# Patient Record
Sex: MALE | Race: BLACK OR AFRICAN AMERICAN | Employment: OTHER | URBAN - METROPOLITAN AREA
[De-identification: names, ages, dates, MRNs, and addresses within clinical notes are randomized per-mention and may not be internally consistent; named-entity substitution may affect disease eponyms.]

---

## 2017-01-09 RX ORDER — FOLIC ACID 1 MG/1
TABLET ORAL
Qty: 90 TAB | Refills: 3 | Status: SHIPPED | OUTPATIENT
Start: 2017-01-09 | End: 2017-12-18 | Stop reason: SDUPTHER

## 2017-04-18 ENCOUNTER — OFFICE VISIT (OUTPATIENT)
Dept: FAMILY MEDICINE CLINIC | Age: 77
End: 2017-04-18

## 2017-04-18 VITALS
DIASTOLIC BLOOD PRESSURE: 91 MMHG | BODY MASS INDEX: 26.52 KG/M2 | SYSTOLIC BLOOD PRESSURE: 139 MMHG | HEART RATE: 58 BPM | WEIGHT: 175 LBS | RESPIRATION RATE: 18 BRPM | HEIGHT: 68 IN | TEMPERATURE: 97.9 F | OXYGEN SATURATION: 98 %

## 2017-04-18 DIAGNOSIS — Z00.00 ROUTINE GENERAL MEDICAL EXAMINATION AT A HEALTH CARE FACILITY: ICD-10-CM

## 2017-04-18 DIAGNOSIS — Z71.89 ACP (ADVANCE CARE PLANNING): ICD-10-CM

## 2017-04-18 DIAGNOSIS — M19.90 OSTEOARTHRITIS, UNSPECIFIED OSTEOARTHRITIS TYPE, UNSPECIFIED SITE: Primary | ICD-10-CM

## 2017-04-18 DIAGNOSIS — Z13.39 SCREENING FOR ALCOHOLISM: ICD-10-CM

## 2017-04-18 RX ORDER — OXYCODONE AND ACETAMINOPHEN 5; 325 MG/1; MG/1
TABLET ORAL
Refills: 0 | COMMUNITY
Start: 2017-02-24 | End: 2018-03-08 | Stop reason: SDUPTHER

## 2017-04-18 RX ORDER — OXYCODONE AND ACETAMINOPHEN 5; 325 MG/1; MG/1
1 TABLET ORAL
Qty: 28 TAB | Refills: 0 | Status: SHIPPED | OUTPATIENT
Start: 2017-04-18 | End: 2017-08-18 | Stop reason: SDUPTHER

## 2017-04-18 RX ORDER — OMEPRAZOLE 40 MG/1
CAPSULE, DELAYED RELEASE ORAL
COMMUNITY
Start: 2017-02-19 | End: 2017-11-16 | Stop reason: SDUPTHER

## 2017-04-18 NOTE — PATIENT INSTRUCTIONS
Low Back Arthritis: Exercises  Your Care Instructions  Here are some examples of typical rehabilitation exercises for your condition. Start each exercise slowly. Ease off the exercise if you start to have pain. Your doctor or physical therapist will tell you when you can start these exercises and which ones will work best for you. When you are not being active, find a comfortable position for rest. Some people are comfortable on the floor or a medium-firm bed with a small pillow under their head and another under their knees. Some people prefer to lie on their side with a pillow between their knees. Don't stay in one position for too long. Take short walks (10 to 20 minutes) every 2 to 3 hours. Avoid slopes, hills, and stairs until you feel better. Walk only distances you can manage without pain, especially leg pain. How to do the exercises  Pelvic tilt    1. Lie on your back with your knees bent. 2. \"Brace\" your stomachtighten your muscles by pulling in and imagining your belly button moving toward your spine. 3. Press your lower back into the floor. You should feel your hips and pelvis rock back. 4. Hold for 6 seconds while breathing smoothly. 5. Relax and allow your pelvis and hips to rock forward. 6. Repeat 8 to 12 times. Back stretches    1. Get down on your hands and knees on the floor. 2. Relax your head and allow it to droop. Round your back up toward the ceiling until you feel a nice stretch in your upper, middle, and lower back. Hold this stretch for as long as it feels comfortable, or about 15 to 30 seconds. 3. Return to the starting position with a flat back while you are on your hands and knees. 4. Let your back sway by pressing your stomach toward the floor. Lift your buttocks toward the ceiling. 5. Hold this position for 15 to 30 seconds. 6. Repeat 2 to 4 times. Follow-up care is a key part of your treatment and safety.  Be sure to make and go to all appointments, and call your doctor if you are having problems. It's also a good idea to know your test results and keep a list of the medicines you take. Where can you learn more? Go to http://jennifer-elida.info/. Enter C978 in the search box to learn more about \"Low Back Arthritis: Exercises. \"  Current as of: May 23, 2016  Content Version: 11.2  © 8653-4081 [employer]. Care instructions adapted under license by [employer] (which disclaims liability or warranty for this information). If you have questions about a medical condition or this instruction, always ask your healthcare professional. [place] [address] any warranty or liability for your use of this information.

## 2017-04-18 NOTE — PROGRESS NOTES
Chief Complaint   Patient presents with    Arthritis     lower extremities     Patient states pain has gotten worse in the past month; states pain is worse in the morning; have been treating with oxycodone; with relief noted. 1. Have you been to the ER, urgent care clinic since your last visit? Hospitalized since your last visit? No    2. Have you seen or consulted any other health care providers outside of the [address] [place] [address] since your last visit? Include any pap smears or colon screening.  No

## 2017-04-18 NOTE — PROGRESS NOTES
Donna Mike is a 78 y.o. male   Chief Complaint   Patient presents with    Arthritis     lower extremities    pt states that he has been having increasing pain in his lower extremitites with increasing stiffness. Pain was an 8/10 this morning and the pain medication does help him to function better in his day to day activities,  checked. Pt also due for AWV. he is a 78y.o. year old male who presents for evalution. Reviewed PmHx, RxHx, FmHx, SocHx, AllgHx and updated and dated in the chart. Review of Systems - negative except as listed above in the HPI    Objective:     Vitals:    04/18/17 0723   BP: (!) 139/91   Pulse: (!) 58   Resp: 18   Temp: 97.9 °F (36.6 °C)   TempSrc: Oral   SpO2: 98%   Weight: 175 lb (79.4 kg)   Height: 5' 8\" (1.727 m)       Current Outpatient Prescriptions   Medication Sig    oxyCODONE-acetaminophen (PERCOCET) 5-325 mg per tablet TK 1 T PO  Q 4 TO 6 H PRF PAIN    omeprazole (PRILOSEC) 40 mg capsule     oxyCODONE-acetaminophen (PERCOCET) 5-325 mg per tablet Take 1 Tab by mouth every six (6) hours as needed for Pain. Max Daily Amount: 4 Tabs.  folic acid (FOLVITE) 1 mg tablet TAKE 1 TABLET BY MOUTH DAILY. EXCEPT DAY YOU TAKE METHOTREXATE.  atenolol (TENORMIN) 50 mg tablet Take 1 Tab by mouth daily.  losartan (COZAAR) 50 mg tablet Take 1 Tab by mouth daily.  metFORMIN (GLUCOPHAGE) 1,000 mg tablet TAKE 1 TABLET BY MOUTH TWICE A DAY WITH MEALS    sildenafil citrate (VIAGRA) 100 mg tablet Take 1 Tab by mouth as needed.  SLOW RELEASE IRON 143 mg (45 mg iron) ER tablet TAKE 1 TAB BY MOUTH DAILY (BEFORE BREAKFAST).  atorvastatin (LIPITOR) 40 mg tablet Take 1 Tab by mouth daily.  aspirin delayed-release 81 mg tablet Take 81 mg by mouth daily.  VYTORIN 10/80 10-80 mg per tablet      No current facility-administered medications for this visit.         Physical Examination: General appearance - alert, well appearing, and in no distress  Eyes - pupils equal and reactive, extraocular eye movements intact  Chest - clear to auscultation, no wheezes, rales or rhonchi, symmetric air entry  Heart - normal rate, regular rhythm, normal S1, S2, no murmurs, rubs, clicks or gallops  Musculoskeletal - stiffness of joints, ttp over hand/finger joints    Ext b/l LE pulses without edema  Assessment/ Plan:   Nita Greco was seen today for arthritis. Diagnoses and all orders for this visit:    Osteoarthritis, unspecified osteoarthritis type, unspecified site  -     oxyCODONE-acetaminophen (PERCOCET) 5-325 mg per tablet; Take 1 Tab by mouth every six (6) hours as needed for Pain. Max Daily Amount: 4 Tabs. Routine general medical examination at a health care facility    Screening for alcoholism    ACP (advance care planning)  Comments:  discussed       Follow-up Disposition: Not on File    I have discussed the diagnosis with the patient and the intended plan as seen in the above orders. The patient has received an after-visit summary and questions were answered concerning future plans. Pt conveyed understanding of plan. Medication Side Effects and Warnings were discussed with patient      Justyna Montes, DO       This is a Subsequent Medicare Annual Wellness Visit providing Personalized Prevention Plan Services (PPPS) (Performed 12 months after initial AWV and PPPS )    I have reviewed the patient's medical history in detail and updated the computerized patient record.      History     Past Medical History:   Diagnosis Date    CAD (coronary artery disease)     CAD (coronary artery disease), native coronary artery     Degenerative joint disease 1/28/2011    Diabetes type 1, controlled ([facility] [address]     Essential hypertension     Hyperlipidemia     Hypertension, benign     Obstructive sleep apnea 1/28/2011    ADELA (obstructive sleep apnea)       Past Surgical History:   Procedure Laterality Date    HX BACK SURGERY      HX CARPAL TUNNEL RELEASE      HX CORONARY STENT PLACEMENT      HX HEART CATHETERIZATION      HX HIP REPLACEMENT       Current Outpatient Prescriptions   Medication Sig Dispense Refill    oxyCODONE-acetaminophen (PERCOCET) 5-325 mg per tablet TK 1 T PO  Q 4 TO 6 H PRF PAIN  0    omeprazole (PRILOSEC) 40 mg capsule       oxyCODONE-acetaminophen (PERCOCET) 5-325 mg per tablet Take 1 Tab by mouth every six (6) hours as needed for Pain. Max Daily Amount: 4 Tabs. 28 Tab 0    folic acid (FOLVITE) 1 mg tablet TAKE 1 TABLET BY MOUTH DAILY. EXCEPT DAY YOU TAKE METHOTREXATE. 90 Tab 3    atenolol (TENORMIN) 50 mg tablet Take 1 Tab by mouth daily. 90 Tab 3    losartan (COZAAR) 50 mg tablet Take 1 Tab by mouth daily. 90 Tab 3    metFORMIN (GLUCOPHAGE) 1,000 mg tablet TAKE 1 TABLET BY MOUTH TWICE A DAY WITH MEALS 180 Tab 3    sildenafil citrate (VIAGRA) 100 mg tablet Take 1 Tab by mouth as needed. 18 Tab 3    SLOW RELEASE IRON 143 mg (45 mg iron) ER tablet TAKE 1 TAB BY MOUTH DAILY (BEFORE BREAKFAST). 30 Tab 5    atorvastatin (LIPITOR) 40 mg tablet Take 1 Tab by mouth daily. 90 Tab 3    aspirin delayed-release 81 mg tablet Take 81 mg by mouth daily.       VYTORIN 10/80 10-80 mg per tablet        Allergies   Allergen Reactions    Ace Inhibitors Swelling     Angioedema      Family History   Problem Relation Age of Onset   [place] Arthritis-osteo Mother     No Known Problems Father      Social History   Substance Use Topics    Smoking status: Former Smoker     Quit date: 10/24/2009    Smokeless tobacco: Never Used    Alcohol use No     Patient Active Problem List   Diagnosis Code    Coronary artery disease I25.10    Dyslipidemia E78.5    Hypertension, benign I10    Obstructive sleep apnea G47.33    Degenerative joint disease M19.90    Diabetes mellitus type 2 in nonobese ([facility] [address] E11.9    ACP (advance care planning) Z71.89    Lumbar disc disease M51.9    Cervical disc disease M50.90    Dupuytren's contracture of right hand M72.0       Depression Risk Factor Screening:     PHQ 2 / 9, over the last two weeks 4/18/2017   Little interest or pleasure in doing things Not at all   Feeling down, depressed or hopeless Not at all   Total Score PHQ 2 0     Alcohol Risk Factor Screening: On any occasion during the past 3 months, have you had more than 4 drinks containing alcohol? No    Do you average more than 14 drinks per week? No      Functional Ability and Level of Safety:     Hearing Loss   normal-to-mild    Activities of Daily Living   Self-care. Requires assistance with: no ADLs    Fall Risk     Fall Risk Assessment, last 12 mths 4/18/2017   Able to walk? Yes   Fall in past 12 months? No     Abuse Screen   Patient is not abused    Review of Systems   A comprehensive review of systems was negative except for that written in the HPI. Physical Examination     Evaluation of Cognitive Function:  Mood/affect:  happy  Appearance: age appropriate  Family member/caregiver input: n/a    See above     Patient Care Team:  Pretty Strange DO as PCP - General (Family Practice)  Mónica Foley MD (Orthopedic Surgery)  Jayna Ramirez MD (Orthopedic Surgery)  Chhaya Rios MD (Orthopedic Surgery)    Advice/Referrals/Counseling   Education and counseling provided:  Are appropriate based on today's review and evaluation  End-of-Life planning (with patient's consent)      Assessment/Plan       ICD-10-CM ICD-9-CM    1. Osteoarthritis, unspecified osteoarthritis type, unspecified site M19.90 715.90 oxyCODONE-acetaminophen (PERCOCET) 5-325 mg per tablet   2. Routine general medical examination at a health care facility Z00.00 V70.0    3. Screening for alcoholism Z13.89 V79.1    4. ACP (advance care planning) Z71.89 V65.49     discussed   . I have discussed the diagnosis with the patient and the intended plan as seen in the above orders. The patient has received an after-visit summary and questions were answered concerning future plans. Pt conveyed understanding of plan.       Dr Pretty Strange

## 2017-04-18 NOTE — MR AVS SNAPSHOT
Visit Information Date & Time Provider Department Dept. Phone Encounter #  
 4/18/2017  7:30 AM Clarke Cabezas [phone] [id number] Your Appointments 4/28/2017  7:15 AM  
ESTABLISHED PATIENT with Bob Sukhidayana Amador DO [address] ([facility]) Appt Note: fuv on diabetes [address] [address] [address]  
[phone]  
  
   
 [address] [address] [address]  
  
    
 12/15/2017  2:00 PM  
ESTABLISHED PATIENT with Marvin Paz MD  
[facility] (AVI SCHEDULING) Appt Note: annual  
 [address] [facility] [place] [address]  
[address] [facility] [place] [address] [address] Upcoming Health Maintenance Date Due COLONOSCOPY 2/19/1960 ZOSTER VACCINE AGE 60> 2/19/2002 MEDICARE YEARLY EXAM 10/27/2016 FOOT EXAM Q1 10/27/2016 HEMOGLOBIN A1C Q6M 4/28/2017 MICROALBUMIN Q1 7/28/2017 EYE EXAM RETINAL OR DILATED Q1 8/30/2017 LIPID PANEL Q1 10/28/2017 GLAUCOMA SCREENING Q2Y 8/30/2018 DTaP/Tdap/Td series (2 - Td) 10/27/2025 Allergies as of 4/18/2017  Review Complete On: 4/18/2017 By: Aislinn Mandel LPN Severity Noted Reaction Type Reactions Ace Inhibitors  11/16/2010    Swelling Angioedema Current Immunizations  Reviewed on 9/1/2016 Name Date Influenza Vaccine 9/25/2016 Influenza Vaccine (Quad) PF 9/29/2015 Pneumococcal Conjugate (PCV-13) 10/27/2015 Pneumococcal Polysaccharide (PPSV-23) 10/1/2012 Tdap 10/27/2015 Not reviewed this visit You Were Diagnosed With   
  
 Codes Comments Osteoarthritis, unspecified osteoarthritis type, unspecified site    -  Primary ICD-10-CM: M19.90 ICD-9-CM: 715.90 Routine general medical examination at a health care facility     ICD-10-CM: Z00.00 ICD-9-CM: V70.0 Screening for alcoholism     ICD-10-CM: Z13.89 ICD-9-CM: V79.1 ACP (advance care planning)     ICD-10-CM: Z71.89 ICD-9-CM: V65.49 discussed Vitals BP Pulse Temp Resp Height(growth percentile) Weight(growth percentile) (!) 139/91 (BP 1 Location: Right arm, BP Patient Position: Sitting) (!) 58 97.9 °F (36.6 °C) (Oral) 18 5' 8\" (1.727 m) 175 lb (79.4 kg) SpO2 BMI Smoking Status 98% 26.61 kg/m2 Former Smoker Vitals History BMI and BSA Data Body Mass Index Body Surface Area  
 26.61 kg/m 2 1.95 m 2 Preferred Pharmacy Pharmacy Name Phone [facility] [place] [address], [address] [facility] [phone] Your Updated Medication List  
  
   
This list is accurate as of: 4/18/17  7:48 AM.  Always use your most recent med list.  
  
  
  
  
 aspirin delayed-release 81 mg tablet Take 81 mg by mouth daily. atenolol 50 mg tablet Commonly known as:  TENORMIN Take 1 Tab by mouth daily. atorvastatin 40 mg tablet Commonly known as:  LIPITOR Take 1 Tab by mouth daily. folic acid 1 mg tablet Commonly known as:  FOLVITE  
TAKE 1 TABLET BY MOUTH DAILY. EXCEPT DAY YOU TAKE METHOTREXATE. losartan 50 mg tablet Commonly known as:  COZAAR Take 1 Tab by mouth daily. metFORMIN 1,000 mg tablet Commonly known as:  GLUCOPHAGE  
TAKE 1 TABLET BY MOUTH TWICE A DAY WITH MEALS  
  
 omeprazole 40 mg capsule Commonly known as:  PRILOSEC  
  
 * oxyCODONE-acetaminophen 5-325 mg per tablet Commonly known as:  PERCOCET TK 1 T PO  Q 4 TO 6 H PRF PAIN  
  
 * oxyCODONE-acetaminophen 5-325 mg per tablet Commonly known as:  PERCOCET Take 1 Tab by mouth every six (6) hours as needed for Pain. Max Daily Amount: 4 Tabs.  
  
 sildenafil citrate 100 mg tablet Commonly known as:  VIAGRA Take 1 Tab by mouth as needed. SLOW RELEASE IRON 143 mg (45 mg iron) Tber Generic drug:  ferrous sulfate TAKE 1 TAB BY MOUTH DAILY (BEFORE BREAKFAST). Vytorin 10/80 10-80 mg per tablet Generic drug:  ezetimibe-simvastatin * Notice: This list has 2 medication(s) that are the same as other medications prescribed for you. Read the directions carefully, and ask your doctor or other care provider to review them with you. Prescriptions Printed Refills  
 oxyCODONE-acetaminophen (PERCOCET) 5-325 mg per tablet 0 Sig: Take 1 Tab by mouth every six (6) hours as needed for Pain. Max Daily Amount: 4 Tabs. Class: Print Route: Oral  
  
Patient Instructions Low Back Arthritis: Exercises Your Care Instructions Here are some examples of typical rehabilitation exercises for your condition. Start each exercise slowly. Ease off the exercise if you start to have pain. Your doctor or physical therapist will tell you when you can start these exercises and which ones will work best for you. When you are not being active, find a comfortable position for rest. Some people are comfortable on the floor or a medium-firm bed with a small pillow under their head and another under their knees. Some people prefer to lie on their side with a pillow between their knees. Don't stay in one position for too long. Take short walks (10 to 20 minutes) every 2 to 3 hours. Avoid slopes, hills, and stairs until you feel better. Walk only distances you can manage without pain, especially leg pain. How to do the exercises Pelvic tilt 1. Lie on your back with your knees bent. 2. \"Brace\" your stomachtighten your muscles by pulling in and imagining your belly button moving toward your spine. 3. Press your lower back into the floor. You should feel your hips and pelvis rock back. 4. Hold for 6 seconds while breathing smoothly. 5. Relax and allow your pelvis and hips to rock forward. 6. Repeat 8 to 12 times. Back stretches 1. Get down on your hands and knees on the floor. 2. Relax your head and allow it to droop.  Round your back up toward the ceiling until you feel a nice stretch in your upper, middle, and lower back. Hold this stretch for as long as it feels comfortable, or about 15 to 30 seconds. 3. Return to the starting position with a flat back while you are on your hands and knees. 4. Let your back sway by pressing your stomach toward the floor. Lift your buttocks toward the ceiling. 5. Hold this position for 15 to 30 seconds. 6. Repeat 2 to 4 times. Follow-up care is a key part of your treatment and safety. Be sure to make and go to all appointments, and call your doctor if you are having problems. It's also a good idea to know your test results and keep a list of the medicines you take. Where can you learn more? Go to http://jennifer-elida.info/. Enter O297 in the search box to learn more about \"Low Back Arthritis: Exercises. \" Current as of: May 23, 2016 Content Version: 11.2 © 7174-6838 [employer]. Care instructions adapted under license by [employer] (which disclaims liability or warranty for this information). If you have questions about a medical condition or this instruction, always ask your healthcare professional. [address] any warranty or liability for your use of this information. Introducing \[facility]\"" & HEALTH SERVICES! Kirk Woodard introduces [employer] patient portal. Now you can access parts of your medical record, email your doctor's office, and request medication refills online. 1. In your internet browser, go to [email or web address] 2. Click on the First Time User? Click Here link in the Sign In box. You will see the New Member Sign Up page. 3. Enter your [employer] Access Code exactly as it appears below. You will not need to use this code after youve completed the sign-up process. If you do not sign up before the expiration date, you must request a new code. · [employer] Access Code: [email or web address] Expires: 7/17/2017  7:48 AM 
 
 4. Enter the last four digits of your Social Security Number (xxxx) and Date of Birth (mm/dd/yyyy) as indicated and click Submit. You will be taken to the next sign-up page. 5. Create a [employer] ID. This will be your [employer] login ID and cannot be changed, so think of one that is secure and easy to remember. 6. Create a [employer] password. You can change your password at any time. 7. Enter your Password Reset Question and Answer. This can be used at a later time if you forget your password. 8. Enter your e-mail address. You will receive e-mail notification when new information is available in [address] 9. Click Sign Up. You can now view and download portions of your medical record. 10. Click the Download Summary menu link to download a portable copy of your medical information. If you have questions, please visit the Frequently Asked Questions section of the [employer] website. Remember, [employer] is NOT to be used for urgent needs. For medical emergencies, dial 911. Now available from your iPhone and Android! Please provide this summary of care documentation to your next provider. Your primary care clinician is listed as Vilinda Screen. If you have any questions after today's visit, please call [phone].

## 2017-08-09 DIAGNOSIS — Z71.89 ACP (ADVANCE CARE PLANNING): Primary | ICD-10-CM

## 2017-08-18 ENCOUNTER — HOSPITAL ENCOUNTER (OUTPATIENT)
Dept: LAB | Age: 77
Discharge: HOME | End: 2017-08-18
Payer: MEDICARE

## 2017-08-18 ENCOUNTER — OFFICE VISIT (OUTPATIENT)
Dept: FAMILY MEDICINE CLINIC | Age: 77
End: 2017-08-18

## 2017-08-18 VITALS
SYSTOLIC BLOOD PRESSURE: 122 MMHG | TEMPERATURE: 97.8 F | OXYGEN SATURATION: 97 % | HEIGHT: 68 IN | WEIGHT: 175 LBS | BODY MASS INDEX: 26.52 KG/M2 | RESPIRATION RATE: 18 BRPM | HEART RATE: 62 BPM | DIASTOLIC BLOOD PRESSURE: 82 MMHG

## 2017-08-18 DIAGNOSIS — E11.9 DIABETES MELLITUS TYPE 2 IN NONOBESE (HCC): Primary | ICD-10-CM

## 2017-08-18 DIAGNOSIS — Z51.81 MEDICATION MONITORING ENCOUNTER: ICD-10-CM

## 2017-08-18 DIAGNOSIS — M19.90 OSTEOARTHRITIS, UNSPECIFIED OSTEOARTHRITIS TYPE, UNSPECIFIED SITE: ICD-10-CM

## 2017-08-18 DIAGNOSIS — M15.9 PRIMARY OSTEOARTHRITIS INVOLVING MULTIPLE JOINTS: ICD-10-CM

## 2017-08-18 DIAGNOSIS — I10 HYPERTENSION, BENIGN: ICD-10-CM

## 2017-08-18 LAB
BARBITURATES UR POC: NEGATIVE
BENZODIAZEPINES UR POC: NEGATIVE
COCAINE QL URINE POC: NEGATIVE
LOT EXP DATE POC: NORMAL
LOT NUMBER POC: NORMAL
MARIJUANA (THC) QL URINE POC: NEGATIVE
MDMA/ECSTASY UR POC: NEGATIVE
METHADONE QL URINE POC: NEGATIVE
METHAMPHETAMINE QL URINE POC: NEGATIVE
NTI OTHER MICRO TRANSPORT 977598: NEGATIVE
OPIATES QL URINE POC: NEGATIVE
OXYCODONE UR POC: NEGATIVE
VALID INTERNAL CONTROL?: YES

## 2017-08-18 PROCEDURE — 82043 UR ALBUMIN QUANTITATIVE: CPT

## 2017-08-18 PROCEDURE — 80053 COMPREHEN METABOLIC PANEL: CPT

## 2017-08-18 PROCEDURE — 83036 HEMOGLOBIN GLYCOSYLATED A1C: CPT

## 2017-08-18 RX ORDER — OXYCODONE AND ACETAMINOPHEN 5; 325 MG/1; MG/1
1 TABLET ORAL
Qty: 30 TAB | Refills: 0 | Status: SHIPPED | OUTPATIENT
Start: 2017-08-18 | End: 2017-10-24 | Stop reason: SDUPTHER

## 2017-08-18 RX ORDER — LIDOCAINE HYDROCHLORIDE 10 MG/ML
4 INJECTION INFILTRATION; PERINEURAL ONCE
Qty: 4 ML | Refills: 0
Start: 2017-08-18 | End: 2017-08-18

## 2017-08-18 RX ORDER — METHYLPREDNISOLONE ACETATE 80 MG/ML
80 INJECTION, SUSPENSION INTRA-ARTICULAR; INTRALESIONAL; INTRAMUSCULAR; SOFT TISSUE ONCE
Qty: 2 ML | Refills: 0
Start: 2017-08-18 | End: 2017-08-18

## 2017-08-18 RX ORDER — BUPIVACAINE HYDROCHLORIDE 2.5 MG/ML
4 INJECTION, SOLUTION INFILTRATION; PERINEURAL ONCE
Qty: 4 ML | Refills: 0
Start: 2017-08-18 | End: 2017-08-18

## 2017-08-18 NOTE — PATIENT INSTRUCTIONS
Arthritis: Care Instructions  Your Care Instructions  Arthritis, also called osteoarthritis, is a breakdown of the cartilage that cushions your joints. When the cartilage wears down, your bones rub against each other. This causes pain and stiffness. Many people have some arthritis as they age. Arthritis most often affects the joints of the spine, hands, hips, knees, or feet. You can take simple measures to protect your joints, ease your pain, and help you stay active. Follow-up care is a key part of your treatment and safety. Be sure to make and go to all appointments, and call your doctor if you are having problems. It's also a good idea to know your test results and keep a list of the medicines you take. How can you care for yourself at home? · Stay at a healthy weight. Being overweight puts extra strain on your joints. · Talk to your doctor or physical therapist about exercises that will help ease joint pain. ¨ Stretch. You may enjoy gentle forms of yoga to help keep your joints and muscles flexible. ¨ Walk instead of jog. Other types of exercise that are less stressful on the joints include riding a bicycle, swimming, spencer chi, or water exercise. ¨ Lift weights. Strong muscles help reduce stress on your joints. Stronger thigh muscles, for example, take some of the stress off of the knees and hips. Learn the right way to lift weights so you do not make joint pain worse. · Take your medicines exactly as prescribed. Call your doctor if you think you are having a problem with your medicine. · Take pain medicines exactly as directed. ¨ If the doctor gave you a prescription medicine for pain, take it as prescribed. ¨ If you are not taking a prescription pain medicine, ask your doctor if you can take an over-the-counter medicine. · Use a cane, crutch, walker, or another device if you need help to get around. These can help rest your joints.  You also can use other things to make life easier, such as a higher toilet seat and padded handles on kitchen utensils. · Do not sit in low chairs, which can make it hard to get up. · Put heat or cold on your sore joints as needed. Use whichever helps you most. You also can take turns with hot and cold packs. ¨ Apply heat 2 or 3 times a day for 20 to 30 minutesusing a heating pad, hot shower, or hot packto relieve pain and stiffness. ¨ Put ice or a cold pack on your sore joint for 10 to 20 minutes at a time. Put a thin cloth between the ice and your skin. When should you call for help? Call your doctor now or seek immediate medical care if:  · You have sudden swelling, warmth, or pain in any joint. · You have joint pain and a fever or rash. · You have such bad pain that you cannot use a joint. Watch closely for changes in your health, and be sure to contact your doctor if:  · You have mild joint symptoms that continue even with more than 6 weeks of care at home. · You have stomach pain or other problems with your medicine. Where can you learn more? Go to http://jennifer-elida.info/. Enter T147 in the search box to learn more about \"Arthritis: Care Instructions. \"  Current as of: November 28, 2016  Content Version: 11.3  © 4274-7719 [employer]. Care instructions adapted under license by [employer] (which disclaims liability or warranty for this information). If you have questions about a medical condition or this instruction, always ask your healthcare professional. [place] [address] any warranty or liability for your use of this information.

## 2017-08-18 NOTE — LETTER
[address] [place] [address]:8/[id number] MR #:[id number] Provider Name:Jose Ramon Amador DO  
*[id number]* BSMG-491 (5/16) Page 1 of 5 Initial [employer] CONTROLLED SUBSTANCE AGREEMENT I may be prescribed medications that are controlled substances as part  of my treatment plan for management of my medical condition(s). The goal of my treatment plan is to maintain and/or improve my health and wellbeing. Because controlled substances have an increased risk of abuse or harm, continual re-evaluation is needed determine if the goals of my treatment plan are being met for my safety and the safety of others. Carlen Schirmer  am entering into this Controlled Substance Agreement with my provider, [address] [place] [address] [place],  at [address] . I understand that successful treatment requires mutual trust and honesty between me and my provider. I understand that there are state and federal laws and regulations which apply to the medications that my provider may prescribe that must be followed. I understand there are risks and benefits ts of taking the medicines that my provider may prescribe. I understand and agree that following this Agreement is necessary in continuing my provider-patient relationship and success of my treatment plan. As a part of my treatment plan, I agree to the following: COMMUNICATION: 
 
1. I will communicate fully with my provider about my medical condition(s), including the effect on my daily life and how well my medications are helping. I will tell my provider all of the medications that I take for any reason, including medications I receive from another health care provider, and will notify my provider about all issues, problems or concerns, including any side effects, which may be related to my medications. I understand that this information allows my provider to adjust my treatment plan to help manage my medical condition.  I understand that this information will become part of my permanent medical record. 2. I will notify my provider if I have a history of alcohol/drug misuse/addiction or if I have had treatment for alcohol/drug addiction in the past, or if I have a new problem with or concern about alcohol/drug use/addiction, because this increases the likelihood of high risk behaviors and may lead to serious medical conditions. 3. Females Only: I will notify my provider if I am or become pregnant, or if I intend to become pregnant, or if I intend to breastfeed. I understand that communication of these issues with my provider is important, due to possible effects my medication could have on an unborn fetus or breastfeeding child. [address] [place] [address] FXA:0/66/9999 MR #:[id number] Provider Name:Jose Ramon MERA DO [place]  
*[id number]* BSMG-491 (5/16) Page 2 of 5 Initial [employer] MISUSE OF MEDICATIONS / DRUGS: 
 
1. I agree to take all controlled substances as prescribed, and will not misuse or abuse any controlled substances prescribed by my provider. For my safety, I will not increase the amount of medicine I take without first talking with and getting permission from my provider. 2. If I have a medical emergency, another health care provider may prescribe me medication. If I seek emergency treatment, I will notify my provider within seventy-two (72) hours. 3. I understand that my provider may discuss my use and/or possible misuse/abuse of controlled substances and alcohol, as appropriate, with any health care provider involved in my care, pharmacist or legal authority. ILLEGAL DRUGS: 
 
1. I will not use illegal drugs of any kind, including but not limited to marijuana, heroin, cocaine, or any prescription drug which is not prescribed to me. DRUG DIVERSION / PRESCRIPTION FRAUD: 
 
1. I will not share, sell, trade, give away, or otherwise misuse my prescriptions or medications. 2. I will not alter any prescriptions provided to me by my provider. SINGLE PROVIDER: 
 
1. I agree that all controlled substances that I take will be prescribed only by my provider (or his/her covering provider) under this Agreement. This agreement does not prevent me from seeking emergency medical treatment or receiving pain management related to a surgery. PROTECTING MEDICATIONS: 
 
1. I am responsible for keeping my prescriptions and medications in a safe and secure place including safeguarding them from loss or theft. I understand that lost, stolen or damaged/destroyed prescriptions or medications will not be replaced. [address] [place] [address] QDQ:5/86/2687 MR #:[id number] Provider Name:Jose Ramon Amador DO  
*[id number]* BSMG-491 (5/16) Page 3 of 5 Initial [employer] PRESCRIPTION RENEWALS/REFILLS: 
 
1. I will follow my controlled substance medication schedule as prescribed by my provider. 2. I understand and agree that I will make any requests for renewals or refills of my prescriptions only at the time of an office visit or during my providers regular office hours subject to the prescription refill requirements of the individual practice. 3. I understand that my provider may not call in prescriptions for controlled substances to my pharmacy. 4. I understand that my provider may adjust or discontinue these medications as deemed appropriate for my medical treatment plan. This Agreement does not guarantee the prescription of controlled medications. 5. I agree that if my medications are adjusted or discontinued, I will properly dispose of any remaining medications. I understand that I will be required to dispose of any remaining controlled medications prior to being provided with any prescriptions for other controlled medications.  
 
 
1. I authorize my provider and my pharmacy to cooperate fully with any local, state, or federal law enforcement agency in the investigation of any possible misuse, sale, or other diversion of my controlled substance prescriptions or medications. RISKS: 
 
 
1. I understand that if I do not adhere to this Agreement in any way, my provider may change my prescriptions, stop prescribing controlled substances or end our provider-patient relationship. 2. If my provider decides to stop prescribing medication, or decides to end our provider-patient relationship,my provider may require that I taper my medications slowly. If necessary, my provider may also provide a prescription for other medications to treat my withdrawal symptoms. UNDERSTANDING THIS AGREEMENT: 
 
I understand that my provider may adjust or stop my prescriptions for controlled substances based on my medical condition and my treatment plan. I understand that this Agreement does not guarantee that I will be prescribed medications or controlled substances. I understand that controlled substances may be just one part 
of my treatment plan.  
 
My initial on each page and my signature below shows that I have read each page of this Agreement, I have had an opportunity to ask questions, and all of my questions have been answered to my satisfaction by my provider. By signing below, I agree to comply with this Agreement, and I understand that if I do not follow the Agreements listed above, my provider may stop 
 
 
 
_________________________________________  Date/Time 8/18/2017 10:16 AM   
             (Patient Signature)

## 2017-08-18 NOTE — PROGRESS NOTES
Pt requesting cortisone inj in shoulders  Also small red itchy areas on lower legs, pt has been outside cutting grass recently, noticed after

## 2017-08-18 NOTE — PROGRESS NOTES
Samantha Hall is a 78 y.o. 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 Pt received oxycodone from me in April and still has 1 pill left but he did get tramadol from [place] and statesit does not do anything for hi hossein would like to maintain pain management with me. Pt will sign a pain contract and he has been doing interventional without much relief. Pt tried and failed gabapentin and states he was taking 6 pills a day with no relief at all. Pt is planning to dispose of the tramadol and I discussed proper disposal.  Pt would also like b/l shoulder injections too which he has had before with relief. Pt also reports that his joints in his legs lock up often and cause him chronic pain pain is a 8/10 at worst and with pain medication brings to a 2-3/10. This is a chronic problem that is not changed. Per review of available records and patients , there are not sign of overuse, misuse, diversion, or concerning side effects. Today we reviewed: the risk of overdose, addiction, and dependency proper storage and disposal of medications the goals of treatment (improve functionality, quality of life, and pain)  The following changes were made to the patients current treatment plan: nothing, medications refilled. Pt is also due for his diabetes check and has been well controlled in past at goal.  Pt is not fasting today. BP remains well controlled. he is a 78y.o. year old male who presents for evalution. Reviewed PmHx, RxHx, FmHx, SocHx, AllgHx and updated and dated in the chart.     Review of Systems - negative except as listed above in the HPI    Objective:     Vitals:    08/18/17 0949   BP: 122/82   Pulse: 62   Resp: 18   Temp: 97.8 °F (36.6 °C)   TempSrc: Oral   SpO2: 97%   Weight: 175 lb (79.4 kg)   Height: 5' 8\" (1.727 m)       Current Outpatient Prescriptions   Medication Sig    oxyCODONE-acetaminophen (PERCOCET) 5-325 mg per tablet Take 1 Tab by mouth every eight (8) hours as needed for Pain. Max Daily Amount: 3 Tabs.  methylPREDNISolone acetate (DEPO-MEDROL) 80 mg/mL injection 1 mL by Intra artICUlar route once for 1 dose. To each shoulder    lidocaine (XYLOCAINE) 10 mg/mL (1 %) injection 4 mL by Intra artICUlar route once for 1 dose. 2mL each shoulder    bupivacaine (MARCAINE) 0.25 % (2.5 mg/mL) soln injection 4 mL by Intra artICUlar route once for 1 dose. 2 mL into each shoulder    oxyCODONE-acetaminophen (PERCOCET) 5-325 mg per tablet TK 1 T PO  Q 4 TO 6 H PRF PAIN    omeprazole (PRILOSEC) 40 mg capsule     folic acid (FOLVITE) 1 mg tablet TAKE 1 TABLET BY MOUTH DAILY. EXCEPT DAY YOU TAKE METHOTREXATE.  atenolol (TENORMIN) 50 mg tablet Take 1 Tab by mouth daily.  losartan (COZAAR) 50 mg tablet Take 1 Tab by mouth daily.  metFORMIN (GLUCOPHAGE) 1,000 mg tablet TAKE 1 TABLET BY MOUTH TWICE A DAY WITH MEALS    sildenafil citrate (VIAGRA) 100 mg tablet Take 1 Tab by mouth as needed.  SLOW RELEASE IRON 143 mg (45 mg iron) ER tablet TAKE 1 TAB BY MOUTH DAILY (BEFORE BREAKFAST).  VYTORIN 10/80 10-80 mg per tablet     atorvastatin (LIPITOR) 40 mg tablet Take 1 Tab by mouth daily.  aspirin delayed-release 81 mg tablet Take 81 mg by mouth daily. No current facility-administered medications for this visit. Physical Examination: General appearance - alert, well appearing, and in no distress  Mental status - alert, oriented to person, place, and time  Eyes - pupils equal and reactive, extraocular eye movements intact  Chest - clear to auscultation, no wheezes, rales or rhonchi, symmetric air entry  Heart - normal rate, regular rhythm, normal S1, S2, no murmurs, rubs, clicks or gallops  Musculoskeletal - abnormal exam of both shoulder with ttp over b/l supraspinatus tendon regions pain with resisted abduction and ext rotation b/l.   Pain with motion noted at b/l hips and knees  No hip bursa ttp    Assessment/ Plan:   Diagnoses and all orders for this visit:    1. Diabetes mellitus type 2 in nonobese (HCC)  -     HEMOGLOBIN A1C WITH EAG  -     MICROALBUMIN, UR, RAND W/ MICROALBUMIN/CREA RATIO  -     METABOLIC PANEL, COMPREHENSIVE    2. Hypertension, benign    3. Primary osteoarthritis involving multiple joints  -     20610 - DRAIN/INJECT LARGE JOINT/BURSA  -     methylPREDNISolone acetate (DEPO-MEDROL) 80 mg/mL injection; 1 mL by Intra artICUlar route once for 1 dose. To each shoulder  -     lidocaine (XYLOCAINE) 10 mg/mL (1 %) injection; 4 mL by Intra artICUlar route once for 1 dose. 2mL each shoulder  -     bupivacaine (MARCAINE) 0.25 % (2.5 mg/mL) soln injection; 4 mL by Intra artICUlar route once for 1 dose. 2 mL into each shoulder    4. Osteoarthritis, unspecified osteoarthritis type, unspecified site  -     oxyCODONE-acetaminophen (PERCOCET) 5-325 mg per tablet; Take 1 Tab by mouth every eight (8) hours as needed for Pain. Max Daily Amount: 3 Tabs. -     20610 - DRAIN/INJECT LARGE JOINT/BURSA  -     methylPREDNISolone acetate (DEPO-MEDROL) 80 mg/mL injection; 1 mL by Intra artICUlar route once for 1 dose. To each shoulder  -     lidocaine (XYLOCAINE) 10 mg/mL (1 %) injection; 4 mL by Intra artICUlar route once for 1 dose. 2mL each shoulder  -     bupivacaine (MARCAINE) 0.25 % (2.5 mg/mL) soln injection; 4 mL by Intra artICUlar route once for 1 dose. 2 mL into each shoulder    5. Medication monitoring encounter  -     DEMETRIO CORDERO ICUP DX DRUG SCREEN 10     utox appropriate, pain contract signed  Follow-up Disposition:  Return in about 6 months (around 2/18/2018), or if symptoms worsen or fail to improve. I have discussed the diagnosis with the patient and the intended plan as seen in the above orders. The patient has received an after-visit summary and questions were answered concerning future plans. Pt conveyed understanding of plan.     Medication Side Effects and Warnings were discussed with patient      Dara Calloway DO   Patient has agreed to the procedure and understands the risk of adverse reactions. Procedure:  Joint Injection:  Bilateral shoulder    Injected joint(s) with sterile technique using 5cc of mixed 1% Lidocaine 2cc with 0.25% Marcaine 2cc, DepoMedrol 1cc with relief and no adverse reaction to procedure. Patient given instructions and expectations of after visit care.        [facility] ([facility]  OFFICE PROCEDURE PROGRESS NOTE        Chart reviewed for the following:   ISelene DO, have reviewed the History, Physical and updated the Allergic reactions for uwjerry Wrench     TIME OUT performed immediately prior to start of procedure:   Dara NINO DO, have performed the following reviews on Alyson Christine prior to the start of the procedure:            * Patient was identified by name and date of birth   * Agreement on procedure being performed was verified  * Risks and Benefits explained to the patient  * Procedure site verified and marked as necessary  * Patient was positioned for comfort  * Consent was signed and verified     Time: 1045am      Date of procedure: 5/18/2017    Procedure performed by:  Dara Calloway DO    Provider assisted by: self DO    Patient assisted by: self    How tolerated by patient: tolerated the procedure well with no complications    Post Procedural Pain Scale: 0 - No Hurt    Comments: none

## 2017-08-19 LAB
ALBUMIN SERPL-MCNC: 4.1 G/DL (ref 3.5–4.8)
ALBUMIN/CREAT UR: 3.7 MG/G CREAT (ref 0–30)
ALBUMIN/GLOB SERPL: 1.6 {RATIO} (ref 1.2–2.2)
ALP SERPL-CCNC: 73 IU/L (ref 39–117)
ALT SERPL-CCNC: 9 IU/L (ref 0–44)
AST SERPL-CCNC: 14 IU/L (ref 0–40)
BILIRUB SERPL-MCNC: 0.3 MG/DL (ref 0–1.2)
BUN SERPL-MCNC: 10 MG/DL (ref 8–27)
BUN/CREAT SERPL: 13 (ref 10–24)
CALCIUM SERPL-MCNC: 8.6 MG/DL (ref 8.6–10.2)
CHLORIDE SERPL-SCNC: 103 MMOL/L (ref 96–106)
CO2 SERPL-SCNC: 22 MMOL/L (ref 18–29)
CREAT SERPL-MCNC: 0.8 MG/DL (ref 0.76–1.27)
CREAT UR-MCNC: 94.2 MG/DL
EST. AVERAGE GLUCOSE BLD GHB EST-MCNC: 120 MG/DL
GLOBULIN SER CALC-MCNC: 2.6 G/DL (ref 1.5–4.5)
GLUCOSE SERPL-MCNC: 93 MG/DL (ref 65–99)
HBA1C MFR BLD: 5.8 % (ref 4.8–5.6)
MICROALBUMIN UR-MCNC: 3.5 UG/ML
POTASSIUM SERPL-SCNC: 4 MMOL/L (ref 3.5–5.2)
PROT SERPL-MCNC: 6.7 G/DL (ref 6–8.5)
SODIUM SERPL-SCNC: 142 MMOL/L (ref 134–144)

## 2017-08-28 RX ORDER — ATORVASTATIN CALCIUM 40 MG/1
TABLET, FILM COATED ORAL
Qty: 90 TAB | Refills: 3 | Status: SHIPPED | OUTPATIENT
Start: 2017-08-28 | End: 2017-10-24 | Stop reason: SDUPTHER

## 2017-10-24 ENCOUNTER — OFFICE VISIT (OUTPATIENT)
Dept: FAMILY MEDICINE CLINIC | Age: 77
End: 2017-10-24

## 2017-10-24 VITALS
TEMPERATURE: 98.4 F | OXYGEN SATURATION: 98 % | BODY MASS INDEX: 26.37 KG/M2 | RESPIRATION RATE: 18 BRPM | DIASTOLIC BLOOD PRESSURE: 64 MMHG | HEART RATE: 54 BPM | SYSTOLIC BLOOD PRESSURE: 132 MMHG | HEIGHT: 68 IN | WEIGHT: 174 LBS

## 2017-10-24 DIAGNOSIS — I10 HYPERTENSION, BENIGN: ICD-10-CM

## 2017-10-24 DIAGNOSIS — N52.01 ERECTILE DYSFUNCTION DUE TO ARTERIAL INSUFFICIENCY: ICD-10-CM

## 2017-10-24 DIAGNOSIS — R80.9 MICROALBUMINURIA: ICD-10-CM

## 2017-10-24 DIAGNOSIS — G56.03 BILATERAL CARPAL TUNNEL SYNDROME: Primary | ICD-10-CM

## 2017-10-24 DIAGNOSIS — E11.9 DIABETES MELLITUS TYPE 2 IN NONOBESE (HCC): ICD-10-CM

## 2017-10-24 DIAGNOSIS — M19.90 OSTEOARTHRITIS, UNSPECIFIED OSTEOARTHRITIS TYPE, UNSPECIFIED SITE: ICD-10-CM

## 2017-10-24 DIAGNOSIS — I25.10 CORONARY ARTERY DISEASE INVOLVING NATIVE CORONARY ARTERY OF NATIVE HEART WITHOUT ANGINA PECTORIS: ICD-10-CM

## 2017-10-24 DIAGNOSIS — Z51.81 MEDICATION MONITORING ENCOUNTER: ICD-10-CM

## 2017-10-24 LAB
BARBITURATES UR POC: NEGATIVE
BENZODIAZEPINES UR POC: NEGATIVE
COCAINE QL URINE POC: NEGATIVE
LOT EXP DATE POC: NORMAL
LOT NUMBER POC: NORMAL
MARIJUANA (THC) QL URINE POC: NEGATIVE
MDMA/ECSTASY UR POC: NEGATIVE
METHADONE QL URINE POC: NEGATIVE
METHAMPHETAMINE QL URINE POC: NEGATIVE
NTI OTHER MICRO TRANSPORT 977598: NEGATIVE
OPIATES QL URINE POC: NEGATIVE
OXYCODONE UR POC: NORMAL
VALID INTERNAL CONTROL?: YES

## 2017-10-24 RX ORDER — SILDENAFIL 100 MG/1
100 TABLET, FILM COATED ORAL AS NEEDED
Qty: 18 TAB | Refills: 3 | Status: SHIPPED | OUTPATIENT
Start: 2017-10-24 | End: 2018-12-19 | Stop reason: SDUPTHER

## 2017-10-24 RX ORDER — LOSARTAN POTASSIUM 50 MG/1
50 TABLET ORAL DAILY
Qty: 90 TAB | Refills: 3 | Status: SHIPPED | OUTPATIENT
Start: 2017-10-24 | End: 2017-10-24 | Stop reason: SDUPTHER

## 2017-10-24 RX ORDER — ATENOLOL 50 MG/1
50 TABLET ORAL DAILY
Qty: 90 TAB | Refills: 3 | Status: SHIPPED | OUTPATIENT
Start: 2017-10-24 | End: 2017-10-24 | Stop reason: SDUPTHER

## 2017-10-24 RX ORDER — OXYCODONE AND ACETAMINOPHEN 5; 325 MG/1; MG/1
1 TABLET ORAL
Qty: 30 TAB | Refills: 0 | Status: SHIPPED | OUTPATIENT
Start: 2017-10-24 | End: 2017-12-19 | Stop reason: SDUPTHER

## 2017-10-24 RX ORDER — METFORMIN HYDROCHLORIDE 1000 MG/1
TABLET ORAL
Qty: 180 TAB | Refills: 3 | Status: SHIPPED | OUTPATIENT
Start: 2017-10-24 | End: 2017-10-24 | Stop reason: SDUPTHER

## 2017-10-24 RX ORDER — ATORVASTATIN CALCIUM 40 MG/1
TABLET, FILM COATED ORAL
Qty: 90 TAB | Refills: 3 | Status: SHIPPED | OUTPATIENT
Start: 2017-10-24 | End: 2018-10-23 | Stop reason: SDUPTHER

## 2017-10-24 RX ORDER — PREDNISONE 10 MG/1
TABLET ORAL
Qty: 42 TAB | Refills: 0 | Status: SHIPPED | OUTPATIENT
Start: 2017-10-24 | End: 2017-12-19

## 2017-10-24 RX ORDER — METFORMIN HYDROCHLORIDE 1000 MG/1
TABLET ORAL
Qty: 180 TAB | Refills: 3 | Status: SHIPPED | OUTPATIENT
Start: 2017-10-24 | End: 2018-11-25 | Stop reason: SDUPTHER

## 2017-10-24 RX ORDER — SILDENAFIL 100 MG/1
100 TABLET, FILM COATED ORAL AS NEEDED
Qty: 18 TAB | Refills: 3 | Status: SHIPPED | OUTPATIENT
Start: 2017-10-24 | End: 2017-10-24 | Stop reason: SDUPTHER

## 2017-10-24 RX ORDER — ATENOLOL 50 MG/1
50 TABLET ORAL DAILY
Qty: 90 TAB | Refills: 3 | Status: SHIPPED | OUTPATIENT
Start: 2017-10-24 | End: 2017-10-25

## 2017-10-24 RX ORDER — LOSARTAN POTASSIUM 50 MG/1
50 TABLET ORAL DAILY
Qty: 90 TAB | Refills: 3 | Status: SHIPPED | OUTPATIENT
Start: 2017-10-24 | End: 2017-12-08 | Stop reason: SDUPTHER

## 2017-10-24 NOTE — PATIENT INSTRUCTIONS

## 2017-10-24 NOTE — MR AVS SNAPSHOT
Visit Information Date & Time Provider Department Dept. Phone Encounter #  
 10/24/2017 10:40 AM [facility], [address] [phone] [id number] Follow-up Instructions Return in about 3 months (around 1/24/2018), or if symptoms worsen or fail to improve. Your Appointments 12/15/2017  2:00 PM  
ESTABLISHED PATIENT with eJffry Rios MD  
[facility] (AVI SCHEDULING) Appt Note: annual  
 [address] [facility] [place] [address] [facility]  
[address] [facility] [place] [address] [address] Upcoming Health Maintenance Date Due COLONOSCOPY 2/19/1958 FOOT EXAM Q1 10/27/2016 EYE EXAM RETINAL OR DILATED Q1 8/30/2017 LIPID PANEL Q1 10/28/2017 HEMOGLOBIN A1C Q6M 2/18/2018 MEDICARE YEARLY EXAM 4/19/2018 MICROALBUMIN Q1 8/18/2018 GLAUCOMA SCREENING Q2Y 8/30/2018 DTaP/Tdap/Td series (2 - Td) 10/27/2025 Allergies as of 10/24/2017  Review Complete On: 10/24/2017 By: Wevod Clare, DO Severity Noted Reaction Type Reactions Ace Inhibitors  11/16/2010    Swelling Angioedema Current Immunizations  Reviewed on 9/1/2016 Name Date Influenza Vaccine 10/5/2017, 9/25/2016 Influenza Vaccine (Quad) PF 9/29/2015 Pneumococcal Conjugate (PCV-13) 10/27/2015 Pneumococcal Polysaccharide (PPSV-23) 10/1/2012 Tdap 10/27/2015 Zoster Vaccine, Live 10/5/2017 Not reviewed this visit You Were Diagnosed With   
  
 Codes Comments Bilateral carpal tunnel syndrome    -  Primary ICD-10-CM: G56.03 
ICD-9-CM: 354.0 Osteoarthritis, unspecified osteoarthritis type, unspecified site     ICD-10-CM: M19.90 ICD-9-CM: 715.90 Coronary artery disease involving native coronary artery of native heart without angina pectoris     ICD-10-CM: I25.10 ICD-9-CM: 414.01 Hypertension, benign     ICD-10-CM: I10 
ICD-9-CM: 401.1 Microalbuminuria     ICD-10-CM: R80.9 ICD-9-CM: 791.0 Diabetes mellitus type 2 in nonobese [facility])     ICD-10-CM: E11.9 ICD-9-CM: 250.00 Erectile dysfunction due to arterial insufficiency     ICD-10-CM: N52.01 
ICD-9-CM: 607.84 Vitals BP Pulse Temp Resp Height(growth percentile) Weight(growth percentile) 132/64 (!) 54 98.4 °F (36.9 °C) (Oral) 18 5' 8\" (1.727 m) 174 lb (78.9 kg) SpO2 BMI Smoking Status 98% 26.46 kg/m2 Former Smoker Vitals History BMI and BSA Data Body Mass Index Body Surface Area  
 26.46 kg/m 2 1.95 m 2 Preferred Pharmacy Pharmacy Name Phone [facility] [address], [address] [place] [address] [phone] Your Updated Medication List  
  
   
This list is accurate as of: 10/24/17 10:58 AM.  Always use your most recent med list.  
  
  
  
  
 aspirin delayed-release 81 mg tablet Take 81 mg by mouth daily. atenolol 50 mg tablet Commonly known as:  TENORMIN Take 1 Tab by mouth daily. atorvastatin 40 mg tablet Commonly known as:  LIPITOR  
TAKE 1 TABLET DAILY (START LIPITOR AFTER VYTORIN IS FINISHED) folic acid 1 mg tablet Commonly known as:  FOLVITE  
TAKE 1 TABLET BY MOUTH DAILY. EXCEPT DAY YOU TAKE METHOTREXATE. losartan 50 mg tablet Commonly known as:  COZAAR Take 1 Tab by mouth daily. metFORMIN 1,000 mg tablet Commonly known as:  GLUCOPHAGE  
TAKE 1 TABLET BY MOUTH TWICE A DAY WITH MEALS  
  
 omeprazole 40 mg capsule Commonly known as:  PRILOSEC  
  
 * oxyCODONE-acetaminophen 5-325 mg per tablet Commonly known as:  PERCOCET TK 1 T PO  Q 4 TO 6 H PRF PAIN  
  
 * oxyCODONE-acetaminophen 5-325 mg per tablet Commonly known as:  PERCOCET Take 1 Tab by mouth every eight (8) hours as needed for Pain. Max Daily Amount: 3 Tabs. predniSONE 10 mg dose pack Commonly known as:  STERAPRED DS  
 See administration instruction per 10mg dose pack, 12 day please  
  
 sildenafil citrate 100 mg tablet Commonly known as:  VIAGRA Take 1 Tab by mouth as needed. SLOW RELEASE IRON 143 mg (45 mg iron) Tber Generic drug:  ferrous sulfate TAKE 1 TAB BY MOUTH DAILY (BEFORE BREAKFAST). Vytorin 10/80 10-80 mg per tablet Generic drug:  ezetimibe-simvastatin * Notice: This list has 2 medication(s) that are the same as other medications prescribed for you. Read the directions carefully, and ask your doctor or other care provider to review them with you. Prescriptions Printed Refills  
 oxyCODONE-acetaminophen (PERCOCET) 5-325 mg per tablet 0 Sig: Take 1 Tab by mouth every eight (8) hours as needed for Pain. Max Daily Amount: 3 Tabs. Class: Print Route: Oral  
  
Prescriptions Sent to Pharmacy Refills  
 atenolol (TENORMIN) 50 mg tablet 3 Sig: Take 1 Tab by mouth daily. Class: Normal  
 Pharmacy: [address], [address] Ph #: [phone] Route: Oral  
 losartan (COZAAR) 50 mg tablet 3 Sig: Take 1 Tab by mouth daily. Class: Normal  
 Pharmacy: [address], [address] Ph #: [phone] Route: Oral  
 metFORMIN (GLUCOPHAGE) 1,000 mg tablet 3 Sig: TAKE 1 TABLET BY MOUTH TWICE A DAY WITH MEALS Class: Normal  
 Pharmacy: [address], [address] Ph #: [phone]  
 sildenafil citrate (VIAGRA) 100 mg tablet 3 Sig: Take 1 Tab by mouth as needed. Class: Normal  
 Pharmacy: [address], [address] Ph #: [phone] Route: Oral  
 atorvastatin (LIPITOR) 40 mg tablet 3 Sig: TAKE 1 TABLET DAILY (START LIPITOR AFTER VYTORIN IS FINISHED) Class: Normal  
 Pharmacy: [address], [address] Ph #: [phone] predniSONE (STERAPRED DS) 10 mg dose pack 0 Sig: See administration instruction per 10mg dose pack, 12 day please Class: Normal  
 Pharmacy: [address] [facility] [address], [address] [place] [address] #: [phone] Follow-up Instructions Return in about 3 months (around 1/24/2018), or if symptoms worsen or fail to improve. Patient Instructions A Healthy Lifestyle: Care Instructions Your Care Instructions A healthy lifestyle can help you feel good, stay at a healthy weight, and have plenty of energy for both work and play. A healthy lifestyle is something you can share with your whole family. A healthy lifestyle also can lower your risk for serious health problems, such as high blood pressure, heart disease, and diabetes. You can follow a few steps listed below to improve your health and the health of your family. Follow-up care is a key part of your treatment and safety. Be sure to make and go to all appointments, and call your doctor if you are having problems. Its also a good idea to know your test results and keep a list of the medicines you take. How can you care for yourself at home? · Do not eat too much sugar, fat, or fast foods. You can still have dessert and treats now and then. The goal is moderation. · Start small to improve your eating habits. Pay attention to portion sizes, drink less juice and soda pop, and eat more fruits and vegetables. ¨ Eat a healthy amount of food. A 3-ounce serving of meat, for example, is about the size of a deck of cards. Fill the rest of your plate with vegetables and whole grains. ¨ Limit the amount of soda and sports drinks you have every day. Drink more water when you are thirsty. ¨ Eat at least 5 servings of fruits and vegetables every day. It may seem like a lot, but it is not hard to reach this goal. A serving or helping is 1 piece of fruit, 1 cup of vegetables, or 2 cups of leafy, raw vegetables. Have an apple or some carrot sticks as an afternoon snack instead of a candy bar. Try to have fruits and/or vegetables at every meal. 
· Make exercise part of your daily routine. You may want to start with simple activities, such as walking, bicycling, or slow swimming. Try to be active 30 to 60 minutes every day. You do not need to do all 30 to 60 minutes all at once. For example, you can exercise 3 times a day for 10 or 20 minutes. Moderate exercise is safe for most people, but it is always a good idea to talk to your doctor before starting an exercise program. 
· Keep moving. Abel Duffy the lawn, work in the garden, or [employer]. Take the stairs instead of the elevator at work. · If you smoke, quit. People who smoke have an increased risk for heart attack, stroke, cancer, and other lung illnesses. Quitting is hard, but there are ways to boost your chance of quitting tobacco for good. ¨ Use nicotine gum, patches, or lozenges. ¨ Ask your doctor about stop-smoking programs and medicines. ¨ Keep trying. In addition to reducing your risk of diseases in the future, you will notice some benefits soon after you stop using tobacco. If you have shortness of breath or asthma symptoms, they will likely get better within a few weeks after you quit. · Limit how much alcohol you drink. Moderate amounts of alcohol (up to 2 drinks a day for men, 1 drink a day for women) are okay. But drinking too much can lead to liver problems, high blood pressure, and other health problems. Family health If you have a family, there are many things you can do together to improve your health. · Eat meals together as a family as often as possible. · Eat healthy foods. This includes fruits, vegetables, lean meats and dairy, and whole grains. · Include your family in your fitness plan.  Most people think of activities such as jogging or tennis as the way to fitness, but there are many ways you and your family can be more active. Anything that makes you breathe hard and gets your heart pumping is exercise. Here are some tips: 
¨ Walk to do errands or to take your child to school or the bus. ¨ Go for a family bike ride after dinner instead of watching TV. Where can you learn more? Go to http://jennifer-elida.info/. Enter E920 in the search box to learn more about \"A Healthy Lifestyle: Care Instructions. \" Current as of: July 26, 2016 Content Version: 11.3 © 0045-1879 [employer]. Care instructions adapted under license by [employer] (which disclaims liability or warranty for this information). If you have questions about a medical condition or this instruction, always ask your healthcare professional. [address] any warranty or liability for your use of this information. Introducing [facility]! [employer] introduces [email or web address] patient portal. Now you can access parts of your medical record, email your doctor's office, and request medication refills online. 1. In your internet browser, go to [email or web address] 2. Click on the First Time User? Click Here link in the Sign In box. You will see the New Member Sign Up page. 3. Enter your [email or web address] Access Code exactly as it appears below. You will not need to use this code after youve completed the sign-up process. If you do not sign up before the expiration date, you must request a new code. · [email or web address] Access Code: [email or web address] Expires: 11/16/2017 10:44 AM 
 
4. Enter the last four digits of your Social Security Number (xxxx) and Date of Birth (mm/dd/yyyy) as indicated and click Submit. You will be taken to the next sign-up page. 5. Create a NextStep.io ID. This will be your [email or web address] login ID and cannot be changed, so think of one that is secure and easy to remember. 6. Create a Image Socket password. You can change your password at any time. 7. Enter your Password Reset Question and Answer. This can be used at a later time if you forget your password. 8. Enter your e-mail address. You will receive e-mail notification when new information is available in [address] 9. Click Sign Up. You can now view and download portions of your medical record. 10. Click the Download Summary menu link to download a portable copy of your medical information. If you have questions, please visit the Frequently Asked Questions section of the Image Socket website. Remember, Image Socket is NOT to be used for urgent needs. For medical emergencies, dial 911. Now available from your iPhone and Android! Please provide this summary of care documentation to your next provider. Your primary care clinician is listed as Dixie Barcenas. If you have any questions after today's visit, please call [phone].

## 2017-10-24 NOTE — PROGRESS NOTES
Pt c/o b/l wrist pain and swelling.  Had Cortizone injections aprox 2 months ago with little effect   Also med refills

## 2017-10-24 NOTE — PROGRESS NOTES
Magdiel Pena is a 78 y.o. male   Chief Complaint   Patient presents with    Wrist Pain    pt states he had b/l wrist injections a couple months ago and states only got relief for a week. Pt is not interested in going back to hand. States was also told he had tendinitis. Pt is using b/l wrist braces. Pt did have b/kl carpal tunnel surgery and states it has come back. Pt also needs refills of all meds including pain medication. Internet is not functioning sdo unable to check . Pt fills approx every 3 motnhs and uses sparingly. Last pill taken was Saturday . Opioid/Pain Management:    1. Has the patient signed a pain contract for chronic narcotic use? yes  2. Has the patient had a UDS or Serum screen as per guidelines as per [facility]?:   yes    3. Does patient meet necessary guidelines for Naloxone treatement per the [facility]?:    no  4. Has the Prescription Monitoring Program been reviewed?  no  5. Does patient have a long term condition that requires long term use of a Narcotic?  yes  6. Has patient been tolerant of therapy and responsible to routine follow up and specialist follow-up? Yes        he is a 78y.o. year old male who presents for evalution. Reviewed PmHx, RxHx, FmHx, SocHx, AllgHx and updated and dated in the chart. Review of Systems - negative except as listed above in the HPI    Objective:     Vitals:    10/24/17 1019   BP: 132/64   Pulse: (!) 54   Resp: 18   Temp: 98.4 °F (36.9 °C)   TempSrc: Oral   SpO2: 98%   Weight: 174 lb (78.9 kg)   Height: 5' 8\" (1.727 m)       Current Outpatient Prescriptions   Medication Sig    oxyCODONE-acetaminophen (PERCOCET) 5-325 mg per tablet Take 1 Tab by mouth every eight (8) hours as needed for Pain. Max Daily Amount: 3 Tabs.  atenolol (TENORMIN) 50 mg tablet Take 1 Tab by mouth daily.  losartan (COZAAR) 50 mg tablet Take 1 Tab by mouth daily.     metFORMIN (GLUCOPHAGE) 1,000 mg tablet TAKE 1 TABLET BY MOUTH TWICE A DAY WITH MEALS    sildenafil citrate (VIAGRA) 100 mg tablet Take 1 Tab by mouth as needed.  atorvastatin (LIPITOR) 40 mg tablet TAKE 1 TABLET DAILY (START LIPITOR AFTER VYTORIN IS FINISHED)    predniSONE (STERAPRED DS) 10 mg dose pack See administration instruction per 10mg dose pack, 12 day please    oxyCODONE-acetaminophen (PERCOCET) 5-325 mg per tablet TK 1 T PO  Q 4 TO 6 H PRF PAIN    omeprazole (PRILOSEC) 40 mg capsule     folic acid (FOLVITE) 1 mg tablet TAKE 1 TABLET BY MOUTH DAILY. EXCEPT DAY YOU TAKE METHOTREXATE.  SLOW RELEASE IRON 143 mg (45 mg iron) ER tablet TAKE 1 TAB BY MOUTH DAILY (BEFORE BREAKFAST).  VYTORIN 10/80 10-80 mg per tablet     aspirin delayed-release 81 mg tablet Take 81 mg by mouth daily. No current facility-administered medications for this visit. Physical Examination: General appearance - alert, well appearing, and in no distress  Mental status - alert, oriented to person, place, and time  Eyes - pupils equal and reactive, extraocular eye movements intact  Chest - clear to auscultation, no wheezes, rales or rhonchi, symmetric air entry  Heart - normal rate, regular rhythm, normal S1, S2, no murmurs, rubs, clicks or gallops  Musculoskeletal - b/l wrist swelling with ttp, chronic b/l shoulder pain with active ROM      Assessment/ Plan:   Diagnoses and all orders for this visit:    1. Bilateral carpal tunnel syndrome  -     predniSONE (STERAPRED DS) 10 mg dose pack; See administration instruction per 10mg dose pack, 12 day please    2. Osteoarthritis, unspecified osteoarthritis type, unspecified site  -     oxyCODONE-acetaminophen (PERCOCET) 5-325 mg per tablet; Take 1 Tab by mouth every eight (8) hours as needed for Pain. Max Daily Amount: 3 Tabs. 3. Coronary artery disease involving native coronary artery of native heart without angina pectoris  -     atenolol (TENORMIN) 50 mg tablet; Take 1 Tab by mouth daily.     4. Hypertension, benign  - atenolol (TENORMIN) 50 mg tablet; Take 1 Tab by mouth daily. -     losartan (COZAAR) 50 mg tablet; Take 1 Tab by mouth daily. 5. Microalbuminuria  -     losartan (COZAAR) 50 mg tablet; Take 1 Tab by mouth daily. 6. Diabetes mellitus type 2 in nonobese (HCC)  -     metFORMIN (GLUCOPHAGE) 1,000 mg tablet; TAKE 1 TABLET BY MOUTH TWICE A DAY WITH MEALS    7. Erectile dysfunction due to arterial insufficiency  -     sildenafil citrate (VIAGRA) 100 mg tablet; Take 1 Tab by mouth as needed. 8. Medication monitoring encounter  -     DEMETRIO CORDERO ICUP DX DRUG SCREEN 10    Other orders  -     atorvastatin (LIPITOR) 40 mg tablet; TAKE 1 TABLET DAILY (START LIPITOR AFTER VYTORIN IS FINISHED)     advised may need to switch atenolol to toprol if still on nationwide backorder. Utox appropriate  Follow-up Disposition:  Return in about 3 months (around 1/24/2018), or if symptoms worsen or fail to improve. I have discussed the diagnosis with the patient and the intended plan as seen in the above orders. The patient has received an after-visit summary and questions were answered concerning future plans. Pt conveyed understanding of plan.     Medication Side Effects and Warnings were discussed with patient      Joana Alonso,

## 2017-10-25 RX ORDER — METOPROLOL SUCCINATE 50 MG/1
50 TABLET, EXTENDED RELEASE ORAL DAILY
Qty: 90 TAB | Refills: 3 | Status: SHIPPED | OUTPATIENT
Start: 2017-10-25 | End: 2019-02-13 | Stop reason: ALTCHOICE

## 2017-11-16 RX ORDER — OMEPRAZOLE 40 MG/1
40 CAPSULE, DELAYED RELEASE ORAL DAILY
Qty: 90 CAP | Refills: 3 | Status: SHIPPED | OUTPATIENT
Start: 2017-11-16 | End: 2018-11-11 | Stop reason: SDUPTHER

## 2017-12-08 DIAGNOSIS — R80.9 MICROALBUMINURIA: ICD-10-CM

## 2017-12-08 DIAGNOSIS — I10 HYPERTENSION, BENIGN: ICD-10-CM

## 2017-12-08 RX ORDER — LOSARTAN POTASSIUM 50 MG/1
50 TABLET ORAL DAILY
Qty: 30 TAB | Refills: 1 | Status: SHIPPED | OUTPATIENT
Start: 2017-12-08 | End: 2018-11-25 | Stop reason: SDUPTHER

## 2017-12-18 DIAGNOSIS — E11.9 DIABETES MELLITUS TYPE 2 IN NONOBESE (HCC): ICD-10-CM

## 2017-12-18 DIAGNOSIS — I25.10 CORONARY ARTERY DISEASE INVOLVING NATIVE CORONARY ARTERY OF NATIVE HEART WITHOUT ANGINA PECTORIS: ICD-10-CM

## 2017-12-18 DIAGNOSIS — I10 HYPERTENSION, BENIGN: ICD-10-CM

## 2017-12-18 DIAGNOSIS — R80.9 MICROALBUMINURIA: ICD-10-CM

## 2017-12-18 RX ORDER — METFORMIN HYDROCHLORIDE 1000 MG/1
TABLET ORAL
Qty: 180 TAB | Refills: 3 | Status: SHIPPED | OUTPATIENT
Start: 2017-12-18 | End: 2018-03-08 | Stop reason: SDUPTHER

## 2017-12-18 RX ORDER — ATENOLOL 50 MG/1
TABLET ORAL
Qty: 90 TAB | Refills: 3 | Status: SHIPPED | OUTPATIENT
Start: 2017-12-18 | End: 2018-11-25 | Stop reason: SDUPTHER

## 2017-12-18 RX ORDER — FOLIC ACID 1 MG/1
TABLET ORAL
Qty: 90 TAB | Refills: 3 | Status: SHIPPED | OUTPATIENT
Start: 2017-12-18 | End: 2018-12-24 | Stop reason: SDUPTHER

## 2017-12-18 RX ORDER — LOSARTAN POTASSIUM 50 MG/1
TABLET ORAL
Qty: 90 TAB | Refills: 3 | Status: SHIPPED | OUTPATIENT
Start: 2017-12-18 | End: 2018-03-08 | Stop reason: SDUPTHER

## 2017-12-19 ENCOUNTER — OFFICE VISIT (OUTPATIENT)
Dept: FAMILY MEDICINE CLINIC | Age: 77
End: 2017-12-19

## 2017-12-19 VITALS
OXYGEN SATURATION: 100 % | BODY MASS INDEX: 25.91 KG/M2 | WEIGHT: 171 LBS | SYSTOLIC BLOOD PRESSURE: 170 MMHG | TEMPERATURE: 98.5 F | DIASTOLIC BLOOD PRESSURE: 94 MMHG | HEART RATE: 50 BPM | HEIGHT: 68 IN | RESPIRATION RATE: 12 BRPM

## 2017-12-19 DIAGNOSIS — M19.90 OSTEOARTHRITIS, UNSPECIFIED OSTEOARTHRITIS TYPE, UNSPECIFIED SITE: ICD-10-CM

## 2017-12-19 RX ORDER — PREDNISONE 10 MG/1
TABLET ORAL
Qty: 21 TAB | Refills: 0 | Status: SHIPPED | OUTPATIENT
Start: 2017-12-19 | End: 2018-01-16 | Stop reason: ALTCHOICE

## 2017-12-19 RX ORDER — OXYCODONE AND ACETAMINOPHEN 5; 325 MG/1; MG/1
1 TABLET ORAL
Qty: 30 TAB | Refills: 0 | Status: SHIPPED | OUTPATIENT
Start: 2017-12-19 | End: 2018-01-16 | Stop reason: SDUPTHER

## 2017-12-19 NOTE — PROGRESS NOTES
Marilee Peck is a 78 y.o. male   Chief Complaint   Patient presents with    Medication Refill     would like pred and oxycodone     pt here for refill of his prednisone and also needs a refill of his percocet which works well for his chronic joint pain. Opioid/Pain Management:    1. Has the patient signed a pain contract for chronic narcotic use? yes  2. Has the patient had a UDS or Serum screen as per guidelines as per [facility]?:   yes    3. Does patient meet necessary guidelines for Naloxone treatement per the [facility]?:    no  4. Has the Prescription Monitoring Program been reviewed? yes  5. Does patient have a long term condition that requires long term use of a Narcotic?  yes  6. Has patient been tolerant of therapy and responsible to routine follow up and specialist follow-up? Yes      he is a 78y.o. year old male who presents for evalution. Reviewed PmHx, RxHx, FmHx, SocHx, AllgHx and updated and dated in the chart. Review of Systems - negative except as listed above in the HPI    Objective:     Vitals:    12/19/17 1026   BP: (!) 170/94   Pulse: (!) 50   Resp: 12   Temp: 98.5 °F (36.9 °C)   SpO2: 100%   Weight: 171 lb (77.6 kg)   Height: 5' 8\" (1.727 m)       Current Outpatient Prescriptions   Medication Sig    oxyCODONE-acetaminophen (PERCOCET) 5-325 mg per tablet Take 1 Tab by mouth every eight (8) hours as needed for Pain. Max Daily Amount: 3 Tabs.  predniSONE (STERAPRED DS) 10 mg dose pack See administration instruction per 10mg dose pack    folic acid (FOLVITE) 1 mg tablet TAKE 1 TABLET DAILY EXCEPT DAY THAT METHOTREXATE IS TAKEN.  atenolol (TENORMIN) 50 mg tablet TAKE 1 TABLET DAILY    metFORMIN (GLUCOPHAGE) 1,000 mg tablet TAKE 1 TABLET TWICE A DAY WITH MEALS    losartan (COZAAR) 50 mg tablet TAKE 1 TABLET DAILY    losartan (COZAAR) 50 mg tablet Take 1 Tab by mouth daily.  omeprazole (PRILOSEC) 40 mg capsule Take 1 Cap by mouth daily.     metoprolol succinate (TOPROL-XL) 50 mg XL tablet Take 1 Tab by mouth daily.  metFORMIN (GLUCOPHAGE) 1,000 mg tablet TAKE 1 TABLET BY MOUTH TWICE A DAY WITH MEALS    sildenafil citrate (VIAGRA) 100 mg tablet Take 1 Tab by mouth as needed.  atorvastatin (LIPITOR) 40 mg tablet TAKE 1 TABLET DAILY (START LIPITOR AFTER VYTORIN IS FINISHED)    oxyCODONE-acetaminophen (PERCOCET) 5-325 mg per tablet TK 1 T PO  Q 4 TO 6 H PRF PAIN    SLOW RELEASE IRON 143 mg (45 mg iron) ER tablet TAKE 1 TAB BY MOUTH DAILY (BEFORE BREAKFAST).  VYTORIN 10/80 10-80 mg per tablet     aspirin delayed-release 81 mg tablet Take 81 mg by mouth daily. No current facility-administered medications for this visit. Physical Examination: General appearance - alert, well appearing, and in no distress  Eyes - pupils equal and reactive, extraocular eye movements intact  Chest - clear to auscultation, no wheezes, rales or rhonchi, symmetric air entry  Heart - normal rate, regular rhythm, normal S1, S2, no murmurs, rubs, clicks or gallops  Musculoskeletal - b/l hand joint ttp      Assessment/ Plan:   Diagnoses and all orders for this visit:    1. Osteoarthritis, unspecified osteoarthritis type, unspecified site  -     oxyCODONE-acetaminophen (PERCOCET) 5-325 mg per tablet; Take 1 Tab by mouth every eight (8) hours as needed for Pain. Max Daily Amount: 3 Tabs. -     predniSONE (STERAPRED DS) 10 mg dose pack; See administration instruction per 10mg dose pack       Follow-up Disposition:  Return if symptoms worsen or fail to improve. I have discussed the diagnosis with the patient and the intended plan as seen in the above orders. The patient has received an after-visit summary and questions were answered concerning future plans. Pt conveyed understanding of plan.     Medication Side Effects and Warnings were discussed with patient      [facility] [place], DO

## 2017-12-19 NOTE — PATIENT INSTRUCTIONS

## 2017-12-19 NOTE — MR AVS SNAPSHOT
Visit Information Date & Time Provider Department Dept. Phone Encounter #  
 12/19/2017 10:20 AM Joana Alonso DO [address] [facility] [phone] [id number] Follow-up Instructions Return if symptoms worsen or fail to improve. Your Appointments 1/24/2018  7:15 AM  
ESTABLISHED PATIENT with Shanon Amador  [facility] ([address]) Appt Note: 3 month follow up  
 [address] [place] [address] Road [address] [phone]  
  
   
 N [address] [place] [id number] Upcoming Health Maintenance Date Due COLONOSCOPY 2/19/1958 FOOT EXAM Q1 10/27/2016 LIPID PANEL Q1 10/28/2017 HEMOGLOBIN A1C Q6M 2/18/2018 MEDICARE YEARLY EXAM 4/19/2018 MICROALBUMIN Q1 8/18/2018 EYE EXAM RETINAL OR DILATED Q1 10/30/2018 GLAUCOMA SCREENING Q2Y 10/30/2019 DTaP/Tdap/Td series (2 - Td) 10/27/2025 Allergies as of 12/19/2017  Review Complete On: 12/19/2017 By: Joana Alonso DO Severity Noted Reaction Type Reactions Ace Inhibitors  11/16/2010    Swelling Angioedema Current Immunizations  Reviewed on 9/1/2016 Name Date Influenza Vaccine 10/5/2017, 9/25/2016 Influenza Vaccine (Quad) PF 9/29/2015 Pneumococcal Conjugate (PCV-13) 10/27/2015 Pneumococcal Polysaccharide (PPSV-23) 10/1/2012 Tdap 10/27/2015 Zoster Vaccine, Live 10/5/2017 Not reviewed this visit You Were Diagnosed With   
  
 Codes Comments Osteoarthritis, unspecified osteoarthritis type, unspecified site     ICD-10-CM: M19.90 ICD-9-CM: 715.90 Vitals BP Pulse Temp Resp Height(growth percentile) Weight(growth percentile) (!) 170/94 (!) 50 98.5 °F (36.9 °C) 12 5' 8\" (1.727 m) 171 lb (77.6 kg) SpO2 BMI Smoking Status 100% 26 kg/m2 Former Smoker Vitals History BMI and BSA Data Body Mass Index Body Surface Area  
 26 kg/m 2 1.93 m 2 Preferred Pharmacy Pharmacy Name Phone [facility] [address]  [facility] [phone] Your Updated Medication List  
  
   
This list is accurate as of: 12/19/17 10:57 AM.  Always use your most recent med list.  
  
  
  
  
 aspirin delayed-release 81 mg tablet Take 81 mg by mouth daily. atenolol 50 mg tablet Commonly known as:  TENORMIN  
TAKE 1 TABLET DAILY  
  
 atorvastatin 40 mg tablet Commonly known as:  LIPITOR  
TAKE 1 TABLET DAILY (START LIPITOR AFTER VYTORIN IS FINISHED) folic acid 1 mg tablet Commonly known as:  FOLVITE  
TAKE 1 TABLET DAILY EXCEPT DAY THAT METHOTREXATE IS TAKEN. * losartan 50 mg tablet Commonly known as:  COZAAR Take 1 Tab by mouth daily. * losartan 50 mg tablet Commonly known as:  COZAAR  
TAKE 1 TABLET DAILY * metFORMIN 1,000 mg tablet Commonly known as:  GLUCOPHAGE  
TAKE 1 TABLET BY MOUTH TWICE A DAY WITH MEALS  
  
 * metFORMIN 1,000 mg tablet Commonly known as:  GLUCOPHAGE  
TAKE 1 TABLET TWICE A DAY WITH MEALS  
  
 metoprolol succinate 50 mg XL tablet Commonly known as:  TOPROL-XL Take 1 Tab by mouth daily. omeprazole 40 mg capsule Commonly known as:  PRILOSEC Take 1 Cap by mouth daily. * oxyCODONE-acetaminophen 5-325 mg per tablet Commonly known as:  PERCOCET TK 1 T PO  Q 4 TO 6 H PRF PAIN  
  
 * oxyCODONE-acetaminophen 5-325 mg per tablet Commonly known as:  PERCOCET Take 1 Tab by mouth every eight (8) hours as needed for Pain. Max Daily Amount: 3 Tabs. predniSONE 10 mg dose pack Commonly known as:  STERAPRED DS See administration instruction per 10mg dose pack  
  
 sildenafil citrate 100 mg tablet Commonly known as:  VIAGRA Take 1 Tab by mouth as needed. SLOW RELEASE IRON 143 mg (45 mg iron) Tber Generic drug:  ferrous sulfate TAKE 1 TAB BY MOUTH DAILY (BEFORE BREAKFAST). Vytorin 10/80 10-80 mg per tablet Generic drug:  ezetimibe-simvastatin * Notice: This list has 6 medication(s) that are the same as other medications prescribed for you. Read the directions carefully, and ask your doctor or other care provider to review them with you. Prescriptions Printed Refills  
 oxyCODONE-acetaminophen (PERCOCET) 5-325 mg per tablet 0 Sig: Take 1 Tab by mouth every eight (8) hours as needed for Pain. Max Daily Amount: 3 Tabs. Class: Print Route: Oral  
  
Prescriptions Sent to Pharmacy Refills  
 predniSONE (STERAPRED DS) 10 mg dose pack 0 Sig: See administration instruction per 10mg dose pack Class: Normal  
 Pharmacy: [facility] [address] [place] [address] [address] AT [facility] [address] ([facility] #: [phone] Follow-up Instructions Return if symptoms worsen or fail to improve. Patient Instructions A Healthy Lifestyle: Care Instructions Your Care Instructions A healthy lifestyle can help you feel good, stay at a healthy weight, and have plenty of energy for both work and play. A healthy lifestyle is something you can share with your whole family. A healthy lifestyle also can lower your risk for serious health problems, such as high blood pressure, heart disease, and diabetes. You can follow a few steps listed below to improve your health and the health of your family. Follow-up care is a key part of your treatment and safety. Be sure to make and go to all appointments, and call your doctor if you are having problems. It's also a good idea to know your test results and keep a list of the medicines you take. How can you care for yourself at home? · Do not eat too much sugar, fat, or fast foods. You can still have dessert and treats now and then. The goal is moderation. · Start small to improve your eating habits. Pay attention to portion sizes, drink less juice and soda pop, and eat more fruits and vegetables. ¨ Eat a healthy amount of food. A 3-ounce serving of meat, for example, is about the size of a deck of cards. Fill the rest of your plate with vegetables and whole grains. ¨ Limit the amount of soda and sports drinks you have every day. Drink more water when you are thirsty. ¨ Eat at least 5 servings of fruits and vegetables every day. It may seem like a lot, but it is not hard to reach this goal. A serving or helping is 1 piece of fruit, 1 cup of vegetables, or 2 cups of leafy, raw vegetables. Have an apple or some carrot sticks as an afternoon snack instead of a candy bar. Try to have fruits and/or vegetables at every meal. 
· Make exercise part of your daily routine. You may want to start with simple activities, such as walking, bicycling, or slow swimming. Try to be active 30 to 60 minutes every day. You do not need to do all 30 to 60 minutes all at once. For example, you can exercise 3 times a day for 10 or 20 minutes. Moderate exercise is safe for most people, but it is always a good idea to talk to your doctor before starting an exercise program. 
· Keep moving. Sloanejamie Ramosreys the lawn, work in the garden, or [email or web address]. Take the stairs instead of the elevator at work. · If you smoke, quit. People who smoke have an increased risk for heart attack, stroke, cancer, and other lung illnesses. Quitting is hard, but there are ways to boost your chance of quitting tobacco for good. ¨ Use nicotine gum, patches, or lozenges. ¨ Ask your doctor about stop-smoking programs and medicines. ¨ Keep trying. In addition to reducing your risk of diseases in the future, you will notice some benefits soon after you stop using tobacco. If you have shortness of breath or asthma symptoms, they will likely get better within a few weeks after you quit. · Limit how much alcohol you drink. Moderate amounts of alcohol (up to 2 drinks a day for men, 1 drink a day for women) are okay.  But drinking too much can lead to liver problems, high blood pressure, and other health problems. Family health If you have a family, there are many things you can do together to improve your health. · Eat meals together as a family as often as possible. · Eat healthy foods. This includes fruits, vegetables, lean meats and dairy, and whole grains. · Include your family in your fitness plan. Most people think of activities such as jogging or tennis as the way to fitness, but there are many ways you and your family can be more active. Anything that makes you breathe hard and gets your heart pumping is exercise. Here are some tips: 
¨ Walk to do errands or to take your child to school or the bus. ¨ Go for a family bike ride after dinner instead of watching TV. Where can you learn more? Go to http://jennifer-elida.info/. Enter D055 in the search box to learn more about \"A Healthy Lifestyle: Care Instructions. \" Current as of: May 12, 2017 Content Version: 11.4 © 9675-8844 Armor[employer]. Care instructions adapted under license by [employer] (which disclaims liability or warranty for this information). If you have questions about a medical condition or this instruction, always ask your healthcare professional. [place] [address] any warranty or liability for your use of this information. Introducing [facility]! Fercho Pollack introduces [employer] patient portal. Now you can access parts of your medical record, email your doctor's office, and request medication refills online. 1. In your internet browser, go to [email or web address] 2. Click on the First Time User? Click Here link in the Sign In box. You will see the New Member Sign Up page. 3. Enter your [employer] Access Code exactly as it appears below. You will not need to use this code after youve completed the sign-up process.  If you do not sign up before the expiration date, you must request a new code. · DGTS Access Code: [email or web address] Expires: 3/19/2018 10:38 AM 
 
4. Enter the last four digits of your Social Security Number (xxxx) and Date of Birth (mm/dd/yyyy) as indicated and click Submit. You will be taken to the next sign-up page. 5. Create a DGTS ID. This will be your DGTS login ID and cannot be changed, so think of one that is secure and easy to remember. 6. Create a DGTS password. You can change your password at any time. 7. Enter your Password Reset Question and Answer. This can be used at a later time if you forget your password. 8. Enter your e-mail address. You will receive e-mail notification when new information is available in [address] 9. Click Sign Up. You can now view and download portions of your medical record. 10. Click the Download Summary menu link to download a portable copy of your medical information. If you have questions, please visit the Frequently Asked Questions section of the DGTS website. Remember, DGTS is NOT to be used for urgent needs. For medical emergencies, dial 911. Now available from your iPhone and Android! Please provide this summary of care documentation to your next provider. Your primary care clinician is listed as Veda Horton. If you have any questions after today's visit, please call [phone].

## 2018-01-16 ENCOUNTER — OFFICE VISIT (OUTPATIENT)
Dept: FAMILY MEDICINE CLINIC | Age: 78
End: 2018-01-16

## 2018-01-16 VITALS
OXYGEN SATURATION: 98 % | TEMPERATURE: 98.1 F | HEIGHT: 68 IN | BODY MASS INDEX: 26.37 KG/M2 | WEIGHT: 174 LBS | RESPIRATION RATE: 18 BRPM | DIASTOLIC BLOOD PRESSURE: 80 MMHG | SYSTOLIC BLOOD PRESSURE: 112 MMHG | HEART RATE: 67 BPM

## 2018-01-16 DIAGNOSIS — R09.81 SINUS CONGESTION: ICD-10-CM

## 2018-01-16 DIAGNOSIS — M54.31 BILATERAL SCIATICA: ICD-10-CM

## 2018-01-16 DIAGNOSIS — M19.90 OSTEOARTHRITIS, UNSPECIFIED OSTEOARTHRITIS TYPE, UNSPECIFIED SITE: ICD-10-CM

## 2018-01-16 DIAGNOSIS — M51.9 LUMBAR DISC DISEASE: Primary | ICD-10-CM

## 2018-01-16 DIAGNOSIS — M54.32 BILATERAL SCIATICA: ICD-10-CM

## 2018-01-16 RX ORDER — OXYCODONE AND ACETAMINOPHEN 5; 325 MG/1; MG/1
1 TABLET ORAL
Qty: 30 TAB | Refills: 0 | Status: SHIPPED | OUTPATIENT
Start: 2018-01-16 | End: 2018-02-21 | Stop reason: SDUPTHER

## 2018-01-16 NOTE — PATIENT INSTRUCTIONS
Sciatica: Care Instructions  Your Care Instructions    Sciatica (say \"wye-GC-gr-kuh\") is an irritation of one of the sciatic nerves, which come from the spinal cord in the lower back. The sciatic nerves and their branches extend down through the buttock to the foot. Sciatica can develop when an injured disc in the back presses against a spinal nerve root. Its main symptom is pain, numbness, or weakness that is often worse in the leg or foot than in the back. Sciatica often will improve and go away with time. Early treatment usually includes medicines and exercises to relieve pain. Follow-up care is a key part of your treatment and safety. Be sure to make and go to all appointments, and call your doctor if you are having problems. It's also a good idea to know your test results and keep a list of the medicines you take. How can you care for yourself at home? · Take pain medicines exactly as directed. ¨ If the doctor gave you a prescription medicine for pain, take it as prescribed. ¨ If you are not taking a prescription pain medicine, ask your doctor if you can take an over-the-counter medicine. · Use heat or ice to relieve pain. ¨ To apply heat, put a warm water bottle, heating pad set on low, or warm cloth on your back. Do not go to sleep with a heating pad on your skin. ¨ To use ice, put ice or a cold pack on the area for 10 to 20 minutes at a time. Put a thin cloth between the ice and your skin. · Avoid sitting if possible, unless it feels better than standing. · Alternate lying down with short walks. Increase your walking distance as you are able to without making your symptoms worse. · Do not do anything that makes your symptoms worse. When should you call for help? Call 911 anytime you think you may need emergency care. For example, call if:  · You are unable to move a leg at all.   Call your doctor now or seek immediate medical care if:  · You have new or worse symptoms in your legs or buttocks. Symptoms may include:  ¨ Numbness or tingling. ¨ Weakness. ¨ Pain. · You lose bladder or bowel control. Watch closely for changes in your health, and be sure to contact your doctor if:  · You are not getting better as expected. Where can you learn more? Go to http://jennifer-elida.info/. Enter [phone] in the search box to learn more about \"Sciatica: Care Instructions. \"  Current as of: March 21, 2017  Content Version: 11.4  © 9075-5117 [employer]bit. Care instructions adapted under license by [employer] (which disclaims liability or warranty for this information). If you have questions about a medical condition or this instruction, always ask your healthcare professional. [address] any warranty or liability for your use of this information.

## 2018-01-16 NOTE — PROGRESS NOTES
Cynthia Mcgrath is a 78 y.o. male   Chief Complaint   Patient presents with    Leg Pain    Nasal Congestion    pt states he is getting worsening shooting pains going down his legs. Pt has known spinal disease from an MRI in 22016. Pt has had multiple shots in his back with no improvement. Pt has taken a steroid pack before for his hands when his back was bothering him too and it did not help at all. Pt catrachito lgo backto spine and discuss further with him. Dr Héctor Marie with [facility]. Pt would also like a refill on his pain medication. pthas Rx on him and has 8 pills remaining. Pt states his stalin kis sig decreasing quality of life and if he needs surgery he will do it. Pt has had surgery back in the early 70's while in the [facility] and again in late 70's. Pt failed neurontin as well    Opioid/Pain Management:    1. Has the patient signed a pain contract for chronic narcotic use? yes  2. Has the patient had a UDS or Serum screen as per guidelines as per [facility]?:   yes    3. Does patient meet necessary guidelines for Naloxone treatement per the [facility]?:    no  4. Has the Prescription Monitoring Program been reviewed? yes  5. Does patient have a long term condition that requires long term use of a Narcotic?  yes  6. Has patient been tolerant of therapy and responsible to routine follow up and specialist follow-up? Yes      Pt also with sinus congestion and some bloody noses. Pt owns a humidifier but has not been using, pt will set it up today. No fever no chills. Has not been using anything other than some saline nasal spray. he is a 78y.o. year old male who presents for evalution. Reviewed PmHx, RxHx, FmHx, SocHx, AllgHx and updated and dated in the chart.     Review of Systems - negative except as listed above in the HPI    Objective:     Vitals:    01/16/18 0956   BP: 112/80   Pulse: 67   Resp: 18   Temp: 98.1 °F (36.7 °C)   TempSrc: Oral   SpO2: 98%   Weight: 174 lb (78.9 kg)   Height: 5' 8\" (1.727 m)       Current Outpatient Prescriptions   Medication Sig    oxyCODONE-acetaminophen (PERCOCET) 5-325 mg per tablet Take 1 Tab by mouth every eight (8) hours as needed for Pain. Max Daily Amount: 3 Tabs.  atenolol (TENORMIN) 50 mg tablet TAKE 1 TABLET DAILY    losartan (COZAAR) 50 mg tablet TAKE 1 TABLET DAILY    losartan (COZAAR) 50 mg tablet Take 1 Tab by mouth daily.  omeprazole (PRILOSEC) 40 mg capsule Take 1 Cap by mouth daily.  metoprolol succinate (TOPROL-XL) 50 mg XL tablet Take 1 Tab by mouth daily.  metFORMIN (GLUCOPHAGE) 1,000 mg tablet TAKE 1 TABLET BY MOUTH TWICE A DAY WITH MEALS    sildenafil citrate (VIAGRA) 100 mg tablet Take 1 Tab by mouth as needed.  atorvastatin (LIPITOR) 40 mg tablet TAKE 1 TABLET DAILY (START LIPITOR AFTER VYTORIN IS FINISHED)    oxyCODONE-acetaminophen (PERCOCET) 5-325 mg per tablet TK 1 T PO  Q 4 TO 6 H PRF PAIN    SLOW RELEASE IRON 143 mg (45 mg iron) ER tablet TAKE 1 TAB BY MOUTH DAILY (BEFORE BREAKFAST).  VYTORIN 10/80 10-80 mg per tablet     aspirin delayed-release 81 mg tablet Take 81 mg by mouth daily.  folic acid (FOLVITE) 1 mg tablet TAKE 1 TABLET DAILY EXCEPT DAY THAT METHOTREXATE IS TAKEN.  metFORMIN (GLUCOPHAGE) 1,000 mg tablet TAKE 1 TABLET TWICE A DAY WITH MEALS     No current facility-administered medications for this visit.         Physical Examination: General appearance - alert, well appearing, and in no distress  Eyes - pupils equal and reactive, extraocular eye movements intact  Ears - bilateral TM's and external ear canals normal  Nose - mucosal congestion with dry nasal mucosa  Mouth - mucous membranes moist, pharynx normal without lesions  Neck - supple, no significant adenopathy  Chest - clear to auscultation, no wheezes, rales or rhonchi, symmetric air entry  Heart - normal rate, regular rhythm, normal S1, S2, no murmurs, rubs, clicks or gallops  Back exam - limited range of motion, pain with motion noted during exam, positive straight-leg raise b/l      Assessment/ Plan:   Diagnoses and all orders for this visit:    1. Lumbar disc disease  -     REFERRAL TO SPINE SURGERY  -     oxyCODONE-acetaminophen (PERCOCET) 5-325 mg per tablet; Take 1 Tab by mouth every eight (8) hours as needed for Pain. Max Daily Amount: 3 Tabs. 2. Bilateral sciatica  -     REFERRAL TO SPINE SURGERY  -     oxyCODONE-acetaminophen (PERCOCET) 5-325 mg per tablet; Take 1 Tab by mouth every eight (8) hours as needed for Pain. Max Daily Amount: 3 Tabs. 3. Osteoarthritis, unspecified osteoarthritis type, unspecified site  -     oxyCODONE-acetaminophen (PERCOCET) 5-325 mg per tablet; Take 1 Tab by mouth every eight (8) hours as needed for Pain. Max Daily Amount: 3 Tabs. 4. Sinus congestion  Humidifier in home no indication for abx     Follow-up Disposition:  Return in about 1 month (around 2/16/2018), or if symptoms worsen or fail to improve, for fasting labs. I have discussed the diagnosis with the patient and the intended plan as seen in the above orders. The patient has received an after-visit summary and questions were answered concerning future plans. Pt conveyed understanding of plan.     Medication Side Effects and Warnings were discussed with patient      Sommer Mckeon,

## 2018-01-16 NOTE — MR AVS SNAPSHOT
[address] [facility] 
[phone] Patient: Kari Reyes MRN: [id number] Visit Information Date & Time Provider Department Dept. Phone Encounter #  
 1/16/2018  9:50 AM Clarke Herrera BARAK Asencio [phone] [id number] Follow-up Instructions Return in about 1 month (around 2/16/2018), or if symptoms worsen or fail to improve, for fasting labs. Your Appointments 2/28/2018  7:45 AM  
ESTABLISHED PATIENT with Tab Amador DO [address] [facility] (Demetria Uzbek) Appt Note: 3 month follow up; Needs MWV; following up [address] [address] [address] [phone]  
  
   
 [address] [address] [address] Upcoming Health Maintenance Date Due COLONOSCOPY 2/19/1958 FOOT EXAM Q1 10/27/2016 LIPID PANEL Q1 10/28/2017 HEMOGLOBIN A1C Q6M 2/18/2018 MEDICARE YEARLY EXAM 4/19/2018 MICROALBUMIN Q1 8/18/2018 EYE EXAM RETINAL OR DILATED Q1 10/30/2018 GLAUCOMA SCREENING Q2Y 10/30/2019 DTaP/Tdap/Td series (2 - Td) 10/27/2025 Allergies as of 1/16/2018  Review Complete On: 1/16/2018 By: Christine Gonzalez DO Severity Noted Reaction Type Reactions Ace Inhibitors  11/16/2010    Swelling Angioedema Current Immunizations  Reviewed on 9/1/2016 Name Date Influenza Vaccine 10/5/2017, 9/25/2016 Influenza Vaccine (Quad) PF 9/29/2015 Pneumococcal Conjugate (PCV-13) 10/27/2015 Pneumococcal Polysaccharide (PPSV-23) 10/1/2012 Tdap 10/27/2015 Zoster Vaccine, Live 10/5/2017 Not reviewed this visit You Were Diagnosed With   
  
 Codes Comments Lumbar disc disease    -  Primary ICD-10-CM: M51.9 ICD-9-CM: 722.93 Bilateral sciatica     ICD-10-CM: M54.31, M54.32 
ICD-9-CM: 724.3 Osteoarthritis, unspecified osteoarthritis type, unspecified site     ICD-10-CM: M19.90 ICD-9-CM: 715.90 Vitals BP Pulse Temp Resp Height(growth percentile) Weight(growth percentile) 112/80 67 98.1 °F (36.7 °C) (Oral) 18 5' 8\" (1.727 m) 174 lb (78.9 kg) SpO2 BMI Smoking Status 98% 26.46 kg/m2 Former Smoker Vitals History BMI and BSA Data Body Mass Index Body Surface Area  
 26.46 kg/m 2 1.95 m 2 Preferred Pharmacy Pharmacy Name Phone [facility] [address]  [facility] [phone] Your Updated Medication List  
  
   
This list is accurate as of: 1/16/18 10:17 AM.  Always use your most recent med list.  
  
  
  
  
 aspirin delayed-release 81 mg tablet Take 81 mg by mouth daily. atenolol 50 mg tablet Commonly known as:  TENORMIN  
TAKE 1 TABLET DAILY  
  
 atorvastatin 40 mg tablet Commonly known as:  LIPITOR  
TAKE 1 TABLET DAILY (START LIPITOR AFTER VYTORIN IS FINISHED) folic acid 1 mg tablet Commonly known as:  FOLVITE  
TAKE 1 TABLET DAILY EXCEPT DAY THAT METHOTREXATE IS TAKEN. * losartan 50 mg tablet Commonly known as:  COZAAR Take 1 Tab by mouth daily. * losartan 50 mg tablet Commonly known as:  COZAAR  
TAKE 1 TABLET DAILY * metFORMIN 1,000 mg tablet Commonly known as:  GLUCOPHAGE  
TAKE 1 TABLET BY MOUTH TWICE A DAY WITH MEALS  
  
 * metFORMIN 1,000 mg tablet Commonly known as:  GLUCOPHAGE  
TAKE 1 TABLET TWICE A DAY WITH MEALS  
  
 metoprolol succinate 50 mg XL tablet Commonly known as:  TOPROL-XL Take 1 Tab by mouth daily. omeprazole 40 mg capsule Commonly known as:  PRILOSEC Take 1 Cap by mouth daily. * oxyCODONE-acetaminophen 5-325 mg per tablet Commonly known as:  PERCOCET TK 1 T PO  Q 4 TO 6 H PRF PAIN  
  
 * oxyCODONE-acetaminophen 5-325 mg per tablet Commonly known as:  PERCOCET Take 1 Tab by mouth every eight (8) hours as needed for Pain. Max Daily Amount: 3 Tabs. sildenafil citrate 100 mg tablet Commonly known as:  VIAGRA Take 1 Tab by mouth as needed. SLOW RELEASE IRON 143 mg (45 mg iron) Tber Generic drug:  ferrous sulfate TAKE 1 TAB BY MOUTH DAILY (BEFORE BREAKFAST). Vytorin 10/80 10-80 mg per tablet Generic drug:  ezetimibe-simvastatin * Notice: This list has 6 medication(s) that are the same as other medications prescribed for you. Read the directions carefully, and ask your doctor or other care provider to review them with you. Prescriptions Printed Refills  
 oxyCODONE-acetaminophen (PERCOCET) 5-325 mg per tablet 0 Sig: Take 1 Tab by mouth every eight (8) hours as needed for Pain. Max Daily Amount: 3 Tabs. Class: Print Route: Oral  
  
We Performed the Following REFERRAL TO SPINE SURGERY [ILI957 Custom] Follow-up Instructions Return in about 1 month (around 2/16/2018), or if symptoms worsen or fail to improve, for fasting labs. Referral Information Referral ID Referred By Referred To  
  
 [id number] [facility], [address], [address] Phone: [phone] Fax: [phone] Visits Status Start Date End Date 1 New Request 1/16/18 1/16/19 If your referral has a status of pending review or denied, additional information will be sent to support the outcome of this decision. Patient Instructions Sciatica: Care Instructions Your Care Instructions Sciatica (say \"xak-ZI-ur-kuh\") is an irritation of one of the sciatic nerves, which come from the spinal cord in the lower back. The sciatic nerves and their branches extend down through the buttock to the foot. Sciatica can develop when an injured disc in the back presses against a spinal nerve root. Its main symptom is pain, numbness, or weakness that is often worse in the leg or foot than in the back. Sciatica often will improve and go away with time. Early treatment usually includes medicines and exercises to relieve pain. Follow-up care is a key part of your treatment and safety. Be sure to make and go to all appointments, and call your doctor if you are having problems. It's also a good idea to know your test results and keep a list of the medicines you take. How can you care for yourself at home? · Take pain medicines exactly as directed. ¨ If the doctor gave you a prescription medicine for pain, take it as prescribed. ¨ If you are not taking a prescription pain medicine, ask your doctor if you can take an over-the-counter medicine. · Use heat or ice to relieve pain. ¨ To apply heat, put a warm water bottle, heating pad set on low, or warm cloth on your back. Do not go to sleep with a heating pad on your skin. ¨ To use ice, put ice or a cold pack on the area for 10 to 20 minutes at a time. Put a thin cloth between the ice and your skin. · Avoid sitting if possible, unless it feels better than standing. · Alternate lying down with short walks. Increase your walking distance as you are able to without making your symptoms worse. · Do not do anything that makes your symptoms worse. When should you call for help? Call 911 anytime you think you may need emergency care. For example, call if: 
· You are unable to move a leg at all. Call your doctor now or seek immediate medical care if: 
· You have new or worse symptoms in your legs or buttocks. Symptoms may include: ¨ Numbness or tingling. ¨ Weakness. ¨ Pain. · You lose bladder or bowel control. Watch closely for changes in your health, and be sure to contact your doctor if: 
· You are not getting better as expected. Where can you learn more? Go to http://jennifer-elida.info/. Enter [phone] in the search box to learn more about \"Sciatica: Care Instructions. \" Current as of: March 21, 2017 Content Version: 11.4 © 8158-7537 Healthwise[employer]. Care instructions adapted under license by [employer] (which disclaims liability or warranty for this information). If you have questions about a medical condition or this instruction, always ask your healthcare professional. [address] any warranty or liability for your use of this information. Introducing [facility]! [facility] introduces Birthday [employer]lam patient portal. Now you can access parts of your medical record, email your doctor's office, and request medication refills online. 1. In your internet browser, go to [email or web address] 2. Click on the First Time User? Click Here link in the Sign In box. You will see the New Member Sign Up page. 3. Enter your Birth[employer]lam Access Code exactly as it appears below. You will not need to use this code after youve completed the sign-up process. If you do not sign up before the expiration date, you must request a new code. · Birthday [employer] Access Code: [email or web address] Expires: 3/19/2018 10:38 AM 
 
4. Enter the last four digits of your Social Security Number (xxxx) and Date of Birth (mm/dd/yyyy) as indicated and click Submit. You will be taken to the next sign-up page. 5. Create a Birthday Slam ID. This will be your Birthday Slam login ID and cannot be changed, so think of one that is secure and easy to remember. 6. Create a Birth[employer] Slam password. You can change your password at any time. 7. Enter your Password Reset Question and Answer. This can be used at a later time if you forget your password. 8. Enter your e-mail address. You will receive e-mail notification when new information is available in [address] 9. Click Sign Up. You can now view and download portions of your medical record. 10. Click the Download Summary menu link to download a portable copy of your medical information.  
 
If you have questions, please visit the Frequently Asked Questions section of the [employer]. Remember, [employer] is NOT to be used for urgent needs. For medical emergencies, dial 911. Now available from your iPhone and Android! Please provide this summary of care documentation to your next provider. Your primary care clinician is listed as Jose Can. If you have any questions after today's visit, please call [phone].

## 2018-01-26 ENCOUNTER — HOSPITAL ENCOUNTER (OUTPATIENT)
Dept: MRI IMAGING | Age: 78
Discharge: HOME | End: 2018-01-26
Attending: ORTHOPAEDIC SURGERY
Payer: MEDICARE

## 2018-01-26 ENCOUNTER — HOSPITAL ENCOUNTER (OUTPATIENT)
Dept: CT IMAGING | Age: 78
Discharge: HOME | End: 2018-01-26
Attending: ORTHOPAEDIC SURGERY
Payer: MEDICARE

## 2018-01-26 DIAGNOSIS — M47.26 OSTEOARTHRITIS OF SPINE WITH RADICULOPATHY, LUMBAR REGION: ICD-10-CM

## 2018-01-26 DIAGNOSIS — M51.36 DEGENERATION OF LUMBAR INTERVERTEBRAL DISC: ICD-10-CM

## 2018-01-26 DIAGNOSIS — M54.16 RIGHT LUMBAR RADICULITIS: ICD-10-CM

## 2018-01-26 DIAGNOSIS — Z98.890 HISTORY OF LAMINECTOMY: ICD-10-CM

## 2018-01-26 DIAGNOSIS — M54.5 LOW BACK PAIN, UNSPECIFIED BACK PAIN LATERALITY, UNSPECIFIED CHRONICITY, WITH SCIATICA PRESENCE UNSPECIFIED: ICD-10-CM

## 2018-01-26 DIAGNOSIS — M54.16 LEFT LUMBAR RADICULITIS: ICD-10-CM

## 2018-01-26 DIAGNOSIS — M54.50 LOW BACK PAIN: ICD-10-CM

## 2018-01-26 DIAGNOSIS — M51.36 DISC DEGENERATION, LUMBAR: ICD-10-CM

## 2018-01-26 PROCEDURE — 72148 MRI LUMBAR SPINE W/O DYE: CPT

## 2018-01-26 PROCEDURE — 72131 CT LUMBAR SPINE W/O DYE: CPT

## 2018-02-21 ENCOUNTER — OFFICE VISIT (OUTPATIENT)
Dept: FAMILY MEDICINE CLINIC | Age: 78
End: 2018-02-21

## 2018-02-21 ENCOUNTER — HOSPITAL ENCOUNTER (OUTPATIENT)
Dept: LAB | Age: 78
Discharge: HOME | End: 2018-02-21
Payer: MEDICARE

## 2018-02-21 VITALS
BODY MASS INDEX: 25.76 KG/M2 | HEART RATE: 53 BPM | HEIGHT: 68 IN | DIASTOLIC BLOOD PRESSURE: 71 MMHG | SYSTOLIC BLOOD PRESSURE: 156 MMHG | OXYGEN SATURATION: 98 % | TEMPERATURE: 97.7 F | RESPIRATION RATE: 18 BRPM | WEIGHT: 170 LBS

## 2018-02-21 DIAGNOSIS — Z51.81 ENCOUNTER FOR MONITORING OPIOID MAINTENANCE THERAPY: ICD-10-CM

## 2018-02-21 DIAGNOSIS — Z01.818 PRE-OP EVALUATION: Primary | ICD-10-CM

## 2018-02-21 DIAGNOSIS — M54.31 BILATERAL SCIATICA: ICD-10-CM

## 2018-02-21 DIAGNOSIS — M51.9 LUMBAR DISC DISEASE: ICD-10-CM

## 2018-02-21 DIAGNOSIS — E78.5 DYSLIPIDEMIA: ICD-10-CM

## 2018-02-21 DIAGNOSIS — M54.32 BILATERAL SCIATICA: ICD-10-CM

## 2018-02-21 DIAGNOSIS — Z79.891 ENCOUNTER FOR MONITORING OPIOID MAINTENANCE THERAPY: ICD-10-CM

## 2018-02-21 DIAGNOSIS — M19.90 OSTEOARTHRITIS, UNSPECIFIED OSTEOARTHRITIS TYPE, UNSPECIFIED SITE: ICD-10-CM

## 2018-02-21 DIAGNOSIS — E11.9 DIABETES MELLITUS TYPE 2 IN NONOBESE (HCC): ICD-10-CM

## 2018-02-21 PROCEDURE — 83036 HEMOGLOBIN GLYCOSYLATED A1C: CPT

## 2018-02-21 PROCEDURE — 80061 LIPID PANEL: CPT

## 2018-02-21 PROCEDURE — 80053 COMPREHEN METABOLIC PANEL: CPT

## 2018-02-21 PROCEDURE — 85025 COMPLETE CBC W/AUTO DIFF WBC: CPT

## 2018-02-21 RX ORDER — OXYCODONE AND ACETAMINOPHEN 5; 325 MG/1; MG/1
1 TABLET ORAL
Qty: 30 TAB | Refills: 0 | Status: SHIPPED | OUTPATIENT
Start: 2018-02-21 | End: 2018-05-01 | Stop reason: SDUPTHER

## 2018-02-21 NOTE — PATIENT INSTRUCTIONS

## 2018-02-21 NOTE — PROGRESS NOTES
Terry Mijares is a 70 y.o. male   Chief Complaint   Patient presents with    Pre-op Exam    Medication Refill    Labs    pt is having spinal surgery with Dr Belkys Bales on 3/13 and 3/14 in his lumbar spine. Pt had another MRI and states that it is terrible. Pt reports his pain gets worse and worse everyday. Pt will have a f/u on 28 March with Dr Belkys Bales and we have discussed that there is no issue with him receiving post op pain medication but to ensure Dr Belkys Bales is aware that he is currently on pain medication. Pt also due for diabetes and lipid recheck, diabetes has been very well controlled last check was 5.8 and pt does eat a healthy diet. Opioid/Pain Management:    1. Has the patient signed a pain contract for chronic narcotic use? yes  2. Has the patient had a UDS or Serum screen as per guidelines as per [facility]?:   yes    3. Does patient meet necessary guidelines for Naloxone treatement per the [facility]?:    no  4. Has the Prescription Monitoring Program been reviewed? yes  5. Does patient have a long term condition that requires long term use of a Narcotic?  yes  6. Has patient been tolerant of therapy and responsible to routine follow up and specialist follow-up? Yes      Terry Mijares is a 70 y.o. male is a 70 y.o. yo male who presents for preoperative evaluation. Latex Allergy:NO    History of anesthesia reaction: NO    History of PE/DVT:NO    Allergies   Allergen Reactions    Ace Inhibitors Swelling     Angioedema        Current Outpatient Prescriptions   Medication Sig    oxyCODONE-acetaminophen (PERCOCET) 5-325 mg per tablet Take 1 Tab by mouth every eight (8) hours as needed for Pain. Max Daily Amount: 3 Tabs.  folic acid (FOLVITE) 1 mg tablet TAKE 1 TABLET DAILY EXCEPT DAY THAT METHOTREXATE IS TAKEN.     atenolol (TENORMIN) 50 mg tablet TAKE 1 TABLET DAILY    metFORMIN (GLUCOPHAGE) 1,000 mg tablet TAKE 1 TABLET TWICE A DAY WITH MEALS    losartan (COZAAR) 50 mg tablet TAKE 1 TABLET DAILY    losartan (COZAAR) 50 mg tablet Take 1 Tab by mouth daily.  omeprazole (PRILOSEC) 40 mg capsule Take 1 Cap by mouth daily.  metoprolol succinate (TOPROL-XL) 50 mg XL tablet Take 1 Tab by mouth daily.  metFORMIN (GLUCOPHAGE) 1,000 mg tablet TAKE 1 TABLET BY MOUTH TWICE A DAY WITH MEALS    sildenafil citrate (VIAGRA) 100 mg tablet Take 1 Tab by mouth as needed.  atorvastatin (LIPITOR) 40 mg tablet TAKE 1 TABLET DAILY (START LIPITOR AFTER VYTORIN IS FINISHED)    oxyCODONE-acetaminophen (PERCOCET) 5-325 mg per tablet TK 1 T PO  Q 4 TO 6 H PRF PAIN    SLOW RELEASE IRON 143 mg (45 mg iron) ER tablet TAKE 1 TAB BY MOUTH DAILY (BEFORE BREAKFAST).  aspirin delayed-release 81 mg tablet Take 81 mg by mouth daily.  VYTORIN 10/80 10-80 mg per tablet      No current facility-administered medications for this visit. Patient Active Problem List   Diagnosis Code    Coronary artery disease I25.10    Dyslipidemia E78.5    Hypertension, benign I10    Obstructive sleep apnea G47.33    Degenerative joint disease M19.90    Diabetes mellitus type 2 in nonobese ([facility] [address] E11.9    Lumbar disc disease M51.9    Cervical disc disease M50.90    Dupuytren's contracture of right hand M72.0    ACP (advance care planning) Z71.89    Primary osteoarthritis involving multiple joints M15.0       Past Surgical History:   Procedure Laterality Date    HX BACK SURGERY      HX CARPAL TUNNEL RELEASE      HX CORONARY STENT PLACEMENT      HX HEART CATHETERIZATION      HX HIP REPLACEMENT         Reviewed PmHx, RxHx, FmHx, SocHx, AllgHx and updated and dated in the chart.     Review of Systems - negative except as listed above in the HPI    Objective:     Vitals:    02/21/18 1030   BP: 156/71   Pulse: (!) 53   Resp: 18   Temp: 97.7 °F (36.5 °C)   TempSrc: Oral   SpO2: 98%   Weight: 170 lb (77.1 kg)   Height: 5' 8\" (1.727 m)     Physical Examination: General appearance - alert, well appearing, and in no distress  Mental status - alert, oriented to person, place, and time  Eyes - pupils equal and reactive, extraocular eye movements intact  Ears - bilateral TM's and external ear canals normal  Mouth - mucous membranes moist, pharynx normal without lesions  Neck - supple, no significant adenopathy  Lymphatics - no palpable lymphadenopathy, no hepatosplenomegaly  Chest - clear to auscultation, no wheezes, rales or rhonchi, symmetric air entry  Heart - normal rate, regular rhythm, normal S1, S2, no murmurs, rubs, clicks or gallops  Abdomen - soft, nontender, nondistended, no masses or organomegaly  Neurological - alert, oriented, normal speech, no focal findings or movement disorder noted  Musculoskeletal - no joint tenderness, deformity or swelling  Back- chronic low back pain with sciatica, pain with motion and decreased ROM  Extremities - peripheral pulses normal, no pedal edema, no clubbing or cyanosis    Assessment/ Plan:   Diagnoses and all orders for this visit:    1. Pre-op evaluation  -     CBC WITH AUTOMATED DIFF    2. Lumbar disc disease  -     [facility] POC ALERE ICUP DX DRUG SCREEN 10  -     oxyCODONE-acetaminophen (PERCOCET) 5-325 mg per tablet; Take 1 Tab by mouth every eight (8) hours as needed for Pain. Max Daily Amount: 3 Tabs. 3. Encounter for monitoring opioid maintenance therapy  -     [facility] POC ALERE ICUP DX DRUG SCREEN 10    4. Osteoarthritis, unspecified osteoarthritis type, unspecified site  -     oxyCODONE-acetaminophen (PERCOCET) 5-325 mg per tablet; Take 1 Tab by mouth every eight (8) hours as needed for Pain. Max Daily Amount: 3 Tabs. 5. Bilateral sciatica  -     oxyCODONE-acetaminophen (PERCOCET) 5-325 mg per tablet; Take 1 Tab by mouth every eight (8) hours as needed for Pain. Max Daily Amount: 3 Tabs. 6. Diabetes mellitus type 2 in nonobese (HCC)  -     METABOLIC PANEL, COMPREHENSIVE  -     HEMOGLOBIN A1C WITH EAG  -      DIABETES FOOT EXAM    7. Dyslipidemia  -     METABOLIC PANEL, COMPREHENSIVE  -     LIPID PANEL       Follow-up Disposition:  Return in about 6 months (around 8/21/2018), or if symptoms worsen or fail to improve. I have discussed the diagnosis with the patient and the intended plan as seen in the above orders. The patient has received an after-visit summary and questions were answered concerning future plans. Pt conveyed understanding of plan.     Medication Side Effects and Warnings were discussed with patient      Kelli Deluna,

## 2018-02-21 NOTE — PROGRESS NOTES
Chief Complaint   Patient presents with    Pre-op Exam    Medication Refill    Labs     Patient in office today for preop clearance. Pt will be having back surgery with  on 3/13&3/14. Pt would like a refill on pain medication. 1. Have you been to the ER, urgent care clinic since your last visit? Hospitalized since your last visit? No    2. Have you seen or consulted any other health care providers outside of the [address] [place] [address] since your last visit? Include any pap smears or colon screening.  No

## 2018-02-21 NOTE — MR AVS SNAPSHOT
[address] [id number] [phone] Patient: Kj Morton MRN: [id number] MIB:[id number]/50/9936 Visit Information Date & Time Provider Department Dept. Phone Encounter #  
 2/21/2018 10:40 AM Clarke Obrein [phone] [id number] Follow-up Instructions Return in about 6 months (around 8/21/2018), or if symptoms worsen or fail to improve. Your Appointments 2/28/2018  7:45 AM  
ESTABLISHED PATIENT with Surendra Amador, DO [facility] ([address]) Appt Note: 3 month follow up; Needs MWV; following up [address] [place] [address] Road [address] [phone]  
  
   
 [address] [place] [address] Road [address] Upcoming Health Maintenance Date Due COLONOSCOPY 2[birth year]19/1958 FOOT EXAM Q1 10/27/2016 LIPID PANEL Q1 10/28/2017 HEMOGLOBIN A1C Q6M 2/18/2018 MEDICARE YEARLY EXAM 4/19/2018 MICROALBUMIN Q1 8/18/2018 EYE EXAM RETINAL OR DILATED Q1 10/30/2018 GLAUCOMA SCREENING Q2Y 10/30/2019 DTaP/Tdap/Td series (2 - Td) 10/27/2025 Allergies as of 2/21/2018  Review Complete On: 2/21/2018 By: Filippo Marie LPN Severity Noted Reaction Type Reactions Ace Inhibitors  11/16/2010    Swelling Angioedema Current Immunizations  Reviewed on 9/1/2016 Name Date Influenza Vaccine 10/5/2017, 9/25/2016 Influenza Vaccine (Quad) PF 9/29/2015 Pneumococcal Conjugate (PCV-13) 10/27/2015 Pneumococcal Polysaccharide (PPSV-23) 10/1/2012 Tdap 10/27/2015 Zoster Vaccine, Live 10/5/2017 Not reviewed this visit You Were Diagnosed With   
  
 Codes Comments Lumbar disc disease    -  Primary ICD-10-CM: M51.9 ICD-9-CM: 722.93 Encounter for monitoring opioid maintenance therapy     ICD-10-CM: Z51.81, [id number] ICD-9-CM: V58.83, V58.69  Osteoarthritis, unspecified osteoarthritis type, unspecified site     ICD-10-CM: M19.90 
 ICD-9-CM: 715.90 Bilateral sciatica     ICD-10-CM: M54.31, M54.32 
ICD-9-CM: 724.3 Diabetes mellitus type 2 in nonobese [facility])     ICD-10-CM: E11.9 ICD-9-CM: 250.00 Dyslipidemia     ICD-10-CM: E78.5 ICD-9-CM: 272.4 Pre-op evaluation     ICD-10-CM: V80.749 ICD-9-CM: V72.84 Vitals BP Pulse Temp Resp Height(growth percentile) Weight(growth percentile) 156/71 (BP 1 Location: Right arm, BP Patient Position: Sitting) (!) 53 97.7 °F (36.5 °C) (Oral) 18 5' 8\" (1.727 m) 170 lb (77.1 kg) SpO2 BMI Smoking Status 98% 25.85 kg/m2 Former Smoker Vitals History BMI and BSA Data Body Mass Index Body Surface Area  
 25.85 kg/m 2 1.92 m 2 Preferred Pharmacy Pharmacy Name Phone [facility] [address] [place] [address] [address] AT [address] [facility] [phone] Your Updated Medication List  
  
   
This list is accurate as of 2/21/18 11:00 AM.  Always use your most recent med list.  
  
  
  
  
 aspirin delayed-release 81 mg tablet Take 81 mg by mouth daily. atenolol 50 mg tablet Commonly known as:  TENORMIN  
TAKE 1 TABLET DAILY  
  
 atorvastatin 40 mg tablet Commonly known as:  LIPITOR  
TAKE 1 TABLET DAILY (START LIPITOR AFTER VYTORIN IS FINISHED) folic acid 1 mg tablet Commonly known as:  FOLVITE  
TAKE 1 TABLET DAILY EXCEPT DAY THAT METHOTREXATE IS TAKEN. * losartan 50 mg tablet Commonly known as:  COZAAR Take 1 Tab by mouth daily. * losartan 50 mg tablet Commonly known as:  COZAAR  
TAKE 1 TABLET DAILY * metFORMIN 1,000 mg tablet Commonly known as:  GLUCOPHAGE  
TAKE 1 TABLET BY MOUTH TWICE A DAY WITH MEALS  
  
 * metFORMIN 1,000 mg tablet Commonly known as:  GLUCOPHAGE  
TAKE 1 TABLET TWICE A DAY WITH MEALS  
  
 metoprolol succinate 50 mg XL tablet Commonly known as:  TOPROL-XL Take 1 Tab by mouth daily. omeprazole 40 mg capsule Commonly known as:  PRILOSEC Take 1 Cap by mouth daily. * oxyCODONE-acetaminophen 5-325 mg per tablet Commonly known as:  PERCOCET TK 1 T PO  Q 4 TO 6 H PRF PAIN  
  
 * oxyCODONE-acetaminophen 5-325 mg per tablet Commonly known as:  PERCOCET Take 1 Tab by mouth every eight (8) hours as needed for Pain. Max Daily Amount: 3 Tabs.  
  
 sildenafil citrate 100 mg tablet Commonly known as:  VIAGRA Take 1 Tab by mouth as needed. SLOW RELEASE IRON 143 mg (45 mg iron) Tber Generic drug:  ferrous sulfate TAKE 1 TAB BY MOUTH DAILY (BEFORE BREAKFAST). Vytorin 10/80 10-80 mg per tablet Generic drug:  ezetimibe-simvastatin * Notice: This list has 6 medication(s) that are the same as other medications prescribed for you. Read the directions carefully, and ask your doctor or other care provider to review them with you. Prescriptions Printed Refills  
 oxyCODONE-acetaminophen (PERCOCET) 5-325 mg per tablet 0 Sig: Take 1 Tab by mouth every eight (8) hours as needed for Pain. Max Daily Amount: 3 Tabs. Class: Print Route: Oral  
  
We Performed the Following AMB POC ALERE ICUP DX DRUG SCREEN 10 [id number] CPT(R)] CBC WITH AUTOMATED DIFF [46427 CPT(R)] HEMOGLOBIN A1C WITH EAG [26547 CPT(R)]  DIABETES FOOT EXAM [HM7 Custom] LIPID PANEL [04991 CPT(R)] METABOLIC PANEL, COMPREHENSIVE [94861 CPT(R)] Follow-up Instructions Return in about 6 months (around 8/21/2018), or if symptoms worsen or fail to improve. Patient Instructions Nutrition Tips for Diabetes: After Your Visit Your Care Instructions A healthy diet is important to manage diabetes. It helps you lose weight (if you need to) and keep it off. It gives you the nutrition and energy your body needs and helps prevent heart disease. But a diet for diabetes does not mean that you have to eat special foods.  You can eat what your family eats, including occasional sweets and other favorites. But you do have to pay attention to how often you eat and how much you eat of certain foods. The right plan for you will give you meals that help you keep your blood sugar at healthy levels. Try to eat a variety of foods and to spread carbohydrate throughout the day. Carbohydrate raises blood sugar higher and more quickly than any other nutrient does. Carbohydrate is found in sugar, breads and cereals, fruit, starchy vegetables such as potatoes and corn, and milk and yogurt. You may want to work with a dietitian or diabetes educator to help you plan meals and snacks. A dietitian or diabetes educator also can help you lose weight if that is one of your goals. The following tips can help you enjoy your meals and stay healthy. Follow-up care is a key part of your treatment and safety. Be sure to make and go to all appointments, and call your doctor if you are having problems. Its also a good idea to know your test results and keep a list of the medicines you take. How can you care for yourself at home? · Learn which foods have carbohydrate and how much carbohydrate to eat. A dietitian or diabetes educator can help you learn to keep track of how much carbohydrate you eat. · Spread carbohydrate throughout the day. Eat some carbohydrate at all meals, but do not eat too much at any one time. · Plan meals to include food from all the food groups. These are the food groups and some example portion sizes: ¨ Grains: 1 slice of bread (1 ounce), ½ cup of cooked cereal, and 1/3 cup of cooked pasta or rice. These have about 15 grams of carbohydrate in a serving. Choose whole grains such as whole wheat bread or crackers, oatmeal, and brown rice more often than refined grains.  
¨ Fruit: 1 small fresh fruit, such as an apple or orange; ½ of a banana; ½ cup of chopped, cooked, or canned fruit; ½ cup of fruit juice; 1 cup of melon or raspberries; and 2 tablespoons of dried fruit. These have about 15 grams of carbohydrate in a serving. ¨ Dairy: 1 cup of nonfat or low-fat milk and 2/3 cup of plain yogurt. These have about 15 grams of carbohydrate in a serving. ¨ Protein foods: Beef, chicken, turkey, fish, eggs, tofu, cheese, cottage cheese, and peanut butter. A serving size of meat is 3 ounces, which is about the size of a deck of cards. Examples of meat substitute serving sizes (equal to 1 ounce of meat) are 1/4 cup of cottage cheese, 1 egg, 1 tablespoon of peanut butter, and ½ cup of tofu. These have very little or no carbohydrate per serving. ¨ Vegetables: Starchy vegetables such as ½ cup of cooked dried beans, peas, potatoes, or corn have about 15 grams of carbohydrate. Nonstarchy vegetables have very little carbohydrate, such as 1 cup of raw leafy vegetables (such as spinach), ½ cup of other vegetables (cooked or chopped), and 3/4 cup of vegetable juice. · Use the plate format to plan meals. It is a good, quick way to make sure that you have a balanced meal. It also helps you spread carbohydrate throughout the day. You divide your plate by types of foods. Put vegetables on half the plate, meat or meat substitutes on one-quarter of the plate, and a grain or starchy vegetable (such as brown rice or a potato) in the final quarter of the plate. To this you can add a small piece of fruit and 1 cup of milk or yogurt, depending on how much carbohydrate you are supposed to eat at a meal. 
· Talk to your dietitian or diabetes educator about ways to add limited amounts of sweets into your meal plan. You can eat these foods now and then, as long as you include the amount of carbohydrate they have in your daily carbohydrate allowance. · If you drink alcohol, limit it to no more than 1 drink a day for women and 2 drinks a day for men. If you are pregnant, no amount of alcohol is known to be safe. · Protein, fat, and fiber do not raise blood sugar as much as carbohydrate does. If you eat a lot of these nutrients in a meal, your blood sugar will rise more slowly than it would otherwise. · Limit saturated fats, such as those from meat and dairy products. Try to replace it with monounsaturated fat, such as olive oil. This is a healthier choice because people who have diabetes are at higher-than-average risk of heart disease. But use a modest amount of olive oil. A tablespoon of olive oil has 14 grams of fat and 120 calories. · Exercise lowers blood sugar. If you take insulin by shots or pump, you can use less than you would if you were not exercising. Keep in mind that timing matters. If you exercise within 1 hour after a meal, your body may need less insulin for that meal than it would if you exercised 3 hours after the meal. Test your blood sugar to find out how exercise affects your need for insulin. · Exercise on most days of the week. Aim for at least 30 minutes. Exercise helps you stay at a healthy weight and helps your body use insulin. Walking is an easy way to get exercise. Gradually increase the amount you walk every day. You also may want to swim, bike, or do other activities. When you eat out · Learn to estimate the serving sizes of foods that have carbohydrate. If you measure food at home, it will be easier to estimate the amount in a serving of restaurant food. · If the meal you order has too much carbohydrate (such as potatoes, corn, or baked beans), ask to have a low-carbohydrate food instead. Ask for a salad or green vegetables. · If you use insulin, check your blood sugar before and after eating out to help you plan how much to eat in the future. · If you eat more carbohydrate at a meal than you had planned, take a walk or do other exercise. This will help lower your blood sugar. Where can you learn more? Go to DealExplorer.be Enter I620 in the search box to learn more about \"Nutrition Tips for Diabetes: After Your Visit. \"  
© 7196-2194 Healthwise, Incorporated. Care instructions adapted under license by [employer] (which disclaims liability or warranty for this information). This care instruction is for use with your licensed healthcare professional. If you have questions about a medical condition or this instruction, always ask your healthcare professional. [address] any warranty or liability for your use of this information. Content Version: 74.6.393254; Current as of: June 4, 2014 Introducing [facility]! [employer] introduces [employer] patient portal. Now you can access parts of your medical record, email your doctor's office, and request medication refills online. 1. In your internet browser, go to [email or web address] 2. Click on the First Time User? Click Here link in the Sign In box. You will see the New Member Sign Up page. 3. Enter your [employer] Access Code exactly as it appears below. You will not need to use this code after youve completed the sign-up process. If you do not sign up before the expiration date, you must request a new code. · [employer] Access Code: [email or web address] Expires: 3/19/2018 10:38 AM 
 
4. Enter the last four digits of your Social Security Number (xxxx) and Date of Birth (mm/dd/yyyy) as indicated and click Submit. You will be taken to the next sign-up page. 5. Create a [employer] ID. This will be your [employer] login ID and cannot be changed, so think of one that is secure and easy to remember. 6. Create a [employer] password. You can change your password at any time. 7. Enter your Password Reset Question and Answer. This can be used at a later time if you forget your password. 8. Enter your e-mail address. You will receive e-mail notification when new information is available in [address] 9. Click Sign Up. You can now view and download portions of your medical record. 10. Click the Download Summary menu link to download a portable copy of your medical information. If you have questions, please visit the Frequently Asked Questions section of the [employer] website. Remember, [employer] is NOT to be used for urgent needs. For medical emergencies, dial 911. Now available from your iPhone and Android! Please provide this summary of care documentation to your next provider. Your primary care clinician is listed as Familia Rubio. If you have any questions after today's visit, please call [phone].

## 2018-02-22 LAB
ALBUMIN SERPL-MCNC: 3.9 G/DL (ref 3.5–4.8)
ALBUMIN/GLOB SERPL: 1.4 {RATIO} (ref 1.2–2.2)
ALP SERPL-CCNC: 85 IU/L (ref 39–117)
ALT SERPL-CCNC: 7 IU/L (ref 0–44)
AST SERPL-CCNC: 14 IU/L (ref 0–40)
BASOPHILS # BLD AUTO: 0 X10E3/UL (ref 0–0.2)
BASOPHILS NFR BLD AUTO: 1 %
BILIRUB SERPL-MCNC: 0.3 MG/DL (ref 0–1.2)
BUN SERPL-MCNC: 7 MG/DL (ref 8–27)
BUN/CREAT SERPL: 9 (ref 10–24)
CALCIUM SERPL-MCNC: 9.1 MG/DL (ref 8.6–10.2)
CHLORIDE SERPL-SCNC: 103 MMOL/L (ref 96–106)
CHOLEST SERPL-MCNC: 153 MG/DL (ref 100–199)
CO2 SERPL-SCNC: 24 MMOL/L (ref 18–29)
CREAT SERPL-MCNC: 0.8 MG/DL (ref 0.76–1.27)
EOSINOPHIL # BLD AUTO: 0.1 X10E3/UL (ref 0–0.4)
EOSINOPHIL NFR BLD AUTO: 1 %
ERYTHROCYTE [DISTWIDTH] IN BLOOD BY AUTOMATED COUNT: 15.4 % (ref 12.3–15.4)
EST. AVERAGE GLUCOSE BLD GHB EST-MCNC: 126 MG/DL
GFR SERPLBLD CREATININE-BSD FMLA CKD-EPI: 100 ML/MIN/{1.73_M2}
GFR SERPLBLD CREATININE-BSD FMLA CKD-EPI: 87 ML/MIN/{1.73_M2}
GLOBULIN SER CALC-MCNC: 2.7 G/L (ref 1.5–4.5)
GLUCOSE SERPL-MCNC: 95 MG/DL (ref 65–99)
HBA1C MFR BLD: 6 % (ref 4.8–5.6)
HCT VFR BLD AUTO: 39.5 % (ref 37.5–51)
HDLC SERPL-MCNC: 55 MG/DL
HGB BLD-MCNC: 13.1 G/DL (ref 13–17.7)
IMM GRANULOCYTES # BLD: 0 X10E3/UL (ref 0–0.1)
IMM GRANULOCYTES NFR BLD: 0 %
INTERPRETATION, 910389: NORMAL
LDLC SERPL CALC-MCNC: 86 MG/DL (ref 0–99)
LYMPHOCYTES # BLD AUTO: 2.2 X10E3/UL (ref 0.7–3.1)
LYMPHOCYTES NFR BLD AUTO: 32 %
Lab: NORMAL
MCH RBC QN AUTO: 30.8 PG (ref 26.6–33)
MCHC RBC AUTO-ENTMCNC: 33.2 G/DL (ref 31.5–35.7)
MCV RBC AUTO: 93 FL (ref 79–97)
MONOCYTES # BLD AUTO: 0.6 X10E3/UL (ref 0.1–0.9)
MONOCYTES NFR BLD AUTO: 9 %
NEUTROPHILS # BLD AUTO: 3.9 X10E3/UL (ref 1.4–7)
NEUTROPHILS NFR BLD AUTO: 57 %
PLATELET # BLD AUTO: 333 X10E3/UL (ref 150–379)
POTASSIUM SERPL-SCNC: 4.2 MMOL/L (ref 3.5–5.2)
PROT SERPL-MCNC: 6.6 G/DL (ref 6–8.5)
RBC # BLD AUTO: 4.25 X10E6/UL (ref 4.14–5.8)
SODIUM SERPL-SCNC: 142 MMOL/L (ref 134–144)
TRIGL SERPL-MCNC: 59 MG/DL (ref 0–149)
VLDLC SERPL CALC-MCNC: 12 MG/DL (ref 5–40)
WBC # BLD AUTO: 6.8 X10E3/UL (ref 3.4–10.8)

## 2018-03-08 ENCOUNTER — OFFICE VISIT (OUTPATIENT)
Dept: CARDIOLOGY CLINIC | Age: 78
End: 2018-03-08

## 2018-03-08 VITALS
HEIGHT: 68 IN | BODY MASS INDEX: 26.37 KG/M2 | HEART RATE: 60 BPM | OXYGEN SATURATION: 96 % | DIASTOLIC BLOOD PRESSURE: 70 MMHG | SYSTOLIC BLOOD PRESSURE: 110 MMHG | WEIGHT: 174 LBS

## 2018-03-08 DIAGNOSIS — I25.10 CORONARY ARTERY DISEASE INVOLVING NATIVE CORONARY ARTERY OF NATIVE HEART WITHOUT ANGINA PECTORIS: Primary | ICD-10-CM

## 2018-03-08 DIAGNOSIS — I10 HYPERTENSION, BENIGN: ICD-10-CM

## 2018-03-08 DIAGNOSIS — E78.5 DYSLIPIDEMIA: ICD-10-CM

## 2018-03-08 DIAGNOSIS — Z01.810 PREOP CARDIOVASCULAR EXAM: ICD-10-CM

## 2018-03-08 NOTE — PROGRESS NOTES
HISTORY OF PRESENT ILLNESS  Chelo Canas is a 70 y.o. male     SUMMARY:   Problem List  Date Reviewed: 3/8/2018          Codes Class Noted    Primary osteoarthritis involving multiple joints ICD-10-CM: M15.0  ICD-9-CM: 715.09  8/18/2017        ACP (advance care planning) ICD-10-CM: Z71.89  ICD-9-CM: V65.49  8/9/2017    Overview Signed 8/9/2017 11:49 AM by Gilbert Johnson DO     Discussed this calendar year 17             Cervical disc disease ICD-10-CM: M50.90  ICD-9-CM: 722.91  9/15/2016        Dupuytren's contracture of right hand ICD-10-CM: M72.0  ICD-9-CM: 728.6  9/15/2016        Lumbar disc disease ICD-10-CM: M51.9  ICD-9-CM: 722.93  9/1/2016        Diabetes mellitus type 2 in nonobese [facility]) ICD-10-CM: E11.9  ICD-9-CM: 250.00  7/28/2015        Obstructive sleep apnea ICD-10-CM: G47.33  ICD-9-CM: 327.23  1/28/2011        Degenerative joint disease ICD-10-CM: M19.90  ICD-9-CM: 715.90  1/28/2011    Overview Signed 1/28/2011  3:22 PM by Bernard Amato MD     A. S/P Right THR (8/18/9)             Coronary artery disease ICD-10-CM: I25.10  ICD-9-CM: 414.00  Unknown    Overview Addendum 9/16/2011 10:32 AM by Bernard Amato MD     a. 1996 PCI/stents of Lcx  b. Cath (10/03): severe prox (40-70%) and mid RCA (80%) stenosis - PCI to both areas  c. Cardiolite (2/04):scheduled for comparison from previous stress pre PCI - similar study with no new acute changes. D.  Exercise Cardiolite (8/29/11):  Reversible mid-distal inferolateral defect. No fixed defects. EF 63%. E.  Cath (9/16/11):  LM ost20, d20. LAD m20. LCx m70; OM1 70, OM2 100, OM3 100. RCA p20, d30; PDA 99. F.  PCI PDA (9/16/11):  2.5x18 Xience. Dyslipidemia ICD-10-CM: E78.5  ICD-9-CM: 272.4  Unknown    Overview Addendum 12/12/2016  2:31 PM by Bernard Amato MD     a. FLP 10/06: LDL 62, HDL 50, Trig 107, Chol 133 (vytorin 10/80). b. Ольга Carolus (6/20/8): Tot 143, , HDL 40, LDL 79 (Vytorin 10/80mg qd). c. FLP (12/20/8):  Tot 137, TG 81, HDL 45, LDL 76 (Vytorin 10/80mg qd). D.  FLP (8/19/11): Tot 133, TG 78, HDL 48, LDL 69 (Vytorin 10/80mg qd). E.  FLP (10/22/13): Tot 155, TG 85, HDL 55, LDL 83 (Vytorin 10/80). F.  FLP (9/29/15): Tot 148, , HDL 64, LDL 60 (Vytorin 10/80). G.  FLP (10/28/16): Tot 148, TG 83, HDL 67, LDL 64 (Vytorin 10/80). Hypertension, benign ICD-10-CM: I10  ICD-9-CM: 401.1  Unknown              Current Outpatient Prescriptions on File Prior to Visit   Medication Sig    oxyCODONE-acetaminophen (PERCOCET) 5-325 mg per tablet Take 1 Tab by mouth every eight (8) hours as needed for Pain. Max Daily Amount: 3 Tabs.  folic acid (FOLVITE) 1 mg tablet TAKE 1 TABLET DAILY EXCEPT DAY THAT METHOTREXATE IS TAKEN.  atenolol (TENORMIN) 50 mg tablet TAKE 1 TABLET DAILY    losartan (COZAAR) 50 mg tablet Take 1 Tab by mouth daily.  omeprazole (PRILOSEC) 40 mg capsule Take 1 Cap by mouth daily.  metoprolol succinate (TOPROL-XL) 50 mg XL tablet Take 1 Tab by mouth daily.  metFORMIN (GLUCOPHAGE) 1,000 mg tablet TAKE 1 TABLET BY MOUTH TWICE A DAY WITH MEALS    sildenafil citrate (VIAGRA) 100 mg tablet Take 1 Tab by mouth as needed.  atorvastatin (LIPITOR) 40 mg tablet TAKE 1 TABLET DAILY (START LIPITOR AFTER VYTORIN IS FINISHED)    SLOW RELEASE IRON 143 mg (45 mg iron) ER tablet TAKE 1 TAB BY MOUTH DAILY (BEFORE BREAKFAST).  aspirin delayed-release 81 mg tablet Take 81 mg by mouth daily. No current facility-administered medications on file prior to visit. CARDIOLOGY STUDIES TO DATE:  Cardiolite (2/04):scheduled for comparison from previous stress pre PCI - similar study with no new acute changes. Exercise Cardiolite (8/29/11):  Reversible mid-distal inferolateral defect. No fixed defects. EF 63%.       Chief Complaint   Patient presents with    Coronary Artery Disease     HPI :  Mr. Lucas Holguin is a 70year-old with known coronary disease, formerly followed by Dr. Saul Johnson, who comes in because he needs to have back surgery next Tuesday. He has done really well since his last stenting procedure in 2011 with no symptoms suggestive of angina or heart failure. He is very active in spite of his back issues. He has hypertension, hyperlipidemia and his lipids are followed by his primary care physician. EKG from preop was reviewed today and showed sinus bradycardia with minor nonspecific ST-T wave changes. CARDIAC ROS:   negative for chest pain, dyspnea, palpitations, syncope, orthopnea, paroxysmal nocturnal dyspnea, exertional chest pressure/discomfort, claudication, lower extremity edema    Family History   Problem Relation Age of Onset    Arthritis-osteo Mother     No Known Problems Father        Past Medical History:   Diagnosis Date    CAD (coronary artery disease)     CAD (coronary artery disease), native coronary artery     Degenerative joint disease 1/28/2011    Diabetes type 1, controlled ([facility] [address]     Essential hypertension     Hyperlipidemia     Hypertension, benign     Obstructive sleep apnea 1/28/2011    ADELA (obstructive sleep apnea)        GENERAL ROS:  A comprehensive review of systems was negative except for that written in the HPI.     Visit Vitals    /70    Pulse 60    Ht 5' 8\" (1.727 m)    Wt 174 lb (78.9 kg)    SpO2 96%    BMI 26.46 kg/m2       Wt Readings from Last 3 Encounters:   03/08/18 174 lb (78.9 kg)   02/21/18 170 lb (77.1 kg)   01/16/18 174 lb (78.9 kg)            BP Readings from Last 3 Encounters:   03/08/18 110/70   02/21/18 156/71   01/16/18 112/80       PHYSICAL EXAM  General appearance: alert, cooperative, no distress, appears stated age  Neck: supple, symmetrical, trachea midline, no adenopathy, no carotid bruit and no JVD  Lungs: clear to auscultation bilaterally  Heart: regular rate and rhythm, S1, S2 normal, no murmur, click, rub or gallop  Extremities: extremities normal, atraumatic, no cyanosis or edema    Lab Results   Component Value Date/Time    Cholesterol, total 153 02/21/2018 11:01 AM    Cholesterol, total 148 10/28/2016 07:34 AM    Cholesterol, total 146 07/28/2016 07:39 AM    Cholesterol, total 148 09/29/2015 07:53 AM    Cholesterol, total 155 10/22/2013 10:00 AM    HDL Cholesterol 55 02/21/2018 11:01 AM    HDL Cholesterol 67 10/28/2016 07:34 AM    HDL Cholesterol 59 07/28/2016 07:39 AM    HDL Cholesterol 64 09/29/2015 07:53 AM    HDL Cholesterol 55 10/22/2013 10:00 AM    LDL, calculated 86 02/21/2018 11:01 AM    LDL, calculated 64 10/28/2016 07:34 AM    LDL, calculated 64 07/28/2016 07:39 AM    LDL, calculated 60 09/29/2015 07:53 AM    LDL, calculated 83 10/22/2013 10:00 AM    Triglyceride 59 02/21/2018 11:01 AM    Triglyceride 83 10/28/2016 07:34 AM    Triglyceride 113 07/28/2016 07:39 AM    Triglyceride 122 09/29/2015 07:53 AM    Triglyceride 85 10/22/2013 10:00 AM     ASSESSMENT  Mr. Jeff Walton is stable, asymptomatic and well compensated on a good medical regimen and needs no further cardiac testing at this time. He should be fine for back surgery without special precautions. current treatment plan is effective, no change in therapy  lab results and schedule of future lab studies reviewed with patient  reviewed diet, exercise and weight control    Encounter Diagnoses   Name Primary?  Coronary artery disease involving native coronary artery of native heart without angina pectoris Yes    Dyslipidemia     Hypertension, benign     Preop cardiovascular exam      No orders of the defined types were placed in this encounter. Follow-up Disposition:  Return in about 1 year (around 3/8/2019).     Albina Hankins MD  3/8/2018

## 2018-03-08 NOTE — MR AVS SNAPSHOT
[address] [facility] [address] [facility] 
[phone] Patient: Mini Hayden MRN: [id number] Visit Information Date & Time Provider Department Dept. Phone Encounter #  
 3/8/2018  2:20 PM Tomi Espinoza MD [facility] [phone] [id number] Follow-up Instructions Return in about 1 year (around 3/8/2019). Upcoming Health Maintenance Date Due COLONOSCOPY 2/19/1960 Bone Densitometry (Dexa) Screening 2/19/2007 MICROALBUMIN Q1 8/18/2018 HEMOGLOBIN A1C Q6M 8/21/2018 EYE EXAM RETINAL OR DILATED Q1 10/30/2018 FOOT EXAM Q1 2/21/2019 LIPID PANEL Q1 2/21/2019 GLAUCOMA SCREENING Q2Y 10/30/2019 DTaP/Tdap/Td series (2 - Td) 10/27/2025 Allergies as of 3/8/2018  Review Complete On: 3/8/2018 By: Tomi Espinoza MD  
  
 Severity Noted Reaction Type Reactions Ace Inhibitors  11/16/2010    Swelling Angioedema 3/8/18- patient states he is not allergic to medication Current Immunizations  Reviewed on 9/1/2016 Name Date Influenza Vaccine 10/5/2017, 9/25/2016 Influenza Vaccine (Quad) PF 9/29/2015 Pneumococcal Conjugate (PCV-13) 10/27/2015 Pneumococcal Polysaccharide (PPSV-23) 10/1/2012 Tdap 10/27/2015 Zoster Vaccine, Live 10/5/2017 Not reviewed this visit You Were Diagnosed With   
  
 Codes Comments Coronary artery disease involving native coronary artery of native heart without angina pectoris    -  Primary ICD-10-CM: I25.10 ICD-9-CM: 414.01 Dyslipidemia     ICD-10-CM: E78.5 ICD-9-CM: 272.4 Hypertension, benign     ICD-10-CM: I10 
ICD-9-CM: 401.1 Preop cardiovascular exam     ICD-10-CM: Z01.810 ICD-9-CM: V72.81 Vitals BP Pulse Height(growth percentile) Weight(growth percentile) SpO2 BMI  
 110/70 60 5' 8\" (1.727 m) 174 lb (78.9 kg) 96% 26.46 kg/m2 Smoking Status Former Smoker Vitals History BMI and BSA Data Body Mass Index Body Surface Area  
 26.46 kg/m 2 1.95 m 2 Preferred Pharmacy Pharmacy Name Phone [facility] [address]  [facility] [phone] Your Updated Medication List  
  
   
This list is accurate as of 3/8/18  2:23 PM.  Always use your most recent med list.  
  
  
  
  
 aspirin delayed-release 81 mg tablet Take 81 mg by mouth daily. atenolol 50 mg tablet Commonly known as:  TENORMIN  
TAKE 1 TABLET DAILY  
  
 atorvastatin 40 mg tablet Commonly known as:  LIPITOR  
TAKE 1 TABLET DAILY (START LIPITOR AFTER VYTORIN IS FINISHED) folic acid 1 mg tablet Commonly known as:  FOLVITE  
TAKE 1 TABLET DAILY EXCEPT DAY THAT METHOTREXATE IS TAKEN. losartan 50 mg tablet Commonly known as:  COZAAR Take 1 Tab by mouth daily. metFORMIN 1,000 mg tablet Commonly known as:  GLUCOPHAGE  
TAKE 1 TABLET BY MOUTH TWICE A DAY WITH MEALS  
  
 metoprolol succinate 50 mg XL tablet Commonly known as:  TOPROL-XL Take 1 Tab by mouth daily. omeprazole 40 mg capsule Commonly known as:  PRILOSEC Take 1 Cap by mouth daily. oxyCODONE-acetaminophen 5-325 mg per tablet Commonly known as:  PERCOCET Take 1 Tab by mouth every eight (8) hours as needed for Pain. Max Daily Amount: 3 Tabs.  
  
 sildenafil citrate 100 mg tablet Commonly known as:  VIAGRA Take 1 Tab by mouth as needed. SLOW RELEASE IRON 143 mg (45 mg iron) Tber Generic drug:  ferrous sulfate TAKE 1 TAB BY MOUTH DAILY (BEFORE BREAKFAST). Follow-up Instructions Return in about 1 year (around 3/8/2019). Introducing [facility]! Carmelita Fothergill introduces [employer]Pin patient portal. Now you can access parts of your medical record, email your doctor's office, and request medication refills online.    
 
1. In your internet browser, go to [email or web address] 2. Click on the First Time User? Click Here link in the Sign In box. You will see the New Member Sign Up page. 3. Enter your Sitedesk Access Code exactly as it appears below. You will not need to use this code after youve completed the sign-up process. If you do not sign up before the expiration date, you must request a new code. · Sitedesk Access Code: [email or web address] Expires: 3/19/2018 10:38 AM 
 
4. Enter the last four digits of your Social Security Number (xxxx) and Date of Birth (mm/dd/yyyy) as indicated and click Submit. You will be taken to the next sign-up page. 5. Create a Monogramt ID. This will be your Sitedesk login ID and cannot be changed, so think of one that is secure and easy to remember. 6. Create a Sitedesk password. You can change your password at any time. 7. Enter your Password Reset Question and Answer. This can be used at a later time if you forget your password. 8. Enter your e-mail address. You will receive e-mail notification when new information is available in [address] 9. Click Sign Up. You can now view and download portions of your medical record. 10. Click the Download Summary menu link to download a portable copy of your medical information. If you have questions, please visit the Frequently Asked Questions section of the Sitedesk website. Remember, Sitedesk is NOT to be used for urgent needs. For medical emergencies, dial 911. Now available from your iPhone and Android! Please provide this summary of care documentation to your next provider. Your primary care clinician is listed as Sommer Mckeon. If you have any questions after today's visit, please call [phone].

## 2018-05-01 ENCOUNTER — OFFICE VISIT (OUTPATIENT)
Dept: FAMILY MEDICINE CLINIC | Age: 78
End: 2018-05-01

## 2018-05-01 VITALS
HEART RATE: 64 BPM | SYSTOLIC BLOOD PRESSURE: 151 MMHG | BODY MASS INDEX: 26.34 KG/M2 | OXYGEN SATURATION: 98 % | RESPIRATION RATE: 16 BRPM | WEIGHT: 173.8 LBS | DIASTOLIC BLOOD PRESSURE: 78 MMHG | HEIGHT: 68 IN | TEMPERATURE: 98 F

## 2018-05-01 DIAGNOSIS — M19.012 ARTHRITIS OF LEFT SHOULDER REGION: ICD-10-CM

## 2018-05-01 DIAGNOSIS — M54.32 BILATERAL SCIATICA: ICD-10-CM

## 2018-05-01 DIAGNOSIS — M54.31 BILATERAL SCIATICA: ICD-10-CM

## 2018-05-01 DIAGNOSIS — M19.90 OSTEOARTHRITIS, UNSPECIFIED OSTEOARTHRITIS TYPE, UNSPECIFIED SITE: Primary | ICD-10-CM

## 2018-05-01 DIAGNOSIS — M51.9 LUMBAR DISC DISEASE: ICD-10-CM

## 2018-05-01 RX ORDER — OXYCODONE AND ACETAMINOPHEN 5; 325 MG/1; MG/1
1 TABLET ORAL
Qty: 30 TAB | Refills: 0 | Status: SHIPPED | OUTPATIENT
Start: 2018-05-01 | End: 2018-06-07 | Stop reason: SDUPTHER

## 2018-05-01 RX ORDER — LIDOCAINE HYDROCHLORIDE 10 MG/ML
2 INJECTION INFILTRATION; PERINEURAL ONCE
Qty: 2 ML | Refills: 0
Start: 2018-05-01 | End: 2018-05-01

## 2018-05-01 RX ORDER — BUPIVACAINE HYDROCHLORIDE 2.5 MG/ML
2 INJECTION, SOLUTION INFILTRATION; PERINEURAL ONCE
Qty: 2 ML | Refills: 0
Start: 2018-05-01 | End: 2018-05-01

## 2018-05-01 RX ORDER — METHYLPREDNISOLONE ACETATE 80 MG/ML
80 INJECTION, SUSPENSION INTRA-ARTICULAR; INTRALESIONAL; INTRAMUSCULAR; SOFT TISSUE ONCE
Qty: 1 ML | Refills: 0
Start: 2018-05-01 | End: 2018-05-01

## 2018-05-01 RX ORDER — OXYCODONE HYDROCHLORIDE 5 MG/1
TABLET ORAL
Refills: 0 | COMMUNITY
Start: 2018-04-04 | End: 2018-05-01

## 2018-05-01 RX ORDER — DOCUSATE SODIUM 100 MG/1
CAPSULE, LIQUID FILLED ORAL
Refills: 0 | COMMUNITY
Start: 2018-03-16 | End: 2019-02-13 | Stop reason: ALTCHOICE

## 2018-05-01 RX ORDER — TRAMADOL HYDROCHLORIDE 50 MG/1
50-100 TABLET ORAL
COMMUNITY
Start: 2018-04-25 | End: 2018-05-01

## 2018-05-01 NOTE — PATIENT INSTRUCTIONS

## 2018-05-01 NOTE — PROGRESS NOTES
Sulma De La O is a 70 y.o. male   Chief Complaint   Patient presents with    Shoulder Pain     left, x 2 weeks    pt here for L sided shoulder pain and would like an injection in huis shoulder. Pt was given tramadol for post op pain for his back and states he does not tolerate this at all. His wounds are completely healed from surgery. Will give pt a refill of his percocet for his chronic joint pain and this has proven well tolerated and beneficial when needed. Opioid/Pain Management:    1. Has the patient signed a pain contract for chronic narcotic use? yes  2. Has the patient had a UDS or Serum screen as per guidelines as per [facility]?:   yes    3. Does patient meet necessary guidelines for Naloxone treatement per the [facility]?:    no  4. Has the Prescription Monitoring Program been reviewed? yes  5. Does patient have a long term condition that requires long term use of a Narcotic?  yes  6. Has patient been tolerant of therapy and responsible to routine follow up and specialist follow-up? Yes        he is a 70y.o. year old male who presents for evalution. Reviewed PmHx, RxHx, FmHx, SocHx, AllgHx and updated and dated in the chart. Review of Systems - negative except as listed above in the HPI    Objective:     Vitals:    05/01/18 1015   BP: 151/78   Pulse: 64   Resp: 16   Temp: 98 °F (36.7 °C)   TempSrc: Oral   SpO2: 98%   Weight: 173 lb 12.8 oz (78.8 kg)   Height: 5' 8\" (1.727 m)       Current Outpatient Prescriptions   Medication Sig     mg capsule TK 1 C PO BID FOR CONSTIPATION    oxyCODONE-acetaminophen (PERCOCET) 5-325 mg per tablet Take 1 Tab by mouth every eight (8) hours as needed for Pain. Max Daily Amount: 3 Tabs.  methylPREDNISolone acetate (DEPO-MEDROL) 80 mg/mL injection 1 mL by IntraMUSCular route once for 1 dose.  lidocaine (XYLOCAINE) 10 mg/mL (1 %) injection 2 mL by Intra artICUlar route once for 1 dose.     bupivacaine (MARCAINE) 0.25 % (2.5 mg/mL) soln injection 2 mL by Intra artICUlar route once for 1 dose.  folic acid (FOLVITE) 1 mg tablet TAKE 1 TABLET DAILY EXCEPT DAY THAT METHOTREXATE IS TAKEN.  atenolol (TENORMIN) 50 mg tablet TAKE 1 TABLET DAILY    losartan (COZAAR) 50 mg tablet Take 1 Tab by mouth daily.  omeprazole (PRILOSEC) 40 mg capsule Take 1 Cap by mouth daily.  metoprolol succinate (TOPROL-XL) 50 mg XL tablet Take 1 Tab by mouth daily.  metFORMIN (GLUCOPHAGE) 1,000 mg tablet TAKE 1 TABLET BY MOUTH TWICE A DAY WITH MEALS    sildenafil citrate (VIAGRA) 100 mg tablet Take 1 Tab by mouth as needed.  atorvastatin (LIPITOR) 40 mg tablet TAKE 1 TABLET DAILY (START LIPITOR AFTER VYTORIN IS FINISHED)    SLOW RELEASE IRON 143 mg (45 mg iron) ER tablet TAKE 1 TAB BY MOUTH DAILY (BEFORE BREAKFAST).  aspirin delayed-release 81 mg tablet Take 81 mg by mouth daily. No current facility-administered medications for this visit. Physical Examination: General appearance - alert, well appearing, and in no distress  Chest - clear to auscultation, no wheezes, rales or rhonchi, symmetric air entry  Heart - normal rate, regular rhythm, normal S1, S2, no murmurs, rubs, clicks or gallops  Musculoskeletal - pain of L shoulder with active and passive ROM, back pain is improved from prior      Assessment/ Plan:   Diagnoses and all orders for this visit:    1. Osteoarthritis, unspecified osteoarthritis type, unspecified site  -     oxyCODONE-acetaminophen (PERCOCET) 5-325 mg per tablet; Take 1 Tab by mouth every eight (8) hours as needed for Pain. Max Daily Amount: 3 Tabs. 2. Lumbar disc disease  -     oxyCODONE-acetaminophen (PERCOCET) 5-325 mg per tablet; Take 1 Tab by mouth every eight (8) hours as needed for Pain. Max Daily Amount: 3 Tabs. 3. Bilateral sciatica  -     oxyCODONE-acetaminophen (PERCOCET) 5-325 mg per tablet; Take 1 Tab by mouth every eight (8) hours as needed for Pain.  Max Daily Amount: 3 Tabs.    4. Arthritis of left shoulder region  -     20610 - DRAIN/INJECT LARGE JOINT/BURSA  -     methylPREDNISolone acetate (DEPO-MEDROL) 80 mg/mL injection; 1 mL by IntraMUSCular route once for 1 dose. -     lidocaine (XYLOCAINE) 10 mg/mL (1 %) injection; 2 mL by Intra artICUlar route once for 1 dose. -     bupivacaine (MARCAINE) 0.25 % (2.5 mg/mL) soln injection; 2 mL by Intra artICUlar route once for 1 dose. Follow-up Disposition:  Return if symptoms worsen or fail to improve. I have discussed the diagnosis with the patient and the intended plan as seen in the above orders. The patient has received an after-visit summary and questions were answered concerning future plans. Pt conveyed understanding of plan. Medication Side Effects and Warnings were discussed with patient      Shivani Luong DO   Patient has agreed to the procedure and understands the risk of adverse reactions. Procedure:  Joint Injection:  L shoulder    Injected joint(s) with sterile technique using 5cc of mixed 1% Lidocaine 2cc with 0.25% Marcaine 2cc, DepoMedrol 1cc with relief and no adverse reaction to procedure. Patient given instructions and expectations of after visit care. [facility]  OFFICE PROCEDURE PROGRESS NOTE        Chart reviewed for the following:   Lexi NINO DO, have reviewed the History, Physical and updated the Allergic reactions.     TIME OUT performed immediately prior to start of procedure:   Shivani NINO DO, have performed the following reviews prior to the start of the procedure:            * Patient was identified by name and date of birth   * Agreement on procedure being performed was verified  * Risks and Benefits explained to the patient  * Procedure site verified and marked as necessary  * Patient was positioned for comfort  * Consent was signed and verified     Time: 1105am      Date of procedure: 5/18/2017    Procedure performed by:  Shivani Luong DO    Provider assisted by: self DO    Patient assisted by: self    How tolerated by patient: tolerated the procedure well with no complications    Post Procedural Pain Scale: 0 - No Hurt    Comments: none

## 2018-05-01 NOTE — PROGRESS NOTES
Chief Complaint   Patient presents with    Shoulder Pain     left, x 2 weeks     1. Have you been to the ER, urgent care clinic since your last visit? Hospitalized since your last visit? No    2. Have you seen or consulted any other health care providers outside of the [facility] since your last visit? Include any pap smears or colon screening.  No

## 2018-06-07 ENCOUNTER — OFFICE VISIT (OUTPATIENT)
Dept: FAMILY MEDICINE CLINIC | Age: 78
End: 2018-06-07

## 2018-06-07 VITALS
BODY MASS INDEX: 26.07 KG/M2 | RESPIRATION RATE: 18 BRPM | OXYGEN SATURATION: 97 % | SYSTOLIC BLOOD PRESSURE: 146 MMHG | HEART RATE: 67 BPM | HEIGHT: 68 IN | DIASTOLIC BLOOD PRESSURE: 84 MMHG | WEIGHT: 172 LBS | TEMPERATURE: 98.1 F

## 2018-06-07 DIAGNOSIS — Z00.00 MEDICARE ANNUAL WELLNESS VISIT, SUBSEQUENT: Primary | ICD-10-CM

## 2018-06-07 DIAGNOSIS — M19.90 OSTEOARTHRITIS, UNSPECIFIED OSTEOARTHRITIS TYPE, UNSPECIFIED SITE: ICD-10-CM

## 2018-06-07 DIAGNOSIS — M51.9 LUMBAR DISC DISEASE: ICD-10-CM

## 2018-06-07 DIAGNOSIS — Z51.81 MEDICATION MONITORING ENCOUNTER: ICD-10-CM

## 2018-06-07 RX ORDER — OXYCODONE AND ACETAMINOPHEN 5; 325 MG/1; MG/1
1 TABLET ORAL
Qty: 30 TAB | Refills: 0 | Status: SHIPPED | OUTPATIENT
Start: 2018-06-07 | End: 2018-08-30 | Stop reason: SDUPTHER

## 2018-06-07 NOTE — PROGRESS NOTES
Bjorn Chavira is a 70 y.o. male   Chief Complaint   Patient presents with    Medication Refill    pt here for refill of his pain medication, pt takes 1/2-1 tab daily. Pt has his Rx from last month and has 1 pill left.  utox is appropriate. Pt has a lot of stiffness and pain in his back around the region where he had his surgery but his problems with his sciatica has resolved since surgery. Pt has f/u with his surgeon on the 13th. Pt has not gotten any refills no abhorrent behavior. No hx of drug abuse. Pt has been compliant with all requests. Pt does not tolerate nsaid's and did not get any effect from gabapentin. Opioid/Pain Management:    1. Has the patient signed a pain contract for chronic narcotic use? yes  2. Has the patient had a UDS or Serum screen as per guidelines as per [facility]?:   yes    3. Does patient meet necessary guidelines for Naloxone treatement per the [facility]?:    no  4. Has the Prescription Monitoring Program been reviewed? yes  5. Does patient have a long term condition that requires long term use of a Narcotic?  yes  6. Has patient been tolerant of therapy and responsible to routine follow up and specialist follow-up? Yes    he is a 70y.o. year old male who presents for evalution. Reviewed PmHx, RxHx, FmHx, SocHx, AllgHx and updated and dated in the chart. Review of Systems - negative except as listed above in the HPI    Objective:     Vitals:    06/07/18 0724   BP: 146/84   Pulse: 67   Resp: 18   Temp: 98.1 °F (36.7 °C)   TempSrc: Oral   SpO2: 97%   Weight: 172 lb (78 kg)   Height: 5' 8\" (1.727 m)       Current Outpatient Prescriptions   Medication Sig    oxyCODONE-acetaminophen (PERCOCET) 5-325 mg per tablet Take 1 Tab by mouth every eight (8) hours as needed for Pain. Max Daily Amount: 3 Tabs.      mg capsule TK 1 C PO BID FOR CONSTIPATION    folic acid (FOLVITE) 1 mg tablet TAKE 1 TABLET DAILY EXCEPT DAY THAT METHOTREXATE IS TAKEN.    atenolol (TENORMIN) 50 mg tablet TAKE 1 TABLET DAILY    losartan (COZAAR) 50 mg tablet Take 1 Tab by mouth daily.  omeprazole (PRILOSEC) 40 mg capsule Take 1 Cap by mouth daily.  metoprolol succinate (TOPROL-XL) 50 mg XL tablet Take 1 Tab by mouth daily.  metFORMIN (GLUCOPHAGE) 1,000 mg tablet TAKE 1 TABLET BY MOUTH TWICE A DAY WITH MEALS    sildenafil citrate (VIAGRA) 100 mg tablet Take 1 Tab by mouth as needed.  atorvastatin (LIPITOR) 40 mg tablet TAKE 1 TABLET DAILY (START LIPITOR AFTER VYTORIN IS FINISHED)    SLOW RELEASE IRON 143 mg (45 mg iron) ER tablet TAKE 1 TAB BY MOUTH DAILY (BEFORE BREAKFAST).  aspirin delayed-release 81 mg tablet Take 81 mg by mouth daily. No current facility-administered medications for this visit. Physical Examination: General appearance - alert, well appearing, and in no distress  Chest - clear to auscultation, no wheezes, rales or rhonchi, symmetric air entry  Heart - normal rate, regular rhythm, normal S1, S2, no murmurs, rubs, clicks or gallops  Back exam - limited range of motion, pain with motion noted during exam      Assessment/ Plan:   Diagnoses and all orders for this visit:    1. Medicare annual wellness visit, subsequent    2. Osteoarthritis, unspecified osteoarthritis type, unspecified site  -     oxyCODONE-acetaminophen (PERCOCET) 5-325 mg per tablet; Take 1 Tab by mouth every eight (8) hours as needed for Pain. Max Daily Amount: 3 Tabs. 3. Lumbar disc disease  -     oxyCODONE-acetaminophen (PERCOCET) 5-325 mg per tablet; Take 1 Tab by mouth every eight (8) hours as needed for Pain. Max Daily Amount: 3 Tabs.    glad to see the sciatica has resolved and his leg numbness has resolved as well   Follow-up Disposition:  Return if symptoms worsen or fail to improve. I have discussed the diagnosis with the patient and the intended plan as seen in the above orders.   The patient has received an after-visit summary and questions were answered concerning future plans. Pt conveyed understanding of plan. Medication Side Effects and Warnings were discussed with patient      Kary Wells, DO        This is the Subsequent Medicare Annual Wellness Exam, performed 12 months or more after the Initial AWV or the last Subsequent AWV    I have reviewed the patient's medical history in detail and updated the computerized patient record. History     Past Medical History:   Diagnosis Date    CAD (coronary artery disease)     CAD (coronary artery disease), native coronary artery     Degenerative joint disease 1/28/2011    Diabetes type 1, controlled ([facility] [address]     Essential hypertension     Hyperlipidemia     Hypertension, benign     Obstructive sleep apnea 1/28/2011    ADELA (obstructive sleep apnea)       Past Surgical History:   Procedure Laterality Date    HX BACK SURGERY      HX CARPAL TUNNEL RELEASE      HX CORONARY STENT PLACEMENT      HX HEART CATHETERIZATION      HX HIP REPLACEMENT       Current Outpatient Prescriptions   Medication Sig Dispense Refill    oxyCODONE-acetaminophen (PERCOCET) 5-325 mg per tablet Take 1 Tab by mouth every eight (8) hours as needed for Pain. Max Daily Amount: 3 Tabs. 30 Tab 0     mg capsule TK 1 C PO BID FOR CONSTIPATION  0    folic acid (FOLVITE) 1 mg tablet TAKE 1 TABLET DAILY EXCEPT DAY THAT METHOTREXATE IS TAKEN. 90 Tab 3    atenolol (TENORMIN) 50 mg tablet TAKE 1 TABLET DAILY 90 Tab 3    losartan (COZAAR) 50 mg tablet Take 1 Tab by mouth daily. 30 Tab 1    omeprazole (PRILOSEC) 40 mg capsule Take 1 Cap by mouth daily. 90 Cap 3    metoprolol succinate (TOPROL-XL) 50 mg XL tablet Take 1 Tab by mouth daily. 90 Tab 3    metFORMIN (GLUCOPHAGE) 1,000 mg tablet TAKE 1 TABLET BY MOUTH TWICE A DAY WITH MEALS 180 Tab 3    sildenafil citrate (VIAGRA) 100 mg tablet Take 1 Tab by mouth as needed.  18 Tab 3    atorvastatin (LIPITOR) 40 mg tablet TAKE 1 TABLET DAILY (START LIPITOR AFTER VYTORIN IS FINISHED) 90 Tab 3    SLOW RELEASE IRON 143 mg (45 mg iron) ER tablet TAKE 1 TAB BY MOUTH DAILY (BEFORE BREAKFAST). 30 Tab 5    aspirin delayed-release 81 mg tablet Take 81 mg by mouth daily. Allergies   Allergen Reactions    Ace Inhibitors Swelling     Angioedema   3/8/18- patient states he is not allergic to medication     Family History   Problem Relation Age of Onset   [address] [facility] Arthritis-osteo Mother     No Known Problems Father      Social History   Substance Use Topics    Smoking status: Former Smoker     Quit date: 10/24/2009    Smokeless tobacco: Never Used    Alcohol use No     Patient Active Problem List   Diagnosis Code    Coronary artery disease I25.10    Dyslipidemia E78.5    Hypertension, benign I10    Obstructive sleep apnea G47.33    Degenerative joint disease M19.90    Diabetes mellitus type 2 in nonobese ([address] E11.9    Lumbar disc disease M51.9    Cervical disc disease M50.90    Dupuytren's contracture of right hand M72.0    ACP (advance care planning) Z71.89    Primary osteoarthritis involving multiple joints M15.0       Depression Risk Factor Screening:     PHQ over the last two weeks 6/7/2018   Little interest or pleasure in doing things Not at all   Feeling down, depressed or hopeless Not at all   Total Score PHQ 2 0     Alcohol Risk Factor Screening: You do not drink alcohol or very rarely. Functional Ability and Level of Safety:   Hearing Loss  Hearing is good. Activities of Daily Living  The home contains: no safety equipment. Patient does total self care    Fall Risk  Fall Risk Assessment, last 12 mths 6/7/2018   Able to walk? Yes   Fall in past 12 months?  No       Abuse Screen  Patient is not abused    Cognitive Screening   Evaluation of Cognitive Function:  Has your family/caregiver stated any concerns about your memory: no  Normal    Patient Care Team   Patient Care Team:  Car Martinez DO as PCP - General (Family Practice)  Adalgisa Mcallister MD (Orthopedic Surgery)  Denisha Segundo MD (Orthopedic Surgery)  Leatha Gomez MD (Orthopedic Surgery)  Savita Campa MD (Orthopedic Surgery)    Assessment/Plan   Education and counseling provided:  Are appropriate based on today's review and evaluation  End-of-Life planning (with patient's consent)    Diagnoses and all orders for this visit:    1. Medicare annual wellness visit, subsequent    2. Osteoarthritis, unspecified osteoarthritis type, unspecified site  -     oxyCODONE-acetaminophen (PERCOCET) 5-325 mg per tablet; Take 1 Tab by mouth every eight (8) hours as needed for Pain. Max Daily Amount: 3 Tabs. 3. Lumbar disc disease  -     oxyCODONE-acetaminophen (PERCOCET) 5-325 mg per tablet; Take 1 Tab by mouth every eight (8) hours as needed for Pain. Max Daily Amount: 3 Tabs. Health Maintenance Due   Topic Date Due    COLONOSCOPY  02/19/1958    MEDICARE YEARLY EXAM  04/19/2018     I have discussed the diagnosis with the patient and the intended plan as seen in the above orders. The patient has received an after-visit summary and questions were answered concerning future plans. Pt conveyed understanding of plan.        [address] [facility] [address]

## 2018-06-07 NOTE — PROGRESS NOTES
Pt here requesting a refill on Percocet. Reports having ongoing back pain r/t sciatica.   Pt states he had surgery done in March

## 2018-06-07 NOTE — PATIENT INSTRUCTIONS
A Healthy Lifestyle: Care Instructions  Your Care Instructions    A healthy lifestyle can help you feel good, stay at a healthy weight, and have plenty of energy for both work and play. A healthy lifestyle is something you can share with your whole family. A healthy lifestyle also can lower your risk for serious health problems, such as high blood pressure, heart disease, and diabetes. You can follow a few steps listed below to improve your health and the health of your family. Follow-up care is a key part of your treatment and safety. Be sure to make and go to all appointments, and call your doctor if you are having problems. It's also a good idea to know your test results and keep a list of the medicines you take. How can you care for yourself at home? · Do not eat too much sugar, fat, or fast foods. You can still have dessert and treats now and then. The goal is moderation. · Start small to improve your eating habits. Pay attention to portion sizes, drink less juice and soda pop, and eat more fruits and vegetables. ¨ Eat a healthy amount of food. A 3-ounce serving of meat, for example, is about the size of a deck of cards. Fill the rest of your plate with vegetables and whole grains. ¨ Limit the amount of soda and sports drinks you have every day. Drink more water when you are thirsty. ¨ Eat at least 5 servings of fruits and vegetables every day. It may seem like a lot, but it is not hard to reach this goal. A serving or helping is 1 piece of fruit, 1 cup of vegetables, or 2 cups of leafy, raw vegetables. Have an apple or some carrot sticks as an afternoon snack instead of a candy bar. Try to have fruits and/or vegetables at every meal.  · Make exercise part of your daily routine. You may want to start with simple activities, such as walking, bicycling, or slow swimming. Try to be active 30 to 60 minutes every day. You do not need to do all 30 to 60 minutes all at once.  For example, you can exercise 3 times a day for 10 or 20 minutes. Moderate exercise is safe for most people, but it is always a good idea to talk to your doctor before starting an exercise program.  · Keep moving. Kwadwo Boron the lawn, work in the garden, or [employer]. Take the stairs instead of the elevator at work. · If you smoke, quit. People who smoke have an increased risk for heart attack, stroke, cancer, and other lung illnesses. Quitting is hard, but there are ways to boost your chance of quitting tobacco for good. ¨ Use nicotine gum, patches, or lozenges. ¨ Ask your doctor about stop-smoking programs and medicines. ¨ Keep trying. In addition to reducing your risk of diseases in the future, you will notice some benefits soon after you stop using tobacco. If you have shortness of breath or asthma symptoms, they will likely get better within a few weeks after you quit. · Limit how much alcohol you drink. Moderate amounts of alcohol (up to 2 drinks a day for men, 1 drink a day for women) are okay. But drinking too much can lead to liver problems, high blood pressure, and other health problems. Family health  If you have a family, there are many things you can do together to improve your health. · Eat meals together as a family as often as possible. · Eat healthy foods. This includes fruits, vegetables, lean meats and dairy, and whole grains. · Include your family in your fitness plan. Most people think of activities such as jogging or tennis as the way to fitness, but there are many ways you and your family can be more active. Anything that makes you breathe hard and gets your heart pumping is exercise. Here are some tips:  ¨ Walk to do errands or to take your child to school or the bus. ¨ Go for a family bike ride after dinner instead of watching TV. Where can you learn more? Go to http://jennifer-elida.info/. Enter M011 in the search box to learn more about \"A Healthy Lifestyle: Care Instructions. \"  Current as of: May 12, 2017  Content Version: 11.4  © 5545-3534 Healthwise, [employer]. Care instructions adapted under license by [employer] (which disclaims liability or warranty for this information). If you have questions about a medical condition or this instruction, always ask your healthcare professional. [address] any warranty or liability for your use of this information. Medicare Wellness Visit, Male    The best way to live healthy is to have a lifestyle where you eat a well-balanced diet, exercise regularly, limit alcohol use, and quit all forms of tobacco/nicotine, if applicable. Regular preventive services are another way to keep healthy. Preventive services (vaccines, screening tests, monitoring & exams) can help personalize your care plan, which helps you manage your own care. Screening tests can find health problems at the earliest stages, when they are easiest to treat. [address] [facility] [facility] follows the current, evidence-based guidelines published by the [facility] Ozzie Jimenez (USPSTF) when recommending preventive services for our patients. Because we follow these guidelines, sometimes recommendations change over time as research supports it. (For example, a prostate screening blood test is no longer routinely recommended for men with no symptoms.)    Of course, you and your provider may decide to screen more often for some diseases, based on your risk and co-morbidities (chronic disease you are already diagnosed with). Preventive services for you include:    - Medicare offers their members a free annual wellness visit, which is time for you and your primary care provider to discuss and plan for your preventive service needs. Take advantage of this benefit every year!    -All people over age 72 should receive the recommended pneumonia vaccines.  Current USPSTF guidelines recommend a series of two vaccines for the best pneumonia protection.     -All adults should have a yearly flu vaccine and a tetanus vaccine every 10 years. All adults age 61 years should receive a shingles vaccine once in their lifetime.      -All adults age 38-68 years who are overweight should have a diabetes screening test once every three years.     -Other screening tests & preventive services for persons with diabetes include: an eye exam to screen for diabetic retinopathy, a kidney function test, a foot exam, and stricter control over your cholesterol.     -Cardiovascular screening for adults with routine risk involves an electrocardiogram (ECG) at intervals determined by the provider.     -Colorectal cancer screenings should be done for adults age 54-65 years with normal risk. There are a number of acceptable methods of screening for this type of cancer. Each test has its own benefits and drawbacks. Discuss with your provider what is most appropriate for you during your annual wellness visit. The different tests include: colonoscopy (considered the best screening method), a fecal occult blood test, a fecal DNA test, and sigmoidoscopy.    -All adults born between [facility] should be screened once for Hepatitis C.    -An Abdominal Aortic Aneurysm (AAA) Screening is recommended for men age 73-68 who has ever smoked in their lifetime.      Here is a list of your current Health Maintenance items (your personalized list of preventive services) with a due date:  Health Maintenance Due   Topic Date Due    Colonoscopy  02/19/1958    Annual Well Visit  04/19/2018

## 2018-06-07 NOTE — MR AVS SNAPSHOT
[address] [address] 
[phone] Patient: Rubina Collado MRN: [id number] Visit Information Date & Time Provider Department Dept. Phone Encounter #  
 6/7/2018  7:30 AM Bao IsakClarke [phone] [id number] Follow-up Instructions Return if symptoms worsen or fail to improve. Your Appointments 3/12/2019  1:00 PM  
ESTABLISHED PATIENT with Shawna Garg MD  
[facility] ([address]) Appt Note: 1 yr f/u per Dr. Noe Ordonez [address] [place] [address]  
[phone] Upcoming Health Maintenance Date Due COLONOSCOPY 2/19/1958 MEDICARE YEARLY EXAM 4/19/2018 Influenza Age 5 to Adult 8/1/2018 MICROALBUMIN Q1 8/18/2018 HEMOGLOBIN A1C Q6M 8/21/2018 EYE EXAM RETINAL OR DILATED Q1 10/30/2018 FOOT EXAM Q1 2/21/2019 LIPID PANEL Q1 2/21/2019 GLAUCOMA SCREENING Q2Y 10/30/2019 DTaP/Tdap/Td series (2 - Td) 10/27/2025 Allergies as of 6/7/2018  Review Complete On: 6/7/2018 By: Bao Parisi DO Severity Noted Reaction Type Reactions Ace Inhibitors  11/16/2010    Swelling Angioedema 3/8/18- patient states he is not allergic to medication Current Immunizations  Reviewed on 9/1/2016 Name Date Influenza Vaccine 10/5/2017, 9/25/2016 Influenza Vaccine (Quad) PF 9/29/2015 Pneumococcal Conjugate (PCV-13) 10/27/2015 Pneumococcal Polysaccharide (PPSV-23) 10/1/2012 Tdap 10/27/2015 Zoster Vaccine, Live 10/5/2017 Not reviewed this visit You Were Diagnosed With   
  
 Codes Comments Medicare annual wellness visit, subsequent    -  Primary ICD-10-CM: Z00.00 ICD-9-CM: V70.0 Osteoarthritis, unspecified osteoarthritis type, unspecified site     ICD-10-CM: M19.90 ICD-9-CM: 715.90 Lumbar disc disease     ICD-10-CM: M51.9 ICD-9-CM: 722.93 Vitals BP Pulse Temp Resp Height(growth percentile) Weight(growth percentile) 146/84 (BP 1 Location: Left arm, BP Patient Position: Sitting) 67 98.1 °F (36.7 °C) (Oral) 18 5' 8\" (1.727 m) 172 lb (78 kg) SpO2 BMI Smoking Status 97% 26.15 kg/m2 Former Smoker Vitals History BMI and BSA Data Body Mass Index Body Surface Area  
 26.15 kg/m 2 1.93 m 2 Preferred Pharmacy Pharmacy Name Phone [facility] [address]  [facility] [phone] Your Updated Medication List  
  
   
This list is accurate as of 6/7/18  7:41 AM.  Always use your most recent med list.  
  
  
  
  
 aspirin delayed-release 81 mg tablet Take 81 mg by mouth daily. atenolol 50 mg tablet Commonly known as:  TENORMIN  
TAKE 1 TABLET DAILY  
  
 atorvastatin 40 mg tablet Commonly known as:  LIPITOR  
TAKE 1 TABLET DAILY (START LIPITOR AFTER VYTORIN IS FINISHED)  mg capsule Generic drug:  docusate sodium TK 1 C PO BID FOR CONSTIPATION  
  
 folic acid 1 mg tablet Commonly known as:  FOLVITE  
TAKE 1 TABLET DAILY EXCEPT DAY THAT METHOTREXATE IS TAKEN. losartan 50 mg tablet Commonly known as:  COZAAR Take 1 Tab by mouth daily. metFORMIN 1,000 mg tablet Commonly known as:  GLUCOPHAGE  
TAKE 1 TABLET BY MOUTH TWICE A DAY WITH MEALS  
  
 metoprolol succinate 50 mg XL tablet Commonly known as:  TOPROL-XL Take 1 Tab by mouth daily. omeprazole 40 mg capsule Commonly known as:  PRILOSEC Take 1 Cap by mouth daily. oxyCODONE-acetaminophen 5-325 mg per tablet Commonly known as:  PERCOCET Take 1 Tab by mouth every eight (8) hours as needed for Pain. Max Daily Amount: 3 Tabs.  
  
 sildenafil citrate 100 mg tablet Commonly known as:  VIAGRA Take 1 Tab by mouth as needed. SLOW RELEASE IRON 143 mg (45 mg iron) Tber Generic drug:  ferrous sulfate TAKE 1 TAB BY MOUTH DAILY (BEFORE BREAKFAST). Prescriptions Printed Refills  
 oxyCODONE-acetaminophen (PERCOCET) 5-325 mg per tablet 0 Sig: Take 1 Tab by mouth every eight (8) hours as needed for Pain. Max Daily Amount: 3 Tabs. Class: Print Route: Oral  
  
Follow-up Instructions Return if symptoms worsen or fail to improve. Patient Instructions A Healthy Lifestyle: Care Instructions Your Care Instructions A healthy lifestyle can help you feel good, stay at a healthy weight, and have plenty of energy for both work and play. A healthy lifestyle is something you can share with your whole family. A healthy lifestyle also can lower your risk for serious health problems, such as high blood pressure, heart disease, and diabetes. You can follow a few steps listed below to improve your health and the health of your family. Follow-up care is a key part of your treatment and safety. Be sure to make and go to all appointments, and call your doctor if you are having problems. It's also a good idea to know your test results and keep a list of the medicines you take. How can you care for yourself at home? · Do not eat too much sugar, fat, or fast foods. You can still have dessert and treats now and then. The goal is moderation. · Start small to improve your eating habits. Pay attention to portion sizes, drink less juice and soda pop, and eat more fruits and vegetables. ¨ Eat a healthy amount of food. A 3-ounce serving of meat, for example, is about the size of a deck of cards. Fill the rest of your plate with vegetables and whole grains. ¨ Limit the amount of soda and sports drinks you have every day. Drink more water when you are thirsty. ¨ Eat at least 5 servings of fruits and vegetables every day.  It may seem like a lot, but it is not hard to reach this goal. A serving or helping is 1 piece of fruit, 1 cup of vegetables, or 2 cups of leafy, raw vegetables. Have an apple or some carrot sticks as an afternoon snack instead of a candy bar. Try to have fruits and/or vegetables at every meal. 
· Make exercise part of your daily routine. You may want to start with simple activities, such as walking, bicycling, or slow swimming. Try to be active 30 to 60 minutes every day. You do not need to do all 30 to 60 minutes all at once. For example, you can exercise 3 times a day for 10 or 20 minutes. Moderate exercise is safe for most people, but it is always a good idea to talk to your doctor before starting an exercise program. 
· Keep moving. Ruddy Bhattr the lawn, work in the garden, or [employer]. Take the stairs instead of the elevator at work. · If you smoke, quit. People who smoke have an increased risk for heart attack, stroke, cancer, and other lung illnesses. Quitting is hard, but there are ways to boost your chance of quitting tobacco for good. ¨ Use nicotine gum, patches, or lozenges. ¨ Ask your doctor about stop-smoking programs and medicines. ¨ Keep trying. In addition to reducing your risk of diseases in the future, you will notice some benefits soon after you stop using tobacco. If you have shortness of breath or asthma symptoms, they will likely get better within a few weeks after you quit. · Limit how much alcohol you drink. Moderate amounts of alcohol (up to 2 drinks a day for men, 1 drink a day for women) are okay. But drinking too much can lead to liver problems, high blood pressure, and other health problems. Family health If you have a family, there are many things you can do together to improve your health. · Eat meals together as a family as often as possible. · Eat healthy foods. This includes fruits, vegetables, lean meats and dairy, and whole grains. · Include your family in your fitness plan.  Most people think of activities such as jogging or tennis as the way to fitness, but there are many ways you and your family can be more active. Anything that makes you breathe hard and gets your heart pumping is exercise. Here are some tips: 
¨ Walk to do errands or to take your child to school or the bus. ¨ Go for a family bike ride after dinner instead of watching TV. Where can you learn more? Go to http://jennifer-elida.info/. Enter J365 in the search box to learn more about \"A Healthy Lifestyle: Care Instructions. \" Current as of: May 12, 2017 Content Version: 11.4 © 9715-3081 [employer]. Care instructions adapted under license by [employer] (which disclaims liability or warranty for this information). If you have questions about a medical condition or this instruction, always ask your healthcare professional. [address] [address] any warranty or liability for your use of this information. Medicare Wellness Visit, Male The best way to live healthy is to have a lifestyle where you eat a well-balanced diet, exercise regularly, limit alcohol use, and quit all forms of tobacco/nicotine, if applicable. Regular preventive services are another way to keep healthy. Preventive services (vaccines, screening tests, monitoring & exams) can help personalize your care plan, which helps you manage your own care. Screening tests can find health problems at the earliest stages, when they are easiest to treat. [address] follows the current, evidence-based guidelines published by the [facility] Ozzie Jimenez (USPSTF) when recommending preventive services for our patients. Because we follow these guidelines, sometimes recommendations change over time as research supports it. (For example, a prostate screening blood test is no longer routinely recommended for men with no symptoms.) Of course, you and your provider may decide to screen more often for some diseases, based on your risk and co-morbidities (chronic disease you are already diagnosed with). Preventive services for you include: - Medicare offers their members a free annual wellness visit, which is time for you and your primary care provider to discuss and plan for your preventive service needs. Take advantage of this benefit every year! 
 
-All people over age 72 should receive the recommended pneumonia vaccines. Current USPSTF guidelines recommend a series of two vaccines for the best pneumonia protection.  
 
-All adults should have a yearly flu vaccine and a tetanus vaccine every 10 years. All adults age 61 years should receive a shingles vaccine once in their lifetime.   
 
-All adults age 38-68 years who are overweight should have a diabetes screening test once every three years.  
 
-Other screening tests & preventive services for persons with diabetes include: an eye exam to screen for diabetic retinopathy, a kidney function test, a foot exam, and stricter control over your cholesterol.  
 
-Cardiovascular screening for adults with routine risk involves an electrocardiogram (ECG) at intervals determined by the provider.  
 
-Colorectal cancer screenings should be done for adults age 54-65 years with normal risk. There are a number of acceptable methods of screening for this type of cancer. Each test has its own benefits and drawbacks. Discuss with your provider what is most appropriate for you during your annual wellness visit. The different tests include: colonoscopy (considered the best screening method), a fecal occult blood test, a fecal DNA test, and sigmoidoscopy. 
 
-All adults born between [facility] should be screened once for Hepatitis C. 
 
-An Abdominal Aortic Aneurysm (AAA) Screening is recommended for men age 73-68 who has ever smoked in their lifetime. Here is a list of your current Health Maintenance items (your personalized list of preventive services) with a due date: 
Health Maintenance Due Topic Date Due  
 Colonoscopy  02/19/1958 Jeevan Alcazar Annual Well Visit  04/19/2018 Introducing [facility]! [employer] introduces Savings.com patient portal. Now you can access parts of your medical record, email your doctor's office, and request medication refills online. 1. In your internet browser, go to [email or web address]/"[employer]" 2. Click on the First Time User? Click Here link in the Sign In box. You will see the New Member Sign Up page. 3. Enter your Savings.com Access Code exactly as it appears below. You will not need to use this code after youve completed the sign-up process. If you do not sign up before the expiration date, you must request a new code. · Savings.com Access Code: [email or web address] Expires: 9/5/2018  7:40 AM 
 
4. Enter the last four digits of your Social Security Number (xxxx) and Date of Birth (mm/dd/yyyy) as indicated and click Submit. You will be taken to the next sign-up page. 5. Create a Savings.com ID. This will be your Savings.com login ID and cannot be changed, so think of one that is secure and easy to remember. 6. Create a Savings.com password. You can change your password at any time. 7. Enter your Password Reset Question and Answer. This can be used at a later time if you forget your password. 8. Enter your e-mail address. You will receive e-mail notification when new information is available in [address] 9. Click Sign Up. You can now view and download portions of your medical record. 10. Click the Download Summary menu link to download a portable copy of your medical information. If you have questions, please visit the Frequently Asked Questions section of the Savings.com website. Remember, Savings.com is NOT to be used for urgent needs. For medical emergencies, dial 911. Now available from your iPhone and Android! Please provide this summary of care documentation to your next provider. Your primary care clinician is listed as Paras Pak. If you have any questions after today's visit, please call [phone].

## 2018-08-16 ENCOUNTER — OFFICE VISIT (OUTPATIENT)
Dept: FAMILY MEDICINE CLINIC | Age: 78
End: 2018-08-16

## 2018-08-16 VITALS
HEIGHT: 68 IN | DIASTOLIC BLOOD PRESSURE: 78 MMHG | SYSTOLIC BLOOD PRESSURE: 184 MMHG | TEMPERATURE: 98.3 F | RESPIRATION RATE: 12 BRPM | HEART RATE: 53 BPM | BODY MASS INDEX: 26.22 KG/M2 | WEIGHT: 173 LBS | OXYGEN SATURATION: 99 %

## 2018-08-16 DIAGNOSIS — E11.9 DIABETES MELLITUS TYPE 2 IN NONOBESE (HCC): ICD-10-CM

## 2018-08-16 DIAGNOSIS — J20.9 ACUTE BRONCHITIS, UNSPECIFIED ORGANISM: Primary | ICD-10-CM

## 2018-08-16 RX ORDER — BENZONATATE 100 MG/1
CAPSULE ORAL
Refills: 0 | COMMUNITY
Start: 2018-08-13 | End: 2019-02-13 | Stop reason: ALTCHOICE

## 2018-08-16 RX ORDER — ALBUTEROL SULFATE 90 UG/1
AEROSOL, METERED RESPIRATORY (INHALATION)
Refills: 0 | COMMUNITY
Start: 2018-08-13 | End: 2019-08-27

## 2018-08-16 RX ORDER — PREDNISONE 20 MG/1
TABLET ORAL
Refills: 0 | COMMUNITY
Start: 2018-08-13 | End: 2018-09-21 | Stop reason: ALTCHOICE

## 2018-08-16 NOTE — PATIENT INSTRUCTIONS
Body Mass Index: Care Instructions  Your Care Instructions    Body mass index (BMI) can help you see if your weight is raising your risk for health problems. It uses a formula to compare how much you weigh with how tall you are. · A BMI lower than 18.5 is considered underweight. · A BMI between 18.5 and 24.9 is considered healthy. · A BMI between 25 and 29.9 is considered overweight. A BMI of 30 or higher is considered obese. If your BMI is in the normal range, it means that you have a lower risk for weight-related health problems. If your BMI is in the overweight or obese range, you may be at increased risk for weight-related health problems, such as high blood pressure, heart disease, stroke, arthritis or joint pain, and diabetes. If your BMI is in the underweight range, you may be at increased risk for health problems such as fatigue, lower protection (immunity) against illness, muscle loss, bone loss, hair loss, and hormone problems. BMI is just one measure of your risk for weight-related health problems. You may be at higher risk for health problems if you are not active, you eat an unhealthy diet, or you drink too much alcohol or use tobacco products. Follow-up care is a key part of your treatment and safety. Be sure to make and go to all appointments, and call your doctor if you are having problems. It's also a good idea to know your test results and keep a list of the medicines you take. How can you care for yourself at home? · Practice healthy eating habits. This includes eating plenty of fruits, vegetables, whole grains, lean protein, and low-fat dairy. · If your doctor recommends it, get more exercise. Walking is a good choice. Bit by bit, increase the amount you walk every day. Try for at least 30 minutes on most days of the week. · Do not smoke. Smoking can increase your risk for health problems. If you need help quitting, talk to your doctor about stop-smoking programs and medicines. These can increase your chances of quitting for good. · Limit alcohol to 2 drinks a day for men and 1 drink a day for women. Too much alcohol can cause health problems. If you have a BMI higher than 25  · Your doctor may do other tests to check your risk for weight-related health problems. This may include measuring the distance around your waist. A waist measurement of more than 40 inches in men or 35 inches in women can increase the risk of weight-related health problems. · Talk with your doctor about steps you can take to stay healthy or improve your health. You may need to make lifestyle changes to lose weight and stay healthy, such as changing your diet and getting regular exercise. If you have a BMI lower than 18.5  · Your doctor may do other tests to check your risk for health problems. · Talk with your doctor about steps you can take to stay healthy or improve your health. You may need to make lifestyle changes to gain or maintain weight and stay healthy, such as getting more healthy foods in your diet and doing exercises to build muscle. Where can you learn more? Go to http://jennifer-elida.info/. Enter S176 in the search box to learn more about \"Body Mass Index: Care Instructions. \"  Current as of: October 13, 2016  Content Version: 11.4  © 8049-8062 Healthwise, Incorporated. Care instructions adapted under license by [employer] (which disclaims liability or warranty for this information). If you have questions about a medical condition or this instruction, always ask your healthcare professional. [address] any warranty or liability for your use of this information.

## 2018-08-16 NOTE — PROGRESS NOTES
1. Have you been to the ER, urgent care clinic since your last visit? Hospitalized since your last visit? Yes When: 8/13/2018 Where: [facility] Reason for visit: Cough    2. Have you seen or consulted any other health care providers outside of the [address] [place] [address] since your last visit? Include any pap smears or colon screening. No     Chief Complaint   Patient presents with    Cough     71 y/o male reports to office to follow up from ER. Pt states Coughing x1 wk. Pt states feeling better today. Pt states rx medication helping with symptoms. Pt states x-rays completed.  Arthritis     Pt states would like to discuss injection.

## 2018-08-16 NOTE — MR AVS SNAPSHOT
[address] [address] 
[phone] Patient: Avni Duffy MRN: [id number]:[id number] Visit Information Date & Time Provider Department Dept. Phone Encounter #  
 8/16/2018  9:50 AM Izabela McmanusFlorecnioAnna BARAK Asencio [phone] [id number] Follow-up Instructions Return in about 6 months (around 2/16/2019), or if symptoms worsen or fail to improve. Your Appointments 3/12/2019  1:00 PM  
ESTABLISHED PATIENT with Navin Denton MD  
[facility] ([facility]) Appt Note: 1 yr f/u per Dr. Renetta Weir [address]  [address] [place] [address]  
[address] [address] Upcoming Health Maintenance Date Due COLONOSCOPY 2/19/1958 Influenza Age 5 to Adult 8/1/2018 MICROALBUMIN Q1 8/18/2018 HEMOGLOBIN A1C Q6M 8/21/2018 EYE EXAM RETINAL OR DILATED Q1 10/30/2018 FOOT EXAM Q1 2/21/2019 LIPID PANEL Q1 2/21/2019 MEDICARE YEARLY EXAM 6/8/2019 GLAUCOMA SCREENING Q2Y 10/30/2019 DTaP/Tdap/Td series (2 - Td) 10/27/2025 Allergies as of 8/16/2018  Review Complete On: 8/16/2018 By: Izabela Mcmanus DO Severity Noted Reaction Type Reactions Ace Inhibitors  11/16/2010    Swelling Angioedema 3/8/18- patient states he is not allergic to medication Current Immunizations  Reviewed on 9/1/2016 Name Date Influenza Vaccine 10/5/2017, 9/25/2016 Influenza Vaccine (Quad) PF 9/29/2015 Pneumococcal Conjugate (PCV-13) 10/27/2015 Pneumococcal Polysaccharide (PPSV-23) 10/1/2012 Tdap 10/27/2015 Zoster Vaccine, Live 10/5/2017 Not reviewed this visit You Were Diagnosed With   
  
 Codes Comments Acute bronchitis, unspecified organism    -  Primary ICD-10-CM: J20.9 ICD-9-CM: 466.0 Diabetes mellitus type 2 in nonobese [facility])     ICD-10-CM: E11.9 ICD-9-CM: 250.00 Vitals BP Pulse Temp Resp Height(growth percentile) Weight(growth percentile) 184/78 (BP 1 Location: Right arm, BP Patient Position: Sitting) (!) 53 98.3 °F (36.8 °C) (Oral) 12 5' 8\" (1.727 m) 173 lb (78.5 kg) SpO2 BMI Smoking Status 99% 26.3 kg/m2 Former Smoker BMI and BSA Data Body Mass Index Body Surface Area  
 26.3 kg/m 2 1.94 m 2 Preferred Pharmacy Pharmacy Name Phone [facility] [address]  [facility] [phone] Your Updated Medication List  
  
   
This list is accurate as of 8/16/18 10:21 AM.  Always use your most recent med list.  
  
  
  
  
 aspirin delayed-release 81 mg tablet Take 81 mg by mouth daily. atenolol 50 mg tablet Commonly known as:  TENORMIN  
TAKE 1 TABLET DAILY  
  
 atorvastatin 40 mg tablet Commonly known as:  LIPITOR  
TAKE 1 TABLET DAILY (START LIPITOR AFTER VYTORIN IS FINISHED) benzonatate 100 mg capsule Commonly known as:  TESSALON  
TK ONE C PO  Q 8 H PRF COUGH  
  
  mg capsule Generic drug:  docusate sodium TK 1 C PO BID FOR CONSTIPATION  
  
 folic acid 1 mg tablet Commonly known as:  FOLVITE  
TAKE 1 TABLET DAILY EXCEPT DAY THAT METHOTREXATE IS TAKEN. losartan 50 mg tablet Commonly known as:  COZAAR Take 1 Tab by mouth daily. metFORMIN 1,000 mg tablet Commonly known as:  GLUCOPHAGE  
TAKE 1 TABLET BY MOUTH TWICE A DAY WITH MEALS  
  
 metoprolol succinate 50 mg XL tablet Commonly known as:  TOPROL-XL Take 1 Tab by mouth daily. omeprazole 40 mg capsule Commonly known as:  PRILOSEC Take 1 Cap by mouth daily. oxyCODONE-acetaminophen 5-325 mg per tablet Commonly known as:  PERCOCET Take 1 Tab by mouth every eight (8) hours as needed for Pain. Max Daily Amount: 3 Tabs. predniSONE 20 mg tablet Commonly known as:  Berle Pouch  
 TK 3 TS PO ONCE DAILY STARTING TOMORROW UNTIL FINISHED  
  
 sildenafil citrate 100 mg tablet Commonly known as:  VIAGRA Take 1 Tab by mouth as needed. SLOW RELEASE IRON 143 mg (45 mg iron) Tber Generic drug:  ferrous sulfate TAKE 1 TAB BY MOUTH DAILY (BEFORE BREAKFAST). VENTOLIN HFA 90 mcg/actuation inhaler Generic drug:  albuterol INHALE 1 TO 2 PUFFS PO Q 4 TO 6 H PRF DYSPNEA/WHEEZING We Performed the Following HEMOGLOBIN A1C WITH EAG [41721 CPT(R)] MICROALBUMIN, UR, RAND W/ MICROALB/CREAT RATIO [id number] CPT(R)] Follow-up Instructions Return in about 6 months (around 2/16/2019), or if symptoms worsen or fail to improve. Patient Instructions Body Mass Index: Care Instructions Your Care Instructions Body mass index (BMI) can help you see if your weight is raising your risk for health problems. It uses a formula to compare how much you weigh with how tall you are. · A BMI lower than 18.5 is considered underweight. · A BMI between 18.5 and 24.9 is considered healthy. · A BMI between 25 and 29.9 is considered overweight. A BMI of 30 or higher is considered obese. If your BMI is in the normal range, it means that you have a lower risk for weight-related health problems. If your BMI is in the overweight or obese range, you may be at increased risk for weight-related health problems, such as high blood pressure, heart disease, stroke, arthritis or joint pain, and diabetes. If your BMI is in the underweight range, you may be at increased risk for health problems such as fatigue, lower protection (immunity) against illness, muscle loss, bone loss, hair loss, and hormone problems. BMI is just one measure of your risk for weight-related health problems. You may be at higher risk for health problems if you are not active, you eat an unhealthy diet, or you drink too much alcohol or use tobacco products. Follow-up care is a key part of your treatment and safety. Be sure to make and go to all appointments, and call your doctor if you are having problems. It's also a good idea to know your test results and keep a list of the medicines you take. How can you care for yourself at home? · Practice healthy eating habits. This includes eating plenty of fruits, vegetables, whole grains, lean protein, and low-fat dairy. · If your doctor recommends it, get more exercise. Walking is a good choice. Bit by bit, increase the amount you walk every day. Try for at least 30 minutes on most days of the week. · Do not smoke. Smoking can increase your risk for health problems. If you need help quitting, talk to your doctor about stop-smoking programs and medicines. These can increase your chances of quitting for good. · Limit alcohol to 2 drinks a day for men and 1 drink a day for women. Too much alcohol can cause health problems. If you have a BMI higher than 25 · Your doctor may do other tests to check your risk for weight-related health problems. This may include measuring the distance around your waist. A waist measurement of more than 40 inches in men or 35 inches in women can increase the risk of weight-related health problems. · Talk with your doctor about steps you can take to stay healthy or improve your health. You may need to make lifestyle changes to lose weight and stay healthy, such as changing your diet and getting regular exercise. If you have a BMI lower than 18.5 · Your doctor may do other tests to check your risk for health problems. · Talk with your doctor about steps you can take to stay healthy or improve your health. You may need to make lifestyle changes to gain or maintain weight and stay healthy, such as getting more healthy foods in your diet and doing exercises to build muscle. Where can you learn more? Go to http://jennifer-elida.info/. Enter S176 in the search box to learn more about \"Body Mass Index: Care Instructions. \" Current as of: October 13, 2016 Content Version: 11.4 © 2651-4977 Healthwise, [employer]. Care instructions adapted under license by [employer] (which disclaims liability or warranty for this information). If you have questions about a medical condition or this instruction, always ask your healthcare professional. [address] any warranty or liability for your use of this information. Introducing [facility]! [facility] introduces [employer] patient portal. Now you can access parts of your medical record, email your doctor's office, and request medication refills online. 1. In your internet browser, go to [email or web address] 2. Click on the First Time User? Click Here link in the Sign In box. You will see the New Member Sign Up page. 3. Enter your [employer] Access Code exactly as it appears below. You will not need to use this code after youve completed the sign-up process. If you do not sign up before the expiration date, you must request a new code. · [employer] Access Code: [email or web address] Expires: 9/5/2018  7:40 AM 
 
4. Enter the last four digits of your Social Security Number (xxxx) and Date of Birth (mm/dd/yyyy) as indicated and click Submit. You will be taken to the next sign-up page. 5. Create a [employer] ID. This will be your [employer] login ID and cannot be changed, so think of one that is secure and easy to remember. 6. Create a [employer] password. You can change your password at any time. 7. Enter your Password Reset Question and Answer. This can be used at a later time if you forget your password. 8. Enter your e-mail address. You will receive e-mail notification when new information is available in [address] 9. Click Sign Up. You can now view and download portions of your medical record. 10. Click the Download Summary menu link to download a portable copy of your medical information. If you have questions, please visit the Frequently Asked Questions section of the [employer] website. Remember, [employer] is NOT to be used for urgent needs. For medical emergencies, dial 911. Now available from your iPhone and Android! Please provide this summary of care documentation to your next provider. Your primary care clinician is listed as Ebony Vazquez. If you have any questions after today's visit, please call [phone].

## 2018-08-16 NOTE — PROGRESS NOTES
Molina Vernon is a 70 y.o. male   Chief Complaint   Patient presents with    Cough     71 y/o male reports to office to follow up from ER. Pt states Coughing x1 wk. Pt states feeling better today. Pt states rx medication helping with symptoms. Pt states x-rays completed.  Arthritis     Pt states would like to discuss injection. pt here for f/u from ED and was dx with a cough and given albuterol tessalon and prednisone. Pt states he had a cxr and this was clear. Pt states his cough is improved and the cough was prod with clear mucous. Pt denies any CP or SOB. Pt is very stiff but this is usual for him and the prednisone he is taking is likely contributing to this. Pt has a back appt on 12 September. Pt is a few months post op from march, 2 back surgeries. We can consider this surgery a success his pain is much better and no longer pain in the legs. Pt has his percocet with him that I gave him in June and still has 9 pills left, pt brought bottle with him. Pt also due for diabetes follow up and last A1C at 6. Pt is controlling this quite well with diet and exercise and medication (metformin). he is a 70y.o. year old male who presents for evalution. Reviewed PmHx, RxHx, FmHx, SocHx, AllgHx and updated and dated in the chart. Review of Systems - negative except as listed above in the HPI    Objective:     Vitals:    08/16/18 0950   BP: 184/78   Pulse: (!) 53   Resp: 12   Temp: 98.3 °F (36.8 °C)   TempSrc: Oral   SpO2: 99%   Weight: 173 lb (78.5 kg)   Height: 5' 8\" (1.727 m)       Current Outpatient Prescriptions   Medication Sig    predniSONE (DELTASONE) 20 mg tablet TK 3 TS PO ONCE DAILY STARTING TOMORROW UNTIL FINISHED    benzonatate (TESSALON) 100 mg capsule TK ONE C PO  Q 8 H PRF COUGH    VENTOLIN HFA 90 mcg/actuation inhaler INHALE 1 TO 2 PUFFS PO Q 4 TO 6 H PRF DYSPNEA/WHEEZING    folic acid (FOLVITE) 1 mg tablet TAKE 1 TABLET DAILY EXCEPT DAY THAT METHOTREXATE IS TAKEN.  atenolol (TENORMIN) 50 mg tablet TAKE 1 TABLET DAILY    losartan (COZAAR) 50 mg tablet Take 1 Tab by mouth daily.  omeprazole (PRILOSEC) 40 mg capsule Take 1 Cap by mouth daily.  metoprolol succinate (TOPROL-XL) 50 mg XL tablet Take 1 Tab by mouth daily.  metFORMIN (GLUCOPHAGE) 1,000 mg tablet TAKE 1 TABLET BY MOUTH TWICE A DAY WITH MEALS    sildenafil citrate (VIAGRA) 100 mg tablet Take 1 Tab by mouth as needed.  atorvastatin (LIPITOR) 40 mg tablet TAKE 1 TABLET DAILY (START LIPITOR AFTER VYTORIN IS FINISHED)    SLOW RELEASE IRON 143 mg (45 mg iron) ER tablet TAKE 1 TAB BY MOUTH DAILY (BEFORE BREAKFAST).  aspirin delayed-release 81 mg tablet Take 81 mg by mouth daily.  oxyCODONE-acetaminophen (PERCOCET) 5-325 mg per tablet Take 1 Tab by mouth every eight (8) hours as needed for Pain. Max Daily Amount: 3 Tabs.   mg capsule TK 1 C PO BID FOR CONSTIPATION     No current facility-administered medications for this visit. Physical Examination: General appearance - alert, well appearing, and in no distress  Mental status - alert, oriented to person, place, and time  Chest - clear to auscultation, no wheezes, rales or rhonchi, symmetric air entry  Heart - normal rate, regular rhythm, normal S1, S2, no murmurs, rubs, clicks or gallops      Assessment/ Plan:   Diagnoses and all orders for this visit:    1. Acute bronchitis, unspecified organism    2. Diabetes mellitus type 2 in nonobese (HCC)  -     HEMOGLOBIN A1C WITH EAG  -     MICROALBUMIN, UR, RAND W/ MICROALB/CREAT RATIO     resolving bronchitis  Follow-up Disposition:  Return if symptoms worsen or fail to improve. I have discussed the diagnosis with the patient and the intended plan as seen in the above orders. The patient has received an after-visit summary and questions were answered concerning future plans. Pt conveyed understanding of plan.     Medication Side Effects and Warnings were discussed with patient      Monika Amanda DO [place]       Discussed the patient's BMI with him. The BMI follow up plan is as follows:     dietary management education, guidance, and counseling  encourage exercise  monitor weight  prescribed dietary intake    An After Visit Summary was printed and given to the patient.

## 2018-08-17 LAB
ALBUMIN/CREAT UR: 7.1 MG/G CREAT (ref 0–30)
CREAT UR-MCNC: 221.8 MG/DL
EST. AVERAGE GLUCOSE BLD GHB EST-MCNC: 134 MG/DL
HBA1C MFR BLD: 6.3 % (ref 4.8–5.6)
MICROALBUMIN UR-MCNC: 15.8 UG/ML

## 2018-08-17 NOTE — PROGRESS NOTES
Prediabetes has worsened slightly and urine test was normal.  We should recheck the diabetes marker in 6 months

## 2018-08-30 ENCOUNTER — TELEPHONE (OUTPATIENT)
Dept: FAMILY MEDICINE CLINIC | Age: 78
End: 2018-08-30

## 2018-08-30 DIAGNOSIS — M19.90 OSTEOARTHRITIS, UNSPECIFIED OSTEOARTHRITIS TYPE, UNSPECIFIED SITE: ICD-10-CM

## 2018-08-30 DIAGNOSIS — M51.9 LUMBAR DISC DISEASE: ICD-10-CM

## 2018-08-30 RX ORDER — OXYCODONE AND ACETAMINOPHEN 5; 325 MG/1; MG/1
1 TABLET ORAL
Qty: 30 TAB | Refills: 0 | Status: SHIPPED | OUTPATIENT
Start: 2018-08-30 | End: 2018-12-04 | Stop reason: SDUPTHER

## 2018-08-30 NOTE — TELEPHONE ENCOUNTER
----- Message from Hylete sent at 8/30/2018  9:12 AM EDT -----  Regarding: Dr. Pooja Lunsford requested a Rx refill for his \"Oxycodone 5-325mg\". Pt's contacts: [phone] (cell) or [phone] (home).

## 2018-09-21 ENCOUNTER — OFFICE VISIT (OUTPATIENT)
Dept: FAMILY MEDICINE CLINIC | Age: 78
End: 2018-09-21

## 2018-09-21 VITALS
HEART RATE: 58 BPM | OXYGEN SATURATION: 96 % | DIASTOLIC BLOOD PRESSURE: 70 MMHG | BODY MASS INDEX: 26.07 KG/M2 | TEMPERATURE: 98.2 F | HEIGHT: 68 IN | SYSTOLIC BLOOD PRESSURE: 136 MMHG | WEIGHT: 172 LBS | RESPIRATION RATE: 16 BRPM

## 2018-09-21 DIAGNOSIS — M65.4 DE QUERVAIN'S TENOSYNOVITIS, BILATERAL: ICD-10-CM

## 2018-09-21 DIAGNOSIS — M79.642 PAIN IN BOTH HANDS: Primary | ICD-10-CM

## 2018-09-21 DIAGNOSIS — M79.641 PAIN IN BOTH HANDS: Primary | ICD-10-CM

## 2018-09-21 DIAGNOSIS — Z23 ENCOUNTER FOR IMMUNIZATION: ICD-10-CM

## 2018-09-21 RX ORDER — PREDNISONE 10 MG/1
TABLET ORAL
Qty: 21 TAB | Refills: 0 | Status: SHIPPED | OUTPATIENT
Start: 2018-09-21 | End: 2019-02-13 | Stop reason: ALTCHOICE

## 2018-09-21 NOTE — PROGRESS NOTES
Chief Complaint Patient presents with  Injection  
  bilateral wrist injections  Immunization/Injection  
  flu shot 1. Have you been to the ER, urgent care clinic since your last visit? Hospitalized since your last visit? No 
 
2. Have you seen or consulted any other health care providers outside of the [address] [place] [address] since your last visit? Include any pap smears or colon screening. No 
 
Visit Vitals  /70 (BP 1 Location: Left arm, BP Patient Position: Sitting)  Pulse (!) 58  Temp 98.2 °F (36.8 °C) (Oral)  Resp 16  
 Ht 5' 8\" (1.727 m)  Wt 172 lb (78 kg)  SpO2 96%  BMI 26.15 kg/m2

## 2018-09-21 NOTE — PROGRESS NOTES
S: 70year old patient here with complaint of bilateral wrist tenderness for about 3-4 months. Had the same problem in the past and had x-rays about 3 years ago he says showed tendonitis. Also had carpal tunnel surgery in both hands over 3 years ago that he says did not help. Saw Dr. Lilia Salinas who is a hand doctor who gave him his last injectiosn in his wrists about 3 years ago. Describes the pain as a constant ache with severity of 7/10 right now. Pain sometimes radiates to fingers. He uses a brace when he is outside working to prevent sharp pains, but as soon as he takes it off the pain worsens. Also tries tylenol with improvement to 5/10. Pain worsens with twisting at wrist and gripping objects and when the weather is wet outside severity can increase to 9/10. Also notices his wrists are a little swollen. Denies numbness. He would like wrist injections today. Also is requesting a flu shot today. Also requests lipid panel and A1C to be faxed to [facility]. O:  
Heart: Regular rate and rhythm, normal S1 and S2 Lungs: Lungs clear to auscultation. Decreased air movement. Musculoskeletal: Pain with flexion and extension of wrists and with twist left or right. Tenderness to palpation of joint left greater than right. Decreased  strength left worse than right. Normal sensation. A: 
Carpal Tunnel P: Continue using splint. Agree steroid injections are a good option as he does not want any more surgery. Physical therapy may be another option. Continue Tylenol as needed. Will give flu shot today.

## 2018-09-21 NOTE — PROGRESS NOTES
Samantha Hou is a 70 y.o. male Chief Complaint Patient presents with  Injection  
  bilateral wrist injections  Immunization/Injection  
  flu shot Pt here for b/l wrist pain and had previously seen hand surgery and had injections. Pt states he is having a lot of pain in both wrists and decreased hand  strength. he is a 70y.o. year old male who presents for evalution. Reviewed PmHx, RxHx, FmHx, SocHx, AllgHx and updated and dated in the chart. Review of Systems - negative except as listed above in the HPI Objective:  
 
Vitals:  
 09/21/18 1021 BP: 136/70 Pulse: (!) 58 Resp: 16 Temp: 98.2 °F (36.8 °C) TempSrc: Oral  
SpO2: 96% Weight: 172 lb (78 kg) Height: 5' 8\" (1.727 m) Current Outpatient Prescriptions Medication Sig  predniSONE (STERAPRED DS) 10 mg dose pack See administration instruction per 10mg dose pack  oxyCODONE-acetaminophen (PERCOCET) 5-325 mg per tablet Take 1 Tab by mouth every eight (8) hours as needed for Pain. Max Daily Amount: 3 Tabs.  VENTOLIN HFA 90 mcg/actuation inhaler INHALE 1 TO 2 PUFFS PO Q 4 TO 6 H PRF DYSPNEA/WHEEZING  
  mg capsule TK 1 C PO BID FOR CONSTIPATION  folic acid (FOLVITE) 1 mg tablet TAKE 1 TABLET DAILY EXCEPT DAY THAT METHOTREXATE IS TAKEN.  atenolol (TENORMIN) 50 mg tablet TAKE 1 TABLET DAILY  losartan (COZAAR) 50 mg tablet Take 1 Tab by mouth daily.  omeprazole (PRILOSEC) 40 mg capsule Take 1 Cap by mouth daily.  metoprolol succinate (TOPROL-XL) 50 mg XL tablet Take 1 Tab by mouth daily.  metFORMIN (GLUCOPHAGE) 1,000 mg tablet TAKE 1 TABLET BY MOUTH TWICE A DAY WITH MEALS  sildenafil citrate (VIAGRA) 100 mg tablet Take 1 Tab by mouth as needed.  atorvastatin (LIPITOR) 40 mg tablet TAKE 1 TABLET DAILY (START LIPITOR AFTER VYTORIN IS FINISHED)  SLOW RELEASE IRON 143 mg (45 mg iron) ER tablet TAKE 1 TAB BY MOUTH DAILY (BEFORE BREAKFAST).  aspirin delayed-release 81 mg tablet Take 81 mg by mouth daily.  benzonatate (TESSALON) 100 mg capsule TK ONE C PO  Q 8 H PRF COUGH No current facility-administered medications for this visit. Physical Examination: General appearance - alert, well appearing, and in no distress Chest - clear to auscultation, no wheezes, rales or rhonchi, symmetric air entry Heart - normal rate, regular rhythm, normal S1, S2, no murmurs, rubs, clicks or gallops Musculoskeletal - + finkelstein b/l, + ttp over posterior wrist joint b/l, decreased  strength of l side. Assessment/ Plan:  
Diagnoses and all orders for this visit: 1. Pain in both hands -     predniSONE (STERAPRED DS) 10 mg dose pack; See administration instruction per 10mg dose pack 2. Encounter for immunization -     Influenza Vaccine Inactivated (IIV)(FLUAD), Subunit, Adjuvanted, IM, ([id number]) -     Administration fee () for Medicare insured patients 3. De Quervain's tenosynovitis, bilateral 
-     predniSONE (STERAPRED DS) 10 mg dose pack; See administration instruction per 10mg dose pack f/u with hand surgery Follow-up Disposition: 
Return if symptoms worsen or fail to improve. I have discussed the diagnosis with the patient and the intended plan as seen in the above orders. The patient has received an after-visit summary and questions were answered concerning future plans. Pt conveyed understanding of plan. Medication Side Effects and Warnings were discussed with patient Melissa Davey DO

## 2018-09-21 NOTE — MR AVS SNAPSHOT
[address] [address] 
[phone] Patient: Kendell Mike MRN: [id number] Visit Information Date & Time Provider Department Dept. Phone Encounter #  
 9/21/2018 10:30 AM Clarke [place] [phone] [id number] Follow-up Instructions Return if symptoms worsen or fail to improve. Your Appointments 3/12/2019  1:00 PM  
ESTABLISHED PATIENT with Luis F Whiting MD  
[facility] ([address] [facility]) Appt Note: 1 yr f/u per Dr. Bueno Arsalan [address] [place] [address]  
[address] [address] Upcoming Health Maintenance Date Due COLONOSCOPY 2/19/1958 Influenza Age 5 to Adult 8/1/2018 EYE EXAM RETINAL OR DILATED Q1 10/30/2018 HEMOGLOBIN A1C Q6M 2/16/2019 FOOT EXAM Q1 2/21/2019 LIPID PANEL Q1 2/21/2019 MEDICARE YEARLY EXAM 6/8/2019 MICROALBUMIN Q1 8/16/2019 GLAUCOMA SCREENING Q2Y 10/30/2019 DTaP/Tdap/Td series (2 - Td) 10/27/2025 Allergies as of 9/21/2018  Review Complete On: 9/21/2018 By: Sowmya Maynard Severity Noted Reaction Type Reactions Ace Inhibitors  11/16/2010    Swelling Angioedema 3/8/18- patient states he is not allergic to medication Current Immunizations  Reviewed on 9/1/2016 Name Date Influenza Vaccine 10/5/2017, 9/25/2016 Influenza Vaccine (Quad) PF 9/29/2015 Influenza Vaccine (Tri) Adjuvanted  Incomplete Pneumococcal Conjugate (PCV-13) 10/27/2015 Pneumococcal Polysaccharide (PPSV-23) 10/1/2012 Tdap 10/27/2015 Zoster Vaccine, Live 10/5/2017 Not reviewed this visit You Were Diagnosed With   
  
 Codes Comments Pain in both hands    -  Primary ICD-10-CM: M79.641, U71.576 ICD-9-CM: 729.5 Encounter for immunization     ICD-10-CM: L21 ICD-9-CM: V03.89   
 De Quervain's tenosynovitis, bilateral     ICD-10-CM: M65.4 ICD-9-CM: 727.04 Vitals BP Pulse Temp Resp Height(growth percentile) Weight(growth percentile) 136/70 (BP 1 Location: Left arm, BP Patient Position: Sitting) (!) 58 98.2 °F (36.8 °C) (Oral) 16 5' 8\" (1.727 m) 172 lb (78 kg) SpO2 BMI Smoking Status 96% 26.15 kg/m2 Former Smoker Vitals History BMI and BSA Data Body Mass Index Body Surface Area  
 26.15 kg/m 2 1.93 m 2 Preferred Pharmacy Pharmacy Name Phone [facility] [address]  [facility] [phone] Your Updated Medication List  
  
   
This list is accurate as of 9/21/18 11:39 AM.  Always use your most recent med list.  
  
  
  
  
 aspirin delayed-release 81 mg tablet Take 81 mg by mouth daily. atenolol 50 mg tablet Commonly known as:  TENORMIN  
TAKE 1 TABLET DAILY  
  
 atorvastatin 40 mg tablet Commonly known as:  LIPITOR  
TAKE 1 TABLET DAILY (START LIPITOR AFTER VYTORIN IS FINISHED) benzonatate 100 mg capsule Commonly known as:  TESSALON  
TK ONE C PO  Q 8 H PRF COUGH  
  
  mg capsule Generic drug:  docusate sodium TK 1 C PO BID FOR CONSTIPATION  
  
 folic acid 1 mg tablet Commonly known as:  FOLVITE  
TAKE 1 TABLET DAILY EXCEPT DAY THAT METHOTREXATE IS TAKEN. losartan 50 mg tablet Commonly known as:  COZAAR Take 1 Tab by mouth daily. metFORMIN 1,000 mg tablet Commonly known as:  GLUCOPHAGE  
TAKE 1 TABLET BY MOUTH TWICE A DAY WITH MEALS  
  
 metoprolol succinate 50 mg XL tablet Commonly known as:  TOPROL-XL Take 1 Tab by mouth daily. omeprazole 40 mg capsule Commonly known as:  PRILOSEC Take 1 Cap by mouth daily. oxyCODONE-acetaminophen 5-325 mg per tablet Commonly known as:  PERCOCET Take 1 Tab by mouth every eight (8) hours as needed for Pain. Max Daily Amount: 3 Tabs. predniSONE 10 mg dose pack Commonly known as:  STERAPRED DS See administration instruction per 10mg dose pack  
  
 sildenafil citrate 100 mg tablet Commonly known as:  VIAGRA Take 1 Tab by mouth as needed. SLOW RELEASE IRON 143 mg (45 mg iron) Tber Generic drug:  ferrous sulfate TAKE 1 TAB BY MOUTH DAILY (BEFORE BREAKFAST). VENTOLIN HFA 90 mcg/actuation inhaler Generic drug:  albuterol INHALE 1 TO 2 PUFFS PO Q 4 TO 6 H PRF DYSPNEA/WHEEZING Prescriptions Sent to Pharmacy Refills  
 predniSONE (STERAPRED DS) 10 mg dose pack 0 Sig: See administration instruction per 10mg dose pack Class: Normal  
 Pharmacy: [facility] [address] [place] [address] [address]  [facility] #: [phone] We Performed the Following ADMIN INFLUENZA VIRUS VAC [ [facility]] INFLUENZA VACCINE INACTIVATED (IIV), SUBUNIT, ADJUVANTED, IM [id number] CPT(R)] Follow-up Instructions Return if symptoms worsen or fail to improve. Patient Instructions Ayanna [place]burg Disease: Exercises Your Care Instructions Here are some examples of typical rehabilitation exercises for your condition. Start each exercise slowly. Ease off the exercise if you start to have pain. Your doctor or your physical or occupational therapist will tell you when you can start these exercises and which ones will work best for you. How to do the exercises Thumb lifts 1. Place your hand on a flat surface, with your palm up. 2. Lift your thumb away from your palm to make a \"C\" shape. 3. Hold for about 6 seconds. 4. Repeat 8 to 12 times. Passive thumb MP flexion 1. Hold your hand in front of you, and turn your hand so your little finger faces down and your thumb faces up. (Your hand should be in the position used for shaking someone's hand.) You may also rest your hand on a flat surface. 2. Use the fingers on your other hand to bend your thumb down at the point where your thumb connects to your palm. 3. Hold for at least 15 to 30 seconds. 4. Repeat 2 to 4 times. Finkelstein stretch 1. Hold your arms out in front of you. (Your hand should be in the position used for shaking someone's hand.) 2. Bend your thumb toward your palm. 3. Use your other hand to gently stretch your thumb and wrist downward until you feel the stretch on the thumb side of your wrist. 
4. Hold for at least 15 to 30 seconds. 5. Repeat 2 to 4 times. Resisted ulnar deviation For this exercise, you will need elastic exercise material, such as surgical tubing or Thera-Band. 1. Sit leaning forward with your legs slightly spread and your elbow on your thigh. 2. Grasp one end of the band with your palm down, and step on the other end with the foot opposite the hand holding the band. 3. Slowly bend your wrist sideways and away from your knee. 4. Repeat 8 to 12 times. Follow-up care is a key part of your treatment and safety. Be sure to make and go to all appointments, and call your doctor if you are having problems. It's also a good idea to know your test results and keep a list of the medicines you take. Where can you learn more? Go to http://jennifer-elida.info/. Enter Y461 in the search box to learn more about \"De Quervain's Disease: Exercises. \" Current as of: November 29, 2017 Content Version: 11.7 © 7440-6346 [employer]. Care instructions adapted under license by [employer] (which disclaims liability or warranty for this information). If you have questions about a medical condition or this instruction, always ask your healthcare professional. [address] [address] any warranty or liability for your use of this information. Introducing [facility]!    
 Aram Fontana introduces [employer] patient portal. Now you can access parts of your medical record, email your doctor's office, and request medication refills online. 1. In your internet browser, go to [email or web address] 2. Click on the First Time User? Click Here link in the Sign In box. You will see the New Member Sign Up page. 3. Enter your [facility] Access Code exactly as it appears below. You will not need to use this code after youve completed the sign-up process. If you do not sign up before the expiration date, you must request a new code. · [facility] Access Code: [email or web address] Expires: 12/20/2018 10:07 AM 
 
4. Enter the last four digits of your Social Security Number (xxxx) and Date of Birth (mm/dd/yyyy) as indicated and click Submit. You will be taken to the next sign-up page. 5. Create a [facility] ID. This will be your [facility] login ID and cannot be changed, so think of one that is secure and easy to remember. 6. Create a [facility] password. You can change your password at any time. 7. Enter your Password Reset Question and Answer. This can be used at a later time if you forget your password. 8. Enter your e-mail address. You will receive e-mail notification when new information is available in [address] 9. Click Sign Up. You can now view and download portions of your medical record. 10. Click the Download Summary menu link to download a portable copy of your medical information. If you have questions, please visit the Frequently Asked Questions section of the [facility] website. Remember, [facility] is NOT to be used for urgent needs. For medical emergencies, dial 911. Now available from your iPhone and Android! Please provide this summary of care documentation to your next provider. Your primary care clinician is listed as Sherman Anderson. If you have any questions after today's visit, please call [phone].

## 2018-09-21 NOTE — PATIENT INSTRUCTIONS
Sarika Collado Disease: Exercises Your Care Instructions Here are some examples of typical rehabilitation exercises for your condition. Start each exercise slowly. Ease off the exercise if you start to have pain. Your doctor or your physical or occupational therapist will tell you when you can start these exercises and which ones will work best for you. How to do the exercises Thumb lifts 1. Place your hand on a flat surface, with your palm up. 2. Lift your thumb away from your palm to make a \"C\" shape. 3. Hold for about 6 seconds. 4. Repeat 8 to 12 times. Passive thumb MP flexion 1. Hold your hand in front of you, and turn your hand so your little finger faces down and your thumb faces up. (Your hand should be in the position used for shaking someone's hand.) You may also rest your hand on a flat surface. 2. Use the fingers on your other hand to bend your thumb down at the point where your thumb connects to your palm. 3. Hold for at least 15 to 30 seconds. 4. Repeat 2 to 4 times. Finkelstein stretch 1. Hold your arms out in front of you. (Your hand should be in the position used for shaking someone's hand.) 2. Bend your thumb toward your palm. 3. Use your other hand to gently stretch your thumb and wrist downward until you feel the stretch on the thumb side of your wrist. 
4. Hold for at least 15 to 30 seconds. 5. Repeat 2 to 4 times. Resisted ulnar deviation For this exercise, you will need elastic exercise material, such as surgical tubing or Thera-Band. 1. Sit leaning forward with your legs slightly spread and your elbow on your thigh. 2. Grasp one end of the band with your palm down, and step on the other end with the foot opposite the hand holding the band. 3. Slowly bend your wrist sideways and away from your knee. 4. Repeat 8 to 12 times. Follow-up care is a key part of your treatment and safety.  Be sure to make and go to all appointments, and call your doctor if you are having problems. It's also a good idea to know your test results and keep a list of the medicines you take. Where can you learn more? Go to http://jennifer-elida.info/. Enter O340 in the search box to learn more about \"De Quervain's Disease: Exercises. \" Current as of: November 29, 2017 Content Version: 11.7 © 6485-7071 [employer]. Care instructions adapted under license by [employer] (which disclaims liability or warranty for this information). If you have questions about a medical condition or this instruction, always ask your healthcare professional. [address] any warranty or liability for your use of this information.

## 2018-10-23 RX ORDER — ATORVASTATIN CALCIUM 40 MG/1
TABLET, FILM COATED ORAL
Qty: 90 TAB | Refills: 3 | Status: SHIPPED | OUTPATIENT
Start: 2018-10-23 | End: 2019-11-25 | Stop reason: SDUPTHER

## 2018-11-12 RX ORDER — OMEPRAZOLE 40 MG/1
CAPSULE, DELAYED RELEASE ORAL
Qty: 90 CAP | Refills: 3 | Status: SHIPPED | OUTPATIENT
Start: 2018-11-12 | End: 2019-11-25 | Stop reason: SDUPTHER

## 2018-11-25 DIAGNOSIS — I25.10 CORONARY ARTERY DISEASE INVOLVING NATIVE CORONARY ARTERY OF NATIVE HEART WITHOUT ANGINA PECTORIS: ICD-10-CM

## 2018-11-25 DIAGNOSIS — E11.9 DIABETES MELLITUS TYPE 2 IN NONOBESE (HCC): ICD-10-CM

## 2018-11-25 DIAGNOSIS — I10 HYPERTENSION, BENIGN: ICD-10-CM

## 2018-11-25 DIAGNOSIS — R80.9 MICROALBUMINURIA: ICD-10-CM

## 2018-11-26 RX ORDER — LOSARTAN POTASSIUM 50 MG/1
TABLET ORAL
Qty: 90 TAB | Refills: 3 | Status: SHIPPED | OUTPATIENT
Start: 2018-11-26 | End: 2019-03-13

## 2018-11-26 RX ORDER — ATENOLOL 50 MG/1
TABLET ORAL
Qty: 90 TAB | Refills: 3 | Status: SHIPPED | OUTPATIENT
Start: 2018-11-26 | End: 2019-11-25 | Stop reason: SDUPTHER

## 2018-11-26 RX ORDER — METFORMIN HYDROCHLORIDE 1000 MG/1
TABLET ORAL
Qty: 180 TAB | Refills: 3 | Status: SHIPPED | OUTPATIENT
Start: 2018-11-26 | End: 2019-11-25 | Stop reason: SDUPTHER

## 2018-12-04 ENCOUNTER — OFFICE VISIT (OUTPATIENT)
Dept: FAMILY MEDICINE CLINIC | Age: 78
End: 2018-12-04

## 2018-12-04 VITALS
RESPIRATION RATE: 16 BRPM | HEART RATE: 42 BPM | DIASTOLIC BLOOD PRESSURE: 83 MMHG | WEIGHT: 173 LBS | HEIGHT: 68 IN | TEMPERATURE: 98 F | BODY MASS INDEX: 26.22 KG/M2 | OXYGEN SATURATION: 98 % | SYSTOLIC BLOOD PRESSURE: 164 MMHG

## 2018-12-04 DIAGNOSIS — M51.9 LUMBAR DISC DISEASE: ICD-10-CM

## 2018-12-04 DIAGNOSIS — M19.90 OSTEOARTHRITIS, UNSPECIFIED OSTEOARTHRITIS TYPE, UNSPECIFIED SITE: Primary | ICD-10-CM

## 2018-12-04 DIAGNOSIS — Z51.81 MEDICATION MONITORING ENCOUNTER: ICD-10-CM

## 2018-12-04 RX ORDER — OXYCODONE AND ACETAMINOPHEN 5; 325 MG/1; MG/1
1 TABLET ORAL
Qty: 30 TAB | Refills: 0 | Status: SHIPPED | OUTPATIENT
Start: 2018-12-04 | End: 2019-02-27 | Stop reason: SDUPTHER

## 2018-12-04 NOTE — PROGRESS NOTES
Angel Abbasi is a 70 y.o. male Chief Complaint Patient presents with  Medication Refill  
 pt here for refill of his narcotic pain medication./  Pt does use this for his chronic arthritis pain in multiple joints. The emdication when needded does bring his pain from a 8 to a 3-4/10. This medication allows for him to be more active in his day to day life and around the home when he needs it. Pt gets an Rx every few months. Last fill was 8/30/18 for 30 pills and pt has his Rx with him with 2 pills remaining. No hx of drug abuse. Pt has not taken percocet in over a week and his utox is appropriate for this. States his hands are the worst, pt also with a lot of stiffness in his back tho he is overall much better since his surgery. Pt is not currently in pain. States contemplated using a pain pill yesterday after doing yard work but decided to use tylenol instead and thisdid help some. Pt is aware to only use med prn for mod to severe pain and otherwise use tylenol. Opioid/Pain Management: 
1. Has the patient signed a pain contract for chronic narcotic use? yes 2. Has the patient had a UDS or Serum screen as per guidelines as per [facility]?:   yes 3. Does patient meet necessary guidelines for Naloxone treatement per the [facility]?:    no 
4. Has the Prescription Monitoring Program been reviewed? yes 5. Does patient have a long term condition that requires long term use of a Narcotic?  yes 6. Has patient been tolerant of therapy and responsible to routine follow up and specialist follow-up? Yes 
 
 
he is a 70y.o. year old male who presents for evalution. Reviewed PmHx, RxHx, FmHx, SocHx, AllgHx and updated and dated in the chart. Review of Systems - negative except as listed above in the HPI Objective:  
 
Vitals:  
 12/04/18 4261 BP: 164/83 Pulse: (!) 42 Resp: 16 Temp: 98 °F (36.7 °C) TempSrc: Oral  
SpO2: 98% Weight: 173 lb (78.5 kg) Height: 5' 8\" (1.727 m) Current Outpatient Medications Medication Sig  
 oxyCODONE-acetaminophen (PERCOCET) 5-325 mg per tablet Take 1 Tab by mouth every eight (8) hours as needed for Pain. Max Daily Amount: 3 Tabs.  metFORMIN (GLUCOPHAGE) 1,000 mg tablet TAKE 1 TABLET TWICE A DAY WITH MEALS  
 losartan (COZAAR) 50 mg tablet TAKE 1 TABLET DAILY  atenolol (TENORMIN) 50 mg tablet TAKE 1 TABLET DAILY  omeprazole (PRILOSEC) 40 mg capsule TAKE 1 CAPSULE DAILY  atorvastatin (LIPITOR) 40 mg tablet TAKE 1 TABLET DAILY (START LIPITOR AFTER VYTORIN IS FINISHED)  VENTOLIN HFA 90 mcg/actuation inhaler INHALE 1 TO 2 PUFFS PO Q 4 TO 6 H PRF DYSPNEA/WHEEZING  
  mg capsule TK 1 C PO BID FOR CONSTIPATION  folic acid (FOLVITE) 1 mg tablet TAKE 1 TABLET DAILY EXCEPT DAY THAT METHOTREXATE IS TAKEN.  metoprolol succinate (TOPROL-XL) 50 mg XL tablet Take 1 Tab by mouth daily.  sildenafil citrate (VIAGRA) 100 mg tablet Take 1 Tab by mouth as needed.  SLOW RELEASE IRON 143 mg (45 mg iron) ER tablet TAKE 1 TAB BY MOUTH DAILY (BEFORE BREAKFAST).  aspirin delayed-release 81 mg tablet Take 81 mg by mouth daily.  predniSONE (STERAPRED DS) 10 mg dose pack See administration instruction per 10mg dose pack  benzonatate (TESSALON) 100 mg capsule TK ONE C PO  Q 8 H PRF COUGH No current facility-administered medications for this visit. Physical Examination: General appearance - alert, well appearing, and in no distress Chest - clear to auscultation, no wheezes, rales or rhonchi, symmetric air entry Heart - normal rate, regular rhythm, normal S1, S2, no murmurs, rubs, clicks or gallops Back exam - limited range of motion Musculoskeletal - no joint tenderness, deformity or swelling Assessment/ Plan:  
Diagnoses and all orders for this visit: 
 
1. Osteoarthritis, unspecified osteoarthritis type, unspecified site -     oxyCODONE-acetaminophen (PERCOCET) 5-325 mg per tablet; Take 1 Tab by mouth every eight (8) hours as needed for Pain. Max Daily Amount: 3 Tabs. 2. Medication monitoring encounter -     DE[facility]O DONATO CONSUELO ICUP DX DRUG SCREEN 10 
 
3. Lumbar disc disease 
-     oxyCODONE-acetaminophen (PERCOCET) 5-325 mg per tablet; Take 1 Tab by mouth every eight (8) hours as needed for Pain. Max Daily Amount: 3 Tabs. no changes in medication indicated at this time Follow-up Disposition: 
Return if symptoms worsen or fail to improve. I have discussed the diagnosis with the patient and the intended plan as seen in the above orders. The patient has received an after-visit summary and questions were answered concerning future plans. Pt conveyed understanding of plan. Medication Side Effects and Warnings were discussed with patient Celia Krishnan DO

## 2018-12-04 NOTE — PROGRESS NOTES
Chief Complaint Patient presents with  Medication Refill Patient presents in office today for med refill of percocet. No concerns. 1. Have you been to the ER, urgent care clinic since your last visit? Hospitalized since your last visit? No 
 
2. Have you seen or consulted any other health care providers outside of the [address] [place] [address] since your last visit? Include any pap smears or colon screening. No 
 
Learning Assessment 12/1/2015 PRIMARY LEARNER Patient HIGHEST LEVEL OF EDUCATION - PRIMARY LEARNER  GRADUATED HIGH SCHOOL OR GED  
BARRIERS PRIMARY LEARNER NONE  
CO-LEARNER CAREGIVER No  
PRIMARY LANGUAGE ENGLISH  
LEARNER PREFERENCE PRIMARY DEMONSTRATION  
ANSWERED BY pt  
RELATIONSHIP SELF

## 2018-12-04 NOTE — PATIENT INSTRUCTIONS
A Healthy Lifestyle: Care Instructions Your Care Instructions A healthy lifestyle can help you feel good, stay at a healthy weight, and have plenty of energy for both work and play. A healthy lifestyle is something you can share with your whole family. A healthy lifestyle also can lower your risk for serious health problems, such as high blood pressure, heart disease, and diabetes. You can follow a few steps listed below to improve your health and the health of your family. Follow-up care is a key part of your treatment and safety. Be sure to make and go to all appointments, and call your doctor if you are having problems. It's also a good idea to know your test results and keep a list of the medicines you take. How can you care for yourself at home? · Do not eat too much sugar, fat, or fast foods. You can still have dessert and treats now and then. The goal is moderation. · Start small to improve your eating habits. Pay attention to portion sizes, drink less juice and soda pop, and eat more fruits and vegetables. ? Eat a healthy amount of food. A 3-ounce serving of meat, for example, is about the size of a deck of cards. Fill the rest of your plate with vegetables and whole grains. ? Limit the amount of soda and sports drinks you have every day. Drink more water when you are thirsty. ? Eat at least 5 servings of fruits and vegetables every day. It may seem like a lot, but it is not hard to reach this goal. A serving or helping is 1 piece of fruit, 1 cup of vegetables, or 2 cups of leafy, raw vegetables. Have an apple or some carrot sticks as an afternoon snack instead of a candy bar. Try to have fruits and/or vegetables at every meal. 
· Make exercise part of your daily routine. You may want to start with simple activities, such as walking, bicycling, or slow swimming. Try to be active 30 to 60 minutes every day.  You do not need to do all 30 to 60 minutes all at once. For example, you can exercise 3 times a day for 10 or 20 minutes. Moderate exercise is safe for most people, but it is always a good idea to talk to your doctor before starting an exercise program. 
· Keep moving. [employer]er the lawn, work in the garden, or [employer]. Take the stairs instead of the elevator at work. · If you smoke, quit. People who smoke have an increased risk for heart attack, stroke, cancer, and other lung illnesses. Quitting is hard, but there are ways to boost your chance of quitting tobacco for good. ? Use nicotine gum, patches, or lozenges. ? Ask your doctor about stop-smoking programs and medicines. ? Keep trying. In addition to reducing your risk of diseases in the future, you will notice some benefits soon after you stop using tobacco. If you have shortness of breath or asthma symptoms, they will likely get better within a few weeks after you quit. · Limit how much alcohol you drink. Moderate amounts of alcohol (up to 2 drinks a day for men, 1 drink a day for women) are okay. But drinking too much can lead to liver problems, high blood pressure, and other health problems. Family health If you have a family, there are many things you can do together to improve your health. · Eat meals together as a family as often as possible. · Eat healthy foods. This includes fruits, vegetables, lean meats and dairy, and whole grains. · Include your family in your fitness plan. Most people think of activities such as jogging or tennis as the way to fitness, but there are many ways you and your family can be more active. Anything that makes you breathe hard and gets your heart pumping is exercise. Here are some tips: 
? Walk to do errands or to take your child to school or the bus. 
? Go for a family bike ride after dinner instead of watching TV. Where can you learn more? Go to http://jennifer-elida.info/. Enter N778 in the search box to learn more about \"A Healthy Lifestyle: Care Instructions. \" Current as of: December 7, 2017 Content Version: 11.8 © 5296-4582 [employer]wise, [employer]. Care instructions adapted under license by [employer] (which disclaims liability or warranty for this information). If you have questions about a medical condition or this instruction, always ask your healthcare professional. [place] [address] any warranty or liability for your use of this information.

## 2018-12-19 DIAGNOSIS — N52.01 ERECTILE DYSFUNCTION DUE TO ARTERIAL INSUFFICIENCY: ICD-10-CM

## 2018-12-19 RX ORDER — SILDENAFIL CITRATE 100 MG/1
TABLET, FILM COATED ORAL
Qty: 18 TAB | Refills: 3 | Status: SHIPPED | OUTPATIENT
Start: 2018-12-19 | End: 2019-08-27

## 2018-12-27 RX ORDER — FOLIC ACID 1 MG/1
TABLET ORAL
Qty: 90 TAB | Refills: 3 | Status: SHIPPED | OUTPATIENT
Start: 2018-12-27 | End: 2019-08-27

## 2019-02-13 ENCOUNTER — OFFICE VISIT (OUTPATIENT)
Dept: CARDIOLOGY CLINIC | Age: 79
End: 2019-02-13

## 2019-02-13 VITALS
SYSTOLIC BLOOD PRESSURE: 140 MMHG | BODY MASS INDEX: 26.4 KG/M2 | DIASTOLIC BLOOD PRESSURE: 76 MMHG | OXYGEN SATURATION: 96 % | WEIGHT: 174.2 LBS | HEIGHT: 68 IN | HEART RATE: 55 BPM | RESPIRATION RATE: 17 BRPM

## 2019-02-13 DIAGNOSIS — E78.5 DYSLIPIDEMIA: ICD-10-CM

## 2019-02-13 DIAGNOSIS — I25.10 CORONARY ARTERY DISEASE INVOLVING NATIVE CORONARY ARTERY OF NATIVE HEART WITHOUT ANGINA PECTORIS: Primary | ICD-10-CM

## 2019-02-13 DIAGNOSIS — I10 HYPERTENSION, BENIGN: ICD-10-CM

## 2019-02-13 NOTE — PROGRESS NOTES
HISTORY OF PRESENT ILLNESS Kina Peña is a 70 y.o. male SUMMARY:  
Problem List  Date Reviewed: 2/12/2019 Codes Class Noted Primary osteoarthritis involving multiple joints ICD-10-CM: M15.0 ICD-9-CM: 715.09  8/18/2017 ACP (advance care planning) ICD-10-CM: Z71.89 ICD-9-CM: V65.49  8/9/2017 Overview Signed 8/9/2017 11:49 AM by Crystal Interiano DO Discussed this calendar year 16 Cervical disc disease ICD-10-CM: M50.90 ICD-9-CM: 722.91  9/15/2016 Dupuytren's contracture of right hand ICD-10-CM: M72.0 ICD-9-CM: 728.6  9/15/2016 Lumbar disc disease ICD-10-CM: M51.9 ICD-9-CM: 722.93  9/1/2016 Diabetes mellitus type 2 in nonobese [facility]) ICD-10-CM: E11.9 ICD-9-CM: 250.00  7/28/2015 Obstructive sleep apnea ICD-10-CM: G47.33 
ICD-9-CM: 327.23  1/28/2011 Degenerative joint disease ICD-10-CM: M19.90 ICD-9-CM: 715.90  1/28/2011 Overview Signed 1/28/2011  3:22 PM by Penelope Haider MD  
  A. S/P Right THR (8/18/9) Coronary artery disease ICD-10-CM: I25.10 ICD-9-CM: 414.00  Unknown Overview Addendum 9/16/2011 10:32 AM by Penelope Haider MD  
  a. 1996 PCI/stents of Lcx 
b. Cath (10/03): severe prox (40-70%) and mid RCA (80%) stenosis - PCI to both areas 
c. Cardiolite (2/04):scheduled for comparison from previous stress pre PCI - similar study with no new acute changes. D.  Exercise Cardiolite (8/29/11):  Reversible mid-distal inferolateral defect. No fixed defects. EF 63%. E.  Cath (9/16/11):  LM ost20, d20. LAD m20. LCx m70; OM1 70, OM2 100, OM3 100. RCA p20, d30; PDA 99. F.  PCI PDA (9/16/11):  2.5x18 Xience. Dyslipidemia ICD-10-CM: E78.5 ICD-9-CM: 272.4  Unknown Overview Addendum 12/12/2016  2:31 PM by Penelope Haider MD  
  a. FLP 10/06: LDL 62, HDL 50, Trig 107, Chol 133 (vytorin 10/80). b. Amy Puls (6/20/8): Tot 143, , HDL 40, LDL 79 (Vytorin 10/80mg qd). c. FLP (12/20/8): Tot 137, TG 81, HDL 45, LDL 76 (Vytorin 10/80mg qd). D.  FLP (8/19/11): Tot 133, TG 78, HDL 48, LDL 69 (Vytorin 10/80mg qd). E.  FLP (10/22/13): Tot 155, TG 85, HDL 55, LDL 83 (Vytorin 10/80). F.  FLP (9/29/15): Tot 148, , HDL 64, LDL 60 (Vytorin 10/80). G.  FLP (10/28/16): Tot 148, TG 83, HDL 67, LDL 64 (Vytorin 10/80). Hypertension, benign ICD-10-CM: I10 
ICD-9-CM: 401.1  Unknown Current Outpatient Medications on File Prior to Visit Medication Sig  
 folic acid (FOLVITE) 1 mg tablet TAKE 1 TABLET DAILY EXCEPT DAY THAT METHOTREXATE IS TAKEN.  
 VIAGRA 100 mg tablet TAKE 1 TABLET AS NEEDED  
 oxyCODONE-acetaminophen (PERCOCET) 5-325 mg per tablet Take 1 Tab by mouth every eight (8) hours as needed for Pain. Max Daily Amount: 3 Tabs.  metFORMIN (GLUCOPHAGE) 1,000 mg tablet TAKE 1 TABLET TWICE A DAY WITH MEALS  
 losartan (COZAAR) 50 mg tablet TAKE 1 TABLET DAILY  atenolol (TENORMIN) 50 mg tablet TAKE 1 TABLET DAILY  omeprazole (PRILOSEC) 40 mg capsule TAKE 1 CAPSULE DAILY  atorvastatin (LIPITOR) 40 mg tablet TAKE 1 TABLET DAILY (START LIPITOR AFTER VYTORIN IS FINISHED)  VENTOLIN HFA 90 mcg/actuation inhaler INHALE 1 TO 2 PUFFS PO Q 4 TO 6 H PRF DYSPNEA/WHEEZING  
 aspirin delayed-release 81 mg tablet Take 81 mg by mouth daily. No current facility-administered medications on file prior to visit. CARDIOLOGY STUDIES TO DATE: 
Cardiolite (2/04):scheduled for comparison from previous stress pre PCI - similar study with no new acute changes. Exercise Cardiolite (8/29/11):  Reversible mid-distal inferolateral defect.  No fixed defects.  EF 63%. Chief Complaint Patient presents with  Coronary Artery Disease HPI : 
Mr. Venkatesh Man is doing great. He had back surgery without difficulty though he still has some muscle stiffness there.  In spite of that, he is very active working around his home and yard with no symptoms suggestive of angina or heart failure. His primary care physician is following his lipids in which he has not had checked since last February. CARDIAC ROS:  
negative for chest pain, dyspnea, palpitations, syncope, orthopnea, paroxysmal nocturnal dyspnea, exertional chest pressure/discomfort, claudication, lower extremity edema Family History Problem Relation Age of Onset 26 [facility] Arthritis-osteo Mother  No Known Problems Father Past Medical History:  
Diagnosis Date  CAD (coronary artery disease)  CAD (coronary artery disease), native coronary artery  Degenerative joint disease 1/28/2011  Diabetes type 1, controlled ([address]  Essential hypertension  Hyperlipidemia  Hypertension, benign  Obstructive sleep apnea 1/28/2011  ADELA (obstructive sleep apnea) GENERAL ROS: 
A comprehensive review of systems was negative except for that written in the HPI. Visit Vitals /76 (BP 1 Location: Left arm, BP Patient Position: Sitting) Pulse (!) 55 Resp 17 Ht 5' 8\" (1.727 m) Wt 174 lb 3.2 oz (79 kg) SpO2 96% BMI 26.49 kg/m² Wt Readings from Last 3 Encounters:  
02/13/19 174 lb 3.2 oz (79 kg) 12/04/18 173 lb (78.5 kg) 09/21/18 172 lb (78 kg) BP Readings from Last 3 Encounters:  
02/13/19 140/76  
12/04/18 164/83  
09/21/18 136/70 PHYSICAL EXAM 
General appearance: alert, cooperative, no distress, appears stated age Neurologic: Alert and oriented X 3 Neck: supple, symmetrical, trachea midline, no adenopathy, no carotid bruit and no JVD Lungs: clear to auscultation bilaterally Heart: regular rate and rhythm, S1, S2 normal, no murmur, click, rub or gallop Extremities: extremities normal, atraumatic, no cyanosis or edema Lab Results Component Value Date/Time  Cholesterol, total 153 02/21/2018 11:01 AM  
 Cholesterol, total 148 10/28/2016 07:34 AM  
 Cholesterol, total 146 07/28/2016 07:39 AM  
 Cholesterol, total 148 09/29/2015 07:53 AM  
 Cholesterol, total 155 10/22/2013 10:00 AM  
 HDL Cholesterol 55 02/21/2018 11:01 AM  
 HDL Cholesterol 67 10/28/2016 07:34 AM  
 HDL Cholesterol 59 07/28/2016 07:39 AM  
 HDL Cholesterol 64 09/29/2015 07:53 AM  
 HDL Cholesterol 55 10/22/2013 10:00 AM  
 LDL, calculated 86 02/21/2018 11:01 AM  
 LDL, calculated 64 10/28/2016 07:34 AM  
 LDL, calculated 64 07/28/2016 07:39 AM  
 LDL, calculated 60 09/29/2015 07:53 AM  
 LDL, calculated 83 10/22/2013 10:00 AM  
 Triglyceride 59 02/21/2018 11:01 AM  
 Triglyceride 83 10/28/2016 07:34 AM  
 Triglyceride 113 07/28/2016 07:39 AM  
 Triglyceride 122 09/29/2015 07:53 AM  
 Triglyceride 85 10/22/2013 10:00 AM  
 
ASSESSMENT Mr. Enma Esposito is stable, asymptomatic and well compensated on a good medical regimen and needs no cardiac testing at this time. He will contact his primary care physician to get some blood work. current treatment plan is effective, no change in therapy 
lab results and schedule of future lab studies reviewed with patient 
reviewed diet, exercise and weight control Encounter Diagnoses Name Primary?  Coronary artery disease involving native coronary artery of native heart without angina pectoris Yes  Hypertension, benign  Dyslipidemia No orders of the defined types were placed in this encounter. Follow-up Disposition: 
Return in about 1 year (around 2/13/2020). Hayley Hdz MD 
2/13/2019

## 2019-02-27 ENCOUNTER — HOSPITAL ENCOUNTER (OUTPATIENT)
Dept: LAB | Age: 79
Discharge: HOME | End: 2019-02-27
Payer: MEDICARE

## 2019-02-27 ENCOUNTER — OFFICE VISIT (OUTPATIENT)
Dept: FAMILY MEDICINE CLINIC | Age: 79
End: 2019-02-27

## 2019-02-27 VITALS
HEIGHT: 68 IN | SYSTOLIC BLOOD PRESSURE: 175 MMHG | DIASTOLIC BLOOD PRESSURE: 75 MMHG | OXYGEN SATURATION: 96 % | TEMPERATURE: 98.2 F | BODY MASS INDEX: 26.67 KG/M2 | RESPIRATION RATE: 16 BRPM | WEIGHT: 176 LBS | HEART RATE: 58 BPM

## 2019-02-27 DIAGNOSIS — E11.9 DIABETES MELLITUS TYPE 2 IN NONOBESE (HCC): Primary | ICD-10-CM

## 2019-02-27 DIAGNOSIS — M19.90 OSTEOARTHRITIS, UNSPECIFIED OSTEOARTHRITIS TYPE, UNSPECIFIED SITE: ICD-10-CM

## 2019-02-27 DIAGNOSIS — E78.5 DYSLIPIDEMIA: ICD-10-CM

## 2019-02-27 DIAGNOSIS — M51.9 LUMBAR DISC DISEASE: ICD-10-CM

## 2019-02-27 PROCEDURE — 80061 LIPID PANEL: CPT

## 2019-02-27 PROCEDURE — 80053 COMPREHEN METABOLIC PANEL: CPT

## 2019-02-27 PROCEDURE — 83036 HEMOGLOBIN GLYCOSYLATED A1C: CPT

## 2019-02-27 RX ORDER — OXYCODONE AND ACETAMINOPHEN 5; 325 MG/1; MG/1
1 TABLET ORAL
Qty: 30 TAB | Refills: 0 | Status: SHIPPED | OUTPATIENT
Start: 2019-02-27 | End: 2019-05-29 | Stop reason: SDUPTHER

## 2019-02-27 NOTE — PROGRESS NOTES
Temo Jernigan is a 70 y.o. male Chief Complaint Patient presents with  Medication Refill  
  percocet  Labs  
 pt here for refill of his narcotic pain medication. Pt does use this for his chronic arthritis pain in multiple joints including his chronic back pain which he has undergone multiple surgeries for. The medication when needed does bring his pain from a 8 to a 3-4/10. This medication allows for him to be more active in his day to day life and around the home when he needs it. Pt gets an Rx every few months. Last fill was 12/4/18 per  for 30 pills. No hx of drug abuse. States his hands are the worst, pt also with a lot of stiffness in his back tho he is overall much better since his surgery. Pt is not currently in pain. Pt is aware to only use med prn for mod to severe pain and otherwise use tylenol. pt has 5 pills remaining from prior Rx. Pt has f/u with Dr Rizwan Otto on march 21. Pt states his biggest complaint with his back is just stiffness. States the cold weather has been negatively impacting his hand arthritis as of late and this remians where his worst pain is. Opioid/Pain Management: 
1. Has the patient signed a pain contract for chronic narcotic use? yes 2. Has the patient had a UDS or Serum screen as per guidelines as per [place] [employer] [facility]?:   yes 3. Does patient meet necessary guidelines for Naloxone treatement per the [place] [employer] [facility]?:    no 
4. Has the Prescription Monitoring Program been reviewed? yes 5. Does patient have a long term condition that requires long term use of a Narcotic?  yes 6. Has patient been tolerant of therapy and responsible to routine follow up and specialist follow-up? Yes Pt also due for recheck of his diabetes and HLD and his diabetes has been well controlled, last A1C at 6.3, on metformin which he tolerates without s/e.   Pt on lipitor for his HLD and his last LDL was 86 but this was a year prior. Pt is fasting today and we will recheck . he is a 70y.o. year old male who presents for evalution. Reviewed PmHx, RxHx, FmHx, SocHx, AllgHx and updated and dated in the chart. Review of Systems - negative except as listed above in the HPI Objective:  
 
Vitals:  
 02/27/19 2468 BP: 175/75 Pulse: (!) 58 Resp: 16 Temp: 98.2 °F (36.8 °C) TempSrc: Oral  
SpO2: 96% Weight: 176 lb (79.8 kg) Height: 5' 8\" (1.727 m) Current Outpatient Medications Medication Sig  
 oxyCODONE-acetaminophen (PERCOCET) 5-325 mg per tablet Take 1 Tab by mouth daily as needed for Pain for up to 30 days.  folic acid (FOLVITE) 1 mg tablet TAKE 1 TABLET DAILY EXCEPT DAY THAT METHOTREXATE IS TAKEN.  
 VIAGRA 100 mg tablet TAKE 1 TABLET AS NEEDED  
 metFORMIN (GLUCOPHAGE) 1,000 mg tablet TAKE 1 TABLET TWICE A DAY WITH MEALS  
 losartan (COZAAR) 50 mg tablet TAKE 1 TABLET DAILY  atenolol (TENORMIN) 50 mg tablet TAKE 1 TABLET DAILY  omeprazole (PRILOSEC) 40 mg capsule TAKE 1 CAPSULE DAILY  atorvastatin (LIPITOR) 40 mg tablet TAKE 1 TABLET DAILY (START LIPITOR AFTER VYTORIN IS FINISHED)  VENTOLIN HFA 90 mcg/actuation inhaler INHALE 1 TO 2 PUFFS PO Q 4 TO 6 H PRF DYSPNEA/WHEEZING  
 aspirin delayed-release 81 mg tablet Take 81 mg by mouth daily. No current facility-administered medications for this visit. Physical Examination: General appearance - alert, well appearing, and in no distress Mental status - alert, oriented to person, place, and time Chest - clear to auscultation, no wheezes, rales or rhonchi, symmetric air entry Heart - normal rate, regular rhythm, normal S1, S2, no murmurs, rubs, clicks or gallops Back exam - limited range of motion Musculoskeletal - mild finger deformities particularly noted in R hand 2nd digit, ttp over PIP joints of R hand with noted swelling without erythema Assessment/ Plan:  
Diagnoses and all orders for this visit: 
 
 1. Diabetes mellitus type 2 in nonobese (HCC) -     METABOLIC PANEL, COMPREHENSIVE 
-     LIPID PANEL 
-     HEMOGLOBIN A1C WITH EAG 2. Dyslipidemia -     METABOLIC PANEL, COMPREHENSIVE 
-     LIPID PANEL 3. Osteoarthritis, unspecified osteoarthritis type, unspecified site 
-     oxyCODONE-acetaminophen (PERCOCET) 5-325 mg per tablet; Take 1 Tab by mouth daily as needed for Pain for up to 30 days. 4. Lumbar disc disease 
-     oxyCODONE-acetaminophen (PERCOCET) 5-325 mg per tablet; Take 1 Tab by mouth daily as needed for Pain for up to 30 days. no change in pain medication. Follow-up Disposition: 
Return if symptoms worsen or fail to improve. I have discussed the diagnosis with the patient and the intended plan as seen in the above orders. The patient has received an after-visit summary and questions were answered concerning future plans. Pt conveyed understanding of plan. Medication Side Effects and Warnings were discussed with patient [id number] [facility], DO

## 2019-02-27 NOTE — PROGRESS NOTES
Chief Complaint Patient presents with  Medication Refill  
  percocet  Labs Patient presents in office today for med refill of percocet and fasting labs. Patient states he saw his cardiologist and was told he is overdue to have his cholesterol checked. No concerns. 1. Have you been to the ER, urgent care clinic since your last visit? Hospitalized since your last visit? No 
 
2. Have you seen or consulted any other health care providers outside of the [address] [place] [address] since your last visit? Include any pap smears or colon screening. No 
 
Learning Assessment 12/1/2015 PRIMARY LEARNER Patient HIGHEST LEVEL OF EDUCATION - PRIMARY LEARNER  GRADUATED HIGH SCHOOL OR GED  
BARRIERS PRIMARY LEARNER NONE  
CO-LEARNER CAREGIVER No  
PRIMARY LANGUAGE ENGLISH  
LEARNER PREFERENCE PRIMARY DEMONSTRATION  
ANSWERED BY pt  
RELATIONSHIP SELF

## 2019-02-27 NOTE — PATIENT INSTRUCTIONS
A Healthy Lifestyle: Care Instructions Your Care Instructions A healthy lifestyle can help you feel good, stay at a healthy weight, and have plenty of energy for both work and play. A healthy lifestyle is something you can share with your whole family. A healthy lifestyle also can lower your risk for serious health problems, such as high blood pressure, heart disease, and diabetes. You can follow a few steps listed below to improve your health and the health of your family. Follow-up care is a key part of your treatment and safety. Be sure to make and go to all appointments, and call your doctor if you are having problems. It's also a good idea to know your test results and keep a list of the medicines you take. How can you care for yourself at home? · Do not eat too much sugar, fat, or fast foods. You can still have dessert and treats now and then. The goal is moderation. · Start small to improve your eating habits. Pay attention to portion sizes, drink less juice and soda pop, and eat more fruits and vegetables. ? Eat a healthy amount of food. A 3-ounce serving of meat, for example, is about the size of a deck of cards. Fill the rest of your plate with vegetables and whole grains. ? Limit the amount of soda and sports drinks you have every day. Drink more water when you are thirsty. ? Eat at least 5 servings of fruits and vegetables every day. It may seem like a lot, but it is not hard to reach this goal. A serving or helping is 1 piece of fruit, 1 cup of vegetables, or 2 cups of leafy, raw vegetables. Have an apple or some carrot sticks as an afternoon snack instead of a candy bar. Try to have fruits and/or vegetables at every meal. 
· Make exercise part of your daily routine. You may want to start with simple activities, such as walking, bicycling, or slow swimming. Try to be active 30 to 60 minutes every day.  You do not need to do all 30 to 60 minutes all at once. For example, you can exercise 3 times a day for 10 or 20 minutes. Moderate exercise is safe for most people, but it is always a good idea to talk to your doctor before starting an exercise program. 
· Keep moving. Robbie Godwin the lawn, work in the garden, or [employer]. Take the stairs instead of the elevator at work. · If you smoke, quit. People who smoke have an increased risk for heart attack, stroke, cancer, and other lung illnesses. Quitting is hard, but there are ways to boost your chance of quitting tobacco for good. ? Use nicotine gum, patches, or lozenges. ? Ask your doctor about stop-smoking programs and medicines. ? Keep trying. In addition to reducing your risk of diseases in the future, you will notice some benefits soon after you stop using tobacco. If you have shortness of breath or asthma symptoms, they will likely get better within a few weeks after you quit. · Limit how much alcohol you drink. Moderate amounts of alcohol (up to 2 drinks a day for men, 1 drink a day for women) are okay. But drinking too much can lead to liver problems, high blood pressure, and other health problems. Family health If you have a family, there are many things you can do together to improve your health. · Eat meals together as a family as often as possible. · Eat healthy foods. This includes fruits, vegetables, lean meats and dairy, and whole grains. · Include your family in your fitness plan. Most people think of activities such as jogging or tennis as the way to fitness, but there are many ways you and your family can be more active. Anything that makes you breathe hard and gets your heart pumping is exercise. Here are some tips: 
? Walk to do errands or to take your child to school or the bus. 
? Go for a family bike ride after dinner instead of watching TV. Where can you learn more? Go to http://jennifer-elida.info/. Enter K019 in the search box to learn more about \"A Healthy Lifestyle: Care Instructions. \" Current as of: September 11, 2018 Content Version: 11.9 © 9229-0947 [employer]. Care instructions adapted under license by [employer] (which disclaims liability or warranty for this information). If you have questions about a medical condition or this instruction, always ask your healthcare professional. [place] [address] any warranty or liability for your use of this information.

## 2019-02-28 LAB
ALBUMIN SERPL-MCNC: 4.1 G/DL (ref 3.5–4.8)
ALBUMIN/GLOB SERPL: 1.5 {RATIO} (ref 1.2–2.2)
ALP SERPL-CCNC: 87 IU/L (ref 39–117)
ALT SERPL-CCNC: 8 IU/L (ref 0–44)
AST SERPL-CCNC: 17 IU/L (ref 0–40)
BILIRUB SERPL-MCNC: 0.2 MG/DL (ref 0–1.2)
BUN SERPL-MCNC: 9 MG/DL (ref 8–27)
BUN/CREAT SERPL: 10 (ref 10–24)
CALCIUM SERPL-MCNC: 9.3 MG/DL (ref 8.6–10.2)
CHLORIDE SERPL-SCNC: 106 MMOL/L (ref 96–106)
CHOLEST SERPL-MCNC: 138 MG/DL (ref 100–199)
CO2 SERPL-SCNC: 23 MMOL/L (ref 20–29)
CREAT SERPL-MCNC: 0.89 MG/DL (ref 0.76–1.27)
EST. AVERAGE GLUCOSE BLD GHB EST-MCNC: 128 MG/DL
GLOBULIN SER CALC-MCNC: 2.7 G/DL (ref 1.5–4.5)
GLUCOSE SERPL-MCNC: 82 MG/DL (ref 65–99)
HBA1C MFR BLD: 6.1 % (ref 4.8–5.6)
HDLC SERPL-MCNC: 51 MG/DL
INTERPRETATION, 910389: NORMAL
LDLC SERPL CALC-MCNC: 71 MG/DL (ref 0–99)
Lab: NORMAL
POTASSIUM SERPL-SCNC: 4.5 MMOL/L (ref 3.5–5.2)
PROT SERPL-MCNC: 6.8 G/DL (ref 6–8.5)
SODIUM SERPL-SCNC: 144 MMOL/L (ref 134–144)
TRIGL SERPL-MCNC: 80 MG/DL (ref 0–149)
VLDLC SERPL CALC-MCNC: 16 MG/DL (ref 5–40)

## 2019-03-13 ENCOUNTER — TELEPHONE (OUTPATIENT)
Dept: FAMILY MEDICINE CLINIC | Age: 79
End: 2019-03-13

## 2019-03-13 RX ORDER — IRBESARTAN 150 MG/1
150 TABLET ORAL DAILY
Qty: 30 TAB | Refills: 0 | Status: SHIPPED | OUTPATIENT
Start: 2019-03-13 | End: 2019-08-27

## 2019-03-13 RX ORDER — IRBESARTAN 150 MG/1
150 TABLET ORAL
Qty: 90 TAB | Refills: 3 | Status: SHIPPED | OUTPATIENT
Start: 2019-03-13 | End: 2019-03-13 | Stop reason: SDUPTHER

## 2019-03-13 RX ORDER — IRBESARTAN 150 MG/1
150 TABLET ORAL
Qty: 90 TAB | Refills: 3 | Status: SHIPPED | OUTPATIENT
Start: 2019-03-13 | End: 2020-02-26

## 2019-03-13 NOTE — TELEPHONE ENCOUNTER
----- Message from Benny Loomis sent at 3/13/2019 12:04 PM EDT -----  Regarding: Demetri Barr - DO/ telephone  Pt's wife Candie lGynn has received a notice about the Pts prescription losartan being recalled and would like a call back in reference to what to do about the prescription. [facility]. Pts wife's contact [phone].

## 2019-03-13 NOTE — TELEPHONE ENCOUNTER
Wife called back upset as she had requested [facility]. Since he is completely out wife would like a 30-day supply to be sent to the local pharmacy to hold him over and a 90-day supply with 3 refills sent to [facility].

## 2019-03-13 NOTE — TELEPHONE ENCOUNTER
I changed to irbesartan and sent to local pharmacy.   Its 150mg but this is equivalent to the 50 of losartan he was on

## 2019-03-26 ENCOUNTER — HOSPITAL ENCOUNTER (OUTPATIENT)
Dept: MRI IMAGING | Age: 79
Discharge: HOME | End: 2019-03-26
Attending: ORTHOPAEDIC SURGERY
Payer: MEDICARE

## 2019-03-26 DIAGNOSIS — M54.16 LEFT LUMBAR RADICULITIS: ICD-10-CM

## 2019-03-26 DIAGNOSIS — Z98.1 STATUS POST LUMBAR SPINAL FUSION: ICD-10-CM

## 2019-03-26 DIAGNOSIS — M43.16 SPONDYLOLISTHESIS OF LUMBAR REGION: ICD-10-CM

## 2019-03-26 DIAGNOSIS — M48.062 LUMBAR STENOSIS WITH NEUROGENIC CLAUDICATION: ICD-10-CM

## 2019-03-26 DIAGNOSIS — M54.50 LUMBAR PAIN: ICD-10-CM

## 2019-03-26 DIAGNOSIS — M99.73 CONNECTIVE TISSUE AND DISC STENOSIS OF INTERVERTEBRAL FORAMINA OF LUMBAR REGION: ICD-10-CM

## 2019-03-26 PROCEDURE — 72148 MRI LUMBAR SPINE W/O DYE: CPT

## 2019-05-29 ENCOUNTER — OFFICE VISIT (OUTPATIENT)
Dept: FAMILY MEDICINE CLINIC | Age: 79
End: 2019-05-29

## 2019-05-29 VITALS
SYSTOLIC BLOOD PRESSURE: 126 MMHG | BODY MASS INDEX: 25.01 KG/M2 | HEIGHT: 68 IN | TEMPERATURE: 98.2 F | DIASTOLIC BLOOD PRESSURE: 73 MMHG | RESPIRATION RATE: 16 BRPM | WEIGHT: 165 LBS | HEART RATE: 55 BPM | OXYGEN SATURATION: 95 %

## 2019-05-29 DIAGNOSIS — M51.9 LUMBAR DISC DISEASE: ICD-10-CM

## 2019-05-29 DIAGNOSIS — M19.90 OSTEOARTHRITIS, UNSPECIFIED OSTEOARTHRITIS TYPE, UNSPECIFIED SITE: ICD-10-CM

## 2019-05-29 RX ORDER — MELOXICAM 7.5 MG/1
TABLET ORAL
Qty: 60 TAB | Refills: 5 | Status: SHIPPED | OUTPATIENT
Start: 2019-05-29 | End: 2019-08-27

## 2019-05-29 RX ORDER — OXYCODONE AND ACETAMINOPHEN 5; 325 MG/1; MG/1
1 TABLET ORAL
Qty: 30 TAB | Refills: 0 | Status: SHIPPED | OUTPATIENT
Start: 2019-05-29 | End: 2019-08-27 | Stop reason: SDUPTHER

## 2019-05-29 NOTE — PROGRESS NOTES
Chief Complaint   Patient presents with    Medication Refill     Percocet     Patient presents in office today for med refill of Percocet. No concerns. 1. Have you been to the ER, urgent care clinic since your last visit? Hospitalized since your last visit? No    2. Have you seen or consulted any other health care providers outside of the [address] [place] [address] since your last visit? Include any pap smears or colon screening.  No    Learning Assessment 12/1/2015   PRIMARY LEARNER Patient   HIGHEST LEVEL OF EDUCATION - PRIMARY LEARNER  GRADUATED HIGH SCHOOL OR GED   BARRIERS PRIMARY LEARNER NONE   CO-LEARNER CAREGIVER No   PRIMARY LANGUAGE ENGLISH   LEARNER PREFERENCE PRIMARY DEMONSTRATION   ANSWERED BY pt   RELATIONSHIP SELF

## 2019-05-29 NOTE — PROGRESS NOTES
Anastasiya Liz is a 70 y.o. male   Chief Complaint   Patient presents with    Medication Refill     Percocet    pt ehre for refill of his percocet, states his back has been bothering him quite a bit more and had an injection in April and this did not help much. Rpeorts another MRI and states was told discs are getting smaller. Pt is using the percocet on rare occasion, has 4 left since lasty Rx. Pt uses them only when he can not take it anymore. Will bring pain from 8-9/10 to a 2-3/10 but does will give him releif for a day or so. Will take one pill every 3-4 days. Last fill was Feb.  Med allows pt to remain somewhat active. He can't flex forward much tho due to pain    MRI Report:  IMPRESSION:   1. Mild to moderate degenerative disease. 2. Mild to moderate canal stenosis L2-L3 and L4-L5. 3. Right neural foraminal stenosis L4-L5. 4. More mild stenoses as described above. Opioid/Pain Management:    1. Has the patient signed a pain contract for chronic narcotic use? yes  2. Has the patient had a UDS or Serum screen as per guidelines as per [facility]?:   yes    3. Does patient meet necessary guidelines for Naloxone treatement per the [facility]?:    no  4. Has the Prescription Monitoring Program been reviewed? yes  5. Does patient have a long term condition that requires long term use of a Narcotic?  yes  6. Has patient been tolerant of therapy and responsible to routine follow up and specialist follow-up? Yes    he is a 70y.o. year old male who presents for evalution. Reviewed PmHx, RxHx, FmHx, SocHx, AllgHx and updated and dated in the chart.     Review of Systems - negative except as listed above in the HPI    Objective:     Vitals:    05/29/19 1552   BP: 126/73   Pulse: (!) 55   Resp: 16   Temp: 98.2 °F (36.8 °C)   TempSrc: Oral   SpO2: 95%   Weight: 165 lb (74.8 kg)   Height: 5' 8\" (1.727 m)       Current Outpatient Medications   Medication Sig    meloxicam (MOBIC) 7.5 mg tablet 1-2 tabs PO qday prn back pain    oxyCODONE-acetaminophen (PERCOCET) 5-325 mg per tablet Take 1 Tab by mouth daily as needed for Pain for up to 30 days.  irbesartan (AVAPRO) 150 mg tablet Take 1 Tab by mouth nightly.  irbesartan (AVAPRO) 150 mg tablet Take 1 Tab by mouth daily.  folic acid (FOLVITE) 1 mg tablet TAKE 1 TABLET DAILY EXCEPT DAY THAT METHOTREXATE IS TAKEN.    VIAGRA 100 mg tablet TAKE 1 TABLET AS NEEDED    metFORMIN (GLUCOPHAGE) 1,000 mg tablet TAKE 1 TABLET TWICE A DAY WITH MEALS    atenolol (TENORMIN) 50 mg tablet TAKE 1 TABLET DAILY    omeprazole (PRILOSEC) 40 mg capsule TAKE 1 CAPSULE DAILY    atorvastatin (LIPITOR) 40 mg tablet TAKE 1 TABLET DAILY (START LIPITOR AFTER VYTORIN IS FINISHED)    VENTOLIN HFA 90 mcg/actuation inhaler INHALE 1 TO 2 PUFFS PO Q 4 TO 6 H PRF DYSPNEA/WHEEZING    aspirin delayed-release 81 mg tablet Take 81 mg by mouth daily. No current facility-administered medications for this visit. Physical Examination: General appearance - alert, well appearing, and in no distress  Chest - clear to auscultation, no wheezes, rales or rhonchi, symmetric air entry  Heart - normal rate, regular rhythm, normal S1, S2, no murmurs, rubs, clicks or gallops  Musculoskeletal - decreased ROM of L spine with multiple prior surgical scars, tp b/l lumbar paraspinals. Assessment/ Plan:   Diagnoses and all orders for this visit:    1. Osteoarthritis, unspecified osteoarthritis type, unspecified site  -     meloxicam (MOBIC) 7.5 mg tablet; 1-2 tabs PO qday prn back pain  -     oxyCODONE-acetaminophen (PERCOCET) 5-325 mg per tablet; Take 1 Tab by mouth daily as needed for Pain for up to 30 days. 2. Lumbar disc disease  -     meloxicam (MOBIC) 7.5 mg tablet; 1-2 tabs PO qday prn back pain  -     oxyCODONE-acetaminophen (PERCOCET) 5-325 mg per tablet; Take 1 Tab by mouth daily as needed for Pain for up to 30 days.      no change in percocet regimen indicated  Follow-up and Dispositions    · Return if symptoms worsen or fail to improve. I have discussed the diagnosis with the patient and the intended plan as seen in the above orders. The patient has received an after-visit summary and questions were answered concerning future plans. Pt conveyed understanding of plan.     Medication Side Effects and Warnings were discussed with patient      Sukumar Jennings, DO

## 2019-05-29 NOTE — PATIENT INSTRUCTIONS
A Healthy Lifestyle: Care Instructions  Your Care Instructions    A healthy lifestyle can help you feel good, stay at a healthy weight, and have plenty of energy for both work and play. A healthy lifestyle is something you can share with your whole family. A healthy lifestyle also can lower your risk for serious health problems, such as high blood pressure, heart disease, and diabetes. You can follow a few steps listed below to improve your health and the health of your family. Follow-up care is a key part of your treatment and safety. Be sure to make and go to all appointments, and call your doctor if you are having problems. It's also a good idea to know your test results and keep a list of the medicines you take. How can you care for yourself at home? · Do not eat too much sugar, fat, or fast foods. You can still have dessert and treats now and then. The goal is moderation. · Start small to improve your eating habits. Pay attention to portion sizes, drink less juice and soda pop, and eat more fruits and vegetables. ? Eat a healthy amount of food. A 3-ounce serving of meat, for example, is about the size of a deck of cards. Fill the rest of your plate with vegetables and whole grains. ? Limit the amount of soda and sports drinks you have every day. Drink more water when you are thirsty. ? Eat at least 5 servings of fruits and vegetables every day. It may seem like a lot, but it is not hard to reach this goal. A serving or helping is 1 piece of fruit, 1 cup of vegetables, or 2 cups of leafy, raw vegetables. Have an apple or some carrot sticks as an afternoon snack instead of a candy bar. Try to have fruits and/or vegetables at every meal.  · Make exercise part of your daily routine. You may want to start with simple activities, such as walking, bicycling, or slow swimming. Try to be active 30 to 60 minutes every day. You do not need to do all 30 to 60 minutes all at once.  For example, you can exercise 3 times a day for 10 or 20 minutes. Moderate exercise is safe for most people, but it is always a good idea to talk to your doctor before starting an exercise program.  · Keep moving. [place] [employer] the lawn, work in the garden, or [employer]. Take the stairs instead of the elevator at work. · If you smoke, quit. People who smoke have an increased risk for heart attack, stroke, cancer, and other lung illnesses. Quitting is hard, but there are ways to boost your chance of quitting tobacco for good. ? Use nicotine gum, patches, or lozenges. ? Ask your doctor about stop-smoking programs and medicines. ? Keep trying. In addition to reducing your risk of diseases in the future, you will notice some benefits soon after you stop using tobacco. If you have shortness of breath or asthma symptoms, they will likely get better within a few weeks after you quit. · Limit how much alcohol you drink. Moderate amounts of alcohol (up to 2 drinks a day for men, 1 drink a day for women) are okay. But drinking too much can lead to liver problems, high blood pressure, and other health problems. Family health  If you have a family, there are many things you can do together to improve your health. · Eat meals together as a family as often as possible. · Eat healthy foods. This includes fruits, vegetables, lean meats and dairy, and whole grains. · Include your family in your fitness plan. Most people think of activities such as jogging or tennis as the way to fitness, but there are many ways you and your family can be more active. Anything that makes you breathe hard and gets your heart pumping is exercise. Here are some tips:  ? Walk to do errands or to take your child to school or the bus.  ? Go for a family bike ride after dinner instead of watching TV. Where can you learn more? Go to http://jennifer-elida.info/. Enter Z378 in the search box to learn more about \"A Healthy Lifestyle: Care Instructions. \"  Current as of: September 11, 2018  Content Version: 11.9  © 2245-9982 [employer]. Care instructions adapted under license by [employer] (which disclaims liability or warranty for this information). If you have questions about a medical condition or this instruction, always ask your healthcare professional. [address] any warranty or liability for your use of this information.

## 2019-08-27 ENCOUNTER — HOSPITAL ENCOUNTER (OUTPATIENT)
Dept: LAB | Age: 79
Discharge: HOME | End: 2019-08-27
Payer: MEDICARE

## 2019-08-27 ENCOUNTER — HOSPITAL ENCOUNTER (OUTPATIENT)
Dept: NON INVASIVE DIAGNOSTICS | Age: 79
Discharge: HOME | End: 2019-08-27
Attending: ORTHOPAEDIC SURGERY
Payer: MEDICARE

## 2019-08-27 ENCOUNTER — OFFICE VISIT (OUTPATIENT)
Dept: FAMILY MEDICINE CLINIC | Age: 79
End: 2019-08-27

## 2019-08-27 ENCOUNTER — HOSPITAL ENCOUNTER (OUTPATIENT)
Dept: PREADMISSION TESTING | Age: 79
Discharge: HOME | End: 2019-08-27
Payer: MEDICARE

## 2019-08-27 VITALS
RESPIRATION RATE: 16 BRPM | WEIGHT: 167 LBS | SYSTOLIC BLOOD PRESSURE: 135 MMHG | HEART RATE: 65 BPM | BODY MASS INDEX: 25.31 KG/M2 | HEIGHT: 68 IN | TEMPERATURE: 98.4 F | OXYGEN SATURATION: 98 % | DIASTOLIC BLOOD PRESSURE: 68 MMHG

## 2019-08-27 VITALS
HEART RATE: 49 BPM | WEIGHT: 168.87 LBS | HEIGHT: 68 IN | RESPIRATION RATE: 16 BRPM | TEMPERATURE: 98.1 F | BODY MASS INDEX: 25.59 KG/M2 | OXYGEN SATURATION: 97 % | DIASTOLIC BLOOD PRESSURE: 63 MMHG | SYSTOLIC BLOOD PRESSURE: 140 MMHG

## 2019-08-27 DIAGNOSIS — M51.9 LUMBAR DISC DISEASE: Primary | ICD-10-CM

## 2019-08-27 DIAGNOSIS — E11.9 DIABETES MELLITUS TYPE 2 IN NONOBESE (HCC): ICD-10-CM

## 2019-08-27 DIAGNOSIS — Z00.00 MEDICARE ANNUAL WELLNESS VISIT, SUBSEQUENT: ICD-10-CM

## 2019-08-27 DIAGNOSIS — Z13.39 SCREENING FOR ALCOHOLISM: ICD-10-CM

## 2019-08-27 DIAGNOSIS — M19.90 OSTEOARTHRITIS, UNSPECIFIED OSTEOARTHRITIS TYPE, UNSPECIFIED SITE: ICD-10-CM

## 2019-08-27 DIAGNOSIS — Z51.81 MEDICATION MONITORING ENCOUNTER: ICD-10-CM

## 2019-08-27 LAB
25(OH)D3 SERPL-MCNC: 41.9 NG/ML (ref 30–100)
ABO + RH BLD: NORMAL
ALBUMIN SERPL-MCNC: 3.8 G/DL (ref 3.5–5)
ALBUMIN/GLOB SERPL: 1.1 {RATIO} (ref 1.1–2.2)
ALP SERPL-CCNC: 93 U/L (ref 45–117)
ALT SERPL-CCNC: 13 U/L (ref 12–78)
ANION GAP SERPL CALC-SCNC: 5 MMOL/L (ref 5–15)
APPEARANCE UR: CLEAR
APTT PPP: 26.3 SEC (ref 22.1–32)
AST SERPL-CCNC: 11 U/L (ref 15–37)
ATRIAL RATE: 53 BPM
BACTERIA URNS QL MICRO: NEGATIVE /HPF
BARBITURATES UR POC: NEGATIVE
BASOPHILS # BLD: 0 K/UL (ref 0–0.1)
BASOPHILS NFR BLD: 1 % (ref 0–1)
BENZODIAZEPINES UR POC: NEGATIVE
BILIRUB SERPL-MCNC: 0.4 MG/DL (ref 0.2–1)
BILIRUB UR QL: NEGATIVE
BLOOD GROUP ANTIBODIES SERPL: NORMAL
BUN SERPL-MCNC: 9 MG/DL (ref 6–20)
BUN/CREAT SERPL: 10 (ref 12–20)
CALCIUM SERPL-MCNC: 9.1 MG/DL (ref 8.5–10.1)
CALCULATED P AXIS, ECG09: 61 DEGREES
CALCULATED R AXIS, ECG10: 63 DEGREES
CALCULATED T AXIS, ECG11: 53 DEGREES
CHLORIDE SERPL-SCNC: 110 MMOL/L (ref 97–108)
CO2 SERPL-SCNC: 28 MMOL/L (ref 21–32)
COCAINE QL URINE POC: NEGATIVE
COLOR UR: NORMAL
CREAT SERPL-MCNC: 0.92 MG/DL (ref 0.7–1.3)
DIAGNOSIS, 93000: NORMAL
DIFFERENTIAL METHOD BLD: ABNORMAL
EOSINOPHIL # BLD: 0.1 K/UL (ref 0–0.4)
EOSINOPHIL NFR BLD: 1 % (ref 0–7)
EPITH CASTS URNS QL MICRO: NORMAL /LPF
ERYTHROCYTE [DISTWIDTH] IN BLOOD BY AUTOMATED COUNT: 15.9 % (ref 11.5–14.5)
EST. AVERAGE GLUCOSE BLD GHB EST-MCNC: 143 MG/DL
GLOBULIN SER CALC-MCNC: 3.5 G/DL (ref 2–4)
GLUCOSE SERPL-MCNC: 86 MG/DL (ref 65–100)
GLUCOSE UR STRIP.AUTO-MCNC: NEGATIVE MG/DL
HBA1C MFR BLD: 6.6 % (ref 4.2–6.3)
HCT VFR BLD AUTO: 41.5 % (ref 36.6–50.3)
HGB BLD-MCNC: 13 G/DL (ref 12.1–17)
HGB UR QL STRIP: NEGATIVE
HYALINE CASTS URNS QL MICRO: NORMAL /LPF (ref 0–5)
IMM GRANULOCYTES # BLD AUTO: 0 K/UL (ref 0–0.04)
IMM GRANULOCYTES NFR BLD AUTO: 0 % (ref 0–0.5)
INR PPP: 1.1 (ref 0.9–1.1)
KETONES UR QL STRIP.AUTO: NEGATIVE MG/DL
LEUKOCYTE ESTERASE UR QL STRIP.AUTO: NEGATIVE
LOT EXP DATE POC: NORMAL
LOT NUMBER POC: NORMAL
LYMPHOCYTES # BLD: 1.6 K/UL (ref 0.8–3.5)
LYMPHOCYTES NFR BLD: 25 % (ref 12–49)
MARIJUANA (THC) QL URINE POC: NEGATIVE
MCH RBC QN AUTO: 29.1 PG (ref 26–34)
MCHC RBC AUTO-ENTMCNC: 31.3 G/DL (ref 30–36.5)
MCV RBC AUTO: 93 FL (ref 80–99)
MDMA/ECSTASY UR POC: NEGATIVE
METHADONE QL URINE POC: NEGATIVE
METHAMPHETAMINE QL URINE POC: NEGATIVE
MONOCYTES # BLD: 0.5 K/UL (ref 0–1)
MONOCYTES NFR BLD: 8 % (ref 5–13)
NEUTS SEG # BLD: 4.1 K/UL (ref 1.8–8)
NEUTS SEG NFR BLD: 65 % (ref 32–75)
NITRITE UR QL STRIP.AUTO: NEGATIVE
NRBC # BLD: 0 K/UL (ref 0–0.01)
NRBC BLD-RTO: 0 PER 100 WBC
NTI OTHER MICRO TRANSPORT 977598: NEGATIVE
OPIATES QL URINE POC: NEGATIVE
OXYCODONE UR POC: NEGATIVE
P-R INTERVAL, ECG05: 150 MS
PH UR STRIP: 5.5 [PH] (ref 5–8)
PLATELET # BLD AUTO: 337 K/UL (ref 150–400)
PMV BLD AUTO: 9.9 FL (ref 8.9–12.9)
POTASSIUM SERPL-SCNC: 5 MMOL/L (ref 3.5–5.1)
PROT SERPL-MCNC: 7.3 G/DL (ref 6.4–8.2)
PROT UR STRIP-MCNC: NEGATIVE MG/DL
PROTHROMBIN TIME: 10.7 SEC (ref 9–11.1)
Q-T INTERVAL, ECG07: 436 MS
QRS DURATION, ECG06: 90 MS
QTC CALCULATION (BEZET), ECG08: 409 MS
RBC # BLD AUTO: 4.46 M/UL (ref 4.1–5.7)
RBC #/AREA URNS HPF: NORMAL /HPF (ref 0–5)
SODIUM SERPL-SCNC: 143 MMOL/L (ref 136–145)
SP GR UR REFRACTOMETRY: 1.01 (ref 1–1.03)
SPECIMEN EXP DATE BLD: NORMAL
THERAPEUTIC RANGE,PTTT: NORMAL SECS (ref 58–77)
UA: UC IF INDICATED,UAUC: NORMAL
UROBILINOGEN UR QL STRIP.AUTO: 0.2 EU/DL (ref 0.2–1)
VALID INTERNAL CONTROL?: YES
VENTRICULAR RATE, ECG03: 53 BPM
WBC # BLD AUTO: 6.2 K/UL (ref 4.1–11.1)
WBC URNS QL MICRO: NORMAL /HPF (ref 0–4)

## 2019-08-27 PROCEDURE — 36415 COLL VENOUS BLD VENIPUNCTURE: CPT

## 2019-08-27 PROCEDURE — 85025 COMPLETE CBC W/AUTO DIFF WBC: CPT

## 2019-08-27 PROCEDURE — 82306 VITAMIN D 25 HYDROXY: CPT

## 2019-08-27 PROCEDURE — 81001 URINALYSIS AUTO W/SCOPE: CPT

## 2019-08-27 PROCEDURE — 83036 HEMOGLOBIN GLYCOSYLATED A1C: CPT

## 2019-08-27 PROCEDURE — 85730 THROMBOPLASTIN TIME PARTIAL: CPT

## 2019-08-27 PROCEDURE — 80053 COMPREHEN METABOLIC PANEL: CPT

## 2019-08-27 PROCEDURE — 93005 ELECTROCARDIOGRAM TRACING: CPT

## 2019-08-27 PROCEDURE — 82043 UR ALBUMIN QUANTITATIVE: CPT

## 2019-08-27 PROCEDURE — 86900 BLOOD TYPING SEROLOGIC ABO: CPT

## 2019-08-27 PROCEDURE — 85610 PROTHROMBIN TIME: CPT

## 2019-08-27 RX ORDER — OXYCODONE AND ACETAMINOPHEN 5; 325 MG/1; MG/1
1 TABLET ORAL
Status: ON HOLD | COMMUNITY
End: 2019-09-10 | Stop reason: SDUPTHER

## 2019-08-27 RX ORDER — CHOLECALCIFEROL TAB 125 MCG (5000 UNIT) 125 MCG
5000 TAB ORAL DAILY
Qty: 30 TAB | Refills: 0 | Status: SHIPPED | OUTPATIENT
Start: 2019-08-27 | End: 2019-09-26

## 2019-08-27 RX ORDER — FOLIC ACID 1 MG/1
1 TABLET ORAL DAILY
COMMUNITY
End: 2020-02-25 | Stop reason: SDUPTHER

## 2019-08-27 RX ORDER — OXYCODONE AND ACETAMINOPHEN 5; 325 MG/1; MG/1
1 TABLET ORAL
Qty: 30 TAB | Refills: 0 | Status: ON HOLD | OUTPATIENT
Start: 2019-08-27 | End: 2019-09-18 | Stop reason: SDUPTHER

## 2019-08-27 NOTE — FACE TO FACE
The patient attended the pre-operative spine class. The content of the class was presented using a power point presentation and visual demonstrations specific for patients undergoing surgical spine procedures of the neck and back. A pre-operative Patient education booklet specific to spine surgery was given to patient. Incentive spirometer and CHG bath kits were demonstrated in class. Day of surgery routine and expectations, hospital routine and expectations, nutrition, alcohol, nicotine, medications, infection control, pain management, DVT precautions and equipment, ice therapy, durable medical equipment, exercises, mobility expectations and precautions, home preparation and safety were reviewed in class. During class, patient had opportunities to ask questions of the material presented as well as any concerns about their upcoming surgery.

## 2019-08-27 NOTE — PROGRESS NOTES
Deann Davalos is a 70 y.o. male    Chief Complaint   Patient presents with    Medication Refill     Oxycodone     1. Have you been to the ER, urgent care clinic since your last visit? Hospitalized since your last visit? No    2. Have you seen or consulted any other health care providers outside of the [address] [place] [address] since your last visit? Include any pap smears or colon screening.  No    Visit Vitals  /68 (BP 1 Location: Left arm, BP Patient Position: Sitting)   Pulse 65   Temp 98.4 °F (36.9 °C) (Oral)   Resp 16   Ht 5' 8\" (1.727 m)   Wt 167 lb (75.8 kg)   SpO2 98%   BMI 25.39 kg/m²

## 2019-08-27 NOTE — PERIOP NOTES
[address] [address] [facility]   MAIN OR                                  [phone]   MAIN PRE OP                          [phone]                                                                                AMBULATORY PRE OP          [phone]  PRE-ADMISSION TESTING    [phone]   Surgery Date:   Tuesday 9/10/19         Is surgery arrival time given by surgeon? NO  If NO, [facility] staff will call you between 3 and 7pm the day before your surgery with your arrival time. (If your surgery is on a Monday, we will call you the Friday before.)    Call [phone] after 7pm Monday-Friday if you did not receive your arrival time.     INSTRUCTIONS BEFORE YOUR SURGERY   When You  Arrive Arrive at the [address] [facility] on the day of your surgery  Have your insurance card, photo ID, and any copayment (if needed)   Food   and   Drink NO food or drink after midnight the night before surgery    This means NO water, gum, mints, coffee, juice, etc.  No alcohol (beer, wine, liquor) 24 hours before and after surgery   Medications to   TAKE   Morning of Surgery MEDICATIONS TO TAKE THE MORNING OF SURGERY WITH A SIP OF WATER:    Atorvastatin, prilosec, atenolol   Medications  To  STOP      7 days before surgery  Non-Steroidal anti-inflammatory Drugs (NSAID's): for example, Ibuprofen (Advil, Motrin), Naproxen (Aleve)      Herbal supplements, vitamins, and fish oil   Other:  (Pain medications not listed above, including Tylenol may be taken)   Blood  Thinners  If you take  Aspirin, Plavix, Coumadin, or any blood-thinning or anti-blood clot medicine, talk to the doctor who prescribed the medications for pre-operative instructions - Aspirin as directed by Dr. Jamel López  If you shower the morning of surgery, please do not apply anything to your skin (lotions, powders, deodorant)   Follow all special bath instructions (for total joint replacement, spine and bowel surgeries)   Do not shave or trim anywhere 24 hours before surgery      Wear loose, comfortable, clean clothes   Wear glasses instead of contacts  [employer] money, valuables, and jewelry, including body piercings, at home   Going Home - or Spending the Night  SAME-DAY SURGERY: You must have a responsible adult drive you home and stay with you 24 hours after surgery   ADMITS: If your doctor is keeping you in the hospital after surgery, leave personal belongings/luggage in your car until you have a hospital room number. Hospital discharge time is 12 noon  Drivers must be here before 12 noon unless you are told differently   Special Instructions 16. Special Instructions:  · Use Chlorhexidine Care Fusion wash and sponges 3 days prior to surgery as instructed. · Incentive spirometer given with instructions to practice at home and bring back to the hospital on the day of surgery. · [facility] will contact you if your Hemoglobin A1C is greater than 7.5. · Ensure/Glucerna  sample, nutritional information, and Ensure/Glucerna coupon given. · Pain pamphlet and Call Don't Fall reminder reviewed with patient. ·  parking is complimentary Monday - Friday 7 am - 5 pm  · Bring PTA Medication list day of surgery with the last doses taken documented   · Do not bring medication bottles the day of surgery     Follow all instructions so your surgery wont be cancelled. Please, be on time. If a situation occurs and you are delayed the day of surgery, call [phone] or [phone]. If your physical condition changes (like a fever, cold, flu, etc.) call your surgeon. The patient was contacted  in person. Home medication reviewed (bottles) and verified during PAT appointment. The patient verbalizes understanding of all instructions and does not  need reinforcement.

## 2019-08-27 NOTE — H&P
Preoperative Evaluation                     History and Physical with Surgical Risk Stratification     8/27/2019    CC: Low back pain  Surgery: L 2-5 Laminectomy, L 2-3 Fusion     HPI:   Meera Velazquez is a 70 y.o. male referred for pre-operative evaluation by Dr. Kelly Noonan for surgery on 9/10/19. Mr. Roxy Farooq states he has had three previous back surgeries and his pain never went away with the last surgery. He is a very active man and likes to keep busy so he states the pain has not kept him down but it has slowed him down some. He notes some stiffness and pain to both legs. He has some numbness and tingling in his toes. Denies buckling. Sitting makes the pain better and walking makes the pain worse. Pain ranges from 2-6/10. He has failed all conservative treatments. The patient was evaluated in the surgeon's office and it was determined that the most appropriate plan of care is to proceed with surgical intervention. Patient's PCP Lobb, Tenna Blow, DO    Review of Systems     Constitutional: Negative for chills and fever  HENT: Negative for congestion and sore throat  Eyes: negative for blurred vision and double vision  Respiratory: Negative for cough, shortness of breath and wheezing  Mouth: Negative for loose, broken or chipped teeth. Cardiovascular: Negative for chest pain and palpitations  Gastrointestinal: Negative for abdominal pain, constipation, diarrhea and nausea  Genitourinary: Negative for dysuria and hematuria  Musculoskeletal: Positive for low back pain  Skin: Negative for rash, open wounds. Negative for bruises easily  Neurological: Negative for dizziness, tremors and headaches  Psychiatric: Negative for depression. The patient is not nervous/anxious.     Inherent Risk of Surgery     Surgical risk:  Intermediate  Low:  EIntermediate:  Orthopedic, High:    Patient Cardiac Risk Assessment     Revised Cardiac Risk Index (RCRI)  Hx of CVD  Rate if cardiac death, nonfatal MI, nonfatal cardiac arrest by number of risk factor- 1.0%    TJ/AHA 2007 Guidelines:   1) Surgery Emergency, Non-cardiac -> to surgery  2) If not, look at clinical predictors    Major Intermediate Minor   Diabetes    Blood Thinner: ASA    METS     >4 METS Climb a flight of stairs or a hill Walk on level ground at 4 mph Run a short distance Heavy work around house (scrub floors, move furniture)    He mows the yard pushing the mower     Other Risk Factors:   Screening for ETOH use:  Done and low risk  Smoking status:   None    Personal or FH of bleeding problems:  No  Personal or FH of blood clots:  No  Personal or FH of anesthesia problems:   No    Pulmonary Risk:  Asthma or COPD:  No  Body mass index is 25.68 kg/m². Known ADELA:  Yes  Albumin normal, BUN normal    Past Medical, Surgical, Social History     Allergies: Allergies   Allergen Reactions    Ace Inhibitors Swelling         Current Outpatient Medications   Medication Sig    oxyCODONE-acetaminophen (PERCOCET) 5-325 mg per tablet Take 1 Tab by mouth daily as needed for Pain.  folic acid (FOLVITE) 1 mg tablet Take 1 mg by mouth daily.  irbesartan (AVAPRO) 150 mg tablet Take 1 Tab by mouth nightly.  metFORMIN (GLUCOPHAGE) 1,000 mg tablet TAKE 1 TABLET TWICE A DAY WITH MEALS    atenolol (TENORMIN) 50 mg tablet TAKE 1 TABLET DAILY    omeprazole (PRILOSEC) 40 mg capsule TAKE 1 CAPSULE DAILY    atorvastatin (LIPITOR) 40 mg tablet TAKE 1 TABLET DAILY (START LIPITOR AFTER VYTORIN IS FINISHED)    aspirin delayed-release 81 mg tablet Take 81 mg by mouth daily.  cholecalciferol, VITAMIN D3, (VITAMIN D3) 5,000 unit tab tablet Take 1 Tab by mouth daily for 30 days. Please start today and take until surgery. Do not take AM of surgery    oxyCODONE-acetaminophen (PERCOCET) 5-325 mg per tablet Take 1 Tab by mouth daily as needed for Pain for up to 30 days. No current facility-administered medications for this encounter.       Past Medical History:   Diagnosis Date  CAD (coronary artery disease)     Degenerative joint disease 2011    Diabetes mellitus ([facility] [address]     Essential hypertension     Hyperlipidemia     Kidney stones     Obstructive sleep apnea 2011    Stopped using CPAP years ago     Past Surgical History:   Procedure Laterality Date    HX CARPAL TUNNEL RELEASE      HX CORONARY STENT PLACEMENT Left 1991    x 3    HX HIP REPLACEMENT Bilateral 2009 & 2015    HX HIP REPLACEMENT Right 2012    Revision    HX LITHOTRIPSY N/A 1969    HX LUMBAR DISKECTOMY N/A     L 4-5    HX LUMBAR FUSION N/A 2018    HX LUMBAR LAMINECTOMY  1971    L 4-5     Social History     Tobacco Use    Smoking status: Former Smoker     Packs/day: 1.00     Years: 16.00     Pack years: 16.00     Types: Cigarettes     Last attempt to quit:      Years since quittin.6    Smokeless tobacco: Never Used   Substance Use Topics    Alcohol use: Yes     Alcohol/week: 0.0 standard drinks     Comment: rarely    Drug use: No       Objective     Vitals:    19 0935   BP: 140/63   Pulse: (!) 49   Resp: 16   Temp: 98.1 °F (36.7 °C)   SpO2: 97%   Weight: 76.6 kg (168 lb 14 oz)   Height: 5' 8\" (1.727 m)       Constitutional:  Appears well,  No Acute Distress, Vitals noted  Psychiatric:   Affect normal, Alert and Oriented to person/place/time    Eyes:   Pupils equally round and reactive, EOMI, conjunctiva clear, eyelids normal  ENT:   External ears and nose normal/lips, teeth normal, gums normal, TMs and Orophyarynx normal  Neck:   general inspection and Thyroid normal.  No abnormal cervical or supraclavicular nodes    Lungs:   clear to auscultation, good respiratory effort  Heart: Ausculation normal.  Bradycardia. No cardiac murmurs. No carotid bruits or palpable thrills  Chest wall normal  Musculoskeletal: Gait antalgic.    Extremities:   without edema, good peripheral pulses  Skin:   Warm to palpation, without rashes, bruising, or suspicious lesions       DVT /PE Risk Assessment    Bedridden for more than 3 days or major surgery within the last 4 weeks NO    Active cancer NO    Calf swelling >3 cm compared to other leg NO    Collateral superficial veins present NO    Entire leg swollen NO    Tenderness along the deep venous system & or pitting edema confined to symptomatic leg NO    Cast applied to lower limb, paralysis NO    Previous DVT NO    Recent Results (from the past 72 hour(s))   TYPE & SCREEN    Collection Time: 08/27/19 10:15 AM   Result Value Ref Range    Crossmatch Expiration 09/10/2019     ABO/Rh(D) Cal Blew POSITIVE     Antibody screen NEG    CBC WITH AUTOMATED DIFF    Collection Time: 08/27/19 10:15 AM   Result Value Ref Range    WBC 6.2 4.1 - 11.1 K/uL    RBC 4.46 4.10 - 5.70 M/uL    HGB 13.0 12.1 - 17.0 g/dL    HCT 41.5 36.6 - 50.3 %    MCV 93.0 80.0 - 99.0 FL    MCH 29.1 26.0 - 34.0 PG    MCHC 31.3 30.0 - 36.5 g/dL    RDW 15.9 (H) 11.5 - 14.5 %    PLATELET 360 579 - 657 K/uL    MPV 9.9 8.9 - 12.9 FL    NRBC 0.0 0  WBC    ABSOLUTE NRBC 0.00 0.00 - 0.01 K/uL    NEUTROPHILS 65 32 - 75 %    LYMPHOCYTES 25 12 - 49 %    MONOCYTES 8 5 - 13 %    EOSINOPHILS 1 0 - 7 %    BASOPHILS 1 0 - 1 %    IMMATURE GRANULOCYTES 0 0.0 - 0.5 %    ABS. NEUTROPHILS 4.1 1.8 - 8.0 K/UL    ABS. LYMPHOCYTES 1.6 0.8 - 3.5 K/UL    ABS. MONOCYTES 0.5 0.0 - 1.0 K/UL    ABS. EOSINOPHILS 0.1 0.0 - 0.4 K/UL    ABS. BASOPHILS 0.0 0.0 - 0.1 K/UL    ABS. IMM.  GRANS. 0.0 0.00 - 0.04 K/UL    DF AUTOMATED     METABOLIC PANEL, COMPREHENSIVE    Collection Time: 08/27/19 10:15 AM   Result Value Ref Range    Sodium 143 136 - 145 mmol/L    Potassium 5.0 3.5 - 5.1 mmol/L    Chloride 110 (H) 97 - 108 mmol/L    CO2 28 21 - 32 mmol/L    Anion gap 5 5 - 15 mmol/L    Glucose 86 65 - 100 mg/dL    BUN 9 6 - 20 MG/DL    Creatinine 0.92 0.70 - 1.30 MG/DL    BUN/Creatinine ratio 10 (L) 12 - 20      GFR est AA >60 >60 ml/min/1.73m2    GFR est non-AA >60 >60 ml/min/1.73m2    Calcium 9.1 8.5 - 10.1 MG/DL    Bilirubin, total 0.4 0.2 - 1.0 MG/DL    ALT (SGPT) 13 12 - 78 U/L    AST (SGOT) 11 (L) 15 - 37 U/L    Alk.  phosphatase 93 45 - 117 U/L    Protein, total 7.3 6.4 - 8.2 g/dL    Albumin 3.8 3.5 - 5.0 g/dL    Globulin 3.5 2.0 - 4.0 g/dL    A-G Ratio 1.1 1.1 - 2.2     HEMOGLOBIN A1C WITH EAG    Collection Time: 08/27/19 10:15 AM   Result Value Ref Range    Hemoglobin A1c 6.6 (H) 4.2 - 6.3 %    Est. average glucose 143 mg/dL   CULTURE, MRSA    Collection Time: 08/27/19 10:15 AM   Result Value Ref Range    Special Requests: NO SPECIAL REQUESTS      Culture result: MRSA NOT PRESENT AT 17 HOURS     PROTHROMBIN TIME + INR    Collection Time: 08/27/19 10:15 AM   Result Value Ref Range    INR 1.1 0.9 - 1.1      Prothrombin time 10.7 9.0 - 11.1 sec   PTT    Collection Time: 08/27/19 10:15 AM   Result Value Ref Range    aPTT 26.3 22.1 - 32.0 sec    aPTT, therapeutic range     58.0 - 77.0 SECS   URINALYSIS W/ REFLEX CULTURE    Collection Time: 08/27/19 10:15 AM   Result Value Ref Range    Color YELLOW/STRAW      Appearance CLEAR CLEAR      Specific gravity 1.012 1.003 - 1.030      pH (UA) 5.5 5.0 - 8.0      Protein NEGATIVE  NEG mg/dL    Glucose NEGATIVE  NEG mg/dL    Ketone NEGATIVE  NEG mg/dL    Bilirubin NEGATIVE  NEG      Blood NEGATIVE  NEG      Urobilinogen 0.2 0.2 - 1.0 EU/dL    Nitrites NEGATIVE  NEG      Leukocyte Esterase NEGATIVE  NEG      WBC 0-4 0 - 4 /hpf    RBC 0-5 0 - 5 /hpf    Epithelial cells FEW FEW /lpf    Bacteria NEGATIVE  NEG /hpf    UA:UC IF INDICATED CULTURE NOT INDICATED BY UA RESULT CNI      Hyaline cast 0-2 0 - 5 /lpf   VITAMIN D, 25 HYDROXY    Collection Time: 08/27/19 10:15 AM   Result Value Ref Range    Vitamin D 25-Hydroxy 41.9 30 - 100 ng/mL   EKG, 12 LEAD, INITIAL    Collection Time: 08/27/19 10:36 AM   Result Value Ref Range    Ventricular Rate 53 BPM    Atrial Rate 53 BPM    P-R Interval 150 ms    QRS Duration 90 ms    Q-T Interval 436 ms    QTC Calculation (Bezet) 409 ms    Calculated P Axis 61 degrees Calculated R Axis 63 degrees    Calculated T Axis 53 degrees    Diagnosis       Sinus bradycardia with sinus arrhythmia  Otherwise normal ECG  When compared with ECG of 9/16/11  No significant change  Confirmed by Darlene Awad MD., Janice ([id number]) on 8/27/2019 3:24:44 PM     AMB POC ALERE ICUP DX DRUG SCREEN 10    Collection Time: 08/27/19  3:05 PM   Result Value Ref Range    LOT NUMBER [id number]     LOT EXP DATE POC 08/19/2020     VALID INTERNAL CONTROL POC Yes     BENZODIAZEPINES UR POC Negative     BARBITURATES UR POC Negative     Methamphetamine urine , Ql. (POC) Negative     Opiates urine , Ql. (POC) Negative     OXYCODONE UR POC Negative     MDMA/ECSTASY UR POC Negative     NTI OTHER MICRO TRANSPORT Negative     Cocaine urine , Ql. (POC) Negative     Methadone urine , Ql. (POC) Negative     Marijuana(THC) urine , Ql. (POC) Negative        Assessment and Plan     Assessment/Plan:   1) Lumbar stenosis  2) Pre-Operative Evaluation    Labs and EKG reviewed. MRSA pending    Vitamin D- script sent to pharmacy per surgeons protocol    Cardiac clearance in  dated 2/13/19    Preoperative Clearance  Per RCRI, the patient has a 1.0% risk of cardiac death, nonfatal MI, nonfatal cardiac arrest based on no risk factors. Per ACC/AHA guidelines, patient is intermediate risk for a(n) intermediate risk surgery and may proceed to planned surgery with the above noted risk.     Anthony Tenorio NP

## 2019-08-27 NOTE — PATIENT INSTRUCTIONS
A Healthy Lifestyle: Care Instructions  Your Care Instructions    A healthy lifestyle can help you feel good, stay at a healthy weight, and have plenty of energy for both work and play. A healthy lifestyle is something you can share with your whole family. A healthy lifestyle also can lower your risk for serious health problems, such as high blood pressure, heart disease, and diabetes. You can follow a few steps listed below to improve your health and the health of your family. Follow-up care is a key part of your treatment and safety. Be sure to make and go to all appointments, and call your doctor if you are having problems. It's also a good idea to know your test results and keep a list of the medicines you take. How can you care for yourself at home? · Do not eat too much sugar, fat, or fast foods. You can still have dessert and treats now and then. The goal is moderation. · Start small to improve your eating habits. Pay attention to portion sizes, drink less juice and soda pop, and eat more fruits and vegetables. ? Eat a healthy amount of food. A 3-ounce serving of meat, for example, is about the size of a deck of cards. Fill the rest of your plate with vegetables and whole grains. ? Limit the amount of soda and sports drinks you have every day. Drink more water when you are thirsty. ? Eat at least 5 servings of fruits and vegetables every day. It may seem like a lot, but it is not hard to reach this goal. A serving or helping is 1 piece of fruit, 1 cup of vegetables, or 2 cups of leafy, raw vegetables. Have an apple or some carrot sticks as an afternoon snack instead of a candy bar. Try to have fruits and/or vegetables at every meal.  · Make exercise part of your daily routine. You may want to start with simple activities, such as walking, bicycling, or slow swimming. Try to be active 30 to 60 minutes every day. You do not need to do all 30 to 60 minutes all at once.  For example, you can exercise 3 times a day for 10 or 20 minutes. Moderate exercise is safe for most people, but it is always a good idea to talk to your doctor before starting an exercise program.  · Keep moving. Nataly Mems the lawn, work in the garden, or [employer]. Take the stairs instead of the elevator at work. · If you smoke, quit. People who smoke have an increased risk for heart attack, stroke, cancer, and other lung illnesses. Quitting is hard, but there are ways to boost your chance of quitting tobacco for good. ? Use nicotine gum, patches, or lozenges. ? Ask your doctor about stop-smoking programs and medicines. ? Keep trying. In addition to reducing your risk of diseases in the future, you will notice some benefits soon after you stop using tobacco. If you have shortness of breath or asthma symptoms, they will likely get better within a few weeks after you quit. · Limit how much alcohol you drink. Moderate amounts of alcohol (up to 2 drinks a day for men, 1 drink a day for women) are okay. But drinking too much can lead to liver problems, high blood pressure, and other health problems. Family health  If you have a family, there are many things you can do together to improve your health. · Eat meals together as a family as often as possible. · Eat healthy foods. This includes fruits, vegetables, lean meats and dairy, and whole grains. · Include your family in your fitness plan. Most people think of activities such as jogging or tennis as the way to fitness, but there are many ways you and your family can be more active. Anything that makes you breathe hard and gets your heart pumping is exercise. Here are some tips:  ? Walk to do errands or to take your child to school or the bus.  ? Go for a family bike ride after dinner instead of watching TV. Where can you learn more? Go to http://jennifer-elida.info/. Enter T096 in the search box to learn more about \"A Healthy Lifestyle: Care Instructions. \"  Current as of: September 11, 2018  Content Version: 12.1  © 8729-4239 Healthwise, [employer]. Care instructions adapted under license by [employer] (which disclaims liability or warranty for this information). If you have questions about a medical condition or this instruction, always ask your healthcare professional. [address] any warranty or liability for your use of this information. Medicare Wellness Visit, Male    The best way to live healthy is to have a lifestyle where you eat a well-balanced diet, exercise regularly, limit alcohol use, and quit all forms of tobacco/nicotine, if applicable. Regular preventive services are another way to keep healthy. Preventive services (vaccines, screening tests, monitoring & exams) can help personalize your care plan, which helps you manage your own care. Screening tests can find health problems at the earliest stages, when they are easiest to treat. [facility] follows the current, evidence-based guidelines published by the [facility] Ozzie Jimenez (USPSTF) when recommending preventive services for our patients. Because we follow these guidelines, sometimes recommendations change over time as research supports it. (For example, a prostate screening blood test is no longer routinely recommended for men with no symptoms.)  Of course, you and your doctor may decide to screen more often for some diseases, based on your risk and co-morbidities (chronic disease you are already diagnosed with). Preventive services for you include:  - Medicare offers their members a free annual wellness visit, which is time for you and your primary care provider to discuss and plan for your preventive service needs. Take advantage of this benefit every year!  -All adults over age 72 should receive the recommended pneumonia vaccines.  Current USPSTF guidelines recommend a series of two vaccines for the best pneumonia protection.   -All adults should have a flu vaccine yearly and an ECG. All adults age 61 and older should receive a shingles vaccine once in their lifetime.    -All adults age 38-68 who are overweight should have a diabetes screening test once every three years.   -Other screening tests & preventive services for persons with diabetes include: an eye exam to screen for diabetic retinopathy, a kidney function test, a foot exam, and stricter control over your cholesterol.   -Cardiovascular screening for adults with routine risk involves an electrocardiogram (ECG) at intervals determined by the provider.   -Colorectal cancer screening should be done for adults age 54-65 with no increased risk factors for colorectal cancer. There are a number of acceptable methods of screening for this type of cancer. Each test has its own benefits and drawbacks. Discuss with your provider what is most appropriate for you during your annual wellness visit. The different tests include: colonoscopy (considered the best screening method), a fecal occult blood test, a fecal DNA test, and sigmoidoscopy.  -All adults born between [facility] should be screened once for Hepatitis C.  -An Abdominal Aortic Aneurysm (AAA) Screening is recommended for men age 73-68 who has ever smoked in their lifetime.      Here is a list of your current Health Maintenance items (your personalized list of preventive services) with a due date:  Health Maintenance Due   Topic Date Due    Colonoscopy  02/19/1960    Shingles Vaccine (1 of 2) 02/19/1992    Diabetic Foot Care  02/21/2019    Annual Well Visit  06/08/2019    Flu Vaccine  08/01/2019    Albumin Urine Test  08/16/2019

## 2019-08-27 NOTE — PROGRESS NOTES
Radha Griffiths is a 70 y.o. male   Chief Complaint   Patient presents with    Medication Refill     Oxycodone    pt here for refill of his percocet for his chronic back pain. Pt is having another surgery with Dr Karri Diamond for his spine and states is having a penny placed this time. Pain has been a 6/10 constant. Pt tries to keep the percocet to a minimum last fill was given in May and is just about out. Sx is scheduled for 10 Sep at [facility] and just had pre op at the hospital.  Med brings his pain to a 3/10 but states pain med takes his appetite away and he does not want to lose more weight. Last percocet was 5 days ago. No hx of abuse. Medication when needed helps him to stay active around his house.    /Opioid/Pain Management:    1. Has the patient signed a pain contract for chronic narcotic use? yes  2. Has the patient had a UDS or Serum screen as per guidelines as per [facility]?:   yes    3. Does patient meet necessary guidelines for Naloxone treatement per the [facility]?:    no  4. Has the Prescription Monitoring Program been reviewed? yes  5. Does patient have a long term condition that requires long term use of a Narcotic?  yes  6. Has patient been tolerant of therapy and responsible to routine follow up and specialist follow-up? Yes    he is a 70y.o. year old male who presents for evalution. Reviewed PmHx, RxHx, FmHx, SocHx, AllgHx and updated and dated in the chart. Review of Systems - negative except as listed above in the HPI    Objective:     Vitals:    08/27/19 1426   BP: 135/68   Pulse: 65   Resp: 16   Temp: 98.4 °F (36.9 °C)   TempSrc: Oral   SpO2: 98%   Weight: 167 lb (75.8 kg)   Height: 5' 8\" (1.727 m)       Current Outpatient Medications   Medication Sig    oxyCODONE-acetaminophen (PERCOCET) 5-325 mg per tablet Take 1 Tab by mouth daily as needed for Pain.  folic acid (FOLVITE) 1 mg tablet Take 1 mg by mouth daily.     cholecalciferol, VITAMIN D3, (VITAMIN D3) 5,000 unit tab tablet Take 1 Tab by mouth daily for 30 days. Please start today and take until surgery. Do not take AM of surgery    oxyCODONE-acetaminophen (PERCOCET) 5-325 mg per tablet Take 1 Tab by mouth daily as needed for Pain for up to 30 days.  irbesartan (AVAPRO) 150 mg tablet Take 1 Tab by mouth nightly.  metFORMIN (GLUCOPHAGE) 1,000 mg tablet TAKE 1 TABLET TWICE A DAY WITH MEALS    atenolol (TENORMIN) 50 mg tablet TAKE 1 TABLET DAILY    omeprazole (PRILOSEC) 40 mg capsule TAKE 1 CAPSULE DAILY    atorvastatin (LIPITOR) 40 mg tablet TAKE 1 TABLET DAILY (START LIPITOR AFTER VYTORIN IS FINISHED)    aspirin delayed-release 81 mg tablet Take 81 mg by mouth daily. No current facility-administered medications for this visit. Physical Examination: General appearance - alert, well appearing, and in no distress  Chest - clear to auscultation, no wheezes, rales or rhonchi, symmetric air entry  Heart - normal rate, regular rhythm, normal S1, S2, no murmurs, rubs, clicks or gallops  Back exam - limited range of motion, pain with motion noted during exam      Assessment/ Plan:   Diagnoses and all orders for this visit:    1. Lumbar disc disease  -     oxyCODONE-acetaminophen (PERCOCET) 5-325 mg per tablet; Take 1 Tab by mouth daily as needed for Pain for up to 30 days.  -     AMB POC ALERE ICUP DX DRUG SCREEN 10    2. Osteoarthritis, unspecified osteoarthritis type, unspecified site  -     oxyCODONE-acetaminophen (PERCOCET) 5-325 mg per tablet; Take 1 Tab by mouth daily as needed for Pain for up to 30 days.  -     AMB POC ALERE ICUP DX DRUG SCREEN 10    3. Medication monitoring encounter  -     AMB POC ALERE ICUP DX DRUG SCREEN 10    4. Diabetes mellitus type 2 in nonobese (HCC)  -     MICROALBUMIN, UR, RAND W/ MICROALB/CREAT RATIO    5. Medicare annual wellness visit, subsequent    6. Screening for alcoholism  -     [place] ANNUAL ALCOHOL SCREEN 15 MIN    no change in pain management indicated.   Pt advised ortho should be managing his post op pain and there is no issue with this with his pain contract. Follow-up and Dispositions    · Return if symptoms worsen or fail to improve. I have discussed the diagnosis with the patient and the intended plan as seen in the above orders. The patient has received an after-visit summary and questions were answered concerning future plans. Pt conveyed understanding of plan. Medication Side Effects and Warnings were discussed with patient      Ibrahima Llamas, DO        This is the Subsequent Medicare Annual Wellness Exam, performed 12 months or more after the Initial AWV or the last Subsequent AWV    I have reviewed the patient's medical history in detail and updated the computerized patient record. History     Past Medical History:   Diagnosis Date    CAD (coronary artery disease)     Degenerative joint disease 1/28/2011    Diabetes mellitus ([facility] [address]     Essential hypertension     Hyperlipidemia     Kidney stones 1969    Obstructive sleep apnea 01/28/2011    Stopped using CPAP years ago      Past Surgical History:   Procedure Laterality Date    HX CARPAL TUNNEL RELEASE      HX CORONARY STENT PLACEMENT Left 1991    x 3    HX HIP REPLACEMENT Bilateral 2009 & 2015    HX HIP REPLACEMENT Right 2012    Revision    HX LITHOTRIPSY N/A 1969    HX LUMBAR DISKECTOMY N/A 1978    L 4-5    HX LUMBAR FUSION N/A 2018    HX LUMBAR LAMINECTOMY  1971    L 4-5     Current Outpatient Medications   Medication Sig Dispense Refill    oxyCODONE-acetaminophen (PERCOCET) 5-325 mg per tablet Take 1 Tab by mouth daily as needed for Pain.  folic acid (FOLVITE) 1 mg tablet Take 1 mg by mouth daily.  cholecalciferol, VITAMIN D3, (VITAMIN D3) 5,000 unit tab tablet Take 1 Tab by mouth daily for 30 days. Please start today and take until surgery.  Do not take AM of surgery 30 Tab 0    oxyCODONE-acetaminophen (PERCOCET) 5-325 mg per tablet Take 1 Tab by mouth daily as needed for Pain for up to 30 days. 30 Tab 0    irbesartan (AVAPRO) 150 mg tablet Take 1 Tab by mouth nightly. 90 Tab 3    metFORMIN (GLUCOPHAGE) 1,000 mg tablet TAKE 1 TABLET TWICE A DAY WITH MEALS 180 Tab 3    atenolol (TENORMIN) 50 mg tablet TAKE 1 TABLET DAILY 90 Tab 3    omeprazole (PRILOSEC) 40 mg capsule TAKE 1 CAPSULE DAILY 90 Cap 3    atorvastatin (LIPITOR) 40 mg tablet TAKE 1 TABLET DAILY (START LIPITOR AFTER VYTORIN IS FINISHED) 90 Tab 3    aspirin delayed-release 81 mg tablet Take 81 mg by mouth daily. Allergies   Allergen Reactions    Ace Inhibitors Swelling     Family History   Problem Relation Age of Onset   [employer] Arthritis-osteo Mother     No Known Problems Father      Social History     Tobacco Use    Smoking status: Former Smoker     Packs/day: 1.00     Years: 16.00     Pack years: 16.00     Types: Cigarettes     Last attempt to quit:      Years since quittin.6    Smokeless tobacco: Never Used   Substance Use Topics    Alcohol use: Yes     Alcohol/week: 0.0 standard drinks     Comment: rarely     Patient Active Problem List   Diagnosis Code    Coronary artery disease I25.10    Dyslipidemia E78.5    Hypertension, benign I10    Obstructive sleep apnea G47.33    Degenerative joint disease M19.90    Diabetes mellitus type 2 in nonobese ([facility] [address] E11.9    Lumbar disc disease M51.9    Cervical disc disease M50.90    Dupuytren's contracture of right hand M72.0    ACP (advance care planning) Z71.89    Primary osteoarthritis involving multiple joints M15.0       Depression Risk Factor Screening:     3 most recent PHQ Screens 2019   Little interest or pleasure in doing things Not at all   Feeling down, depressed, irritable, or hopeless Not at all   Total Score PHQ 2 0     Alcohol Risk Factor Screening: You do not drink alcohol or very rarely. Functional Ability and Level of Safety:   Hearing Loss  Hearing is good.     Activities of Daily Living  The home contains: no safety equipment. Patient does total self care    Fall Risk  Fall Risk Assessment, last 12 mths 8/27/2019   Able to walk? Yes   Fall in past 12 months? -       Abuse Screen  Patient is not abused    Cognitive Screening   Evaluation of Cognitive Function:  Has your family/caregiver stated any concerns about your memory: no  Normal    Patient Care Team   Patient Care Team:  Namrata Rodríguez DO as PCP - General (Family Practice)  Brittny Garcia MD (Orthopedic Surgery)  Lissett Naik MD (Orthopedic Surgery)  Suzie Yo MD (Orthopedic Surgery)  Tyson Chandra MD (Orthopedic Surgery)    Assessment/Plan   Education and counseling provided:  Are appropriate based on today's review and evaluation    Diagnoses and all orders for this visit:    1. Lumbar disc disease  -     oxyCODONE-acetaminophen (PERCOCET) 5-325 mg per tablet; Take 1 Tab by mouth daily as needed for Pain for up to 30 days.  -     AMB POC ALERE ICUP DX DRUG SCREEN 10    2. Osteoarthritis, unspecified osteoarthritis type, unspecified site  -     oxyCODONE-acetaminophen (PERCOCET) 5-325 mg per tablet; Take 1 Tab by mouth daily as needed for Pain for up to 30 days.  -     AMB POC ALERE ICUP DX DRUG SCREEN 10    3. Medication monitoring encounter  -     AMB POC ALERE ICUP DX DRUG SCREEN 10    4. Diabetes mellitus type 2 in nonobese (HCC)  -     MICROALBUMIN, UR, RAND W/ MICROALB/CREAT RATIO    5. Medicare annual wellness visit, subsequent    6. Screening for alcoholism  -     [place] ANNUAL ALCOHOL SCREEN 15 MIN        Health Maintenance Due   Topic Date Due    COLONOSCOPY  02/19/1960    Shingrix Vaccine Age 50> (1 of 2) 02/19/1992    FOOT EXAM Q1  02/21/2019    MEDICARE YEARLY EXAM  06/08/2019    Influenza Age 5 to Adult  08/01/2019    MICROALBUMIN Q1  08/16/2019     I have discussed the diagnosis with the patient and the intended plan as seen in the above orders.   The patient has received an after-visit summary and questions were answered concerning future plans. Pt conveyed understanding of plan.       Dr Reyes Rey

## 2019-08-28 ENCOUNTER — TELEPHONE (OUTPATIENT)
Dept: CARDIOLOGY CLINIC | Age: 79
End: 2019-08-28

## 2019-08-28 LAB
ALBUMIN/CREAT UR: <3.7 MG/G CREAT (ref 0–30)
BACTERIA SPEC CULT: NORMAL
BACTERIA SPEC CULT: NORMAL
CREAT UR-MCNC: 82.1 MG/DL
MICROALBUMIN UR-MCNC: <3 UG/ML
SERVICE CMNT-IMP: NORMAL

## 2019-08-28 NOTE — TELEPHONE ENCOUNTER
[facility] Pre Admission Testing is requesting cardiac clearance for patient to undergo laminectomy/fusion under general anesthesia. Clearance to include holding aspirin. If okay, how many days can he hold?      Fax #: [phone]  Phone #: [phone]

## 2019-08-29 NOTE — TELEPHONE ENCOUNTER
Patient would like to know when he needs to stop taking his aspirin prior to surgery. I see the message from [facility], but he would like a call personally from Rafa Konx to discuss this. Thank you.      Phone: [phone]

## 2019-08-29 NOTE — TELEPHONE ENCOUNTER
Called patient. Left message on voicemail stating I was returning call. Faxed clearance to [facility] PAT.

## 2019-08-29 NOTE — TELEPHONE ENCOUNTER
Called patient. Verified patient's identity with two identifiers. Patient stated he was calling to see how long he was okay to hold asa, but he just got call from hospital and they told him 7 days. I confirmed this was what Dr. Alphonse Levy advised. Patient verbalized understanding and denied further questions or concerns.

## 2019-08-29 NOTE — PERIOP NOTES
Called patient to notify him of when to stop his aspirin prior to surgery. Per Dr. Armen De Santiago patient is to stop 7 days prior to surgery. Patient very appreciative of the phone call and verbalizes understanding.

## 2019-09-09 ENCOUNTER — ANESTHESIA EVENT (OUTPATIENT)
Dept: ANESTHESIOLOGY | Age: 79
End: 2019-09-09
Payer: MEDICARE

## 2019-09-09 RX ORDER — DIPHENHYDRAMINE HYDROCHLORIDE 50 MG/ML
12.5 INJECTION, SOLUTION INTRAMUSCULAR; INTRAVENOUS AS NEEDED
Status: CANCELLED | OUTPATIENT
Start: 2019-09-09 | End: 2019-09-09

## 2019-09-09 RX ORDER — MIDAZOLAM HYDROCHLORIDE 1 MG/ML
2 INJECTION, SOLUTION INTRAMUSCULAR; INTRAVENOUS
Status: CANCELLED | OUTPATIENT
Start: 2019-09-09

## 2019-09-09 RX ORDER — HYDROMORPHONE HYDROCHLORIDE 1 MG/ML
.25-1 INJECTION, SOLUTION INTRAMUSCULAR; INTRAVENOUS; SUBCUTANEOUS
Status: CANCELLED | OUTPATIENT
Start: 2019-09-09

## 2019-09-10 ENCOUNTER — HOSPITAL ENCOUNTER (OUTPATIENT)
Age: 79
Setting detail: OUTPATIENT SURGERY
Discharge: HOME | End: 2019-09-10
Attending: ORTHOPAEDIC SURGERY | Admitting: ORTHOPAEDIC SURGERY
Payer: MEDICARE

## 2019-09-10 ENCOUNTER — APPOINTMENT (OUTPATIENT)
Dept: GENERAL RADIOLOGY | Age: 79
End: 2019-09-10
Attending: ORTHOPAEDIC SURGERY
Payer: MEDICARE

## 2019-09-10 ENCOUNTER — ANESTHESIA (OUTPATIENT)
Dept: ANESTHESIOLOGY | Age: 79
End: 2019-09-10
Payer: MEDICARE

## 2019-09-10 VITALS
RESPIRATION RATE: 16 BRPM | TEMPERATURE: 99.2 F | SYSTOLIC BLOOD PRESSURE: 145 MMHG | DIASTOLIC BLOOD PRESSURE: 73 MMHG | OXYGEN SATURATION: 100 % | HEART RATE: 77 BPM

## 2019-09-10 LAB
ANION GAP SERPL CALC-SCNC: 9 MMOL/L (ref 5–15)
APPEARANCE UR: CLEAR
BACTERIA URNS QL MICRO: NEGATIVE /HPF
BASOPHILS # BLD: 0.1 K/UL (ref 0–0.1)
BASOPHILS NFR BLD: 0 % (ref 0–1)
BILIRUB UR QL: NEGATIVE
BUN SERPL-MCNC: 9 MG/DL (ref 6–20)
BUN/CREAT SERPL: 11 (ref 12–20)
CALCIUM SERPL-MCNC: 9 MG/DL (ref 8.5–10.1)
CHLORIDE SERPL-SCNC: 108 MMOL/L (ref 97–108)
CO2 SERPL-SCNC: 25 MMOL/L (ref 21–32)
COLOR UR: NORMAL
CREAT SERPL-MCNC: 0.81 MG/DL (ref 0.7–1.3)
DIFFERENTIAL METHOD BLD: ABNORMAL
EOSINOPHIL # BLD: 0 K/UL (ref 0–0.4)
EOSINOPHIL NFR BLD: 0 % (ref 0–7)
EPITH CASTS URNS QL MICRO: NORMAL /LPF
ERYTHROCYTE [DISTWIDTH] IN BLOOD BY AUTOMATED COUNT: 15 % (ref 11.5–14.5)
GLUCOSE BLD STRIP.AUTO-MCNC: 88 MG/DL (ref 65–100)
GLUCOSE SERPL-MCNC: 98 MG/DL (ref 65–100)
GLUCOSE UR STRIP.AUTO-MCNC: NEGATIVE MG/DL
HCT VFR BLD AUTO: 37.5 % (ref 36.6–50.3)
HGB BLD-MCNC: 12.3 G/DL (ref 12.1–17)
HGB UR QL STRIP: NEGATIVE
IMM GRANULOCYTES # BLD AUTO: 0.2 K/UL (ref 0–0.04)
IMM GRANULOCYTES NFR BLD AUTO: 1 % (ref 0–0.5)
KETONES UR QL STRIP.AUTO: NEGATIVE MG/DL
LEUKOCYTE ESTERASE UR QL STRIP.AUTO: NEGATIVE
LYMPHOCYTES # BLD: 1.2 K/UL (ref 0.8–3.5)
LYMPHOCYTES NFR BLD: 6 % (ref 12–49)
MCH RBC QN AUTO: 29.4 PG (ref 26–34)
MCHC RBC AUTO-ENTMCNC: 32.8 G/DL (ref 30–36.5)
MCV RBC AUTO: 89.5 FL (ref 80–99)
MONOCYTES # BLD: 1.1 K/UL (ref 0–1)
MONOCYTES NFR BLD: 5 % (ref 5–13)
NEUTS SEG # BLD: 17.9 K/UL (ref 1.8–8)
NEUTS SEG NFR BLD: 88 % (ref 32–75)
NITRITE UR QL STRIP.AUTO: NEGATIVE
NRBC # BLD: 0 K/UL (ref 0–0.01)
NRBC BLD-RTO: 0 PER 100 WBC
PH UR STRIP: 8 [PH] (ref 5–8)
PLATELET # BLD AUTO: 325 K/UL (ref 150–400)
PMV BLD AUTO: 9.6 FL (ref 8.9–12.9)
POTASSIUM SERPL-SCNC: 3.6 MMOL/L (ref 3.5–5.1)
PROT UR STRIP-MCNC: NEGATIVE MG/DL
RBC # BLD AUTO: 4.19 M/UL (ref 4.1–5.7)
RBC #/AREA URNS HPF: NORMAL /HPF (ref 0–5)
SERVICE CMNT-IMP: NORMAL
SODIUM SERPL-SCNC: 142 MMOL/L (ref 136–145)
SP GR UR REFRACTOMETRY: 1.01 (ref 1–1.03)
UA: UC IF INDICATED,UAUC: NORMAL
UROBILINOGEN UR QL STRIP.AUTO: 0.2 EU/DL (ref 0.2–1)
WBC # BLD AUTO: 20.4 K/UL (ref 4.1–11.1)
WBC URNS QL MICRO: NORMAL /HPF (ref 0–4)

## 2019-09-10 PROCEDURE — 77030040361 HC SLV COMPR DVT MDII -B

## 2019-09-10 PROCEDURE — 80048 BASIC METABOLIC PNL TOTAL CA: CPT

## 2019-09-10 PROCEDURE — 74011250636 HC RX REV CODE- 250/636: Performed by: ANESTHESIOLOGY

## 2019-09-10 PROCEDURE — 71045 X-RAY EXAM CHEST 1 VIEW: CPT

## 2019-09-10 PROCEDURE — 85025 COMPLETE CBC W/AUTO DIFF WBC: CPT

## 2019-09-10 PROCEDURE — 65270000029 HC RM PRIVATE

## 2019-09-10 PROCEDURE — 82962 GLUCOSE BLOOD TEST: CPT

## 2019-09-10 PROCEDURE — 81001 URINALYSIS AUTO W/SCOPE: CPT

## 2019-09-10 PROCEDURE — 36415 COLL VENOUS BLD VENIPUNCTURE: CPT

## 2019-09-10 PROCEDURE — 77030020782 HC GWN BAIR PAWS FLX 3M -B

## 2019-09-10 PROCEDURE — 87040 BLOOD CULTURE FOR BACTERIA: CPT

## 2019-09-10 RX ORDER — SODIUM CHLORIDE, SODIUM LACTATE, POTASSIUM CHLORIDE, CALCIUM CHLORIDE 600; 310; 30; 20 MG/100ML; MG/100ML; MG/100ML; MG/100ML
125 INJECTION, SOLUTION INTRAVENOUS CONTINUOUS
Status: DISCONTINUED | OUTPATIENT
Start: 2019-09-10 | End: 2019-09-10 | Stop reason: HOSPADM

## 2019-09-10 RX ORDER — NALOXONE HYDROCHLORIDE 0.4 MG/ML
0.2 INJECTION, SOLUTION INTRAMUSCULAR; INTRAVENOUS; SUBCUTANEOUS
Status: DISCONTINUED | OUTPATIENT
Start: 2019-09-10 | End: 2019-09-10 | Stop reason: HOSPADM

## 2019-09-10 RX ORDER — FLUMAZENIL 0.1 MG/ML
0.2 INJECTION INTRAVENOUS
Status: DISCONTINUED | OUTPATIENT
Start: 2019-09-10 | End: 2019-09-10 | Stop reason: HOSPADM

## 2019-09-10 RX ORDER — LIDOCAINE HYDROCHLORIDE 10 MG/ML
0.1 INJECTION, SOLUTION EPIDURAL; INFILTRATION; INTRACAUDAL; PERINEURAL AS NEEDED
Status: DISCONTINUED | OUTPATIENT
Start: 2019-09-10 | End: 2019-09-10 | Stop reason: HOSPADM

## 2019-09-10 RX ADMIN — SODIUM CHLORIDE, SODIUM LACTATE, POTASSIUM CHLORIDE, AND CALCIUM CHLORIDE 125 ML/HR: 600; 310; 30; 20 INJECTION, SOLUTION INTRAVENOUS at 06:36

## 2019-09-10 NOTE — PERIOP NOTES
All lab work obtained, chest x-ray and urinalysis sent along with blood cultures before discharge. Iv dc'd, pt. Instructed to take Tylenol when he get's home. Dr. Estee Barahona to call pt. With results of labs for further instruction. Pt. Discharged into care of wife.

## 2019-09-10 NOTE — PERIOP NOTES
Dr. Lawton Beverage present. Informed him of pt's temp. And pt. Having chills last night and today. Orders given for labs, chest x-ray, blood cultures. Case canceled at this time.

## 2019-09-10 NOTE — H&P
H&P Update:  Namrata Poole was seen and examined. History and physical has been reviewed. Significant clinical changes have occurred as noted:  Patient reports had chills yesterday and also had painful urination. Denies sore throat or upper respiratory symptoms. Denies abdominal pain or diarrhea. Can't remember if urine looked discolored. On presentation this am at 6 am he had temp 99.4, on recheck at 7:45 am his temp is 100.4  BP stable  General: WD, WN, NAD, feels warm to touch  HEENT-sclera non-icteric, no sinus tenderness  -neck-supple, no lymphadenopathy, no pain with ROM  -abd- soft, NT  -skin-no rashes seen , no ecchymosis  -circ-pulses palpable in feet and arm bilaterally, no edema, calves soft and nontender      A/p : discussed with patient, given temperature going up, urinary symptoms and chills yesterday, I recommend postponing elective surgery at this time. He may have UTI or viral illness, discussed postpone surgery until resolved, hopefully just a week. -will order CXR, CBC c diff, BMP, UA and culture, blood cultures x 2.   -discussed using tylenol for temp, will followup on above studies, discussed if blood cultures positive over next few days, will refer to ER.   -will hold on antibiotics until results of testing.  -patient and wife disappointed but agree with plan.

## 2019-09-10 NOTE — ANESTHESIA PREPROCEDURE EVALUATION
Anesthetic History   No history of anesthetic complications            Review of Systems / Medical History  Patient summary reviewed and pertinent labs reviewed    Pulmonary        Sleep apnea           Neuro/Psych   Within defined limits          Comments: Chronic back pain, b/l radiculopathy, chronic pain meds Cardiovascular    Hypertension          CAD and cardiac stents      Comments: Cardiology clearance, denies CP or SOB   GI/Hepatic/Renal  Within defined limits              Endo/Other    Diabetes    Arthritis     Other Findings              Physical Exam    Airway  Mallampati: II    Neck ROM: normal range of motion   Mouth opening: Normal     Cardiovascular    Rhythm: regular  Rate: normal         Dental    Dentition: Full upper dentures     Pulmonary  Breath sounds clear to auscultation               Abdominal  GI exam deferred       Other Findings            Anesthetic Plan    ASA: 3  Anesthesia type: general          Induction: Intravenous  Anesthetic plan and risks discussed with: Patient

## 2019-09-11 ENCOUNTER — OFFICE VISIT (OUTPATIENT)
Dept: FAMILY MEDICINE CLINIC | Age: 79
End: 2019-09-11

## 2019-09-11 ENCOUNTER — HOSPITAL ENCOUNTER (OUTPATIENT)
Dept: LAB | Age: 79
Discharge: HOME | End: 2019-09-11
Payer: MEDICARE

## 2019-09-11 VITALS
SYSTOLIC BLOOD PRESSURE: 151 MMHG | HEIGHT: 68 IN | DIASTOLIC BLOOD PRESSURE: 74 MMHG | TEMPERATURE: 98 F | HEART RATE: 52 BPM | BODY MASS INDEX: 25.16 KG/M2 | RESPIRATION RATE: 16 BRPM | OXYGEN SATURATION: 98 % | WEIGHT: 166 LBS

## 2019-09-11 DIAGNOSIS — D72.829 LEUKOCYTOSIS, UNSPECIFIED TYPE: Primary | ICD-10-CM

## 2019-09-11 PROCEDURE — 85651 RBC SED RATE NONAUTOMATED: CPT

## 2019-09-11 PROCEDURE — 86141 C-REACTIVE PROTEIN HS: CPT

## 2019-09-11 PROCEDURE — 85025 COMPLETE CBC W/AUTO DIFF WBC: CPT

## 2019-09-11 NOTE — PROGRESS NOTES
Chief Complaint   Patient presents with    Follow-up     Patient presents in office today for f/u from labs done by Dr. Cornelius Cherry. .  States that yesterday he had a fever and he had an elevated WBC. Was supposed to have back surgery yesterday and they told him they could not perform the surgery and to f/u with PCP. No other concerns. 1. Have you been to the ER, urgent care clinic since your last visit? Hospitalized since your last visit? No    2. Have you seen or consulted any other health care providers outside of the [address] [place] [address] since your last visit? Include any pap smears or colon screening.  No    Learning Assessment 12/1/2015   PRIMARY LEARNER Patient   HIGHEST LEVEL OF EDUCATION - PRIMARY LEARNER  GRADUATED HIGH SCHOOL OR GED   BARRIERS PRIMARY LEARNER NONE   CO-LEARNER CAREGIVER No   PRIMARY LANGUAGE ENGLISH   LEARNER PREFERENCE PRIMARY DEMONSTRATION   ANSWERED BY pt   RELATIONSHIP SELF

## 2019-09-11 NOTE — PROGRESS NOTES
Roxana Del Rio is a 70 y.o. male   Chief Complaint   Patient presents with    Follow-up    pt here for follow up from the hospital yesterday, pt was supposed to have surgery for his back and had chills the night before and was told he had a fever. His WBC were 20.4 with neutrophils elevated at 88. Pt states he feels fine today and has been checking temp and no fever. Pt did take tylenol at 1pm today tho. Nose has been runny clear mucous. No ear pain no sore throat No cough no sob, no sinus pain. Has one sensitive tooth but this has been for years. No CP, no abd pain No NVD. No skin rashes or lesions. No recent steroid use. No recent stressors per pt states he has been happy and doing well just trying to get his back to feel better. he is a 70y.o. year old male who presents for evalution. Reviewed PmHx, RxHx, FmHx, SocHx, AllgHx and updated and dated in the chart. Review of Systems - negative except as listed above in the HPI    Objective:     Vitals:    09/11/19 1413   BP: 151/74   Pulse: (!) 52   Resp: 16   Temp: 98 °F (36.7 °C)   TempSrc: Oral   SpO2: 98%   Weight: 166 lb (75.3 kg)   Height: 5' 8\" (1.727 m)       Current Outpatient Medications   Medication Sig    folic acid (FOLVITE) 1 mg tablet Take 1 mg by mouth daily.  cholecalciferol, VITAMIN D3, (VITAMIN D3) 5,000 unit tab tablet Take 1 Tab by mouth daily for 30 days. Please start today and take until surgery. Do not take AM of surgery    irbesartan (AVAPRO) 150 mg tablet Take 1 Tab by mouth nightly.  metFORMIN (GLUCOPHAGE) 1,000 mg tablet TAKE 1 TABLET TWICE A DAY WITH MEALS    atenolol (TENORMIN) 50 mg tablet TAKE 1 TABLET DAILY    omeprazole (PRILOSEC) 40 mg capsule TAKE 1 CAPSULE DAILY    atorvastatin (LIPITOR) 40 mg tablet TAKE 1 TABLET DAILY (START LIPITOR AFTER VYTORIN IS FINISHED)    aspirin delayed-release 81 mg tablet Take 81 mg by mouth daily.     oxyCODONE-acetaminophen (PERCOCET) 5-325 mg per tablet Take 1 Tab by mouth daily as needed for Pain for up to 30 days. No current facility-administered medications for this visit. Physical Examination: General appearance - alert, well appearing, and in no distress  Mental status - alert, oriented to person, place, and time  Eyes - pupils equal and reactive, extraocular eye movements intact  Ears - bilateral TM's and external ear canals normal  Nose - normal and patent, no erythema, discharge or polyps  Mouth - mucous membranes moist, pharynx normal without lesions  Neck - supple, no significant adenopathy  Lymphatics - no palpable lymphadenopathy, no hepatosplenomegaly  Chest - clear to auscultation, no wheezes, rales or rhonchi, symmetric air entry  Heart - normal rate, regular rhythm, normal S1, S2, no murmurs, rubs, clicks or gallops  Abdomen - soft, nontender, nondistended, no masses or organomegaly  Musculoskeletal - no joint tenderness, deformity or swelling  Extremities - peripheral pulses normal, no pedal edema, no clubbing or cyanosis  Skin - normal coloration and turgor, no rashes, no suspicious skin lesions noted      Assessment/ Plan:   Diagnoses and all orders for this visit:    1. Leukocytosis, unspecified type  -     CBC WITH AUTOMATED DIFF  -     SED RATE (ESR)  -     CRP, HIGH SENSITIVITY     CXR Negative  Blood cx's neg thus far. CXR with just some atelectasis no consolidation. No source of infection will recheck WBC today along with inflammatory markers. Pt to stop tylenol and see if he gets a fever if so call office. Follow-up and Dispositions    · Return if symptoms worsen or fail to improve. I have discussed the diagnosis with the patient and the intended plan as seen in the above orders. The patient has received an after-visit summary and questions were answered concerning future plans. Pt conveyed understanding of plan.     Medication Side Effects and Warnings were discussed with patient      [address] [facility] [address], DO

## 2019-09-11 NOTE — PATIENT INSTRUCTIONS
A Healthy Lifestyle: Care Instructions  Your Care Instructions    A healthy lifestyle can help you feel good, stay at a healthy weight, and have plenty of energy for both work and play. A healthy lifestyle is something you can share with your whole family. A healthy lifestyle also can lower your risk for serious health problems, such as high blood pressure, heart disease, and diabetes. You can follow a few steps listed below to improve your health and the health of your family. Follow-up care is a key part of your treatment and safety. Be sure to make and go to all appointments, and call your doctor if you are having problems. It's also a good idea to know your test results and keep a list of the medicines you take. How can you care for yourself at home? · Do not eat too much sugar, fat, or fast foods. You can still have dessert and treats now and then. The goal is moderation. · Start small to improve your eating habits. Pay attention to portion sizes, drink less juice and soda pop, and eat more fruits and vegetables. ? Eat a healthy amount of food. A 3-ounce serving of meat, for example, is about the size of a deck of cards. Fill the rest of your plate with vegetables and whole grains. ? Limit the amount of soda and sports drinks you have every day. Drink more water when you are thirsty. ? Eat at least 5 servings of fruits and vegetables every day. It may seem like a lot, but it is not hard to reach this goal. A serving or helping is 1 piece of fruit, 1 cup of vegetables, or 2 cups of leafy, raw vegetables. Have an apple or some carrot sticks as an afternoon snack instead of a candy bar. Try to have fruits and/or vegetables at every meal.  · Make exercise part of your daily routine. You may want to start with simple activities, such as walking, bicycling, or slow swimming. Try to be active 30 to 60 minutes every day. You do not need to do all 30 to 60 minutes all at once.  For example, you can exercise 3 times a day for 10 or 20 minutes. Moderate exercise is safe for most people, but it is always a good idea to talk to your doctor before starting an exercise program.  · Keep moving. Sadi Memos the lawn, work in the garden, or [employer]. Take the stairs instead of the elevator at work. · If you smoke, quit. People who smoke have an increased risk for heart attack, stroke, cancer, and other lung illnesses. Quitting is hard, but there are ways to boost your chance of quitting tobacco for good. ? Use nicotine gum, patches, or lozenges. ? Ask your doctor about stop-smoking programs and medicines. ? Keep trying. In addition to reducing your risk of diseases in the future, you will notice some benefits soon after you stop using tobacco. If you have shortness of breath or asthma symptoms, they will likely get better within a few weeks after you quit. · Limit how much alcohol you drink. Moderate amounts of alcohol (up to 2 drinks a day for men, 1 drink a day for women) are okay. But drinking too much can lead to liver problems, high blood pressure, and other health problems. Family health  If you have a family, there are many things you can do together to improve your health. · Eat meals together as a family as often as possible. · Eat healthy foods. This includes fruits, vegetables, lean meats and dairy, and whole grains. · Include your family in your fitness plan. Most people think of activities such as jogging or tennis as the way to fitness, but there are many ways you and your family can be more active. Anything that makes you breathe hard and gets your heart pumping is exercise. Here are some tips:  ? Walk to do errands or to take your child to school or the bus.  ? Go for a family bike ride after dinner instead of watching TV. Where can you learn more? Go to http://jennifer-elida.info/. Enter J063 in the search box to learn more about \"A Healthy Lifestyle: Care Instructions. \"  Current as of: September 11, 2018  Content Version: 12.1  © 2985-9645 [employer]. Care instructions adapted under license by [employer] (which disclaims liability or warranty for this information). If you have questions about a medical condition or this instruction, always ask your healthcare professional. [address] any warranty or liability for your use of this information.

## 2019-09-12 ENCOUNTER — PATIENT OUTREACH (OUTPATIENT)
Dept: FAMILY MEDICINE CLINIC | Age: 79
End: 2019-09-12

## 2019-09-12 LAB
BASOPHILS # BLD AUTO: 0 X10E3/UL (ref 0–0.2)
BASOPHILS NFR BLD AUTO: 0 %
CRP SERPL HS-MCNC: 169.73 MG/L (ref 0–3)
EOSINOPHIL # BLD AUTO: 0.1 X10E3/UL (ref 0–0.4)
EOSINOPHIL NFR BLD AUTO: 1 %
ERYTHROCYTE [DISTWIDTH] IN BLOOD BY AUTOMATED COUNT: 16.3 % (ref 12.3–15.4)
ERYTHROCYTE [SEDIMENTATION RATE] IN BLOOD BY WESTERGREN METHOD: 12 MM/HR (ref 0–30)
HCT VFR BLD AUTO: 37 % (ref 37.5–51)
HGB BLD-MCNC: 12 G/DL (ref 13–17.7)
IMM GRANULOCYTES # BLD AUTO: 0 X10E3/UL (ref 0–0.1)
IMM GRANULOCYTES NFR BLD AUTO: 0 %
LYMPHOCYTES # BLD AUTO: 3.1 X10E3/UL (ref 0.7–3.1)
LYMPHOCYTES NFR BLD AUTO: 20 %
MCH RBC QN AUTO: 29.2 PG (ref 26.6–33)
MCHC RBC AUTO-ENTMCNC: 32.4 G/DL (ref 31.5–35.7)
MCV RBC AUTO: 90 FL (ref 79–97)
MONOCYTES # BLD AUTO: 1 X10E3/UL (ref 0.1–0.9)
MONOCYTES NFR BLD AUTO: 6 %
NEUTROPHILS # BLD AUTO: 11.4 X10E3/UL (ref 1.4–7)
NEUTROPHILS NFR BLD AUTO: 73 %
PLATELET # BLD AUTO: 374 X10E3/UL (ref 150–450)
RBC # BLD AUTO: 4.11 X10E6/UL (ref 4.14–5.8)
WBC # BLD AUTO: 15.6 X10E3/UL (ref 3.4–10.8)

## 2019-09-12 NOTE — PERIOP NOTES
Message left for Dr. Madrid Leisure office to verify that patient will not need PAT or repeat labwork prior to surgery 9/16/19.

## 2019-09-12 NOTE — PROGRESS NOTES
09/12/19 Opened encounter for ELIZA VELASQUEZ for patient's scheduled surgery, surgery did not actually happen on 9/10. Rescheduled for 9/16. NN will follow chart as needed.  JOSELITO

## 2019-09-12 NOTE — PROGRESS NOTES
His white count has come down but one of his inflammatory markers is extremely elevated.   I would like him to follow up in a week for repeat labs

## 2019-09-12 NOTE — PROGRESS NOTES
Called and spoke with patient in reference to labs. Patient states that he will have to call back to schedule but that it wont be in a week. He states that Dr. Kevin Cheng went over the results with him today and cleared him for surgery on Monday.

## 2019-09-13 ENCOUNTER — ANESTHESIA EVENT (OUTPATIENT)
Dept: SURGERY | Age: 79
DRG: 454 | End: 2019-09-13
Payer: MEDICARE

## 2019-09-13 ENCOUNTER — APPOINTMENT (OUTPATIENT)
Dept: CT IMAGING | Age: 79
End: 2019-09-13
Attending: EMERGENCY MEDICINE
Payer: MEDICARE

## 2019-09-13 ENCOUNTER — APPOINTMENT (OUTPATIENT)
Dept: GENERAL RADIOLOGY | Age: 79
End: 2019-09-13
Attending: EMERGENCY MEDICINE
Payer: MEDICARE

## 2019-09-13 ENCOUNTER — HOSPITAL ENCOUNTER (EMERGENCY)
Age: 79
Discharge: HOME | End: 2019-09-13
Attending: EMERGENCY MEDICINE
Payer: MEDICARE

## 2019-09-13 VITALS
SYSTOLIC BLOOD PRESSURE: 137 MMHG | WEIGHT: 165 LBS | OXYGEN SATURATION: 92 % | HEART RATE: 85 BPM | RESPIRATION RATE: 20 BRPM | TEMPERATURE: 98.5 F | DIASTOLIC BLOOD PRESSURE: 63 MMHG | BODY MASS INDEX: 25.01 KG/M2 | HEIGHT: 68 IN

## 2019-09-13 DIAGNOSIS — E87.20 LACTIC ACIDOSIS: ICD-10-CM

## 2019-09-13 DIAGNOSIS — N39.0 URINARY TRACT INFECTION WITHOUT HEMATURIA, SITE UNSPECIFIED: Primary | ICD-10-CM

## 2019-09-13 LAB
ALBUMIN SERPL-MCNC: 2.9 G/DL (ref 3.5–5)
ALBUMIN/GLOB SERPL: 0.6 {RATIO} (ref 1.1–2.2)
ALP SERPL-CCNC: 94 U/L (ref 45–117)
ALT SERPL-CCNC: 14 U/L (ref 12–78)
ANION GAP SERPL CALC-SCNC: 8 MMOL/L (ref 5–15)
APPEARANCE UR: ABNORMAL
AST SERPL-CCNC: 29 U/L (ref 15–37)
ATRIAL RATE: 86 BPM
BACTERIA URNS QL MICRO: ABNORMAL /HPF
BASOPHILS # BLD: 0 K/UL (ref 0–0.1)
BASOPHILS NFR BLD: 0 % (ref 0–1)
BILIRUB SERPL-MCNC: 0.5 MG/DL (ref 0.2–1)
BILIRUB UR QL: NEGATIVE
BUN SERPL-MCNC: 8 MG/DL (ref 6–20)
BUN/CREAT SERPL: 8 (ref 12–20)
CALCIUM SERPL-MCNC: 8.6 MG/DL (ref 8.5–10.1)
CALCULATED P AXIS, ECG09: 35 DEGREES
CALCULATED R AXIS, ECG10: 41 DEGREES
CALCULATED T AXIS, ECG11: 31 DEGREES
CHLORIDE SERPL-SCNC: 110 MMOL/L (ref 97–108)
CO2 SERPL-SCNC: 26 MMOL/L (ref 21–32)
COLOR UR: ABNORMAL
COMMENT, HOLDF: NORMAL
COMMENT, HOLDF: NORMAL
CREAT SERPL-MCNC: 0.96 MG/DL (ref 0.7–1.3)
DIAGNOSIS, 93000: NORMAL
DIFFERENTIAL METHOD BLD: ABNORMAL
EOSINOPHIL # BLD: 0 K/UL (ref 0–0.4)
EOSINOPHIL NFR BLD: 0 % (ref 0–7)
EPITH CASTS URNS QL MICRO: ABNORMAL /LPF
ERYTHROCYTE [DISTWIDTH] IN BLOOD BY AUTOMATED COUNT: 14.8 % (ref 11.5–14.5)
GLOBULIN SER CALC-MCNC: 4.5 G/DL (ref 2–4)
GLUCOSE SERPL-MCNC: 96 MG/DL (ref 65–100)
GLUCOSE UR STRIP.AUTO-MCNC: NEGATIVE MG/DL
HCT VFR BLD AUTO: 37.2 % (ref 36.6–50.3)
HGB BLD-MCNC: 11.8 G/DL (ref 12.1–17)
HGB UR QL STRIP: ABNORMAL
HYALINE CASTS URNS QL MICRO: ABNORMAL /LPF (ref 0–5)
IMM GRANULOCYTES # BLD AUTO: 0 K/UL
IMM GRANULOCYTES NFR BLD AUTO: 0 %
KETONES UR QL STRIP.AUTO: NEGATIVE MG/DL
LACTATE BLD-SCNC: 0.89 MMOL/L (ref 0.4–2)
LACTATE SERPL-SCNC: 2.5 MMOL/L (ref 0.4–2)
LEUKOCYTE ESTERASE UR QL STRIP.AUTO: ABNORMAL
LYMPHOCYTES # BLD: 0.7 K/UL (ref 0.8–3.5)
LYMPHOCYTES NFR BLD: 16 % (ref 12–49)
MCH RBC QN AUTO: 29.2 PG (ref 26–34)
MCHC RBC AUTO-ENTMCNC: 31.7 G/DL (ref 30–36.5)
MCV RBC AUTO: 92.1 FL (ref 80–99)
MONOCYTES # BLD: 0 K/UL (ref 0–1)
MONOCYTES NFR BLD: 0 % (ref 5–13)
NEUTS BAND NFR BLD MANUAL: 6 % (ref 0–6)
NEUTS SEG # BLD: 3.4 K/UL (ref 1.8–8)
NEUTS SEG NFR BLD: 78 % (ref 32–75)
NITRITE UR QL STRIP.AUTO: POSITIVE
NRBC # BLD: 0 K/UL (ref 0–0.01)
NRBC BLD-RTO: 0 PER 100 WBC
P-R INTERVAL, ECG05: 124 MS
PH UR STRIP: 5.5 [PH] (ref 5–8)
PLATELET # BLD AUTO: 329 K/UL (ref 150–400)
PMV BLD AUTO: 9.7 FL (ref 8.9–12.9)
POTASSIUM SERPL-SCNC: 4 MMOL/L (ref 3.5–5.1)
PROT SERPL-MCNC: 7.4 G/DL (ref 6.4–8.2)
PROT UR STRIP-MCNC: 30 MG/DL
Q-T INTERVAL, ECG07: 360 MS
QRS DURATION, ECG06: 78 MS
QTC CALCULATION (BEZET), ECG08: 430 MS
RBC # BLD AUTO: 4.04 M/UL (ref 4.1–5.7)
RBC #/AREA URNS HPF: ABNORMAL /HPF (ref 0–5)
RBC MORPH BLD: ABNORMAL
SAMPLES BEING HELD,HOLD: NORMAL
SAMPLES BEING HELD,HOLD: NORMAL
SODIUM SERPL-SCNC: 144 MMOL/L (ref 136–145)
SP GR UR REFRACTOMETRY: 1.01 (ref 1–1.03)
UA: UC IF INDICATED,UAUC: ABNORMAL
UR CULT HOLD, URHOLD: NORMAL
UROBILINOGEN UR QL STRIP.AUTO: 0.2 EU/DL (ref 0.2–1)
VENTRICULAR RATE, ECG03: 86 BPM
WBC # BLD AUTO: 4.1 K/UL (ref 4.1–11.1)
WBC URNS QL MICRO: >100 /HPF (ref 0–4)

## 2019-09-13 PROCEDURE — 87086 URINE CULTURE/COLONY COUNT: CPT

## 2019-09-13 PROCEDURE — 96361 HYDRATE IV INFUSION ADD-ON: CPT

## 2019-09-13 PROCEDURE — 83605 ASSAY OF LACTIC ACID: CPT

## 2019-09-13 PROCEDURE — 71046 X-RAY EXAM CHEST 2 VIEWS: CPT

## 2019-09-13 PROCEDURE — 74011000250 HC RX REV CODE- 250: Performed by: EMERGENCY MEDICINE

## 2019-09-13 PROCEDURE — 87186 SC STD MICRODIL/AGAR DIL: CPT

## 2019-09-13 PROCEDURE — 74011636320 HC RX REV CODE- 636/320: Performed by: RADIOLOGY

## 2019-09-13 PROCEDURE — 93005 ELECTROCARDIOGRAM TRACING: CPT

## 2019-09-13 PROCEDURE — 99285 EMERGENCY DEPT VISIT HI MDM: CPT

## 2019-09-13 PROCEDURE — 36415 COLL VENOUS BLD VENIPUNCTURE: CPT

## 2019-09-13 PROCEDURE — 81001 URINALYSIS AUTO W/SCOPE: CPT

## 2019-09-13 PROCEDURE — 80053 COMPREHEN METABOLIC PANEL: CPT

## 2019-09-13 PROCEDURE — 71260 CT THORAX DX C+: CPT

## 2019-09-13 PROCEDURE — 85025 COMPLETE CBC W/AUTO DIFF WBC: CPT

## 2019-09-13 PROCEDURE — 87077 CULTURE AEROBIC IDENTIFY: CPT

## 2019-09-13 PROCEDURE — 74011250636 HC RX REV CODE- 250/636: Performed by: EMERGENCY MEDICINE

## 2019-09-13 PROCEDURE — 96374 THER/PROPH/DIAG INJ IV PUSH: CPT

## 2019-09-13 PROCEDURE — 87040 BLOOD CULTURE FOR BACTERIA: CPT

## 2019-09-13 RX ORDER — SODIUM CHLORIDE 0.9 % (FLUSH) 0.9 %
5-10 SYRINGE (ML) INJECTION AS NEEDED
Status: DISCONTINUED | OUTPATIENT
Start: 2019-09-13 | End: 2019-09-13 | Stop reason: HOSPADM

## 2019-09-13 RX ORDER — CEFADROXIL 500 MG/1
500 CAPSULE ORAL 2 TIMES DAILY
Qty: 20 CAP | Refills: 0 | Status: ON HOLD | OUTPATIENT
Start: 2019-09-13 | End: 2019-09-16 | Stop reason: ALTCHOICE

## 2019-09-13 RX ADMIN — IOPAMIDOL 100 ML: 612 INJECTION, SOLUTION INTRAVENOUS at 08:28

## 2019-09-13 RX ADMIN — SODIUM CHLORIDE 1000 ML: 900 INJECTION, SOLUTION INTRAVENOUS at 08:49

## 2019-09-13 RX ADMIN — CEFTRIAXONE SODIUM 1 G: 1 INJECTION, POWDER, FOR SOLUTION INTRAMUSCULAR; INTRAVENOUS at 08:49

## 2019-09-13 NOTE — ED PROVIDER NOTES
70 y.o. male with past medical history significant for hyperlipidemia, CAD, essential HTN, obstructive sleep apnea, DJD, DM, and kidney stones who presents from home via EMS with chief complaint of chills. He notes that 5 days ago he had chills when he woke up. He went in for a pre-evaluation for back surgery was noted to be febrile. Blood work was obtained and he was found to have an elevated white blood cell count so the surgery was canceled and he followed up with his primary care doctor. Later in the week he had repeat blood work which was noted to be normal he was told to come back Monday for his back surgery. This morning around 5 AM, the patient woke up with extreme chills. He has fevers. Patient states his fevers have been intermittent. He has not been taking anything to denies any headache otherwise the patient states that he is been well. His only complaint is that of intermittent chills. No recent travel. No known tuberculosis. No known exposure to anybody with any communicable diseases. He does note one episode of dysuria several days ago that has since resolved. Otherwise the patient denies any other acute complaints or modifying factors.     Social hx: Former smoker  PCP: Shira Amezcua DO    Note written by Hubert Thomas, as dictated by Bertha Osei DO 7:02 AM              Past Medical History:   Diagnosis Date    CAD (coronary artery disease)     Degenerative joint disease 1/28/2011    Diabetes mellitus ([facility] [address]     Essential hypertension     Hyperlipidemia     Kidney stones 1969    Obstructive sleep apnea 01/28/2011    Stopped using CPAP years ago       Past Surgical History:   Procedure Laterality Date    HX CARPAL TUNNEL RELEASE      HX CORONARY STENT PLACEMENT Left 1991    x 3    HX HIP REPLACEMENT Bilateral 2009 & 2015    HX HIP REPLACEMENT Right 2012    Revision    HX LITHOTRIPSY N/A 1969    HX LUMBAR DISKECTOMY N/A 1978    L 4-5    HX LUMBAR FUSION N/A 2018    HX LUMBAR LAMINECTOMY  1971    L 4-5         Family History:   Problem Relation Age of Onset   Rodriguez Arthritis-osteo Mother     No Known Problems Father        Social History     Socioeconomic History    Marital status:      Spouse name: Not on file    Number of children: Not on file    Years of education: Not on file    Highest education level: Not on file   Occupational History    Not on file   Social Needs    Financial resource strain: Not on file    Food insecurity:     Worry: Not on file     Inability: Not on file    Transportation needs:     Medical: Not on file     Non-medical: Not on file   Tobacco Use    Smoking status: Former Smoker     Packs/day: 1.00     Years: 16.00     Pack years: 16.00     Types: Cigarettes     Last attempt to quit:      Years since quittin.7    Smokeless tobacco: Never Used   Substance and Sexual Activity    Alcohol use: Yes     Alcohol/week: 0.0 standard drinks     Comment: rarely    Drug use: No    Sexual activity: Yes     Partners: Female   Lifestyle    Physical activity:     Days per week: Not on file     Minutes per session: Not on file    Stress: Not on file   Relationships    Social connections:     Talks on phone: Not on file     Gets together: Not on file     Attends Orthodox service: Not on file     Active member of club or organization: Not on file     Attends meetings of clubs or organizations: Not on file     Relationship status: Not on file    Intimate partner violence:     Fear of current or ex partner: Not on file     Emotionally abused: Not on file     Physically abused: Not on file     Forced sexual activity: Not on file   Other Topics Concern    Not on file   Social History Narrative    Not on file         ALLERGIES: Ace inhibitors    Review of Systems   Constitutional: Positive for chills. Negative for fever. HENT: Negative for congestion, ear pain, hearing loss and sore throat. Eyes: Negative for visual disturbance. Respiratory: Negative for cough, chest tightness and shortness of breath. Cardiovascular: Negative for chest pain. Gastrointestinal: Negative for abdominal pain, diarrhea and nausea. Genitourinary: Positive for dysuria. Negative for difficulty urinating and flank pain. Musculoskeletal: Negative for back pain, joint swelling, myalgias and neck pain. Skin: Negative for rash. Neurological: Negative for dizziness, weakness, light-headedness, numbness and headaches. Psychiatric/Behavioral: Negative for suicidal ideas. Vitals:    09/13/19 0657   BP: 140/69   Pulse: 85   Resp: 20   Temp: 99.4 °F (37.4 °C)   SpO2: 96%   Weight: 74.8 kg (165 lb)   Height: 5' 8\" (1.727 m)            Physical Exam   Constitutional: He is oriented to person, place, and time. He appears well-developed and well-nourished. No distress. HENT:   Head: Normocephalic and atraumatic. Mouth/Throat: No oropharyngeal exudate. Eyes: Pupils are equal, round, and reactive to light. Conjunctivae and EOM are normal.   Neck: Neck supple. No JVD present. No tracheal deviation present. Cardiovascular: Normal rate and regular rhythm. Exam reveals no friction rub. No murmur heard. Pulmonary/Chest: Effort normal and breath sounds normal. No stridor. No respiratory distress. He has no wheezes. He has no rales. He exhibits no tenderness. Abdominal: Soft. He exhibits no distension. There is no tenderness. There is no guarding. Musculoskeletal: He exhibits no edema, tenderness or deformity. Lymphadenopathy:     He has no cervical adenopathy. Neurological: He is alert and oriented to person, place, and time. No cranial nerve deficit. Skin: Skin is warm, dry and intact. Capillary refill takes less than 2 seconds. No bruising, no petechiae and no rash noted. He is not diaphoretic. No pallor. Psychiatric: He has a normal mood and affect.  His behavior is normal. Judgment and thought content normal.    Note written by Flash Coronel. Black, Scribe, as dictated by Dennis Palomino DO 7:02 AM       MDM  Number of Diagnoses or Management Options  Lactic acidosis:   Urinary tract infection without hematuria, site unspecified:     ED Course as of Sep 13 1055   Fri Sep 13, 2019   0830 Nitrites(!): POSITIVE [GG]      ED Course User Index  [GG] Dennis Palomino DO     Mr. Dhruv Huber is a 43-year-old male who comes in as above. Here, the patient is afebrile but did have a minimally elevated lactate. Patient has complaints I did order sepsis panel however the only positive finding was that of a urinary tract infection which would account for his fever and dysuria. Abnormal x-ray was followed by CT which was unremarkable. At this time, I will discharge the patient home as his lactate is improved with a liter of fluid. He will be placed on Duricef for treatment of his urinary tract infection and asked to call his surgeon today to find out about follow-up on Monday to see if he can go through with his procedure. At this time patient is agreeable to this plan. All questions answered. Patient discharged home in stable condition. Procedures  ED EKG interpretation: 7:36  Rhythm: normal sinus rhythm; and regular . Rate (approx.): 86; Axis: normal; ST/T wave: No acute changes; Note written by Hubert Weaver, as dictated by Dennis Palomino DO 7:58 AM     PROGRESS NOTE:  8:40 AM  Patient reevaluated. Patient is doing well. Patient updated of further testing and lab plans.

## 2019-09-13 NOTE — ED NOTES
Urinal given to patient at this time, along with glass of water. Relayed instructions given regarding obtaining specimen, patient and wife understand directions.

## 2019-09-13 NOTE — ED NOTES
Patient able to stand without any difficulty and got dressed at this time. Discharge Instructions Reviewed with patient and wife. Discharge instructions given to patient per this nurse. Patient able to return verbalize discharge instructions. Paper copy of discharge instructions given. No RX given to patient but instructions given regarding 1 Rx sent electronically to pharmacy on record per this nurse. Patient condition stable, Respiratory status WNL, Neurostatus intact.  Ambulatory out of er, to home with wife

## 2019-09-13 NOTE — PERIOP NOTES
Received a return call from [address] at Dr. Sabrina Sargent office. Per [address], Dr. Slade Galarza has reviewed the ED visit notes and spoken to the patient. He has informed the patient to contact their on call line if he has any further UTI symptoms, but otherwise will still plan for the patient to have surgery.   DOS: 9/16/2019

## 2019-09-13 NOTE — PERIOP NOTES
The patient has been put back on the surgery schedule for Monday, 9/16/2019. The patient appears to be in the ED this morning for fever, chills, and UTI symptoms. His UA shows 3+ bacteria, + nitrates, and large leukocyte esterase. Notified Olesya at Dr. Duane Booker office regarding this. Olesya to inform Dr. Meghann Jacobo.   DOS: 9/16/2019

## 2019-09-13 NOTE — PERIOP NOTES
The patient is still on the surgery schedule for Monday, 9/16/2019. Left a VM nixon Dacosta at Dr. Elías Tuttle office inquiring if they will be cancelling the patient's surgery based on the ED visit for UTI symptoms.   DOS: 9/16/2019

## 2019-09-15 LAB
BACTERIA SPEC CULT: ABNORMAL
BACTERIA SPEC CULT: ABNORMAL
CC UR VC: ABNORMAL
SERVICE CMNT-IMP: ABNORMAL

## 2019-09-15 RX ORDER — NITROFURANTOIN 25; 75 MG/1; MG/1
100 CAPSULE ORAL 2 TIMES DAILY
Qty: 14 CAP | Refills: 0 | Status: ON HOLD | OUTPATIENT
Start: 2019-09-15 | End: 2019-09-16 | Stop reason: ALTCHOICE

## 2019-09-15 NOTE — ED NOTES
Lab called regarding patient's urine culture results. Pt dc home on duricef. Will call in rx for macrobid to patients pharmacy and inform him of results and to go  rx.       Francisco Blood PA-C

## 2019-09-16 ENCOUNTER — ANESTHESIA (OUTPATIENT)
Dept: SURGERY | Age: 79
DRG: 454 | End: 2019-09-16
Payer: MEDICARE

## 2019-09-16 ENCOUNTER — HOSPITAL ENCOUNTER (INPATIENT)
Age: 79
LOS: 4 days | Discharge: HOME HEALTH | DRG: 454 | End: 2019-09-20
Attending: ORTHOPAEDIC SURGERY | Admitting: ORTHOPAEDIC SURGERY
Payer: MEDICARE

## 2019-09-16 ENCOUNTER — APPOINTMENT (OUTPATIENT)
Dept: GENERAL RADIOLOGY | Age: 79
DRG: 454 | End: 2019-09-16
Attending: ORTHOPAEDIC SURGERY
Payer: MEDICARE

## 2019-09-16 DIAGNOSIS — M51.9 LUMBAR DISC DISEASE: ICD-10-CM

## 2019-09-16 DIAGNOSIS — M19.90 OSTEOARTHRITIS, UNSPECIFIED OSTEOARTHRITIS TYPE, UNSPECIFIED SITE: ICD-10-CM

## 2019-09-16 LAB
APPEARANCE UR: CLEAR
BACTERIA SPEC CULT: NORMAL
BACTERIA URNS QL MICRO: NEGATIVE /HPF
BASOPHILS # BLD: 0 K/UL (ref 0–0.1)
BASOPHILS NFR BLD: 1 % (ref 0–1)
BILIRUB UR QL: NEGATIVE
COLOR UR: ABNORMAL
DIFFERENTIAL METHOD BLD: ABNORMAL
EOSINOPHIL # BLD: 0 K/UL (ref 0–0.4)
EOSINOPHIL NFR BLD: 1 % (ref 0–7)
EPITH CASTS URNS QL MICRO: ABNORMAL /LPF
ERYTHROCYTE [DISTWIDTH] IN BLOOD BY AUTOMATED COUNT: 14.7 % (ref 11.5–14.5)
GLUCOSE BLD STRIP.AUTO-MCNC: 105 MG/DL (ref 65–100)
GLUCOSE BLD STRIP.AUTO-MCNC: 125 MG/DL (ref 65–100)
GLUCOSE BLD STRIP.AUTO-MCNC: 142 MG/DL (ref 65–100)
GLUCOSE BLD STRIP.AUTO-MCNC: 80 MG/DL (ref 65–100)
GLUCOSE UR STRIP.AUTO-MCNC: NEGATIVE MG/DL
HCT VFR BLD AUTO: 34.7 % (ref 36.6–50.3)
HGB BLD-MCNC: 11 G/DL (ref 12.1–17)
HGB UR QL STRIP: NEGATIVE
HYALINE CASTS URNS QL MICRO: ABNORMAL /LPF (ref 0–5)
IMM GRANULOCYTES # BLD AUTO: 0 K/UL (ref 0–0.04)
IMM GRANULOCYTES NFR BLD AUTO: 1 % (ref 0–0.5)
KETONES UR QL STRIP.AUTO: NEGATIVE MG/DL
LEUKOCYTE ESTERASE UR QL STRIP.AUTO: ABNORMAL
LYMPHOCYTES # BLD: 1.7 K/UL (ref 0.8–3.5)
LYMPHOCYTES NFR BLD: 26 % (ref 12–49)
MCH RBC QN AUTO: 28.9 PG (ref 26–34)
MCHC RBC AUTO-ENTMCNC: 31.7 G/DL (ref 30–36.5)
MCV RBC AUTO: 91.3 FL (ref 80–99)
MONOCYTES # BLD: 0.7 K/UL (ref 0–1)
MONOCYTES NFR BLD: 11 % (ref 5–13)
NEUTS SEG # BLD: 4 K/UL (ref 1.8–8)
NEUTS SEG NFR BLD: 60 % (ref 32–75)
NITRITE UR QL STRIP.AUTO: NEGATIVE
NRBC # BLD: 0 K/UL (ref 0–0.01)
NRBC BLD-RTO: 0 PER 100 WBC
PH UR STRIP: 7 [PH] (ref 5–8)
PLATELET # BLD AUTO: 407 K/UL (ref 150–400)
PMV BLD AUTO: 9.6 FL (ref 8.9–12.9)
PROT UR STRIP-MCNC: NEGATIVE MG/DL
RBC # BLD AUTO: 3.8 M/UL (ref 4.1–5.7)
RBC #/AREA URNS HPF: ABNORMAL /HPF (ref 0–5)
SERVICE CMNT-IMP: ABNORMAL
SERVICE CMNT-IMP: NORMAL
SERVICE CMNT-IMP: NORMAL
SP GR UR REFRACTOMETRY: 1.01 (ref 1–1.03)
UR CULT HOLD, URHOLD: NORMAL
UROBILINOGEN UR QL STRIP.AUTO: 1 EU/DL (ref 0.2–1)
WBC # BLD AUTO: 6.5 K/UL (ref 4.1–11.1)
WBC URNS QL MICRO: ABNORMAL /HPF (ref 0–4)

## 2019-09-16 PROCEDURE — 74011250636 HC RX REV CODE- 250/636: Performed by: ORTHOPAEDIC SURGERY

## 2019-09-16 PROCEDURE — 94760 N-INVAS EAR/PLS OXIMETRY 1: CPT

## 2019-09-16 PROCEDURE — 77030018723 HC ELCTRD BLD COVD -A: Performed by: ORTHOPAEDIC SURGERY

## 2019-09-16 PROCEDURE — 74011250637 HC RX REV CODE- 250/637: Performed by: NURSE PRACTITIONER

## 2019-09-16 PROCEDURE — 77030040951 HC GRFT BN OSTEOAMP SELCT BTUS -I: Performed by: ORTHOPAEDIC SURGERY

## 2019-09-16 PROCEDURE — 97161 PT EVAL LOW COMPLEX 20 MIN: CPT

## 2019-09-16 PROCEDURE — 07DR0ZZ EXTRACTION OF ILIAC BONE MARROW, OPEN APPROACH: ICD-10-PCS | Performed by: ORTHOPAEDIC SURGERY

## 2019-09-16 PROCEDURE — 86923 COMPATIBILITY TEST ELECTRIC: CPT

## 2019-09-16 PROCEDURE — 77030026438 HC STYL ET INTUB CARD -A: Performed by: NURSE ANESTHETIST, CERTIFIED REGISTERED

## 2019-09-16 PROCEDURE — 77030004391 HC BUR FLUT MEDT -C: Performed by: ORTHOPAEDIC SURGERY

## 2019-09-16 PROCEDURE — 77030034169 HC GRFT BN BIOACTV INTRFC 1G BSTEB -F: Performed by: ORTHOPAEDIC SURGERY

## 2019-09-16 PROCEDURE — 74011000250 HC RX REV CODE- 250: Performed by: NURSE ANESTHETIST, CERTIFIED REGISTERED

## 2019-09-16 PROCEDURE — 74011250636 HC RX REV CODE- 250/636: Performed by: NURSE ANESTHETIST, CERTIFIED REGISTERED

## 2019-09-16 PROCEDURE — 82962 GLUCOSE BLOOD TEST: CPT

## 2019-09-16 PROCEDURE — 77030003161 HC GRFT DURA MTRX INLC -E: Performed by: ORTHOPAEDIC SURGERY

## 2019-09-16 PROCEDURE — 76210000016 HC OR PH I REC 1 TO 1.5 HR: Performed by: ORTHOPAEDIC SURGERY

## 2019-09-16 PROCEDURE — 74011000250 HC RX REV CODE- 250: Performed by: NURSE PRACTITIONER

## 2019-09-16 PROCEDURE — 77030031139 HC SUT VCRL2 J&J -A: Performed by: ORTHOPAEDIC SURGERY

## 2019-09-16 PROCEDURE — 81001 URINALYSIS AUTO W/SCOPE: CPT

## 2019-09-16 PROCEDURE — 77030029099 HC BN WAX SSPC -A: Performed by: ORTHOPAEDIC SURGERY

## 2019-09-16 PROCEDURE — 77030013079 HC BLNKT BAIR HGGR 3M -A: Performed by: NURSE ANESTHETIST, CERTIFIED REGISTERED

## 2019-09-16 PROCEDURE — 77030037134 HC WRAP COMPR BACK THER SOLM -B

## 2019-09-16 PROCEDURE — 0ST20ZZ RESECTION OF LUMBAR VERTEBRAL DISC, OPEN APPROACH: ICD-10-PCS | Performed by: ORTHOPAEDIC SURGERY

## 2019-09-16 PROCEDURE — 77030035129: Performed by: ORTHOPAEDIC SURGERY

## 2019-09-16 PROCEDURE — 77030014647 HC SEAL FBRN TISSL BAXT -D: Performed by: ORTHOPAEDIC SURGERY

## 2019-09-16 PROCEDURE — 77030018836 HC SOL IRR NACL ICUM -A: Performed by: ORTHOPAEDIC SURGERY

## 2019-09-16 PROCEDURE — 65270000029 HC RM PRIVATE

## 2019-09-16 PROCEDURE — 86900 BLOOD TYPING SEROLOGIC ABO: CPT

## 2019-09-16 PROCEDURE — C1713 ANCHOR/SCREW BN/BN,TIS/BN: HCPCS | Performed by: ORTHOPAEDIC SURGERY

## 2019-09-16 PROCEDURE — 77030034475 HC MISC IMPL SPN: Performed by: ORTHOPAEDIC SURGERY

## 2019-09-16 PROCEDURE — 77030029301 HC PMP F NGP WND DRSG PICO S&N -F: Performed by: ORTHOPAEDIC SURGERY

## 2019-09-16 PROCEDURE — 77030039266 HC ADH SKN EXOFIN S2SG -A: Performed by: ORTHOPAEDIC SURGERY

## 2019-09-16 PROCEDURE — 0SG00AJ FUSION OF LUMBAR VERTEBRAL JOINT WITH INTERBODY FUSION DEVICE, POSTERIOR APPROACH, ANTERIOR COLUMN, OPEN APPROACH: ICD-10-PCS | Performed by: ORTHOPAEDIC SURGERY

## 2019-09-16 PROCEDURE — 77030020782 HC GWN BAIR PAWS FLX 3M -B

## 2019-09-16 PROCEDURE — 85025 COMPLETE CBC W/AUTO DIFF WBC: CPT

## 2019-09-16 PROCEDURE — 97116 GAIT TRAINING THERAPY: CPT

## 2019-09-16 PROCEDURE — 0SG1071 FUSION OF 2 OR MORE LUMBAR VERTEBRAL JOINTS WITH AUTOLOGOUS TISSUE SUBSTITUTE, POSTERIOR APPROACH, POSTERIOR COLUMN, OPEN APPROACH: ICD-10-PCS | Performed by: ORTHOPAEDIC SURGERY

## 2019-09-16 PROCEDURE — C9290 INJ, BUPIVACAINE LIPOSOME: HCPCS | Performed by: ORTHOPAEDIC SURGERY

## 2019-09-16 PROCEDURE — 77030040830 HC CATH URETH FOL MDII -A: Performed by: ORTHOPAEDIC SURGERY

## 2019-09-16 PROCEDURE — 77030008684 HC TU ET CUF COVD -B: Performed by: NURSE ANESTHETIST, CERTIFIED REGISTERED

## 2019-09-16 PROCEDURE — 77030040361 HC SLV COMPR DVT MDII -B

## 2019-09-16 PROCEDURE — 76060000043 HC ANESTHESIA 6 TO 6.5 HR: Performed by: ORTHOPAEDIC SURGERY

## 2019-09-16 PROCEDURE — 74011250636 HC RX REV CODE- 250/636

## 2019-09-16 PROCEDURE — 74011250636 HC RX REV CODE- 250/636: Performed by: ANESTHESIOLOGY

## 2019-09-16 PROCEDURE — 01NB0ZZ RELEASE LUMBAR NERVE, OPEN APPROACH: ICD-10-PCS | Performed by: ORTHOPAEDIC SURGERY

## 2019-09-16 PROCEDURE — 77030033138 HC SUT PGA STRATFX J&J -B: Performed by: ORTHOPAEDIC SURGERY

## 2019-09-16 PROCEDURE — 74011000272 HC RX REV CODE- 272: Performed by: ORTHOPAEDIC SURGERY

## 2019-09-16 PROCEDURE — 77030013474 HC CRD BPLR DISP ADLR -A: Performed by: ORTHOPAEDIC SURGERY

## 2019-09-16 PROCEDURE — 77030029914 HC PMP F NGP WND DRSG PICO S&N -C: Performed by: ORTHOPAEDIC SURGERY

## 2019-09-16 PROCEDURE — 74011000250 HC RX REV CODE- 250: Performed by: ORTHOPAEDIC SURGERY

## 2019-09-16 PROCEDURE — 74011250636 HC RX REV CODE- 250/636: Performed by: NURSE PRACTITIONER

## 2019-09-16 PROCEDURE — 76010000179 HC OR TIME 6 TO 6.5 HR INTENSV-TIER 1: Performed by: ORTHOPAEDIC SURGERY

## 2019-09-16 PROCEDURE — 36415 COLL VENOUS BLD VENIPUNCTURE: CPT

## 2019-09-16 PROCEDURE — 77030018846 HC SOL IRR STRL H20 ICUM -A: Performed by: ORTHOPAEDIC SURGERY

## 2019-09-16 PROCEDURE — 77030012935 HC DRSG AQUACEL BMS -B: Performed by: ORTHOPAEDIC SURGERY

## 2019-09-16 DEVICE — FORTILINK-TS INTERBODY FUSION DEVICE (IBF) SYSTEM WITH TETRAFUSE 3D TECHNOLOGY, 12MM (H), 32MM (L)
Type: IMPLANTABLE DEVICE | Site: SPINE LUMBAR | Status: FUNCTIONAL
Brand: FORTILINK-TS IBF SYSTEM WITH TETRAFUSE 3D TECHNOLOGY

## 2019-09-16 DEVICE — DURAGEN® SUTURABLE DURAL REGENERATION MATRIX, 2 IN X 2 IN (5 CM X 5 CM)
Type: IMPLANTABLE DEVICE | Site: SPINE LUMBAR | Status: FUNCTIONAL
Brand: DURAGEN® SUTURABLE

## 2019-09-16 DEVICE — INTERFACE IS A SYNTHETIC BIOACTIVE BONE GRAFT FOR USE IN THE REPAIR OF OSSEOUS DEFECTS. IT IS SUPPLIED AS IRREGULAR SYNTHETIC GRANULES OF BIOACTIVE GLASS (45S5 BIOGLASS), SIZED FROM 200 MICRONS TO 420 MICRONS. WHEN IMPLANTED IN LIVING TISSUE, THE MATERIAL UNDERGOES A TIME DEPENDENT SURFACE MODIFICATION. THE SURFACE REACTION RESULTS IN THE FORMATION OF A CALCIUM PHOSPHATE LAYER, WHICH IS EQUIVALENT IN COMPOSITION AND STRUCTURE TO THE HYDROXYAPATITE FOUND IN BONE MINERAL. THE BIOLOGICAL APATITE LAYER OF THE GRANULES PROVIDES AN OSTEOCONDUCTIVE SCAFFOLD FOR THE GENERATION OF NEW OSSEOUS TISSUE. NEW BONE INFILTRATES AROUND THE GRANULES ALLOWING THE REPAIR OF THE DEFECT AS THE GRANULES ARE ABSORBED.
Type: IMPLANTABLE DEVICE | Site: SPINE LUMBAR | Status: FUNCTIONAL
Brand: INTERFACE

## 2019-09-16 DEVICE — GRAFT BONE 10 CC: Type: IMPLANTABLE DEVICE | Site: SPINE LUMBAR | Status: FUNCTIONAL

## 2019-09-16 DEVICE — SET SCREW 5540030 5.5 TI NS BRK OFF
Type: IMPLANTABLE DEVICE | Site: SPINE LUMBAR | Status: FUNCTIONAL
Brand: CD HORIZON® SPINAL SYSTEM

## 2019-09-16 DEVICE — BIOACTIVE BONE GRAFT PUTTY IS A SYNTHETIC BONE VOID FILLER COMPRISED OF A MIXTURE OF CALCIUM PHOSPHATE GRANULES (CAP) AND (45S5) BIOACTIVE GLASS GRANULES SUSPENDED IN A RESORBABLE ALKYLENEOXIDE POLYMER (AOP) CARRIER THAT FACILITATES HANDLING AND DELIVERY OF THE GRANULE COMPONENTS. THE DEVICE IS SUPPLIED AS PUTTY, PRE-LOADED IN A SYRINGE APPLICATOR AND PACKAGED IN A STERILE BARRIER FOIL POUCH. THE DEVICE IS PROVIDED STERILE, FOR SINGLE USE, IN A VARIETY OF SIZES.
Type: IMPLANTABLE DEVICE | Site: SPINE LUMBAR | Status: FUNCTIONAL
Brand: SIGNAFUSE

## 2019-09-16 RX ORDER — SODIUM CHLORIDE 0.9 % (FLUSH) 0.9 %
5-40 SYRINGE (ML) INJECTION AS NEEDED
Status: DISCONTINUED | OUTPATIENT
Start: 2019-09-16 | End: 2019-09-20 | Stop reason: HOSPADM

## 2019-09-16 RX ORDER — HYDROMORPHONE HYDROCHLORIDE 1 MG/ML
.25-1 INJECTION, SOLUTION INTRAMUSCULAR; INTRAVENOUS; SUBCUTANEOUS
Status: DISCONTINUED | OUTPATIENT
Start: 2019-09-16 | End: 2019-09-16 | Stop reason: HOSPADM

## 2019-09-16 RX ORDER — FACIAL-BODY WIPES
10 EACH TOPICAL DAILY PRN
Status: DISCONTINUED | OUTPATIENT
Start: 2019-09-18 | End: 2019-09-20 | Stop reason: HOSPADM

## 2019-09-16 RX ORDER — METOCLOPRAMIDE 10 MG/1
10 TABLET ORAL
Status: DISCONTINUED | OUTPATIENT
Start: 2019-09-16 | End: 2019-09-20 | Stop reason: HOSPADM

## 2019-09-16 RX ORDER — DIPHENHYDRAMINE HYDROCHLORIDE 50 MG/ML
12.5 INJECTION, SOLUTION INTRAMUSCULAR; INTRAVENOUS AS NEEDED
Status: DISCONTINUED | OUTPATIENT
Start: 2019-09-16 | End: 2019-09-16 | Stop reason: HOSPADM

## 2019-09-16 RX ORDER — NALOXONE HYDROCHLORIDE 0.4 MG/ML
0.2 INJECTION, SOLUTION INTRAMUSCULAR; INTRAVENOUS; SUBCUTANEOUS
Status: DISCONTINUED | OUTPATIENT
Start: 2019-09-16 | End: 2019-09-16 | Stop reason: HOSPADM

## 2019-09-16 RX ORDER — INSULIN LISPRO 100 [IU]/ML
INJECTION, SOLUTION INTRAVENOUS; SUBCUTANEOUS
Status: DISCONTINUED | OUTPATIENT
Start: 2019-09-16 | End: 2019-09-20 | Stop reason: HOSPADM

## 2019-09-16 RX ORDER — GLYCOPYRROLATE 0.2 MG/ML
INJECTION INTRAMUSCULAR; INTRAVENOUS AS NEEDED
Status: DISCONTINUED | OUTPATIENT
Start: 2019-09-16 | End: 2019-09-16 | Stop reason: HOSPADM

## 2019-09-16 RX ORDER — MIDAZOLAM HYDROCHLORIDE 1 MG/ML
INJECTION, SOLUTION INTRAMUSCULAR; INTRAVENOUS AS NEEDED
Status: DISCONTINUED | OUTPATIENT
Start: 2019-09-16 | End: 2019-09-16 | Stop reason: HOSPADM

## 2019-09-16 RX ORDER — FLUMAZENIL 0.1 MG/ML
0.2 INJECTION INTRAVENOUS
Status: DISCONTINUED | OUTPATIENT
Start: 2019-09-16 | End: 2019-09-16 | Stop reason: HOSPADM

## 2019-09-16 RX ORDER — VALSARTAN 80 MG/1
80 TABLET ORAL
Status: DISCONTINUED | OUTPATIENT
Start: 2019-09-16 | End: 2019-09-20 | Stop reason: HOSPADM

## 2019-09-16 RX ORDER — METFORMIN HYDROCHLORIDE 500 MG/1
1000 TABLET ORAL DAILY
Status: DISCONTINUED | OUTPATIENT
Start: 2019-09-17 | End: 2019-09-20 | Stop reason: HOSPADM

## 2019-09-16 RX ORDER — AMOXICILLIN 250 MG
1 CAPSULE ORAL 2 TIMES DAILY
Status: DISCONTINUED | OUTPATIENT
Start: 2019-09-16 | End: 2019-09-20 | Stop reason: HOSPADM

## 2019-09-16 RX ORDER — KETOROLAC TROMETHAMINE 30 MG/ML
15 INJECTION, SOLUTION INTRAMUSCULAR; INTRAVENOUS EVERY 6 HOURS
Status: COMPLETED | OUTPATIENT
Start: 2019-09-16 | End: 2019-09-17

## 2019-09-16 RX ORDER — ACETAMINOPHEN 500 MG
1000 TABLET ORAL EVERY 6 HOURS
Status: DISCONTINUED | OUTPATIENT
Start: 2019-09-16 | End: 2019-09-20 | Stop reason: HOSPADM

## 2019-09-16 RX ORDER — OXYCODONE HYDROCHLORIDE 5 MG/1
5 TABLET ORAL
Status: DISCONTINUED | OUTPATIENT
Start: 2019-09-16 | End: 2019-09-20 | Stop reason: HOSPADM

## 2019-09-16 RX ORDER — MAGNESIUM SULFATE 100 %
4 CRYSTALS MISCELLANEOUS AS NEEDED
Status: DISCONTINUED | OUTPATIENT
Start: 2019-09-16 | End: 2019-09-20 | Stop reason: HOSPADM

## 2019-09-16 RX ORDER — ONDANSETRON 2 MG/ML
INJECTION INTRAMUSCULAR; INTRAVENOUS AS NEEDED
Status: DISCONTINUED | OUTPATIENT
Start: 2019-09-16 | End: 2019-09-16 | Stop reason: HOSPADM

## 2019-09-16 RX ORDER — DEXTROSE MONOHYDRATE 100 MG/ML
0-250 INJECTION, SOLUTION INTRAVENOUS AS NEEDED
Status: DISCONTINUED | OUTPATIENT
Start: 2019-09-16 | End: 2019-09-20 | Stop reason: HOSPADM

## 2019-09-16 RX ORDER — SODIUM CHLORIDE 0.9 % (FLUSH) 0.9 %
5-40 SYRINGE (ML) INJECTION EVERY 8 HOURS
Status: DISCONTINUED | OUTPATIENT
Start: 2019-09-16 | End: 2019-09-20 | Stop reason: HOSPADM

## 2019-09-16 RX ORDER — SODIUM CHLORIDE, SODIUM LACTATE, POTASSIUM CHLORIDE, CALCIUM CHLORIDE 600; 310; 30; 20 MG/100ML; MG/100ML; MG/100ML; MG/100ML
INJECTION, SOLUTION INTRAVENOUS
Status: DISCONTINUED | OUTPATIENT
Start: 2019-09-16 | End: 2019-09-16 | Stop reason: HOSPADM

## 2019-09-16 RX ORDER — POLYETHYLENE GLYCOL 3350 17 G/17G
17 POWDER, FOR SOLUTION ORAL DAILY
Status: DISCONTINUED | OUTPATIENT
Start: 2019-09-17 | End: 2019-09-20 | Stop reason: HOSPADM

## 2019-09-16 RX ORDER — PROPOFOL 10 MG/ML
INJECTION, EMULSION INTRAVENOUS
Status: DISCONTINUED | OUTPATIENT
Start: 2019-09-16 | End: 2019-09-16 | Stop reason: HOSPADM

## 2019-09-16 RX ORDER — PROPOFOL 10 MG/ML
INJECTION, EMULSION INTRAVENOUS AS NEEDED
Status: DISCONTINUED | OUTPATIENT
Start: 2019-09-16 | End: 2019-09-16 | Stop reason: HOSPADM

## 2019-09-16 RX ORDER — TRAMADOL HYDROCHLORIDE 50 MG/1
50 TABLET ORAL
Status: DISCONTINUED | OUTPATIENT
Start: 2019-09-16 | End: 2019-09-20 | Stop reason: HOSPADM

## 2019-09-16 RX ORDER — ROCURONIUM BROMIDE 10 MG/ML
INJECTION, SOLUTION INTRAVENOUS AS NEEDED
Status: DISCONTINUED | OUTPATIENT
Start: 2019-09-16 | End: 2019-09-16 | Stop reason: HOSPADM

## 2019-09-16 RX ORDER — OXYCODONE HYDROCHLORIDE 5 MG/1
10 TABLET ORAL
Status: DISCONTINUED | OUTPATIENT
Start: 2019-09-16 | End: 2019-09-20 | Stop reason: HOSPADM

## 2019-09-16 RX ORDER — SODIUM CHLORIDE, SODIUM LACTATE, POTASSIUM CHLORIDE, CALCIUM CHLORIDE 600; 310; 30; 20 MG/100ML; MG/100ML; MG/100ML; MG/100ML
125 INJECTION, SOLUTION INTRAVENOUS CONTINUOUS
Status: DISCONTINUED | OUTPATIENT
Start: 2019-09-16 | End: 2019-09-16 | Stop reason: HOSPADM

## 2019-09-16 RX ORDER — TAMSULOSIN HYDROCHLORIDE 0.4 MG/1
0.4 CAPSULE ORAL
Status: DISCONTINUED | OUTPATIENT
Start: 2019-09-16 | End: 2019-09-20 | Stop reason: HOSPADM

## 2019-09-16 RX ORDER — CYCLOBENZAPRINE HCL 10 MG
10 TABLET ORAL
Status: DISCONTINUED | OUTPATIENT
Start: 2019-09-16 | End: 2019-09-20 | Stop reason: HOSPADM

## 2019-09-16 RX ORDER — LEVOFLOXACIN 500 MG/1
500 TABLET, FILM COATED ORAL EVERY 24 HOURS
Status: DISCONTINUED | OUTPATIENT
Start: 2019-09-17 | End: 2019-09-16 | Stop reason: ALTCHOICE

## 2019-09-16 RX ORDER — FENTANYL CITRATE 50 UG/ML
INJECTION, SOLUTION INTRAMUSCULAR; INTRAVENOUS AS NEEDED
Status: DISCONTINUED | OUTPATIENT
Start: 2019-09-16 | End: 2019-09-16 | Stop reason: HOSPADM

## 2019-09-16 RX ORDER — NEOSTIGMINE METHYLSULFATE 1 MG/ML
INJECTION INTRAVENOUS AS NEEDED
Status: DISCONTINUED | OUTPATIENT
Start: 2019-09-16 | End: 2019-09-16 | Stop reason: HOSPADM

## 2019-09-16 RX ORDER — LIDOCAINE HYDROCHLORIDE 20 MG/ML
INJECTION, SOLUTION EPIDURAL; INFILTRATION; INTRACAUDAL; PERINEURAL AS NEEDED
Status: DISCONTINUED | OUTPATIENT
Start: 2019-09-16 | End: 2019-09-16 | Stop reason: HOSPADM

## 2019-09-16 RX ORDER — SODIUM CHLORIDE 9 MG/ML
125 INJECTION, SOLUTION INTRAVENOUS CONTINUOUS
Status: DISPENSED | OUTPATIENT
Start: 2019-09-16 | End: 2019-09-17

## 2019-09-16 RX ORDER — HYDROMORPHONE HYDROCHLORIDE 2 MG/ML
INJECTION, SOLUTION INTRAMUSCULAR; INTRAVENOUS; SUBCUTANEOUS AS NEEDED
Status: DISCONTINUED | OUTPATIENT
Start: 2019-09-16 | End: 2019-09-16 | Stop reason: HOSPADM

## 2019-09-16 RX ORDER — ONDANSETRON 2 MG/ML
4 INJECTION INTRAMUSCULAR; INTRAVENOUS
Status: ACTIVE | OUTPATIENT
Start: 2019-09-16 | End: 2019-09-17

## 2019-09-16 RX ORDER — CHOLECALCIFEROL TAB 125 MCG (5000 UNIT) 125 MCG
5000 TAB ORAL DAILY
Status: DISCONTINUED | OUTPATIENT
Start: 2019-09-17 | End: 2019-09-20 | Stop reason: HOSPADM

## 2019-09-16 RX ORDER — EPHEDRINE SULFATE/0.9% NACL/PF 50 MG/5 ML
SYRINGE (ML) INTRAVENOUS AS NEEDED
Status: DISCONTINUED | OUTPATIENT
Start: 2019-09-16 | End: 2019-09-16 | Stop reason: HOSPADM

## 2019-09-16 RX ORDER — SUCCINYLCHOLINE CHLORIDE 20 MG/ML
INJECTION INTRAMUSCULAR; INTRAVENOUS AS NEEDED
Status: DISCONTINUED | OUTPATIENT
Start: 2019-09-16 | End: 2019-09-16 | Stop reason: HOSPADM

## 2019-09-16 RX ORDER — VANCOMYCIN HYDROCHLORIDE 1 G/20ML
INJECTION, POWDER, LYOPHILIZED, FOR SOLUTION INTRAVENOUS AS NEEDED
Status: DISCONTINUED | OUTPATIENT
Start: 2019-09-16 | End: 2019-09-16 | Stop reason: HOSPADM

## 2019-09-16 RX ORDER — CEFAZOLIN SODIUM 1 G/3ML
INJECTION, POWDER, FOR SOLUTION INTRAMUSCULAR; INTRAVENOUS AS NEEDED
Status: DISCONTINUED | OUTPATIENT
Start: 2019-09-16 | End: 2019-09-16 | Stop reason: HOSPADM

## 2019-09-16 RX ORDER — LIDOCAINE HYDROCHLORIDE 10 MG/ML
0.1 INJECTION, SOLUTION EPIDURAL; INFILTRATION; INTRACAUDAL; PERINEURAL AS NEEDED
Status: DISCONTINUED | OUTPATIENT
Start: 2019-09-16 | End: 2019-09-16 | Stop reason: HOSPADM

## 2019-09-16 RX ORDER — DIPHENHYDRAMINE HYDROCHLORIDE 50 MG/ML
12.5 INJECTION, SOLUTION INTRAMUSCULAR; INTRAVENOUS
Status: ACTIVE | OUTPATIENT
Start: 2019-09-16 | End: 2019-09-17

## 2019-09-16 RX ORDER — FAMOTIDINE 20 MG/1
20 TABLET, FILM COATED ORAL 2 TIMES DAILY
Status: DISCONTINUED | OUTPATIENT
Start: 2019-09-16 | End: 2019-09-20 | Stop reason: HOSPADM

## 2019-09-16 RX ORDER — LEVOFLOXACIN 5 MG/ML
500 INJECTION, SOLUTION INTRAVENOUS EVERY 24 HOURS
Status: DISCONTINUED | OUTPATIENT
Start: 2019-09-16 | End: 2019-09-20 | Stop reason: HOSPADM

## 2019-09-16 RX ORDER — MORPHINE SULFATE 4 MG/ML
2 INJECTION INTRAVENOUS
Status: DISPENSED | OUTPATIENT
Start: 2019-09-16 | End: 2019-09-17

## 2019-09-16 RX ORDER — NALOXONE HYDROCHLORIDE 0.4 MG/ML
0.4 INJECTION, SOLUTION INTRAMUSCULAR; INTRAVENOUS; SUBCUTANEOUS AS NEEDED
Status: DISCONTINUED | OUTPATIENT
Start: 2019-09-16 | End: 2019-09-20 | Stop reason: HOSPADM

## 2019-09-16 RX ORDER — ATENOLOL 50 MG/1
50 TABLET ORAL DAILY
Status: DISCONTINUED | OUTPATIENT
Start: 2019-09-17 | End: 2019-09-20 | Stop reason: HOSPADM

## 2019-09-16 RX ORDER — ATORVASTATIN CALCIUM 20 MG/1
40 TABLET, FILM COATED ORAL DAILY
Status: DISCONTINUED | OUTPATIENT
Start: 2019-09-17 | End: 2019-09-20 | Stop reason: HOSPADM

## 2019-09-16 RX ADMIN — ROCURONIUM BROMIDE 10 MG: 50 INJECTION, SOLUTION INTRAVENOUS at 08:01

## 2019-09-16 RX ADMIN — FENTANYL CITRATE 100 MCG: 50 INJECTION, SOLUTION INTRAMUSCULAR; INTRAVENOUS at 08:20

## 2019-09-16 RX ADMIN — HYDROMORPHONE HYDROCHLORIDE 0.5 MG: 2 INJECTION INTRAMUSCULAR; INTRAVENOUS; SUBCUTANEOUS at 13:44

## 2019-09-16 RX ADMIN — PROPOFOL 75 MCG/KG/MIN: 10 INJECTION, EMULSION INTRAVENOUS at 09:00

## 2019-09-16 RX ADMIN — PROPOFOL 150 MG: 10 INJECTION, EMULSION INTRAVENOUS at 08:01

## 2019-09-16 RX ADMIN — ROCURONIUM BROMIDE 40 MG: 50 INJECTION, SOLUTION INTRAVENOUS at 08:48

## 2019-09-16 RX ADMIN — SODIUM CHLORIDE, SODIUM LACTATE, POTASSIUM CHLORIDE, AND CALCIUM CHLORIDE: 600; 310; 30; 20 INJECTION, SOLUTION INTRAVENOUS at 13:29

## 2019-09-16 RX ADMIN — FENTANYL CITRATE 100 MCG: 50 INJECTION, SOLUTION INTRAMUSCULAR; INTRAVENOUS at 08:01

## 2019-09-16 RX ADMIN — WATER 2 G: 1 INJECTION INTRAMUSCULAR; INTRAVENOUS; SUBCUTANEOUS at 18:39

## 2019-09-16 RX ADMIN — Medication 5 MG: at 11:17

## 2019-09-16 RX ADMIN — SODIUM CHLORIDE, SODIUM LACTATE, POTASSIUM CHLORIDE, AND CALCIUM CHLORIDE: 600; 310; 30; 20 INJECTION, SOLUTION INTRAVENOUS at 07:55

## 2019-09-16 RX ADMIN — Medication 10 MG: at 13:03

## 2019-09-16 RX ADMIN — VALSARTAN 80 MG: 80 TABLET, FILM COATED ORAL at 22:20

## 2019-09-16 RX ADMIN — Medication 5 MG: at 10:59

## 2019-09-16 RX ADMIN — NEOSTIGMINE METHYLSULFATE 2 MG: 1 INJECTION, SOLUTION INTRAVENOUS at 13:33

## 2019-09-16 RX ADMIN — Medication 10 MG: at 08:11

## 2019-09-16 RX ADMIN — MIDAZOLAM HYDROCHLORIDE 2 MG: 1 INJECTION, SOLUTION INTRAMUSCULAR; INTRAVENOUS at 07:59

## 2019-09-16 RX ADMIN — ROCURONIUM BROMIDE 20 MG: 50 INJECTION, SOLUTION INTRAVENOUS at 10:58

## 2019-09-16 RX ADMIN — CEFAZOLIN 2 G: 1 INJECTION, POWDER, FOR SOLUTION INTRAMUSCULAR; INTRAVENOUS at 08:44

## 2019-09-16 RX ADMIN — MORPHINE SULFATE 2 MG: 4 INJECTION, SOLUTION INTRAMUSCULAR; INTRAVENOUS at 18:21

## 2019-09-16 RX ADMIN — SODIUM CHLORIDE, SODIUM LACTATE, POTASSIUM CHLORIDE, AND CALCIUM CHLORIDE: 600; 310; 30; 20 INJECTION, SOLUTION INTRAVENOUS at 11:00

## 2019-09-16 RX ADMIN — TAMSULOSIN HYDROCHLORIDE 0.4 MG: 0.4 CAPSULE ORAL at 22:17

## 2019-09-16 RX ADMIN — GLYCOPYRROLATE 0.4 MG: 0.2 INJECTION, SOLUTION INTRAMUSCULAR; INTRAVENOUS at 13:33

## 2019-09-16 RX ADMIN — OXYCODONE HYDROCHLORIDE 10 MG: 5 TABLET ORAL at 22:17

## 2019-09-16 RX ADMIN — WATER 2 G: 1 INJECTION INTRAMUSCULAR; INTRAVENOUS; SUBCUTANEOUS at 12:50

## 2019-09-16 RX ADMIN — PROPOFOL 30 MG: 10 INJECTION, EMULSION INTRAVENOUS at 08:20

## 2019-09-16 RX ADMIN — KETOROLAC TROMETHAMINE 15 MG: 30 INJECTION, SOLUTION INTRAMUSCULAR at 18:40

## 2019-09-16 RX ADMIN — LEVOFLOXACIN 500 MG: 5 INJECTION, SOLUTION INTRAVENOUS at 08:37

## 2019-09-16 RX ADMIN — OXYCODONE HYDROCHLORIDE 10 MG: 5 TABLET ORAL at 16:45

## 2019-09-16 RX ADMIN — SODIUM CHLORIDE, POTASSIUM CHLORIDE, SODIUM LACTATE AND CALCIUM CHLORIDE: 600; 310; 30; 20 INJECTION, SOLUTION INTRAVENOUS at 08:13

## 2019-09-16 RX ADMIN — SENNOSIDES,DOCUSATE SODIUM 1 TABLET: 8.6; 5 TABLET, FILM COATED ORAL at 18:39

## 2019-09-16 RX ADMIN — METOCLOPRAMIDE HYDROCHLORIDE 10 MG: 10 TABLET ORAL at 18:39

## 2019-09-16 RX ADMIN — SODIUM CHLORIDE 125 ML/HR: 900 INJECTION, SOLUTION INTRAVENOUS at 22:25

## 2019-09-16 RX ADMIN — SODIUM CHLORIDE, SODIUM LACTATE, POTASSIUM CHLORIDE, AND CALCIUM CHLORIDE 125 ML/HR: 600; 310; 30; 20 INJECTION, SOLUTION INTRAVENOUS at 06:26

## 2019-09-16 RX ADMIN — FENTANYL CITRATE 50 MCG: 50 INJECTION, SOLUTION INTRAMUSCULAR; INTRAVENOUS at 08:33

## 2019-09-16 RX ADMIN — FAMOTIDINE 20 MG: 20 TABLET ORAL at 18:39

## 2019-09-16 RX ADMIN — LIDOCAINE HYDROCHLORIDE 80 MG: 20 INJECTION, SOLUTION INTRAVENOUS at 08:01

## 2019-09-16 RX ADMIN — SODIUM CHLORIDE 125 ML/HR: 900 INJECTION, SOLUTION INTRAVENOUS at 14:42

## 2019-09-16 RX ADMIN — ONDANSETRON 4 MG: 2 INJECTION INTRAMUSCULAR; INTRAVENOUS at 13:19

## 2019-09-16 RX ADMIN — Medication 5 MG: at 13:39

## 2019-09-16 RX ADMIN — SUCCINYLCHOLINE CHLORIDE 80 MG: 20 INJECTION, SOLUTION INTRAMUSCULAR; INTRAVENOUS; PARENTERAL at 08:02

## 2019-09-16 RX ADMIN — Medication 10 ML: at 22:21

## 2019-09-16 RX ADMIN — ACETAMINOPHEN 1000 MG: 500 TABLET ORAL at 22:17

## 2019-09-16 RX ADMIN — Medication 10 MG: at 10:13

## 2019-09-16 RX ADMIN — HYDROMORPHONE HYDROCHLORIDE 1 MG: 2 INJECTION INTRAMUSCULAR; INTRAVENOUS; SUBCUTANEOUS at 12:55

## 2019-09-16 RX ADMIN — KETOROLAC TROMETHAMINE 15 MG: 30 INJECTION, SOLUTION INTRAMUSCULAR at 22:20

## 2019-09-16 RX ADMIN — ACETAMINOPHEN 1000 MG: 500 TABLET ORAL at 18:39

## 2019-09-16 RX ADMIN — Medication 10 MG: at 09:14

## 2019-09-16 NOTE — PERIOP NOTES
Dr Marya Parmar phoned to notify his case is posted for 0730 today. He was not aware, his cases usually start at 46.   He is in transit to hospital.

## 2019-09-16 NOTE — PROGRESS NOTES
Problem: Mobility Impaired (Adult and Pediatric)  Goal: *Acute Goals and Plan of Care (Insert Text)  Description  FUNCTIONAL STATUS PRIOR TO ADMISSION: Patient was modified independent using a back brace for functional mobility. HOME SUPPORT PRIOR TO ADMISSION: The patient lived with wife but did not require assist.    Physical Therapy Goals  Initiated 9/16/2019    1. Patient will move from supine to sit and sit to supine  in bed with independence within 4 days. 2. Patient will perform sit to stand with modified independence within 4 days. 3. Patient will ambulate with modified independence for 100 feet with the least restrictive device within 4 days. 4. Patient will ascend/descend 1 stairs with 1 handrail(s) with minimal assistance/contact guard assist within 4 days. 5. Patient will verbalize and demonstrate understanding of spinal precautions (No bending, lifting greater than 5 lbs, or twisting; log-roll technique; frequent repositioning as instructed) within 4 days. Outcome: Progressing Towards Goal   PHYSICAL THERAPY EVALUATION  Patient: Lara Doherty (22 y.o. male)  Date: 9/16/2019  Primary Diagnosis: Adjacent segment disease with spinal stenosis [M48.00]  Procedure(s) (LRB):  L2-5 LAMINECTOMY, L2-3 POSTERIOR LUMBER INTERBODY FUSION WITH L2-5 INSTRUMENTATION, ILIAC CREST BONE MARROW ASPIRATE, OSTEOAMP FIBERS, IMAGE GUIDANCE (N/A) Day of Surgery   Precautions: ESBL,  Back, Contact, Fall      ASSESSMENT  Based on the objective data described below, the patient presents with post up pain at 9/10, decreased BLE strength and ROM, decreased bed mobility, transfers and gait following admission for L2-L5 laminectomy fusion with instrumentation. Patient was received in bed willing to work with PT. Wife present. Nursing medicated patient at start of PT for pain. Patient has drain and JULIUS dressing in place. Slightly dizzy when up and drop in blood pressure but resolved once back in bed.      Current Level of Function Impacting Discharge (mobility/balance): PT educated patient and his wife regarding spine precautions of no BLT's and log roll technique for getting in and out of bed. Discussed brace. Patient was wearing brace prior to this surgery but did not bring it today and there are no MD orders for brace. MD please clarify brace orders. Patient was able to roll and sit on edge of bed with min assist. He had drop in blood pressure (see doc flow sheets) when seated but was able to take a few steps with rolling walker +2 min assist. Slight dizziness reported but improved once back in bed. Positioned patient in side-lying for comfort with pillow support between legs and to low back. Wife present and will bring brace in tomorrow. Functional Outcome Measure: The patient scored 30/100 on the Barthel outcome measure. Other factors to consider for discharge: none     Patient will benefit from skilled therapy intervention to address the above noted impairments. PLAN :  Recommendations and Planned Interventions: bed mobility training, transfer training and gait training      Frequency/Duration: Patient will be followed by physical therapy:  twice daily to address goals. Recommendation for discharge: (in order for the patient to meet his/her long term goals)  Physical therapy at least 2 days/week in the home     This discharge recommendation:  Has not yet been discussed the attending provider and/or case management    Equipment recommendations for successful discharge (if) home: patient owns DME required for discharge         SUBJECTIVE:   Patient stated This is my 4th back surgery but the first two were several years ago. The most recent one was last year.     OBJECTIVE DATA SUMMARY:   HISTORY:    Past Medical History:   Diagnosis Date    CAD (coronary artery disease)     Degenerative joint disease 1/28/2011    Diabetes mellitus ([facility] [address]     Essential hypertension     Hyperlipidemia     Kidney stones 1969 Obstructive sleep apnea 01/28/2011    Stopped using CPAP years ago     Past Surgical History:   Procedure Laterality Date    HX CARPAL TUNNEL RELEASE      HX CORONARY STENT PLACEMENT Left 1991    x 3    HX HIP REPLACEMENT Bilateral 2009 & 2015    HX HIP REPLACEMENT Right 2012    Revision    HX LITHOTRIPSY N/A 1969    HX LUMBAR DISKECTOMY N/A 1978    L 4-5    HX LUMBAR FUSION N/A 2018    HX LUMBAR LAMINECTOMY  1971    L 4-5       Personal factors and/or comorbidities impacting plan of care: pain    Home Situation  Home Environment: Private residence  # Steps to Enter: 1  Rails to Enter: Yes  One/Two Story Residence: Split level(3 story but can stay on 1st floor)  Living Alone: No  Support Systems: Spouse/Significant Other/Partner  Patient Expects to be Discharged to[de-identified] Private residence  Current DME Used/Available at Home: Brace/Splint, Adaptive dressing aides, Cane, straight, Shower chair, Walker, rolling, Commode, bedside    EXAMINATION/PRESENTATION/DECISION MAKING:   Critical Behavior:  Neurologic State: Alert  Orientation Level: Oriented X4     Safety/Judgement: Good awareness of safety precautions  Hearing: Auditory  Auditory Impairment: None  Skin:  not fully observed    Range Of Motion:  AROM: Generally decreased, functional(BLE)                       Strength:    Strength: Generally decreased, functional(BLE)                    Tone & Sensation:   Tone: Normal              Sensation: Intact                         Functional Mobility:  Bed Mobility:  Rolling: Additional time;Stand-by assistance  Supine to Sit: Additional time;Minimum assistance  Sit to Supine: Minimum assistance; Additional time;Assist x2  Scooting: Stand-by assistance  Transfers:  Sit to Stand: Assist x2;Minimum assistance  Stand to Sit: Minimum assistance;Assist x2                       Balance:   Sitting: Intact  Standing: Intact; With support  Ambulation/Gait Training:  Distance (ft): 3 Feet (ft)  Assistive Device: Walker, rolling;Gait belt  Ambulation - Level of Assistance: Minimal assistance;Assist x2        Gait Abnormalities: Decreased step clearance              Speed/Pat: Pace decreased (<100 feet/min)                                       Functional Measure:  Barthel Index:    Bathin  Bladder: 0  Bowels: 10  Groomin  Dressin  Feedin  Mobility: 0  Stairs: 0  Toilet Use: 0  Transfer (Bed to Chair and Back): 5  Total: 30/100       The Barthel ADL Index: Guidelines  1. The index should be used as a record of what a patient does, not as a record of what a patient could do. 2. The main aim is to establish degree of independence from any help, physical or verbal, however minor and for whatever reason. 3. The need for supervision renders the patient not independent. 4. A patient's performance should be established using the best available evidence. Asking the patient, friends/relatives and nurses are the usual sources, but direct observation and common sense are also important. However direct testing is not needed. 5. Usually the patient's performance over the preceding 24-48 hours is important, but occasionally longer periods will be relevant. 6. Middle categories imply that the patient supplies over 50 per cent of the effort. 7. Use of aids to be independent is allowed. María Gorman., Barthel, D.W. (9634). Functional evaluation: the Barthel Index. [facility] (14)2. DELORIS Sharpe, Abhay Rubina., [facility], [address] [place] [address] (). Measuring the change indisability after inpatient rehabilitation; comparison of the responsiveness of the Barthel Index and Functional [place] Measure. Journal of Neurology, Neurosurgery, and Psychiatry, 66(4), 902-233. Ermelinda Watt, N.J.A, DANNY Peter, & Lissette Whitney MEleazarA. (2004.) Assessment of post-stroke quality of life in cost-effectiveness studies: The usefulness of the Barthel Index and the EuroQoL-5D.  [facility], 13, 205-69 Physical Therapy Evaluation Charge Determination   History Examination Presentation Decision-Making   MEDIUM  Complexity : 1-2 comorbidities / personal factors will impact the outcome/ POC  LOW Complexity : 1-2 Standardized tests and measures addressing body structure, function, activity limitation and / or participation in recreation  MEDIUM Complexity : Evolving with changing characteristics  Other outcome measures Barthel  LOW       Based on the above components, the patient evaluation is determined to be of the following complexity level: LOW     Pain Ratin/10 to low back    Activity Tolerance:   Fair; decreased blood pressure in sitting and slight dizziness when up. Please refer to the flowsheet for vital signs taken during this treatment. After treatment patient left in no apparent distress:   Call bell within reach, Bed / chair alarm activated, Caregiver / family present, Side rails x 3 and side lying with pillow support. COMMUNICATION/EDUCATION:   The patients plan of care was discussed with: Registered Nurse. Fall prevention education was provided and the patient/caregiver indicated understanding. and Patient/family agree to work toward stated goals and plan of care.     Thank you for this referral.  Janece Bernheim, PT   Time Calculation: 27 mins

## 2019-09-16 NOTE — PROGRESS NOTES
Patient is seen in PACU   Extubated  Uneventfully. - Denies low back pain,numbness/ tingling.  - post op pain is well contrlolled. - Denies N/V/ HA. Alert and oriented  - Vitals stable  - Dressing clean/ Dry, [address]  working.   - Hemovac- holding suction with minimal output   Motor strength strong BLE   sensory intact. Collins +    - stable post lumbar fusion     -Clear liquids  - Pain meds  As needed.   -  Diabetic management  -SCD  - Raya op antibiotics

## 2019-09-16 NOTE — ROUTINE PROCESS
TRANSFER - OUT REPORT:    Verbal report given to [address] [place] [address] Way on Ramon Pugh  being transferred to  for routine post - op       Report consisted of patients Situation, Background, Assessment and   Recommendations(SBAR). Information from the following report(s) SBAR and OR Summary was reviewed with the receiving nurse. Lines:   Peripheral IV 09/16/19 Right Forearm (Active)   Site Assessment Clean, dry, & intact 9/16/2019  2:30 PM   Phlebitis Assessment 0 9/16/2019  2:30 PM   Infiltration Assessment 0 9/16/2019  2:30 PM   Dressing Status Clean, dry, & intact 9/16/2019  2:30 PM   Dressing Type Tape;Transparent 9/16/2019  2:30 PM   Hub Color/Line Status Pink; Infusing 9/16/2019  2:30 PM   Action Taken Open ports on tubing capped 9/16/2019  6:25 AM   Alcohol Cap Used Yes 9/16/2019  2:30 PM       Peripheral IV 09/16/19 Left Arm (Active)   Site Assessment Clean, dry, & intact 9/16/2019  2:30 PM   Phlebitis Assessment 0 9/16/2019  2:30 PM   Infiltration Assessment 0 9/16/2019  2:30 PM   Dressing Status Clean, dry, & intact 9/16/2019  2:30 PM   Dressing Type Tape;Transparent 9/16/2019  2:30 PM   Hub Color/Line Status Green;Capped 9/16/2019  2:30 PM   Alcohol Cap Used Yes 9/16/2019  2:30 PM        Opportunity for questions and clarification was provided.       Patient transported with:   O2 @ 2 liters  Registered Nurse

## 2019-09-16 NOTE — ANESTHESIA PREPROCEDURE EVALUATION
Anesthetic History   No history of anesthetic complications            Review of Systems / Medical History  Patient summary reviewed and pertinent labs reviewed    Pulmonary        Sleep apnea           Neuro/Psych   Within defined limits          Comments: Chronic back pain, b/l radiculopathy, chronic pain meds Cardiovascular    Hypertension          CAD and cardiac stents      Comments: Cardiology clearance, denies CP or SOB   GI/Hepatic/Renal  Within defined limits              Endo/Other    Diabetes    Arthritis     Other Findings              Physical Exam    Airway  Mallampati: II    Neck ROM: normal range of motion   Mouth opening: Normal     Cardiovascular    Rhythm: regular  Rate: normal         Dental    Dentition: Full upper dentures     Pulmonary  Breath sounds clear to auscultation               Abdominal  GI exam deferred       Other Findings            Anesthetic Plan    ASA: 3  Anesthesia type: general          Induction: Intravenous  Anesthetic plan and risks discussed with: Patient      Surgery was cancelled last week for UTI now treated.

## 2019-09-16 NOTE — BRIEF OP NOTE
BRIEF OPERATIVE NOTE    Date of Procedure: 9/16/2019   Preoperative Diagnosis: ADJACENT SEGMENT DISEASE WITH STENOSIS  Postoperative Diagnosis: ADJACENT SEGMENT DISEASE WITH STENOSIS    Procedure(s):  L2-5 LAMINECTOMY, L2-3 POSTERIOR LUMBER INTERBODY FUSION WITH L2-5 INSTRUMENTATION, ILIAC CREST BONE MARROW ASPIRATE, OSTEOAMP FIBERS, IMAGE GUIDANCE  Surgeon(s) and Role:     * Josey Koehler MD - Primary         Surgical Assistant: Sintia Kern NP    Surgical Staff:  Circ-1: Magdy Flores RN  Circ-Relief: Keturah Castellanos RN  Scrub Tech-1: Ervin De Santiago  Scrub Tech-Relief: Ian Duke  Nurse Practitioner: Carlos Sevilla NP  Event Time In Time Out   Incision Start 5945    Incision Close 1410      Anesthesia: General   Estimated Blood Loss: 500 cc, 125 returned in cell saver  Specimens: * No specimens in log *   Findings: severe stenosis L2-3, stenosis L3-4, bladder outlet obstruction   Complications: none  Implants:   Implant Name Type Inv.  Item Serial No.  Lot No. LRB No. Used Action   SIGNA FUSE   NA  [id number] N/A 1 Implanted   INTERFACE   NA  [id number] N/A 1 Implanted   0STEOAMP   [id number]  NA N/A 1 Implanted   0STEOAMP   [id number]  NA N/A 1 Implanted   INTERBODY FUSION DEVICE   NA  [id number] N/A 1 Implanted   SCR SPNE MAS 6.5X45 CC -- CD [employer]ORIZON SOLERA - SN/A  SCR SPNE MAS 6.5X45 CC -- CD HOR[employer]ON SOLERA N/A [employer] N/A N/A 1 Implanted   SCR SPNE MAS 6.5X50 CC -- CD [employer]OR[employer] SOLERA - SN/A  SCR SPNE MAS 6.5X50 CC -- CD [employer]OR[employer]ON SOLERA N/A [employer]FAM[employer] DANEK N/A N/A 1 Implanted   RANDALL SPNE CP4 NS CRV [id number] -- CD [employer]OR[employer] SOLERA - SNA  RANDALL SPNE CP4 NS CRV [id number] -- CD HORIZON SOLERA NA [employer]FAM[employer] DANEK NA N/A 2 Implanted   SCR SET SPNE BRK-OFF 5.5MM TI --  - SNA  SCR SET SPNE BRK-OFF 5.5MM TI --  NA [employer] SOFAMOR DANEK NA N/A 8 Implanted   GRAFT DURA REGEN TMPLT 2X2IN -- DURAGEN - SNA  GRAFT DURA REGEN TMPLT 2X2IN -- DURAGEN NA IN[facility]RA [facility] [id number] N/A 1 Implanted

## 2019-09-16 NOTE — PROGRESS NOTES
Called PACU. Received report from César Johnson, [place].  Discussed procedure, SBAR, Kardex, VS, Med hx

## 2019-09-16 NOTE — H&P
H&P Update:  Radha Griffiths was seen and examined. History and physical has been reviewed. Significant clinical changes have occurred as noted:  Patient had UTI with [facility], received antibiotics on Friday IV and PO and no fevers, chills or dysuria. Cultures reviewed. 3 sets of blood cultures negative. WBC count on Friday in ER was normal. CXR negative for pneumonia. Patient identified by surgeon; surgical site was confirmed by patient and surgeon. Reports stiffness and back pain with bilateral radiation to both legs, left greater than right. Sitting painful in back, lying flat okay. Can't lie on stomach due to back pain. Standing and walking gets radiating pain to both legs. Reviewed adjacent segment disease L2-3 as well as residual narrowing and stenosis L3-5. Plan for decompression L2-5 lami's and hardware removal L3-5, new instrumentation at L2 then L2-5 screws and rods, possible interbody at L2-3. Discussed risks of spinal fluid leaks, has had previous left L5-S1 lami and right L4-5 lami in past. Has congenitally narrow spinal canal. Reviewed all this with patient and wife.

## 2019-09-17 LAB
ANION GAP SERPL CALC-SCNC: 6 MMOL/L (ref 5–15)
APPEARANCE UR: CLEAR
BACTERIA SPEC CULT: NORMAL
BACTERIA SPEC CULT: NORMAL
BACTERIA URNS QL MICRO: NEGATIVE /HPF
BILIRUB UR QL: NEGATIVE
BUN SERPL-MCNC: 6 MG/DL (ref 6–20)
BUN/CREAT SERPL: 8 (ref 12–20)
CALCIUM SERPL-MCNC: 7.8 MG/DL (ref 8.5–10.1)
CHLORIDE SERPL-SCNC: 111 MMOL/L (ref 97–108)
CO2 SERPL-SCNC: 27 MMOL/L (ref 21–32)
COLOR UR: ABNORMAL
CREAT SERPL-MCNC: 0.78 MG/DL (ref 0.7–1.3)
EPITH CASTS URNS QL MICRO: ABNORMAL /LPF
GLUCOSE BLD STRIP.AUTO-MCNC: 122 MG/DL (ref 65–100)
GLUCOSE BLD STRIP.AUTO-MCNC: 175 MG/DL (ref 65–100)
GLUCOSE BLD STRIP.AUTO-MCNC: 94 MG/DL (ref 65–100)
GLUCOSE BLD STRIP.AUTO-MCNC: 99 MG/DL (ref 65–100)
GLUCOSE SERPL-MCNC: 113 MG/DL (ref 65–100)
GLUCOSE UR STRIP.AUTO-MCNC: NEGATIVE MG/DL
HGB BLD-MCNC: 8.5 G/DL (ref 12.1–17)
HGB UR QL STRIP: NEGATIVE
HYALINE CASTS URNS QL MICRO: ABNORMAL /LPF (ref 0–5)
KETONES UR QL STRIP.AUTO: NEGATIVE MG/DL
LEUKOCYTE ESTERASE UR QL STRIP.AUTO: ABNORMAL
NITRITE UR QL STRIP.AUTO: NEGATIVE
PH UR STRIP: 5.5 [PH] (ref 5–8)
POTASSIUM SERPL-SCNC: 3.2 MMOL/L (ref 3.5–5.1)
PROT UR STRIP-MCNC: NEGATIVE MG/DL
RBC #/AREA URNS HPF: ABNORMAL /HPF (ref 0–5)
SERVICE CMNT-IMP: ABNORMAL
SERVICE CMNT-IMP: ABNORMAL
SERVICE CMNT-IMP: NORMAL
SODIUM SERPL-SCNC: 144 MMOL/L (ref 136–145)
SP GR UR REFRACTOMETRY: 1.02 (ref 1–1.03)
UROBILINOGEN UR QL STRIP.AUTO: 0.2 EU/DL (ref 0.2–1)
WBC URNS QL MICRO: ABNORMAL /HPF (ref 0–4)

## 2019-09-17 PROCEDURE — 82962 GLUCOSE BLOOD TEST: CPT

## 2019-09-17 PROCEDURE — 80048 BASIC METABOLIC PNL TOTAL CA: CPT

## 2019-09-17 PROCEDURE — 74011250636 HC RX REV CODE- 250/636: Performed by: NURSE PRACTITIONER

## 2019-09-17 PROCEDURE — 97116 GAIT TRAINING THERAPY: CPT

## 2019-09-17 PROCEDURE — 74011250637 HC RX REV CODE- 250/637: Performed by: NURSE PRACTITIONER

## 2019-09-17 PROCEDURE — 85018 HEMOGLOBIN: CPT

## 2019-09-17 PROCEDURE — 74011000250 HC RX REV CODE- 250: Performed by: NURSE PRACTITIONER

## 2019-09-17 PROCEDURE — 65270000029 HC RM PRIVATE

## 2019-09-17 PROCEDURE — 97165 OT EVAL LOW COMPLEX 30 MIN: CPT

## 2019-09-17 PROCEDURE — 77030027138 HC INCENT SPIROMETER -A

## 2019-09-17 PROCEDURE — 97535 SELF CARE MNGMENT TRAINING: CPT

## 2019-09-17 PROCEDURE — 81001 URINALYSIS AUTO W/SCOPE: CPT

## 2019-09-17 PROCEDURE — 97530 THERAPEUTIC ACTIVITIES: CPT

## 2019-09-17 PROCEDURE — 94760 N-INVAS EAR/PLS OXIMETRY 1: CPT

## 2019-09-17 PROCEDURE — 74011250636 HC RX REV CODE- 250/636: Performed by: ORTHOPAEDIC SURGERY

## 2019-09-17 PROCEDURE — 87086 URINE CULTURE/COLONY COUNT: CPT

## 2019-09-17 PROCEDURE — 74011636637 HC RX REV CODE- 636/637: Performed by: NURSE PRACTITIONER

## 2019-09-17 PROCEDURE — 36415 COLL VENOUS BLD VENIPUNCTURE: CPT

## 2019-09-17 RX ORDER — TAMSULOSIN HYDROCHLORIDE 0.4 MG/1
0.4 CAPSULE ORAL DAILY
Status: DISCONTINUED | OUTPATIENT
Start: 2019-09-17 | End: 2019-09-17 | Stop reason: SDUPTHER

## 2019-09-17 RX ADMIN — OXYCODONE HYDROCHLORIDE 10 MG: 5 TABLET ORAL at 16:03

## 2019-09-17 RX ADMIN — METOCLOPRAMIDE HYDROCHLORIDE 10 MG: 10 TABLET ORAL at 11:25

## 2019-09-17 RX ADMIN — OXYCODONE HYDROCHLORIDE 10 MG: 5 TABLET ORAL at 09:34

## 2019-09-17 RX ADMIN — ATORVASTATIN CALCIUM 40 MG: 20 TABLET, FILM COATED ORAL at 08:44

## 2019-09-17 RX ADMIN — Medication 10 ML: at 06:00

## 2019-09-17 RX ADMIN — KETOROLAC TROMETHAMINE 15 MG: 30 INJECTION, SOLUTION INTRAMUSCULAR at 06:33

## 2019-09-17 RX ADMIN — METFORMIN HYDROCHLORIDE 1000 MG: 500 TABLET ORAL at 08:44

## 2019-09-17 RX ADMIN — KETOROLAC TROMETHAMINE 15 MG: 30 INJECTION, SOLUTION INTRAMUSCULAR at 13:02

## 2019-09-17 RX ADMIN — ATENOLOL 50 MG: 50 TABLET ORAL at 08:42

## 2019-09-17 RX ADMIN — ACETAMINOPHEN 1000 MG: 500 TABLET ORAL at 06:33

## 2019-09-17 RX ADMIN — Medication 10 ML: at 18:32

## 2019-09-17 RX ADMIN — INSULIN LISPRO 2 UNITS: 100 INJECTION, SOLUTION INTRAVENOUS; SUBCUTANEOUS at 18:31

## 2019-09-17 RX ADMIN — METOCLOPRAMIDE HYDROCHLORIDE 10 MG: 10 TABLET ORAL at 16:15

## 2019-09-17 RX ADMIN — SODIUM CHLORIDE 125 ML/HR: 900 INJECTION, SOLUTION INTRAVENOUS at 06:37

## 2019-09-17 RX ADMIN — OXYCODONE HYDROCHLORIDE 10 MG: 5 TABLET ORAL at 00:48

## 2019-09-17 RX ADMIN — WATER 2 G: 1 INJECTION INTRAMUSCULAR; INTRAVENOUS; SUBCUTANEOUS at 11:25

## 2019-09-17 RX ADMIN — LEVOFLOXACIN 500 MG: 5 INJECTION, SOLUTION INTRAVENOUS at 08:45

## 2019-09-17 RX ADMIN — CHOLECALCIFEROL TAB 125 MCG (5000 UNIT) 5000 UNITS: 125 TAB at 08:45

## 2019-09-17 RX ADMIN — FAMOTIDINE 20 MG: 20 TABLET ORAL at 18:30

## 2019-09-17 RX ADMIN — FAMOTIDINE 20 MG: 20 TABLET ORAL at 08:45

## 2019-09-17 RX ADMIN — VALSARTAN 80 MG: 80 TABLET, FILM COATED ORAL at 21:33

## 2019-09-17 RX ADMIN — MORPHINE SULFATE 2 MG: 4 INJECTION, SOLUTION INTRAMUSCULAR; INTRAVENOUS at 02:56

## 2019-09-17 RX ADMIN — SENNOSIDES,DOCUSATE SODIUM 1 TABLET: 8.6; 5 TABLET, FILM COATED ORAL at 18:30

## 2019-09-17 RX ADMIN — POLYETHYLENE GLYCOL 3350 17 G: 17 POWDER, FOR SOLUTION ORAL at 08:45

## 2019-09-17 RX ADMIN — ACETAMINOPHEN 1000 MG: 500 TABLET ORAL at 13:02

## 2019-09-17 RX ADMIN — Medication 10 ML: at 22:00

## 2019-09-17 RX ADMIN — WATER 2 G: 1 INJECTION INTRAMUSCULAR; INTRAVENOUS; SUBCUTANEOUS at 02:59

## 2019-09-17 RX ADMIN — METOCLOPRAMIDE HYDROCHLORIDE 10 MG: 10 TABLET ORAL at 06:33

## 2019-09-17 RX ADMIN — OXYCODONE HYDROCHLORIDE 10 MG: 5 TABLET ORAL at 06:33

## 2019-09-17 RX ADMIN — ACETAMINOPHEN 1000 MG: 500 TABLET ORAL at 19:44

## 2019-09-17 RX ADMIN — OXYCODONE HYDROCHLORIDE 10 MG: 5 TABLET ORAL at 21:33

## 2019-09-17 RX ADMIN — SENNOSIDES,DOCUSATE SODIUM 1 TABLET: 8.6; 5 TABLET, FILM COATED ORAL at 08:45

## 2019-09-17 RX ADMIN — TAMSULOSIN HYDROCHLORIDE 0.4 MG: 0.4 CAPSULE ORAL at 21:33

## 2019-09-17 NOTE — OP NOTES
[facility] 81  OPERATIVE REPORT    Name:  Alize Hedrick  MR#:  [id number]  :  1940  ACCOUNT #:  [de-identified]  DATE OF SERVICE:  2019    PREOPERATIVE DIAGNOSES:  1. 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 2.  History of previous lumbar fusion, L3-5.  3.  History of previous laminectomy, L5-S1 on the left and L4-5 on the right. 4.  Lumbar spinal stenosis. 5.  Congenitally narrow spinal canal.    POSTOPERATIVE DIAGNOSES:  1. 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 2.  History of previous lumbar fusion, L3-5.  3.  History of previous laminectomy, L5-S1 on the left and L4-5 on the right. 4.  Lumbar spinal stenosis. 5.  Congenitally narrow spinal canal.    PROCEDURES PERFORMED:  1. L2 complete laminectomy with bilateral medial facetectomies and foraminotomies. 2.  L3 complete laminectomy with bilateral medial facetectomies and foraminotomies. 3.  L4 laminectomy with medial facetectomies. 4.  L2-3 transforaminal lumbar interbody fusion. 5.  Insertion of RTI TETRAfuse structural cage, size 12 x 32 mm in L2-L3 packed with morselized allograft. 6.  Bilateral pedicle screw instrumentation from L2-5 utilizing the Medtronic SOLERA system with 100-mm penny bilaterally, 6.5 x 50 mm screw on the left, 6.5 x 45-mm screw on the right at L2.  7.  Hardware removal, L3-5.  8.  Iliac crest bone marrow aspirate from right iliac crest through a separate fascial incision. 9.  Use of morselized allograft, OsteoAMP, Signafuse, and local autograft bone for posterolateral and interbody fusion. 10.  Use of stereotactic navigation Xylo O-arm for spinal instrumentation. SURGEON:  Yonathan Kaur MD    ASSISTANT:  Trevon Robin NP    ANESTHESIA:  General.    COMPLICATIONS:  None. SPECIMENS REMOVED:  none. IMPLANTS:  Medtronic solera screws and rods, RTI tetrafuse structural interbody cage. Keiry Hart     ESTIMATED BLOOD LOSS:  500 mL, 125 returned in Víctor. DRAINS:  One medium Hemovac. INDICATIONS:  The patient is a very pleasant 82-year-old male who had previously undergone two laminectomies while in [facility] in the remote past.  He subsequently underwent a L3-5 oblique and posterior fusion about two years ago and had done well. He developed recurrent pain in his back with radiation down both legs, left and right as well as stiffness, difficulty with standing and walking. No leg pain. Plain x-rays demonstrated that his fusion appeared to be taken pretty solidly, L3-5 but that MRI demonstrated significant junctional stenosis at L2-L3 as well as congenitally slender spinal canal from L1 to L5 with some residual stenosis although it is relatively mild at L3-4 and L4-5 despite previous anterior decompression. He failed conservative measures including bracing, therapy, multiple epidural steroid injections. We discussed risks and benefits of adjacent segment decompression and fusion but also decompression of his spinal canal and enlargement at L3-4 and L4-5 as well. We discussed the risks and benefits of surgery and he wished to proceed. The patient was originally planned for surgery last week; however on presentation had fever with elevated white count. He had subsequent followup with his primary care physician and determined to have urinary tract infection upon return to the ER. He had been on IV antibiotics and then p.o. antibiotics and had resolution of chills, fevers, and was about date for antibiotic therapy for his urinary tract infection. He reported dysuria, off and on in the past week but none since starting the antibiotics four days ago. He had a history of kidney stones in the remote past and a history of one to two time nocturia. On followup this morning he has not had any chills, dysuria, he was afebrile and was stable to proceed with surgery. OPERATIVE NOTE:  The patient identified in the preoperative area.   H and P and consent form were updated. Lumbar spine was marked as the site of surgery. SCDs were applied in the preoperative area. We once again reviewed the risks and benefits and he wished to proceed. The patient was taken to the operating room. Once in the operating room, he did receive antibiotics. He received Ancef as well as an IV dose of Levaquin. His E. coli urinary tract infection was resistant to Ancef but was susceptible to Levaquin. Additionally, the patient underwent general anesthetic by the Anesthesia team.  Collins catheter was then inserted. Initially, there demonstrated to be obstruction at the level of the prostatic urethra. I utilized a 12-mm Coude catheter and was essentially able to be placed with good urine outflow. We then proceeded with placement of SSEP and free-running EMG and neurologic monitoring electrodes and baselines were obtained. The patient was then gently log rolled onto the [facility] frame and all bony prominences well padded including the cubital and carpal tunnels, iliac crest, knees, and ankles. The patient was then sterilely prepped and draped in the usual standard fashion. Surgical time-out was performed. I then marked the patient's previous incisions. He has had bilateral Toro incisions for his instrumentation and a previous midline incision from his previous L4-S1 laminectomies in the past.  We utilized the midline incision and proceeded to extend this proximally. Careful sharp dissection was taken down to the subcutaneous tissue. The patient appeared to have some previous posterior onlay fusion over the L4-5 lamina. I exposed the transverse process of L2 as well as the previous hardware from L3-L5. The interspinous ligament was quite degenerative between L2 and L3 and there was obvious events of sclerosis to the bone here and the facet joints bilaterally had dorsal facet cyst.  At this point, I exposed the fusion mass posterolaterally L3-5.   I then took down the facet joint capsules and took down the facets of L2-3 bilaterally to the dorsal part. At this point, I then proceeded to place the Medtronic spinous process clamp on L5. I then made a separate incision to the fascia over the top of the PSIS on the right. Through this separate fascial incision, I exposed the PSIS and then utilized the Jamshidi needle into the PSIS. I removed approximately 25 mL of bone marrow aspirate. FloSeal was utilized for hemostasis and this was removed. The fascia was then reapproximated. We had excellent hemostasis. At this point, I then proceeded to run my first Medtronic O-arm spin. After confirming excellent images, I then proceeded to utilize the navigating instruments to make  holes for pedicle screws at L2 bilaterally. I utilized a navigated bur followed by navigated tap and ultimately placed navigating screws bilaterally at L2. I probed both  holes. There was no evidence of breach. I then applied both L2 screws with stereotactic navigation. I then proceeded to remove the posterior hardware from L3-5, removing the end caps and the rods from all six screws. At this point, I then proceeded with the decompression portion of the procedure. Given the marked central stenosis at L2-3, this required central laminectomy at L2. I proceeded with complete central laminectomy at L2 all the way up to the L1-2 junction and removed the ligamentum flavum between L1 and L2. This was then widened out to the L2 pedicles and then, foraminotomy was performed both on the right and left hand side. There was severe foraminal stenosis bilaterally and I took the decompression until the L2 roots were fully decompressed. I then proceeded down and removed the L3 central lamina.   This decompressed the traversing L3 root and then there was some residual mild central stenosis here due to a congenitally small spinal canal.  The entirety of the L3 lamina was then removed down to the L3-4 joint and the L4 pedicle. I then palpated the L4 pedicles side to side on the right hand side. There had been previous decompression here on the left hand side. I removed the remainder of the L4 lamina to fully decompress the traversing L4 root. There was some ligamentum flavum residual overtop in the midline at L4-5 and L3-4 and this was removed to allow for central decompression. After decompression, neuromonitoring was improved with SSEPs in bilateral lower extremities. At this point, I then returned up to the left hand side at L2-3. I took out then the remainder of the inferior and superior facets exposing the posterolateral disk space within the foraminal zone. I then utilized the nerve root retractor to coagulate several epidural veins. I then incised the disk annulus with 11-blade and then proceeded with thorough diskectomy here. After preparation of disk space at L2-3, I then proceeded with my second O-arm spin. This confirmed excellent placement of the L2 screws and at the L3, L4, and L5 screws showed no evidence of hardware issue or failure. We had good interbody bone growth from both the L3-4 and L4-5 interbody cages that were anterior. At this point, I then proceeded to densely pack the L2-3 disk space with morselized  allograft. I then trialed up to a size 12 x 32, had the best fit and fill. I then packed an RTI TETRAfuse cage with morselized allograft and this was tamped into place at L2-3 under fluoroscopic guidance. It was tamped across the midline. We had excellent restoration of disk space height. I then proceeded with final positioning under fluoroscopy. There was no evidence of neurologic impingement. I then proceeded to place two lordotic rods measuring 100 mm spanning from L2 down to L5 and then proceeded to apply end caps and then torqued and counter-torqued within [facility] specifications.   After placement of penny and end caps, I then proceeded to repalpate the foramina of L2-3 bilaterally. These were widely patent. The L3-4 foramen was widely patent as well as the central canal at the L1-2 junction, the L2, L3, and L4. There was no evidence of spinal fluid leak. I then proceeded to place Duragen over the exposed neural elements. I then proceeded to decorticate the transverse processes of L2 as well as the fusion mass at L3-4 and then proceeded to place my morselized allograft directly to the decorticated surfaces posterolaterally. Morselized allograft was mixed with vancomycin powder. Final AP and lateral fluoroscopic radiographs were obtained demonstrating excellent lordosis and safe position of all hardware. I then proceeded to place a deep drain. Local anesthetic solution was placed into the deep paraspinal musculature and I proceeded to place closure of the fascia with #1 Stratafix suture followed by layered closure with 2-0 Vicryl and 3-0 Stratafix and Dermabond. As this was a previous surgery, I did utilize JULIUS wound VAC over top of the closed incision as my final dressing as he was a multilevel revision surgery to prevent fluid collections. At the conclusion, SSEPs were higher than at baseline with improved amplitude. The patient tolerated the procedure well and was taken to the recovery room in stable condition. I discussed the surgery as well as the intraoperative findings including the findings with the catheter. We will plan for ongoing treatments with antibiotics for his E. coli urinary tract infection for 10 days as this was a refractory organism. We will re-culture prior to removal of his catheter and we discussed that he should have followup with Urology over the next several weeks as well. Trevon Robin, nurse practitioner, she was present and scrubbed for the entire procedure, and provided assistance with exposure, retraction, and wound closure.         MD HERMELINDA Almonte/[email or web address]  D:  09/16/2019 14:42  T:  09/17/2019 1:37  JOB #:  [id number]  CC: Clemente Can MD

## 2019-09-17 NOTE — PROGRESS NOTES
Problem: Mobility Impaired (Adult and Pediatric)  Goal: *Acute Goals and Plan of Care (Insert Text)  Description  FUNCTIONAL STATUS PRIOR TO ADMISSION: Patient was modified independent using a back brace for functional mobility. HOME SUPPORT PRIOR TO ADMISSION: The patient lived with wife but did not require assist.    Physical Therapy Goals  Initiated 9/16/2019    1. Patient will move from supine to sit and sit to supine  in bed with independence within 4 days. 2. Patient will perform sit to stand with modified independence within 4 days. 3. Patient will ambulate with modified independence for 100 feet with the least restrictive device within 4 days. 4. Patient will ascend/descend 1 stairs with 1 handrail(s) with minimal assistance/contact guard assist within 4 days. 5. Patient will verbalize and demonstrate understanding of spinal precautions (No bending, lifting greater than 5 lbs, or twisting; log-roll technique; frequent repositioning as instructed) within 4 days. Note:   PHYSICAL THERAPY TREATMENT  Patient: Radha Griffiths (48 y.o. male)  Date: 9/17/2019  Diagnosis: Adjacent segment disease with spinal stenosis [M48.00] <principal problem not specified>  Procedure(s) (LRB):  L2-5 LAMINECTOMY, L2-3 POSTERIOR LUMBER INTERBODY FUSION WITH L2-5 INSTRUMENTATION, ILIAC CREST BONE MARROW ASPIRATE, OSTEOAMP FIBERS, IMAGE GUIDANCE (N/A) 1 Day Post-Op  Precautions: Back, Contact, Fall No bending, no lifting greater than 5 lbs, no twisting, log-roll technique, repositioning every 20-30 min except when sleeping, brace when OOB (if ordered)  Chart, physical therapy assessment, plan of care and goals were reviewed. ASSESSMENT  Based on the objective data described below, patient presents with decreased tolerance for activity and decreased standing balance but improved ability today to mobilize out of bed and ambulate in hallways as well as tolerate up to chair.  Patient 's BP measured for orthostatic hypotension and although patient slightly symptomatic upon standing, with reported dizziness , his BP were stable and negative for hypotension today. He does tend to desaturate to 85% on RA with activity but rebounds to 92% or > within less than 1 minute of cued PLB. He remained on RA for activity and ambulated 250'+ with SPO2 >92% with prompted intermittent rest breaks. Attained a new IS for patient to use inpatient (his is at home) and placed back on 3L for rest up in chair. Wife is at his side and patient in NAD while itting up in chair. Wife brougt LSO brace today which patient donned with min assist x 2 and education completed regarding back precautions and fall prevention. Both patient and his wife indicated good understanding of education provided. Anticipate patient will do well and be able to discharge to home with follow up HHPT as needed. Current Level of Function Impacting Discharge (mobility/balance): previous 3 back surgeries       PLAN :  Patient continues to benefit from skilled intervention to address the above impairments. Continue treatment per established plan of care. to address goals. Recommendation for discharge: (in order for the patient to meet his/her long term goals)  Physical therapy at least 2 days/week in the home     This discharge recommendation:  Has not yet been discussed the attending provider and/or case management    Equipment recommendations for successful discharge (if) home: none       SUBJECTIVE:   Patient stated I will try with you dear    OBJECTIVE DATA SUMMARY:   Critical Behavior:  Neurologic State: Alert  Orientation Level: Oriented X4     Safety/Judgement: Good awareness of safety precautions    Spinal diagnosis intervention:  Reviewed back brace application and wear schedule. Brace donned with minimal assistance x 2  Recalls 3 of 4 spine precautions, reviewed all. Functional Mobility Training:    Bed Mobility:  Log Rolling: Supervision; Additional time  Supine to Sit: Minimum assistance; Additional time;Assist x1(up to chair)     Scooting: Stand-by assistance    Transfers:  Sit to Stand: Contact guard assistance; Additional time;Assist x1  Stand to Sit: Contact guard assistance; Additional time;Assist x1  Stand Pivot Transfers: Contact guard assistance; Additional time;Assist x1     Bed to Chair: Contact guard assistance; Additional time;Assist x1          Balance:  Sitting: Intact; Without support  Standing: Intact; With support  Ambulation/Gait Training:  Distance (ft): 125 Feet (ft)(x 2)  Assistive Device: Brace/Splint;Gait belt;Walker, rolling(wife brought in LSO brace)  Ambulation - Level of Assistance: Contact guard assistance; Additional time;Assist x1  Gait Abnormalities: Antalgic;Decreased step clearance  Speed/Pat: Slow      Activity Tolerance:   Good and desaturates with exertion and requires oxygen  Please refer to the flowsheet for vital signs taken during this treatment.     After treatment patient left in no apparent distress:   Sitting in chair, Call bell within reach and Caregiver / family present    COMMUNICATION/COLLABORATION:   The patients plan of care was discussed with: Occupational Therapist and Registered Nurse    Clayton Cisneros PT, DPT   Time Calculation: 24 mins

## 2019-09-17 NOTE — PROGRESS NOTES
Problem: Mobility Impaired (Adult and Pediatric)  Goal: *Acute Goals and Plan of Care (Insert Text)  Description  FUNCTIONAL STATUS PRIOR TO ADMISSION: Patient was modified independent using a back brace for functional mobility. HOME SUPPORT PRIOR TO ADMISSION: The patient lived with wife but did not require assist.    Physical Therapy Goals  Initiated 9/16/2019    1. Patient will move from supine to sit and sit to supine  in bed with independence within 4 days. 2. Patient will perform sit to stand with modified independence within 4 days. 3. Patient will ambulate with modified independence for 100 feet with the least restrictive device within 4 days. 4. Patient will ascend/descend 1 stairs with 1 handrail(s) with minimal assistance/contact guard assist within 4 days. 5. Patient will verbalize and demonstrate understanding of spinal precautions (No bending, lifting greater than 5 lbs, or twisting; log-roll technique; frequent repositioning as instructed) within 4 days. Note:   PHYSICAL THERAPY TREATMENT  Patient: Duyen Oliva (5 y.o. male)  Date: 9/17/2019  Diagnosis: Adjacent segment disease with spinal stenosis [M48.00] <principal problem not specified>  Procedure(s) (LRB):  L2-5 LAMINECTOMY, L2-3 POSTERIOR LUMBER INTERBODY FUSION WITH L2-5 INSTRUMENTATION, ILIAC CREST BONE MARROW ASPIRATE, OSTEOAMP FIBERS, IMAGE GUIDANCE (N/A) 1 Day Post-Op  Precautions: Back, Contact, Fall  Chart, physical therapy assessment, plan of care and goals were reviewed. ASSESSMENT  Based on the objective data described below, Pt supine to sit to stand with CGA. Pt donned back brace with min assist.Pt ambulated 180ft with RW CGA. Pt left sitting. Pt mobilizing well but de-sats on RA with mobility. Pt was given 2L to increase SPO2 to 94% with mobility. Pt left sitting. Continue goals. Current Level of Function Impacting Discharge (mobility/balance):Pt is CGA for mobility.              PLAN :  Patient continues to benefit from skilled intervention to address the above impairments. Continue treatment per established plan of care. to address goals. Recommendation for discharge: (in order for the patient to meet his/her long term goals)  Physical therapy at least 2 days/week in the home     This discharge recommendation:  Has not yet been discussed the attending provider and/or case management    Equipment recommendations for successful discharge (if) home: rolling walker       SUBJECTIVE:       OBJECTIVE DATA SUMMARY:   Critical Behavior:  Neurologic State: Alert  Orientation Level: Oriented X4     Safety/Judgement: Good awareness of safety precautions  Functional Mobility Training:  Bed Mobility:  Rolling: Supervision; Additional time  Supine to Sit: Contact guard assistance  Sit to Supine: Contact guard assistance  Scooting: Stand-by assistance        Transfers:  Sit to Stand: Contact guard assistance  Stand to Sit: Contact guard assistance  Stand Pivot Transfers: Contact guard assistance; Additional time;Assist x1     Bed to Chair: Stand-by assistance                    Balance:  Sitting: Intact  Standing: Intact; With support  Ambulation/Gait Training:  Distance (ft): 180 Feet (ft)  Assistive Device: Gait belt;Walker, rolling  Ambulation - Level of Assistance: Contact guard assistance        Gait Abnormalities: Decreased step clearance              Speed/Pat: Pace decreased (<100 feet/min)                  Activity Tolerance:   Good  Please refer to the flowsheet for vital signs taken during this treatment.     After treatment patient left in no apparent distress:   Sitting in chair    COMMUNICATION/COLLABORATION:   The patients plan of care was discussed with: Physical Therapist    Candance Or, PTA   Time Calculation: 23 mins

## 2019-09-17 NOTE — PROGRESS NOTES
Bedside shift change report given to [facility] [address] [place] [address] nurse) by GIOVANNI Kang (offgoing nurse).  Report included the following information SBAR, MAR and Recent Results.

## 2019-09-17 NOTE — PROGRESS NOTES
Problem: Diabetes Self-Management  Goal: *Disease process and treatment process  Description  Define diabetes and identify own type of diabetes; list 3 options for treating diabetes. Outcome: Progressing Towards Goal  Goal: *Incorporating nutritional management into lifestyle  Description  Describe effect of type, amount and timing of food on blood glucose; list 3 methods for planning meals. Outcome: Progressing Towards Goal  Goal: *Incorporating physical activity into lifestyle  Description  State effect of exercise on blood glucose levels. Outcome: Progressing Towards Goal  Goal: *Developing strategies to promote health/change behavior  Description  Define the ABC's of diabetes; identify appropriate screenings, schedule and personal plan for screenings. Outcome: Progressing Towards Goal  Goal: *Using medications safely  Description  State effect of diabetes medications on diabetes; name diabetes medication taking, action and side effects. Outcome: Progressing Towards Goal  Goal: *Monitoring blood glucose, interpreting and using results  Description  Identify recommended blood glucose targets  and personal targets. Outcome: Progressing Towards Goal  Goal: *Prevention, detection, treatment of acute complications  Description  List symptoms of hyper- and hypoglycemia; describe how to treat low blood sugar and actions for lowering  high blood glucose level. Outcome: Progressing Towards Goal  Goal: *Prevention, detection and treatment of chronic complications  Description  Define the natural course of diabetes and describe the relationship of blood glucose levels to long term complications of diabetes.   Outcome: Progressing Towards Goal  Goal: *Developing strategies to address psychosocial issues  Description  Describe feelings about living with diabetes; identify support needed and support network  Outcome: Progressing Towards Goal  Goal: *Insulin pump training  Outcome: Progressing Towards Goal  Goal: *Sick day guidelines  Outcome: Progressing Towards Goal  Goal: *Patient Specific Goal (EDIT GOAL, INSERT TEXT)  Outcome: Progressing Towards Goal     Problem: Patient Education: Go to Patient Education Activity  Goal: Patient/Family Education  Outcome: Progressing Towards Goal     Problem: Falls - Risk of  Goal: *Absence of Falls  Description  Document [place] Fall Risk and appropriate interventions in the flowsheet. Outcome: Progressing Towards Goal  Note:   Fall Risk Interventions:  Mobility Interventions: Bed/chair exit alarm         Medication Interventions: Bed/chair exit alarm                   Problem: Patient Education: Go to Patient Education Activity  Goal: Patient/Family Education  Outcome: Progressing Towards Goal     Problem: Risk for Spread of Infection  Goal: Prevent transmission of infectious organism to others  Description  Prevent the transmission of infectious organisms to other patients, staff members, and visitors. Outcome: Progressing Towards Goal     Problem: Patient Education:  Go to Education Activity  Goal: Patient/Family Education  Outcome: Progressing Towards Goal     Problem: Patient Education: Go to Patient Education Activity  Goal: Patient/Family Education  Outcome: Progressing Towards Goal     Problem: Pressure Injury - Risk of  Goal: *Prevention of pressure injury  Description  Document Darion Scale and appropriate interventions in the flowsheet. Outcome: Progressing Towards Goal  Note:   Pressure Injury Interventions:             Activity Interventions: Pressure redistribution bed/mattress(bed type)    Mobility Interventions: HOB 30 degrees or less    Nutrition Interventions: Document food/fluid/supplement intake                     Problem: Patient Education: Go to Patient Education Activity  Goal: Patient/Family Education  Outcome: Progressing Towards Goal

## 2019-09-17 NOTE — PROGRESS NOTES
OCCUPATIONAL THERAPY EVALUATION/DISCHARGE  Patient: Roxana Del Rio (71 y.o. male)  Date: 9/17/2019  Primary Diagnosis: Adjacent segment disease with spinal stenosis [M48.00]  Procedure(s) (LRB):  L2-5 LAMINECTOMY, L2-3 POSTERIOR LUMBER INTERBODY FUSION WITH L2-5 INSTRUMENTATION, ILIAC CREST BONE MARROW ASPIRATE, OSTEOAMP FIBERS, IMAGE GUIDANCE (N/A) 1 Day Post-Op   Precautions:   Back, Contact, Fall    ASSESSMENT  Based on the objective data described below, the patient presents with ability to complete basic ADL tasks with SBA-supervision with RW and dressing aides s/p L2-L5 laminectomy, L2-3 posterior lumbar fusion POD 1. This is his fourth back surgery and he has good understanding of back precautions, brace wear, dressing compensatory techniques, and home safety. Received shivering in chair, agreeable to therapy. Nursing present and aware of shivering and weaning from supplemental O2. Spo2 94% pre and post activity. Amb to bathroom with RW with SBA, transfer to toilet with SBA. Retutrned to bed with supervision to doff brace and log rolling to supine. He owns dressing aides and denies need for review. Cleared from OT standpoint with support from wife. Current Level of Function (ADLs/self-care): SBA-supervision with RW and dressing aides    Other factors to consider for discharge: supportive wife, multiple back surgeries     PLAN :  Recommendation for discharge: (in order for the patient to meet his/her long term goals)  No skilled occupational therapy/ follow up rehabilitation needs identified at this time. This discharge recommendation:  Has been made in collaboration with the attending provider and/or case management    Equipment recommendations for successful discharge: patient owns DME required for discharge       SUBJECTIVE:   Patient stated I have done this before.     OBJECTIVE DATA SUMMARY:   HISTORY:   Past Medical History:   Diagnosis Date    CAD (coronary artery disease)     Degenerative joint disease 1/28/2011    Diabetes mellitus ([facility] [address]     Essential hypertension     Hyperlipidemia     Kidney stones 1969    Obstructive sleep apnea 01/28/2011    Stopped using CPAP years ago     Past Surgical History:   Procedure Laterality Date    HX CARPAL TUNNEL RELEASE      HX CORONARY STENT PLACEMENT Left 1991    x 3    HX HIP REPLACEMENT Bilateral 2009 & 2015    HX HIP REPLACEMENT Right 2012    Revision    HX LITHOTRIPSY N/A 1969    HX LUMBAR DISKECTOMY N/A 1978    L 4-5    HX LUMBAR FUSION N/A 2018    HX LUMBAR LAMINECTOMY  1971    L 4-5       Prior Level of Function/Environment/Context: Home with wife. Indep ADL tasks. Hx of multiple back surgeries  Expanded or extensive additional review of patient history:     Home Situation  Home Environment: Private residence  # Steps to Enter: 1  Rails to Enter: Yes  One/Two Story Residence: Split level(3 story but can stay on 1st floor)  Living Alone: No  Support Systems: Spouse/Significant Other/Partner  Patient Expects to be Discharged to[de-identified] Private residence  Current DME Used/Available at Home: Brace/Splint, Adaptive dressing aides, Cane, straight, Shower chair, Walker, rolling, Commode, bedside  Tub or Shower Type: Shower    Hand dominance: Right    EXAMINATION OF PERFORMANCE DEFICITS:  Cognitive/Behavioral Status:  Neurologic State: Alert  Orientation Level: Oriented X4       Hearing: Auditory  Auditory Impairment: None              Range of Motion:  BUE WDL                      Strength:  BUE WDL                   Coordination:     Fine Motor Skills-Upper: Left Intact; Right Intact    Gross Motor Skills-Upper: Left Intact; Right Intact    Tone & Sensation:  Denies numbness or tingling                              Balance:  Sitting: Intact; Without support  Standing: Intact; With support    Functional Mobility and Transfers for ADLs:  Bed Mobility:  Rolling: Supervision; Additional time  Supine to Sit: Minimum assistance; Additional time;Assist x1(up to chair)  Scooting: Stand-by assistance    Transfers:  Sit to Stand: Stand-by assistance  Stand to Sit: Stand-by assistance  Bed to Chair: Stand-by assistance  Bathroom Mobility: Stand-by assistance(RW)  Toilet Transfer : Stand-by assistance    ADL Assessment:  Feeding: Independent    Oral Facial Hygiene/Grooming: Stand-by assistance    Bathing: Stand-by assistance; Additional time; Adaptive equipment    Upper Body Dressing: Supervision(doff brace)    Lower Body Dressing: Supervision; Adaptive equipment(denies need for review with patient)    ADL Intervention and task modifications:     Grooming  Washing Hands: Compensatory technique training     Lower Body Dressing Assistance  Socks: Compensatory technique training(denies need for ed on AD)    Toileting  Bladder Hygiene: Compensatory technique training  Bowel Hygiene: Compensatory technique training       Patient instructed and demonstrated 3/3 spine precautions with minimal cues. Patient instructed and indicated understanding the benefits of maintaining activity tolerance, functional mobility, and independence with self care tasks during acute stay  to ensure safe return home and to baseline. Encouraged patient to increase frequency and duration OOB, not sitting longer than 30 mins without marching/walking with staff, be out of bed for all meals, perform daily ADLs (as approved by RN/MD regarding bathing etc), and performing functional mobility to/from bathroom. Patient instruction and indicated understanding on body mechanics, ergonomics and gravitational force on the spine during different body positions to plan activities in prep for return home to complete basic ADLs, instrumental ADLs and back to work safely. Bathing: Patient instructed and indicated understanding when bathing to not submerge wound in water, follow MD instructions on when to return to shower.    Dressing brace: Patient instructed and demonstrated to don/doff veljaneto on brace seated EOB. Ed on wear schedule when OOB. Dressing lower body: Patient instructed to don brace first and on the benefits to remain seated to don all clothing to increase independence with precautions and pain management. Patient instructed and demonstrated tailor sitting for lower body dressing. Toileting: Patient issued hand out with information about pericare within back precautions including toilet tongs. Patient instruction and indicated understanding to not strain i.e. holding breath to bear down during a bowel movement, lifting/activity, and sexual activity. Home safety: Patient instructed and indicated understanding on home modifications and safety [raise height of ADL objects (i.e. clothing, sink items, fridge items, items to mouth when grooming), appropriate height of chair surfaces, recliner safety, change of floor surfaces, clear pathways] to increase independence and fall prevention. Standing: Patient instructed and indicated understanding to walk up to sink/counter top/surfaces, step into walker, square off while using objects, slide objects along surfaces, to increase adherence to back precautions and fall prevention. Patient instructed to increase amount of time standing in order to increase independence and tolerance with ADLs. During prolonged standing, can open cabinet door or place foot on stool to decrease spinal pressure/increase pain. Functional Measure:  Barthel Index:    Bathin  Bladder: 0  Bowels: 10  Groomin  Dressin  Feedin  Mobility: 5  Stairs: 5  Toilet Use: 10  Transfer (Bed to Chair and Back): 10  Total: 55/100        The Barthel ADL Index: Guidelines  1. The index should be used as a record of what a patient does, not as a record of what a patient could do. 2. The main aim is to establish degree of independence from any help, physical or verbal, however minor and for whatever reason.   3. The need for supervision renders the patient not independent. 4. A patient's performance should be established using the best available evidence. Asking the patient, friends/relatives and nurses are the usual sources, but direct observation and common sense are also important. However direct testing is not needed. 5. Usually the patient's performance over the preceding 24-48 hours is important, but occasionally longer periods will be relevant. 6. Middle categories imply that the patient supplies over 50 per cent of the effort. 7. Use of aids to be independent is allowed. Julian Zelaya., Barthel, D.W. ([id number]). Functional evaluation: the Barthel Index. [address] [facility] (14)2. DELORIS Bustamante, Shante Winkler., Torrie Weller., [place], [address] (1999). Measuring the change indisability after inpatient rehabilitation; comparison of the responsiveness of the Barthel Index and Functional [place] Measure. Journal of Neurology, Neurosurgery, and Psychiatry, 66(4), 203-183. Juni Fong, N.J.A, VISH Peter.DESHAWN, & Ilsa Stewart M.A. (2004.) Assessment of post-stroke quality of life in cost-effectiveness studies: The usefulness of the Barthel Index and the EuroQoL-5D.  Quality of Life Research, 15, 531-14         Occupational Therapy Evaluation Charge Determination   History Examination Decision-Making   LOW Complexity : Brief history review  LOW Complexity : 1-3 performance deficits relating to physical, cognitive , or psychosocial skils that result in activity limitations and / or participation restrictions  LOW Complexity : No comorbidities that affect functional and no verbal or physical assistance needed to complete eval tasks       Based on the above components, the patient evaluation is determined to be of the following complexity level: LOW   Pain Rating:  Did not rate- received pain meds prior to session    Activity Tolerance:   Fair, spO2 stable on RA pre and post ADL tasks  Please refer to the flowsheet for vital signs taken during this treatment. After treatment patient left in no apparent distress:    Supine in bed    COMMUNICATION/EDUCATION:   The patients plan of care was discussed with: Physical Therapist, Registered Nurse and .     Thank you for this referral.  Shaye Li OT  Time Calculation: 21 mins

## 2019-09-17 NOTE — PROGRESS NOTES
Assessed hemovac. It had been pulled out. Appeared to have wrapped around pt when he was turning during the night. He has been able to turn himself during my shift. Output was 70 cc sanguinous drainage.

## 2019-09-17 NOTE — ROUTINE PROCESS
Bedside shift change report given to Janice (oncoming nurse) by Nicole Kramer (offgoing nurse). Report included the following information SBAR, MAR and Recent Results.

## 2019-09-17 NOTE — PROGRESS NOTES
ORTHOPAEDIC LUMBAR FUSION PROGRESS NOTE    NAME:     Chacha Riojas   :       1940   MRN:       [id number]   DATE:      2019    POD:              1 Day Post-Op  S/P:              Procedure(s):  L2-5 LAMINECTOMY, L2-3 POSTERIOR LUMBER INTERBODY FUSION WITH L2-5 INSTRUMENTATION, ILIAC CREST BONE MARROW ASPIRATE, OSTEOAMP FIBERS, IMAGE GUIDANCE    SUBJECTIVE:    Reports improvement in preop  Lower back,BLE. Denies numbness/ tingling. He states,'I feel Like totally a different person\", No pain laying down, or laying on either side. Postop pain controlled  With medications. Tolerating PO liquid intake well. Got up and stood by the bed yesterday evening. Passing flatus , Denies abdominal pain/ discomfort. Guzman +  Denies nausea/vomiting, headache, chest pain or shortness of breath  Recent Labs     19  0618 19  0609   HGB 8.5* 11.0*   HCT  --  34.7*     --    K 3.2*  --    *  --    CO2 27  --    BUN 6  --    CREA 0.78  --    *  --      Patient Vitals for the past 12 hrs:   BP Temp Pulse Resp SpO2   19 0354 107/52 98.1 °F (36.7 °C) 74 16 97 %   19 2342 117/55 98.5 °F (36.9 °C) 64 16 97 %     Exam:  Positive strength/ROM bilat lower ext. Neuro intact to sensation  Dressings clean and dry  Hemovac: pulled out last night, output was 70 ml last shift and 450 since surgery. Lower extremities warm and well perfused      PLAN: 1 Day Post-Op, improved   Continue PO pain medications as needed  Continue SCD's, frequent ambulation  advance to diabetic diet. _  Continue antibiotics   Keep guzman in until patient is able to stand up and void by himself. Continue bowel regimen   Continue lumbar orthosis  Continue Vit D3  Medical comorbities stable   Discharge planning - tomorrow if patient clears PT. Seen and examined this am. Doing great, preop neurologic symptoms resolved. Additional history, patient reports 2-3 times per night nocturia.  Reports has to go to bathroom to urinate about every 3 hours at night. Discussed intraop findings of likely BPH requiring coudet catheter, this may be compounding his ability to clear the UTI. Discussed he should have followup with urology and likely cystoscopy. He needs to remain on antibiotic therapy for his Ecoli UTI until then. Will send a new specimen prior to catheter removal. Will also start flomax, reviewed this with him as well.

## 2019-09-17 NOTE — PROGRESS NOTES
9/17/2019 5:19 PM Discharge plan:  -Home with home health through [facility]  -Pt's wife will transport pt home     9/17/2019 4:41 PM Case management consult for home health received. Met with pt and pt's wife. Charted address and phone numbers confirmed. Reason for Admission:    Procedure(s) (LRB):  L2-5 LAMINECTOMY, L2-3 POSTERIOR LUMBER INTERBODY FUSION WITH L2-5 INSTRUMENTATION, ILIAC CREST BONE MARROW ASPIRATE                   RRAT Score: 14                 Do you (patient/family) have any concerns for transition/discharge? No concerns                  Plan for utilizing home health: Yes, choice letter signed for [facility]. Referral sent to [facility] and accepted. Current Advanced Directive/Advance Care Plan:    Sayra Kay [phone]  [phone]             Transition of Care Plan: home with family and home health    Pt lives with his wife in a 2 story home in [place]. There is 1 step to enter and pt's main living area is on the 1st floor. Pt has a rw and cane at home. Pt has rx coverage and fills his scripts at [email or web address]. Pt was independent with adls and driving prior to admission. Pt's wife will transport pt home. CM will follow for final discharge needs.  CASSANDRA Bobo  Care Management Interventions  PCP Verified by CM: Homar Burgos nurse navigator notified of admission. )  Transition of Care Consult (CM Consult): Discharge Planning  MyChart Signup: No  Discharge Durable Medical Equipment: No  Physical Therapy Consult: Yes  Occupational Therapy Consult: Yes  Speech Therapy Consult: No  Current Support Network: Lives with Spouse  Plan discussed with Pt/Family/Caregiver: Yes  Freedom of Choice Offered: (S) Yes  Discharge Location  Discharge Placement: Home with home health

## 2019-09-18 LAB
BACTERIA SPEC CULT: NORMAL
CC UR VC: NORMAL
GLUCOSE BLD STRIP.AUTO-MCNC: 110 MG/DL (ref 65–100)
GLUCOSE BLD STRIP.AUTO-MCNC: 166 MG/DL (ref 65–100)
GLUCOSE BLD STRIP.AUTO-MCNC: 84 MG/DL (ref 65–100)
GLUCOSE BLD STRIP.AUTO-MCNC: 99 MG/DL (ref 65–100)
HGB BLD-MCNC: 7.4 G/DL (ref 12.1–17)
SERVICE CMNT-IMP: ABNORMAL
SERVICE CMNT-IMP: ABNORMAL
SERVICE CMNT-IMP: NORMAL

## 2019-09-18 PROCEDURE — 74011250637 HC RX REV CODE- 250/637: Performed by: NURSE PRACTITIONER

## 2019-09-18 PROCEDURE — 74011636637 HC RX REV CODE- 636/637: Performed by: NURSE PRACTITIONER

## 2019-09-18 PROCEDURE — 97530 THERAPEUTIC ACTIVITIES: CPT

## 2019-09-18 PROCEDURE — 74011250636 HC RX REV CODE- 250/636: Performed by: ORTHOPAEDIC SURGERY

## 2019-09-18 PROCEDURE — 65270000029 HC RM PRIVATE

## 2019-09-18 PROCEDURE — 82962 GLUCOSE BLOOD TEST: CPT

## 2019-09-18 PROCEDURE — 97116 GAIT TRAINING THERAPY: CPT

## 2019-09-18 PROCEDURE — 85018 HEMOGLOBIN: CPT

## 2019-09-18 PROCEDURE — 94760 N-INVAS EAR/PLS OXIMETRY 1: CPT

## 2019-09-18 PROCEDURE — 74011250636 HC RX REV CODE- 250/636: Performed by: NURSE PRACTITIONER

## 2019-09-18 PROCEDURE — 36415 COLL VENOUS BLD VENIPUNCTURE: CPT

## 2019-09-18 RX ORDER — OXYCODONE AND ACETAMINOPHEN 5; 325 MG/1; MG/1
1-2 TABLET ORAL
Qty: 50 TAB | Refills: 0 | Status: SHIPPED | OUTPATIENT
Start: 2019-09-18 | End: 2019-11-20 | Stop reason: SDUPTHER

## 2019-09-18 RX ORDER — TAMSULOSIN HYDROCHLORIDE 0.4 MG/1
0.4 CAPSULE ORAL DAILY
Qty: 30 CAP | Refills: 0 | Status: SHIPPED | OUTPATIENT
Start: 2019-09-18 | End: 2021-05-24

## 2019-09-18 RX ORDER — LEVOFLOXACIN 500 MG/1
500 TABLET, FILM COATED ORAL DAILY
Qty: 7 TAB | Refills: 0 | Status: SHIPPED | OUTPATIENT
Start: 2019-09-18 | End: 2019-09-25

## 2019-09-18 RX ORDER — IBUPROFEN 200 MG
1 CAPSULE ORAL 3 TIMES DAILY
Qty: 90 TAB | Refills: 0 | Status: SHIPPED | OUTPATIENT
Start: 2019-09-18 | End: 2019-09-23 | Stop reason: SDUPTHER

## 2019-09-18 RX ADMIN — SODIUM CHLORIDE 500 ML: 900 INJECTION, SOLUTION INTRAVENOUS at 15:42

## 2019-09-18 RX ADMIN — OXYCODONE HYDROCHLORIDE 10 MG: 5 TABLET ORAL at 21:50

## 2019-09-18 RX ADMIN — POLYETHYLENE GLYCOL 3350 17 G: 17 POWDER, FOR SOLUTION ORAL at 08:00

## 2019-09-18 RX ADMIN — Medication 10 ML: at 21:56

## 2019-09-18 RX ADMIN — FAMOTIDINE 20 MG: 20 TABLET ORAL at 18:23

## 2019-09-18 RX ADMIN — TAMSULOSIN HYDROCHLORIDE 0.4 MG: 0.4 CAPSULE ORAL at 21:50

## 2019-09-18 RX ADMIN — Medication 10 ML: at 06:27

## 2019-09-18 RX ADMIN — CYCLOBENZAPRINE HYDROCHLORIDE 10 MG: 10 TABLET, FILM COATED ORAL at 21:50

## 2019-09-18 RX ADMIN — ATORVASTATIN CALCIUM 40 MG: 20 TABLET, FILM COATED ORAL at 08:00

## 2019-09-18 RX ADMIN — ACETAMINOPHEN 1000 MG: 500 TABLET ORAL at 18:23

## 2019-09-18 RX ADMIN — METOCLOPRAMIDE HYDROCHLORIDE 10 MG: 10 TABLET ORAL at 06:27

## 2019-09-18 RX ADMIN — METOCLOPRAMIDE HYDROCHLORIDE 10 MG: 10 TABLET ORAL at 12:10

## 2019-09-18 RX ADMIN — METFORMIN HYDROCHLORIDE 1000 MG: 500 TABLET ORAL at 08:00

## 2019-09-18 RX ADMIN — OXYCODONE HYDROCHLORIDE 10 MG: 5 TABLET ORAL at 10:21

## 2019-09-18 RX ADMIN — ACETAMINOPHEN 1000 MG: 500 TABLET ORAL at 03:23

## 2019-09-18 RX ADMIN — CHOLECALCIFEROL TAB 125 MCG (5000 UNIT) 5000 UNITS: 125 TAB at 08:00

## 2019-09-18 RX ADMIN — Medication 10 ML: at 14:00

## 2019-09-18 RX ADMIN — METOCLOPRAMIDE HYDROCHLORIDE 10 MG: 10 TABLET ORAL at 18:23

## 2019-09-18 RX ADMIN — ACETAMINOPHEN 1000 MG: 500 TABLET ORAL at 12:11

## 2019-09-18 RX ADMIN — SENNOSIDES,DOCUSATE SODIUM 1 TABLET: 8.6; 5 TABLET, FILM COATED ORAL at 18:23

## 2019-09-18 RX ADMIN — INSULIN LISPRO 2 UNITS: 100 INJECTION, SOLUTION INTRAVENOUS; SUBCUTANEOUS at 07:58

## 2019-09-18 RX ADMIN — LEVOFLOXACIN 500 MG: 5 INJECTION, SOLUTION INTRAVENOUS at 10:14

## 2019-09-18 RX ADMIN — OXYCODONE HYDROCHLORIDE 10 MG: 5 TABLET ORAL at 04:43

## 2019-09-18 RX ADMIN — SENNOSIDES,DOCUSATE SODIUM 1 TABLET: 8.6; 5 TABLET, FILM COATED ORAL at 08:00

## 2019-09-18 RX ADMIN — ATENOLOL 50 MG: 50 TABLET ORAL at 09:36

## 2019-09-18 RX ADMIN — CYCLOBENZAPRINE HYDROCHLORIDE 10 MG: 10 TABLET, FILM COATED ORAL at 10:21

## 2019-09-18 RX ADMIN — FAMOTIDINE 20 MG: 20 TABLET ORAL at 08:00

## 2019-09-18 NOTE — PROGRESS NOTES
Bedside shift change report given to GIOVANNI Mckenna (oncoming nurse) by GIOVANNI Cruz (offgoing nurse).  Report included the following information SBAR, MAR and Recent Results.

## 2019-09-18 NOTE — PROGRESS NOTES
0440 Pt c/o left hip pain. He stated that when he got up earlier in the shift to go to the bathroom, that he felt the pain for the first time. I assisted the pt from the bed to the bathroom at that time. Pt did not verbalize any complaints. Pt now says that he has excruciating when he lays on his left side. When pt had labs drawn he was laying on his left side and voiced no complaints. Oxycodone 10mg given. Will continue to monitor. Occupational Therapy Evaluation      Charisma Curtis    6/19/2018    Patient Active Problem List   Diagnosis    Deep vein thrombosis (DVT) of left upper extremity ([facility] )    H/O mitral valve replacement    CVA (cerebral vascular accident) ([facility] )    Controlled type 2 diabetes mellitus without complication, without long-term current use of insulin ([facility] )    Pacemaker    Acid reflux    HTN (hypertension)    Constipation    [facility]ADWICK (acute kidney injury) ([facility] )    Systolic congestive heart failure ([facility] )    Encephalopathy    History of ischemic right MCA stroke    History of CVA (cerebrovascular accident)    History of subarachnoid hemorrhage    CKD (chronic kidney disease) stage 3, GFR 30-59 ml/min    History of DVT (deep vein thrombosis)    History of pacemaker    Acute cystitis without hematuria    Chronic systolic CHF (congestive heart failure) (HCC)    Colonic thickening    Anemia    Altered mental status    Seizure-like activity ([facility] )    HCAP (healthcare-associated pneumonia)    Acute pain of right shoulder    Abnormal TSH    Dysphagia       Past Medical History:   Diagnosis Date    Acid reflux     on occ    Arthritis     DJD right hip replaced    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 with no change    Cancer ([place] [address] ) 2007    colonic polyps, no surgery done    Deep vein thrombosis (DVT) of left upper extremity ([facility] ) 12/11/2017    Diabetes mellitus ([place] [address] )     type 2    Diverticulosis     Edema     in legs on occ    Hypertension     on occ    Language barrier     speaks Indonesian & broken english    Stroke [facility])        Past Surgical History:   Procedure Laterality Date    COLON SURGERY      COLONOSCOPY      COLONOSCOPY N/A 11/2/2016    Procedure: COLONOSCOPY;  Surgeon: Maria R Denton MD;  Location: [facility] GI LAB; Service:     ESOPHAGOGASTRODUODENOSCOPY N/A 11/2/2016    Procedure: ESOPHAGOGASTRODUODENOSCOPY (EGD); Surgeon: Maria R Denton MD;  Location: [facility] GI LAB;   Service:  JOINT REPLACEMENT Right 02/2015    hip    JOINT REPLACEMENT Left     knee    JOINT REPLACEMENT Right     knee    DC REVISE MEDIAN N/CARPAL TUNNEL SURG Left 1/28/2016    Procedure: RELEASE CARPAL TUNNEL;  Surgeon: Rena Cali MD;  Location: [facility] MAIN OR;  Service: Orthopedics    TUBAL LIGATION      VARICOSE VEIN SURGERY Bilateral       06/19/18 1015   Note Type   Note type Eval/Treat   Restrictions/Precautions   Weight Bearing Precautions Per Order No   Other Precautions Chair Alarm;Cognitive; Fall Risk;Pain   Pain Assessment   Pain Assessment FLACC   Pain Score (c/o R shoulder pain)   Pain Rating: FLACC (Rest) - Face 0   Pain Rating: FLACC (Rest) - Legs 0   Pain Rating: FLACC (Rest) - Activity 0   Pain Rating: FLACC (Rest) - Cry 0   Pain Rating: FLACC (Rest) - Consolability 0   Score: FLACC (Rest) 0   Pain Rating: FLACC (Activity) - Face 1   Pain Rating: FLACC (Activity) - Legs 0   Pain Rating: FLACC (Activity) - Activity 1   Pain Rating: FLACC (Activity) - Cry 1   Pain Rating: FLACC (Activity) - Consolability 1   Score: FLACC (Activity) 4   Home Living   Type of Home House   Home Equipment Walker   Prior Function   Level of [place] Independent with ADLs and functional mobility   Receives Help From Family   ADL Assistance Independent   IADLs Needs assistance   Falls in the last 6 months 0   Vocational Retired   Lifestyle   Autonomy pt reports being ind w/ ADLs PTA   Reciprocal Relationships pt has family support   Service to Others pt is retired   Intrinsic Gratification none expressed at this time   Psychosocial   Psychosocial (WDL) WDL   Subjective   Subjective "where is my son?"   ADL   Where Assessed Chair   Eating Assistance 5  Supervision/Setup   Eating Deficit Supervision/safety   Grooming Assistance 4  Minimal Assistance   Grooming Deficit Verbal cueing   UB Bathing Assistance 3  Moderate Assistance   UB Bathing Deficit Verbal cueing;Supervision/safety; Increased time to complete   LB Bathing Assistance 2  Maximal Assistance   LB Bathing Deficit Verbal cueing;Supervision/safety; Increased time to complete   UB Dressing Assistance 3  Moderate Assistance   LB Dressing Assistance 2  Maximal Assistance   Toileting Assistance  3  Moderate Assistance   Functional Assistance 3  Moderate Assistance   Bed Mobility   Additional Comments pt OOB   Transfers   Sit to Stand 3  Moderate assistance   Additional items Assist x 2; Increased time required;Verbal cues;Armrests   Stand to Sit 3  Moderate assistance   Additional items Assist x 1; Increased time required;Verbal cues;Armrests   Functional Mobility   Functional Mobility 3  Moderate assistance   Additional items Rolling walker   Balance   Static Sitting Fair   Dynamic Sitting Fair -   Static Standing Poor +   Dynamic Standing Poor   Ambulatory Poor   Activity Tolerance   Activity Tolerance Patient limited by fatigue;Patient limited by pain   Medical Staff Made Aware PT Davin De La Garza, updated CM   Nurse Made Aware RN Watson Number   RUE Assessment   RUE Assessment X  (c/o shoulder pain, flexion to 90*, elbow WFL)   LUE Assessment   LUE Assessment X  (<90* shoulder, elbow WFL)   Hand Function   Gross Motor Coordination Impaired   Fine Motor Coordination Impaired   Vision-Basic Assessment   Current Vision Wears glasses all the time  (not w/ patient at eval)   Cognition   Overall Cognitive Status Impaired   Arousal/Participation Cooperative   Attention Difficulty attending to directions   Orientation Level Oriented to person;Disoriented to place; Disoriented to time;Disoriented to situation   Memory Decreased recall of precautions;Decreased recall of recent events;Decreased short term memory;Decreased recall of biographical information   Following Commands Follows one step commands with increased time or repetition  (verbal and tactile cues)   Assessment   Limitation Decreased ADL status; Decreased UE ROM; Decreased UE strength;Decreased Safe judgement during ADL;Decreased cognition;Decreased endurance;Decreased fine motor control;Decreased self-care trans;Decreased high-level ADLs   Prognosis Fair   Assessment Pt is a 81 yo F admit to [facility] on 6/18/18 w/ fever, [place] [facility] [place]  Pt w/ PMHx significant for CVA, arthritis, cancer, DVT, DM, edema, HTN, diverticulosis   Pt w/ active OT orders and activity orders  PTA, pt was living at home w/ family  Pt is poor historian, unable to obtain compete history  Pt denies falls  Upon OT arrival, pt presents seated in chair  Following OT evaluation, pt seated in chair, all needs within reach  At time of OT eval, pt requiring Min A for grooming, Mod A UB self-care, Max A LB self-care, Mod A x 2 transfers and functional mobility w/ RW  Pt requiring VC for RW safety  Pt demonstrating deficits in baseline areas of occupation including decreased ADL performance, IADL performance, and functional mobility 2* cognitive status, decreased attention, decreased command following, generalized weakness, decreased mobility status, decreased activity tolerance, increased risk for falls, decreased safety awareness  Pt scored a 30/100 on the Barthel Index and has been identified as a high complexity evaluation  OT will continue to follow pt 3-5x/wk to promote increased independence w/ ADLs and functional mobility to meet below stated goals  From OT standpoint, anticipate d/c to STR to continue to address safety and independence w/ ADLs, IADLs, and functional mobility     Goals   Patient Goals talk to son   LTG Time Frame 7-10   Recommendation   OT Discharge Recommendation Short Term Rehab   OT - OK to Discharge Yes  (to STR at this time)   Barthel Index   Feeding 5   Bathing 0   Grooming Score 0   Dressing Score 5   Bladder Score 5   Bowels Score 5   Toilet Use Score 5   Transfers (Bed/Chair) Score 5   Mobility (Level Surface) Score 0   Stairs Score 0   Barthel Index Score 30   Modified [place]t Scale   Modified Connor Scale 4   Pt will complete supine<>sit in bed w/ SBA w/ G carryover of technique  Pt will complete functional transfers w/ SBA and RW w/ G carryover of safe techniques in order to increase mobility status  Pt will complete basic grooming tasks w/ G dynamic balance w/ SBA  Pt will complete toileting w/ SBA w/ G hygiene/thoroughness to promote increased independence  Pt will increase independence w/ UB self-care to a SBA level w/ G dynamic balance  Pt will increase independence w/ LB self-care to a SBA level w/ G dynamic balance  Pt will improve activity tolerance during functional activities to G during 30 min treatment sessions  Pt will demonstrate G safety and problem solving while completing ADL tasks w/ SBA      Documentation Completed by Danielle Gaspar MS, OTR/L

## 2019-09-18 NOTE — ROUTINE PROCESS
Bedside shift change report given to Janice (oncoming nurse) by Jericho Villanueva (offgoing nurse). Report included the following information SBAR, MAR and Recent Results.

## 2019-09-18 NOTE — PROGRESS NOTES
Problem: Mobility Impaired (Adult and Pediatric)  Goal: *Acute Goals and Plan of Care (Insert Text)  Description  FUNCTIONAL STATUS PRIOR TO ADMISSION: Patient was modified independent using a back brace for functional mobility. HOME SUPPORT PRIOR TO ADMISSION: The patient lived with wife but did not require assist.    Physical Therapy Goals  Initiated 9/16/2019    1. Patient will move from supine to sit and sit to supine  in bed with independence within 4 days. 2. Patient will perform sit to stand with modified independence within 4 days. 3. Patient will ambulate with modified independence for 100 feet with the least restrictive device within 4 days. 4. Patient will ascend/descend 1 stairs with 1 handrail(s) with minimal assistance/contact guard assist within 4 days. 5. Patient will verbalize and demonstrate understanding of spinal precautions (No bending, lifting greater than 5 lbs, or twisting; log-roll technique; frequent repositioning as instructed) within 4 days. 9/18/2019 1536 by April Romero PTA  Note:   PHYSICAL THERAPY TREATMENT  Patient: Bee Navarrete (56 y.o. male)  Date: 9/18/2019  Diagnosis: Adjacent segment disease with spinal stenosis [M48.00] <principal problem not specified>  Procedure(s) (LRB):  L2-5 LAMINECTOMY, L2-3 POSTERIOR LUMBER INTERBODY FUSION WITH L2-5 INSTRUMENTATION, ILIAC CREST BONE MARROW ASPIRATE, OSTEOAMP FIBERS, IMAGE GUIDANCE (N/A) 2 Days Post-Op  Precautions: Back, Contact, Fall  Chart, physical therapy assessment, plan of care and goals were reviewed. ASSESSMENT  Pt seen for afternoon treatment. Pt CGA to min assist supine to sit. Pt donned back brace with min assist.Pt to stand with min to CGA. Pt ambulated 40ft before becoming dizzy and weak. Pt was sat in chair and taken back to room. Pt BP sitting was 114/55. Pt SPT back to bed with min to mod of 2. Pts BP lying on side was 101/46. Pt de-sats to SPO2 75% on RA. With ambulation. Pt returned to O2 with SPOT increased slowly to 92% on 2L. Pt Nurse notified. Current Level of Function Impacting Discharge (mobility/balance): min assist for mobility. PLAN :  Patient continues to benefit from skilled intervention to address the above impairments. Continue treatment per established plan of care. to address goals. Recommendation for discharge: (in order for the patient to meet his/her long term goals)  Physical therapy at least 2 days/week in the home     This discharge recommendation:  Has been made in collaboration with the attending provider and/or case management    Equipment recommendations for successful discharge (if) home: rolling walker       SUBJECTIVE:       OBJECTIVE DATA SUMMARY:   Critical Behavior:  Neurologic State: Alert  Orientation Level: Oriented X4     Safety/Judgement: Good awareness of safety precautions  Functional Mobility Training:  Bed Mobility:      CGA to min assist              Transfers:  Sit to Stand: Minimum assistance to CGA  Stand to Sit: Contact guard assistance;Minimum assistance                             Balance:     Ambulation/Gait Training:  Distance (ft): 40 Feet (ft)  Assistive Device: Walker, rolling;Gait belt  Ambulation - Level of Assistance: Contact guard assistance;Minimal assistance        Gait Abnormalities: Decreased step clearance; Path deviations              Speed/Pat: Pace decreased (<100 feet/min)                     *    Activity Tolerance:   Poor  Please refer to the flowsheet for vital signs taken during this treatment.     After treatment patient left in no apparent distress:   Supine in bed    COMMUNICATION/COLLABORATION:   The patients plan of care was discussed with: Physical Therapist    Gerhard So PTA   Time Calculation: 23 mins           9/18/2019 1006 by Peng Castro PTA  Note:   PHYSICAL THERAPY TREATMENT  Patient: Lupe Hernandez (88 y.o. male)  Date: 9/18/2019  Diagnosis: Adjacent segment disease with spinal stenosis [M48.00] <principal problem not specified>  Procedure(s) (LRB):  L2-5 LAMINECTOMY, L2-3 POSTERIOR LUMBER INTERBODY FUSION WITH L2-5 INSTRUMENTATION, ILIAC CREST BONE MARROW ASPIRATE, OSTEOAMP FIBERS, IMAGE GUIDANCE (N/A) 2 Days Post-Op  Precautions: Back, Contact, Fall  Chart, physical therapy assessment, plan of care and goals were reviewed. ASSESSMENT  Based on the objective data described below,Pt sitting on EOB on arrival of PT. Pt donned back brace with min assist.Pt comes to stand from bed with min assist.Pt reports increased leg pain this AM.Pt struggled to ambulate to restroom only ambulating 8ft before requesting to sit secondary to much leg pain. Pt sat for 5 minutes before standing again with min assist.Pt ambulated into restroom with RW min assist buckling slightly x 2. After use of restroom pt stood with min assist and continued leg pain. Pt was not comfortable ambulating increased distance and requested back to bed. Pt min assist back to bed. Pt has regressed from yesterday when pt ambulated over 100ft  CGA with no leg pain. Pt nurse notified. PT will follow in afternoon. Current Level of Function Impacting Discharge (mobility/balance): min assist with RW    Other factors to consider for discharge: Pain in legs         PLAN :  Patient continues to benefit from skilled intervention to address the above impairments. Continue treatment per established plan of care. to address goals. Recommendation for discharge: (in order for the patient to meet his/her long term goals)  No skilled physical therapy/ follow up rehabilitation needs identified at this time.     This discharge recommendation:  Has been made in collaboration with the attending provider and/or case management    Equipment recommendations for successful discharge (if) home: rolling walker       SUBJECTIVE:       OBJECTIVE DATA SUMMARY:   Critical Behavior:  Neurologic State: Alert  Orientation Level: Oriented X4     Safety/Judgement: Good awareness of safety precautions  Functional Mobility Training:  Bed Mobility:      Pt min assist back to bed. Transfers:  Sit to Stand: Minimum assistance  Stand to Sit: Contact guard assistance;Minimum assistance                             Balance:     Ambulation/Gait Training:  Distance (ft): 25 Feet (ft)  Assistive Device: Walker, rolling;Gait belt  Ambulation - Level of Assistance: Contact guard assistance;Minimal assistance        Gait Abnormalities: Decreased step clearance; Path deviations              Speed/Pat: Pace decreased (<100 feet/min)                Activity Tolerance:   Fair and Poor  Please refer to the flowsheet for vital signs taken during this treatment.     After treatment patient left in no apparent distress:   Supine in bed    COMMUNICATION/COLLABORATION:   The patients plan of care was discussed with: Physical Therapist    Fannie Pierce PTA   Time Calculation: 38 mins

## 2019-09-18 NOTE — PROGRESS NOTES
ORTHOPAEDIC LUMBAR FUSION PROGRESS NOTE    NAME:     Rehan Zarate   :       1940   MRN:       [id number]   DATE:      2019    POD:              2 Days Post-Op  S/P:              Procedure(s):  L2-5 LAMINECTOMY, L2-3 POSTERIOR LUMBER INTERBODY FUSION WITH L2-5 INSTRUMENTATION, ILIAC CREST BONE MARROW ASPIRATE, OSTEOAMP FIBERS, IMAGE GUIDANCE    SUBJECTIVE:    Reports improvement in preop  Low back p[ain,. Reports some pain in the thighs when laying on his left side. Denies numbness/ tingling. Postop pain controlled  Well with oxycodon. Tolerating PO intake. Walked twice in the [place] [address] yesterday. Passing flatus , no BM yet  Voided without difficulty . Denies nausea/vomiting, headache, chest pain or shortness of breath  Recent Labs     19  0322 19  0618 19  0609   HGB 7.4* 8.5* 11.0*   HCT  --   --  34.7*   NA  --  144  --    K  --  3.2*  --    CL  --  111*  --    CO2  --  27  --    BUN  --  6  --    CREA  --  0.78  --    GLU  --  113*  --      Patient Vitals for the past 12 hrs:   BP Temp Pulse Resp SpO2   19 0457 122/50 -- 91 16 92 %   19 2355 121/50 100 °F (37.8 °C) 100 16 90 %   19 2033 123/64 99 °F (37.2 °C) 96 16 90 %     UA- Negative nitrites. Exam:  Positive strength/ROM bilat lower ext. Neuro intact to sensation  Dressings clean and dry.  JULIUS functioning  Lower extremities warm and well perfused      PLAN: 2 Days Post-Op, improved   Continue PO pain medications as needed  Continue SCD's, frequent ambulation  Diet: Diabetic  Continue bowel regimen   Continue lumbar orthosis  Continue Vit D3  Medical comorbities stable    Discharge today afternoon if patient clears PT.

## 2019-09-18 NOTE — PROGRESS NOTES
Problem: Mobility Impaired (Adult and Pediatric)  Goal: *Acute Goals and Plan of Care (Insert Text)  Description  FUNCTIONAL STATUS PRIOR TO ADMISSION: Patient was modified independent using a back brace for functional mobility. HOME SUPPORT PRIOR TO ADMISSION: The patient lived with wife but did not require assist.    Physical Therapy Goals  Initiated 9/16/2019    1. Patient will move from supine to sit and sit to supine  in bed with independence within 4 days. 2. Patient will perform sit to stand with modified independence within 4 days. 3. Patient will ambulate with modified independence for 100 feet with the least restrictive device within 4 days. 4. Patient will ascend/descend 1 stairs with 1 handrail(s) with minimal assistance/contact guard assist within 4 days. 5. Patient will verbalize and demonstrate understanding of spinal precautions (No bending, lifting greater than 5 lbs, or twisting; log-roll technique; frequent repositioning as instructed) within 4 days. Note:   PHYSICAL THERAPY TREATMENT  Patient: Ya Fall (3 y.o. male)  Date: 9/18/2019  Diagnosis: Adjacent segment disease with spinal stenosis [M48.00] <principal problem not specified>  Procedure(s) (LRB):  L2-5 LAMINECTOMY, L2-3 POSTERIOR LUMBER INTERBODY FUSION WITH L2-5 INSTRUMENTATION, ILIAC CREST BONE MARROW ASPIRATE, OSTEOAMP FIBERS, IMAGE GUIDANCE (N/A) 2 Days Post-Op  Precautions: Back, Contact, Fall  Chart, physical therapy assessment, plan of care and goals were reviewed. ASSESSMENT  Based on the objective data described below,Pt sitting on EOB on arrival of PT. Pt donned back brace with min assist.Pt comes to stand from bed with min assist.Pt reports increased leg pain this AM.Pt struggled to ambulate to restroom only ambulating 8ft before requesting to sit secondary to much leg pain. Pt sat for 5 minutes before standing again with min assist.Pt ambulated into restroom with RW min assist buckling slightly x 2. After use of restroom pt stood with min assist and continued leg pain. Pt was not comfortable ambulating increased distance and requested back to bed. Pt min assist back to bed. Pt has regressed from yesterday when pt ambulated over 100ft  CGA with no leg pain. Pt nurse notified. PT will follow in afternoon. Current Level of Function Impacting Discharge (mobility/balance): min assist with RW    Other factors to consider for discharge: Pain in legs         PLAN :  Patient continues to benefit from skilled intervention to address the above impairments. Continue treatment per established plan of care. to address goals. Recommendation for discharge: (in order for the patient to meet his/her long term goals)  No skilled physical therapy/ follow up rehabilitation needs identified at this time. This discharge recommendation:  Has been made in collaboration with the attending provider and/or case management    Equipment recommendations for successful discharge (if) home: rolling walker       SUBJECTIVE:       OBJECTIVE DATA SUMMARY:   Critical Behavior:  Neurologic State: Alert  Orientation Level: Oriented X4     Safety/Judgement: Good awareness of safety precautions  Functional Mobility Training:  Bed Mobility:      Pt min assist back to bed. Transfers:  Sit to Stand: Minimum assistance  Stand to Sit: Contact guard assistance;Minimum assistance                             Balance:     Ambulation/Gait Training:  Distance (ft): 25 Feet (ft)  Assistive Device: Walker, rolling;Gait belt  Ambulation - Level of Assistance: Contact guard assistance;Minimal assistance        Gait Abnormalities: Decreased step clearance; Path deviations              Speed/Pat: Pace decreased (<100 feet/min)                Activity Tolerance:   Fair and Poor  Please refer to the flowsheet for vital signs taken during this treatment.     After treatment patient left in no apparent distress:   Supine in bed    COMMUNICATION/COLLABORATION:   The patients plan of care was discussed with: Physical Therapist    Shannon Lindquist PTA   Time Calculation: 38 mins

## 2019-09-18 NOTE — PROGRESS NOTES
9/18/2019 4:19 PM Case management consult for, \"Please notify [facility] that JULIUS dressing can be removed on Monday, Sept 23. Please also set up a urology appointment OP for Mr. Ozzie Melvin. A date that is within 1 week if possible. \" received. CM sent order to [facility] via [employer]. CM met with pt and pt's wife to discuss need for Urology follow up. Pt does not have an established Urologist. CM called and attempted to make appt at [facility], office closed at this time. CM will follow up on 9/19 to make Urology follow up appt for pt.    PAMELLA LeeW

## 2019-09-18 NOTE — PROGRESS NOTES
Patient awake,alert oriented. When ambulating with PT patient became dizzy. Patient had an episode of orthostatic hypotension in the 14A systolic. After laying in be patient stated was not dizzy anymore. LEE Garcia at bedside. Awaiting orders at this time. Close monitoring of patient at this time.

## 2019-09-19 LAB
ABO + RH BLD: NORMAL
BACTERIA SPEC CULT: NORMAL
BACTERIA SPEC CULT: NORMAL
BLD PROD TYP BPU: NORMAL
BLD PROD TYP BPU: NORMAL
BLOOD GROUP ANTIBODIES SERPL: NORMAL
BPU ID: NORMAL
BPU ID: NORMAL
CROSSMATCH RESULT,%XM: NORMAL
CROSSMATCH RESULT,%XM: NORMAL
GLUCOSE BLD STRIP.AUTO-MCNC: 108 MG/DL (ref 65–100)
GLUCOSE BLD STRIP.AUTO-MCNC: 151 MG/DL (ref 65–100)
GLUCOSE BLD STRIP.AUTO-MCNC: 91 MG/DL (ref 65–100)
GLUCOSE BLD STRIP.AUTO-MCNC: 94 MG/DL (ref 65–100)
GLUCOSE BLD STRIP.AUTO-MCNC: 97 MG/DL (ref 65–100)
HGB BLD-MCNC: 6.8 G/DL (ref 12.1–17)
SERVICE CMNT-IMP: ABNORMAL
SERVICE CMNT-IMP: ABNORMAL
SERVICE CMNT-IMP: NORMAL
SPECIMEN EXP DATE BLD: NORMAL
STATUS OF UNIT,%ST: NORMAL
STATUS OF UNIT,%ST: NORMAL
UNIT DIVISION, %UDIV: 0
UNIT DIVISION, %UDIV: 0

## 2019-09-19 PROCEDURE — P9016 RBC LEUKOCYTES REDUCED: HCPCS

## 2019-09-19 PROCEDURE — 30233N1 TRANSFUSION OF NONAUTOLOGOUS RED BLOOD CELLS INTO PERIPHERAL VEIN, PERCUTANEOUS APPROACH: ICD-10-PCS | Performed by: ORTHOPAEDIC SURGERY

## 2019-09-19 PROCEDURE — 86923 COMPATIBILITY TEST ELECTRIC: CPT

## 2019-09-19 PROCEDURE — 97530 THERAPEUTIC ACTIVITIES: CPT

## 2019-09-19 PROCEDURE — 74011250636 HC RX REV CODE- 250/636: Performed by: NURSE PRACTITIONER

## 2019-09-19 PROCEDURE — 74011250636 HC RX REV CODE- 250/636: Performed by: ORTHOPAEDIC SURGERY

## 2019-09-19 PROCEDURE — 65270000029 HC RM PRIVATE

## 2019-09-19 PROCEDURE — 36430 TRANSFUSION BLD/BLD COMPNT: CPT

## 2019-09-19 PROCEDURE — 85018 HEMOGLOBIN: CPT

## 2019-09-19 PROCEDURE — 97116 GAIT TRAINING THERAPY: CPT

## 2019-09-19 PROCEDURE — 86900 BLOOD TYPING SEROLOGIC ABO: CPT

## 2019-09-19 PROCEDURE — 82962 GLUCOSE BLOOD TEST: CPT

## 2019-09-19 PROCEDURE — 36415 COLL VENOUS BLD VENIPUNCTURE: CPT

## 2019-09-19 PROCEDURE — 94762 N-INVAS EAR/PLS OXIMTRY CONT: CPT

## 2019-09-19 PROCEDURE — 74011250637 HC RX REV CODE- 250/637: Performed by: NURSE PRACTITIONER

## 2019-09-19 RX ORDER — FUROSEMIDE 10 MG/ML
10 INJECTION INTRAMUSCULAR; INTRAVENOUS ONCE
Status: COMPLETED | OUTPATIENT
Start: 2019-09-19 | End: 2019-09-19

## 2019-09-19 RX ORDER — SODIUM CHLORIDE 9 MG/ML
250 INJECTION, SOLUTION INTRAVENOUS AS NEEDED
Status: DISCONTINUED | OUTPATIENT
Start: 2019-09-19 | End: 2019-09-20 | Stop reason: HOSPADM

## 2019-09-19 RX ADMIN — ATENOLOL 50 MG: 50 TABLET ORAL at 09:49

## 2019-09-19 RX ADMIN — FAMOTIDINE 20 MG: 20 TABLET ORAL at 09:50

## 2019-09-19 RX ADMIN — OXYCODONE HYDROCHLORIDE 5 MG: 5 TABLET ORAL at 22:46

## 2019-09-19 RX ADMIN — TAMSULOSIN HYDROCHLORIDE 0.4 MG: 0.4 CAPSULE ORAL at 22:45

## 2019-09-19 RX ADMIN — ATORVASTATIN CALCIUM 40 MG: 20 TABLET, FILM COATED ORAL at 09:49

## 2019-09-19 RX ADMIN — VALSARTAN 80 MG: 80 TABLET, FILM COATED ORAL at 22:46

## 2019-09-19 RX ADMIN — OXYCODONE HYDROCHLORIDE 5 MG: 5 TABLET ORAL at 09:49

## 2019-09-19 RX ADMIN — METFORMIN HYDROCHLORIDE 1000 MG: 500 TABLET ORAL at 09:49

## 2019-09-19 RX ADMIN — CYCLOBENZAPRINE HYDROCHLORIDE 10 MG: 10 TABLET, FILM COATED ORAL at 22:46

## 2019-09-19 RX ADMIN — LEVOFLOXACIN 500 MG: 5 INJECTION, SOLUTION INTRAVENOUS at 09:50

## 2019-09-19 RX ADMIN — ACETAMINOPHEN 1000 MG: 500 TABLET ORAL at 17:43

## 2019-09-19 RX ADMIN — FUROSEMIDE 10 MG: 10 INJECTION, SOLUTION INTRAMUSCULAR; INTRAVENOUS at 17:17

## 2019-09-19 RX ADMIN — Medication 5 ML: at 06:00

## 2019-09-19 RX ADMIN — CHOLECALCIFEROL TAB 125 MCG (5000 UNIT) 5000 UNITS: 125 TAB at 09:49

## 2019-09-19 RX ADMIN — POLYETHYLENE GLYCOL 3350 17 G: 17 POWDER, FOR SOLUTION ORAL at 09:48

## 2019-09-19 RX ADMIN — METOCLOPRAMIDE HYDROCHLORIDE 10 MG: 10 TABLET ORAL at 17:43

## 2019-09-19 RX ADMIN — METOCLOPRAMIDE HYDROCHLORIDE 10 MG: 10 TABLET ORAL at 07:05

## 2019-09-19 RX ADMIN — OXYCODONE HYDROCHLORIDE 5 MG: 5 TABLET ORAL at 15:13

## 2019-09-19 RX ADMIN — SENNOSIDES,DOCUSATE SODIUM 1 TABLET: 8.6; 5 TABLET, FILM COATED ORAL at 17:44

## 2019-09-19 RX ADMIN — ACETAMINOPHEN 1000 MG: 500 TABLET ORAL at 01:10

## 2019-09-19 RX ADMIN — SENNOSIDES,DOCUSATE SODIUM 1 TABLET: 8.6; 5 TABLET, FILM COATED ORAL at 09:50

## 2019-09-19 RX ADMIN — FAMOTIDINE 20 MG: 20 TABLET ORAL at 17:43

## 2019-09-19 RX ADMIN — OXYCODONE HYDROCHLORIDE 5 MG: 5 TABLET ORAL at 05:11

## 2019-09-19 RX ADMIN — ACETAMINOPHEN 1000 MG: 500 TABLET ORAL at 05:11

## 2019-09-19 NOTE — PROGRESS NOTES
ORTHOPAEDIC LUMBAR FUSION PROGRESS NOTE    NAME:     Kirsty Conteh   :       1940   MRN:       [id number]   DATE:      2019    POD:              3 Days Post-Op  S/P:              Procedure(s):  L2-5 LAMINECTOMY, L2-3 POSTERIOR LUMBER INTERBODY FUSION WITH L2-5 INSTRUMENTATION, ILIAC CREST BONE MARROW ASPIRATE, OSTEOAMP FIBERS, IMAGE GUIDANCE    SUBJECTIVE:    Reports improvement in preop  Lower back and BLE pain. Post op pain is well controlled. Tolerating PO intake well   Walked yesterday, had some dizziness yesterday afternoon, has not recurred. Passing flatus , No BM yet. Denies abdominal discomfort. Denies nausea/vomiting, headache, chest pain or shortness of breath  Recent Labs     19  0514  19  0618   HGB 6.8*   < > 8.5*   NA  --   --  144   K  --   --  3.2*   CL  --   --  111*   CO2  --   --  27   BUN  --   --  6   CREA  --   --  0.78   GLU  --   --  113*    < > = values in this interval not displayed. Patient Vitals for the past 12 hrs:   BP Temp Pulse Resp SpO2   19 0328 104/59 98.3 °F (36.8 °C) 69 16 98 %   19 0053 115/55 99 °F (37.2 °C) 72 15 97 %   19 2249 97/44 (!) 100.6 °F (38.1 °C) 71 14 95 %   19 2045 109/54 98.4 °F (36.9 °C) 75 16 92 %     Exam:  Positive strength/ROM bilat lower ext. Neuro intact to sensation  Dressings clean and dry, JULIUS working well. Abdomen soft, nont aminah,  Calves soft , non tender. Lower extremities warm and well perfused      PLAN: 3 Days Post-Op, improved   Continue PO pain medications as needed  Continue SCD's, frequent ambulation  Diet: Diabetic   Acute blood loss anemia -hgb <7, history of COPD and also with some dizziness yesterday, plan Transfuse 2 units blood, 10 mg lasix in between. Continue bowel regimen   Continue lumbar orthosis  Continue Vit D3  Medical comorbities stable  Plan d/c home tomorrow.    Discharge planning - today afternoon  After BT.

## 2019-09-19 NOTE — CDMP QUERY
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 Pt noted to have low hbg and orders for blood transfusion. If possible, please document in the progress notes and d/c summary if you are evaluating and / or treating any of the following: 
 
=> Anemia due to postoperative blood loss (please specify if expected or a complication of the surgery) 
=> Anemia due to acute blood loss 
=> Other, please specify 
=> Clinically unable to determine The medical record reflects the following: 
   Risk Factors: 71 yo F admitted with Adjacent segment disease, L2-3 with spinal stenosis and is s/p fusion. Clinical Indicators: 9/16 hbg 11.0    9/19 Hbg 6.8 Treatment: order for 2 units pbcs transfusion Thank you for your time [facility] RN/BSN, CCDS Desk:   [phone] Other:  [phone]

## 2019-09-19 NOTE — PROGRESS NOTES
9/19/2019 1:50 PM Spoke with Ortho NP, pt will require Urology follow up due to UTI and trouble placing guzman in the OR. CM called and scheduled pt at [facility] on 9/23 at 10:30am with Katya Madrigal. Appt added to pt's avs. CASSANDRA Anthony    Discharge plan:  Home with home health through [facility] [address]. Family will transport pt home.   Urology follow up on 9/23 at 10:30am.

## 2019-09-19 NOTE — PROGRESS NOTES
Blood ordered by Angel Luis Hinton NP for patient. Blood consent currently not signed by MD. Leona Marie and left message concerning issue. Awaiting callback.

## 2019-09-19 NOTE — PROGRESS NOTES
Problem: Mobility Impaired (Adult and Pediatric)  Goal: *Acute Goals and Plan of Care (Insert Text)  Description  FUNCTIONAL STATUS PRIOR TO ADMISSION: Patient was modified independent using a back brace for functional mobility. HOME SUPPORT PRIOR TO ADMISSION: The patient lived with wife but did not require assist.    Physical Therapy Goals  Initiated 9/16/2019    1. Patient will move from supine to sit and sit to supine  in bed with independence within 4 days. 2. Patient will perform sit to stand with modified independence within 4 days. 3. Patient will ambulate with modified independence for 100 feet with the least restrictive device within 4 days. 4. Patient will ascend/descend 1 stairs with 1 handrail(s) with minimal assistance/contact guard assist within 4 days. 5. Patient will verbalize and demonstrate understanding of spinal precautions (No bending, lifting greater than 5 lbs, or twisting; log-roll technique; frequent repositioning as instructed) within 4 days. Note:   PHYSICAL THERAPY TREATMENT  Patient: Maria Guadalupe [place] (43 y.o. male)  Date: 9/19/2019  Diagnosis: Adjacent segment disease with spinal stenosis [M48.00] <principal problem not specified>  Procedure(s) (LRB):  L2-5 LAMINECTOMY, L2-3 POSTERIOR LUMBER INTERBODY FUSION WITH L2-5 INSTRUMENTATION, ILIAC CREST BONE MARROW ASPIRATE, OSTEOAMP FIBERS, IMAGE GUIDANCE (N/A) 3 Days Post-Op  Precautions: Back, Contact, Fall  Chart, physical therapy assessment, plan of care and goals were reviewed. ASSESSMENT  Based on the objective data described below,Pt has just received 1 unit of blood. Pt to sit with min assist.Pt donned back brace with min assist.Pt to stand with CGA to min assist.Pt ambulated slowly 30ft in room with RW CGA to min assist.Pt with slight buckle times 1 but recoverd quickly. Pt was left sitting in chair. Pt reports feeling better than yesterday but still feels head not quite clear. BP supine 165/79, sitting  132/83 ,standing 127/60, post amb sitting 110/63, post amb sitting + 2 minutes 163/73. Pt is noticeably weaker than 2 days ago when he ambulated up to 140ft at steady pace with RW CGA. Nurse notified. Note pt continues to de-sat on RA to 86% to 88%. Pt given 2L mobilizing with SPO2 at 93%. Current Level of Function Impacting Discharge (mobility/balance): Pt min assist for mobility. Other factors to consider for discharge: Pt de-sats and is generally weak. PLAN :  Patient continues to benefit from skilled intervention to address the above impairments. Continue treatment per established plan of care. to address goals. Recommendation for discharge: (in order for the patient to meet his/her long term goals)  Physical therapy at least 2 days/week in the home     This discharge recommendation:  Has been made in collaboration with the attending provider and/or case management    Equipment recommendations for successful discharge (if) home: rolling walker       SUBJECTIVE:       OBJECTIVE DATA SUMMARY:   Critical Behavior:  Neurologic State: Alert  Orientation Level: Oriented X4     Safety/Judgement: Good awareness of safety precautions  Functional Mobility Training:  Bed Mobility:     Supine to Sit: Minimum assistance              Transfers:  Sit to Stand: Contact guard assistance;Minimum assistance  Stand to Sit: Contact guard assistance;Minimum assistance                             Balance:  Standing: With support  Standing - Static: Fair  Ambulation/Gait Training:  Distance (ft): 30 Feet (ft)  Assistive Device: Walker, rolling;Gait belt  Ambulation - Level of Assistance: Contact guard assistance        Gait Abnormalities: Decreased step clearance                     Activity Tolerance:   Fair  Please refer to the flowsheet for vital signs taken during this treatment.     After treatment patient left in no apparent distress:   Sitting in chair    COMMUNICATION/COLLABORATION:   The patients plan of care was discussed with: Physical Therapist    Christoph Guardado, JOE   Time Calculation: 38 mins

## 2019-09-19 NOTE — PROGRESS NOTES
CMS Note  09/19/2019    Patient received and signed the 2nd IM letter. Patient was given a copy for their record.   Marge Salazar CMS

## 2019-09-19 NOTE — PROGRESS NOTES
Nutrition Assessment:    RECOMMENDATIONS/INTERVENTION(S):   1. Continue consistent carb diet. 2. Add Ensure HP BID to promote adequate protein intake. 3. Continue to monitor po intake , wt changes, labs. ASSESSMENT:   9/19: Pt assessed for LOS. Admitted with adjacent segment disease w/ spinal stenosis, s/p L2- laminectomy, L2-3 posterior lumber interbody fusion. PMH includes T2DM, CAD, HTN. BMI 25.1, WNL for age. Pt reports good appetite and po intake currently and PTA. Says he was hungry for breakfast and ate 100% of it but it came late so he isn't hungry for lunch. Pt reports no recent wt changes. Last BM 9/15, says this is not normal for him, he is usually very regular. On bowel regimen. No n/v/d. Pt has no questions about menu or consistent carb diet. Discussed protein and its importance in healing. Pt agreeable to trying Ensure HP. Will add BID to promote adequate protein intake. Labs- K 3.2, Cl 111, Ca 7.8, -94-99. Meds- Lipitor, Vit. D3, Humalog, Metformin, pericolace, Flomax, Reglan, Miralax. Diet Order: Consistent carb  % Eaten:  No data found. Pertinent Medications: [x] Reviewed    Labs: [x] Reviewed    Anthropometrics: Height: 5' 8\" (172.7 cm) Weight: 74.8 kg (165 lb)    IBW (%IBW):   ( ) UBW (%UBW):   (  %)      BMI: Body mass index is 25.09 kg/m². This BMI is indicative of:   [] Underweight    [x] Normal    [] Overweight    []  Obesity    []  Extreme Obesity (BMI>40)  Estimated Nutrition Needs (Based on): 1884 Kcals/day(1449 x 1.3 AF) , 75 g(1-1.2 g/kg) Protein  Carbohydrate: At Least 130 g/day  Fluids: 1884 mL/day (1 ml/kcal)    Last BM: 9/15   [x]Active     []Hyperactive  []Hypoactive       [] Absent   BS  Skin:    [] Intact   [x] Incision  [] Breakdown   [] DTI   [] Tears/Excoriation/Abrasion  []Edema [] Other:      Wt Readings from Last 30 Encounters:   09/16/19 74.8 kg (165 lb)   09/13/19 74.8 kg (165 lb)   09/11/19 75.3 kg (166 lb)   08/27/19 76.6 kg (168 lb 14 oz) 08/27/19 75.8 kg (167 lb)   05/29/19 74.8 kg (165 lb)   02/27/19 79.8 kg (176 lb)   02/13/19 79 kg (174 lb 3.2 oz)   12/04/18 78.5 kg (173 lb)   09/21/18 78 kg (172 lb)   08/16/18 78.5 kg (173 lb)   06/07/18 78 kg (172 lb)   05/01/18 78.8 kg (173 lb 12.8 oz)   03/08/18 78.9 kg (174 lb)   02/21/18 77.1 kg (170 lb)   01/16/18 78.9 kg (174 lb)   12/19/17 77.6 kg (171 lb)   10/24/17 78.9 kg (174 lb)   08/18/17 79.4 kg (175 lb)   04/18/17 79.4 kg (175 lb)   12/12/16 77.4 kg (170 lb 9.6 oz)   10/28/16 77.7 kg (171 lb 6.4 oz)   09/15/16 78.6 kg (173 lb 3.2 oz)   09/01/16 78.9 kg (174 lb)   07/28/16 79.8 kg (175 lb 14.4 oz)   06/28/16 79.1 kg (174 lb 4.8 oz)   06/07/16 80.6 kg (177 lb 12.8 oz)   04/05/16 80.6 kg (177 lb 9.6 oz)   03/01/16 81.5 kg (179 lb 9.6 oz)   01/06/16 80.7 kg (178 lb)      NUTRITION DIAGNOSES:   Problem:  Increased nutrient needs     Etiology: related to increased demand for protein     Signs/Symptoms: as evidenced by s/p laminectomy      NUTRITION INTERVENTIONS:  Meals/Snacks: General/healthful diet   Supplements: Commercial supplement              GOAL:   PO intake >75% meals + ONS next 5-7 days    Cultural, Taoist, or Ethnic Dietary Needs: None     EDUCATION & DISCHARGE NEEDS:    [x] None Identified   [] Identified and Education Provided/Documented   [] Identified and Pt declined/was not appropriate      [] Interdisciplinary Care Plan Reviewed/Documented    [x] Discharge Needs:    CCD   [] No Nutrition Related Discharge Needs    NUTRITION RISK:   Pt Is At Nutrition Risk  [x]     No Nutrition Risk Identified  []       PT SEEN FOR:    []  MD Consult: []Calorie Count      []Diabetic Diet Education        []Diet Education     []Electrolyte Management     []General Nutrition Management and Supplements     []Management of Tube Feeding     []TPN Recommendations    []  RN Referral:  []MST score >=2     []Enteral/Parenteral Nutrition PTA     []Pregnant: Gestational DM or Multigestation [] Pressure Ulcer    []  Low BMI      [x]  Length of Stay       [] Dysphagia Diet         [] Ventilator  []  Follow-up     Previous Recommendations:   [] Implemented          [] Not Implemented          [x] Not Applicable    Previous Goal:   [] Met              [] Progressing Towards Goal              [] Not Progressing Towards Goal   [x] Not Applicable            Neha Miranda, [address] [facility]  Pager [phone]  Phone [phone]

## 2019-09-19 NOTE — ANESTHESIA POSTPROCEDURE EVALUATION
Procedure(s):  L2-5 LAMINECTOMY, L2-3 POSTERIOR LUMBER INTERBODY FUSION WITH L2-5 INSTRUMENTATION, ILIAC CREST BONE MARROW ASPIRATE, OSTEOAMP FIBERS, IMAGE GUIDANCE.     general    Anesthesia Post Evaluation        Patient location during evaluation: bedside  Level of consciousness: awake  Pain management: satisfactory to patient  Airway patency: patent  Anesthetic complications: no  Cardiovascular status: acceptable  Respiratory status: acceptable  Hydration status: acceptable        Vitals Value Taken Time   /65 9/16/2019  3:30 PM   Temp 36.5 °C (97.7 °F) 9/16/2019  2:31 PM   Pulse 66 9/16/2019  3:30 PM   Resp 20 9/16/2019  3:30 PM   SpO2 98 % 9/16/2019  3:30 PM

## 2019-09-19 NOTE — ROUTINE PROCESS
Bedside shift change report given to Celia Sutton RN (oncoming nurse) by Sanam Wu RN (offgoing nurse). Report included the following information SBAR, Kardex and MAR.

## 2019-09-19 NOTE — PROGRESS NOTES
Henna Conrad NP in to sign blood consent for patient. Azalia Martinez NP currently in OR with Dr. Estee Barahona.

## 2019-09-19 NOTE — PROGRESS NOTES
Problem: Falls - Risk of  Goal: *Absence of Falls  Description  Document Stephanie Zavala Fall Risk and appropriate interventions in the flowsheet. Outcome: Progressing Towards Goal  Note:   Fall Risk Interventions:  Mobility Interventions: Patient to call before getting OOB         Medication Interventions: Teach patient to arise slowly, Patient to call before getting OOB, Bed/chair exit alarm                   Problem: Risk for Spread of Infection  Goal: Prevent transmission of infectious organism to others  Description  Prevent the transmission of infectious organisms to other patients, staff members, and visitors. Outcome: Progressing Towards Goal     Problem: Pressure Injury - Risk of  Goal: *Prevention of pressure injury  Description  Document Darion Scale and appropriate interventions in the flowsheet. Outcome: Progressing Towards Goal  Note:   Pressure Injury Interventions:             Activity Interventions: Increase time out of bed, Pressure redistribution bed/mattress(bed type), PT/OT evaluation    Mobility Interventions: Pressure redistribution bed/mattress (bed type), HOB 30 degrees or less, PT/OT evaluation    Nutrition Interventions: Document food/fluid/supplement intake                     Problem: Pain  Goal: *Control of Pain  Outcome: Progressing Towards Goal

## 2019-09-20 VITALS
HEIGHT: 68 IN | BODY MASS INDEX: 25.01 KG/M2 | SYSTOLIC BLOOD PRESSURE: 115 MMHG | TEMPERATURE: 97.6 F | RESPIRATION RATE: 18 BRPM | WEIGHT: 165 LBS | DIASTOLIC BLOOD PRESSURE: 54 MMHG | OXYGEN SATURATION: 93 % | HEART RATE: 66 BPM

## 2019-09-20 LAB
ABO + RH BLD: NORMAL
BLD PROD TYP BPU: NORMAL
BLD PROD TYP BPU: NORMAL
BLOOD GROUP ANTIBODIES SERPL: NORMAL
BPU ID: NORMAL
BPU ID: NORMAL
CROSSMATCH RESULT,%XM: NORMAL
CROSSMATCH RESULT,%XM: NORMAL
GLUCOSE BLD STRIP.AUTO-MCNC: 114 MG/DL (ref 65–100)
GLUCOSE BLD STRIP.AUTO-MCNC: 91 MG/DL (ref 65–100)
HCT VFR BLD AUTO: 32.6 % (ref 36.6–50.3)
HGB BLD-MCNC: 10.5 G/DL (ref 12.1–17)
SERVICE CMNT-IMP: ABNORMAL
SERVICE CMNT-IMP: NORMAL
SPECIMEN EXP DATE BLD: NORMAL
STATUS OF UNIT,%ST: NORMAL
STATUS OF UNIT,%ST: NORMAL
UNIT DIVISION, %UDIV: 0
UNIT DIVISION, %UDIV: 0

## 2019-09-20 PROCEDURE — 74011250636 HC RX REV CODE- 250/636: Performed by: ORTHOPAEDIC SURGERY

## 2019-09-20 PROCEDURE — 77010033678 HC OXYGEN DAILY

## 2019-09-20 PROCEDURE — 97530 THERAPEUTIC ACTIVITIES: CPT

## 2019-09-20 PROCEDURE — 82962 GLUCOSE BLOOD TEST: CPT

## 2019-09-20 PROCEDURE — 94760 N-INVAS EAR/PLS OXIMETRY 1: CPT

## 2019-09-20 PROCEDURE — 74011250637 HC RX REV CODE- 250/637: Performed by: NURSE PRACTITIONER

## 2019-09-20 PROCEDURE — 36415 COLL VENOUS BLD VENIPUNCTURE: CPT

## 2019-09-20 PROCEDURE — 85018 HEMOGLOBIN: CPT

## 2019-09-20 PROCEDURE — 97116 GAIT TRAINING THERAPY: CPT

## 2019-09-20 RX ADMIN — OXYCODONE HYDROCHLORIDE 5 MG: 5 TABLET ORAL at 07:00

## 2019-09-20 RX ADMIN — METOCLOPRAMIDE HYDROCHLORIDE 10 MG: 10 TABLET ORAL at 07:00

## 2019-09-20 RX ADMIN — ACETAMINOPHEN 1000 MG: 500 TABLET ORAL at 12:55

## 2019-09-20 RX ADMIN — ATORVASTATIN CALCIUM 40 MG: 20 TABLET, FILM COATED ORAL at 09:53

## 2019-09-20 RX ADMIN — FAMOTIDINE 20 MG: 20 TABLET ORAL at 09:53

## 2019-09-20 RX ADMIN — Medication 10 ML: at 01:12

## 2019-09-20 RX ADMIN — ACETAMINOPHEN 1000 MG: 500 TABLET ORAL at 01:12

## 2019-09-20 RX ADMIN — METOCLOPRAMIDE HYDROCHLORIDE 10 MG: 10 TABLET ORAL at 12:55

## 2019-09-20 RX ADMIN — CHOLECALCIFEROL TAB 125 MCG (5000 UNIT) 5000 UNITS: 125 TAB at 09:53

## 2019-09-20 RX ADMIN — ATENOLOL 50 MG: 50 TABLET ORAL at 09:53

## 2019-09-20 RX ADMIN — LEVOFLOXACIN 500 MG: 5 INJECTION, SOLUTION INTRAVENOUS at 09:53

## 2019-09-20 RX ADMIN — OXYCODONE HYDROCHLORIDE 5 MG: 5 TABLET ORAL at 10:08

## 2019-09-20 RX ADMIN — METFORMIN HYDROCHLORIDE 1000 MG: 500 TABLET ORAL at 09:53

## 2019-09-20 RX ADMIN — SENNOSIDES,DOCUSATE SODIUM 1 TABLET: 8.6; 5 TABLET, FILM COATED ORAL at 09:53

## 2019-09-20 RX ADMIN — POLYETHYLENE GLYCOL 3350 17 G: 17 POWDER, FOR SOLUTION ORAL at 10:03

## 2019-09-20 RX ADMIN — ACETAMINOPHEN 1000 MG: 500 TABLET ORAL at 06:59

## 2019-09-20 NOTE — PROGRESS NOTES
9/20/2019 9:25 AM Discharge order noted. [facility] [address] was sent pt's discharge clinicals and notified of pt's discharge home today via [employer]. Pt has a PCP follow up on 9/26 and Urology follow up on 9/23, both appts are on pt's avs.   Pt's wife will transport pt home. No further discharge needs identified at this time.    PAMELLA GeeW

## 2019-09-20 NOTE — PROGRESS NOTES
I have reviewed discharge instructions with the patient and spouse. The patient and spouse verbalized understanding. Opportunity was provided for questions and clarification.

## 2019-09-20 NOTE — PROGRESS NOTES
PCP HECTOR appt scheduled with Dr. Vikash Phillip on 9/26/2019 at 9:45am. Appt added to AVS. Nelson Luevano, CM Specialist

## 2019-09-20 NOTE — PROGRESS NOTES
ORTHOPAEDIC LUMBAR FUSION PROGRESS NOTE    NAME:     Namrata Poole   :       1940   MRN:       [id number]   DATE:      2019    POD:              4 Days Post-Op  S/P:              Procedure(s):  L2-5 LAMINECTOMY, L2-3 POSTERIOR LUMBER INTERBODY FUSION WITH L2-5 INSTRUMENTATION, ILIAC CREST BONE MARROW ASPIRATE, OSTEOAMP FIBERS, IMAGE GUIDANCE    SUBJECTIVE:    Reports improvement in preop  Lower back and bLE pain. Postop pain controlled  Well with  Medications. Tolerating PO intake  Well.   walking inside the room, with out assistance. Passing flatus   Voiding without  Difficulty. Denies nausea/vomiting, headache, chest pain or shortness of breath  Recent Labs     19  0125   HGB 10.5*   HCT 32.6*     Patient Vitals for the past 12 hrs:   BP Temp Pulse Resp SpO2   19 0754 -- 97.6 °F (36.4 °C) 78 18 93 %   19 0401 130/69 97.6 °F (36.4 °C) 69 16 94 %   19 2327 121/66 98.9 °F (37.2 °C) 73 16 95 %   19 2244 118/67 99.1 °F (37.3 °C) 75 16 95 %   19 2132 135/78 98.9 °F (37.2 °C) 67 16 95 %     Exam:  Positive strength/ROM bilat lower ext. Neuro intact to sensation  Dressings ,   Soaked some, Pineda not functioning. Lower extremities warm and well perfused      PLAN: 4 Days Post-Op, improved   Continue PO pain medications as needed  Continue SCD's, frequent ambulation  Diet: diabetic    Change dressing-Pineda  Continue bowel regimen   Continue lumbar orthosis  Continue Vit D3  Medical comorbities stable  Discharge planning  Today with home health.

## 2019-09-20 NOTE — PROGRESS NOTES
Problem: Falls - Risk of  Goal: *Absence of Falls  Description  Document Erik Reyes Fall Risk and appropriate interventions in the flowsheet. Outcome: Progressing Towards Goal  Note:   Fall Risk Interventions:  Mobility Interventions: OT consult for ADLs, Patient to call before getting OOB         Medication Interventions: Patient to call before getting OOB, Teach patient to arise slowly                   Problem: Risk for Spread of Infection  Goal: Prevent transmission of infectious organism to others  Description  Prevent the transmission of infectious organisms to other patients, staff members, and visitors. Outcome: Progressing Towards Goal     Problem: Pressure Injury - Risk of  Goal: *Prevention of pressure injury  Description  Document Darion Scale and appropriate interventions in the flowsheet. Outcome: Progressing Towards Goal  Note:   Pressure Injury Interventions:             Activity Interventions: Increase time out of bed, Pressure redistribution bed/mattress(bed type), PT/OT evaluation    Mobility Interventions: HOB 30 degrees or less, Pressure redistribution bed/mattress (bed type), PT/OT evaluation    Nutrition Interventions: Document food/fluid/supplement intake                     Problem: Pain  Goal: *Control of Pain  Outcome: Progressing Towards Goal

## 2019-09-20 NOTE — PROGRESS NOTES
Problem: Falls - Risk of  Goal: *Absence of Falls  Description  Document Mendoza Margaretchrei Fall Risk and appropriate interventions in the flowsheet. Outcome: Progressing Towards Goal  Note:   Fall Risk Interventions:  Mobility Interventions: Patient to call before getting OOB      Medication Interventions: Teach patient to arise slowly    Problem: Risk for Spread of Infection  Goal: Prevent transmission of infectious organism to others  Description  Prevent the transmission of infectious organisms to other patients, staff members, and visitors. Outcome: Progressing Towards Goal     Problem: Pressure Injury - Risk of  Goal: *Prevention of pressure injury  Description  Document Darion Scale and appropriate interventions in the flowsheet. Outcome: Progressing Towards Goal  Note:   Pressure Injury Interventions:       Activity Interventions: Increase time out of bed    Mobility Interventions: HOB 30 degrees or less    Nutrition Interventions: Document food/fluid/supplement intake           Problem: Pain  Goal: *Control of Pain  Outcome: Progressing Towards Goal

## 2019-09-20 NOTE — ROUTINE PROCESS
Bedside shift change report given to GIOVANNI Kramer (oncoming nurse) by Kayla Wagoner RN (offgoing nurse). Report included the following information SBAR, Kardex and MAR.

## 2019-09-20 NOTE — PROGRESS NOTES
Problem: Mobility Impaired (Adult and Pediatric)  Goal: *Acute Goals and Plan of Care (Insert Text)  Description  FUNCTIONAL STATUS PRIOR TO ADMISSION: Patient was modified independent using a back brace for functional mobility. HOME SUPPORT PRIOR TO ADMISSION: The patient lived with wife but did not require assist.    Physical Therapy Goals  Initiated 9/16/2019    1. Patient will move from supine to sit and sit to supine  in bed with independence within 4 days. 2. Patient will perform sit to stand with modified independence within 4 days. 3. Patient will ambulate with modified independence for 100 feet with the least restrictive device within 4 days. 4. Patient will ascend/descend 1 stairs with 1 handrail(s) with minimal assistance/contact guard assist within 4 days. 5. Patient will verbalize and demonstrate understanding of spinal precautions (No bending, lifting greater than 5 lbs, or twisting; log-roll technique; frequent repositioning as instructed) within 4 days. Note:   PHYSICAL THERAPY TREATMENT  Patient: Namrata Poole (39 y.o. male)  Date: 9/20/2019  Diagnosis: Adjacent segment disease with spinal stenosis [M48.00] <principal problem not specified>  Procedure(s) (LRB):  L2-5 LAMINECTOMY, L2-3 POSTERIOR LUMBER INTERBODY FUSION WITH L2-5 INSTRUMENTATION, ILIAC CREST BONE MARROW ASPIRATE, OSTEOAMP FIBERS, IMAGE GUIDANCE (N/A) 4 Days Post-Op  Precautions: Back, Contact, Fall  Chart, physical therapy assessment, plan of care and goals were reviewed. ASSESSMENT  Based on the objective data described below, Pt comes to sit with CGA. Pt donned back brace with min assist.Pt CGA sit to stand. Pt ambulated 140ft with RW CGA. Pt up and down 3 steps with rail CGA. Pts BP was 133/72 supine and 148/71 standing with no complaints of dizziness. Pts SPO2 is 92% on RA at rest.Pt tends to drop to 86% on RA with ambulation but rebounds back to 92% post ambulation sitting. Pt looking and feeling much better today. Pt is cleared for D/C by PT. Current Level of Function Impacting Discharge (mobility/balance): Pt is CGA for mobility. O         PLAN :  Patient continues to benefit from skilled intervention to address the above impairments. Continue treatment per established plan of care. to address goals. Recommendation for discharge: (in order for the patient to meet his/her long term goals)  Physical therapy at least 2 days/week in the home     This discharge recommendation:  Has been made in collaboration with the attending provider and/or case management    Equipment recommendations for successful discharge (if) home: rolling walker       SUBJECTIVE:       OBJECTIVE DATA SUMMARY:   Critical Behavior:  Neurologic State: Alert, Appropriate for age  Orientation Level: Oriented X4     Safety/Judgement: Good awareness of safety precautions  Functional Mobility Training:  Bed Mobility:        Sit to Supine: Contact guard assistance           Transfers:  Sit to Stand: Contact guard assistance  Stand to Sit: Contact guard assistance                             Balance:  Sitting: Intact  Standing - Static: Good  Ambulation/Gait Training:  Distance (ft): 140 Feet (ft)  Assistive Device: Gait belt;Walker, rolling  Ambulation - Level of Assistance: Contact guard assistance        Gait Abnormalities: Decreased step clearance        Base of Support: Narrowed     Speed/Pat: Pace decreased (<100 feet/min)  Step Length: Right shortened;Left shortened               Activity Tolerance:   Good  Please refer to the flowsheet for vital signs taken during this treatment.     After treatment patient left in no apparent distress:   Sitting in chair    COMMUNICATION/COLLABORATION:   The patients plan of care was discussed with: Physical Therapist    Fannie Pierce PTA   Time Calculation: 23 mins

## 2019-09-20 NOTE — DISCHARGE INSTRUCTIONS
Alexandra Ramirez MD  [address] [place] [address]  Office Phone: [phone]  Lumbar Surgery Discharge Instructions    Activities   You are going home a well person, be as active as possible. Your only exercise should be walking. Start with short frequent walks and increase your walking distance each day. Start with walking three times per day for 5 minutes and increase your distance each day 2-3 minutes. Limit the amount of time you sit to 20-30 minute intervals. Sitting for prolonged periods of time will be uncomfortable for you following your surgery.  Do not lift anything over 8 pounds for 10-12 weeks, and do not do any bending or straining.  When you are in the bed, you may lay on your back or on either side. Do not lay on your stomach.  Continue using your incentive spirometer regularly for deep breathing exercises   You may resume sexual relations 6 weeks after your surgery      Diet   You may resume your normal diet. Be sure to drink plenty of fluids, it is important to keep yourself hydrated.  MAKE SURE YOU ARE GETTING GOOD NUTRITION (Lean Protein, Vitamin D AND Calcium)   Avoid alcoholic beverages and ABSOLUTELY NO tobacco products. Tobacco products will interfere with your healing. If you continue to use tobacco, you may end up needing another surgery in the future. Medications   Do not take anti-inflammatory medications or aspirin unless instructed by your physician. YOU MAY RESUME YOUR 81MG ASPIRIN ON Friday, SEPT 20.   Start the Calcium AFTER finishing the Levaquin antibiotic.  Take your pain medication as directed.  Do NOT take additional Tylenol if your prescribed pain medication has acetaminophen in it (Endocet/Percocet, Lortab, Norco).  It is important to have regular bowel movements. Pain medications may cause constipation. Stool softeners, prune juice, and increasing your water and fiber intake may help in preventing constipation.    Do NOT take laxatives if at all possible except in severe situations. It can results in a vicious cycle of constipation and diarrhea.  Do not be alarmed if you still have some of the same symptoms you had prior to surgery. The nerves often require time to heal after the pressure has been relieved. You may experience pain in your shoulders or between your shoulder blades, which is common after this surgery. The level of pain you experience should improve as your body heals. Driving   You may not drive or return to work until instructed by your physician. However, you may ride in the car for short periods of time. Brace   If you have a back brace, you should wear your brace at all times when you are out of bed. Do not wear the brace while in bed or showering.  Remember to always wear a cotton t-shirt underneath your brace.  Do not bend or twist when your brace is off. Showering   For now, you may shower the front side of your body. Approximately 5-7 days after your surgery, if your incision is not draining, you may begin taking full showers. Your dressing is waterproof.  Do not rub or apply lotion or ointments to the incision site.  Do not use tub baths, swimming pools or Jacuzzis. Caring for your incision   Keep the Aquacel on until 7 days after discharge. At that point, if the incision is dry and without drainage, you may keep the wound open to air without cover.  You may have steri-strips on your incision (small, white pieces of tape). Do not pull the steri-strips; they will fall off on their own after several days. If you have sutures or staples, they will be removed by home health or when you see your physician.  Do not rub or apply any lotions or ointments to your incision site. Follow Up  · You should already have your follow-up visit scheduled with Dr. Josselyn Hernandez for 2-3 weeks after surgery. Please call the office if there are any questions or concerns regarding your follow-up. · Follow up with Urologist.      Notify your physician if you develop any of the following conditions:   Fever above 101 degrees for 24 hours.  Nausea or vomiting.  Severe headache.  Inability to urinate.  Loss of bowel or bladder control (sudden onset of incontinence).  Changes in sensation in your extremities (numbness, tingling, loss of color).  Severe pain or pain not relieved by medications.  Redness, swelling, or drainage from your incision.  Persistent pain in the chest.    Pain in the calf of either leg.  Increased weakness (if this is greater than before your surgery). If you have any questions, contact your Orthopaedic Surgeons office.

## 2019-09-21 NOTE — PROGRESS NOTES
Roxi with wound care called to bedside to assess patient's sofia dressing. Leak was found near bottom of dressing and dressing was changed by wound care prior to discharge.

## 2019-09-23 ENCOUNTER — PATIENT OUTREACH (OUTPATIENT)
Dept: FAMILY MEDICINE CLINIC | Age: 79
End: 2019-09-23

## 2019-09-24 NOTE — DISCHARGE SUMMARY
Orthopedic Service Discharge Summary    Patient ID:  Keisha Vallejo  [id number]  male  70 y.o.  1940    Admit date: 9/16/2019    Discharge date and time: 9/20/2019  1:41 PM     Admitting Physician: Danielle Villaseñor MD     Discharge Physician: Danielle Villaseñor MD    Consulting Physician(s): Treatment Team: Utilization Review: Joyce Perez; Care Manager: Ya Cole Primary Nurse: Baltazar Higginbotham; Primary Nurse: Lorenza Bamberger; Primary Nurse: Elizabeth Llanes    Date of Surgery: 9/16/2019     Preoperative Diagnosis:  ADJACENT SEGMENT DISEASE WITH STENOSIS    Postoperative Diagnosis: ADJACENT SEGMENT DISEASE WITH STENOSIS    Procedure(s): Procedure(s):  L2-5 LAMINECTOMY, L2-3 POSTERIOR LUMBER INTERBODY FUSION WITH L2-5 INSTRUMENTATION, ILIAC CREST BONE MARROW ASPIRATE, OSTEOAMP FIBERS, IMAGE GUIDANCE    Surgeon: Surgeon(s) and Role:     * Zuleika Stanford MD - Primary      Anesthesia:  General    Preoperative Medical Clearance:                           HPI:  Pt is a 70 y.o. male who has a history of Adjacent segment disease with spinal stenosis [M48.00]  with pain and limitations of activities of daily living who presents at this time for lower back pain and stiffness and Bilateral radiation of pain, left > right  following the failure of conservative management. PMH:   Past Medical History:   Diagnosis Date    CAD (coronary artery disease)     Degenerative joint disease 1/28/2011    Diabetes mellitus ([facility] [address]     Essential hypertension     Hyperlipidemia     Kidney stones 1969    Obstructive sleep apnea 01/28/2011    Stopped using CPAP years ago       Medications upon admission :   Prior to Admission Medications   Prescriptions Last Dose Informant Patient Reported?  Taking?   atenolol (TENORMIN) 50 mg tablet 9/16/2019 at Unknown time  No Yes   Sig: TAKE 1 TABLET DAILY   atorvastatin (LIPITOR) 40 mg tablet 9/15/2019 at Unknown time  No Yes   Sig: TAKE 1 TABLET DAILY (START LIPITOR AFTER VYTORIN IS FINISHED) cholecalciferol, VITAMIN D3, (VITAMIN D3) 5,000 unit tab tablet 9/10/2019  No No   Sig: Take 1 Tab by mouth daily for 30 days. Please start today and take until surgery. Do not take AM of surgery   folic acid (FOLVITE) 1 mg tablet 9/15/2019 at Unknown time  Yes Yes   Sig: Take 1 mg by mouth daily. irbesartan (AVAPRO) 150 mg tablet 9/15/2019 at Unknown time  No Yes   Sig: Take 1 Tab by mouth nightly. metFORMIN (GLUCOPHAGE) 1,000 mg tablet 9/10/2019  No No   Sig: TAKE 1 TABLET TWICE A DAY WITH MEALS   omeprazole (PRILOSEC) 40 mg capsule 9/16/2019 at Unknown time  No Yes   Sig: TAKE 1 CAPSULE DAILY   oxyCODONE-acetaminophen (PERCOCET) 5-325 mg per tablet 9/10/2019  No No   Sig: Take 1 Tab by mouth daily as needed for Pain for up to 30 days. Facility-Administered Medications: None        Allergies: Allergies   Allergen Reactions    Ace Inhibitors Swelling        Hospital Course: The patient underwent surgery. Complications:  None; patient tolerated the procedure well. Was taken to the PACU in stable condition and then transferred to the Orthopedics floor. Perioperative Antibiotics:  Ancef and Levaquin     Postoperative Pain Management:  Acetaminophen and Oxycodone., Muscle relaxant    DVT Prophylaxis: SCD  Postoperative transfusions:     2 Banked PRBCs     Post Op complications: none    Hemoglobin at discharge:    Lab Results   Component Value Date/Time    HGB 10.5 (L) 09/20/2019 01:25 AM    INR 1.1 08/27/2019 10:15 AM        POD#1 &2 Dressing  - Clean /dry  . Incision - clean, dry and intact. No significant erythema or swelling. Neurovascular exam within normal limits. Pre op lower back pain and BLE pain is improved and post op pain is adequately controlled with medications. Pt had a HVAC drain and it was pulled out on POD #1.    Physical Therapy started on the day following surgery and progressed to ambulation with the aid of a rolling walker for distances of 100 feet with minimal assistance POD#3,  Julius functioning, Neuro intact,  Pre op pain remained improved, post op pain well controlled. Hemoglobin Dropped to 7.2 g   , PRBC 2 units transfused. POD #4    JULIUS  Dressing Changed. Neuro intact. hemoglobin  Raised to 10.5   PT continued during the stay, ambulated using RW independently. Patient stayed on Levaquin during hospital stay for E.coli UTI, . He remained asymptomatic. Started flomax  as  There was  difficulty in the urinary catheter insertion pre op. .  .  At the time of discharge, Pre op symptoms improved, post op pain well controlled, able to go up and down stairs and had understanding of precautions needed following surgery. Discharged to: Home. Discharge instructions:  -See Full Summary of discharge instructions attached  -Anticoagulate with ASA  -Resume pre hospital diet            -Resume home medications   Discharge Medication List as of 9/20/2019 12:36 PM      START taking these medications    Details   levoFLOXacin (LEVAQUIN) 500 mg tablet Take 1 Tab by mouth daily for 7 days. , Print, Disp-7 Tab, R-0      tamsulosin (FLOMAX) 0.4 mg capsule Take 1 Cap by mouth daily. , Print, Disp-30 Cap, R-0      Calcium-Cholecalciferol, D3, (CALCIUM 500 + D, D3,) 500 mg(1,250mg) -125 unit tab Take 1 Tab by mouth three (3) times daily. , Print, Disp-90 Tab, R-0         CONTINUE these medications which have CHANGED    Details   oxyCODONE-acetaminophen (PERCOCET) 5-325 mg per tablet Take 1-2 Tabs by mouth every six (6) hours as needed for Pain for up to 7 days. Max Daily Amount: 8 Tabs. Indications: pain, Print, Disp-50 Tab, R-0         CONTINUE these medications which have NOT CHANGED    Details   folic acid (FOLVITE) 1 mg tablet Take 1 mg by mouth daily. , Historical Med      irbesartan (AVAPRO) 150 mg tablet Take 1 Tab by mouth nightly., Normal, Disp-90 Tab, R-3      atenolol (TENORMIN) 50 mg tablet TAKE 1 TABLET DAILY, Normal, Disp-90 Tab, R-3      omeprazole (PRILOSEC) 40 mg capsule TAKE 1 CAPSULE DAILY, Normal, Disp-90 Cap, R-3      atorvastatin (LIPITOR) 40 mg tablet TAKE 1 TABLET DAILY (START LIPITOR AFTER VYTORIN IS FINISHED), Normal, Disp-90 Tab, R-3      cholecalciferol, VITAMIN D3, (VITAMIN D3) 5,000 unit tab tablet Take 1 Tab by mouth daily for 30 days. Please start today and take until surgery.  Do not take AM of surgery, Normal, Disp-30 Tab, R-0      metFORMIN (GLUCOPHAGE) 1,000 mg tablet TAKE 1 TABLET TWICE A DAY WITH MEALS, Normal, Disp-180 Tab, R-3         STOP taking these medications       nitrofurantoin, macrocrystal-monohydrate, (MACROBID) 100 mg capsule Comments:   Reason for Stopping:         cefadroxil (DURICEF) 500 mg capsule Comments:   Reason for Stopping:         aspirin delayed-release 81 mg tablet Comments:   Reason for Stopping:            per medical continuation form  -Discharge activity: Activity as tolerated, Ambulate in house, No driving while on analgesics, PT/OT per Home Health and See surgical instructions  -Ambulate with Walkers, Type: Rolling Walker, appropriate total joint protocol  -Wound Care Keep wound clean and dry, Reinforce dressing PRN, Ice to area for comfort and As directed  -Follow up in office in 2 weeks   - follow up with urologist in 1 week      Signed:  Adriel Davis NP  9/24/2019  4:51 PM        No att. providers found

## 2019-09-26 ENCOUNTER — HOSPITAL ENCOUNTER (OUTPATIENT)
Dept: LAB | Age: 79
Discharge: HOME | End: 2019-09-26
Payer: MEDICARE

## 2019-09-26 ENCOUNTER — OFFICE VISIT (OUTPATIENT)
Dept: FAMILY MEDICINE CLINIC | Age: 79
End: 2019-09-26

## 2019-09-26 VITALS
TEMPERATURE: 98.1 F | RESPIRATION RATE: 18 BRPM | HEART RATE: 59 BPM | BODY MASS INDEX: 25.76 KG/M2 | WEIGHT: 170 LBS | OXYGEN SATURATION: 93 % | DIASTOLIC BLOOD PRESSURE: 82 MMHG | SYSTOLIC BLOOD PRESSURE: 173 MMHG | HEIGHT: 68 IN

## 2019-09-26 DIAGNOSIS — M51.9 LUMBAR DISC DISEASE: ICD-10-CM

## 2019-09-26 DIAGNOSIS — M19.90 OSTEOARTHRITIS, UNSPECIFIED OSTEOARTHRITIS TYPE, UNSPECIFIED SITE: Primary | ICD-10-CM

## 2019-09-26 DIAGNOSIS — E78.5 DYSLIPIDEMIA: ICD-10-CM

## 2019-09-26 DIAGNOSIS — Z23 ENCOUNTER FOR IMMUNIZATION: ICD-10-CM

## 2019-09-26 PROCEDURE — 80061 LIPID PANEL: CPT

## 2019-09-26 PROCEDURE — 36415 COLL VENOUS BLD VENIPUNCTURE: CPT

## 2019-09-26 NOTE — PROGRESS NOTES
Klaudia Chen is a 70 y.o. male   Chief Complaint   Patient presents with   [facility] Follow Up    Immunization/Injection    pt here for HECTOR from recent hospitalization for back surgery and pt had a UTI and was treated with levaquin finishes friday. Saw urology yesterday and is being started on flomax to help keep him going and prevent a future uti. Pt states his back is feeling better, using oxycodone post op and was given 60 tabs, using 1 every 6 hours. States the pain is improving everyday. Pt has f/u with Dr Darnell Ewing today. HH is coming to his house and he is planning to start PT next week. Pt is sleeping better now with the pain. Walking with a walker. Pt also due for chol recheck had a recent CMP in hospital.  Pt is on his statin and no issues with side effects. he is a 70y.o. year old male who presents for evalution. Reviewed PmHx, RxHx, FmHx, SocHx, AllgHx and updated and dated in the chart. Review of Systems - negative except as listed above in the HPI    Objective:     Vitals:    09/26/19 0932   BP: 173/82   Pulse: (!) 59   Resp: 18   Temp: 98.1 °F (36.7 °C)   TempSrc: Oral   SpO2: 93%   Weight: 170 lb (77.1 kg)   Height: 5' 8\" (1.727 m)       Current Outpatient Medications   Medication Sig    tamsulosin (FLOMAX) 0.4 mg capsule Take 1 Cap by mouth daily.  folic acid (FOLVITE) 1 mg tablet Take 1 mg by mouth daily.  cholecalciferol, VITAMIN D3, (VITAMIN D3) 5,000 unit tab tablet Take 1 Tab by mouth daily for 30 days. Please start today and take until surgery. Do not take AM of surgery    irbesartan (AVAPRO) 150 mg tablet Take 1 Tab by mouth nightly.     metFORMIN (GLUCOPHAGE) 1,000 mg tablet TAKE 1 TABLET TWICE A DAY WITH MEALS    atenolol (TENORMIN) 50 mg tablet TAKE 1 TABLET DAILY    omeprazole (PRILOSEC) 40 mg capsule TAKE 1 CAPSULE DAILY    atorvastatin (LIPITOR) 40 mg tablet TAKE 1 TABLET DAILY (START LIPITOR AFTER VYTORIN IS FINISHED)     No current facility-administered medications for this visit. Physical Examination: General appearance - alert, well appearing, and in no distress  Mental status - alert, oriented to person, place, and time  Chest - clear to auscultation, no wheezes, rales or rhonchi, symmetric air entry  Heart - normal rate, regular rhythm, normal S1, S2, no murmurs, rubs, clicks or gallops  Abdomen - soft, nontender, nondistended, no masses or organomegaly    Pt wearing back brace  Assessment/ Plan:   Diagnoses and all orders for this visit:    1. Osteoarthritis, unspecified osteoarthritis type, unspecified site    2. Lumbar disc disease  S/p laminectomy and is getting better day by day, f/u with [facility] later today has [place] for bandage changes and should be starting PT soon. Using incentive spirometer and is getting up and moving around. 3. Encounter for immunization  -     INFLUENZA VACCINE INACTIVATED (IIV), SUBUNIT, ADJUVANTED, IM  -     ADMIN INFLUENZA VIRUS VAC    4. Dyslipidemia  -     LIPID PANEL       Follow-up and Dispositions    · Return if symptoms worsen or fail to improve. I have discussed the diagnosis with the patient and the intended plan as seen in the above orders. The patient has received an after-visit summary and questions were answered concerning future plans. Pt conveyed understanding of plan.     Medication Side Effects and Warnings were discussed with patient      Fercho Guy DO

## 2019-09-26 NOTE — PROGRESS NOTES
Chief Complaint   Patient presents with   [facility] Immunization/Injection     Patient presents in office today for ELIZA VELASQUEZ f/u from [facility]. Admitted on 9/16/19. Discharged on 9/20/19. Would like to get a flu shot today. States that he needs an RX for a testing meter and test strips. No concerns. 1. Have you been to the ER, urgent care clinic since your last visit? Hospitalized since your last visit? Yes 9/16/19 [facility]     2. Have you seen or consulted any other health care providers outside of the [address] [place] [address] since your last visit? Include any pap smears or colon screening.  Yes When: 9/12/19 Where: Ortho    Learning Assessment 12/1/2015   PRIMARY LEARNER Patient   HIGHEST LEVEL OF EDUCATION - PRIMARY LEARNER  GRADUATED HIGH SCHOOL OR GED   BARRIERS PRIMARY LEARNER NONE   CO-LEARNER CAREGIVER No   PRIMARY LANGUAGE ENGLISH   LEARNER PREFERENCE PRIMARY DEMONSTRATION   ANSWERED BY pt   RELATIONSHIP SELF

## 2019-09-26 NOTE — PATIENT INSTRUCTIONS
A Healthy Lifestyle: Care Instructions  Your Care Instructions    A healthy lifestyle can help you feel good, stay at a healthy weight, and have plenty of energy for both work and play. A healthy lifestyle is something you can share with your whole family. A healthy lifestyle also can lower your risk for serious health problems, such as high blood pressure, heart disease, and diabetes. You can follow a few steps listed below to improve your health and the health of your family. Follow-up care is a key part of your treatment and safety. Be sure to make and go to all appointments, and call your doctor if you are having problems. It's also a good idea to know your test results and keep a list of the medicines you take. How can you care for yourself at home? · Do not eat too much sugar, fat, or fast foods. You can still have dessert and treats now and then. The goal is moderation. · Start small to improve your eating habits. Pay attention to portion sizes, drink less juice and soda pop, and eat more fruits and vegetables. ? Eat a healthy amount of food. A 3-ounce serving of meat, for example, is about the size of a deck of cards. Fill the rest of your plate with vegetables and whole grains. ? Limit the amount of soda and sports drinks you have every day. Drink more water when you are thirsty. ? Eat at least 5 servings of fruits and vegetables every day. It may seem like a lot, but it is not hard to reach this goal. A serving or helping is 1 piece of fruit, 1 cup of vegetables, or 2 cups of leafy, raw vegetables. Have an apple or some carrot sticks as an afternoon snack instead of a candy bar. Try to have fruits and/or vegetables at every meal.  · Make exercise part of your daily routine. You may want to start with simple activities, such as walking, bicycling, or slow swimming. Try to be active 30 to 60 minutes every day. You do not need to do all 30 to 60 minutes all at once.  For example, you can exercise 3 times a day for 10 or 20 minutes. Moderate exercise is safe for most people, but it is always a good idea to talk to your doctor before starting an exercise program.  · Keep moving. [place] the lawn, work in the garden, or [employer]. Take the stairs instead of the elevator at work. · If you smoke, quit. People who smoke have an increased risk for heart attack, stroke, cancer, and other lung illnesses. Quitting is hard, but there are ways to boost your chance of quitting tobacco for good. ? Use nicotine gum, patches, or lozenges. ? Ask your doctor about stop-smoking programs and medicines. ? Keep trying. In addition to reducing your risk of diseases in the future, you will notice some benefits soon after you stop using tobacco. If you have shortness of breath or asthma symptoms, they will likely get better within a few weeks after you quit. · Limit how much alcohol you drink. Moderate amounts of alcohol (up to 2 drinks a day for men, 1 drink a day for women) are okay. But drinking too much can lead to liver problems, high blood pressure, and other health problems. Family health  If you have a family, there are many things you can do together to improve your health. · Eat meals together as a family as often as possible. · Eat healthy foods. This includes fruits, vegetables, lean meats and dairy, and whole grains. · Include your family in your fitness plan. Most people think of activities such as jogging or tennis as the way to fitness, but there are many ways you and your family can be more active. Anything that makes you breathe hard and gets your heart pumping is exercise. Here are some tips:  ? Walk to do errands or to take your child to school or the bus.  ? Go for a family bike ride after dinner instead of watching TV. Where can you learn more? Go to http://jennifer-elida.info/. Enter X960 in the search box to learn more about \"A Healthy Lifestyle: Care Instructions. \"  Current as of: May 28, 2019  Content Version: 12.2  © 7493-1123 [employer]. Care instructions adapted under license by [employer] (which disclaims liability or warranty for this information). If you have questions about a medical condition or this instruction, always ask your healthcare professional. [address] any warranty or liability for your use of this information.

## 2019-09-27 LAB
CHOLEST SERPL-MCNC: 128 MG/DL (ref 100–199)
HDLC SERPL-MCNC: 33 MG/DL
INTERPRETATION, 910389: NORMAL
LDLC SERPL CALC-MCNC: 79 MG/DL (ref 0–99)
TRIGL SERPL-MCNC: 78 MG/DL (ref 0–149)
VLDLC SERPL CALC-MCNC: 16 MG/DL (ref 5–40)

## 2019-10-07 ENCOUNTER — PATIENT OUTREACH (OUTPATIENT)
Dept: FAMILY MEDICINE CLINIC | Age: 79
End: 2019-10-07

## 2019-10-07 NOTE — PROGRESS NOTES
Goals      Attends follow-up appointments as directed. 9/23 - Patient will attend ortho, urology, and PCP follow-up appointments. KEB    10/7/19 - Patient attended all follow-up appointments. OSITO       Prevent complications post hospitalization. 9/23/19 - Patient will complete Levaquin. Dr. Jona Gottron will repeat CBC to check H/H. Will monitor for results. OSITO    10/7/19 - Patient completed Levaquin. Dr. Jona Gottron did not repeat CBC. Patient reports feeling well. No questions or concerns.  OSITO

## 2019-10-21 ENCOUNTER — PATIENT OUTREACH (OUTPATIENT)
Dept: FAMILY MEDICINE CLINIC | Age: 79
End: 2019-10-21

## 2019-11-20 ENCOUNTER — OFFICE VISIT (OUTPATIENT)
Dept: FAMILY MEDICINE CLINIC | Age: 79
End: 2019-11-20

## 2019-11-20 VITALS
OXYGEN SATURATION: 98 % | BODY MASS INDEX: 25.61 KG/M2 | HEART RATE: 56 BPM | RESPIRATION RATE: 20 BRPM | TEMPERATURE: 98.2 F | DIASTOLIC BLOOD PRESSURE: 76 MMHG | WEIGHT: 169 LBS | SYSTOLIC BLOOD PRESSURE: 171 MMHG | HEIGHT: 68 IN

## 2019-11-20 DIAGNOSIS — M19.90 OSTEOARTHRITIS, UNSPECIFIED OSTEOARTHRITIS TYPE, UNSPECIFIED SITE: Primary | ICD-10-CM

## 2019-11-20 DIAGNOSIS — M51.9 LUMBAR DISC DISEASE: ICD-10-CM

## 2019-11-20 DIAGNOSIS — I10 ESSENTIAL HYPERTENSION: ICD-10-CM

## 2019-11-20 RX ORDER — OXYCODONE AND ACETAMINOPHEN 5; 325 MG/1; MG/1
1 TABLET ORAL
Qty: 30 TAB | Refills: 0 | Status: SHIPPED | OUTPATIENT
Start: 2019-11-20 | End: 2019-12-20 | Stop reason: SDUPTHER

## 2019-11-20 NOTE — PROGRESS NOTES
Yasmine Hartman is a 70 y.o. male , id x 2(name and ). Reviewed record, history, and  medications. Chief Complaint   Patient presents with    Medication Refill     pain med        Vitals:    19 1056   BP: 171/76   Pulse: (!) 56   Resp: 20   Temp: 98.2 °F (36.8 °C)   SpO2: 98%   Weight: 169 lb (76.7 kg)   Height: 5' 8\" (1.727 m)       Coordination of Care Questionnaire:   1) Have you been to an emergency room, urgent care, or hospitalized since your last visit?   no       2. Have seen or consulted any other health care provider since your last visit? NO    Patient is accompanied by self I have received verbal consent from [employer] to discuss any/all medical information while they are present in the room.

## 2019-11-20 NOTE — PROGRESS NOTES
Chief Complaint   Patient presents with    Medication Refill     pain med      Val Lott is a 70 y.o. male who presents for evaluation. Here for refill of percocet for management of chronic pain d/t osteoarthritis and lumbar disc disease. Has had 4 back surgeries, most recently had surgery in September. Was getting percocet temporarily from ortho following surgery. States prior to surgery would get #30 from Dr. Ananth Robin, takes sparingly. Takes 1 pill per day at most but notes that typically #30 will last him 2 months. Has signed pain contract,  appropriate, accountable with follow up and UDS. BP typically stable on medication. Noted that past 2 BP on record have been much high than his typical. States he does have a BP cuff somewhere at home, has not checked recently. Reviewed PmHx, RxHx, FmHx, SocHx, AllgHx and updated and dated in the chart. Patient Active Problem List    Diagnosis    Adjacent segment disease with spinal stenosis    Primary osteoarthritis involving multiple joints    ACP (advance care planning)     Discussed this calendar year 16      Cervical disc disease    Dupuytren's contracture of right hand    Lumbar disc disease    Diabetes mellitus type 2 in nonobese ([facility] [address]    Obstructive sleep apnea    Degenerative joint disease     A. S/P Right THR (8/18/9)      Coronary artery disease     a. 1996 PCI/stents of Lcx  b. Cath (10/03): severe prox (40-70%) and mid RCA (80%) stenosis - PCI to both areas  c. Cardiolite (2/04):scheduled for comparison from previous stress pre PCI - similar study with no new acute changes. D.  Exercise Cardiolite (8/29/11):  Reversible mid-distal inferolateral defect. No fixed defects. EF 63%. E.  Cath (9/16/11):  LM ost20, d20. LAD m20. LCx m70; OM1 70, OM2 100, OM3 100. RCA p20, d30; PDA 99. F.  PCI PDA (9/16/11):  2.5x18 Xience.  Dyslipidemia     a. FLP 10/06: LDL 62, HDL 50, Trig 107, Chol 133 (vytorin 10/80). b. Karen Gess (6/20/8):  Tot 143, , HDL 40, LDL 79 (Vytorin 10/80mg qd). c. FLP (12/20/8): Tot 137, TG 81, HDL 45, LDL 76 (Vytorin 10/80mg qd). D.  FLP (8/19/11): Tot 133, TG 78, HDL 48, LDL 69 (Vytorin 10/80mg qd). E.  FLP (10/22/13): Tot 155, TG 85, HDL 55, LDL 83 (Vytorin 10/80). F.  FLP (9/29/15): Tot 148, , HDL 64, LDL 60 (Vytorin 10/80). G.  FLP (10/28/16): Tot 148, TG 83, HDL 67, LDL 64 (Vytorin 10/80).  Hypertension, benign       Review of Systems - negative except as listed above in the HPI    Objective:     Vitals:    11/20/19 1056   BP: 171/76   Pulse: (!) 56   Resp: 20   Temp: 98.2 °F (36.8 °C)   SpO2: 98%   Weight: 169 lb (76.7 kg)   Height: 5' 8\" (1.727 m)     Physical Examination: General appearance - alert, well appearing, and in no distress  Mental status - alert, oriented to person, place, and time  Chest - clear to auscultation, no wheezes, rales or rhonchi, symmetric air entry  Heart - normal rate, regular rhythm, normal S1, S2, no murmurs, rubs, clicks or gallops     Opioid/Pain Management:    1. Has the patient signed a pain contract for chronic narcotic use? yes  2. Has the patient had a UDS or Serum screen as per guidelines as per [place] of [facility]?:   yes    3. Does patient meet necessary guidelines for Naloxone treatement per the [facility] of [facility]?:    no  4. Has the Prescription Monitoring Program been reviewed? yes  5. Does patient have a long term condition that requires long term use of a Narcotic?  yes  6. Has patient been tolerant of therapy and responsible to routine follow up and specialist follow-up? Yes        Assessment/ Plan:   Diagnoses and all orders for this visit:    1. Osteoarthritis, unspecified osteoarthritis type, unspecified site  2. Lumbar disc disease  -     oxyCODONE-acetaminophen (PERCOCET) 5-325 mg per tablet; Take 1 Tab by mouth daily as needed for Pain for up to 30 days. Indications: pain    3.  Essential hypertension  - Discussed last 2 BP have been 170's, much higher than typical. Potentially due to pain s/p surgery. - Advised to start checking BP at home daily. If persistently elevated at home as well, RTC for increase in BP medication. Follow-up and Dispositions    · Return if symptoms worsen or fail to improve. I have discussed the diagnosis with the patient and the intended plan as seen in the above orders. The patient understands and agrees with the plan. The patient has received an after-visit summary and questions were answered concerning future plans. Medication Side Effects and Warnings were discussed with patient  Patient Labs were reviewed and or requested:  Patient Past Records were reviewed and or requested    Sheba Garcia NP  [id number] [facility]    There are no Patient Instructions on file for this visit.

## 2019-11-25 DIAGNOSIS — I10 HYPERTENSION, BENIGN: ICD-10-CM

## 2019-11-25 DIAGNOSIS — E11.9 DIABETES MELLITUS TYPE 2 IN NONOBESE (HCC): ICD-10-CM

## 2019-11-25 DIAGNOSIS — I25.10 CORONARY ARTERY DISEASE INVOLVING NATIVE CORONARY ARTERY OF NATIVE HEART WITHOUT ANGINA PECTORIS: ICD-10-CM

## 2019-11-26 RX ORDER — OMEPRAZOLE 40 MG/1
CAPSULE, DELAYED RELEASE ORAL
Qty: 90 CAP | Refills: 4 | Status: SHIPPED | OUTPATIENT
Start: 2019-11-26 | End: 2021-05-07

## 2019-11-26 RX ORDER — ATENOLOL 50 MG/1
TABLET ORAL
Qty: 90 TAB | Refills: 4 | Status: SHIPPED | OUTPATIENT
Start: 2019-11-26 | End: 2021-05-24

## 2019-11-26 RX ORDER — METFORMIN HYDROCHLORIDE 1000 MG/1
TABLET ORAL
Qty: 180 TAB | Refills: 4 | Status: ON HOLD | OUTPATIENT
Start: 2019-11-26 | End: 2020-05-07 | Stop reason: SDUPTHER

## 2019-11-26 RX ORDER — ATORVASTATIN CALCIUM 40 MG/1
TABLET, FILM COATED ORAL
Qty: 90 TAB | Refills: 4 | Status: SHIPPED | OUTPATIENT
Start: 2019-11-26 | End: 2020-12-18

## 2019-11-26 RX ORDER — SILDENAFIL 100 MG/1
TABLET, FILM COATED ORAL
Qty: 18 TAB | Refills: 4 | Status: SHIPPED | OUTPATIENT
Start: 2019-11-26 | End: 2020-05-12 | Stop reason: SINTOL

## 2019-11-29 RX ORDER — FOLIC ACID 1 MG/1
TABLET ORAL
Qty: 90 TAB | Refills: 4 | Status: SHIPPED | OUTPATIENT
Start: 2019-11-29 | End: 2020-08-14 | Stop reason: SDUPTHER

## 2019-12-11 ENCOUNTER — TELEPHONE (OUTPATIENT)
Dept: CARDIOLOGY CLINIC | Age: 79
End: 2019-12-11

## 2019-12-11 ENCOUNTER — OFFICE VISIT (OUTPATIENT)
Dept: CARDIOLOGY CLINIC | Age: 79
End: 2019-12-11

## 2019-12-11 VITALS
OXYGEN SATURATION: 99 % | HEIGHT: 68 IN | HEART RATE: 60 BPM | WEIGHT: 171 LBS | SYSTOLIC BLOOD PRESSURE: 160 MMHG | BODY MASS INDEX: 25.91 KG/M2 | DIASTOLIC BLOOD PRESSURE: 70 MMHG | RESPIRATION RATE: 20 BRPM

## 2019-12-11 DIAGNOSIS — G47.33 OBSTRUCTIVE SLEEP APNEA: ICD-10-CM

## 2019-12-11 DIAGNOSIS — E78.5 DYSLIPIDEMIA: ICD-10-CM

## 2019-12-11 DIAGNOSIS — I25.119 CORONARY ARTERY DISEASE INVOLVING NATIVE CORONARY ARTERY OF NATIVE HEART WITH ANGINA PECTORIS (HCC): ICD-10-CM

## 2019-12-11 DIAGNOSIS — I10 HYPERTENSION, BENIGN: Primary | ICD-10-CM

## 2019-12-11 DIAGNOSIS — E11.9 DIABETES MELLITUS TYPE 2 IN NONOBESE (HCC): ICD-10-CM

## 2019-12-11 DIAGNOSIS — R07.89 OTHER CHEST PAIN: ICD-10-CM

## 2019-12-11 DIAGNOSIS — R06.09 DOE (DYSPNEA ON EXERTION): ICD-10-CM

## 2019-12-11 RX ORDER — LEVOFLOXACIN 500 MG/1
TABLET, FILM COATED ORAL DAILY
COMMUNITY
End: 2019-12-27

## 2019-12-11 RX ORDER — AMLODIPINE BESYLATE 5 MG/1
5 TABLET ORAL DAILY
Qty: 90 TAB | Refills: 2 | Status: SHIPPED | OUTPATIENT
Start: 2019-12-11 | End: 2020-05-08

## 2019-12-11 RX ORDER — ASCORBIC ACID 500 MG
500 TABLET ORAL 2 TIMES DAILY
COMMUNITY

## 2019-12-11 RX ORDER — ASPIRIN 81 MG/1
81 TABLET ORAL
COMMUNITY
End: 2021-06-04

## 2019-12-11 NOTE — TELEPHONE ENCOUNTER
requested patient see Duayne Pump today or tomorrow for SOB. Stated perfect served  and he was in agreement.     Sonia Cat advise

## 2019-12-11 NOTE — PATIENT INSTRUCTIONS
I am going to send you for labs today to check your kidney function and blood counts. I have also sent in a new prescription for you - Amlodipine 5 mg once daily. This will help lower your blood pressure and also may help with some of your chest  Pain symptoms. We will plan to perform a Lexiscan/chemical stress test as soon as we can. Also, an Echo will be scheduled to look at the pumping function of your heart. I would like these to be done on the same day, but the stress test will be done in the hospital, so I can have it done ASAP. Continue to monitor your symptoms, BP, HR, etc and notify me of any concerns prior to your testing and next visit. If you develop pain that does not resolve with rest, you should present to the ER or call 911.

## 2019-12-11 NOTE — PROGRESS NOTES
[address] [facility], [address] [facility]    Office [phone]  Fax [phone]       Earnestine Ortega is a 70 y.o. male previously seen by Dr. Diana Maxwell. Last seen 2/18/19. Prior to that he was followed by Dr. Gaby Malone from 2011 to 2016. Mr. Arleen Bassett was referred to our office today by Dr. Lauren Long of [facility] for evaluation of chest tightness and shortness of breath. Assessment  Encounter Diagnoses   Name Primary?  Hypertension, benign Yes    Diabetes mellitus type 2 in nonobese ([facility] [address]     Obstructive sleep apnea     Dyslipidemia     Coronary artery disease involving native coronary artery of native heart with angina pectoris ([facility] [address]     RG (dyspnea on exertion)     Other chest pain      Recommendations:  1. CAD; multivessel, with hx of PCI '96 to LCx, 2003 to Prox and mid RCA and 2011 to PDA. Now with exertional chest tightness and RG concerning for myocardial ischemia. No chest pain in the office today. EKG with STT's but unchanged c/t baseline. He is at risk for CAD progression/ISR in the setting of dyslipidemia, DM, and HTN   - Expedite ischemic evaluation with Lexiscan Cardiolite. - Evaluate Echocardiogram to assess LV function in the setting of progressive dyspnea. Baseline assessment in 2003. Normal LVEF per Cardiolite 2011.   - Continue ASA, BB and statin  - ADD Amlodipine for anti-anginal affect. - Update BMP today  - Advised him to seek immediate care in ER or call 911 should his sxs recur and not resolve with rest.  Would avoid nitrates in this situation due to positional dizziness. 2. Dyslipidemia - On Lipitor 40 mg daily. LDL goal <70; closer to 50, in the setting of recurrent CAD and DM. Last LDL 79. May benefit from higher dose statin and/or resumption of Zetia (previously on Vytorin - discontinued due to insurance coverage of combo medications).    - Continue Lipitor 40 mg daily  - Continue heart healthy diabetic diet    - Will update lipids again in the near future and continue this disussion    3. HTN - elevated. - ADD Amlodipine, as above  - Continue Losartan 50 mg daily and Atenolol 50 mg daily    4. ADELA - continue nightly CPAP    5. Anemia - per most recent labwork 9/2019; Hgb 10.5. Update CBC today. Further decline in Hgb may be contributing to sxs. Subjective:  Mr. Oleg Wright is an active gentleman despite ongoing issues with osteoarthritis with multiple back and hip surgeries; last 9/2019. He reports the onset of exertional chest tightness and associated RG for the past several weeks. Prior to that he was active with washing his car and caring for his yard. These activities have since been suspended. He also reports doing initially well with PT/rehab following surgery in September, but is now unable to do exercises because of shortness of breath and chest tightness. He lives in a 3 story house and notes that going upstairs recently has become \"challenging\". \"I keep everything on the first floor so I don't have to do the stairs\". Symtpoms are resolved with rest.      Prior to PCI in 1996, 2003 and 2011 his anginal sxs were consistent with fatigue and dyspnea. He denies ever using SL nitroglycerin in the past.      Notes dizziness with position change. No syncope or near syncope. BP has been high per home monitoring. Compliant with low sodium diet and antihypertensive medications. Uses CPAP nightly. Appetite has been poor since last surgery. Denies any nausea or vomiting. Ensures proper hydration. He denies any edema, orthopnea or PND. Notes poor sleep. Lipids and routine lab work per [employer]. He reports recent discontinuation of Vyttorin 10/80 mg and now taking Lipitor 40 mg daily. Pain is currently managed with 1 percocet daily and PRN Tylenol. Avoiding NSAIDs. He denies any bleeding or bruising concerns on ASA.       Cardiac risk factors   HTN yes  DM  yes  Smoking no    Cardiac testing  Cardiolite (2/04):scheduled for comparison from previous stress pre PCI - similar study with no new acute changes. Exercise Cardiolite (8/29/11):  Reversible mid-distal inferolateral defect.  No fixed defects.  EF 63%. Past Medical History:   Diagnosis Date    CAD (coronary artery disease)     Degenerative joint disease 1/28/2011    Diabetes mellitus ([facility] [address]     Essential hypertension     History of back surgery 09/2019    Hyperlipidemia     Kidney stones 1969    Obstructive sleep apnea 01/28/2011    Stopped using CPAP years ago        Current Outpatient Medications   Medication Sig Dispense Refill    aspirin delayed-release 81 mg tablet Take  by mouth daily.  ascorbic acid, vitamin C, (VITAMIN C) 500 mg tablet Take  by mouth.  multivitamin (ONE A DAY) tablet Take 1 Tab by mouth daily.  levoFLOXacin (LEVAQUIN) 500 mg tablet Take  by mouth daily.  folic acid (FOLVITE) 1 mg tablet TAKE 1 TABLET DAILY, EXCEPT DAY THAT METHOTREXATE IS TAKEN 90 Tab 4    atorvastatin (LIPITOR) 40 mg tablet TAKE 1 TABLET DAILY (START LIPITOR AFTER VYTORIN IS FINISHED) 90 Tab 4    omeprazole (PRILOSEC) 40 mg capsule TAKE 1 CAPSULE DAILY 90 Cap 4    atenolol (TENORMIN) 50 mg tablet TAKE 1 TABLET DAILY 90 Tab 4    metFORMIN (GLUCOPHAGE) 1,000 mg tablet TAKE 1 TABLET TWICE A DAY WITH MEALS 180 Tab 4    sildenafil citrate (VIAGRA) 100 mg tablet TAKE 1 TABLET AS NEEDED 18 Tab 4    oxyCODONE-acetaminophen (PERCOCET) 5-325 mg per tablet Take 1 Tab by mouth daily as needed for Pain for up to 30 days. Indications: pain 30 Tab 0    tamsulosin (FLOMAX) 0.4 mg capsule Take 1 Cap by mouth daily. 30 Cap 0    folic acid (FOLVITE) 1 mg tablet Take 1 mg by mouth daily.  irbesartan (AVAPRO) 150 mg tablet Take 1 Tab by mouth nightly. 90 Tab 3     Allergies   Allergen Reactions    Ace Inhibitors Swelling      Review of Systems  Constitutional: Negative for fever, chills, malaise/fatigue and diaphoresis.    Respiratory: Negative for cough, hemoptysis, sputum production, and wheezing.  +shortness of breath/RG  Cardiovascular: Negative for palpitations, orthopnea, claudication, leg swelling and PND. +chest pain  Gastrointestinal: Negative for heartburn, nausea, vomiting, blood in stool and melena. Genitourinary: Negative for dysuria and flank pain. Musculoskeletal: Negative for joint pain and back pain. Skin: Negative for rash. Neurological: Negative for focal weakness, seizures, loss of consciousness, weakness and headaches. +dizziness  Endo/Heme/Allergies: Does not bruise/bleed easily. Psychiatric/Behavioral: Negative for memory loss. The patient does not have insomnia. Physical Exam  Visit Vitals  /70   Pulse 60   Resp 20   Ht 5' 8\" (1.727 m)   Wt 171 lb (77.6 kg)   SpO2 99%   BMI 26.00 kg/m²     Wt Readings from Last 3 Encounters:   11/20/19 169 lb (76.7 kg)   09/26/19 170 lb (77.1 kg)   09/16/19 165 lb (74.8 kg)      General - well developed well nourished  Neck - JVP normal, thyroid nl  Cardiac - normal S1,S2, no murmurs, rubs or gallops. No clicks  Vascular - carotids without bruits, radials, femorals and pedal pulses equal bilateral  Lungs - clear to auscultation bilaterals, no rales ,wheezing or rhonchi  Abd - soft nontender, no HSM, no abd bruits  Extremities - no edema  Skin - no rash  Neuro - nonfocal  Psych - normal mood and affect    Cardiographics  EKG 12/11/19 - SB, STT's; unchanged.      Meenakshi Winters NP

## 2019-12-11 NOTE — PROGRESS NOTES
Visit Vitals  /70   Pulse 60   Resp 20   Ht 5' 8\" (1.727 m)   Wt 171 lb (77.6 kg)   SpO2 99%   BMI 26.00 kg/m²     Complains SOB with exertion and chest pressure, dizziness.   Denies edema  EKG today

## 2019-12-12 LAB
ALBUMIN SERPL-MCNC: 4.1 G/DL (ref 3.5–4.8)
ALBUMIN/GLOB SERPL: 1.4 {RATIO} (ref 1.2–2.2)
ALP SERPL-CCNC: 104 IU/L (ref 39–117)
ALT SERPL-CCNC: 6 IU/L (ref 0–44)
AST SERPL-CCNC: 14 IU/L (ref 0–40)
BASOPHILS # BLD AUTO: 0 X10E3/UL (ref 0–0.2)
BASOPHILS NFR BLD AUTO: 0 %
BILIRUB SERPL-MCNC: 0.3 MG/DL (ref 0–1.2)
BUN SERPL-MCNC: 6 MG/DL (ref 8–27)
BUN/CREAT SERPL: 7 (ref 10–24)
CALCIUM SERPL-MCNC: 9.6 MG/DL (ref 8.6–10.2)
CHLORIDE SERPL-SCNC: 106 MMOL/L (ref 96–106)
CO2 SERPL-SCNC: 23 MMOL/L (ref 20–29)
CREAT SERPL-MCNC: 0.84 MG/DL (ref 0.76–1.27)
EOSINOPHIL # BLD AUTO: 0.1 X10E3/UL (ref 0–0.4)
EOSINOPHIL NFR BLD AUTO: 1 %
ERYTHROCYTE [DISTWIDTH] IN BLOOD BY AUTOMATED COUNT: 14.2 % (ref 12.3–15.4)
GLOBULIN SER CALC-MCNC: 3 G/DL (ref 1.5–4.5)
GLUCOSE SERPL-MCNC: 81 MG/DL (ref 65–99)
HCT VFR BLD AUTO: 36.2 % (ref 37.5–51)
HGB BLD-MCNC: 11 G/DL (ref 13–17.7)
IMM GRANULOCYTES # BLD AUTO: 0 X10E3/UL (ref 0–0.1)
IMM GRANULOCYTES NFR BLD AUTO: 0 %
LYMPHOCYTES # BLD AUTO: 2.2 X10E3/UL (ref 0.7–3.1)
LYMPHOCYTES NFR BLD AUTO: 28 %
MCH RBC QN AUTO: 25.2 PG (ref 26.6–33)
MCHC RBC AUTO-ENTMCNC: 30.4 G/DL (ref 31.5–35.7)
MCV RBC AUTO: 83 FL (ref 79–97)
MONOCYTES # BLD AUTO: 0.6 X10E3/UL (ref 0.1–0.9)
MONOCYTES NFR BLD AUTO: 7 %
NEUTROPHILS # BLD AUTO: 4.8 X10E3/UL (ref 1.4–7)
NEUTROPHILS NFR BLD AUTO: 64 %
PLATELET # BLD AUTO: 361 X10E3/UL (ref 150–450)
POTASSIUM SERPL-SCNC: 4.5 MMOL/L (ref 3.5–5.2)
PROT SERPL-MCNC: 7.1 G/DL (ref 6–8.5)
RBC # BLD AUTO: 4.37 X10E6/UL (ref 4.14–5.8)
SODIUM SERPL-SCNC: 143 MMOL/L (ref 134–144)
WBC # BLD AUTO: 7.7 X10E3/UL (ref 3.4–10.8)

## 2019-12-13 ENCOUNTER — TELEPHONE (OUTPATIENT)
Dept: CARDIOLOGY CLINIC | Age: 79
End: 2019-12-13

## 2019-12-13 NOTE — TELEPHONE ENCOUNTER
----- Message from Ellen Dobbins NP sent at 12/12/2019  1:20 PM EST -----  Please notify Mr. Breanne Collier that his Hgb is stable. No other concerning findings. Plan to continue with plan for expedited stress and Echo with return office visit.

## 2019-12-18 ENCOUNTER — HOSPITAL ENCOUNTER (OUTPATIENT)
Dept: NUCLEAR MEDICINE | Age: 79
Discharge: HOME | End: 2019-12-18
Attending: NURSE PRACTITIONER
Payer: MEDICARE

## 2019-12-18 ENCOUNTER — HOSPITAL ENCOUNTER (OUTPATIENT)
Dept: NON INVASIVE DIAGNOSTICS | Age: 79
Discharge: HOME | End: 2019-12-18
Attending: NURSE PRACTITIONER
Payer: MEDICARE

## 2019-12-18 VITALS
SYSTOLIC BLOOD PRESSURE: 155 MMHG | BODY MASS INDEX: 25.91 KG/M2 | HEIGHT: 68 IN | DIASTOLIC BLOOD PRESSURE: 82 MMHG | WEIGHT: 171 LBS

## 2019-12-18 DIAGNOSIS — I10 HYPERTENSION, BENIGN: ICD-10-CM

## 2019-12-18 DIAGNOSIS — G47.33 OBSTRUCTIVE SLEEP APNEA: ICD-10-CM

## 2019-12-18 DIAGNOSIS — R06.09 DOE (DYSPNEA ON EXERTION): ICD-10-CM

## 2019-12-18 DIAGNOSIS — R07.89 OTHER CHEST PAIN: ICD-10-CM

## 2019-12-18 DIAGNOSIS — E78.5 DYSLIPIDEMIA: ICD-10-CM

## 2019-12-18 DIAGNOSIS — I25.119 CORONARY ARTERY DISEASE INVOLVING NATIVE CORONARY ARTERY OF NATIVE HEART WITH ANGINA PECTORIS (HCC): ICD-10-CM

## 2019-12-18 DIAGNOSIS — E11.9 DIABETES MELLITUS TYPE 2 IN NONOBESE (HCC): ICD-10-CM

## 2019-12-18 PROCEDURE — 74011250636 HC RX REV CODE- 250/636: Performed by: INTERNAL MEDICINE

## 2019-12-18 PROCEDURE — 93017 CV STRESS TEST TRACING ONLY: CPT

## 2019-12-18 RX ADMIN — REGADENOSON 0.4 MG: 0.08 INJECTION, SOLUTION INTRAVENOUS at 10:12

## 2019-12-20 ENCOUNTER — OFFICE VISIT (OUTPATIENT)
Dept: FAMILY MEDICINE CLINIC | Age: 79
End: 2019-12-20

## 2019-12-20 VITALS
HEIGHT: 68 IN | TEMPERATURE: 98.6 F | DIASTOLIC BLOOD PRESSURE: 73 MMHG | RESPIRATION RATE: 16 BRPM | WEIGHT: 172 LBS | HEART RATE: 46 BPM | OXYGEN SATURATION: 98 % | SYSTOLIC BLOOD PRESSURE: 123 MMHG | BODY MASS INDEX: 26.07 KG/M2

## 2019-12-20 DIAGNOSIS — M51.9 LUMBAR DISC DISEASE: ICD-10-CM

## 2019-12-20 DIAGNOSIS — M19.90 OSTEOARTHRITIS, UNSPECIFIED OSTEOARTHRITIS TYPE, UNSPECIFIED SITE: ICD-10-CM

## 2019-12-20 LAB
STRESS BASELINE DIAS BP: 82 MMHG
STRESS BASELINE HR: 60 BPM
STRESS BASELINE SYS BP: 155 MMHG
STRESS ESTIMATED WORKLOAD: 1 METS
STRESS EXERCISE DUR MIN: NORMAL
STRESS PEAK DIAS BP: 82 MMHG
STRESS PEAK SYS BP: 155 MMHG
STRESS PERCENT HR ACHIEVED: 57 %
STRESS POST PEAK HR: 82 BPM
STRESS RATE PRESSURE PRODUCT: NORMAL BPM*MMHG
STRESS ST DEPRESSION: 0 MM
STRESS ST ELEVATION: 0 MM
STRESS TARGET HR: 143 BPM

## 2019-12-20 RX ORDER — OXYCODONE AND ACETAMINOPHEN 5; 325 MG/1; MG/1
1-2 TABLET ORAL
Qty: 50 TAB | Refills: 0 | Status: ON HOLD | OUTPATIENT
Start: 2020-02-18 | End: 2020-01-07 | Stop reason: CLARIF

## 2019-12-20 RX ORDER — OXYCODONE AND ACETAMINOPHEN 5; 325 MG/1; MG/1
1 TABLET ORAL
Qty: 30 TAB | Refills: 0 | Status: SHIPPED | OUTPATIENT
Start: 2020-01-19 | End: 2020-04-30

## 2019-12-20 RX ORDER — OXYCODONE AND ACETAMINOPHEN 5; 325 MG/1; MG/1
1 TABLET ORAL
Qty: 30 TAB | Refills: 0 | Status: ON HOLD | OUTPATIENT
Start: 2019-12-20 | End: 2020-01-07 | Stop reason: CLARIF

## 2019-12-20 NOTE — PATIENT INSTRUCTIONS
A Healthy Lifestyle: Care Instructions Your Care Instructions A healthy lifestyle can help you feel good, stay at a healthy weight, and have plenty of energy for both work and play. A healthy lifestyle is something you can share with your whole family. A healthy lifestyle also can lower your risk for serious health problems, such as high blood pressure, heart disease, and diabetes. You can follow a few steps listed below to improve your health and the health of your family. Follow-up care is a key part of your treatment and safety. Be sure to make and go to all appointments, and call your doctor if you are having problems. It's also a good idea to know your test results and keep a list of the medicines you take. How can you care for yourself at home? · Do not eat too much sugar, fat, or fast foods. You can still have dessert and treats now and then. The goal is moderation. · Start small to improve your eating habits. Pay attention to portion sizes, drink less juice and soda pop, and eat more fruits and vegetables. ? Eat a healthy amount of food. A 3-ounce serving of meat, for example, is about the size of a deck of cards. Fill the rest of your plate with vegetables and whole grains. ? Limit the amount of soda and sports drinks you have every day. Drink more water when you are thirsty. ? Eat at least 5 servings of fruits and vegetables every day. It may seem like a lot, but it is not hard to reach this goal. A serving or helping is 1 piece of fruit, 1 cup of vegetables, or 2 cups of leafy, raw vegetables. Have an apple or some carrot sticks as an afternoon snack instead of a candy bar. Try to have fruits and/or vegetables at every meal. 
· Make exercise part of your daily routine. You may want to start with simple activities, such as walking, bicycling, or slow swimming. Try to be active 30 to 60 minutes every day.  You do not need to do all 30 to 60 minutes all at once. For example, you can exercise 3 times a day for 10 or 20 minutes. Moderate exercise is safe for most people, but it is always a good idea to talk to your doctor before starting an exercise program. 
· Keep moving. Paul Canary the lawn, work in the garden, or [employer]. Take the stairs instead of the elevator at work. · If you smoke, quit. People who smoke have an increased risk for heart attack, stroke, cancer, and other lung illnesses. Quitting is hard, but there are ways to boost your chance of quitting tobacco for good. ? Use nicotine gum, patches, or lozenges. ? Ask your doctor about stop-smoking programs and medicines. ? Keep trying. In addition to reducing your risk of diseases in the future, you will notice some benefits soon after you stop using tobacco. If you have shortness of breath or asthma symptoms, they will likely get better within a few weeks after you quit. · Limit how much alcohol you drink. Moderate amounts of alcohol (up to 2 drinks a day for men, 1 drink a day for women) are okay. But drinking too much can lead to liver problems, high blood pressure, and other health problems. Family health If you have a family, there are many things you can do together to improve your health. · Eat meals together as a family as often as possible. · Eat healthy foods. This includes fruits, vegetables, lean meats and dairy, and whole grains. · Include your family in your fitness plan. Most people think of activities such as jogging or tennis as the way to fitness, but there are many ways you and your family can be more active. Anything that makes you breathe hard and gets your heart pumping is exercise. Here are some tips: 
? Walk to do errands or to take your child to school or the bus. 
? Go for a family bike ride after dinner instead of watching TV. Where can you learn more? Go to http://jennifer-elida.info/. Enter K576 in the search box to learn more about \"A Healthy Lifestyle: Care Instructions. \" Current as of: May 28, 2019 Content Version: 12.2 © 4510-7852 [employer]. Care instructions adapted under license by [employer] (which disclaims liability or warranty for this information). If you have questions about a medical condition or this instruction, always ask your healthcare professional. [place] [address] any warranty or liability for your use of this information.

## 2019-12-20 NOTE — PROGRESS NOTES
Chief Complaint   Patient presents with    Medication Refill     Patient presents in office today for med refill of Percocet. No concerns. 1. Have you been to the ER, urgent care clinic since your last visit? Hospitalized since your last visit? No    2. Have you seen or consulted any other health care providers outside of the [address] [place] [address] since your last visit? Include any pap smears or colon screening.  No    Learning Assessment 12/1/2015   PRIMARY LEARNER Patient   HIGHEST LEVEL OF EDUCATION - PRIMARY LEARNER  GRADUATED HIGH SCHOOL OR GED   BARRIERS PRIMARY LEARNER NONE   CO-LEARNER CAREGIVER No   PRIMARY LANGUAGE ENGLISH   LEARNER PREFERENCE PRIMARY DEMONSTRATION   ANSWERED BY pt   RELATIONSHIP SELF

## 2019-12-20 NOTE — PROGRESS NOTES
Maryuri Treviño is a 70 y.o. male   Chief Complaint   Patient presents with    Medication Refill    pt here for refill of his percocet for his chronic back pain. He recently had surgery and states he is having continued back pain, requiring 1 pain pill per day. Pt is also following up with DR Kaur his spine surgeon. No hx of abuse or misuse. Med brings his pain to a 3/10 but states pain med takes his appetite away and he does not want to lose more weight. Last percocet was Tuesday this week. No hx of abuse. Medication allows him to stay active at home. Pt has decided that he does not want to take more than 1 a day and catrachito ldeal with any pain that comes otherwise. States the cold weather has been flaring his arthritis as well. Opioid/Pain Management:    1. Has the patient signed a pain contract for chronic narcotic use? yes  2. Has the patient had a UDS or Serum screen as per guidelines as per [facility]?:   yes    3. Does patient meet necessary guidelines for Naloxone treatement per the [facility]?:    no  4. Has the Prescription Monitoring Program been reviewed? yes  5. Does patient have a long term condition that requires long term use of a Narcotic?  yes  6. Has patient been tolerant of therapy and responsible to routine follow up and specialist follow-up? Yes    he is a 70y.o. year old male who presents for evalution. Reviewed PmHx, RxHx, FmHx, SocHx, AllgHx and updated and dated in the chart. Review of Systems - negative except as listed above in the HPI    Objective:     Vitals:    12/20/19 0826   BP: 123/73   Pulse: (!) 46   Resp: 16   Temp: 98.6 °F (37 °C)   TempSrc: Oral   SpO2: 98%   Weight: 172 lb (78 kg)   Height: 5' 8\" (1.727 m)       Current Outpatient Medications   Medication Sig    [START ON 2/18/2020] oxyCODONE-acetaminophen (PERCOCET) 5-325 mg per tablet Take 1-2 Tabs by mouth daily as needed for Pain for up to 30 days.  Indications: pain    [START ON 1/19/2020] oxyCODONE-acetaminophen (PERCOCET) 5-325 mg per tablet Take 1 Tab by mouth daily as needed for Pain for up to 30 days. Indications: pain    oxyCODONE-acetaminophen (PERCOCET) 5-325 mg per tablet Take 1 Tab by mouth daily as needed for Pain for up to 30 days.  aspirin delayed-release 81 mg tablet Take  by mouth daily.  ascorbic acid, vitamin C, (VITAMIN C) 500 mg tablet Take  by mouth.  multivitamin (ONE A DAY) tablet Take 1 Tab by mouth daily.  amLODIPine (NORVASC) 5 mg tablet Take 1 Tab by mouth daily.  folic acid (FOLVITE) 1 mg tablet TAKE 1 TABLET DAILY, EXCEPT DAY THAT METHOTREXATE IS TAKEN    atorvastatin (LIPITOR) 40 mg tablet TAKE 1 TABLET DAILY (START LIPITOR AFTER VYTORIN IS FINISHED)    omeprazole (PRILOSEC) 40 mg capsule TAKE 1 CAPSULE DAILY    atenolol (TENORMIN) 50 mg tablet TAKE 1 TABLET DAILY    metFORMIN (GLUCOPHAGE) 1,000 mg tablet TAKE 1 TABLET TWICE A DAY WITH MEALS    sildenafil citrate (VIAGRA) 100 mg tablet TAKE 1 TABLET AS NEEDED    tamsulosin (FLOMAX) 0.4 mg capsule Take 1 Cap by mouth daily.  folic acid (FOLVITE) 1 mg tablet Take 1 mg by mouth daily.  irbesartan (AVAPRO) 150 mg tablet Take 1 Tab by mouth nightly.  levoFLOXacin (LEVAQUIN) 500 mg tablet Take  by mouth daily. No current facility-administered medications for this visit. Physical Examination: General appearance - alert, well appearing, and in no distress  Chest - clear to auscultation, no wheezes, rales or rhonchi, symmetric air entry  Heart - normal rate, regular rhythm, normal S1, S2, no murmurs, rubs, clicks or gallops  Back exam - limited range of motion, pain with motion noted during exam, tenderness noted midline lumbar spine      Assessment/ Plan:   Diagnoses and all orders for this visit:    1. Osteoarthritis, unspecified osteoarthritis type, unspecified site  -     oxyCODONE-acetaminophen (PERCOCET) 5-325 mg per tablet;  Take 1-2 Tabs by mouth daily as needed for Pain for up to 30 days. Indications: pain  -     oxyCODONE-acetaminophen (PERCOCET) 5-325 mg per tablet; Take 1 Tab by mouth daily as needed for Pain for up to 30 days. Indications: pain  -     oxyCODONE-acetaminophen (PERCOCET) 5-325 mg per tablet; Take 1 Tab by mouth daily as needed for Pain for up to 30 days. 2. Lumbar disc disease  -     oxyCODONE-acetaminophen (PERCOCET) 5-325 mg per tablet; Take 1-2 Tabs by mouth daily as needed for Pain for up to 30 days. Indications: pain  -     oxyCODONE-acetaminophen (PERCOCET) 5-325 mg per tablet; Take 1 Tab by mouth daily as needed for Pain for up to 30 days. Indications: pain  -     oxyCODONE-acetaminophen (PERCOCET) 5-325 mg per tablet; Take 1 Tab by mouth daily as needed for Pain for up to 30 days. Follow-up and Dispositions    · Return in about 3 months (around 3/20/2020), or if symptoms worsen or fail to improve. I have discussed the diagnosis with the patient and the intended plan as seen in the above orders. The patient has received an after-visit summary and questions were answered concerning future plans. Pt conveyed understanding of plan.     Medication Side Effects and Warnings were discussed with patient      Verena Ross, DO

## 2019-12-26 NOTE — PROGRESS NOTES
Deirdre Levin, [address]  [address],  [address]  [place], [address] [facility]    Office [phone]  Fax [phone]  Cell [phone]          Talib Hurtado is a 70 y.o. male last seen by Marian Garvin NP 2 weeks ago. Assessment  Encounter Diagnoses   Name Primary?  Coronary artery disease involving native coronary artery of native heart with angina pectoris ([address] Yes    Dyslipidemia     Hypertension, benign     Obstructive sleep apnea     RG (dyspnea on exertion)     Primary osteoarthritis involving multiple joints      Recommendations:    CAD; with multivessel PCI dating back to 1996, most recently PCI 2011 to [facility] with LILIAN. Cor angio also demonstrated LCX mid 70%, OM1 70%,  of OM2 and OM3, mild plaque in LAD. He continues to have class 3 angina. Recent lexiscan Cardiolite was completely normal to my review. Echo demonstrates normal LV function with mild-moderate PA HTN. He is on optimal medical therapy, most recently with the addition of Amlodipine. At this time I favor proceeding with cardiac cath at this time. He is in complete agreement. - Continue ASA 81mg/d. HTN, controlled on combination therapy. ADELA - continue nightly CPAP    Dyslipidemia. Updated labs from Sept demonstrate LDL 79.  - Continue Atorva 40mg/d     Chronic low back pain s/p multiple back surgeries, most recently Sept 2019. Cath near future  Right radial approach  ASA 3  Airway 3       Subjective:    Talib Hurtado reports he has a stable pattern of moderate RG with moderate activity with accompanying chest tightness and fatigue. He enjoys washing his car and doing yard work, but his activity has been limited d/t osteoarthritis particularly low back pain. Patient denies any palpitations, syncope, orthopnea, edema or paroxysmal nocturnal dyspnea. He had back surgery most recently in September. He still reports pain. He wears a brace.        Of note, he is retired - former     Cardiac risk factors   HTN yes  DM  yes  Smoking no    Cardiac testing  Cardiolite (2/04):scheduled for comparison from previous stress pre PCI - similar study with no new acute changes. Exercise Cardiolite (8/29/11):  Reversible mid-distal inferolateral defect.  No fixed defects.  EF 63%. Lexiscan Cardiolite 12/18/19 - Normal perfusion study. EF 59%. Echo 12/27/19 - Mild LVH, EF 60-65%, mild MR, PA 51 mmg Hg    Past Medical History:   Diagnosis Date    CAD (coronary artery disease)     Degenerative joint disease 1/28/2011    Diabetes mellitus ([facility] [address]     Essential hypertension     History of back surgery 09/2019    Hyperlipidemia     Kidney stones 1969    Obstructive sleep apnea 01/28/2011    Stopped using CPAP years ago        Current Outpatient Medications   Medication Sig Dispense Refill    [START ON 2/18/2020] oxyCODONE-acetaminophen (PERCOCET) 5-325 mg per tablet Take 1-2 Tabs by mouth daily as needed for Pain for up to 30 days. Indications: pain 50 Tab 0    [START ON 1/19/2020] oxyCODONE-acetaminophen (PERCOCET) 5-325 mg per tablet Take 1 Tab by mouth daily as needed for Pain for up to 30 days. Indications: pain 30 Tab 0    oxyCODONE-acetaminophen (PERCOCET) 5-325 mg per tablet Take 1 Tab by mouth daily as needed for Pain for up to 30 days. 30 Tab 0    aspirin delayed-release 81 mg tablet Take  by mouth daily.  ascorbic acid, vitamin C, (VITAMIN C) 500 mg tablet Take  by mouth.  multivitamin (ONE A DAY) tablet Take 1 Tab by mouth daily.  amLODIPine (NORVASC) 5 mg tablet Take 1 Tab by mouth daily.  90 Tab 2    folic acid (FOLVITE) 1 mg tablet TAKE 1 TABLET DAILY, EXCEPT DAY THAT METHOTREXATE IS TAKEN 90 Tab 4    atorvastatin (LIPITOR) 40 mg tablet TAKE 1 TABLET DAILY (START LIPITOR AFTER VYTORIN IS FINISHED) 90 Tab 4    omeprazole (PRILOSEC) 40 mg capsule TAKE 1 CAPSULE DAILY 90 Cap 4    atenolol (TENORMIN) 50 mg tablet TAKE 1 TABLET DAILY 90 Tab 4    metFORMIN (GLUCOPHAGE) 1,000 mg tablet TAKE 1 TABLET TWICE A DAY WITH MEALS 180 Tab 4    tamsulosin (FLOMAX) 0.4 mg capsule Take 1 Cap by mouth daily. 30 Cap 0    folic acid (FOLVITE) 1 mg tablet Take 1 mg by mouth daily.  irbesartan (AVAPRO) 150 mg tablet Take 1 Tab by mouth nightly. 90 Tab 3    sildenafil citrate (VIAGRA) 100 mg tablet TAKE 1 TABLET AS NEEDED 18 Tab 4     Allergies   Allergen Reactions    Ace Inhibitors Swelling      Review of Systems  Constitutional: Negative for fever, chills, malaise and diaphoresis. +fatigue  Respiratory: Negative for cough, hemoptysis, sputum production, and wheezing. +RG  Cardiovascular: Negative for palpitations, orthopnea, claudication, leg swelling and PND. +exertional chest tightness  Gastrointestinal: Negative for heartburn, nausea, vomiting, blood in stool and melena. Genitourinary: Negative for dysuria and flank pain. Musculoskeletal: Negative for joint pain and back pain. Skin: Negative for rash. Neurological: Negative for focal weakness, seizures, loss of consciousness, weakness and headaches. +dizziness  Endo/Heme/Allergies: Does not bruise/bleed easily. Psychiatric/Behavioral: Negative for memory loss. The patient does not have insomnia. Physical Exam  Visit Vitals  /70 (BP 1 Location: Left arm, BP Patient Position: Sitting)   Pulse (!) 54   Resp 18   Ht 5' 8\" (1.727 m)   Wt 165 lb (74.8 kg)   SpO2 95%   BMI 25.09 kg/m²     Wt Readings from Last 3 Encounters:   12/27/19 165 lb (74.8 kg)   12/27/19 165 lb (74.8 kg)   12/20/19 172 lb (78 kg)      General - well developed well nourished  Neck - JVP normal, thyroid nl  Cardiac - normal S1,S2, no murmurs, rubs or gallops.  No clicks  Vascular - carotids without bruits, radials, femorals and pedal pulses equal bilateral  Lungs - clear to auscultation bilaterals, no rales ,wheezing or rhonchi  Abd - soft nontender, no HSM, no abd bruits  Extremities - no edema  Skin - no rash  Neuro - nonfocal  Psych - normal mood and affect    Cardiographics  EKG 12/11/19 - SB, STT's; unchanged. Echo 12/27/19 - Mild LVH, EF 60-65%, mild MR, PA 51 mmg Hg        Written by Marion Valderrama, as dictated by Mervat Vann M.D.      Mervat Vann MD

## 2019-12-26 NOTE — H&P (VIEW-ONLY)
Deirdre Brewer MD [facility] [address] [facility], [address] [facility] Office [phone] Fax [phone] Cell [phone] Jeremiah Germain is a 70 y.o. male last seen by Brandon Nolasco NP 2 weeks ago. Assessment Encounter Diagnoses Name Primary?  Coronary artery disease involving native coronary artery of native heart with angina pectoris ([address] Yes  Dyslipidemia  Hypertension, benign  Obstructive sleep apnea  RG (dyspnea on exertion)  Primary osteoarthritis involving multiple joints Recommendations: 
 
CAD; with multivessel PCI dating back to 1996, most recently PCI 2011 to [facility] with LILIAN. Cor angio also demonstrated LCX mid 70%, OM1 70%,  of OM2 and OM3, mild plaque in LAD. He continues to have class 3 angina. Recent lexiscan Cardiolite was completely normal to my review. Echo demonstrates normal LV function with mild-moderate PA HTN. He is on optimal medical therapy, most recently with the addition of Amlodipine. At this time I favor proceeding with cardiac cath at this time. He is in complete agreement. - Continue ASA 81mg/d. HTN, controlled on combination therapy. ADELA - continue nightly CPAP Dyslipidemia. Updated labs from Sept demonstrate LDL 79. 
- Continue Atorva 40mg/d Chronic low back pain s/p multiple back surgeries, most recently Sept 2019. Cath near future Right radial approach ASA 3 Airway 3 Subjective: 
 
Jeremiah Germain reports he has a stable pattern of moderate RG with moderate activity with accompanying chest tightness and fatigue. He enjoys washing his car and doing yard work, but his activity has been limited d/t osteoarthritis particularly low back pain. Patient denies any palpitations, syncope, orthopnea, edema or paroxysmal nocturnal dyspnea. He had back surgery most recently in September. He still reports pain. He wears a brace. Of note, he is retired - former [place] Cardiac risk factors HTN yes DM  yes Smoking no Cardiac testing Cardiolite (2/04):scheduled for comparison from previous stress pre PCI - similar study with no new acute changes. Exercise Cardiolite (8/29/11):  Reversible mid-distal inferolateral defect.  No fixed defects.  EF 63%. Lexiscan Cardiolite 12/18/19 - Normal perfusion study. EF 59%. Echo 12/27/19 - Mild LVH, EF 60-65%, mild MR, PA 51 mmg Hg Past Medical History:  
Diagnosis Date  CAD (coronary artery disease)  Degenerative joint disease 1/28/2011  Diabetes mellitus ([address]  Essential hypertension  History of back surgery 09/2019  Hyperlipidemia  Kidney stones 1969  Obstructive sleep apnea 01/28/2011 Stopped using CPAP years ago Current Outpatient Medications Medication Sig Dispense Refill  [START ON 2/18/2020] oxyCODONE-acetaminophen (PERCOCET) 5-325 mg per tablet Take 1-2 Tabs by mouth daily as needed for Pain for up to 30 days. Indications: pain 50 Tab 0  
 [START ON 1/19/2020] oxyCODONE-acetaminophen (PERCOCET) 5-325 mg per tablet Take 1 Tab by mouth daily as needed for Pain for up to 30 days. Indications: pain 30 Tab 0  
 oxyCODONE-acetaminophen (PERCOCET) 5-325 mg per tablet Take 1 Tab by mouth daily as needed for Pain for up to 30 days. 30 Tab 0  
 aspirin delayed-release 81 mg tablet Take  by mouth daily.  ascorbic acid, vitamin C, (VITAMIN C) 500 mg tablet Take  by mouth.  multivitamin (ONE A DAY) tablet Take 1 Tab by mouth daily.  amLODIPine (NORVASC) 5 mg tablet Take 1 Tab by mouth daily. 90 Tab 2  
 folic acid (FOLVITE) 1 mg tablet TAKE 1 TABLET DAILY, EXCEPT DAY THAT METHOTREXATE IS TAKEN 90 Tab 4  
 atorvastatin (LIPITOR) 40 mg tablet TAKE 1 TABLET DAILY (START LIPITOR AFTER VYTORIN IS FINISHED) 90 Tab 4  
 omeprazole (PRILOSEC) 40 mg capsule TAKE 1 CAPSULE DAILY 90 Cap 4  
 atenolol (TENORMIN) 50 mg tablet TAKE 1 TABLET DAILY 90 Tab 4  metFORMIN (GLUCOPHAGE) 1,000 mg tablet TAKE 1 TABLET TWICE A DAY WITH MEALS 180 Tab 4  
 tamsulosin (FLOMAX) 0.4 mg capsule Take 1 Cap by mouth daily. 30 Cap 0  
 folic acid (FOLVITE) 1 mg tablet Take 1 mg by mouth daily.  irbesartan (AVAPRO) 150 mg tablet Take 1 Tab by mouth nightly. 90 Tab 3  
 sildenafil citrate (VIAGRA) 100 mg tablet TAKE 1 TABLET AS NEEDED 18 Tab 4 Allergies Allergen Reactions  Ace Inhibitors Swelling Review of Systems Constitutional: Negative for fever, chills, malaise and diaphoresis. +fatigue Respiratory: Negative for cough, hemoptysis, sputum production, and wheezing. +RG Cardiovascular: Negative for palpitations, orthopnea, claudication, leg swelling and PND. +exertional chest tightness Gastrointestinal: Negative for heartburn, nausea, vomiting, blood in stool and melena. Genitourinary: Negative for dysuria and flank pain. Musculoskeletal: Negative for joint pain and back pain. Skin: Negative for rash. Neurological: Negative for focal weakness, seizures, loss of consciousness, weakness and headaches. +dizziness Endo/Heme/Allergies: Does not bruise/bleed easily. Psychiatric/Behavioral: Negative for memory loss. The patient does not have insomnia. Physical Exam 
Visit Vitals /70 (BP 1 Location: Left arm, BP Patient Position: Sitting) Pulse (!) 54 Resp 18 Ht 5' 8\" (1.727 m) Wt 165 lb (74.8 kg) SpO2 95% BMI 25.09 kg/m² Wt Readings from Last 3 Encounters:  
12/27/19 165 lb (74.8 kg) 12/27/19 165 lb (74.8 kg) 12/20/19 172 lb (78 kg) General - well developed well nourished Neck - JVP normal, thyroid nl Cardiac - normal S1,S2, no murmurs, rubs or gallops. No clicks Vascular - carotids without bruits, radials, femorals and pedal pulses equal bilateral 
Lungs - clear to auscultation bilaterals, no rales ,wheezing or rhonchi Abd - soft nontender, no HSM, no abd bruits Extremities - no edema Skin - no rash Neuro - nonfocal 
Psych - normal mood and affect Cardiographics EKG 12/11/19 - SB, STT's; unchanged. Echo 12/27/19 - Mild LVH, EF 60-65%, mild MR, PA 51 mmg Hg Written by Bee Cid, as dictated by Melissa Diane M.D.   
 
Melissa Diane MD

## 2019-12-27 ENCOUNTER — OFFICE VISIT (OUTPATIENT)
Dept: CARDIOLOGY CLINIC | Age: 79
End: 2019-12-27

## 2019-12-27 VITALS
BODY MASS INDEX: 25.01 KG/M2 | RESPIRATION RATE: 18 BRPM | DIASTOLIC BLOOD PRESSURE: 70 MMHG | OXYGEN SATURATION: 95 % | HEART RATE: 54 BPM | SYSTOLIC BLOOD PRESSURE: 140 MMHG | HEIGHT: 68 IN | WEIGHT: 165 LBS

## 2019-12-27 DIAGNOSIS — G47.33 OBSTRUCTIVE SLEEP APNEA: ICD-10-CM

## 2019-12-27 DIAGNOSIS — I25.119 CORONARY ARTERY DISEASE INVOLVING NATIVE CORONARY ARTERY OF NATIVE HEART WITH ANGINA PECTORIS (HCC): Primary | ICD-10-CM

## 2019-12-27 DIAGNOSIS — M15.9 PRIMARY OSTEOARTHRITIS INVOLVING MULTIPLE JOINTS: ICD-10-CM

## 2019-12-27 DIAGNOSIS — R06.09 DOE (DYSPNEA ON EXERTION): ICD-10-CM

## 2019-12-27 DIAGNOSIS — I10 HYPERTENSION, BENIGN: ICD-10-CM

## 2019-12-27 DIAGNOSIS — E78.5 DYSLIPIDEMIA: ICD-10-CM

## 2019-12-27 NOTE — PROGRESS NOTES
Chief Complaint   Patient presents with    Hypertension    Coronary Artery Disease    Cholesterol Problem   1. Have you been to the ER, urgent care clinic since your last visit? Hospitalized since your last visit? No    2. Have you seen or consulted any other health care providers outside of the [address] [place] [address] since your last visit? Include any pap smears or colon screening.  No    Visit Vitals  /70 (BP 1 Location: Left arm, BP Patient Position: Sitting)   Pulse (!) 54   Resp 18   Ht 5' 8\" (1.727 m)   Wt 165 lb (74.8 kg)   SpO2 95%   BMI 25.09 kg/m²

## 2019-12-31 ENCOUNTER — TELEPHONE (OUTPATIENT)
Dept: CARDIOLOGY CLINIC | Age: 79
End: 2019-12-31

## 2019-12-31 DIAGNOSIS — I25.119 CORONARY ARTERY DISEASE INVOLVING NATIVE CORONARY ARTERY OF NATIVE HEART WITH ANGINA PECTORIS (HCC): Primary | ICD-10-CM

## 2019-12-31 RX ORDER — SODIUM CHLORIDE 0.9 % (FLUSH) 0.9 %
5-40 SYRINGE (ML) INJECTION EVERY 8 HOURS
Status: CANCELLED | OUTPATIENT
Start: 2020-01-07

## 2019-12-31 RX ORDER — DIPHENHYDRAMINE HYDROCHLORIDE 50 MG/ML
25 INJECTION, SOLUTION INTRAMUSCULAR; INTRAVENOUS
Status: CANCELLED | OUTPATIENT
Start: 2020-01-07 | End: 2020-01-08

## 2019-12-31 RX ORDER — SODIUM CHLORIDE 9 MG/ML
50 INJECTION, SOLUTION INTRAVENOUS CONTINUOUS
Status: CANCELLED | OUTPATIENT
Start: 2020-01-07

## 2019-12-31 RX ORDER — SODIUM CHLORIDE 0.9 % (FLUSH) 0.9 %
5-40 SYRINGE (ML) INJECTION AS NEEDED
Status: CANCELLED | OUTPATIENT
Start: 2020-01-07

## 2019-12-31 NOTE — PROGRESS NOTES
Spoke with pt concerning cardiac cath procedure. (Pt [id number]). Instructions given to pt per Taina. Pt NPO after midnight; hold metformin and take all other meds with sip of water in AM; have someone available to drive pt to and from procedure; pack a bag in case an overnight stay is warranted. cath scheduled for 0800am on 1/7/19. Pt advised to arrive 2hrs prior to procedure for prep. Labs none. Pt expressed understanding. Opportunities for questions, clarifications, and concerns provided.

## 2019-12-31 NOTE — TELEPHONE ENCOUNTER
----- Message from Mervat Vann MD sent at 12/27/2019  3:35 PM EST -----  Regarding: cath  Needs cath 2/7 if possible  Dx CAD, class 3 angina, normal lexiscan cardiolite, on optimal medical Rx    Right radial

## 2020-01-07 ENCOUNTER — HOSPITAL ENCOUNTER (OUTPATIENT)
Age: 80
Setting detail: OUTPATIENT SURGERY
Discharge: HOME | End: 2020-01-07
Attending: SPECIALIST | Admitting: SPECIALIST
Payer: MEDICARE

## 2020-01-07 VITALS
HEART RATE: 53 BPM | SYSTOLIC BLOOD PRESSURE: 125 MMHG | DIASTOLIC BLOOD PRESSURE: 69 MMHG | BODY MASS INDEX: 25.33 KG/M2 | OXYGEN SATURATION: 99 % | WEIGHT: 167.11 LBS | TEMPERATURE: 98 F | RESPIRATION RATE: 19 BRPM | HEIGHT: 68 IN

## 2020-01-07 DIAGNOSIS — I25.119 CORONARY ARTERY DISEASE INVOLVING NATIVE CORONARY ARTERY OF NATIVE HEART WITH ANGINA PECTORIS (HCC): ICD-10-CM

## 2020-01-07 DIAGNOSIS — I25.119 CORONARY ARTERY DISEASE WITH ANGINA PECTORIS, UNSPECIFIED VESSEL OR LESION TYPE, UNSPECIFIED WHETHER NATIVE OR TRANSPLANTED HEART (HCC): ICD-10-CM

## 2020-01-07 LAB
GLUCOSE BLD STRIP.AUTO-MCNC: 101 MG/DL (ref 65–100)
GLUCOSE BLD STRIP.AUTO-MCNC: 102 MG/DL (ref 65–100)
SERVICE CMNT-IMP: ABNORMAL
SERVICE CMNT-IMP: ABNORMAL

## 2020-01-07 PROCEDURE — 77030019569 HC BND COMPR RAD TERU -B: Performed by: SPECIALIST

## 2020-01-07 PROCEDURE — 74011250636 HC RX REV CODE- 250/636: Performed by: SPECIALIST

## 2020-01-07 PROCEDURE — C1894 INTRO/SHEATH, NON-LASER: HCPCS | Performed by: SPECIALIST

## 2020-01-07 PROCEDURE — 74011000250 HC RX REV CODE- 250: Performed by: SPECIALIST

## 2020-01-07 PROCEDURE — 99152 MOD SED SAME PHYS/QHP 5/>YRS: CPT | Performed by: SPECIALIST

## 2020-01-07 PROCEDURE — 82962 GLUCOSE BLOOD TEST: CPT

## 2020-01-07 PROCEDURE — 99153 MOD SED SAME PHYS/QHP EA: CPT | Performed by: SPECIALIST

## 2020-01-07 PROCEDURE — 77030004532 HC CATH ANGI DX IMP BSC -A: Performed by: SPECIALIST

## 2020-01-07 PROCEDURE — 74011636320 HC RX REV CODE- 636/320: Performed by: SPECIALIST

## 2020-01-07 PROCEDURE — C1769 GUIDE WIRE: HCPCS | Performed by: SPECIALIST

## 2020-01-07 PROCEDURE — 93458 L HRT ARTERY/VENTRICLE ANGIO: CPT | Performed by: SPECIALIST

## 2020-01-07 RX ORDER — SODIUM CHLORIDE 0.9 % (FLUSH) 0.9 %
5-40 SYRINGE (ML) INJECTION AS NEEDED
Status: DISCONTINUED | OUTPATIENT
Start: 2020-01-07 | End: 2020-01-07 | Stop reason: HOSPADM

## 2020-01-07 RX ORDER — SODIUM CHLORIDE 0.9 % (FLUSH) 0.9 %
5-40 SYRINGE (ML) INJECTION EVERY 8 HOURS
Status: DISCONTINUED | OUTPATIENT
Start: 2020-01-07 | End: 2020-01-07 | Stop reason: HOSPADM

## 2020-01-07 RX ORDER — FENTANYL CITRATE 50 UG/ML
INJECTION, SOLUTION INTRAMUSCULAR; INTRAVENOUS AS NEEDED
Status: DISCONTINUED | OUTPATIENT
Start: 2020-01-07 | End: 2020-01-07 | Stop reason: HOSPADM

## 2020-01-07 RX ORDER — MIDAZOLAM HYDROCHLORIDE 1 MG/ML
INJECTION, SOLUTION INTRAMUSCULAR; INTRAVENOUS AS NEEDED
Status: DISCONTINUED | OUTPATIENT
Start: 2020-01-07 | End: 2020-01-07 | Stop reason: HOSPADM

## 2020-01-07 RX ORDER — VERAPAMIL HYDROCHLORIDE 2.5 MG/ML
INJECTION, SOLUTION INTRAVENOUS AS NEEDED
Status: DISCONTINUED | OUTPATIENT
Start: 2020-01-07 | End: 2020-01-07 | Stop reason: HOSPADM

## 2020-01-07 RX ORDER — SODIUM CHLORIDE 9 MG/ML
50 INJECTION, SOLUTION INTRAVENOUS CONTINUOUS
Status: DISCONTINUED | OUTPATIENT
Start: 2020-01-07 | End: 2020-01-07 | Stop reason: HOSPADM

## 2020-01-07 RX ORDER — LIDOCAINE HYDROCHLORIDE 10 MG/ML
INJECTION INFILTRATION; PERINEURAL AS NEEDED
Status: DISCONTINUED | OUTPATIENT
Start: 2020-01-07 | End: 2020-01-07 | Stop reason: HOSPADM

## 2020-01-07 RX ORDER — SODIUM CHLORIDE 9 MG/ML
150 INJECTION, SOLUTION INTRAVENOUS CONTINUOUS
Status: DISPENSED | OUTPATIENT
Start: 2020-01-07 | End: 2020-01-07

## 2020-01-07 RX ORDER — HEPARIN SODIUM 1000 [USP'U]/ML
INJECTION, SOLUTION INTRAVENOUS; SUBCUTANEOUS AS NEEDED
Status: DISCONTINUED | OUTPATIENT
Start: 2020-01-07 | End: 2020-01-07 | Stop reason: HOSPADM

## 2020-01-07 RX ORDER — DIPHENHYDRAMINE HYDROCHLORIDE 50 MG/ML
25 INJECTION, SOLUTION INTRAMUSCULAR; INTRAVENOUS
Status: DISCONTINUED | OUTPATIENT
Start: 2020-01-07 | End: 2020-01-07 | Stop reason: HOSPADM

## 2020-01-07 NOTE — PROGRESS NOTES
Patient arrived. ID and allergies verified verbally with patient. Pt voices understanding of procedure to be performed. Consent obtained. Pt prepped for procedure. Accpalak is 101.  07:45, here. 07:50 TRANSFER - OUT REPORT:    Verbal report given to Wan(name) on Brigida Theodore  being transferred to cath(unit) for routine progression of care       Report consisted of patients Situation, Background, Assessment and   Recommendations(SBAR). Information from the following report(s) Procedure Summary was reviewed with the receiving nurse. Lines:   Peripheral IV 01/07/20 Left Forearm (Active)   Site Assessment Clean, dry, & intact 1/7/2020  7:08 AM        Opportunity for questions and clarification was provided. Patient transported with:   Registered Nurse   08:40 TRANSFER - IN REPORT:    Verbal report received from GIOVANNI Briscoe(name) on Brigida Theodore  being received from cath(unit) for routine progression of care      Report consisted of patients Situation, Background, Assessment and   Recommendations(SBAR). Information from the following report(s) Procedure Summary was reviewed with the receiving nurse. Opportunity for questions and clarification was provided. Assessment completed upon patients arrival to unit and care assumed. 09:40 Air release completed. TR Band removed from rt wrist. No bleeding or  Hematoma. Dressing applied. Wrist immobilizer in place. Radial and ulnar pulse remain palpable on affected extremity. Pt tolerated well. Instructions given to pt regarding movement and activity restrictions. Pt voiced understanding. 10:15 ,DC instructions reviewed with pt and his wife,they voice understanding. 11:00,pt DC via [address] with wife.

## 2020-01-07 NOTE — Clinical Note
TRANSFER - OUT REPORT:     Verbal report given to: Bernerd Koyanagi. Report consisted of patient's Situation, Background, Assessment and   Recommendations(SBAR). Opportunity for questions and clarification was provided. Patient transported with a Registered Nurse and [address] [place] [address] / [facility]. Patient transported to: Bernadette Harman.

## 2020-01-07 NOTE — H&P
Date of Surgery Update:  Radha Horne was seen and examined. History and physical has been reviewed. The patient has been examined. There have been no significant clinical changes since the completion of the originally dated History and Physical.    Signed By: Stacie Naranjo MD     January 7, 2020 7:46 AM           Yuri Frazier MD   Physician   Cardiology   Progress Notes      Signed   Encounter Date:  12/27/2019                    []Hide copied text    []Ollie for details            Erin Perdomo [place] [address]  [address], [facility], [address] [place] [address]     Office [phone]  Fax [phone]  Cell [phone]                      Radha Horne is a 70 y.o. male last seen by Candice Hutchins NP 2 weeks ago.      Assessment       Encounter Diagnoses   Name Primary?  Coronary artery disease involving native coronary artery of native heart with angina pectoris (HCC) Yes    Dyslipidemia      Hypertension, benign      Obstructive sleep apnea      RG (dyspnea on exertion)      Primary osteoarthritis involving multiple joints        Recommendations:     CAD; with multivessel PCI dating back to 1996, most recently PCI 2011 to [facility] with LILIAN. Cor angio also demonstrated LCX mid 70%, OM1 70%,  of OM2 and OM3, mild plaque in LAD. He continues to have class 3 angina. Recent lexiscan Cardiolite was completely normal to my review. Echo demonstrates normal LV function with mild-moderate PA HTN. He is on optimal medical therapy, most recently with the addition of Amlodipine. At this time I favor proceeding with cardiac cath at this time. He is in complete agreement. - Continue ASA 81mg/d.     HTN, controlled on combination therapy.     ADELA - continue nightly CPAP     Dyslipidemia.  Updated labs from Sept demonstrate LDL 79.  - Continue Atorva 40mg/d      Chronic low back pain s/p multiple back surgeries, most recently Sept 2019.     Cath near future  Right radial approach  ASA 3  Airway 3        Subjective:     Huan Crocker reports he has a stable pattern of moderate RG with moderate activity with accompanying chest tightness and fatigue. He enjoys washing his car and doing yard work, but his activity has been limited d/t osteoarthritis particularly low back pain. Patient denies any palpitations, syncope, orthopnea, edema or paroxysmal nocturnal dyspnea.      He had back surgery most recently in September. He still reports pain. He wears a brace.        Of note, he is retired - former      Cardiac risk factors   HTN yes  DM  yes  Smoking no     Cardiac testing  Cardiolite (2/04):scheduled for comparison from previous stress pre PCI - similar study with no new acute changes. Exercise Cardiolite (8/29/11):  Reversible mid-distal inferolateral defect.  No fixed defects.  EF 63%. Lexiscan Cardiolite 12/18/19 - Normal perfusion study. EF 59%. Echo 12/27/19 - Mild LVH, EF 60-65%, mild MR, PA 51 mmg Hg          Past Medical History:   Diagnosis Date    CAD (coronary artery disease)      Degenerative joint disease 1/28/2011    Diabetes mellitus ([facility] [address]      Essential hypertension      History of back surgery 09/2019    Hyperlipidemia      Kidney stones 1969    Obstructive sleep apnea 01/28/2011     Stopped using CPAP years ago         Current Outpatient Medications   Medication Sig Dispense Refill    [START ON 2/18/2020] oxyCODONE-acetaminophen (PERCOCET) 5-325 mg per tablet Take 1-2 Tabs by mouth daily as needed for Pain for up to 30 days. Indications: pain 50 Tab 0    [START ON 1/19/2020] oxyCODONE-acetaminophen (PERCOCET) 5-325 mg per tablet Take 1 Tab by mouth daily as needed for Pain for up to 30 days. Indications: pain 30 Tab 0    oxyCODONE-acetaminophen (PERCOCET) 5-325 mg per tablet Take 1 Tab by mouth daily as needed for Pain for up to 30 days.  30 Tab 0    aspirin delayed-release 81 mg tablet Take  by mouth daily.        ascorbic acid, vitamin C, (VITAMIN C) 500 mg tablet Take  by mouth.        multivitamin (ONE A DAY) tablet Take 1 Tab by mouth daily.        amLODIPine (NORVASC) 5 mg tablet Take 1 Tab by mouth daily. 90 Tab 2    folic acid (FOLVITE) 1 mg tablet TAKE 1 TABLET DAILY, EXCEPT DAY THAT METHOTREXATE IS TAKEN 90 Tab 4    atorvastatin (LIPITOR) 40 mg tablet TAKE 1 TABLET DAILY (START LIPITOR AFTER VYTORIN IS FINISHED) 90 Tab 4    omeprazole (PRILOSEC) 40 mg capsule TAKE 1 CAPSULE DAILY 90 Cap 4    atenolol (TENORMIN) 50 mg tablet TAKE 1 TABLET DAILY 90 Tab 4    metFORMIN (GLUCOPHAGE) 1,000 mg tablet TAKE 1 TABLET TWICE A DAY WITH MEALS 180 Tab 4    tamsulosin (FLOMAX) 0.4 mg capsule Take 1 Cap by mouth daily. 30 Cap 0    folic acid (FOLVITE) 1 mg tablet Take 1 mg by mouth daily.        irbesartan (AVAPRO) 150 mg tablet Take 1 Tab by mouth nightly. 90 Tab 3    sildenafil citrate (VIAGRA) 100 mg tablet TAKE 1 TABLET AS NEEDED 18 Tab 4           Allergies   Allergen Reactions    Ace Inhibitors Swelling      Review of Systems  Constitutional: Negative for fever, chills, malaise and diaphoresis. +fatigue  Respiratory: Negative for cough, hemoptysis, sputum production, and wheezing. +RG  Cardiovascular: Negative for palpitations, orthopnea, claudication, leg swelling and PND. +exertional chest tightness  Gastrointestinal: Negative for heartburn, nausea, vomiting, blood in stool and melena. Genitourinary: Negative for dysuria and flank pain. Musculoskeletal: Negative for joint pain and back pain. Skin: Negative for rash. Neurological: Negative for focal weakness, seizures, loss of consciousness, weakness and headaches. +dizziness  Endo/Heme/Allergies: Does not bruise/bleed easily. Psychiatric/Behavioral: Negative for memory loss.  The patient does not have insomnia.       Physical Exam  Visit Vitals  /70 (BP 1 Location: Left arm, BP Patient Position: Sitting)   Pulse (!) 54   Resp 18   Ht 5' 8\" (1.727 m)   Wt 165 lb (74.8 kg)   SpO2 95% BMI 25.09 kg/m²          Wt Readings from Last 3 Encounters:   12/27/19 165 lb (74.8 kg)   12/27/19 165 lb (74.8 kg)   12/20/19 172 lb (78 kg)      General - well developed well nourished  Neck - JVP normal, thyroid nl  Cardiac - normal S1,S2, no murmurs, rubs or gallops. No clicks  Vascular - carotids without bruits, radials, femorals and pedal pulses equal bilateral  Lungs - clear to auscultation bilaterals, no rales ,wheezing or rhonchi  Abd - soft nontender, no HSM, no abd bruits  Extremities - no edema  Skin - no rash  Neuro - nonfocal  Psych - normal mood and affect     Cardiographics  EKG 12/11/19 - SB, STT's; unchanged.    Echo 12/27/19 - Mild LVH, EF 60-65%, mild MR, PA 51 mmg Hg           Written by Daryl Ramey, as dictated by Shawn Cisneros M.D.   Rhonda Raymundo MD         Electronically signed by Niki Dwyer MD at 12/27/19 2235   Note Details     Author Niki Dwyer MD File Time 12/27/19 2235   Author Type Physician Status Signed   Last  Niki Dwyer MD Specialty Cardiology       Office Visit on 12/27/2019          Revision History          Detailed Report         Note shared with patient

## 2020-01-07 NOTE — DISCHARGE INSTRUCTIONS
Patient Discharge Instructions    Teresa Gusman / [id number] : 1940    Admitted 2020 Discharged: 2020     Findings:  Right coronary stents are wide open  Left circumflex artery has progressive mid vessel narrowing, now 80-85% extending into a downstream branch that is 100% blocked  Heart function normal by recent echocardiogram    Will arrange stent procedure with Dr Michelle Gonzalez to be done at [facility]    Medications:  Resume metformin from Thursday  Continue other medications       · It is important that you take the medication exactly as they are prescribed. · Keep your medication in the bottles provided by the pharmacist and keep a list of the medication names, dosages, and times to be taken in your wallet. · Do not take other medications without consulting your doctor. Transradial Cardiac Catheterization/Angiography Discharge Instructions    It is normal to feel tired the first couple days. Take it easy and follow the physicians instructions. CHECK THE CATHETER INSERTION SITE DAILY:    Remove the wrist dressing 24 hours after the procedure. You may shower 24 hours after the procedure. Wash with soap and water and pat dry. Gentle cleaning of the site with soap and water is sufficient, cover with a dry clean dressing or bandage. Do not apply creams or powders to the area. No soaking the wrist for 3 days. Leave the puncture site open to air after 24 hours post-procedure. CALL THE PHYSICIANS:     If the site becomes red, swollen or feels warm to the touch  If there is bleeding or drainage or if there is unusual pain at the radial site. If there is any minor oozing, you may apply a band-aid and remove after 12 hours.    If the bleeding continues, hold pressure with the middle finger against the puncture site and the thumb against the back of the wrist,call 911 to be transported to the hospital.  DO  [address].    ACTIVITY:   For the first 24 hours do not manipulate the wrist.  No lifting, pushing or pulling over 3-5 pounds with the affected wrist for 7 daysand no straining the insertion site. Do not life grocery bags or the garbage can, do not run the vacuum  or  for 7 days. Start with short walks as in the hospital and gradually increase as tolerated each day. It is recommended to walk 30 minutes 5-7 days per week. RETURN TO WORK:    You may return to work in two days. See activity precautions above. Information obtained by :  I understand that if any problems occur once I am at home I am to contact my physician. I understand and acknowledge receipt of the instructions indicated above.                                                                                                                                            R.N.'s Signature                                                                  Date/Time                                                                                                                                              Patient or Representative Feli Bell MD

## 2020-01-07 NOTE — Clinical Note
"Chief Complaint   Patient presents with     URI     2 days       Initial Pulse 116  Temp 97.1  F (36.2  C) (Axillary)  Resp 24  Ht 2' 7.5\" (0.8 m)  Wt 24 lb 12.8 oz (11.2 kg)  BMI 17.57 kg/m2 Estimated body mass index is 17.57 kg/(m^2) as calculated from the following:    Height as of this encounter: 2' 7.5\" (0.8 m).    Weight as of this encounter: 24 lb 12.8 oz (11.2 kg).  Medication Reconciliation: complete     Shannon DORSEY LPN      " Defibrillation and grounding pads were applied.

## 2020-01-08 ENCOUNTER — TELEPHONE (OUTPATIENT)
Dept: CARDIOLOGY CLINIC | Age: 80
End: 2020-01-08

## 2020-01-08 DIAGNOSIS — I25.119 CORONARY ARTERY DISEASE INVOLVING NATIVE CORONARY ARTERY OF NATIVE HEART WITH ANGINA PECTORIS (HCC): Primary | ICD-10-CM

## 2020-01-08 RX ORDER — CLOPIDOGREL BISULFATE 75 MG/1
75 TABLET ORAL DAILY
Qty: 90 TAB | Refills: 3 | Status: ON HOLD | OUTPATIENT
Start: 2020-01-08 | End: 2020-05-07 | Stop reason: SDUPTHER

## 2020-01-08 NOTE — TELEPHONE ENCOUNTER
Patient's wife is calling to follow up on procedure he is supposed to have with Dr Yuliet Landers. Please advise.      Phone: [phone]

## 2020-01-09 NOTE — TELEPHONE ENCOUNTER
Spoke with pt concerning  of Lcx procedure that is scheduled at [facility] for 1/23/20. Instructions given to pt per VO Dr. Keerthi San. Pt. NPO after 0700 am hold the Metformin 24 hours prior to the procedure; have someone available to drive pt to and from procedure; pack a bag in case an overnight stay is warranted.  scheduled for 12:30 pm on 1/23/20. Patient advised to arrive 2 hrs prior to procedure for prep. Patient verbalized understanding. Opportunities for questions, clarifications, and concerns provided.

## 2020-01-09 NOTE — TELEPHONE ENCOUNTER
Pharmacy confirmed. Patient's wife, Melva Damon, stated that Dr. Courtney Woodward recommended the patient take Plavix prior to his procedure. Please advise.     Phone #: [phone]  Thanks

## 2020-01-10 RX ORDER — CLOPIDOGREL BISULFATE 75 MG/1
75 TABLET ORAL DAILY
Qty: 90 TAB | Refills: 3 | OUTPATIENT
Start: 2020-01-10

## 2020-01-15 ENCOUNTER — TELEPHONE (OUTPATIENT)
Dept: CARDIOLOGY CLINIC | Age: 80
End: 2020-01-15

## 2020-01-18 LAB
BASOPHILS # BLD AUTO: 0 X10E3/UL (ref 0–0.2)
BASOPHILS NFR BLD AUTO: 1 %
BUN SERPL-MCNC: 10 MG/DL (ref 8–27)
BUN/CREAT SERPL: 11 (ref 10–24)
CALCIUM SERPL-MCNC: 9.8 MG/DL (ref 8.6–10.2)
CHLORIDE SERPL-SCNC: 111 MMOL/L (ref 96–106)
CO2 SERPL-SCNC: 23 MMOL/L (ref 20–29)
CREAT SERPL-MCNC: 0.89 MG/DL (ref 0.76–1.27)
EOSINOPHIL # BLD AUTO: 0.1 X10E3/UL (ref 0–0.4)
EOSINOPHIL NFR BLD AUTO: 1 %
ERYTHROCYTE [DISTWIDTH] IN BLOOD BY AUTOMATED COUNT: 14.9 % (ref 11.6–15.4)
GLUCOSE SERPL-MCNC: 92 MG/DL (ref 65–99)
HCT VFR BLD AUTO: 37.1 % (ref 37.5–51)
HGB BLD-MCNC: 11.2 G/DL (ref 13–17.7)
IMM GRANULOCYTES # BLD AUTO: 0 X10E3/UL (ref 0–0.1)
IMM GRANULOCYTES NFR BLD AUTO: 0 %
LYMPHOCYTES # BLD AUTO: 2 X10E3/UL (ref 0.7–3.1)
LYMPHOCYTES NFR BLD AUTO: 32 %
MCH RBC QN AUTO: 24 PG (ref 26.6–33)
MCHC RBC AUTO-ENTMCNC: 30.2 G/DL (ref 31.5–35.7)
MCV RBC AUTO: 79 FL (ref 79–97)
MONOCYTES # BLD AUTO: 0.5 X10E3/UL (ref 0.1–0.9)
MONOCYTES NFR BLD AUTO: 8 %
NEUTROPHILS # BLD AUTO: 3.7 X10E3/UL (ref 1.4–7)
NEUTROPHILS NFR BLD AUTO: 58 %
PLATELET # BLD AUTO: 329 X10E3/UL (ref 150–450)
POTASSIUM SERPL-SCNC: 5.3 MMOL/L (ref 3.5–5.2)
RBC # BLD AUTO: 4.67 X10E6/UL (ref 4.14–5.8)
SODIUM SERPL-SCNC: 148 MMOL/L (ref 134–144)
WBC # BLD AUTO: 6.2 X10E3/UL (ref 3.4–10.8)

## 2020-01-21 DIAGNOSIS — R07.9 CHEST PAIN, UNSPECIFIED TYPE: Primary | ICD-10-CM

## 2020-01-21 RX ORDER — SODIUM CHLORIDE 0.9 % (FLUSH) 0.9 %
5-40 SYRINGE (ML) INJECTION AS NEEDED
Status: CANCELLED | OUTPATIENT
Start: 2020-01-23

## 2020-01-21 RX ORDER — SODIUM CHLORIDE 9 MG/ML
100 INJECTION, SOLUTION INTRAVENOUS CONTINUOUS
Status: CANCELLED | OUTPATIENT
Start: 2020-01-23

## 2020-01-21 RX ORDER — SODIUM CHLORIDE 0.9 % (FLUSH) 0.9 %
5-40 SYRINGE (ML) INJECTION EVERY 8 HOURS
Status: CANCELLED | OUTPATIENT
Start: 2020-01-23

## 2020-01-23 ENCOUNTER — HOSPITAL ENCOUNTER (OUTPATIENT)
Age: 80
Setting detail: OBSERVATION
Discharge: HOME | End: 2020-01-24
Attending: STUDENT IN AN ORGANIZED HEALTH CARE EDUCATION/TRAINING PROGRAM | Admitting: STUDENT IN AN ORGANIZED HEALTH CARE EDUCATION/TRAINING PROGRAM
Payer: MEDICARE

## 2020-01-23 DIAGNOSIS — R07.9 CHEST PAIN, UNSPECIFIED TYPE: ICD-10-CM

## 2020-01-23 PROBLEM — Z98.890 S/P CARDIAC CATH: Status: ACTIVE | Noted: 2020-01-23

## 2020-01-23 LAB
ANION GAP SERPL CALC-SCNC: 4 MMOL/L (ref 5–15)
BUN SERPL-MCNC: 9 MG/DL (ref 6–20)
BUN/CREAT SERPL: 13 (ref 12–20)
CALCIUM SERPL-MCNC: 8.6 MG/DL (ref 8.5–10.1)
CHLORIDE SERPL-SCNC: 114 MMOL/L (ref 97–108)
CO2 SERPL-SCNC: 25 MMOL/L (ref 21–32)
CREAT SERPL-MCNC: 0.72 MG/DL (ref 0.7–1.3)
GLUCOSE BLD STRIP.AUTO-MCNC: 95 MG/DL (ref 65–100)
GLUCOSE SERPL-MCNC: 91 MG/DL (ref 65–100)
POTASSIUM SERPL-SCNC: 3.6 MMOL/L (ref 3.5–5.1)
SERVICE CMNT-IMP: NORMAL
SODIUM SERPL-SCNC: 143 MMOL/L (ref 136–145)

## 2020-01-23 PROCEDURE — 77030019569 HC BND COMPR RAD TERU -B: Performed by: STUDENT IN AN ORGANIZED HEALTH CARE EDUCATION/TRAINING PROGRAM

## 2020-01-23 PROCEDURE — C1887 CATHETER, GUIDING: HCPCS | Performed by: STUDENT IN AN ORGANIZED HEALTH CARE EDUCATION/TRAINING PROGRAM

## 2020-01-23 PROCEDURE — 92943 PRQ TRLUML REVSC CH OCC ANT: CPT | Performed by: STUDENT IN AN ORGANIZED HEALTH CARE EDUCATION/TRAINING PROGRAM

## 2020-01-23 PROCEDURE — 93454 CORONARY ARTERY ANGIO S&I: CPT | Performed by: STUDENT IN AN ORGANIZED HEALTH CARE EDUCATION/TRAINING PROGRAM

## 2020-01-23 PROCEDURE — 77030039046 HC PAD DEFIB RADIOTRNSPNT CNMD -B: Performed by: STUDENT IN AN ORGANIZED HEALTH CARE EDUCATION/TRAINING PROGRAM

## 2020-01-23 PROCEDURE — 36415 COLL VENOUS BLD VENIPUNCTURE: CPT

## 2020-01-23 PROCEDURE — 99153 MOD SED SAME PHYS/QHP EA: CPT | Performed by: STUDENT IN AN ORGANIZED HEALTH CARE EDUCATION/TRAINING PROGRAM

## 2020-01-23 PROCEDURE — 99218 HC RM OBSERVATION: CPT

## 2020-01-23 PROCEDURE — 74011636320 HC RX REV CODE- 636/320: Performed by: STUDENT IN AN ORGANIZED HEALTH CARE EDUCATION/TRAINING PROGRAM

## 2020-01-23 PROCEDURE — 74011250637 HC RX REV CODE- 250/637: Performed by: STUDENT IN AN ORGANIZED HEALTH CARE EDUCATION/TRAINING PROGRAM

## 2020-01-23 PROCEDURE — 77030013744: Performed by: STUDENT IN AN ORGANIZED HEALTH CARE EDUCATION/TRAINING PROGRAM

## 2020-01-23 PROCEDURE — 77030012468 HC VLV BLEEDBK CNTRL ABBT -B: Performed by: STUDENT IN AN ORGANIZED HEALTH CARE EDUCATION/TRAINING PROGRAM

## 2020-01-23 PROCEDURE — 92978 ENDOLUMINL IVUS OCT C 1ST: CPT | Performed by: STUDENT IN AN ORGANIZED HEALTH CARE EDUCATION/TRAINING PROGRAM

## 2020-01-23 PROCEDURE — 77030010221 HC SPLNT WR POS TELE -B: Performed by: STUDENT IN AN ORGANIZED HEALTH CARE EDUCATION/TRAINING PROGRAM

## 2020-01-23 PROCEDURE — C1769 GUIDE WIRE: HCPCS | Performed by: STUDENT IN AN ORGANIZED HEALTH CARE EDUCATION/TRAINING PROGRAM

## 2020-01-23 PROCEDURE — 80048 BASIC METABOLIC PNL TOTAL CA: CPT

## 2020-01-23 PROCEDURE — C1753 CATH, INTRAVAS ULTRASOUND: HCPCS | Performed by: STUDENT IN AN ORGANIZED HEALTH CARE EDUCATION/TRAINING PROGRAM

## 2020-01-23 PROCEDURE — C1894 INTRO/SHEATH, NON-LASER: HCPCS | Performed by: STUDENT IN AN ORGANIZED HEALTH CARE EDUCATION/TRAINING PROGRAM

## 2020-01-23 PROCEDURE — 77030013715 HC INFL SYS MRTM -B: Performed by: STUDENT IN AN ORGANIZED HEALTH CARE EDUCATION/TRAINING PROGRAM

## 2020-01-23 PROCEDURE — 74011250636 HC RX REV CODE- 250/636: Performed by: STUDENT IN AN ORGANIZED HEALTH CARE EDUCATION/TRAINING PROGRAM

## 2020-01-23 PROCEDURE — 99152 MOD SED SAME PHYS/QHP 5/>YRS: CPT | Performed by: STUDENT IN AN ORGANIZED HEALTH CARE EDUCATION/TRAINING PROGRAM

## 2020-01-23 PROCEDURE — 85347 COAGULATION TIME ACTIVATED: CPT

## 2020-01-23 PROCEDURE — 74011000250 HC RX REV CODE- 250: Performed by: STUDENT IN AN ORGANIZED HEALTH CARE EDUCATION/TRAINING PROGRAM

## 2020-01-23 PROCEDURE — C1725 CATH, TRANSLUMIN NON-LASER: HCPCS | Performed by: STUDENT IN AN ORGANIZED HEALTH CARE EDUCATION/TRAINING PROGRAM

## 2020-01-23 PROCEDURE — 82962 GLUCOSE BLOOD TEST: CPT

## 2020-01-23 RX ORDER — DIPHENHYDRAMINE HYDROCHLORIDE 50 MG/ML
INJECTION, SOLUTION INTRAMUSCULAR; INTRAVENOUS AS NEEDED
Status: DISCONTINUED | OUTPATIENT
Start: 2020-01-23 | End: 2020-01-23 | Stop reason: HOSPADM

## 2020-01-23 RX ORDER — HEPARIN SODIUM 1000 [USP'U]/ML
INJECTION, SOLUTION INTRAVENOUS; SUBCUTANEOUS AS NEEDED
Status: DISCONTINUED | OUTPATIENT
Start: 2020-01-23 | End: 2020-01-23 | Stop reason: HOSPADM

## 2020-01-23 RX ORDER — TAMSULOSIN HYDROCHLORIDE 0.4 MG/1
0.4 CAPSULE ORAL DAILY
Status: DISCONTINUED | OUTPATIENT
Start: 2020-01-24 | End: 2020-01-24 | Stop reason: HOSPADM

## 2020-01-23 RX ORDER — ASPIRIN 81 MG/1
81 TABLET ORAL DAILY
Status: DISCONTINUED | OUTPATIENT
Start: 2020-01-24 | End: 2020-01-24 | Stop reason: HOSPADM

## 2020-01-23 RX ORDER — MIDAZOLAM HYDROCHLORIDE 1 MG/ML
INJECTION, SOLUTION INTRAMUSCULAR; INTRAVENOUS AS NEEDED
Status: DISCONTINUED | OUTPATIENT
Start: 2020-01-23 | End: 2020-01-23 | Stop reason: HOSPADM

## 2020-01-23 RX ORDER — ACETAMINOPHEN 325 MG/1
650 TABLET ORAL
Status: DISCONTINUED | OUTPATIENT
Start: 2020-01-23 | End: 2020-01-24 | Stop reason: HOSPADM

## 2020-01-23 RX ORDER — SODIUM CHLORIDE 9 MG/ML
150 INJECTION, SOLUTION INTRAVENOUS CONTINUOUS
Status: DISCONTINUED | OUTPATIENT
Start: 2020-01-23 | End: 2020-01-23 | Stop reason: HOSPADM

## 2020-01-23 RX ORDER — LOSARTAN POTASSIUM 50 MG/1
50 TABLET ORAL
Status: DISCONTINUED | OUTPATIENT
Start: 2020-01-23 | End: 2020-01-24 | Stop reason: HOSPADM

## 2020-01-23 RX ORDER — SODIUM CHLORIDE 0.9 % (FLUSH) 0.9 %
5-40 SYRINGE (ML) INJECTION AS NEEDED
Status: DISCONTINUED | OUTPATIENT
Start: 2020-01-23 | End: 2020-01-24 | Stop reason: HOSPADM

## 2020-01-23 RX ORDER — ATENOLOL 50 MG/1
50 TABLET ORAL DAILY
Status: DISCONTINUED | OUTPATIENT
Start: 2020-01-24 | End: 2020-01-24 | Stop reason: HOSPADM

## 2020-01-23 RX ORDER — SODIUM CHLORIDE 0.9 % (FLUSH) 0.9 %
5-40 SYRINGE (ML) INJECTION EVERY 8 HOURS
Status: DISCONTINUED | OUTPATIENT
Start: 2020-01-23 | End: 2020-01-23 | Stop reason: HOSPADM

## 2020-01-23 RX ORDER — PROTAMINE SULFATE 10 MG/ML
INJECTION, SOLUTION INTRAVENOUS AS NEEDED
Status: DISCONTINUED | OUTPATIENT
Start: 2020-01-23 | End: 2020-01-23 | Stop reason: HOSPADM

## 2020-01-23 RX ORDER — SODIUM CHLORIDE 0.9 % (FLUSH) 0.9 %
5-40 SYRINGE (ML) INJECTION EVERY 8 HOURS
Status: DISCONTINUED | OUTPATIENT
Start: 2020-01-23 | End: 2020-01-24 | Stop reason: HOSPADM

## 2020-01-23 RX ORDER — HEPARIN SODIUM 200 [USP'U]/100ML
INJECTION, SOLUTION INTRAVENOUS
Status: COMPLETED | OUTPATIENT
Start: 2020-01-23 | End: 2020-01-23

## 2020-01-23 RX ORDER — VERAPAMIL HYDROCHLORIDE 2.5 MG/ML
INJECTION, SOLUTION INTRAVENOUS AS NEEDED
Status: DISCONTINUED | OUTPATIENT
Start: 2020-01-23 | End: 2020-01-23 | Stop reason: HOSPADM

## 2020-01-23 RX ORDER — ATORVASTATIN CALCIUM 40 MG/1
40 TABLET, FILM COATED ORAL DAILY
Status: DISCONTINUED | OUTPATIENT
Start: 2020-01-24 | End: 2020-01-24 | Stop reason: HOSPADM

## 2020-01-23 RX ORDER — SODIUM CHLORIDE 0.9 % (FLUSH) 0.9 %
5-40 SYRINGE (ML) INJECTION AS NEEDED
Status: DISCONTINUED | OUTPATIENT
Start: 2020-01-23 | End: 2020-01-23 | Stop reason: HOSPADM

## 2020-01-23 RX ORDER — CLOPIDOGREL BISULFATE 75 MG/1
75 TABLET ORAL DAILY
Status: DISCONTINUED | OUTPATIENT
Start: 2020-01-24 | End: 2020-01-24 | Stop reason: HOSPADM

## 2020-01-23 RX ORDER — FENTANYL CITRATE 50 UG/ML
INJECTION, SOLUTION INTRAMUSCULAR; INTRAVENOUS AS NEEDED
Status: DISCONTINUED | OUTPATIENT
Start: 2020-01-23 | End: 2020-01-23 | Stop reason: HOSPADM

## 2020-01-23 RX ORDER — HYDROCODONE BITARTRATE AND ACETAMINOPHEN 5; 325 MG/1; MG/1
1 TABLET ORAL
Status: DISCONTINUED | OUTPATIENT
Start: 2020-01-23 | End: 2020-01-24 | Stop reason: HOSPADM

## 2020-01-23 RX ORDER — NITROGLYCERIN 20 MG/100ML
INJECTION INTRAVENOUS
Status: DISCONTINUED | OUTPATIENT
Start: 2020-01-23 | End: 2020-01-23 | Stop reason: HOSPADM

## 2020-01-23 RX ORDER — AMLODIPINE BESYLATE 5 MG/1
5 TABLET ORAL DAILY
Status: DISCONTINUED | OUTPATIENT
Start: 2020-01-24 | End: 2020-01-24 | Stop reason: HOSPADM

## 2020-01-23 RX ORDER — ONDANSETRON 2 MG/ML
INJECTION INTRAMUSCULAR; INTRAVENOUS AS NEEDED
Status: DISCONTINUED | OUTPATIENT
Start: 2020-01-23 | End: 2020-01-23 | Stop reason: HOSPADM

## 2020-01-23 RX ADMIN — LOSARTAN POTASSIUM 50 MG: 50 TABLET, FILM COATED ORAL at 20:37

## 2020-01-23 RX ADMIN — Medication 10 ML: at 20:44

## 2020-01-23 RX ADMIN — SODIUM CHLORIDE 100 ML/HR: 900 INJECTION, SOLUTION INTRAVENOUS at 11:26

## 2020-01-23 RX ADMIN — SODIUM CHLORIDE 100 ML/HR: 900 INJECTION, SOLUTION INTRAVENOUS at 15:59

## 2020-01-23 NOTE — PROCEDURES
BRIEF PROCEDURE NOTE    Date of Procedure: 1/23/2020   Preoperative Diagnosis: stable angina  Postoperative Diagnosis: stable angina    Procedure: coronary angiography, IVUS, PCI  Interventional Cardiologist: Danny Rai DO  Assistant: Cm Hartmann MD  Anesthesia: local + IV moderate sedation   Estimated Blood Loss: Minimal    Access: right radial artery, 7F      Findings:   L Main: large caliber, ostial 30-40% stenosis, IVUS with minimal luminal area of 15mm2  LAD:  Large caliber diffuse mild disease  LCx: moderate caliber, diffuse mid-segment disease, OM1 small caliber, OM2 moderate ostial , OM3 small caliber      EBU 3.5 7F guide  Heparin for -350    runthrough into LAD, IVUS of left main performed. Runthrough was advanced into Lcx with Turnpike spiral microcather. Proximal cap was probed with runthrough. Subsequently attempted true-true lumen crossing with [place] 2nd and subsequently [place] 3rd. [place] 3rd tracked into subintimal space. Attempted to redirect MiracleBros 6 more proximal and medially. Supercross with miraclebros to change wire direction. [place] 3rd was again useeed with Turnpike spiral microcatheter. Subsequently Mongo wire was used in attempt to STAR into distal vessel. However, Mongo wire was outside architecture of the vessel. Balloon tamponade was performed. Bedside echo w/o evidence of pericardial effusion. Patiently was hemodynamically stable during procedure. Protamine was administered at the end of the procedure. Specimens Removed: None    Implants: None    Closure Device: radial TR band    See full cath note. Complications: none    Findings:  1. No significant left main disease by IVUS  2.  Unsuccessful OM2       Plan:    Monitor overnight, f/up with Dr. Gabi Jimenez in 10-14 days    DO Philly Ordonez Crew,   [facility] [address], [address] [facility] [id number] [place] [address] Office [phone]

## 2020-01-23 NOTE — INTERVAL H&P NOTE
H&P Update: 
Mirna Franklin was seen and examined. History and physical has been reviewed. The patient has been examined. There have been no significant clinical changes since the completion of the originally dated History and Physical. 
 
Risk and benefit of cardiac catheterization/PCI was described in detail to patient.  (risk of vascular access complication with potential surgical intervention for management, stroke, myocardial infarction, emergent open heart surgery and death). Patient wishes to proceed with coronary angiography with possible intervention. 01/07/20 CARDIAC PROCEDURE 01/12/2020 1/12/2020 Narrative · Multivessel CAD · Patent RCA stents · Progressive mid LCX disease extending into subtotally occluded OM3 · Normal LVEDP · Normal LVEF by echo Elective PCI with atherectomy.   
  
  Signed by: Conner Eubanks MD

## 2020-01-23 NOTE — PROGRESS NOTES
TRANSFER - IN REPORT:    Verbal report received from PARVIZ Maya on Padmini Lockhart, Procedure: Cath procedure with no intervention , from the Cardiac Cath lab, for routine progression of care. Report consisted of patients Situation, Background, Assessment and Recommendations(SBAR). Information from the following report(s) Procedure Summary, MAR and Recent Results was reviewed with the receiving clinician. Opportunity for questions and clarification was provided. Assessment completed upon patients arrival to [address] [place] [address] and care assumed. Cardiac Cath Lab Recovery Arrival Note:     Padmini Lockhart arrived to [facility] recovery area. Patient procedure= Cath procedure with no intervention. Patient on cardiac monitor, non-invasive blood pressure, Patient status doing well without problems. Patient is A&Ox 4. Patient reports no pain, no chest pain, no n/v. Procedure site without any bleeding and no hematoma.

## 2020-01-23 NOTE — PROGRESS NOTES
Verbal report given to Laurice Duane) on NAME  being transferred to CVSU(unit) for routine progression of care       Report consisted of patients Situation, Background, Assessment and   Recommendations(SBAR). Information from the following report(s) Procedure Summary, MAR and Recent Results was reviewed with the receiving nurse. Lines:       Opportunity for questions and clarification was provided.       Patient transported with:   Monitor  Registered Nurse

## 2020-01-23 NOTE — PROGRESS NOTES
Cardiac Cath Lab Procedure Area Arrival Note:    [facility] arrived to Cardiac Cath Lab, Procedure Area. Patient identifiers verified with NAME and DATE OF BIRTH. Procedure verified with patient. Consent forms verified. Allergies verified. Patient informed of procedure and plan of care. Questions answered with review. Patient voiced understanding of procedure and plan of care. Patient on cardiac monitor, non-invasive blood pressure, SPO2 monitor. On RA and placed on O2 @ 2 lpm via NC.  IV of NSS on pump at 100 ml/hr. Patient status doing well without problems. Patient is A&Ox 4. Patient reports no complaints of pain or shortness of breath. Patient medicated during procedure with orders obtained and verified by Dr. Laz Del Angel. Refer to patients Cardiac Cath Lab PROCEDURE REPORT for vital signs, assessment, status, and response during procedure, printed at end of case. Printed report on chart or scanned into chart. 1625 Transfer to [address] [place] [address] from Procedure Area    Verbal report given to PARVIZ Ortiz on [facility] being transferred to Cardiac Cath Lab  for routine progression of care   Patient is post [address] [facility] and attempted  of Circ procedure. Patient stable upon transfer to . Report consisted of patients Situation, Background, Assessment and   Recommendations(SBAR). Information from the following report(s) SBAR, Procedure Summary and MAR was reviewed with the receiving nurse. Opportunity for questions and clarification was provided. Patient medicated during procedure with orders obtained and verified by Dr. Laz Del Angel. Refer to patient PROCEDURE REPORT for vital signs, assessment, status, and response during procedure.

## 2020-01-23 NOTE — Clinical Note
Balloon inserted. Balloon inflated using multiple inflations inflation technique. Pressure = 12 hayden; Duration = 90 sec. Inflation 2: Pressure: 9 hayden; Duration: 300 sec.

## 2020-01-23 NOTE — ROUTINE PROCESS
Cardiac Cath Lab Recovery Arrival Note: 
 
 
Molina Vernon arrived to Cardiac Cath Lab, Recovery Area. Staff introduced to patient. Patient identifiers verified with NAME and DATE OF BIRTH. Procedure verified with patient. Consent forms reviewed and signed by patient or authorized representative and verified. Allergies verified. Patient informed of procedure and plan of care. Questions answered with review. Patient prepped for procedure, per orders from physician, prior to arrival. 
 
Patient on cardiac monitor, non-invasive blood pressure, SPO2 monitor. Patient is A&Ox 4. Patient reports no pain, no chest pain, no n/v. Patient in stretcher, in low position, with side rails up, call bell within reach, patient instructed to call of assistance as needed. Patient prep in: [facility] Recovery Area, Bed# 9. Family in: Wife: Pedro Luis Caballero [phone].   
Prep by: Judit Harding Rn

## 2020-01-24 ENCOUNTER — APPOINTMENT (OUTPATIENT)
Dept: NON INVASIVE DIAGNOSTICS | Age: 80
End: 2020-01-24
Attending: STUDENT IN AN ORGANIZED HEALTH CARE EDUCATION/TRAINING PROGRAM
Payer: MEDICARE

## 2020-01-24 VITALS
HEIGHT: 68 IN | BODY MASS INDEX: 24.86 KG/M2 | TEMPERATURE: 98.6 F | DIASTOLIC BLOOD PRESSURE: 59 MMHG | WEIGHT: 164 LBS | HEART RATE: 45 BPM | SYSTOLIC BLOOD PRESSURE: 132 MMHG | OXYGEN SATURATION: 100 % | RESPIRATION RATE: 20 BRPM

## 2020-01-24 LAB
ACT BLD: 230 SECS (ref 79–138)
ACT BLD: 246 SECS (ref 79–138)
ACT BLD: 296 SECS (ref 79–138)
ACT BLD: 323 SECS (ref 79–138)
ACT BLD: 345 SECS (ref 79–138)
ACT BLD: 461 SECS (ref 79–138)
ANION GAP SERPL CALC-SCNC: 6 MMOL/L (ref 5–15)
ATRIAL RATE: 50 BPM
AV VELOCITY RATIO: 0.6
BUN SERPL-MCNC: 9 MG/DL (ref 6–20)
BUN/CREAT SERPL: 11 (ref 12–20)
CALCIUM SERPL-MCNC: 8 MG/DL (ref 8.5–10.1)
CALCULATED P AXIS, ECG09: 71 DEGREES
CALCULATED R AXIS, ECG10: 63 DEGREES
CALCULATED T AXIS, ECG11: 59 DEGREES
CHLORIDE SERPL-SCNC: 113 MMOL/L (ref 97–108)
CO2 SERPL-SCNC: 24 MMOL/L (ref 21–32)
CREAT SERPL-MCNC: 0.82 MG/DL (ref 0.7–1.3)
DIAGNOSIS, 93000: NORMAL
ECHO AO ROOT DIAM: 3.37 CM
ECHO AV AREA PEAK VELOCITY: 1.2 CM2
ECHO AV PEAK GRADIENT: 8.7 MMHG
ECHO AV PEAK VELOCITY: 147.25 CM/S
ECHO LA MAJOR AXIS: 4.07 CM
ECHO LA TO AORTIC ROOT RATIO: 1.21
ECHO LV E' LATERAL VELOCITY: 7.35 CM/S
ECHO LV E' SEPTAL VELOCITY: 6.7 CM/S
ECHO LV INTERNAL DIMENSION DIASTOLIC: 4.67 CM (ref 4.2–5.9)
ECHO LV INTERNAL DIMENSION SYSTOLIC: 2.58 CM
ECHO LV IVSD: 1.08 CM (ref 0.6–1)
ECHO LV MASS 2D: 166.9 G (ref 88–224)
ECHO LV MASS INDEX 2D: 88.8 G/M2 (ref 49–115)
ECHO LV POSTERIOR WALL DIASTOLIC: 0.76 CM (ref 0.6–1)
ECHO LVOT DIAM: 1.63 CM
ECHO LVOT PEAK GRADIENT: 3.1 MMHG
ECHO LVOT PEAK VELOCITY: 88.28 CM/S
ECHO MV A VELOCITY: 93.65 CM/S
ECHO MV AREA PHT: 3 CM2
ECHO MV E DECELERATION TIME (DT): 253 MS
ECHO MV E VELOCITY: 84.2 CM/S
ECHO MV E/A RATIO: 0.9
ECHO MV E/E' LATERAL: 11.46
ECHO MV E/E' RATIO (AVERAGED): 12.01
ECHO MV E/E' SEPTAL: 12.57
ECHO MV PRESSURE HALF TIME (PHT): 73.4 MS
ECHO PULMONARY ARTERY SYSTOLIC PRESSURE (PASP): 53 MMHG
ECHO RV INTERNAL DIMENSION: 3.76 CM
ECHO TV REGURGITANT MAX VELOCITY: 348.76 CM/S
ECHO TV REGURGITANT PEAK GRADIENT: 48.7 MMHG
ERYTHROCYTE [DISTWIDTH] IN BLOOD BY AUTOMATED COUNT: 15.7 % (ref 11.5–14.5)
GLUCOSE BLD STRIP.AUTO-MCNC: 117 MG/DL (ref 65–100)
GLUCOSE BLD STRIP.AUTO-MCNC: 71 MG/DL (ref 65–100)
GLUCOSE BLD STRIP.AUTO-MCNC: 88 MG/DL (ref 65–100)
GLUCOSE SERPL-MCNC: 91 MG/DL (ref 65–100)
HCT VFR BLD AUTO: 31.9 % (ref 36.6–50.3)
HGB BLD-MCNC: 9.4 G/DL (ref 12.1–17)
LVFS 2D: 44.79 %
MCH RBC QN AUTO: 24.5 PG (ref 26–34)
MCHC RBC AUTO-ENTMCNC: 29.5 G/DL (ref 30–36.5)
MCV RBC AUTO: 83.1 FL (ref 80–99)
MV DEC SLOPE: 3.33
NRBC # BLD: 0 K/UL (ref 0–0.01)
NRBC BLD-RTO: 0 PER 100 WBC
P-R INTERVAL, ECG05: 148 MS
PLATELET # BLD AUTO: 269 K/UL (ref 150–400)
PMV BLD AUTO: 10.4 FL (ref 8.9–12.9)
POTASSIUM SERPL-SCNC: 3.7 MMOL/L (ref 3.5–5.1)
Q-T INTERVAL, ECG07: 438 MS
QRS DURATION, ECG06: 86 MS
QTC CALCULATION (BEZET), ECG08: 399 MS
RBC # BLD AUTO: 3.84 M/UL (ref 4.1–5.7)
SERVICE CMNT-IMP: ABNORMAL
SERVICE CMNT-IMP: NORMAL
SERVICE CMNT-IMP: NORMAL
SODIUM SERPL-SCNC: 143 MMOL/L (ref 136–145)
VENTRICULAR RATE, ECG03: 50 BPM
WBC # BLD AUTO: 8.2 K/UL (ref 4.1–11.1)

## 2020-01-24 PROCEDURE — 36415 COLL VENOUS BLD VENIPUNCTURE: CPT

## 2020-01-24 PROCEDURE — 99218 HC RM OBSERVATION: CPT

## 2020-01-24 PROCEDURE — 93306 TTE W/DOPPLER COMPLETE: CPT

## 2020-01-24 PROCEDURE — 85027 COMPLETE CBC AUTOMATED: CPT

## 2020-01-24 PROCEDURE — 82962 GLUCOSE BLOOD TEST: CPT

## 2020-01-24 PROCEDURE — 93005 ELECTROCARDIOGRAM TRACING: CPT

## 2020-01-24 PROCEDURE — 74011250637 HC RX REV CODE- 250/637: Performed by: STUDENT IN AN ORGANIZED HEALTH CARE EDUCATION/TRAINING PROGRAM

## 2020-01-24 PROCEDURE — 80048 BASIC METABOLIC PNL TOTAL CA: CPT

## 2020-01-24 RX ORDER — DEXTROSE MONOHYDRATE 100 MG/ML
0-250 INJECTION, SOLUTION INTRAVENOUS AS NEEDED
Status: DISCONTINUED | OUTPATIENT
Start: 2020-01-24 | End: 2020-01-24 | Stop reason: HOSPADM

## 2020-01-24 RX ORDER — INSULIN LISPRO 100 [IU]/ML
INJECTION, SOLUTION INTRAVENOUS; SUBCUTANEOUS
Status: DISCONTINUED | OUTPATIENT
Start: 2020-01-24 | End: 2020-01-24 | Stop reason: HOSPADM

## 2020-01-24 RX ORDER — MAGNESIUM SULFATE 100 %
4 CRYSTALS MISCELLANEOUS AS NEEDED
Status: DISCONTINUED | OUTPATIENT
Start: 2020-01-24 | End: 2020-01-24 | Stop reason: HOSPADM

## 2020-01-24 RX ADMIN — ACETAMINOPHEN 650 MG: 325 TABLET ORAL at 07:10

## 2020-01-24 RX ADMIN — Medication 10 ML: at 07:08

## 2020-01-24 RX ADMIN — TAMSULOSIN HYDROCHLORIDE 0.4 MG: 0.4 CAPSULE ORAL at 08:42

## 2020-01-24 RX ADMIN — AMLODIPINE BESYLATE 5 MG: 5 TABLET ORAL at 08:42

## 2020-01-24 RX ADMIN — CLOPIDOGREL BISULFATE 75 MG: 75 TABLET ORAL at 08:42

## 2020-01-24 RX ADMIN — ATENOLOL 50 MG: 50 TABLET ORAL at 08:40

## 2020-01-24 RX ADMIN — ASPIRIN 81 MG: 81 TABLET, COATED ORAL at 08:40

## 2020-01-24 NOTE — CARDIO/PULMONARY
Cardiac Rehab: Attempted to meet with Evelyne Reaves. He is off the floor and his wife is in the room. She has a good understanding of the cardiac cath results and had numerous questions regarding the exact locations of the lesions. Spent a significant amount of time discussing this and even reviewed some diagrams. Discussed the cardiac rehab program and whether or not Evelyne Reaves would be able and interested. He has had back surgery and has recently had significant SOB which has limited his activity level. Encouraged them to discuss with Dr. Jany Henry and to call should Cocarrington Waremayela decide to enroll. [facility] contact number is on the AVS. Wife verbalized understanding and is without further questions at this time.  Roslyn Méndez RN

## 2020-01-24 NOTE — PROGRESS NOTES
Collette Painter DO  [facility] [address], [address] [facility], [address] [place] [address] N[place]                                       Office [phone],MNT [phone]    2020     Admit Date: 2020    Admit Diagnosis: Chest pain, unspecified type [R07.9];S/P cardiac cath [Z98.890]    NAME: Magdiel Pena   :  1940     MRN: [id number]     Assessment/Plan:      CAD s/p prior RCA pci. Attempted Lcx  pci yesterday, wire perforation controlled with balloon tamponade      T2DM      Mild chest discomfort this am, described as soreness. - echocardiogram to evaluate for effusion, repeat ecg  - cont home medical regimen    D/c planning pending echocardiogram results               Please do not hesitate to contact us with questions or concerns. See note below for details. Barbara Mckeon DO    History of Present Illness:  Magdiel Pean reports he has a stable pattern of moderate RG with moderate activity with accompanying chest tightness and fatigue. Admitted to Lcx  intervention. Interval Hx:  Mild chest soreness this am.  Mildly bradycardiac overnight.     ROS:  Constitutional: neg  Cardiovascular: chest pain  Respiratory: neg      Medications:  Current Facility-Administered Medications   Medication Dose Route Frequency    amLODIPine (NORVASC) tablet 5 mg  5 mg Oral DAILY    aspirin delayed-release tablet 81 mg  81 mg Oral DAILY    atenoloL (TENORMIN) tablet 50 mg  50 mg Oral DAILY    atorvastatin (LIPITOR) tablet 40 mg  40 mg Oral DAILY    clopidogreL (PLAVIX) tablet 75 mg  75 mg Oral DAILY    losartan (COZAAR) tablet 50 mg  50 mg Oral QHS    tamsulosin (FLOMAX) capsule 0.4 mg  0.4 mg Oral DAILY    sodium chloride (NS) flush 5-40 mL  5-40 mL IntraVENous Q8H    sodium chloride (NS) flush 5-40 mL  5-40 mL IntraVENous PRN    acetaminophen (TYLENOL) tablet 650 mg  650 mg Oral Q4H PRN    HYDROcodone-acetaminophen (NORCO) 5-325 mg per tablet 1 Tab 1 Tab Oral Q4H PRN       Physical Exam:  Visit Vitals  /60 (BP 1 Location: Left arm)   Pulse (!) 56   Temp 98.7 °F (37.1 °C)   Resp 18   Ht 5' 8\" (1.727 m)   Wt 164 lb 10.9 oz (74.7 kg)   SpO2 93%   BMI 25.04 kg/m²    O2 Flow Rate (L/min): 2 l/min O2 Device: Room air        Gen: Well-developed, well-nourished, no acute distress  Resp: No accessory muscle use, Clear breath sounds, No rales or rhonchi  Card: Normal Rate,Regular Rythm, Normal S1, S2, No murmurs, rubs or gallop. No edema. PT/DP pulses 2+. Psych:  Good insight, oriented to person, place          Recent Labs     01/24/20  0322      K 3.7   *   BUN 9   CREA 0.82   GLU 91   CA 8.0*     Recent Labs     01/24/20  0322   WBC 8.2   HGB 9.4*   HCT 31.9*        No results for input(s): CHOL, LDLC in the last 72 hours.     No lab exists for component: TGL, HDLC,  HBA1C

## 2020-01-24 NOTE — DISCHARGE SUMMARY
Cardiology Discharge Summary     Patient ID:  Tricia Dean  [id number]  81 y.o.  1940    Admit Date: 1/23/2020    Discharge Date: 01/24/20    Admitting Physician: Teto Mortensen DO     Discharge Physician: same    Admission Diagnoses:   Chest pain, unspecified type [R07.9]  S/P cardiac cath [Z98.890]    Discharge Diagnoses: Active Problems:    S/P cardiac cath (1/23/2020)        Discharge Condition: Good    Cardiology Procedures this Admission:  Attempted Lcx  pci yesterday, wire perforation controlled with balloon tamponade    Consults: None    Hospital Course:   Admitted to observation, on telemetry to evaluate for pericardial effusion following procedures  01/23/20   ECHO ADULT COMPLETE 01/24/2020 1/24/2020    Narrative · Normal cavity size, systolic function (ejection fraction normal) and   diastolic function. Upper normal wall thickness. Estimated left   ventricular ejection fraction is 55 - 60%. No regional wall motion   abnormality noted. Normal left ventricular strain. · Mild tricuspid valve regurgitation is present. · Mild to moderate pulmonary hypertension. · Mild mitral valve regurgitation is present. Signed by: Wilberto Zaldivar MD   Mild chest soreness noted on 1/24, but fully relieved with Tylenol  No chest pain with ambulation. Appropriate for discharge        Visit Vitals  /59   Pulse (!) 45   Temp 98.6 °F (37 °C)   Resp 20   Ht 5' 8\" (1.727 m)   Wt 164 lb (74.4 kg)   SpO2 100%   BMI 24.94 kg/m²       Physical Exam  Abdomen: soft, non-tender.  Bowel sounds normal.   Extremities: no LE edema, + PP bilaterally   Heart: regular rate and rhythm, S1, S2 normal, no murmurs, clicks, rubs or gallops  Lungs: clear to auscultation bilaterally  Neck: supple, symmetrical, trachea midline  Neurologic: Grossly normal  Pulses: 2+ and symmetrical    Labs:   Recent Labs     01/24/20  0322   WBC 8.2   HGB 9.4*   HCT 31.9*        Recent Labs     01/24/20  0322 01/23/20  1337    143   K 3.7 3.6   * 114*   CO2 24 25   GLU 91 91   BUN 9 9   CREA 0.82 0.72   CA 8.0* 8.6       No results for input(s): TROIQ, CPK, CKMB in the last 72 hours. Disposition: home    Patient Instructions:   Current Discharge Medication List      CONTINUE these medications which have NOT CHANGED    Details   clopidogrel (PLAVIX) 75 mg tab Take 1 Tab by mouth daily. Qty: 90 Tab, Refills: 3      aspirin delayed-release 81 mg tablet Take  by mouth daily. ascorbic acid, vitamin C, (VITAMIN C) 500 mg tablet Take  by mouth.      multivitamin (ONE A DAY) tablet Take 1 Tab by mouth daily. amLODIPine (NORVASC) 5 mg tablet Take 1 Tab by mouth daily. Qty: 90 Tab, Refills: 2      atorvastatin (LIPITOR) 40 mg tablet TAKE 1 TABLET DAILY (START LIPITOR AFTER VYTORIN IS FINISHED)  Qty: 90 Tab, Refills: 4      omeprazole (PRILOSEC) 40 mg capsule TAKE 1 CAPSULE DAILY  Qty: 90 Cap, Refills: 4      atenolol (TENORMIN) 50 mg tablet TAKE 1 TABLET DAILY  Qty: 90 Tab, Refills: 4    Associated Diagnoses: Coronary artery disease involving native coronary artery of native heart without angina pectoris; Hypertension, benign      tamsulosin (FLOMAX) 0.4 mg capsule Take 1 Cap by mouth daily. Qty: 30 Cap, Refills: 0      !! folic acid (FOLVITE) 1 mg tablet Take 1 mg by mouth daily. irbesartan (AVAPRO) 150 mg tablet Take 1 Tab by mouth nightly. Qty: 90 Tab, Refills: 3      oxyCODONE-acetaminophen (PERCOCET) 5-325 mg per tablet Take 1 Tab by mouth daily as needed for Pain for up to 30 days.  Indications: pain  Qty: 30 Tab, Refills: 0    Associated Diagnoses: Osteoarthritis, unspecified osteoarthritis type, unspecified site; Lumbar disc disease      !! folic acid (FOLVITE) 1 mg tablet TAKE 1 TABLET DAILY, EXCEPT DAY THAT METHOTREXATE IS TAKEN  Qty: 90 Tab, Refills: 4      metFORMIN (GLUCOPHAGE) 1,000 mg tablet TAKE 1 TABLET TWICE A DAY WITH MEALS  Qty: 180 Tab, Refills: 4    Associated Diagnoses: Diabetes mellitus type 2 in nonobese (HCC)      sildenafil citrate (VIAGRA) 100 mg tablet TAKE 1 TABLET AS NEEDED  Qty: 18 Tab, Refills: 4       !! - Potential duplicate medications found. Please discuss with provider. Reference discharge instructions provided by nursing for diet and activity.     Follow-up with   Future Appointments   Date Time Provider Rafa Santoyo   3/20/2020  7:45 AM DO NAKUL Jaquez SCHED       Signed:  Kristy Molina PA-C negative

## 2020-01-24 NOTE — PROGRESS NOTES
1930 Bedside and Verbal shift change report given to Joselito Pascual (oncoming nurse) by Angel Cruz RN (offgoing nurse). Report included the following information SBAR, Kardex, Procedure Summary, MAR, Med Rec Status and Cardiac Rhythm Sinus Indy Situ. 0730 Bedside and Verbal shift change report given to David Ring (oncoming nurse) by Danish Kulkarni RN (offgoing nurse). Report included the following information SBAR, Kardex, Procedure Summary, Intake/Output, MAR, Cardiac Rhythm Sinus Indy Situ and Alarm Parameters .

## 2020-01-27 ENCOUNTER — PATIENT OUTREACH (OUTPATIENT)
Dept: CARDIOLOGY CLINIC | Age: 80
End: 2020-01-27

## 2020-01-27 NOTE — PROGRESS NOTES
Hospital Discharge Follow-Up      Date/Time:  2020 10:42 AM  Gagan Damian RN, Care Transition Nurse  [address] [facility] Ambulatory Care Coordination Team  Direct phone:  [phone]    Patient was admitted to [facility] on 20 and discharged on 20 for elective  OM2. The physician discharge summary was available at the time of outreach. Patient was contacted within one business days of discharge. Top Challenges reviewed with the provider   Cardiology - relayed continued recovery and waiting for phone call to determine next steps: discharging provider relayed that he may order a new medication for SOB/RG and may schedule procedure with Dr. Naveed Yadav for  with him. Advance Care Planning:   Does patient have an Advance Directive:  not on file        Method of communication with provider :staff message    Was this a readmission? no     Care Transition Nurse (CTN) contacted the patient by telephone to perform post hospital discharge assessment. Verified name and  with patient as identifiers. Provided introduction to self, and explanation of the CTN role. Patient received hospital discharge instructions. CTN reviewed discharge instructions and red flags with patient who verbalized understanding. Patient given an opportunity to ask questions and does not have any further questions or concerns at this time. The patient agrees to contact the PCP office for questions related to their healthcare. CTN provided contact information for future reference.     Disease Specific:   N/A    Patients top risk factors for readmission:  functional physical ability, medical condition    Orders at discharge: orders to start [address] ordered at discharge: none    Medication(s):   New Medications at Discharge: none  Changed Medications at Discharge: none  Discontinued Medications at Discharge: none    Medication reconciliation was performed with patient, who verbalizes understanding of administration of home medications. There were no barriers to obtaining medications identified at this time. Referral to Pharm D needed: no     Current Outpatient Medications   Medication Sig    clopidogrel (PLAVIX) 75 mg tab Take 1 Tab by mouth daily.  aspirin delayed-release 81 mg tablet Take  by mouth daily.  ascorbic acid, vitamin C, (VITAMIN C) 500 mg tablet Take  by mouth.  multivitamin (ONE A DAY) tablet Take 1 Tab by mouth daily.  amLODIPine (NORVASC) 5 mg tablet Take 1 Tab by mouth daily.  folic acid (FOLVITE) 1 mg tablet TAKE 1 TABLET DAILY, EXCEPT DAY THAT METHOTREXATE IS TAKEN    atorvastatin (LIPITOR) 40 mg tablet TAKE 1 TABLET DAILY (START LIPITOR AFTER VYTORIN IS FINISHED)    omeprazole (PRILOSEC) 40 mg capsule TAKE 1 CAPSULE DAILY    atenolol (TENORMIN) 50 mg tablet TAKE 1 TABLET DAILY    metFORMIN (GLUCOPHAGE) 1,000 mg tablet TAKE 1 TABLET TWICE A DAY WITH MEALS    sildenafil citrate (VIAGRA) 100 mg tablet TAKE 1 TABLET AS NEEDED    tamsulosin (FLOMAX) 0.4 mg capsule Take 1 Cap by mouth daily.  folic acid (FOLVITE) 1 mg tablet Take 1 mg by mouth daily.  irbesartan (AVAPRO) 150 mg tablet Take 1 Tab by mouth nightly.  oxyCODONE-acetaminophen (PERCOCET) 5-325 mg per tablet Take 1 Tab by mouth daily as needed for Pain for up to 30 days. Indications: pain     No current facility-administered medications for this visit. There are no discontinued medications. [facility] follow up appointment(s):   Future Appointments   Date Time Provider Rafa Santoyo   3/20/2020  7:45 AM Sabino Amador,  [facility] SCHED      Non-[facility] follow up appointment(s): NA  [facility]:  n/a       Goals        Post Hospitalization     Supportive resources in place to maintain patient in the community (ie.  Home Health, DME equipment, refer to, medication assistant plan, etc.)      1/27/20- spoke with Mr. Morelia Burdick- he is relaying that son and daughter both work full time- DIL also works. He is taking wife to have out pt Right RCR- she had left done already. Reminded him that he will need to be able to have help if he is scheduled for procedure- he will need  and may have to have some help with getting wife to therapies until she is able to return to driving. He said he would talk with family about this- may have some friends who are retired who may be able to help also. [employer]        Understands red flags post discharge. 1/27/20- spoke with Mr. Leonela Patel- he is doing well- right wrist bandage off and with no concerns. He has continued SOB-RG with minimal activity. No CP. He relays he was told that Dr. Ayla Santizo would be getting with him about next steps:  Adding medication and scheduling with Dr. Saray Braun for  procedure.    Relayed to Dr. Ayla Santizo and team.   [facility]

## 2020-02-03 ENCOUNTER — TELEPHONE (OUTPATIENT)
Dept: CARDIOLOGY CLINIC | Age: 80
End: 2020-02-03

## 2020-02-03 NOTE — TELEPHONE ENCOUNTER
Patients wife states that the patient is still having a lot of SOB and would like to know if the patient can be seen tomorrow. Please advise.      Phone: [phone]

## 2020-02-03 NOTE — TELEPHONE ENCOUNTER
Niko Victoria stated due to helping wife after shoulder surgery, has more SOB but can wait till 2/6.   Patient will call back if worsens

## 2020-02-06 ENCOUNTER — OFFICE VISIT (OUTPATIENT)
Dept: CARDIOLOGY CLINIC | Age: 80
End: 2020-02-06

## 2020-02-06 VITALS
SYSTOLIC BLOOD PRESSURE: 120 MMHG | WEIGHT: 166 LBS | RESPIRATION RATE: 20 BRPM | BODY MASS INDEX: 25.16 KG/M2 | HEIGHT: 68 IN | OXYGEN SATURATION: 98 % | HEART RATE: 50 BPM | DIASTOLIC BLOOD PRESSURE: 70 MMHG

## 2020-02-06 DIAGNOSIS — I10 HYPERTENSION, BENIGN: ICD-10-CM

## 2020-02-06 DIAGNOSIS — G47.33 OBSTRUCTIVE SLEEP APNEA: ICD-10-CM

## 2020-02-06 DIAGNOSIS — E11.9 DIABETES MELLITUS TYPE 2 IN NONOBESE (HCC): ICD-10-CM

## 2020-02-06 DIAGNOSIS — R06.09 DOE (DYSPNEA ON EXERTION): ICD-10-CM

## 2020-02-06 DIAGNOSIS — I25.119 CORONARY ARTERY DISEASE INVOLVING NATIVE CORONARY ARTERY OF NATIVE HEART WITH ANGINA PECTORIS (HCC): Primary | ICD-10-CM

## 2020-02-06 DIAGNOSIS — E78.5 DYSLIPIDEMIA: ICD-10-CM

## 2020-02-06 NOTE — LETTER
2/6/20 Patient: Trish Jack YOB: 1940 Date of Visit: 2/6/2020 Dannielle Cooks, DO 
[facility] [address] [place] [id number] VIA In Basket Dear Dannielle Cooks, DO, Thank you for referring Mr. Kathleen Card to [facility] for evaluation. My notes for this consultation are attached. If you have questions, please do not hesitate to call me. I look forward to following your patient along with you. Sincerely, Tolu Mancuso MD

## 2020-02-06 NOTE — PROGRESS NOTES
Deirdre Solomon MD [facility]  [address] Rafa [address],  [facility], [address] [facility]    Office [phone]  Fax [phone]  Cell [phone]          Tricia Dean is a 70 y.o. male last seen by me 6 weeks ago. F/u post attempted PCI 1/23/20 by Dr. Blank Miranda. .    Assessment  Encounter Diagnoses   Name Primary?  Coronary artery disease involving native coronary artery of native heart with angina pectoris ([facility] [address] Yes    Hypertension, benign     Obstructive sleep apnea     Dyslipidemia     RG (dyspnea on exertion)     Diabetes mellitus type 2 in nonobese [facility])      Recommendations:    CAD; with multivessel PCI dating back to 1996, most recently PCI 2011 to PDA with LIILAN. Updated cath 1/7/20 demonstrated progressive LCX disease with  OM2, patent RCA stents. Attempted PCI OM  was unsuccessful by Dr. Blank Miranda. He continues to have class 3 RG, but also is functionally limited by his recent back surgery. He is on optimal medical therapy. Will review his films with Dr. Avon Gitelman and if he is optimistic about repeated PCI attempt, will refer him. For now, I urged him to push exercise from a cardiac and back standpoint.  - Continue DAPT  - Continue Amlodipine 5mg/d, Atenolol 50mg/d. I am leary about adding oral nitrates given his low BP. HTN, controlled on combination therapy. ADELA - continue nightly CPAP    Dyslipidemia. Updated labs from Sept demonstrate LDL 79.  - Continue Atorva 40mg/d     Chronic low back pain s/p multiple back surgeries, most recently Sept 2019. F/u in 3 months. Follow-up and Dispositions    · Return in about 3 months (around 5/6/2020). Subjective:    He underwent elective cath by me on 1/7/20 demonstrating mid LCX disease, OM2 ostial . Subsequent PCI was attempted by Dr. Blank Miranda on 1/23/20 w/o success. Wire perforation was noted, with balloon tamponade.      Tricia Dean reports he has been feeling fatigued with a stable pattern of moderate RG with moderate activity. He says he gets RG when he first starts activity, but it improves as he continues. Patient denies any palpitations, syncope, orthopnea, edema or paroxysmal nocturnal dyspnea. He has orthostatic sxs. He had back surgery most recently in September. He still reports pain. He wears a brace. His functional capacity remains limited by this. Of note, he is retired - former     Cardiac risk factors   HTN yes  DM  yes  Smoking no    Cardiac testing  Cardiolite (2/04):scheduled for comparison from previous stress pre PCI - similar study with no new acute changes. Exercise Cardiolite (8/29/11):  Reversible mid-distal inferolateral defect.  No fixed defects.  EF 63%. Lexiscan Cardiolite 12/18/19 - Normal perfusion study. EF 59%. Echo 12/27/19 - Mild LVH, EF 60-65%, mild MR, PA 51 mmg Hg    Past Medical History:   Diagnosis Date    CAD (coronary artery disease)     Degenerative joint disease 1/28/2011    Diabetes mellitus ([facility] [address]     Essential hypertension     History of back surgery 09/2019    Hyperlipidemia     Kidney stones 1969    Obstructive sleep apnea 01/28/2011    Stopped using CPAP years ago        Current Outpatient Medications   Medication Sig Dispense Refill    clopidogrel (PLAVIX) 75 mg tab Take 1 Tab by mouth daily. 90 Tab 3    oxyCODONE-acetaminophen (PERCOCET) 5-325 mg per tablet Take 1 Tab by mouth daily as needed for Pain for up to 30 days. Indications: pain 30 Tab 0    aspirin delayed-release 81 mg tablet Take  by mouth daily.  ascorbic acid, vitamin C, (VITAMIN C) 500 mg tablet Take  by mouth.  multivitamin (ONE A DAY) tablet Take 1 Tab by mouth daily.  amLODIPine (NORVASC) 5 mg tablet Take 1 Tab by mouth daily.  90 Tab 2    folic acid (FOLVITE) 1 mg tablet TAKE 1 TABLET DAILY, EXCEPT DAY THAT METHOTREXATE IS TAKEN 90 Tab 4    atorvastatin (LIPITOR) 40 mg tablet TAKE 1 TABLET DAILY (START LIPITOR AFTER VYTORIN IS FINISHED) 90 Tab 4    omeprazole (PRILOSEC) 40 mg capsule TAKE 1 CAPSULE DAILY 90 Cap 4    atenolol (TENORMIN) 50 mg tablet TAKE 1 TABLET DAILY 90 Tab 4    metFORMIN (GLUCOPHAGE) 1,000 mg tablet TAKE 1 TABLET TWICE A DAY WITH MEALS 180 Tab 4    sildenafil citrate (VIAGRA) 100 mg tablet TAKE 1 TABLET AS NEEDED 18 Tab 4    tamsulosin (FLOMAX) 0.4 mg capsule Take 1 Cap by mouth daily. 30 Cap 0    folic acid (FOLVITE) 1 mg tablet Take 1 mg by mouth daily.  irbesartan (AVAPRO) 150 mg tablet Take 1 Tab by mouth nightly. 90 Tab 3     Allergies   Allergen Reactions    Ace Inhibitors Swelling      Review of Systems  Constitutional: Negative for fever, chills, malaise and diaphoresis. +fatigue  Respiratory: Negative for cough, hemoptysis, sputum production, and wheezing. +RG  Cardiovascular: Negative for chest pain, palpitations, orthopnea, claudication, leg swelling and PND. +orthostatic sxs  Gastrointestinal: Negative for heartburn, nausea, vomiting, blood in stool and melena. Genitourinary: Negative for dysuria and flank pain. Musculoskeletal: Negative for joint pain and back pain. Skin: Negative for rash. Neurological: Negative for focal weakness, seizures, loss of consciousness, weakness and headaches. Endo/Heme/Allergies: Does not bruise/bleed easily. Psychiatric/Behavioral: Negative for memory loss. The patient does not have insomnia. Physical Exam  Visit Vitals  /70   Pulse (!) 50   Resp 20   Ht 5' 8\" (1.727 m)   Wt 166 lb (75.3 kg)   SpO2 98%   BMI 25.24 kg/m²     Wt Readings from Last 3 Encounters:   02/06/20 166 lb (75.3 kg)   01/24/20 164 lb (74.4 kg)   01/07/20 167 lb 1.7 oz (75.8 kg)      General - well developed well nourished  Neck - JVP normal, thyroid nl  Cardiac - normal S1,S2,  rubs or gallops. No clicks.  No murmur  Vascular - carotids without bruits, radials, femorals and pedal pulses equal bilateral  Lungs - clear to auscultation bilaterals, no rales ,wheezing or rhonchi  Abd - soft nontender, no HSM, no abd bruits  Extremities - no edema  Skin - no rash  Neuro - nonfocal  Psych - normal mood and affect    Cardiographics  EKG 12/11/19 - SB, STT's; unchanged. Echo 12/27/19 - Mild LVH, EF 60-65%, mild MR, PA 51 mmg Hg        Written by Marilee Mathews, as dictated by Martha Champion M.D.      Martha Champion MD

## 2020-02-06 NOTE — PROGRESS NOTES
Visit Vitals  /70   Pulse (!) 50   Resp 20   Ht 5' 8\" (1.727 m)   Wt 166 lb (75.3 kg)   SpO2 98%   BMI 25.24 kg/m²     Follow up appt from post Cath ,

## 2020-02-14 ENCOUNTER — TELEPHONE (OUTPATIENT)
Dept: CARDIOLOGY CLINIC | Age: 80
End: 2020-02-14

## 2020-02-14 NOTE — TELEPHONE ENCOUNTER
Patients spouse, Francisco Farley, is calling to speak with someone to get an update on plans regarding patients blockage.      Phone: [phone]

## 2020-02-17 NOTE — TELEPHONE ENCOUNTER
I spoke with Dr Ozzy Parker. He has reviewed films and sees reasonable success for repeat intervention. He would like to see him in a few weeks to discuss. Can you arrange?  [facility]

## 2020-02-25 ENCOUNTER — OFFICE VISIT (OUTPATIENT)
Dept: CARDIOLOGY CLINIC | Age: 80
End: 2020-02-25

## 2020-02-25 ENCOUNTER — PATIENT OUTREACH (OUTPATIENT)
Dept: CARDIOLOGY CLINIC | Age: 80
End: 2020-02-25

## 2020-02-25 VITALS
RESPIRATION RATE: 18 BRPM | BODY MASS INDEX: 25.69 KG/M2 | OXYGEN SATURATION: 98 % | WEIGHT: 169.5 LBS | HEIGHT: 68 IN | SYSTOLIC BLOOD PRESSURE: 112 MMHG | DIASTOLIC BLOOD PRESSURE: 76 MMHG | HEART RATE: 50 BPM

## 2020-02-25 DIAGNOSIS — R06.09 DOE (DYSPNEA ON EXERTION): ICD-10-CM

## 2020-02-25 DIAGNOSIS — E11.9 DIABETES MELLITUS TYPE 2 IN NONOBESE (HCC): ICD-10-CM

## 2020-02-25 DIAGNOSIS — G47.33 OBSTRUCTIVE SLEEP APNEA: ICD-10-CM

## 2020-02-25 DIAGNOSIS — E78.5 DYSLIPIDEMIA: ICD-10-CM

## 2020-02-25 DIAGNOSIS — I25.119 CORONARY ARTERY DISEASE INVOLVING NATIVE CORONARY ARTERY OF NATIVE HEART WITH ANGINA PECTORIS (HCC): Primary | ICD-10-CM

## 2020-02-25 DIAGNOSIS — I10 HYPERTENSION, BENIGN: ICD-10-CM

## 2020-02-25 RX ORDER — OXYCODONE AND ACETAMINOPHEN 5; 325 MG/1; MG/1
TABLET ORAL
COMMUNITY
Start: 2020-02-21 | End: 2020-05-04 | Stop reason: SDUPTHER

## 2020-02-25 NOTE — LETTER
2/25/20 Patient: Trish Jack YOB: 1940 Date of Visit: 2/25/2020 Dannielle Cooks, DO 
[facility] [address] [place] [address] VIA In Basket Dear Dannielle Cooks, DO, Thank you for referring Mr. Kathleen Card to [address] [place] [address] for evaluation. My notes for this consultation are attached. If you have questions, please do not hesitate to call me. I look forward to following your patient along with you.  
 
 
Sincerely, 
 
Deann Abad MD

## 2020-02-25 NOTE — PROGRESS NOTES
[address] [place] [address] [address], [place], [address]     Rancho Joyner is a 68 y.o. male. Patient is new to me, referred by Dr. Naomi Bragg to review films for possible repeat PCI attempt. Subjective:     Rancho Joyner has a PMHx of CAD with multivessel PCI dating back to 1996, most recently PCI 2011 to PDA with LILIAN. Updated cath 1/7/20 demonstrated progressive LCX disease with  OM2, patent RCA stents. Attempted PCI OM  on 01/23/20 was unsuccessful by Dr. Charissa Neil. He reports chief complaint of RG with short distances, which started around September 2019. He had back surgery around that time, which has functionally limited him. He states he has no lung issues that he is aware of. He is a former smoker and quit around [facility]. Patient also has H/O type 2 DM, hyperlipidemia, HTN controlled on combination therapy, ADELA on CPAP, and chronic low back pain s/p multiple back surgeries, most recently Sept 2019. Updated labs from Sept demonstrate LDL 79. He is currently on Atorvastatin 40mg/d.      Patient denies any exertional chest pain, palpitations, syncope, orthopnea, edema or paroxysmal nocturnal dyspnea.     Cardiac risk factors   HTN yes  DM yes  Smoking former, quit 1989    Patient Active Problem List    Diagnosis Date Noted    S/P cardiac cath 01/23/2020     Priority: 1 - One    RG (dyspnea on exertion) 12/11/2019    Other chest pain 12/11/2019    Adjacent segment disease with spinal stenosis 09/16/2019    Primary osteoarthritis involving multiple joints 08/18/2017    ACP (advance care planning) 08/09/2017    Cervical disc disease 09/15/2016    Dupuytren's contracture of right hand 09/15/2016    Lumbar disc disease 09/01/2016    Diabetes mellitus type 2 in nonobese ([facility] [address] 07/28/2015    Obstructive sleep apnea 01/28/2011    Degenerative joint disease 01/28/2011    Coronary artery disease     Dyslipidemia     Hypertension, benign       Lobb, Susan Herrera,   Past Medical History:   Diagnosis Date    CAD (coronary artery disease)     Degenerative joint disease 2011    Diabetes mellitus ([facility] [address]     Essential hypertension     History of back surgery 2019    Hyperlipidemia     Kidney stones 1969    Obstructive sleep apnea 2011    Stopped using CPAP years ago      Past Surgical History:   Procedure Laterality Date    HX CARPAL TUNNEL RELEASE      HX CORONARY STENT PLACEMENT Left 1991    x 3    HX HIP REPLACEMENT Bilateral  &     HX HIP REPLACEMENT Right 2012    Revision    HX LITHOTRIPSY N/A     HX LUMBAR DISKECTOMY N/A     L 4-5    HX LUMBAR FUSION N/A 2018    HX LUMBAR LAMINECTOMY  1971    L 4-5     Allergies   Allergen Reactions    Ace Inhibitors Swelling      Family History   Problem Relation Age of Onset    Arthritis-osteo Mother     No Known Problems Father       Social History     Socioeconomic History    Marital status:      Spouse name: Not on file    Number of children: Not on file    Years of education: Not on file    Highest education level: Not on file   Occupational History    Not on file   Social Needs    Financial resource strain: Not on file    Food insecurity:     Worry: Not on file     Inability: Not on file    Transportation needs:     Medical: Not on file     Non-medical: Not on file   Tobacco Use    Smoking status: Former Smoker     Packs/day: 1.00     Years: 16.00     Pack years: 16.00     Types: Cigarettes     Last attempt to quit:      Years since quittin.1    Smokeless tobacco: Never Used   Substance and Sexual Activity    Alcohol use:  Yes     Alcohol/week: 0.0 standard drinks     Comment: rarely    Drug use: No    Sexual activity: Yes     Partners: Female   Lifestyle    Physical activity:     Days per week: Not on file     Minutes per session: Not on file    Stress: Not on file   Relationships    Social connections:     Talks on phone: Not on file     Gets together: Not on file     Attends [employer] service: Not on file     Active member of club or organization: Not on file     Attends meetings of clubs or organizations: Not on file     Relationship status: Not on file    Intimate partner violence:     Fear of current or ex partner: Not on file     Emotionally abused: Not on file     Physically abused: Not on file     Forced sexual activity: Not on file   Other Topics Concern    Not on file   Social History Narrative    Not on file      Current Outpatient Medications   Medication Sig    oxyCODONE-acetaminophen (PERCOCET) 5-325 mg per tablet TK 1 TO 2 TS PO D PRF PAIN FOR UP TO 30 DAYS    clopidogrel (PLAVIX) 75 mg tab Take 1 Tab by mouth daily.  aspirin delayed-release 81 mg tablet Take  by mouth daily.  ascorbic acid, vitamin C, (VITAMIN C) 500 mg tablet Take  by mouth.  multivitamin (ONE A DAY) tablet Take 1 Tab by mouth daily.  amLODIPine (NORVASC) 5 mg tablet Take 1 Tab by mouth daily.  folic acid (FOLVITE) 1 mg tablet TAKE 1 TABLET DAILY, EXCEPT DAY THAT METHOTREXATE IS TAKEN    atorvastatin (LIPITOR) 40 mg tablet TAKE 1 TABLET DAILY (START LIPITOR AFTER VYTORIN IS FINISHED)    omeprazole (PRILOSEC) 40 mg capsule TAKE 1 CAPSULE DAILY    atenolol (TENORMIN) 50 mg tablet TAKE 1 TABLET DAILY    metFORMIN (GLUCOPHAGE) 1,000 mg tablet TAKE 1 TABLET TWICE A DAY WITH MEALS    sildenafil citrate (VIAGRA) 100 mg tablet TAKE 1 TABLET AS NEEDED    tamsulosin (FLOMAX) 0.4 mg capsule Take 1 Cap by mouth daily.  irbesartan (AVAPRO) 150 mg tablet Take 1 Tab by mouth nightly. No current facility-administered medications for this visit. Review of Systems  Constitutional: Negative for fever, chills, malaise/fatigue and diaphoresis. Respiratory: Negative for cough, hemoptysis, sputum production, and wheezing. +RG  Cardiovascular: Negative for chest pain, palpitations, orthopnea, claudication, leg swelling and PND.   Gastrointestinal: Negative for heartburn, nausea, vomiting, blood in stool and melena. Genitourinary: Negative for dysuria and flank pain. Musculoskeletal: Negative for joint pain and back pain. Skin: Negative for rash. Neurological: Negative for focal weakness, seizures, loss of consciousness, weakness and headaches. Endo/Heme/Allergies: Does not bruise/bleed easily. Psychiatric/Behavioral: Negative for memory loss. The patient does not have insomnia. Physical Exam:    Visit Vitals  /76 (BP 1 Location: Left arm, BP Patient Position: Sitting)   Pulse (!) 50   Resp 18   Ht 5' 8\" (1.727 m)   Wt 169 lb 8 oz (76.9 kg)   SpO2 98%   BMI 25.77 kg/m²     Wt Readings from Last 3 Encounters:   02/25/20 169 lb 8 oz (76.9 kg)   02/06/20 166 lb (75.3 kg)   01/24/20 164 lb (74.4 kg)       Gen: NAD    Mental Status - Alert. General Appearance - Not in acute distress. Neck - no JVD    Chest and Lung Exam   Inspection: Accessory muscles - No use of accessory muscles in breathing. Auscultation:   Breath sounds: - Normal.     Cardiovascular   Inspection: Jugular vein - Bilateral - Inspection Normal.   Palpation/Percussion:   Apical Impulse: - Normal.   Auscultation: Rhythm - Regular. Heart Sounds - S1 WNL and S2 WNL. No S3 or S4. Murmurs & Other Heart Sounds: Auscultation of the heart reveals - No Murmurs. Peripheral Vascular   Upper Extremity: Inspection - Bilateral - No Cyanotic nailbeds or Digital clubbing. Lower Extremity:   Palpation: Edema - Bilateral - No edema. Abdomen: Soft, non-tender, bowel sounds are active. Neuro: A&O times 3, CN and motor grossly WNL    Cardiographics     Assessment:     Encounter Diagnoses   Name Primary?     Coronary artery disease involving native coronary artery of native heart with angina pectoris (HCC) Yes    RG (dyspnea on exertion)     Dyslipidemia     Diabetes mellitus type 2 in nonobese (HCC)     Hypertension, benign     Obstructive sleep apnea         Plan:     Patient with CAD with multivessel PCI dating back to 1996, most recently PCI 2011 to PDA with LILIAN. Updated cath 1/7/20 demonstrated progressive LCX disease with  OM2, patent RCA stents. Attempted PCI OM  on 01/23/20 was unsuccessful by Dr. Reese More. Blood pressure today is good. We discussed preceding with possible repeat PCI attempt. I advised the patient we should wait at least 8 weeks after most recent attempt to allow more time for healing. We discussed that our chances for success depend on how the structures heal. I will attempt antegrade with less aggressive wires to avoid repeat perforation. There is a possibility of epicardial retrograde attempt if the antegrade attempt is unsuccessful; that would be performed out of town by another practitioner. . Pt to have CBC, CMP, and INR checked about 2 weeks prior to procedure.     Written by Pamela Richardson, as dictated by Skye Campa MD.

## 2020-02-25 NOTE — PROGRESS NOTES
1. Have you been to the ER, urgent care clinic since your last visit? Hospitalized since your last visit? No.    2. Have you seen or consulted any other health care providers outside of the [address] [place] [address] since your last visit? Include any pap smears or colon screening.    No.        Chief Complaint   Patient presents with    New Patient     referred by French Bowie to discuss

## 2020-02-26 RX ORDER — IRBESARTAN 150 MG
TABLET ORAL
Qty: 90 TAB | Refills: 4 | Status: SHIPPED | OUTPATIENT
Start: 2020-02-26 | End: 2021-05-24

## 2020-02-26 NOTE — PROGRESS NOTES
Patient has graduated from the Transitions of Care Coordination  program on 2/23/2020. Patient/family has the ability to self-manage at this time Care management goals have been completed. Patient was not referred to the [place] team for further management. Goals Addressed                 This Visit's Progress       Post Hospitalization     COMPLETED: Supportive resources in place to maintain patient in the community (ie. Home Health, DME equipment, refer to, medication assistant plan, etc.)        1/27/20- spoke with Mr. Harika Canchola- he is relaying that son and daughter both work full time- DIL also works. He is taking wife to have out pt Right RCR- she had left done already. Reminded him that he will need to be able to have help if he is scheduled for procedure- he will need  and may have to have some help with getting wife to therapies until she is able to return to driving. He said he would talk with family about this- may have some friends who are retired who may be able to help also. [employer]        COMPLETED: Understands red flags post discharge. 1/27/20- spoke with Mr. Harika Canchola- he is doing well- right wrist bandage off and with no concerns. He has continued SOB-RG with minimal activity. No CP. He relays he was told that Dr. Erickson Stevenson would be getting with him about next steps:  Adding medication and scheduling with Dr. Cayla Borrero for  procedure. Relayed to Dr. Erickson Stevenson and team.   [facility]             Patient has Care Transition Nurse's contact information for any further questions, concerns, or needs.   Patients upcoming visits:    Future Appointments   Date Time Provider Rafa Santoyo   3/20/2020  7:45 AM Adrián Odonnell DO [facility] SCHED   5/12/2020  1:20 PM Stephen Baez MD [facility]

## 2020-03-07 LAB
ALBUMIN SERPL-MCNC: 4.2 G/DL (ref 3.7–4.7)
ALBUMIN/GLOB SERPL: 1.4 {RATIO} (ref 1.2–2.2)
ALP SERPL-CCNC: 98 IU/L (ref 39–117)
ALT SERPL-CCNC: 6 IU/L (ref 0–44)
AST SERPL-CCNC: 15 IU/L (ref 0–40)
BILIRUB SERPL-MCNC: 0.3 MG/DL (ref 0–1.2)
BUN SERPL-MCNC: 7 MG/DL (ref 8–27)
BUN/CREAT SERPL: 7 (ref 10–24)
CALCIUM SERPL-MCNC: 9.9 MG/DL (ref 8.6–10.2)
CHLORIDE SERPL-SCNC: 107 MMOL/L (ref 96–106)
CO2 SERPL-SCNC: 23 MMOL/L (ref 20–29)
CREAT SERPL-MCNC: 0.94 MG/DL (ref 0.76–1.27)
ERYTHROCYTE [DISTWIDTH] IN BLOOD BY AUTOMATED COUNT: 16.7 % (ref 11.6–15.4)
GLOBULIN SER CALC-MCNC: 3 G/DL (ref 1.5–4.5)
GLUCOSE SERPL-MCNC: 94 MG/DL (ref 65–99)
HCT VFR BLD AUTO: 38.4 % (ref 37.5–51)
HGB BLD-MCNC: 11.4 G/DL (ref 13–17.7)
INR PPP: 1.1 (ref 0.8–1.2)
MCH RBC QN AUTO: 23.7 PG (ref 26.6–33)
MCHC RBC AUTO-ENTMCNC: 29.7 G/DL (ref 31.5–35.7)
MCV RBC AUTO: 80 FL (ref 79–97)
PLATELET # BLD AUTO: 381 X10E3/UL (ref 150–450)
POTASSIUM SERPL-SCNC: 5 MMOL/L (ref 3.5–5.2)
PROT SERPL-MCNC: 7.2 G/DL (ref 6–8.5)
PROTHROMBIN TIME: 11.4 SEC (ref 9.1–12)
RBC # BLD AUTO: 4.81 X10E6/UL (ref 4.14–5.8)
SODIUM SERPL-SCNC: 144 MMOL/L (ref 134–144)
WBC # BLD AUTO: 5.8 X10E3/UL (ref 3.4–10.8)

## 2020-04-13 ENCOUNTER — TELEPHONE (OUTPATIENT)
Dept: CARDIOLOGY CLINIC | Age: 80
End: 2020-04-13

## 2020-04-13 NOTE — TELEPHONE ENCOUNTER
Spoke to patient using 2 identifiers. Patient called with c/o SOB x 2 wks. Happens on exertion only - with short distance walking - and gets dizzy for about a minute when standing up too fast, but dizziness goes away when he sits down. Has just a little tightness and pressure to chest, but no pain. Denies palpitations/flutters or swelling to extremity. Does not smoke. Reports no hx of asthma or COPD. Reports BP today at 158/96. Seen as a new patient on 2/25/20. Please advise.

## 2020-04-13 NOTE — TELEPHONE ENCOUNTER
It seems this has been an issue for him for some time? When he came to see Dr Wilber Downey in February he reported that as his biggest complaint? Is it now worse than his baseline? The standing dizziness could mean he is a bit dehydrated. Make sure and increase you water intake to see if this helps. If he can have someone help him check his BP when he stands up that might tell us if indeed his BP is dropping with standing. If he has not tried a higher dose of Amlodipine, 10mg every day we could try that to see if it helps his SOB any. He might need to hold his Avapro to avoid decreasing his BP too much. Additionally we can ask Dr Wilber Downey about whether he is able to do the  procedure then end of April/beginning of May with the [address] issue.

## 2020-04-13 NOTE — TELEPHONE ENCOUNTER
Spoke to patient using 2 identifiers. Per Brionna Arellano NP, patient was made aware of the following:    Hien Hutchinson NP 1 hour ago (3:42 PM)         It seems this has been an issue for him for some time? When he came to see Dr Flores Doan in February he reported that as his biggest complaint? Is it now worse than his baseline? The standing dizziness could mean he is a bit dehydrated. Make sure and increase you water intake to see if this helps. If he can have someone help him check his BP when he stands up that might tell us if indeed his BP is dropping with standing. If he has not tried a higher dose of Amlodipine, 10mg every day we could try that to see if it helps his SOB any. He might need to hold his Avapro to avoid decreasing his BP too much. Additionally we can ask Dr Flores Doan about whether he is able to do the  procedure then end of April/beginning of May with the [facility] [address] [facility] [address] issue. Patient stated that he drinks plenty of fluids throughout the day and since his visit with Dr. Flores Doan in February, his SOB has worsened. Patient agreed to take blood pressure while sitting and standing and increase amlodipine to 10 mg daily to see if it helps with SOB. Will follow up with patient tomorrow for blood pressure readings.

## 2020-04-13 NOTE — TELEPHONE ENCOUNTER
Pt called saying he would like to talk about him being short of breath, he said he has 100% heart block

## 2020-04-20 NOTE — TELEPHONE ENCOUNTER
Spoke to patient using 2 identifiers. Per patient, BP reading today as follows:  144/86 sitting and 117/72 standing. Previous readings from a few days ago:  130/76 and 132/86. Patient c/o Irma Andrade, but denied chest pain/pressure/tightness, palpitations/flutters. Does not smoke. Reports no hx of asthma or COPD. Still gets dizzy when he stands up too quickly. Drinks plenty of water throughout the day. Tolerating increased dose of amlodipine just fine.

## 2020-04-28 ENCOUNTER — TELEPHONE (OUTPATIENT)
Dept: CARDIOLOGY CLINIC | Age: 80
End: 2020-04-28

## 2020-04-28 DIAGNOSIS — E11.9 DIABETES MELLITUS TYPE 2 IN NONOBESE (HCC): ICD-10-CM

## 2020-04-28 DIAGNOSIS — E78.5 DYSLIPIDEMIA: ICD-10-CM

## 2020-04-28 DIAGNOSIS — I25.119 CORONARY ARTERY DISEASE INVOLVING NATIVE CORONARY ARTERY OF NATIVE HEART WITH ANGINA PECTORIS (HCC): Primary | ICD-10-CM

## 2020-04-28 NOTE — TELEPHONE ENCOUNTER
Pt called in and stated he is having a  lumbar injection on Wednesday the day before his hospital procedure and wants to know if that will interfere. Call back number is [phone].

## 2020-04-29 NOTE — TELEPHONE ENCOUNTER
Verified patient with 2 identifiers   Patient states he is scheduled for a lumbar injection on 5/6/20 and is scheduled for stent placement on 5/7/20 with Dr Wilber Downey. Patient questioning is the lumbar injection will interfere with the stenting procedure  He states he will postpone to injection if needed  Please advise.

## 2020-04-29 NOTE — TELEPHONE ENCOUNTER
He will not have enough time in between his injection and the cath. Should be 72 hours at least. Additionally he will not be able to have an injection for a minimum of 3 months, ideally 6 months if he requires a stent as we cant interrupt his blood thinner.

## 2020-04-30 ENCOUNTER — VIRTUAL VISIT (OUTPATIENT)
Dept: FAMILY MEDICINE CLINIC | Age: 80
End: 2020-04-30

## 2020-04-30 DIAGNOSIS — M51.9 LUMBAR DISC DISEASE: ICD-10-CM

## 2020-04-30 DIAGNOSIS — M19.90 OSTEOARTHRITIS, UNSPECIFIED OSTEOARTHRITIS TYPE, UNSPECIFIED SITE: ICD-10-CM

## 2020-04-30 RX ORDER — OXYCODONE AND ACETAMINOPHEN 5; 325 MG/1; MG/1
1 TABLET ORAL
Qty: 30 TAB | Refills: 0 | Status: SHIPPED | OUTPATIENT
Start: 2020-04-30 | End: 2020-05-04 | Stop reason: SDUPTHER

## 2020-04-30 RX ORDER — OXYCODONE AND ACETAMINOPHEN 5; 325 MG/1; MG/1
1 TABLET ORAL
Qty: 30 TAB | Refills: 0 | Status: SHIPPED | OUTPATIENT
Start: 2020-06-29 | End: 2020-08-07 | Stop reason: SDUPTHER

## 2020-04-30 RX ORDER — OXYCODONE AND ACETAMINOPHEN 5; 325 MG/1; MG/1
1-2 TABLET ORAL
Qty: 30 TAB | Refills: 0 | Status: SHIPPED | OUTPATIENT
Start: 2020-05-30 | End: 2020-05-04 | Stop reason: SDUPTHER

## 2020-04-30 NOTE — PATIENT INSTRUCTIONS
A Healthy Lifestyle: Care Instructions Your Care Instructions A healthy lifestyle can help you feel good, stay at a healthy weight, and have plenty of energy for both work and play. A healthy lifestyle is something you can share with your whole family. A healthy lifestyle also can lower your risk for serious health problems, such as high blood pressure, heart disease, and diabetes. You can follow a few steps listed below to improve your health and the health of your family. Follow-up care is a key part of your treatment and safety. Be sure to make and go to all appointments, and call your doctor if you are having problems. It's also a good idea to know your test results and keep a list of the medicines you take. How can you care for yourself at home? · Do not eat too much sugar, fat, or fast foods. You can still have dessert and treats now and then. The goal is moderation. · Start small to improve your eating habits. Pay attention to portion sizes, drink less juice and soda pop, and eat more fruits and vegetables. ? Eat a healthy amount of food. A 3-ounce serving of meat, for example, is about the size of a deck of cards. Fill the rest of your plate with vegetables and whole grains. ? Limit the amount of soda and sports drinks you have every day. Drink more water when you are thirsty. ? Eat at least 5 servings of fruits and vegetables every day. It may seem like a lot, but it is not hard to reach this goal. A serving or helping is 1 piece of fruit, 1 cup of vegetables, or 2 cups of leafy, raw vegetables. Have an apple or some carrot sticks as an afternoon snack instead of a candy bar. Try to have fruits and/or vegetables at every meal. 
· Make exercise part of your daily routine. You may want to start with simple activities, such as walking, bicycling, or slow swimming. Try to be active 30 to 60 minutes every day.  You do not need to do all 30 to 60 minutes all at once. For example, you can exercise 3 times a day for 10 or 20 minutes. Moderate exercise is safe for most people, but it is always a good idea to talk to your doctor before starting an exercise program. 
· Keep moving. Amin Absophia the lawn, work in the garden, or [employer]. Take the stairs instead of the elevator at work. · If you smoke, quit. People who smoke have an increased risk for heart attack, stroke, cancer, and other lung illnesses. Quitting is hard, but there are ways to boost your chance of quitting tobacco for good. ? Use nicotine gum, patches, or lozenges. ? Ask your doctor about stop-smoking programs and medicines. ? Keep trying. In addition to reducing your risk of diseases in the future, you will notice some benefits soon after you stop using tobacco. If you have shortness of breath or asthma symptoms, they will likely get better within a few weeks after you quit. · Limit how much alcohol you drink. Moderate amounts of alcohol (up to 2 drinks a day for men, 1 drink a day for women) are okay. But drinking too much can lead to liver problems, high blood pressure, and other health problems. Family health If you have a family, there are many things you can do together to improve your health. · Eat meals together as a family as often as possible. · Eat healthy foods. This includes fruits, vegetables, lean meats and dairy, and whole grains. · Include your family in your fitness plan. Most people think of activities such as jogging or tennis as the way to fitness, but there are many ways you and your family can be more active. Anything that makes you breathe hard and gets your heart pumping is exercise. Here are some tips: 
? Walk to do errands or to take your child to school or the bus. 
? Go for a family bike ride after dinner instead of watching TV. Where can you learn more? Go to http://jennifer-elida.info/ Enter N648 in the search box to learn more about \"A Healthy Lifestyle: Care Instructions. \" Current as of: May 28, 2019Content Version: 12.4 © 3196-3391 [employer]. Care instructions adapted under license by [employer] (which disclaims liability or warranty for this information). If you have questions about a medical condition or this instruction, always ask your healthcare professional. [place] [address] any warranty or liability for your use of this information.

## 2020-04-30 NOTE — PROGRESS NOTES
Ermelinda Herndon is a 68 y.o. male   Chief Complaint   Patient presents with    Medication Refill    pt here for refill of his percocet for his chronic back pain.  He recently had surgery and states he is having continued back pain, requiring 1 pain pill per day. Pt is also following up with DR Kaur his spine surgeon injection being delayed likely forn6 months or so given he will be on blood thinner after stent placed. .  No hx of abuse or misuse.  Med brings his pain to a 3/10 but states pain med takes his appetite away and he does not want to lose more weight.  No hx of abuse.  Medication allows him to stay active at home. Pt has decided that he does not want to take more than 1 a day and catrachito ldeal with any pain that comes otherwise. Pt has been short of breath and needs stent placed this was delayed due to covid and was supposed to have a spinal injection done next week, thos this is now being delayed for having a cardiac cath next Thursday. Opioid/Pain Management:    1. Has the patient signed a pain contract for chronic narcotic use? yes  2. Has the patient had a UDS or Serum screen as per guidelines as per [facility]?:   yes    3. Does patient meet necessary guidelines for Naloxone treatement per the [facility]?:    no  4. Has the Prescription Monitoring Program been reviewed? yes  5. Does patient have a long term condition that requires long term use of a Narcotic?  yes  6. Has patient been tolerant of therapy and responsible to routine follow up and specialist follow-up? Yes    he is a 68y.o. year old male who presents for evalution. Reviewed PmHx, RxHx, FmHx, SocHx, AllgHx and updated and dated in the chart. Review of Systems - negative except as listed above in the HPI    Objective: There were no vitals filed for this visit.     Current Outpatient Medications   Medication Sig    [START ON 6/29/2020] oxyCODONE-acetaminophen (PERCOCET) 5-325 mg per tablet Take 1 Tab by mouth daily as needed for Pain for up to 30 days. Indications: pain    [START ON 5/30/2020] oxyCODONE-acetaminophen (PERCOCET) 5-325 mg per tablet Take 1-2 Tabs by mouth daily as needed for Pain for up to 30 days. Indications: pain    oxyCODONE-acetaminophen (PERCOCET) 5-325 mg per tablet Take 1 Tab by mouth daily as needed for Pain for up to 30 days.  AVAPRO 150 mg tablet TAKE 1 TABLET NIGHTLY    oxyCODONE-acetaminophen (PERCOCET) 5-325 mg per tablet TK 1 TO 2 TS PO D PRF PAIN FOR UP TO 30 DAYS    clopidogrel (PLAVIX) 75 mg tab Take 1 Tab by mouth daily.  aspirin delayed-release 81 mg tablet Take  by mouth daily.  ascorbic acid, vitamin C, (VITAMIN C) 500 mg tablet Take  by mouth.  multivitamin (ONE A DAY) tablet Take 1 Tab by mouth daily.  amLODIPine (NORVASC) 5 mg tablet Take 1 Tab by mouth daily.  folic acid (FOLVITE) 1 mg tablet TAKE 1 TABLET DAILY, EXCEPT DAY THAT METHOTREXATE IS TAKEN    atorvastatin (LIPITOR) 40 mg tablet TAKE 1 TABLET DAILY (START LIPITOR AFTER VYTORIN IS FINISHED)    omeprazole (PRILOSEC) 40 mg capsule TAKE 1 CAPSULE DAILY    atenolol (TENORMIN) 50 mg tablet TAKE 1 TABLET DAILY    metFORMIN (GLUCOPHAGE) 1,000 mg tablet TAKE 1 TABLET TWICE A DAY WITH MEALS    sildenafil citrate (VIAGRA) 100 mg tablet TAKE 1 TABLET AS NEEDED    tamsulosin (FLOMAX) 0.4 mg capsule Take 1 Cap by mouth daily. No current facility-administered medications for this visit. Physical Examination: General appearance - alert, well appearing, and in no distress  Mental status - alert, oriented to person, place, and time  Eyes - pupils equal and reactive, extraocular eye movements intact      Assessment/ Plan:   Diagnoses and all orders for this visit:    1. Osteoarthritis, unspecified osteoarthritis type, unspecified site  -     oxyCODONE-acetaminophen (PERCOCET) 5-325 mg per tablet; Take 1 Tab by mouth daily as needed for Pain for up to 30 days.  Indications: pain  - oxyCODONE-acetaminophen (PERCOCET) 5-325 mg per tablet; Take 1-2 Tabs by mouth daily as needed for Pain for up to 30 days. Indications: pain  -     oxyCODONE-acetaminophen (PERCOCET) 5-325 mg per tablet; Take 1 Tab by mouth daily as needed for Pain for up to 30 days. 2. Lumbar disc disease  -     oxyCODONE-acetaminophen (PERCOCET) 5-325 mg per tablet; Take 1 Tab by mouth daily as needed for Pain for up to 30 days. Indications: pain  -     oxyCODONE-acetaminophen (PERCOCET) 5-325 mg per tablet; Take 1-2 Tabs by mouth daily as needed for Pain for up to 30 days. Indications: pain  -     oxyCODONE-acetaminophen (PERCOCET) 5-325 mg per tablet; Take 1 Tab by mouth daily as needed for Pain for up to 30 days. no change indicated  Follow-up and Dispositions    · Return in about 3 months (around 7/30/2020), or if symptoms worsen or fail to improve. I have discussed the diagnosis with the patient and the intended plan as seen in the above orders. The patient has received an after-visit summary and questions were answered concerning future plans. Pt conveyed understanding of plan. 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 A caregiver was present when appropriate. Due to this being a TeleHealth encounter (During [place] [address] public health emergency), evaluation of the following organ systems was limited: Vitals/Constitutional/EENT/Resp/CV/GI//MS/Neuro/Skin/Heme-Lymph-Imm. Pursuant to the emergency declaration under the [facility], [address] [place] [address] authority and the [employer] and [employer] Act, this Virtual Visit was conducted with patient's (and/or legal guardian's) consent, to reduce the risk of exposure to COVID-19 and provide necessary medical care. Services were provided through a video synchronous discussion virtually to substitute for in-person encounter. --Verner Bunkers, DO on 4/30/2020 at 1:36 PM    An electronic signature was used to authenticate this note.

## 2020-05-01 ENCOUNTER — HOSPITAL ENCOUNTER (OUTPATIENT)
Dept: LAB | Age: 80
Discharge: HOME | End: 2020-05-01
Payer: MEDICARE

## 2020-05-01 PROCEDURE — 36415 COLL VENOUS BLD VENIPUNCTURE: CPT

## 2020-05-01 PROCEDURE — 85025 COMPLETE CBC W/AUTO DIFF WBC: CPT

## 2020-05-01 PROCEDURE — 85610 PROTHROMBIN TIME: CPT

## 2020-05-01 PROCEDURE — 80053 COMPREHEN METABOLIC PANEL: CPT

## 2020-05-02 LAB
ALBUMIN SERPL-MCNC: 4.1 G/DL (ref 3.7–4.7)
ALBUMIN/GLOB SERPL: 1.4 {RATIO} (ref 1.2–2.2)
ALP SERPL-CCNC: 101 IU/L (ref 39–117)
ALT SERPL-CCNC: 9 IU/L (ref 0–44)
AST SERPL-CCNC: 13 IU/L (ref 0–40)
BASOPHILS # BLD AUTO: 0 X10E3/UL (ref 0–0.2)
BASOPHILS NFR BLD AUTO: 1 %
BILIRUB SERPL-MCNC: <0.2 MG/DL (ref 0–1.2)
BUN SERPL-MCNC: 13 MG/DL (ref 8–27)
BUN/CREAT SERPL: 13 (ref 10–24)
CALCIUM SERPL-MCNC: 9.6 MG/DL (ref 8.6–10.2)
CHLORIDE SERPL-SCNC: 106 MMOL/L (ref 96–106)
CO2 SERPL-SCNC: 21 MMOL/L (ref 20–29)
CREAT SERPL-MCNC: 0.97 MG/DL (ref 0.76–1.27)
EOSINOPHIL # BLD AUTO: 0.1 X10E3/UL (ref 0–0.4)
EOSINOPHIL NFR BLD AUTO: 1 %
ERYTHROCYTE [DISTWIDTH] IN BLOOD BY AUTOMATED COUNT: 17.6 % (ref 11.6–15.4)
GLOBULIN SER CALC-MCNC: 3 G/DL (ref 1.5–4.5)
GLUCOSE SERPL-MCNC: 101 MG/DL (ref 65–99)
HCT VFR BLD AUTO: 33.5 % (ref 37.5–51)
HGB BLD-MCNC: 10.7 G/DL (ref 13–17.7)
IMM GRANULOCYTES # BLD AUTO: 0 X10E3/UL (ref 0–0.1)
IMM GRANULOCYTES NFR BLD AUTO: 0 %
INR PPP: 1 (ref 0.8–1.2)
LYMPHOCYTES # BLD AUTO: 1.9 X10E3/UL (ref 0.7–3.1)
LYMPHOCYTES NFR BLD AUTO: 38 %
MCH RBC QN AUTO: 25 PG (ref 26.6–33)
MCHC RBC AUTO-ENTMCNC: 31.9 G/DL (ref 31.5–35.7)
MCV RBC AUTO: 78 FL (ref 79–97)
MONOCYTES # BLD AUTO: 0.4 X10E3/UL (ref 0.1–0.9)
MONOCYTES NFR BLD AUTO: 8 %
NEUTROPHILS # BLD AUTO: 2.6 X10E3/UL (ref 1.4–7)
NEUTROPHILS NFR BLD AUTO: 52 %
PLATELET # BLD AUTO: 366 X10E3/UL (ref 150–450)
POTASSIUM SERPL-SCNC: 4.8 MMOL/L (ref 3.5–5.2)
PROT SERPL-MCNC: 7.1 G/DL (ref 6–8.5)
PROTHROMBIN TIME: 11 SEC (ref 9.1–12)
RBC # BLD AUTO: 4.28 X10E6/UL (ref 4.14–5.8)
SODIUM SERPL-SCNC: 140 MMOL/L (ref 134–144)
WBC # BLD AUTO: 5 X10E3/UL (ref 3.4–10.8)

## 2020-05-04 ENCOUNTER — VIRTUAL VISIT (OUTPATIENT)
Dept: CARDIOLOGY CLINIC | Age: 80
End: 2020-05-04

## 2020-05-04 VITALS
BODY MASS INDEX: 25.09 KG/M2 | WEIGHT: 165 LBS | SYSTOLIC BLOOD PRESSURE: 135 MMHG | TEMPERATURE: 97.7 F | DIASTOLIC BLOOD PRESSURE: 86 MMHG

## 2020-05-04 DIAGNOSIS — I10 HYPERTENSION, BENIGN: ICD-10-CM

## 2020-05-04 DIAGNOSIS — R06.09 DOE (DYSPNEA ON EXERTION): ICD-10-CM

## 2020-05-04 DIAGNOSIS — E78.5 DYSLIPIDEMIA: ICD-10-CM

## 2020-05-04 DIAGNOSIS — I25.119 CORONARY ARTERY DISEASE INVOLVING NATIVE CORONARY ARTERY OF NATIVE HEART WITH ANGINA PECTORIS (HCC): Primary | ICD-10-CM

## 2020-05-04 NOTE — H&P (VIEW-ONLY)
Brooklynn Garcia MD 
       
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 male who was seen by synchronous (real-time) audio-video technology on 2020 Subjective: The patient is a 68y.o. year old male  who returns for a routine follow-up. Since the last visit, patient reports persistent if not worsening RG. This has been his anginal equivalent in the past.  No swelling or dyspnea at rest or orthopnea. Past Medical History:  
Diagnosis Date  CAD (coronary artery disease)  Degenerative joint disease 2011  Diabetes mellitus ([facility]  Essential hypertension  History of back surgery 2019  Hyperlipidemia  Kidney stones 1969  Obstructive sleep apnea 2011 Stopped using CPAP years ago ICD-10-CM ICD-9-CM 1. Coronary artery disease involving native coronary artery of native heart with angina pectoris ([facility] I25.119 414.01   
  413.9 2. RG (dyspnea on exertion) R06.09 786.09   
3. Hypertension, benign I10 401.1 4. Dyslipidemia E78.5 272.4 Social History Tobacco Use  Smoking status: Former Smoker Packs/day: 1.00 Years: 16.00 Pack years: 16.00 Types: Cigarettes Last attempt to quit:  Years since quittin.3  Smokeless tobacco: Never Used Substance Use Topics  Alcohol use: Yes Alcohol/week: 0.0 standard drinks Comment: rarely Family History Problem Relation Age of Onset 26 [facility] Arthritis-osteo Mother  No Known Problems Father Review of Systems Cardiovascular: Negative except as noted in HPI Objective:  
 
 
Vitals:  
 20 1001 BP: 135/86 Temp: 97.7 °F (36.5 °C) Weight: 165 lb (74.8 kg) Body mass index is 25.09 kg/m². General PE Mental Status - Alert. General Appearance - Not in acute distress. HEENT: 
PERRL, no JVD. Chest and Lung Exam  
 Inspection: Accessory muscles - No use of accessory muscles in breathing. Cardiovascular Inspection: Jugular vein - Bilateral - Inspection Normal. 
 
Peripheral Vascular Upper Extremity: Inspection - Bilateral - No Cyanotic nailbeds or Digital clubbing. Lower Extremity:  
Palpation: Edema - Bilateral - No edema. Neuro Alert and Oriented X 3 Data Review:  
 
 
LABS- @brieflabs@ Allergies reviewed Allergies Allergen Reactions  Ace Inhibitors Swelling Medications reviewed Current Outpatient Medications Medication Sig  [START ON 6/29/2020] oxyCODONE-acetaminophen (PERCOCET) 5-325 mg per tablet Take 1 Tab by mouth daily as needed for Pain for up to 30 days. Indications: pain  AVAPRO 150 mg tablet TAKE 1 TABLET NIGHTLY  clopidogrel (PLAVIX) 75 mg tab Take 1 Tab by mouth daily.  aspirin delayed-release 81 mg tablet Take  by mouth daily.  ascorbic acid, vitamin C, (VITAMIN C) 500 mg tablet Take 500 mg by mouth daily.  multivitamin (ONE A DAY) tablet Take 1 Tab by mouth daily.  amLODIPine (NORVASC) 5 mg tablet Take 1 Tab by mouth daily. (Patient taking differently: Take 5 mg by mouth two (2) times a day.)  folic acid (FOLVITE) 1 mg tablet TAKE 1 TABLET DAILY, EXCEPT DAY THAT METHOTREXATE IS TAKEN  
 atorvastatin (LIPITOR) 40 mg tablet TAKE 1 TABLET DAILY (START LIPITOR AFTER VYTORIN IS FINISHED)  omeprazole (PRILOSEC) 40 mg capsule TAKE 1 CAPSULE DAILY  atenolol (TENORMIN) 50 mg tablet TAKE 1 TABLET DAILY  metFORMIN (GLUCOPHAGE) 1,000 mg tablet TAKE 1 TABLET TWICE A DAY WITH MEALS  sildenafil citrate (VIAGRA) 100 mg tablet TAKE 1 TABLET AS NEEDED  tamsulosin (FLOMAX) 0.4 mg capsule Take 1 Cap by mouth daily. No current facility-administered medications for this visit. Assessment: ICD-10-CM ICD-9-CM 1.  Coronary artery disease involving native coronary artery of native heart with angina pectoris [facility] I25.119 414.01   
 413.9 2. RG (dyspnea on exertion) R06.09 786.09   
3. Hypertension, benign I10 401.1 4. Dyslipidemia E78.5 272.4 No orders of the defined types were placed in this encounter. Plan:  
 
Proceed with re-attempt Cx  this week. Darin Frances MD 
 
Pursuant to the emergency declaration under the [address] [place] [address], [facility] authority and the [employer] and [employer] Act, this Virtual Visit was conducted, with patient's consent, to reduce the patient's risk of exposure to COVID-19 and provide continuity of care for an established patient. Services were provided through a video synchronous discussion virtually to substitute for in-person clinic visit.

## 2020-05-04 NOTE — PROGRESS NOTES
Leland Brandt MD          NAME:  Irasema Whitney   :   1940   MRN:   [id number]   PCP:  Katya Saba     Irasema Whitney is a 68 y.o. male who was seen by synchronous (real-time) audio-video technology on 2020     Subjective: The patient is a 68y.o. year old male  who returns for a routine follow-up. Since the last visit, patient reports persistent if not worsening RG. This has been his anginal equivalent in the past.  No swelling or dyspnea at rest or orthopnea. Past Medical History:   Diagnosis Date    CAD (coronary artery disease)     Degenerative joint disease 2011    Diabetes mellitus ([facility] [address]     Essential hypertension     History of back surgery 2019    Hyperlipidemia     Kidney stones 1969    Obstructive sleep apnea 2011    Stopped using CPAP years ago        ICD-10-CM ICD-9-CM    1. Coronary artery disease involving native coronary artery of native heart with angina pectoris ([facility] [address] I25.119 414.01      413.9    2. RG (dyspnea on exertion) R06.09 786.09    3. Hypertension, benign I10 401.1    4. Dyslipidemia E78.5 272.4       Social History     Tobacco Use    Smoking status: Former Smoker     Packs/day: 1.00     Years: 16.00     Pack years: 16.00     Types: Cigarettes     Last attempt to quit:      Years since quittin.3    Smokeless tobacco: Never Used   Substance Use Topics    Alcohol use: Yes     Alcohol/week: 0.0 standard drinks     Comment: rarely      Family History   Problem Relation Age of Onset   Tanner Mirza Arthritis-osteo Mother     No Known Problems Father         Review of Systems  Cardiovascular: Negative except as noted in HPI      Objective:       Vitals:    20 1001   BP: 135/86   Temp: 97.7 °F (36.5 °C)   Weight: 165 lb (74.8 kg)    Body mass index is 25.09 kg/m². General PE    Mental Status - Alert. General Appearance - Not in acute distress. HEENT:  PERRL, no JVD.     Chest and Lung Exam   Inspection: Accessory muscles - No use of accessory muscles in breathing. Cardiovascular   Inspection: Jugular vein - Bilateral - Inspection Normal.    Peripheral Vascular   Upper Extremity: Inspection - Bilateral - No Cyanotic nailbeds or Digital clubbing. Lower Extremity:   Palpation: Edema - Bilateral - No edema. Neuro  Alert and Oriented X 3          Data Review:       LABS- @brieflabs@      Allergies reviewed  Allergies   Allergen Reactions    Ace Inhibitors Swelling       Medications reviewed  Current Outpatient Medications   Medication Sig    [START ON 6/29/2020] oxyCODONE-acetaminophen (PERCOCET) 5-325 mg per tablet Take 1 Tab by mouth daily as needed for Pain for up to 30 days. Indications: pain    AVAPRO 150 mg tablet TAKE 1 TABLET NIGHTLY    clopidogrel (PLAVIX) 75 mg tab Take 1 Tab by mouth daily.  aspirin delayed-release 81 mg tablet Take  by mouth daily.  ascorbic acid, vitamin C, (VITAMIN C) 500 mg tablet Take 500 mg by mouth daily.  multivitamin (ONE A DAY) tablet Take 1 Tab by mouth daily.  amLODIPine (NORVASC) 5 mg tablet Take 1 Tab by mouth daily. (Patient taking differently: Take 5 mg by mouth two (2) times a day.)    folic acid (FOLVITE) 1 mg tablet TAKE 1 TABLET DAILY, EXCEPT DAY THAT METHOTREXATE IS TAKEN    atorvastatin (LIPITOR) 40 mg tablet TAKE 1 TABLET DAILY (START LIPITOR AFTER VYTORIN IS FINISHED)    omeprazole (PRILOSEC) 40 mg capsule TAKE 1 CAPSULE DAILY    atenolol (TENORMIN) 50 mg tablet TAKE 1 TABLET DAILY    metFORMIN (GLUCOPHAGE) 1,000 mg tablet TAKE 1 TABLET TWICE A DAY WITH MEALS    sildenafil citrate (VIAGRA) 100 mg tablet TAKE 1 TABLET AS NEEDED    tamsulosin (FLOMAX) 0.4 mg capsule Take 1 Cap by mouth daily. No current facility-administered medications for this visit. Assessment:       ICD-10-CM ICD-9-CM    1. Coronary artery disease involving native coronary artery of native heart with angina pectoris [facility] I25.119 414.01      413.9    2.  RG (dyspnea on exertion) R06.09 786.09    3. Hypertension, benign I10 401.1    4. Dyslipidemia E78.5 272.4         No orders of the defined types were placed in this encounter. Plan:     Proceed with re-attempt Cx  this week. Tuan Cobos MD    Pursuant to the emergency declaration under the [address] [place] [address] [facility] authority and the [employer] Act, this Virtual Visit was conducted, with patient's consent, to reduce the patient's risk of exposure to COVID-19 and provide continuity of care for an established patient. Services were provided through a video synchronous discussion virtually to substitute for in-person clinic visit.

## 2020-05-07 ENCOUNTER — HOSPITAL ENCOUNTER (OUTPATIENT)
Age: 80
Discharge: HOME | End: 2020-05-08
Attending: INTERNAL MEDICINE | Admitting: INTERNAL MEDICINE
Payer: MEDICARE

## 2020-05-07 DIAGNOSIS — E11.9 DIABETES MELLITUS TYPE 2 IN NONOBESE (HCC): ICD-10-CM

## 2020-05-07 DIAGNOSIS — R07.9 CHEST PAIN, UNSPECIFIED TYPE: ICD-10-CM

## 2020-05-07 PROBLEM — I20.9 ANGINA, CLASS III (HCC): Status: ACTIVE | Noted: 2020-05-07

## 2020-05-07 LAB
ACT BLD: 0 SECS (ref 79–138)
ACT BLD: 213 SECS (ref 79–138)
GLUCOSE BLD STRIP.AUTO-MCNC: 117 MG/DL (ref 65–100)
SERVICE CMNT-IMP: ABNORMAL

## 2020-05-07 PROCEDURE — 93005 ELECTROCARDIOGRAM TRACING: CPT

## 2020-05-07 PROCEDURE — 74011636320 HC RX REV CODE- 636/320: Performed by: INTERNAL MEDICINE

## 2020-05-07 PROCEDURE — C1887 CATHETER, GUIDING: HCPCS | Performed by: INTERNAL MEDICINE

## 2020-05-07 PROCEDURE — 77030012468 HC VLV BLEEDBK CNTRL ABBT -B: Performed by: INTERNAL MEDICINE

## 2020-05-07 PROCEDURE — 92943 PRQ TRLUML REVSC CH OCC ANT: CPT | Performed by: INTERNAL MEDICINE

## 2020-05-07 PROCEDURE — 99153 MOD SED SAME PHYS/QHP EA: CPT | Performed by: INTERNAL MEDICINE

## 2020-05-07 PROCEDURE — 85347 COAGULATION TIME ACTIVATED: CPT

## 2020-05-07 PROCEDURE — C1725 CATH, TRANSLUMIN NON-LASER: HCPCS | Performed by: INTERNAL MEDICINE

## 2020-05-07 PROCEDURE — 74011250636 HC RX REV CODE- 250/636: Performed by: INTERNAL MEDICINE

## 2020-05-07 PROCEDURE — 74011250637 HC RX REV CODE- 250/637: Performed by: NURSE PRACTITIONER

## 2020-05-07 PROCEDURE — 74011000258 HC RX REV CODE- 258: Performed by: INTERNAL MEDICINE

## 2020-05-07 PROCEDURE — C1769 GUIDE WIRE: HCPCS | Performed by: INTERNAL MEDICINE

## 2020-05-07 PROCEDURE — 99152 MOD SED SAME PHYS/QHP 5/>YRS: CPT | Performed by: INTERNAL MEDICINE

## 2020-05-07 PROCEDURE — 77030028837 HC SYR ANGI PWR INJ COEU -A: Performed by: INTERNAL MEDICINE

## 2020-05-07 PROCEDURE — 82962 GLUCOSE BLOOD TEST: CPT

## 2020-05-07 PROCEDURE — 74011000250 HC RX REV CODE- 250: Performed by: INTERNAL MEDICINE

## 2020-05-07 PROCEDURE — 92928 PRQ TCAT PLMT NTRAC ST 1 LES: CPT | Performed by: INTERNAL MEDICINE

## 2020-05-07 PROCEDURE — 93454 CORONARY ARTERY ANGIO S&I: CPT | Performed by: INTERNAL MEDICINE

## 2020-05-07 PROCEDURE — 77030019697 HC SYR ANGI INFL MRTM -B: Performed by: INTERNAL MEDICINE

## 2020-05-07 RX ORDER — INSULIN LISPRO 100 [IU]/ML
INJECTION, SOLUTION INTRAVENOUS; SUBCUTANEOUS
Status: DISCONTINUED | OUTPATIENT
Start: 2020-05-07 | End: 2020-05-08 | Stop reason: HOSPADM

## 2020-05-07 RX ORDER — LIDOCAINE HYDROCHLORIDE 10 MG/ML
INJECTION, SOLUTION EPIDURAL; INFILTRATION; INTRACAUDAL; PERINEURAL AS NEEDED
Status: DISCONTINUED | OUTPATIENT
Start: 2020-05-07 | End: 2020-05-07 | Stop reason: HOSPADM

## 2020-05-07 RX ORDER — METFORMIN HYDROCHLORIDE 1000 MG/1
TABLET ORAL
Qty: 180 TAB | Refills: 4 | Status: SHIPPED
Start: 2020-05-07 | End: 2020-12-18

## 2020-05-07 RX ORDER — FENTANYL CITRATE 50 UG/ML
INJECTION, SOLUTION INTRAMUSCULAR; INTRAVENOUS AS NEEDED
Status: DISCONTINUED | OUTPATIENT
Start: 2020-05-07 | End: 2020-05-07 | Stop reason: HOSPADM

## 2020-05-07 RX ORDER — HEPARIN SODIUM 200 [USP'U]/100ML
INJECTION, SOLUTION INTRAVENOUS
Status: COMPLETED | OUTPATIENT
Start: 2020-05-07 | End: 2020-05-07

## 2020-05-07 RX ORDER — ACETAMINOPHEN 325 MG/1
650 TABLET ORAL
Status: DISCONTINUED | OUTPATIENT
Start: 2020-05-07 | End: 2020-05-08 | Stop reason: HOSPADM

## 2020-05-07 RX ORDER — CLOPIDOGREL BISULFATE 75 MG/1
75 TABLET ORAL DAILY
Status: DISCONTINUED | OUTPATIENT
Start: 2020-05-08 | End: 2020-05-08 | Stop reason: HOSPADM

## 2020-05-07 RX ORDER — ATORVASTATIN CALCIUM 40 MG/1
40 TABLET, FILM COATED ORAL DAILY
Status: DISCONTINUED | OUTPATIENT
Start: 2020-05-08 | End: 2020-05-08 | Stop reason: HOSPADM

## 2020-05-07 RX ORDER — NALOXONE HYDROCHLORIDE 0.4 MG/ML
0.4 INJECTION, SOLUTION INTRAMUSCULAR; INTRAVENOUS; SUBCUTANEOUS AS NEEDED
Status: DISCONTINUED | OUTPATIENT
Start: 2020-05-07 | End: 2020-05-08 | Stop reason: HOSPADM

## 2020-05-07 RX ORDER — MAGNESIUM SULFATE 100 %
4 CRYSTALS MISCELLANEOUS AS NEEDED
Status: DISCONTINUED | OUTPATIENT
Start: 2020-05-07 | End: 2020-05-08 | Stop reason: HOSPADM

## 2020-05-07 RX ORDER — AMLODIPINE BESYLATE 5 MG/1
5 TABLET ORAL 2 TIMES DAILY
Status: DISCONTINUED | OUTPATIENT
Start: 2020-05-07 | End: 2020-05-08 | Stop reason: HOSPADM

## 2020-05-07 RX ORDER — SODIUM CHLORIDE 0.9 % (FLUSH) 0.9 %
5-40 SYRINGE (ML) INJECTION EVERY 8 HOURS
Status: DISCONTINUED | OUTPATIENT
Start: 2020-05-07 | End: 2020-05-08 | Stop reason: HOSPADM

## 2020-05-07 RX ORDER — ATENOLOL 25 MG/1
50 TABLET ORAL DAILY
Status: DISCONTINUED | OUTPATIENT
Start: 2020-05-08 | End: 2020-05-08 | Stop reason: HOSPADM

## 2020-05-07 RX ORDER — FENTANYL CITRATE 50 UG/ML
25 INJECTION, SOLUTION INTRAMUSCULAR; INTRAVENOUS ONCE
Status: COMPLETED | OUTPATIENT
Start: 2020-05-07 | End: 2020-05-07

## 2020-05-07 RX ORDER — IRBESARTAN 150 MG/1
150 TABLET ORAL
Status: DISCONTINUED | OUTPATIENT
Start: 2020-05-07 | End: 2020-05-08 | Stop reason: HOSPADM

## 2020-05-07 RX ORDER — SODIUM CHLORIDE 0.9 % (FLUSH) 0.9 %
5-40 SYRINGE (ML) INJECTION AS NEEDED
Status: DISCONTINUED | OUTPATIENT
Start: 2020-05-07 | End: 2020-05-08 | Stop reason: HOSPADM

## 2020-05-07 RX ORDER — CLOPIDOGREL BISULFATE 75 MG/1
75 TABLET ORAL DAILY
Qty: 90 TAB | Refills: 3 | Status: SHIPPED | OUTPATIENT
Start: 2020-05-07 | End: 2020-12-22

## 2020-05-07 RX ORDER — HEPARIN SODIUM 1000 [USP'U]/ML
INJECTION, SOLUTION INTRAVENOUS; SUBCUTANEOUS AS NEEDED
Status: DISCONTINUED | OUTPATIENT
Start: 2020-05-07 | End: 2020-05-07 | Stop reason: HOSPADM

## 2020-05-07 RX ORDER — TAMSULOSIN HYDROCHLORIDE 0.4 MG/1
0.4 CAPSULE ORAL DAILY
Status: DISCONTINUED | OUTPATIENT
Start: 2020-05-08 | End: 2020-05-08 | Stop reason: HOSPADM

## 2020-05-07 RX ORDER — DEXTROSE 50 % IN WATER (D50W) INTRAVENOUS SYRINGE
12.5-25 AS NEEDED
Status: DISCONTINUED | OUTPATIENT
Start: 2020-05-07 | End: 2020-05-08 | Stop reason: HOSPADM

## 2020-05-07 RX ORDER — ASPIRIN 81 MG/1
81 TABLET ORAL DAILY
Status: DISCONTINUED | OUTPATIENT
Start: 2020-05-08 | End: 2020-05-08 | Stop reason: HOSPADM

## 2020-05-07 RX ORDER — MIDAZOLAM HYDROCHLORIDE 1 MG/ML
INJECTION, SOLUTION INTRAMUSCULAR; INTRAVENOUS AS NEEDED
Status: DISCONTINUED | OUTPATIENT
Start: 2020-05-07 | End: 2020-05-07 | Stop reason: HOSPADM

## 2020-05-07 RX ORDER — AMLODIPINE BESYLATE 5 MG/1
5 TABLET ORAL 2 TIMES DAILY
Qty: 60 TAB | Refills: 11 | Status: SHIPPED | OUTPATIENT
Start: 2020-05-07 | End: 2020-08-07

## 2020-05-07 RX ADMIN — AMLODIPINE BESYLATE 5 MG: 5 TABLET ORAL at 18:30

## 2020-05-07 RX ADMIN — FENTANYL CITRATE 25 MCG: 50 INJECTION INTRAMUSCULAR; INTRAVENOUS at 18:31

## 2020-05-07 RX ADMIN — IRBESARTAN 150 MG: 150 TABLET, FILM COATED ORAL at 23:12

## 2020-05-07 RX ADMIN — Medication 10 ML: at 22:00

## 2020-05-07 NOTE — Clinical Note
Lesion located in the Mid Cx. Balloon inserted. Balloon inflated using single inflation technique. Lesion #1: Pressure = 12 hayden; Duration = 15 sec.

## 2020-05-07 NOTE — Clinical Note
TRANSFER - OUT REPORT:     Verbal report given to: Erica Hubbard RN. Report consisted of patient's Situation, Background, Assessment and   Recommendations(SBAR). Opportunity for questions and clarification was provided. Patient transported with a Registered Nurse and [address] [place] [address] / [facility]. Patient transported to: 2164.

## 2020-05-07 NOTE — Clinical Note
Contrast: Isovue. Amount: 210 mL. Aortic aneurysm    Bunion    h/o multiple Pneumonia till 2007    H/O: Psoriasis    HTN (Hypertension)    Hyperlipidemia    Obese    Sleep apnea    Umbilical hernia

## 2020-05-07 NOTE — DISCHARGE INSTRUCTIONS
[address]  [phone]      [address] [facility] INSTRUCTIONS      Patient ID:  Irasema Whitney  [id number]  66 y.o.  1940    Admit Date: 5/7/2020    Discharge Date: 5/7/2020     Admitting Physician: Unique Enamorado MD     Discharge Physician: Renate Harper NP    Admission Diagnoses:   Chest pain, unspecified type [R07.9]  Angina, class III [facility]) [I20.9]    Discharge Diagnoses:   Principal Problem:    Angina, class III ([address] (5/7/2020)    Active Problems:    S/P cardiac cath (1/23/2020)      Overview: 5/7/2020 unsuccessful stent to  of LCX, successful PTCA      Hypertension, benign ()        Discharge Condition: Good    Cardiology Procedures this Admission:  Left heart catheterization with PTCA     Disposition: home      Reference discharge instructions provided by nursing for diet and activity. Signed:  Renate Harper NP  5/7/2020  1:26 PM    CARDIAC CATHETERIZATION/PCI DISCHARGE INSTRUCTIONS    It is normal to feel tired the first couple days. Take it easy and follow the physicians instructions. CHECK THE CATHETER INSERTION SITE DAILY:    You may shower 24 hours after the procedure, remove the bandage during showering. Wash with soap and water and pat dry. Gentle cleaning of the site with soap and water is sufficient, cover with a dry clean dressing or bandage. Do not apply creams or powders to the area. Do not sit in a bathtub or pool of water for 7 days or until wound has completely healed. Temporary bruising and discomfort is normal and may last a few weeks. You may have a  formation of a small lump at the site which may last up to 6 weeks. CALL THE PHYSICIANS:    If the site becomes red, swollen or feels warm to the touch  If there is bleeding or drainage or if there is unusual pain at the groin or down the leg.   If there is any bleeding, lie down, apply pressure or have someone apply pressure with a clean cloth until the bleeding stops. If the bleeding continues, call 911 to be transported to the hospital.  DO NOT DRIVE YOURSELF, [address]. ACTIVITY:    For the first 24-48 hours or as instructed by the physician:  No lifting, pushing or pulling over 10 pounds and no straining the insertion site. Do not life grocery bags or the garbage can, do not run the vacuum  or  for 7 days. Start with short walks as in the hospital and gradually increase as tolerated each day. It is recommended to walk 30 minutes 5-7 days per week. Follow your physicians instructions on activity. Avoid walking outside in extremes of heat or cold. Walk inside when it is cold and windy or hot and humid. Things to keep in mind:  No driving for at least 24 hours, or as designated by your physician. Limit the number of times you go up and down the stairs  Take rests and pace yourself with activity. Be careful and do not strain with bowel movements. MEDICATIONS:    Take all medications as prescribed  Call your physician if you have any questions  Keep an updated list of your medications with you at all times and give a list to your physician and pharmacist        SIGNS AND SYMPTOMS:    Be cautious of symptoms of angina or recurrent symptoms such as chest discomfort, unusual shortness of breath or fatigue. These could be symptoms of restenosis, a new blockage or a heart attack. If your symptoms are relieved with rest it is still recommended that you notify your physician of recurrent chest pain or discomfort. FOR CHEST PAIN or symptoms of angina not relieved with rest:  If the discomfort is not relieved with rest, and you have been prescribed Nitroglycerin, take as directed (taken under the tongue, one at a time 5 minutes apart for a total of 3 doses). If the discomfort is not relieved after the 3rd nitroglycerin, call 911.   If you have not been prescribed Nitroglycerin  and your chest discomfort is not relieved with rest, call 911. AFTER CARE:    Follow up with your physician as instructed. Follow a heart healthy diet with proper portion control, daily stress management, daily exercise, blood pressure and cholesterol control , and smoking cessation.

## 2020-05-07 NOTE — PROGRESS NOTES
Mr. Farheen Lemon is s/p failed stent deployment to  of LCX .   Dr. Lupe Castaneda was able to PTCA the area  Right groin site D/I, no hematoma, +DP/PT    Continue on Plavix 75mg daily, ASA, BB and statin     F/u with Dr. Savannah Nielson at [place] [address]

## 2020-05-07 NOTE — ROUTINE PROCESS
0915: Confirmed with pt his wishes to be FULL code. 1050: Pt just arrived back to IVCU, site CDI, no complaints. 1450: Pt ambulated in hallway with RN, tolerated well, site CDI. 1640: Pt had groin site ooze and small hematoma. Resolved with 15 min handheld pressure. Will restart bedrest now.     1800: Paged on-call MD to ask for pain med besides tylenol, per pt request. Pt's back is aching from long bed rest.

## 2020-05-08 ENCOUNTER — TELEPHONE (OUTPATIENT)
Dept: FAMILY MEDICINE CLINIC | Age: 80
End: 2020-05-08

## 2020-05-08 VITALS
DIASTOLIC BLOOD PRESSURE: 64 MMHG | OXYGEN SATURATION: 98 % | RESPIRATION RATE: 18 BRPM | HEART RATE: 59 BPM | HEIGHT: 68 IN | WEIGHT: 165 LBS | BODY MASS INDEX: 25.01 KG/M2 | SYSTOLIC BLOOD PRESSURE: 125 MMHG | TEMPERATURE: 98.6 F

## 2020-05-08 LAB
ANION GAP SERPL CALC-SCNC: 4 MMOL/L (ref 5–15)
ATRIAL RATE: 45 BPM
BUN SERPL-MCNC: 10 MG/DL (ref 6–20)
BUN/CREAT SERPL: 13 (ref 12–20)
CALCIUM SERPL-MCNC: 8.5 MG/DL (ref 8.5–10.1)
CALCULATED P AXIS, ECG09: 69 DEGREES
CALCULATED R AXIS, ECG10: 71 DEGREES
CALCULATED T AXIS, ECG11: 64 DEGREES
CHLORIDE SERPL-SCNC: 111 MMOL/L (ref 97–108)
CO2 SERPL-SCNC: 26 MMOL/L (ref 21–32)
CREAT SERPL-MCNC: 0.8 MG/DL (ref 0.7–1.3)
DIAGNOSIS, 93000: NORMAL
GLUCOSE BLD STRIP.AUTO-MCNC: 95 MG/DL (ref 65–100)
GLUCOSE SERPL-MCNC: 83 MG/DL (ref 65–100)
P-R INTERVAL, ECG05: 156 MS
POTASSIUM SERPL-SCNC: 3.8 MMOL/L (ref 3.5–5.1)
Q-T INTERVAL, ECG07: 450 MS
QRS DURATION, ECG06: 86 MS
QTC CALCULATION (BEZET), ECG08: 389 MS
SERVICE CMNT-IMP: NORMAL
SODIUM SERPL-SCNC: 141 MMOL/L (ref 136–145)
VENTRICULAR RATE, ECG03: 45 BPM

## 2020-05-08 PROCEDURE — 82962 GLUCOSE BLOOD TEST: CPT

## 2020-05-08 PROCEDURE — 36415 COLL VENOUS BLD VENIPUNCTURE: CPT

## 2020-05-08 PROCEDURE — 80048 BASIC METABOLIC PNL TOTAL CA: CPT

## 2020-05-08 PROCEDURE — 74011250637 HC RX REV CODE- 250/637: Performed by: NURSE PRACTITIONER

## 2020-05-08 RX ORDER — AMOXICILLIN AND CLAVULANATE POTASSIUM 875; 125 MG/1; MG/1
1 TABLET, FILM COATED ORAL EVERY 12 HOURS
Qty: 20 TAB | Refills: 0 | Status: SHIPPED | OUTPATIENT
Start: 2020-05-08 | End: 2020-05-18

## 2020-05-08 RX ADMIN — ASPIRIN 81 MG: 81 TABLET ORAL at 08:41

## 2020-05-08 RX ADMIN — ATORVASTATIN CALCIUM 40 MG: 40 TABLET, FILM COATED ORAL at 08:42

## 2020-05-08 RX ADMIN — Medication 10 ML: at 03:04

## 2020-05-08 RX ADMIN — CLOPIDOGREL BISULFATE 75 MG: 75 TABLET ORAL at 08:42

## 2020-05-08 RX ADMIN — ATENOLOL 50 MG: 25 TABLET ORAL at 08:42

## 2020-05-08 RX ADMIN — AMLODIPINE BESYLATE 5 MG: 5 TABLET ORAL at 08:41

## 2020-05-08 RX ADMIN — TAMSULOSIN HYDROCHLORIDE 0.4 MG: 0.4 CAPSULE ORAL at 08:42

## 2020-05-08 NOTE — PROGRESS NOTES
Problem: Falls - Risk of  Goal: *Absence of Falls  Description: Document Jose Cease Fall Risk and appropriate interventions in the flowsheet.   Outcome: Progressing Towards Goal  Note: Fall Risk Interventions:            Medication Interventions: Patient to call before getting OOB, Teach patient to arise slowly

## 2020-05-08 NOTE — PROGRESS NOTES
Pt angry bc he ws told he would be discharged by Agustina Stone with NP about expediting DC orders. Pt has ambulated in bales. Gait steady. Right groin dressing dry and intact. DC instructions reviewed with pt. He verbalizes understanding. SL dc'd without difficulty. Called wife, who is waiting in parking lot to pull up. Pt wheeled to front door and discharged to home in care of wife.

## 2020-05-08 NOTE — TELEPHONE ENCOUNTER
Telephone call from wife, patient is having bad toothache. Wants to know if you'll order an antibiotic for him until he can see a dentist, thinks he may have a gum infection.     [phone] wife Lennie An Let wife know    Pharmacy on file is corrrect

## 2020-05-11 NOTE — TELEPHONE ENCOUNTER
Called wife Elly Castellanos to follow up on patient and antibiotic. She states she got the antibiotic from the dentist and had a visit today with the dentist. He needs a tooth pulled. He is on day 3 of antibiotics and doing better.

## 2020-05-11 NOTE — INTERVAL H&P NOTE
Update History & Physical    The Patient's History and Physical of May 4,   2020 was reviewed with the patient and I examined the patient. There was no change. The surgical site was confirmed by the patient and me. Plan:  The risk, benefits, expected outcome, and alternative to the recommended procedure have been discussed with the patient. Patient understands and wants to proceed with the procedure.     Electronically signed by Michael Chaney MD on 5/11/2020 at 9:47 AM

## 2020-05-12 ENCOUNTER — VIRTUAL VISIT (OUTPATIENT)
Dept: CARDIOLOGY CLINIC | Age: 80
End: 2020-05-12

## 2020-05-12 DIAGNOSIS — E78.5 DYSLIPIDEMIA: ICD-10-CM

## 2020-05-12 DIAGNOSIS — G47.33 OBSTRUCTIVE SLEEP APNEA: ICD-10-CM

## 2020-05-12 DIAGNOSIS — E11.9 DIABETES MELLITUS TYPE 2 IN NONOBESE (HCC): ICD-10-CM

## 2020-05-12 DIAGNOSIS — I10 HYPERTENSION, BENIGN: ICD-10-CM

## 2020-05-12 DIAGNOSIS — I25.118 CORONARY ARTERY DISEASE OF NATIVE ARTERY OF NATIVE HEART WITH STABLE ANGINA PECTORIS (HCC): Primary | ICD-10-CM

## 2020-05-12 DIAGNOSIS — M51.9 LUMBAR DISC DISEASE: ICD-10-CM

## 2020-05-12 RX ORDER — ISOSORBIDE MONONITRATE 30 MG/1
30 TABLET, EXTENDED RELEASE ORAL DAILY
Qty: 30 TAB | Refills: 3 | Status: SHIPPED | OUTPATIENT
Start: 2020-05-12 | End: 2020-06-19

## 2020-05-12 NOTE — PROGRESS NOTES
VIRTUAL VISIT DOCUMENTATION     Pursuant to the emergency declaration under the [facility] waiver authority and the [employer] and [employer] Act, this Virtual  Visit was conducted, with patient's consent, to reduce the patient's risk of exposure to COVID-19 and provide continuity of care for an established patient. Services were provided through a video synchronous discussion virtually to substitute for in-person clinic visit. CHIEF COMPLAINT      Lolis Iglesias is a 68 y.o. male who was seen by synchronous (real-time) audio-video technology on 5/12/2020. Patient is being seen today for CAD, angina. ASSESSMENT        ICD-10-CM ICD-9-CM    1. Coronary artery disease of native artery of native heart with stable angina pectoris ([facility] [address] I25.118 414.01      413.9    2. Diabetes mellitus type 2 in nonobese (HCC) E11.9 250.00    3. Dyslipidemia E78.5 272.4    4. Hypertension, benign I10 401.1    5. Obstructive sleep apnea G47.33 327.23    6. Lumbar disc disease M51.9 722.93         PLAN     CAD; with multivessel PCI dating back to 1996, most recently PCI 2011 to PDA with LILIAN. Updated cath 1/7/20 demonstrated progressive LCX disease with  OM2, patent RCA stents. Attempted PCI OM  was unsuccessful by Dr. Kamran Brandt and later by Dr Floers Doan last week. Vessel is relatively small and epicardial retrograde approach not recommended. He continues to have class 3 RG, but also is functionally limited by his chronic low back pain. - add Imdur 30 mg/d, stop viagra prn  - Continue DAPT  - Continue Amlodipine 5mg/d, Atenolol 50mg/d  - exercise training once his back is treated     HTN, controlled on combination therapy.     ADELA - continue nightly CPAP     Dyslipidemia. Updated labs from Sept demonstrate LDL 79.  - Continue Atorva 40mg/d      Chronic low back pain s/p multiple back surgeries, most recently Sept 2019.  Needs DMITRY near future - low cardiac risk. Safe to stop DAPT one week preprocedure if needed    RTC one month      We discussed the expected course, resolution and complications of the diagnosis(es) in detail. Medication risks, benefits, costs, interactions, and alternatives were discussed as indicated. I advised him to contact the office if his condition worsens, changes or fails to improve as anticipated. He expressed understanding with the diagnosis(es) and plan    HISTORY OF PRESENTING ILLNESS      Farhan Arce is a 68 y.o. male     Attempted PCI OM  unsuccessful by Dr Brady Rivera last week. Continues to have class 3 RG but no chest pain. Not exercising due to low back pain. DMITRY needs to be done by Dr Theresa Trevino team.   Also has impacted tooth that will need extraction by oral surgeon  Patient denies any exertional chest pain, , palpitations, syncope, orthopnea, edema or paroxysmal nocturnal dyspnea.     /74, HR 50s, oxygen sat 97% RA     ACTIVE PROBLEM LIST     Patient Active Problem List    Diagnosis Date Noted    S/P cardiac cath 01/23/2020     Priority: 1 - One    Angina, class III ([address] 05/07/2020    RG (dyspnea on exertion) 12/11/2019    Other chest pain 12/11/2019    Adjacent segment disease with spinal stenosis 09/16/2019    Primary osteoarthritis involving multiple joints 08/18/2017    ACP (advance care planning) 08/09/2017    Cervical disc disease 09/15/2016    Dupuytren's contracture of right hand 09/15/2016    Lumbar disc disease 09/01/2016    Diabetes mellitus type 2 in nonobese ([address] 07/28/2015    Obstructive sleep apnea 01/28/2011    Degenerative joint disease 01/28/2011    Coronary artery disease     Dyslipidemia     Hypertension, benign            PAST MEDICAL HISTORY     Past Medical History:   Diagnosis Date    CAD (coronary artery disease)     Degenerative joint disease 1/28/2011    Diabetes mellitus ([address]     Essential hypertension     History of back surgery 09/2019    Hyperlipidemia     Kidney stones 1969    Obstructive sleep apnea 2011    Stopped using CPAP years ago           PAST SURGICAL HISTORY     Past Surgical History:   Procedure Laterality Date    HX CARPAL TUNNEL RELEASE      HX CORONARY STENT PLACEMENT Left 1991    x 3    HX HIP REPLACEMENT Bilateral 2009 & 2015    HX HIP REPLACEMENT Right 2012    Revision    HX LITHOTRIPSY N/A 1969    HX LUMBAR DISKECTOMY N/A     L 4-5    HX LUMBAR FUSION N/A 2018    HX LUMBAR LAMINECTOMY  1971    L 4-5          ALLERGIES     Allergies   Allergen Reactions    Ace Inhibitors Swelling          FAMILY HISTORY     Family History   Problem Relation Age of Onset   [facility] Arthritis-osteo Mother     No Known Problems Father     negative for cardiac disease       SOCIAL HISTORY     Social History     Socioeconomic History    Marital status:      Spouse name: Not on file    Number of children: Not on file    Years of education: Not on file    Highest education level: Not on file   Tobacco Use    Smoking status: Former Smoker     Packs/day: 1.00     Years: 16.00     Pack years: 16.00     Types: Cigarettes     Last attempt to quit:      Years since quittin.3    Smokeless tobacco: Never Used   Substance and Sexual Activity    Alcohol use: Yes     Alcohol/week: 0.0 standard drinks     Comment: rarely    Drug use: No    Sexual activity: Yes     Partners: Female         MEDICATIONS     Current Outpatient Medications   Medication Sig    amoxicillin-clavulanate (AUGMENTIN) 875-125 mg per tablet Take 1 Tab by mouth every twelve (12) hours for 10 days.  metFORMIN (GLUCOPHAGE) 1,000 mg tablet Please restart Metformin on 2020    amLODIPine (NORVASC) 5 mg tablet Take 1 Tab by mouth two (2) times a day.  clopidogreL (Plavix) 75 mg tab Take 1 Tab by mouth daily.  [START ON 2020] oxyCODONE-acetaminophen (PERCOCET) 5-325 mg per tablet Take 1 Tab by mouth daily as needed for Pain for up to 30 days. Indications: pain    AVAPRO 150 mg tablet TAKE 1 TABLET NIGHTLY    aspirin delayed-release 81 mg tablet Take  by mouth daily.  ascorbic acid, vitamin C, (VITAMIN C) 500 mg tablet Take 500 mg by mouth daily.  multivitamin (ONE A DAY) tablet Take 1 Tab by mouth daily.  folic acid (FOLVITE) 1 mg tablet TAKE 1 TABLET DAILY, EXCEPT DAY THAT METHOTREXATE IS TAKEN    atorvastatin (LIPITOR) 40 mg tablet TAKE 1 TABLET DAILY (START LIPITOR AFTER VYTORIN IS FINISHED)    omeprazole (PRILOSEC) 40 mg capsule TAKE 1 CAPSULE DAILY    atenolol (TENORMIN) 50 mg tablet TAKE 1 TABLET DAILY    sildenafil citrate (VIAGRA) 100 mg tablet TAKE 1 TABLET AS NEEDED    tamsulosin (FLOMAX) 0.4 mg capsule Take 1 Cap by mouth daily. No current facility-administered medications for this visit. I have reviewed the nurses notes, vitals, problem list, allergy list, medical history, family, social history and medications. REVIEW OF SYMPTOMS     Constitutional: Negative for fever, chills, malaise/fatigue and diaphoresis. Respiratory: Negative for cough, hemoptysis, sputum production, and wheezing. Cardiovascular: Negative for chest pain, palpitations, orthopnea, claudication, leg swelling and PND. Gastrointestinal: Negative for heartburn, nausea, vomiting, blood in stool and melena. Genitourinary: Negative for dysuria and flank pain. Musculoskeletal: positive for joint pain and back pain. Skin: Negative for rash. Neurological: Negative for focal weakness, seizures, loss of consciousness, weakness and headaches. Endo/Heme/Allergies: Negative for abnormal bleeding. Psychiatric/Behavioral: Negative for memory loss. PHYSICAL EXAMINATION      Due to this being a TeleHealth evaluation, many elements of the physical examination are unable to be assessed. General: Well developed, in no acute distress, cooperative and alert  HEENT: Pupils equal/round. No marked JVD visible on video.   Respiratory: No audible wheezing, no signs of respiratory distress, lips non cyanotic  Extremities:  No edema  Neuro: A&Ox3, speech clear, no facial droop, answering questions appropriately  Skin: Skin color is normal. No rashes or lesions. Non diaphoretic on visible skin during exam       DIAGNOSTIC DATA      Cardiolite (2/04):scheduled for comparison from previous stress pre PCI - similar study with no new acute changes. Exercise Cardiolite (8/29/11):  Reversible mid-distal inferolateral defect.  No fixed defects.  EF 63%. Lexiscan Cardiolite 12/18/19 - Normal perfusion study. EF 59%. Echo 12/27/19 - Mild LVH, EF 60-65%, mild MR, PA 51 mmg Hg       LABORATORY DATA      Lab Results   Component Value Date/Time    WBC 5.0 05/01/2020 07:45 AM    HGB 10.7 (L) 05/01/2020 07:45 AM    HCT 33.5 (L) 05/01/2020 07:45 AM    PLATELET 304 05/72/3368 07:45 AM    MCV 78 (L) 05/01/2020 07:45 AM      Lab Results   Component Value Date/Time    Sodium 141 05/08/2020 02:59 AM    Potassium 3.8 05/08/2020 02:59 AM    Chloride 111 (H) 05/08/2020 02:59 AM    CO2 26 05/08/2020 02:59 AM    Anion gap 4 (L) 05/08/2020 02:59 AM    Glucose 83 05/08/2020 02:59 AM    BUN 10 05/08/2020 02:59 AM    Creatinine 0.80 05/08/2020 02:59 AM    BUN/Creatinine ratio 13 05/08/2020 02:59 AM    GFR est AA >60 05/08/2020 02:59 AM    GFR est non-AA >60 05/08/2020 02:59 AM    Calcium 8.5 05/08/2020 02:59 AM    Bilirubin, total <0.2 05/01/2020 07:45 AM    AST (SGOT) 13 05/01/2020 07:45 AM    Alk. phosphatase 101 05/01/2020 07:45 AM    Protein, total 7.1 05/01/2020 07:45 AM    Albumin 4.1 05/01/2020 07:45 AM    Globulin 4.5 (H) 09/13/2019 07:03 AM    A-G Ratio 1.4 05/01/2020 07:45 AM    ALT (SGPT) 9 05/01/2020 07:45 AM             FOLLOW-UP            Patient was made aware and verbalized understanding that an appointment will be scheduled for them for a virtual visit and/or office visit within the above time frame.  Patient understanding his/her responsibility to call and change time/date if he/she so chooses. Thank you, Bon Lynn DO for allowing me to participate in the care of Lolis Iglesias. Please do not hesitate to contact me for further questions/concerns. Greater than 20 minutes was spent in direct video patient care, planning and chart review. This visit was conducted using [employer] telemedicine services.        Savita Hutchinson MD    [place] [address]        [phone] / [phone] Fax       [facility]  [address] [place] [address], [address] [place] [address]  Woody Aguilar  [phone] / [phone] Fax

## 2020-06-19 ENCOUNTER — VIRTUAL VISIT (OUTPATIENT)
Dept: CARDIOLOGY CLINIC | Age: 80
End: 2020-06-19

## 2020-06-19 DIAGNOSIS — M51.9 LUMBAR DISC DISEASE: ICD-10-CM

## 2020-06-19 DIAGNOSIS — E78.5 DYSLIPIDEMIA: ICD-10-CM

## 2020-06-19 DIAGNOSIS — E11.9 DIABETES MELLITUS TYPE 2 IN NONOBESE (HCC): ICD-10-CM

## 2020-06-19 DIAGNOSIS — I25.118 CORONARY ARTERY DISEASE OF NATIVE ARTERY OF NATIVE HEART WITH STABLE ANGINA PECTORIS (HCC): Primary | ICD-10-CM

## 2020-06-19 DIAGNOSIS — G47.33 OBSTRUCTIVE SLEEP APNEA: ICD-10-CM

## 2020-06-19 DIAGNOSIS — R06.09 DOE (DYSPNEA ON EXERTION): ICD-10-CM

## 2020-06-19 DIAGNOSIS — I10 HYPERTENSION, BENIGN: ICD-10-CM

## 2020-06-19 RX ORDER — RANOLAZINE 500 MG/1
500 TABLET, EXTENDED RELEASE ORAL 2 TIMES DAILY
Qty: 60 TAB | Refills: 3 | Status: SHIPPED | OUTPATIENT
Start: 2020-06-19 | End: 2020-09-14 | Stop reason: SDUPTHER

## 2020-06-19 NOTE — PROGRESS NOTES
VIRTUAL VISIT DOCUMENTATION     Pursuant to the emergency declaration under the [place] waiver authority and the [employer] Act, this Virtual  Visit was conducted, with patient's consent, to reduce the patient's risk of exposure to COVID-19 and provide continuity of care for an established patient. Services were provided through a video synchronous discussion virtually to substitute for in-person clinic visit. Lakesha Aviles is a 68 y.o. male who was seen by synchronous (real-time) audio-video technology on 6/19/2020. Patient is being seen today for CAD, angina. Last seen as VV one month ago     ASSESSMENT        ICD-10-CM ICD-9-CM    1. Coronary artery disease of native artery of native heart with stable angina pectoris (HCC) I25.118 414.01 REFERRAL TO CARDIAC REHAB     413.9 ranolazine ER (Ranexa) 500 mg SR tablet   2. Diabetes mellitus type 2 in nonobese (HCC) E11.9 250.00 REFERRAL TO CARDIAC REHAB   3. Dyslipidemia E78.5 272.4 REFERRAL TO CARDIAC REHAB   4. Hypertension, benign I10 401.1 REFERRAL TO CARDIAC REHAB   5. RG (dyspnea on exertion) R06.00 786.09 REFERRAL TO CARDIAC REHAB   6. Obstructive sleep apnea G47.33 327.23 REFERRAL TO CARDIAC REHAB   7. Lumbar disc disease M51.9 722.93 REFERRAL TO CARDIAC REHAB        PLAN     CAD; with multivessel PCI dating back to 1996, most recently PCI 2011 to PDA with LILIAN. Updated cath 1/7/20 demonstrated progressive LCX disease with  OM2, patent RCA stents. Attempted PCI OM  was unsuccessful by Dr. Ashley Lowry and later by Dr Toni Freitas last week. Vessel is relatively small and epicardial retrograde approach not recommended. He continues to have class 3 RG, but also is functionally limited by his chronic low back pain. Nitrates not helping.  I suspect a big part of his dypsnea is due to deconditioning.  - discontinue Imdur 30 mg/d, start ranexa 500 mg bid  - Continue DAPT  - Continue Amlodipine 5mg/d, Atenolol 50mg/d  - refer to cardiac rehab     HTN, controlled on combination therapy.     ADELA - continue nightly CPAP     Dyslipidemia. Updated labs from Sept demonstrate LDL 79.  - Continue Atorva 40mg/d      Chronic low back pain s/p multiple back surgeries, most recently Sept 2019. S/p recent DMITRY without much relief      RTC one month      We discussed the expected course, resolution and complications of the diagnosis(es) in detail. Medication risks, benefits, costs, interactions, and alternatives were discussed as indicated. I advised him to contact the office if his condition worsens, changes or fails to improve as anticipated. He expressed understanding with the diagnosis(es) and plan    HISTORY OF PRESENTING ILLNESS      Derrick Arriaza is a 68 y.o. male     Attempted PCI OM  unsuccessful by Dr Ambar Glover to have class 3 RG but no chest pain. Recently had DMITRY without pain relief. Gets winded easily    Patient denies any exertional chest pain, , palpitations, syncope, orthopnea, edema or paroxysmal nocturnal dyspnea.     /70, HR 54,      ACTIVE PROBLEM LIST     Patient Active Problem List    Diagnosis Date Noted    S/P cardiac cath 01/23/2020     Priority: 1 - One    Angina, class III ([facility] [address] 05/07/2020    RG (dyspnea on exertion) 12/11/2019    Other chest pain 12/11/2019    Adjacent segment disease with spinal stenosis 09/16/2019    Primary osteoarthritis involving multiple joints 08/18/2017    ACP (advance care planning) 08/09/2017    Cervical disc disease 09/15/2016    Dupuytren's contracture of right hand 09/15/2016    Lumbar disc disease 09/01/2016    Diabetes mellitus type 2 in nonobese ([facility] [address] 07/28/2015    Obstructive sleep apnea 01/28/2011    Degenerative joint disease 01/28/2011    Coronary artery disease     Dyslipidemia     Hypertension, benign            PAST MEDICAL HISTORY     Past Medical History:   Diagnosis Date    CAD (coronary artery disease)     Degenerative joint disease 2011    Diabetes mellitus ([facility] [address]     Essential hypertension     History of back surgery 2019    Hyperlipidemia     Kidney stones 1969    Obstructive sleep apnea 2011    Stopped using CPAP years ago           PAST SURGICAL HISTORY     Past Surgical History:   Procedure Laterality Date    HX CARPAL TUNNEL RELEASE      HX CORONARY STENT PLACEMENT Left 1991    x 3    HX HIP REPLACEMENT Bilateral  & 2015    HX HIP REPLACEMENT Right 2012    Revision    HX LITHOTRIPSY N/A     HX LUMBAR DISKECTOMY N/A     L 4-5    HX LUMBAR FUSION N/A 2018    HX LUMBAR LAMINECTOMY  1971    L 4-5          ALLERGIES     Allergies   Allergen Reactions    Ace Inhibitors Swelling          FAMILY HISTORY     Family History   Problem Relation Age of Onset    Arthritis-osteo Mother     No Known Problems Father     negative for cardiac disease       SOCIAL HISTORY     Social History     Socioeconomic History    Marital status:      Spouse name: Not on file    Number of children: Not on file    Years of education: Not on file    Highest education level: Not on file   Tobacco Use    Smoking status: Former Smoker     Packs/day: 1.00     Years: 16.00     Pack years: 16.00     Types: Cigarettes     Last attempt to quit:      Years since quittin.4    Smokeless tobacco: Never Used   Substance and Sexual Activity    Alcohol use: Yes     Alcohol/week: 0.0 standard drinks     Comment: rarely    Drug use: No    Sexual activity: Yes     Partners: Female         MEDICATIONS     Current Outpatient Medications   Medication Sig    isosorbide mononitrate ER (IMDUR) 30 mg tablet Take 1 Tab by mouth daily.  metFORMIN (GLUCOPHAGE) 1,000 mg tablet Please restart Metformin on 2020    amLODIPine (NORVASC) 5 mg tablet Take 1 Tab by mouth two (2) times a day.     clopidogreL (Plavix) 75 mg tab Take 1 Tab by mouth daily.  [START ON 6/29/2020] oxyCODONE-acetaminophen (PERCOCET) 5-325 mg per tablet Take 1 Tab by mouth daily as needed for Pain for up to 30 days. Indications: pain    AVAPRO 150 mg tablet TAKE 1 TABLET NIGHTLY    aspirin delayed-release 81 mg tablet Take  by mouth daily.  ascorbic acid, vitamin C, (VITAMIN C) 500 mg tablet Take 500 mg by mouth daily.  multivitamin (ONE A DAY) tablet Take 1 Tab by mouth daily.  folic acid (FOLVITE) 1 mg tablet TAKE 1 TABLET DAILY, EXCEPT DAY THAT METHOTREXATE IS TAKEN    atorvastatin (LIPITOR) 40 mg tablet TAKE 1 TABLET DAILY (START LIPITOR AFTER VYTORIN IS FINISHED)    omeprazole (PRILOSEC) 40 mg capsule TAKE 1 CAPSULE DAILY    atenolol (TENORMIN) 50 mg tablet TAKE 1 TABLET DAILY    tamsulosin (FLOMAX) 0.4 mg capsule Take 1 Cap by mouth daily. No current facility-administered medications for this visit. I have reviewed the nurses notes, vitals, problem list, allergy list, medical history, family, social history and medications. REVIEW OF SYMPTOMS     Constitutional: Negative for fever, chills, malaise/fatigue and diaphoresis. Respiratory: Negative for cough, hemoptysis, sputum production, and wheezing. Cardiovascular: Negative for chest pain, palpitations, orthopnea, claudication, leg swelling and PND. Gastrointestinal: Negative for heartburn, nausea, vomiting, blood in stool and melena. Genitourinary: Negative for dysuria and flank pain. Musculoskeletal: positive for joint painSkin: Negative for rash. Neurological: Negative for focal weakness, seizures, loss of consciousness, weakness and headaches. Endo/Heme/Allergies: Negative for abnormal bleeding. Psychiatric/Behavioral: Negative for memory loss. PHYSICAL EXAMINATION      Due to this being a TeleHealth evaluation, many elements of the physical examination are unable to be assessed.      General: Well developed, in no acute distress, cooperative and alert  HEENT: Pupils equal/round. No marked JVD visible on video. Respiratory: No audible wheezing, no signs of respiratory distress, lips non cyanotic  Extremities:  No edema  Neuro: A&Ox3, speech clear, no facial droop, answering questions appropriately  Skin: Skin color is normal. No rashes or lesions. Non diaphoretic on visible skin during exam       DIAGNOSTIC DATA      Cardiolite (2/04):scheduled for comparison from previous stress pre PCI - similar study with no new acute changes. Exercise Cardiolite (8/29/11):  Reversible mid-distal inferolateral defect.  No fixed defects.  EF 63%. Lexiscan Cardiolite 12/18/19 - Normal perfusion study. EF 59%. Echo 12/27/19 - Mild LVH, EF 60-65%, mild MR, PA 51 mmg Hg       LABORATORY DATA      Lab Results   Component Value Date/Time    WBC 5.0 05/01/2020 07:45 AM    HGB 10.7 (L) 05/01/2020 07:45 AM    HCT 33.5 (L) 05/01/2020 07:45 AM    PLATELET 916 02/50/8422 07:45 AM    MCV 78 (L) 05/01/2020 07:45 AM      Lab Results   Component Value Date/Time    Sodium 141 05/08/2020 02:59 AM    Potassium 3.8 05/08/2020 02:59 AM    Chloride 111 (H) 05/08/2020 02:59 AM    CO2 26 05/08/2020 02:59 AM    Anion gap 4 (L) 05/08/2020 02:59 AM    Glucose 83 05/08/2020 02:59 AM    BUN 10 05/08/2020 02:59 AM    Creatinine 0.80 05/08/2020 02:59 AM    BUN/Creatinine ratio 13 05/08/2020 02:59 AM    GFR est AA >60 05/08/2020 02:59 AM    GFR est non-AA >60 05/08/2020 02:59 AM    Calcium 8.5 05/08/2020 02:59 AM    Bilirubin, total <0.2 05/01/2020 07:45 AM    Alk. phosphatase 101 05/01/2020 07:45 AM    Protein, total 7.1 05/01/2020 07:45 AM    Albumin 4.1 05/01/2020 07:45 AM    Globulin 4.5 (H) 09/13/2019 07:03 AM    A-G Ratio 1.4 05/01/2020 07:45 AM    ALT (SGPT) 9 05/01/2020 07:45 AM             FOLLOW-UP            Patient was made aware and verbalized understanding that an appointment will be scheduled for them for a virtual visit and/or office visit within the above time frame.  Patient understanding his/her responsibility to call and change time/date if he/she so chooses. Thank you, Jayne Martinez DO for allowing me to participate in the care of Sulema Davis. Please do not hesitate to contact me for further questions/concerns. Greater than 20 minutes was spent in direct video patient care, planning and chart review. This visit was conducted using [employer] telemedicine services.        Yordy Silverio MD    [address]  [place] [address]        [phone] / [phone] Fax       [facility] [facility] [address]  [place]y Aguilar  [phone] / [phone] Fax

## 2020-06-22 ENCOUNTER — TELEPHONE (OUTPATIENT)
Dept: CARDIOLOGY CLINIC | Age: 80
End: 2020-06-22

## 2020-06-22 RX ORDER — AMLODIPINE BESYLATE 5 MG/1
5 TABLET ORAL 2 TIMES DAILY
Qty: 60 TAB | Refills: 11 | Status: CANCELLED | OUTPATIENT
Start: 2020-06-22

## 2020-06-22 NOTE — TELEPHONE ENCOUNTER
Patient inquiring if he's supposed to be still taking amlodipine. Patient said his old medicine bottle said no refills.  Please advise        ASCXD:[phone]

## 2020-07-02 ENCOUNTER — HOSPITAL ENCOUNTER (OUTPATIENT)
Dept: CARDIAC REHAB | Age: 80
Discharge: HOME | End: 2020-07-02
Payer: MEDICARE

## 2020-07-02 VITALS — WEIGHT: 171 LBS | HEIGHT: 68 IN | BODY MASS INDEX: 25.91 KG/M2

## 2020-07-02 PROCEDURE — 93797 PHYS/QHP OP CAR RHAB WO ECG: CPT

## 2020-07-02 PROCEDURE — 93798 PHYS/QHP OP CAR RHAB W/ECG: CPT

## 2020-07-02 NOTE — CARDIO/PULMONARY
[facility] Cardiopulmonary Rehab Orientation: Met with [facility]" Michalene Mcardle KID:[id number], for cardiac rehab orientation and exercise tolerance test today. Mr Michalene Mcardle is a 68year-old patient of Dr Kassy Eastman, S/P unsuccessful PCI of  (1/23/20), with primary diagnosis of Angina, class 3. Cardiac hx also includes: multiple PCTA/stents since 1996, with LVEF 55-60%. Cardiac risk factors include: DM, HTN, HLD, history of smoking (21 pack years), age, and gender. BMI 26. Pt quit smoking after he retired from the [employer] (3716). CAD risk factors were reviewed with patient. Pt resides with his wife in [place]. 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 Pt has one son & one grandson, who live close by and help as needed. Depression score PHQ9 is 7 and this is considered to represent mild depression. The result was discussed with patient, who denied any feelings of depression. He reported his only sources of stress are his RG and chronic low back pain. Patient denied chest pain during 6 minute exercise test on [employer] at 2.2 METS with peak HR 96, peak /70, and RPE 13. He exhibited mild SOB. Cardiac rhythm was SB/SR with a few PACs vs PJCs. Limitations to exercise are primarily related to dyspnea on exertion and chronic back pain. Pt recently had an epidural steroid injection without improvement. Most recent lumbar surgery was in 9/2019. Also has hx of bilateral THRs, polymyalgia rheumatica, and cervical disc disease. Previously, pt has enjoyed being active, doing yard work, driving his [email or web address], and detailing his vehicles. Pt was given the Cardiac Rehab manual and an exercise plan was developed. Pt will attend cardiac rehab 2 to 3 times per week. Pt verbalized plan to engage in home exercise of walking. It is likely that pt would be able to exercise more on a sit down exercise bike. Patient's identified goals are: 1. \"I want to breathe better when walking. \" 2. Meet with dietitian to learn more about healthy eating. 4. Learn more about cardiac medications.

## 2020-07-06 ENCOUNTER — HOSPITAL ENCOUNTER (OUTPATIENT)
Dept: CARDIAC REHAB | Age: 80
Discharge: HOME | End: 2020-07-06
Payer: MEDICARE

## 2020-07-06 VITALS — BODY MASS INDEX: 25.97 KG/M2 | WEIGHT: 170.8 LBS

## 2020-07-06 PROCEDURE — 93798 PHYS/QHP OP CAR RHAB W/ECG: CPT

## 2020-07-08 ENCOUNTER — DOCUMENTATION ONLY (OUTPATIENT)
Dept: FAMILY MEDICINE CLINIC | Age: 80
End: 2020-07-08

## 2020-07-08 ENCOUNTER — HOSPITAL ENCOUNTER (OUTPATIENT)
Dept: CARDIAC REHAB | Age: 80
Discharge: HOME | End: 2020-07-08
Payer: MEDICARE

## 2020-07-08 VITALS — BODY MASS INDEX: 26 KG/M2 | WEIGHT: 171 LBS

## 2020-07-08 PROCEDURE — 93798 PHYS/QHP OP CAR RHAB W/ECG: CPT

## 2020-07-08 NOTE — PROGRESS NOTES
Received request for recent lab results for [facility] Cardiopulmonary Rehab. No lab results since 02/27/19 was faxed to [phone] w/confirmation.

## 2020-07-10 ENCOUNTER — HOSPITAL ENCOUNTER (OUTPATIENT)
Dept: CARDIAC REHAB | Age: 80
Discharge: HOME | End: 2020-07-10
Payer: MEDICARE

## 2020-07-10 VITALS — BODY MASS INDEX: 26.7 KG/M2 | WEIGHT: 175.6 LBS

## 2020-07-10 PROCEDURE — 93798 PHYS/QHP OP CAR RHAB W/ECG: CPT

## 2020-07-13 ENCOUNTER — HOSPITAL ENCOUNTER (OUTPATIENT)
Dept: CARDIAC REHAB | Age: 80
End: 2020-07-13
Payer: MEDICARE

## 2020-07-15 ENCOUNTER — HOSPITAL ENCOUNTER (OUTPATIENT)
Dept: CARDIAC REHAB | Age: 80
Discharge: HOME | End: 2020-07-15
Payer: MEDICARE

## 2020-07-15 VITALS — WEIGHT: 173.2 LBS | BODY MASS INDEX: 26.33 KG/M2

## 2020-07-15 PROCEDURE — 93798 PHYS/QHP OP CAR RHAB W/ECG: CPT

## 2020-07-17 ENCOUNTER — HOSPITAL ENCOUNTER (OUTPATIENT)
Dept: CARDIAC REHAB | Age: 80
Discharge: HOME | End: 2020-07-17
Payer: MEDICARE

## 2020-07-17 VITALS — WEIGHT: 174 LBS | BODY MASS INDEX: 26.46 KG/M2

## 2020-07-17 PROCEDURE — 93798 PHYS/QHP OP CAR RHAB W/ECG: CPT

## 2020-07-17 NOTE — PROGRESS NOTES
Deirdre Barger MD [facility] [place]  [address], [facility], [address] [facility]    Office [phone]  Fax [phone]  Cell [phone]       Jd Beck is a 68 y.o. male last seen by me 1 month ago (VV). Diagnoses    Encounter Diagnoses     ICD-10-CM ICD-9-CM   1. Coronary artery disease of native artery of native heart with stable angina pectoris ([address]  I25.118 414.01     413.9   2. Hypertension, benign  I10 401.1   3. Angina, class III (HCC)  I20.9 413.9   4. Diabetes mellitus type 2 in nonobese (HCC)  E11.9 250.00   5. Obstructive sleep apnea  G47.33 327.23   6. Dyslipidemia  E78.5 272.4   7. RG (dyspnea on exertion)  R06.00 786.09       Assessment/Recommendations:    CAD; with multivessel PCI dating back to 1996, most recently PCI 2011 to PDA with LILIAN.  Updated cath 1/7/20 demonstrated progressive LCX disease with  OM2, patent RCA stents. Attempted PCI OM  was unsuccessful by Dr. Fabi Lou and later by Dr. Willy Gunn May 2020. Vessel is relatively small and epicardial retrograde approach not recommended. He continues to have class 3 RG, but also is functionally limited by his chronic low back pain. Nitrates have not helped. He was started on Ranexa last month. I suspect a big part of his dypsnea is due to deconditioning.  - Continue Ranexa 500 mg bid  - Continue DAPT  - Continue Amlodipine 10mg/d, Atenolol 50mg/d  - Continue to cardiac rehab     HTN, largely controlled on combination therapy.     ADELA - continue nightly CPAP     Dyslipidemia. Updated labs from Sept 2019 demonstrated LDL 79.  - Continue Atorva 40mg/d   - Update lipids w/ Jose Ramon Amador, DO near future      Chronic low back pain s/p multiple back surgeries, most recently Sept 2019. S/p recent DMITRY without much relief    3 month f/u     Follow-up and Dispositions    · Return in about 3 months (around 10/20/2020).            Subjective:    Ramond [place] reports that his cardiac rehab is going well (he goes thrice weekly), however he still reports a stable pattern of RG. He does not go on the treadmill due to his back. Patient denies any exertional chest pain, palpitations, syncope, orthopnea, edema or paroxysmal nocturnal dyspnea. He reports staying active as much as he can. He states that his appetite is good, but he has a hard time getting enough sleep. He reports that he takes amlodipine twice a day. His blood work is followed by Verónica Lopez DO. He reports that he does take his BP at home, but does not have a log here with him of the readings. Of note, he is retired [employer] and was a .       Cardiac testing  Cardiolite ():scheduled for comparison from previous stress pre PCI - similar study with no new acute changes. Exercise Cardiolite (11):  Reversible mid-distal inferolateral defect.  No fixed defects.  EF 63%. Lexiscan Cardiolite 19 - Normal perfusion study. EF 59%. Echo 19 - Mild LVH, EF 60-65%, mild MR, PA 51 mmg Hg      Past Medical History:   Diagnosis Date    CAD (coronary artery disease)     Degenerative joint disease 2011    Diabetes mellitus ([facility] [address]     Essential hypertension     History of back surgery 2019    Hyperlipidemia     Kidney stones 1969    Obstructive sleep apnea 2011    Stopped using CPAP years ago        Social History     Socioeconomic History    Marital status:      Spouse name: Not on file    Number of children: Not on file    Years of education: Not on file    Highest education level: Not on file   Tobacco Use    Smoking status: Former Smoker     Packs/day: 1.00     Years: 16.00     Pack years: 16.00     Types: Cigarettes     Last attempt to quit:      Years since quittin.5    Smokeless tobacco: Never Used   Substance and Sexual Activity    Alcohol use:  Yes     Alcohol/week: 0.0 standard drinks     Comment: rarely    Drug use: No    Sexual activity: Yes     Partners: Female       Current Outpatient Medications   Medication Sig Dispense Refill    ranolazine ER (Ranexa) 500 mg SR tablet Take 1 Tab by mouth two (2) times a day. 60 Tab 3    metFORMIN (GLUCOPHAGE) 1,000 mg tablet Please restart Metformin on 5/9/2020 (Patient taking differently: two (2) times daily (with meals). Please restart Metformin on 5/9/2020) 180 Tab 4    amLODIPine (NORVASC) 5 mg tablet Take 1 Tab by mouth two (2) times a day. (Patient taking differently: Take 5 mg by mouth daily.) 60 Tab 11    clopidogreL (Plavix) 75 mg tab Take 1 Tab by mouth daily. 90 Tab 3    oxyCODONE-acetaminophen (PERCOCET) 5-325 mg per tablet Take 1 Tab by mouth daily as needed for Pain for up to 30 days. Indications: pain 30 Tab 0    AVAPRO 150 mg tablet TAKE 1 TABLET NIGHTLY 90 Tab 4    aspirin delayed-release 81 mg tablet Take  by mouth daily.  ascorbic acid, vitamin C, (VITAMIN C) 500 mg tablet Take 500 mg by mouth two (2) times a day. + Vit D3      multivitamin (ONE A DAY) tablet Take 1 Tab by mouth daily.  folic acid (FOLVITE) 1 mg tablet TAKE 1 TABLET DAILY, EXCEPT DAY THAT METHOTREXATE IS TAKEN 90 Tab 4    atorvastatin (LIPITOR) 40 mg tablet TAKE 1 TABLET DAILY (START LIPITOR AFTER VYTORIN IS FINISHED) 90 Tab 4    omeprazole (PRILOSEC) 40 mg capsule TAKE 1 CAPSULE DAILY 90 Cap 4    atenolol (TENORMIN) 50 mg tablet TAKE 1 TABLET DAILY 90 Tab 4    tamsulosin (FLOMAX) 0.4 mg capsule Take 1 Cap by mouth daily. 30 Cap 0       Allergies   Allergen Reactions    Ace Inhibitors Swelling          Review of Symptoms:  Constitutional: Negative for fever, chills, malaise/fatigue and diaphoresis. Respiratory: Negative for cough, hemoptysis, sputum production, and wheezing. +RG  Cardiovascular: Negative for chest pain, palpitations, orthopnea, claudication, leg swelling and PND. Gastrointestinal: Negative for heartburn, nausea, vomiting, blood in stool and melena. Genitourinary: Negative for dysuria and flank pain.   Musculoskeletal: Negative for joint pain and back pain. Skin: Negative for rash. Neurological: Negative for focal weakness, seizures, loss of consciousness, weakness and headaches. Endo/Heme/Allergies: Does not bruise/bleed easily. Psychiatric/Behavioral: Negative for memory loss. The patient does not have insomnia. Physical Exam  Visit Vitals  /80 (BP 1 Location: Right arm, BP Patient Position: Sitting)   Pulse (!) 53   Ht 5' 8\" (1.727 m)   Wt 171 lb (77.6 kg)   SpO2 98%   BMI 26.00 kg/m²     Wt Readings from Last 3 Encounters:   07/20/20 173 lb (78.5 kg)   07/20/20 171 lb (77.6 kg)   07/17/20 174 lb (78.9 kg)     General - WDWN  HEENT: Eyes without jaundice, Hearing intact  Neck - JVP normal, thyroid nl  Cardiac - normal S1,S2, no murmurs, rubs or gallops. No clicks  Vascular - carotids without bruits, radials, femorals and pedal pulses equal bilateral  Lungs - clear to auscultation bilaterals, no rales ,wheezing or rhonchi  Abd - soft nontender, no HSM, no abd bruits  Extremities - no edema  Neuro - nonfocal, normal gait  Psych - mood and affect normal    Labs:      Cardiographics          Written by Naseem Herrera, as dictated by Myles Clemente M.D.      Myles Clemente MD

## 2020-07-20 ENCOUNTER — OFFICE VISIT (OUTPATIENT)
Dept: CARDIOLOGY CLINIC | Age: 80
End: 2020-07-20

## 2020-07-20 ENCOUNTER — HOSPITAL ENCOUNTER (OUTPATIENT)
Dept: CARDIAC REHAB | Age: 80
Discharge: HOME | End: 2020-07-20
Payer: MEDICARE

## 2020-07-20 VITALS
BODY MASS INDEX: 25.91 KG/M2 | OXYGEN SATURATION: 98 % | DIASTOLIC BLOOD PRESSURE: 80 MMHG | SYSTOLIC BLOOD PRESSURE: 158 MMHG | HEIGHT: 68 IN | HEART RATE: 53 BPM | WEIGHT: 171 LBS

## 2020-07-20 VITALS — WEIGHT: 173 LBS | BODY MASS INDEX: 26.3 KG/M2

## 2020-07-20 DIAGNOSIS — I25.118 CORONARY ARTERY DISEASE OF NATIVE ARTERY OF NATIVE HEART WITH STABLE ANGINA PECTORIS (HCC): Primary | ICD-10-CM

## 2020-07-20 DIAGNOSIS — R06.09 DOE (DYSPNEA ON EXERTION): ICD-10-CM

## 2020-07-20 DIAGNOSIS — E78.5 DYSLIPIDEMIA: ICD-10-CM

## 2020-07-20 DIAGNOSIS — I20.9 ANGINA, CLASS III (HCC): ICD-10-CM

## 2020-07-20 DIAGNOSIS — G47.33 OBSTRUCTIVE SLEEP APNEA: ICD-10-CM

## 2020-07-20 DIAGNOSIS — E11.9 DIABETES MELLITUS TYPE 2 IN NONOBESE (HCC): ICD-10-CM

## 2020-07-20 DIAGNOSIS — I10 HYPERTENSION, BENIGN: ICD-10-CM

## 2020-07-20 PROCEDURE — 93798 PHYS/QHP OP CAR RHAB W/ECG: CPT

## 2020-07-20 NOTE — PROGRESS NOTES
Chief Complaint   Patient presents with    Coronary Artery Disease    Cholesterol Problem    Hypertension    Follow-up     1 month     Visit Vitals  /80 (BP 1 Location: Right arm, BP Patient Position: Sitting)   Pulse (!) 53   Ht 5' 8\" (1.727 m)   Wt 171 lb (77.6 kg)   SpO2 98%   BMI 26.00 kg/m²     Cardiac rehab 3x/week besides joging.   Chest pain denied   SOB still there; a little bit better  Swelling in hands/feet denied   Dizziness watches himself when changing positions  Recent hospital stays denied   Refills denied

## 2020-07-20 NOTE — LETTER
7/20/20 Patient: Caty Connelly YOB: 1940 Date of Visit: 7/20/2020 Ruma Salazar DO 
[address] [address] [place] [address] VIA In Basket Dear Ruma Salazar DO, Thank you for referring Mr. Nabor Benitez to [facility] for evaluation. My notes for this consultation are attached. If you have questions, please do not hesitate to call me. I look forward to following your patient along with you. Sincerely, Mervat Vann MD

## 2020-07-22 ENCOUNTER — HOSPITAL ENCOUNTER (OUTPATIENT)
Dept: CARDIAC REHAB | Age: 80
Discharge: HOME | End: 2020-07-22
Payer: MEDICARE

## 2020-07-22 VITALS — WEIGHT: 172.2 LBS | BODY MASS INDEX: 26.18 KG/M2

## 2020-07-22 PROCEDURE — 93798 PHYS/QHP OP CAR RHAB W/ECG: CPT

## 2020-07-24 ENCOUNTER — HOSPITAL ENCOUNTER (OUTPATIENT)
Dept: CARDIAC REHAB | Age: 80
Discharge: HOME | End: 2020-07-24
Payer: MEDICARE

## 2020-07-24 VITALS — WEIGHT: 170.2 LBS | BODY MASS INDEX: 25.88 KG/M2

## 2020-07-24 PROCEDURE — 93798 PHYS/QHP OP CAR RHAB W/ECG: CPT

## 2020-07-27 ENCOUNTER — HOSPITAL ENCOUNTER (OUTPATIENT)
Dept: CARDIAC REHAB | Age: 80
Discharge: HOME | End: 2020-07-27
Payer: MEDICARE

## 2020-07-27 VITALS — WEIGHT: 171.2 LBS | BODY MASS INDEX: 26.03 KG/M2

## 2020-07-27 PROCEDURE — 93798 PHYS/QHP OP CAR RHAB W/ECG: CPT

## 2020-07-29 ENCOUNTER — HOSPITAL ENCOUNTER (OUTPATIENT)
Dept: CARDIAC REHAB | Age: 80
Discharge: HOME | End: 2020-07-29
Payer: MEDICARE

## 2020-07-29 VITALS — WEIGHT: 171.2 LBS | BODY MASS INDEX: 26.03 KG/M2

## 2020-07-29 PROCEDURE — 93798 PHYS/QHP OP CAR RHAB W/ECG: CPT

## 2020-07-31 ENCOUNTER — HOSPITAL ENCOUNTER (OUTPATIENT)
Dept: CARDIAC REHAB | Age: 80
Discharge: HOME | End: 2020-07-31
Payer: MEDICARE

## 2020-07-31 VITALS — WEIGHT: 173 LBS | BODY MASS INDEX: 26.3 KG/M2

## 2020-07-31 PROCEDURE — 93798 PHYS/QHP OP CAR RHAB W/ECG: CPT

## 2020-08-03 ENCOUNTER — HOSPITAL ENCOUNTER (OUTPATIENT)
Dept: CARDIAC REHAB | Age: 80
Discharge: HOME | End: 2020-08-03
Payer: MEDICARE

## 2020-08-03 VITALS — BODY MASS INDEX: 26.21 KG/M2 | WEIGHT: 172.4 LBS

## 2020-08-03 PROCEDURE — 93798 PHYS/QHP OP CAR RHAB W/ECG: CPT

## 2020-08-05 ENCOUNTER — HOSPITAL ENCOUNTER (OUTPATIENT)
Dept: CARDIAC REHAB | Age: 80
Discharge: HOME | End: 2020-08-05
Payer: MEDICARE

## 2020-08-05 ENCOUNTER — APPOINTMENT (OUTPATIENT)
Dept: CARDIAC REHAB | Age: 80
End: 2020-08-05
Payer: MEDICARE

## 2020-08-05 VITALS — WEIGHT: 172.8 LBS | BODY MASS INDEX: 26.27 KG/M2

## 2020-08-05 PROCEDURE — 93798 PHYS/QHP OP CAR RHAB W/ECG: CPT

## 2020-08-07 ENCOUNTER — HOSPITAL ENCOUNTER (OUTPATIENT)
Dept: CARDIAC REHAB | Age: 80
Discharge: HOME | End: 2020-08-07
Payer: MEDICARE

## 2020-08-07 ENCOUNTER — VIRTUAL VISIT (OUTPATIENT)
Dept: FAMILY MEDICINE CLINIC | Age: 80
End: 2020-08-07
Payer: MEDICARE

## 2020-08-07 VITALS — BODY MASS INDEX: 26.18 KG/M2 | WEIGHT: 172.2 LBS

## 2020-08-07 DIAGNOSIS — D64.9 ANEMIA, UNSPECIFIED TYPE: ICD-10-CM

## 2020-08-07 DIAGNOSIS — E11.9 DIABETES MELLITUS TYPE 2 IN NONOBESE (HCC): Primary | ICD-10-CM

## 2020-08-07 DIAGNOSIS — M51.9 LUMBAR DISC DISEASE: ICD-10-CM

## 2020-08-07 DIAGNOSIS — Z13.39 SCREENING FOR ALCOHOLISM: ICD-10-CM

## 2020-08-07 DIAGNOSIS — Z00.00 MEDICARE ANNUAL WELLNESS VISIT, SUBSEQUENT: ICD-10-CM

## 2020-08-07 DIAGNOSIS — K21.9 GASTROESOPHAGEAL REFLUX DISEASE, ESOPHAGITIS PRESENCE NOT SPECIFIED: ICD-10-CM

## 2020-08-07 DIAGNOSIS — I10 HYPERTENSION, BENIGN: ICD-10-CM

## 2020-08-07 DIAGNOSIS — M19.90 OSTEOARTHRITIS, UNSPECIFIED OSTEOARTHRITIS TYPE, UNSPECIFIED SITE: ICD-10-CM

## 2020-08-07 DIAGNOSIS — E78.5 DYSLIPIDEMIA: ICD-10-CM

## 2020-08-07 PROCEDURE — G0439 PPPS, SUBSEQ VISIT: HCPCS | Performed by: FAMILY MEDICINE

## 2020-08-07 PROCEDURE — G8510 SCR DEP NEG, NO PLAN REQD: HCPCS | Performed by: FAMILY MEDICINE

## 2020-08-07 PROCEDURE — G8756 NO BP MEASURE DOC: HCPCS | Performed by: FAMILY MEDICINE

## 2020-08-07 PROCEDURE — 99214 OFFICE O/P EST MOD 30 MIN: CPT | Performed by: FAMILY MEDICINE

## 2020-08-07 PROCEDURE — 1101F PT FALLS ASSESS-DOCD LE1/YR: CPT | Performed by: FAMILY MEDICINE

## 2020-08-07 PROCEDURE — 93798 PHYS/QHP OP CAR RHAB W/ECG: CPT

## 2020-08-07 PROCEDURE — G8427 DOCREV CUR MEDS BY ELIG CLIN: HCPCS | Performed by: FAMILY MEDICINE

## 2020-08-07 RX ORDER — OXYCODONE AND ACETAMINOPHEN 5; 325 MG/1; MG/1
1-2 TABLET ORAL
Qty: 30 TAB | Refills: 0 | Status: SHIPPED | OUTPATIENT
Start: 2020-09-06 | End: 2020-12-01 | Stop reason: SDUPTHER

## 2020-08-07 RX ORDER — OXYCODONE AND ACETAMINOPHEN 5; 325 MG/1; MG/1
1 TABLET ORAL
Qty: 30 TAB | Refills: 0 | Status: SHIPPED | OUTPATIENT
Start: 2020-10-06 | End: 2020-12-01 | Stop reason: SDUPTHER

## 2020-08-07 RX ORDER — OXYCODONE AND ACETAMINOPHEN 5; 325 MG/1; MG/1
1 TABLET ORAL
Qty: 30 TAB | Refills: 0 | Status: SHIPPED | OUTPATIENT
Start: 2020-08-07 | End: 2020-12-01 | Stop reason: SDUPTHER

## 2020-08-07 NOTE — PROGRESS NOTES
Progress Note    he is a 68y.o. year old male who presents for evalution. Subjective:     Pt states he ha weaned himself off the norvasc, was making him dizzy. Doing fine off of it. BP was 120/80's. Will maintain off of this. Needs to stay on avapro and atenolol. Pt with DM on metformin and tolerates this. Last A1c was 6.8. CAD on ranexa and plavix and asa. Taking statin as well. Mild chronic anemia recently 10.7 and will recheck. Reflux has been well controlled on prilosec. Pt also reports a lot of congestion in the morning. Used a vaporizer. Bothers him quite a bit. Pt also needs refill of percocet for chronic back pain, s/p multiple spinal surgeries. Medication allows him to be active at home. No issues with abuse or misuse. No issues with sedation. Pain from 7-8 to 3/10. Opioid/Pain Management:    1. Has the patient signed a pain contract for chronic narcotic use? yes  2. Has the patient had a UDS or Serum screen as per guidelines as per [facility]?:   yes    3. Does patient meet necessary guidelines for Naloxone treatement per the [facility]?:    no  4. Has the Prescription Monitoring Program been reviewed? yes  5. Does patient have a long term condition that requires long term use of a Narcotic?  yes  6. Has patient been tolerant of therapy and responsible to routine follow up and specialist follow-up? Yes      Reviewed PmHx, RxHx, FmHx, SocHx, AllgHx and updated and dated in the chart. Review of Systems - negative except as listed above in the HPI    Objective: There were no vitals filed for this visit. Current Outpatient Medications   Medication Sig    [START ON 10/6/2020] oxyCODONE-acetaminophen (PERCOCET) 5-325 mg per tablet Take 1 Tab by mouth daily as needed for Pain for up to 30 days.  Indications: pain    [START ON 9/6/2020] oxyCODONE-acetaminophen (PERCOCET) 5-325 mg per tablet Take 1-2 Tabs by mouth daily as needed for Pain for up to 30 days. Indications: pain    oxyCODONE-acetaminophen (PERCOCET) 5-325 mg per tablet Take 1 Tab by mouth daily as needed for Pain for up to 30 days.  ranolazine ER (Ranexa) 500 mg SR tablet Take 1 Tab by mouth two (2) times a day.  metFORMIN (GLUCOPHAGE) 1,000 mg tablet Please restart Metformin on 5/9/2020 (Patient taking differently: two (2) times daily (with meals). Please restart Metformin on 5/9/2020)    clopidogreL (Plavix) 75 mg tab Take 1 Tab by mouth daily.  AVAPRO 150 mg tablet TAKE 1 TABLET NIGHTLY    aspirin delayed-release 81 mg tablet Take  by mouth daily.  ascorbic acid, vitamin C, (VITAMIN C) 500 mg tablet Take 500 mg by mouth two (2) times a day. + Vit D3    multivitamin (ONE A DAY) tablet Take 1 Tab by mouth daily.  folic acid (FOLVITE) 1 mg tablet TAKE 1 TABLET DAILY, EXCEPT DAY THAT METHOTREXATE IS TAKEN    atorvastatin (LIPITOR) 40 mg tablet TAKE 1 TABLET DAILY (START LIPITOR AFTER VYTORIN IS FINISHED)    omeprazole (PRILOSEC) 40 mg capsule TAKE 1 CAPSULE DAILY    atenolol (TENORMIN) 50 mg tablet TAKE 1 TABLET DAILY    tamsulosin (FLOMAX) 0.4 mg capsule Take 1 Cap by mouth daily. No current facility-administered medications for this visit. Physical Examination: General appearance - alert, well appearing, and in no distress  Mental status - alert, oriented to person, place, and time  Neurological - alert, oriented, normal speech, no focal findings or movement disorder noted      Assessment/ Plan:   Diagnoses and all orders for this visit:    1. Diabetes mellitus type 2 in nonobese (HCC)  -     METABOLIC PANEL, COMPREHENSIVE; Future  -     LIPID PANEL; Future  -     MICROALBUMIN, UR, RAND W/ MICROALB/CREAT RATIO; Future  -     HEMOGLOBIN A1C WITH EAG; Future    2. Dyslipidemia  -     METABOLIC PANEL, COMPREHENSIVE; Future  -     LIPID PANEL; Future    3. Hypertension, benign  -     METABOLIC PANEL, COMPREHENSIVE; Future    4.  Anemia, unspecified type  -     CBC WITH AUTOMATED DIFF; Future  -     IRON PROFILE; Future    5. Osteoarthritis, unspecified osteoarthritis type, unspecified site  -     oxyCODONE-acetaminophen (PERCOCET) 5-325 mg per tablet; Take 1 Tab by mouth daily as needed for Pain for up to 30 days. Indications: pain  -     oxyCODONE-acetaminophen (PERCOCET) 5-325 mg per tablet; Take 1-2 Tabs by mouth daily as needed for Pain for up to 30 days. Indications: pain  -     oxyCODONE-acetaminophen (PERCOCET) 5-325 mg per tablet; Take 1 Tab by mouth daily as needed for Pain for up to 30 days. 6. Lumbar disc disease  -     oxyCODONE-acetaminophen (PERCOCET) 5-325 mg per tablet; Take 1 Tab by mouth daily as needed for Pain for up to 30 days. Indications: pain  -     oxyCODONE-acetaminophen (PERCOCET) 5-325 mg per tablet; Take 1-2 Tabs by mouth daily as needed for Pain for up to 30 days. Indications: pain  -     oxyCODONE-acetaminophen (PERCOCET) 5-325 mg per tablet; Take 1 Tab by mouth daily as needed for Pain for up to 30 days. 7. Medicare annual wellness visit, subsequent    8. Screening for alcoholism  -     [place] ANNUAL ALCOHOL SCREEN 15 MIN    9. Gastroesophageal reflux disease, esophagitis presence not specified  Controlled on prilosec     Follow-up and Dispositions    · Return in about 3 months (around 11/7/2020), or if symptoms worsen or fail to improve. I have discussed the diagnosis with the patient and the intended plan as seen in the above orders. The patient has received an after-visit summary and questions were answered concerning future plans. Pt conveyed understanding of plan. Medication Side Effects and Warnings were discussed with patient      Linda Espinoza DO         Michellenova Boyce is a 68 y.o. male being evaluated by a Virtual Visit (video visit) encounter to address concerns as mentioned above. A caregiver was present when appropriate.  Due to this being a TeleHealth encounter (During [facility] public health emergency), evaluation of the following organ systems was limited: Vitals/Constitutional/EENT/Resp/CV/GI//MS/Neuro/Skin/Heme-Lymph-Imm. Pursuant to the emergency declaration under the [facility], [address] [place] [address] authority and the [employer] Act, this Virtual Visit was conducted with patient's (and/or legal guardian's) consent, to reduce the risk of exposure to COVID-19 and provide necessary medical care. Services were provided through a video synchronous discussion virtually to substitute for in-person encounter. --Ruma Salazar DO on 8/7/2020 at 2:47 PM    An electronic signature was used to authenticate this note. This is the Subsequent Medicare Annual Wellness Exam, performed 12 months or more after the Initial AWV or the last Subsequent AWV    I have reviewed the patient's medical history in detail and updated the computerized patient record.      History     Patient Active Problem List   Diagnosis Code    Coronary artery disease I25.10    Dyslipidemia E78.5    Hypertension, benign I10    Obstructive sleep apnea G47.33    Degenerative joint disease M19.90    Diabetes mellitus type 2 in nonobese ([address] E11.9    Lumbar disc disease M51.9    Cervical disc disease M50.90    Dupuytren's contracture of right hand M72.0    ACP (advance care planning) Z71.89    Primary osteoarthritis involving multiple joints M89.49    Adjacent segment disease with spinal stenosis M48.00    RG (dyspnea on exertion) R06.00    Other chest pain R07.89    S/P cardiac cath Z98.890    Angina, class III (HCC) I20.9    Gastroesophageal reflux disease K21.9     Past Medical History:   Diagnosis Date    CAD (coronary artery disease)     Degenerative joint disease 1/28/2011    Diabetes mellitus ([address]     Essential hypertension     History of back surgery 09/2019    Hyperlipidemia     Kidney stones 1969    Obstructive sleep apnea 01/28/2011    Stopped using CPAP years ago      Past Surgical History:   Procedure Laterality Date    HX CARPAL TUNNEL RELEASE      HX CORONARY STENT PLACEMENT Left 1991    x 3    HX HIP REPLACEMENT Bilateral 2009 & 2015    HX HIP REPLACEMENT Right 2012    Revision    HX LITHOTRIPSY N/A 1969    HX LUMBAR DISKECTOMY N/A 1978    L 4-5    HX LUMBAR FUSION N/A 2018    HX LUMBAR LAMINECTOMY  1971    L 4-5     Current Outpatient Medications   Medication Sig Dispense Refill    [START ON 10/6/2020] oxyCODONE-acetaminophen (PERCOCET) 5-325 mg per tablet Take 1 Tab by mouth daily as needed for Pain for up to 30 days. Indications: pain 30 Tab 0    [START ON 9/6/2020] oxyCODONE-acetaminophen (PERCOCET) 5-325 mg per tablet Take 1-2 Tabs by mouth daily as needed for Pain for up to 30 days. Indications: pain 30 Tab 0    oxyCODONE-acetaminophen (PERCOCET) 5-325 mg per tablet Take 1 Tab by mouth daily as needed for Pain for up to 30 days. 30 Tab 0    ranolazine ER (Ranexa) 500 mg SR tablet Take 1 Tab by mouth two (2) times a day. 60 Tab 3    metFORMIN (GLUCOPHAGE) 1,000 mg tablet Please restart Metformin on 5/9/2020 (Patient taking differently: two (2) times daily (with meals). Please restart Metformin on 5/9/2020) 180 Tab 4    clopidogreL (Plavix) 75 mg tab Take 1 Tab by mouth daily. 90 Tab 3    AVAPRO 150 mg tablet TAKE 1 TABLET NIGHTLY 90 Tab 4    aspirin delayed-release 81 mg tablet Take  by mouth daily.  ascorbic acid, vitamin C, (VITAMIN C) 500 mg tablet Take 500 mg by mouth two (2) times a day. + Vit D3      multivitamin (ONE A DAY) tablet Take 1 Tab by mouth daily.       folic acid (FOLVITE) 1 mg tablet TAKE 1 TABLET DAILY, EXCEPT DAY THAT METHOTREXATE IS TAKEN 90 Tab 4    atorvastatin (LIPITOR) 40 mg tablet TAKE 1 TABLET DAILY (START LIPITOR AFTER VYTORIN IS FINISHED) 90 Tab 4    omeprazole (PRILOSEC) 40 mg capsule TAKE 1 CAPSULE DAILY 90 Cap 4    atenolol (TENORMIN) 50 mg tablet TAKE 1 TABLET DAILY 90 Tab 4    tamsulosin (FLOMAX) 0.4 mg capsule Take 1 Cap by mouth daily. 30 Cap 0     Allergies   Allergen Reactions    Ace Inhibitors Swelling       Family History   Problem Relation Age of Onset   [facility] Arthritis-osteo Mother     No Known Problems Father      Social History     Tobacco Use    Smoking status: Former Smoker     Packs/day: 1.00     Years: 16.00     Pack years: 16.00     Types: Cigarettes     Last attempt to quit:      Years since quittin.6    Smokeless tobacco: Never Used   Substance Use Topics    Alcohol use: Yes     Alcohol/week: 0.0 standard drinks     Comment: rarely       Depression Risk Factor Screening:     3 most recent PHQ Screens 2020   PHQ Not Done -   Little interest or pleasure in doing things Not at all   Feeling down, depressed, irritable, or hopeless Not at all   Total Score PHQ 2 0   Trouble falling or staying asleep, or sleeping too much -   Feeling tired or having little energy -   Poor appetite, weight loss, or overeating -   Feeling bad about yourself - or that you are a failure or have let yourself or your family down -   Trouble concentrating on things such as school, work, reading, or watching TV -   Moving or speaking so slowly that other people could have noticed; or the opposite being so fidgety that others notice -   Thoughts of being better off dead, or hurting yourself in some way -   PHQ 9 Score -   How difficult have these problems made it for you to do your work, take care of your home and get along with others -       Alcohol Risk Factor Screening (MALE > 65): Do you average more 1 drink per night or more than 7 drinks a week: No    In the past three months have you have had more than 4 drinks containing alcohol on one occasion: No      Functional Ability and Level of Safety:   Hearing: Hearing is good. Activities of Daily Living:   The home contains: has a stair chair  Patient does total self care     Ambulation: with mild difficulty     Fall Risk:  Fall Risk Assessment, last 12 mths 2/25/2020   Able to walk? Yes   Fall in past 12 months? No     Abuse Screen:  Patient is not abused       Cognitive Screening   Has your family/caregiver stated any concerns about your memory: no        Patient Care Team   Patient Care Team:  Krishna Swain DO as PCP - General (Family Medicine)  Krishna Swain DO as PCP [facility]  Allison Severe, MD (Orthopedic Surgery)  Win Escobar MD (Orthopedic Surgery)  Beata Thomas MD (Orthopedic Surgery)  Trudi Reese MD (Orthopedic Surgery)  Mary Rowe MD as Physician (Cardiology)  Mayr Rowe MD (Cardiology)    Assessment/Plan   Education and counseling provided:  Are appropriate based on today's review and evaluation    Diagnoses and all orders for this visit:    1. Diabetes mellitus type 2 in nonobese (HCC)  -     METABOLIC PANEL, COMPREHENSIVE; Future  -     LIPID PANEL; Future  -     MICROALBUMIN, UR, RAND W/ MICROALB/CREAT RATIO; Future  -     HEMOGLOBIN A1C WITH EAG; Future    2. Dyslipidemia  -     METABOLIC PANEL, COMPREHENSIVE; Future  -     LIPID PANEL; Future    3. Hypertension, benign  -     METABOLIC PANEL, COMPREHENSIVE; Future    4. Anemia, unspecified type  -     CBC WITH AUTOMATED DIFF; Future  -     IRON PROFILE; Future    5. Osteoarthritis, unspecified osteoarthritis type, unspecified site  -     oxyCODONE-acetaminophen (PERCOCET) 5-325 mg per tablet; Take 1 Tab by mouth daily as needed for Pain for up to 30 days. Indications: pain  -     oxyCODONE-acetaminophen (PERCOCET) 5-325 mg per tablet; Take 1-2 Tabs by mouth daily as needed for Pain for up to 30 days. Indications: pain  -     oxyCODONE-acetaminophen (PERCOCET) 5-325 mg per tablet; Take 1 Tab by mouth daily as needed for Pain for up to 30 days. 6. Lumbar disc disease  -     oxyCODONE-acetaminophen (PERCOCET) 5-325 mg per tablet; Take 1 Tab by mouth daily as needed for Pain for up to 30 days. Indications: pain  -     oxyCODONE-acetaminophen (PERCOCET) 5-325 mg per tablet; Take 1-2 Tabs by mouth daily as needed for Pain for up to 30 days. Indications: pain  -     oxyCODONE-acetaminophen (PERCOCET) 5-325 mg per tablet; Take 1 Tab by mouth daily as needed for Pain for up to 30 days. 7. Medicare annual wellness visit, subsequent    8. Screening for alcoholism  -     [place] ANNUAL ALCOHOL SCREEN 15 MIN    9. Gastroesophageal reflux disease, esophagitis presence not specified        Health Maintenance Due   Topic Date Due    Shingrix Vaccine Age 49> (1 of 2) 02/19/1992    Foot Exam Q1  02/21/2019    Influenza Age 5 to Adult  08/01/2020    Medicare Yearly Exam  08/27/2020    MICROALBUMIN Q1  08/27/2020       Nicolle Evans, who was evaluated through a synchronous (real-time) audio-video encounter, and/or his healthcare decision maker, is aware that it is a billable service, with coverage as determined by his insurance carrier. He provided verbal consent to proceed: Yes, and patient identification was verified. It was conducted pursuant to the emergency declaration under the [facility], [address] [place] [address] [facility] authority and the [employer] Act. A caregiver was present when appropriate. Ability to conduct physical exam was limited. I was at home. The patient was at home.     Josephine King DO

## 2020-08-07 NOTE — PATIENT INSTRUCTIONS
Medicare Wellness Visit, Male The best way to live healthy is to have a lifestyle where you eat a well-balanced diet, exercise regularly, limit alcohol use, and quit all forms of tobacco/nicotine, if applicable. Regular preventive services are another way to keep healthy. Preventive services (vaccines, screening tests, monitoring & exams) can help personalize your care plan, which helps you manage your own care. Screening tests can find health problems at the earliest stages, when they are easiest to treat. [facility] follows the current, evidence-based guidelines published by the [facility] ([facility]) when recommending preventive services for our patients. Because we follow these guidelines, sometimes recommendations change over time as research supports it. (For example, a prostate screening blood test is no longer routinely recommended for men with no symptoms). Of course, you and your doctor may decide to screen more often for some diseases, based on your risk and co-morbidities (chronic disease you are already diagnosed with). Preventive services for you include: - Medicare offers their members a free annual wellness visit, which is time for you and your primary care provider to discuss and plan for your preventive service needs. Take advantage of this benefit every year! 
-All adults over age 72 should receive the recommended pneumonia vaccines. Current USPSTF guidelines recommend a series of two vaccines for the best pneumonia protection.  
-All adults should have a flu vaccine yearly and tetanus vaccine every 10 years. 
-All adults age 48 and older should receive the shingles vaccines (series of two vaccines).       
-All adults age 38-68 who are overweight should have a diabetes screening test once every three years.  
-Other screening tests & preventive services for persons with diabetes include: an eye exam to screen for diabetic retinopathy, a kidney function test, a foot exam, and stricter control over your cholesterol.  
-Cardiovascular screening for adults with routine risk involves an electrocardiogram (ECG) at intervals determined by the provider.  
-Colorectal cancer screening should be done for adults age 54-65 with no increased risk factors for colorectal cancer. There are a number of acceptable methods of screening for this type of cancer. Each test has its own benefits and drawbacks. Discuss with your provider what is most appropriate for you during your annual wellness visit. The different tests include: colonoscopy (considered the best screening method), a fecal occult blood test, a fecal DNA test, and sigmoidoscopy. 
-All adults born between [facility] should be screened once for Hepatitis C. 
-An Abdominal Aortic Aneurysm (AAA) Screening is recommended for men age 73-68 who has ever smoked in their lifetime. Here is a list of your current Health Maintenance items (your personalized list of preventive services) with a due date: 
Health Maintenance Due Topic Date Due  Shingles Vaccine (1 of 2) 02/19/1992 [facility] Diabetic Foot Care  02/21/2019  Flu Vaccine  08/01/2020 [facility] Annual Well Visit  08/27/2020  Albumin Urine Test  08/27/2020

## 2020-08-10 ENCOUNTER — HOSPITAL ENCOUNTER (OUTPATIENT)
Dept: CARDIAC REHAB | Age: 80
Discharge: HOME | End: 2020-08-10
Payer: MEDICARE

## 2020-08-10 VITALS — BODY MASS INDEX: 26.18 KG/M2 | WEIGHT: 172.2 LBS

## 2020-08-10 PROCEDURE — 93798 PHYS/QHP OP CAR RHAB W/ECG: CPT

## 2020-08-12 ENCOUNTER — HOSPITAL ENCOUNTER (OUTPATIENT)
Dept: CARDIAC REHAB | Age: 80
Discharge: HOME | End: 2020-08-12
Payer: MEDICARE

## 2020-08-12 VITALS — BODY MASS INDEX: 26.82 KG/M2 | WEIGHT: 176.36 LBS

## 2020-08-12 PROCEDURE — 93798 PHYS/QHP OP CAR RHAB W/ECG: CPT

## 2020-08-13 ENCOUNTER — HOSPITAL ENCOUNTER (OUTPATIENT)
Dept: LAB | Age: 80
Discharge: HOME | End: 2020-08-13

## 2020-08-13 DIAGNOSIS — E78.5 DYSLIPIDEMIA: ICD-10-CM

## 2020-08-13 DIAGNOSIS — I10 HYPERTENSION, BENIGN: ICD-10-CM

## 2020-08-13 DIAGNOSIS — D64.9 ANEMIA, UNSPECIFIED TYPE: ICD-10-CM

## 2020-08-13 DIAGNOSIS — E11.9 DIABETES MELLITUS TYPE 2 IN NONOBESE (HCC): ICD-10-CM

## 2020-08-13 LAB
ALBUMIN SERPL-MCNC: 3.6 G/DL (ref 3.5–5)
ALBUMIN/GLOB SERPL: 1.1 {RATIO} (ref 1.1–2.2)
ALP SERPL-CCNC: 90 U/L (ref 45–117)
ALT SERPL-CCNC: 11 U/L (ref 12–78)
ANION GAP SERPL CALC-SCNC: 7 MMOL/L (ref 5–15)
AST SERPL-CCNC: 11 U/L (ref 15–37)
BASOPHILS # BLD: 0 K/UL (ref 0–0.1)
BASOPHILS NFR BLD: 1 % (ref 0–1)
BILIRUB SERPL-MCNC: 0.3 MG/DL (ref 0.2–1)
BUN SERPL-MCNC: 9 MG/DL (ref 6–20)
BUN/CREAT SERPL: 10 (ref 12–20)
CALCIUM SERPL-MCNC: 8.7 MG/DL (ref 8.5–10.1)
CHLORIDE SERPL-SCNC: 108 MMOL/L (ref 97–108)
CHOLEST SERPL-MCNC: 139 MG/DL
CO2 SERPL-SCNC: 25 MMOL/L (ref 21–32)
CREAT SERPL-MCNC: 0.94 MG/DL (ref 0.7–1.3)
CREAT UR-MCNC: 187 MG/DL
DIFFERENTIAL METHOD BLD: ABNORMAL
EOSINOPHIL # BLD: 0.1 K/UL (ref 0–0.4)
EOSINOPHIL NFR BLD: 1 % (ref 0–7)
ERYTHROCYTE [DISTWIDTH] IN BLOOD BY AUTOMATED COUNT: 18.7 % (ref 11.5–14.5)
EST. AVERAGE GLUCOSE BLD GHB EST-MCNC: 128 MG/DL
GLOBULIN SER CALC-MCNC: 3.2 G/DL (ref 2–4)
GLUCOSE SERPL-MCNC: 85 MG/DL (ref 65–100)
HBA1C MFR BLD: 6.1 % (ref 4–5.6)
HCT VFR BLD AUTO: 36.5 % (ref 36.6–50.3)
HDLC SERPL-MCNC: 53 MG/DL
HDLC SERPL: 2.6 {RATIO} (ref 0–5)
HGB BLD-MCNC: 10.6 G/DL (ref 12.1–17)
IMM GRANULOCYTES # BLD AUTO: 0 K/UL (ref 0–0.04)
IMM GRANULOCYTES NFR BLD AUTO: 0 % (ref 0–0.5)
IRON SATN MFR SERPL: 6 % (ref 20–50)
IRON SERPL-MCNC: 26 UG/DL (ref 35–150)
LDLC SERPL CALC-MCNC: 69.8 MG/DL (ref 0–100)
LIPID PROFILE,FLP: NORMAL
LYMPHOCYTES # BLD: 2.5 K/UL (ref 0.8–3.5)
LYMPHOCYTES NFR BLD: 39 % (ref 12–49)
MCH RBC QN AUTO: 24.7 PG (ref 26–34)
MCHC RBC AUTO-ENTMCNC: 29 G/DL (ref 30–36.5)
MCV RBC AUTO: 85.1 FL (ref 80–99)
MICROALBUMIN UR-MCNC: 1.59 MG/DL
MICROALBUMIN/CREAT UR-RTO: 9 MG/G (ref 0–30)
MONOCYTES # BLD: 0.6 K/UL (ref 0–1)
MONOCYTES NFR BLD: 9 % (ref 5–13)
NEUTS SEG # BLD: 3.2 K/UL (ref 1.8–8)
NEUTS SEG NFR BLD: 50 % (ref 32–75)
NRBC # BLD: 0 K/UL (ref 0–0.01)
NRBC BLD-RTO: 0 PER 100 WBC
PLATELET # BLD AUTO: 354 K/UL (ref 150–400)
PMV BLD AUTO: 10.2 FL (ref 8.9–12.9)
POTASSIUM SERPL-SCNC: 4.3 MMOL/L (ref 3.5–5.1)
PROT SERPL-MCNC: 6.8 G/DL (ref 6.4–8.2)
RBC # BLD AUTO: 4.29 M/UL (ref 4.1–5.7)
SODIUM SERPL-SCNC: 140 MMOL/L (ref 136–145)
TIBC SERPL-MCNC: 439 UG/DL (ref 250–450)
TRIGL SERPL-MCNC: 81 MG/DL (ref ?–150)
VLDLC SERPL CALC-MCNC: 16.2 MG/DL
WBC # BLD AUTO: 6.5 K/UL (ref 4.1–11.1)

## 2020-08-14 ENCOUNTER — APPOINTMENT (OUTPATIENT)
Dept: CARDIAC REHAB | Age: 80
End: 2020-08-14
Payer: MEDICARE

## 2020-08-14 RX ORDER — FOLIC ACID 1 MG/1
TABLET ORAL
Qty: 90 TAB | Refills: 1 | Status: SHIPPED | OUTPATIENT
Start: 2020-08-14 | End: 2020-08-25 | Stop reason: SDUPTHER

## 2020-08-14 NOTE — PROGRESS NOTES
Diabetes has improved now at 6.1 down from 6.6. Keep up the good work    Cholesterol looks great. Anemia is stable. But your iron is low so ensure you are taking a multivitamin with iron every day.     Recheck labs 6 months

## 2020-08-17 ENCOUNTER — HOSPITAL ENCOUNTER (OUTPATIENT)
Dept: CARDIAC REHAB | Age: 80
Discharge: HOME | End: 2020-08-17
Payer: MEDICARE

## 2020-08-17 VITALS — WEIGHT: 176.4 LBS | BODY MASS INDEX: 26.82 KG/M2

## 2020-08-17 PROCEDURE — 93798 PHYS/QHP OP CAR RHAB W/ECG: CPT

## 2020-08-19 ENCOUNTER — HOSPITAL ENCOUNTER (OUTPATIENT)
Dept: CARDIAC REHAB | Age: 80
Discharge: HOME | End: 2020-08-19
Payer: MEDICARE

## 2020-08-19 VITALS — BODY MASS INDEX: 25.88 KG/M2 | WEIGHT: 170.2 LBS

## 2020-08-19 PROCEDURE — 93798 PHYS/QHP OP CAR RHAB W/ECG: CPT

## 2020-08-21 ENCOUNTER — HOSPITAL ENCOUNTER (OUTPATIENT)
Dept: CARDIAC REHAB | Age: 80
Discharge: HOME | End: 2020-08-21
Payer: MEDICARE

## 2020-08-21 VITALS — BODY MASS INDEX: 25.88 KG/M2 | WEIGHT: 170.2 LBS

## 2020-08-21 PROCEDURE — 93798 PHYS/QHP OP CAR RHAB W/ECG: CPT

## 2020-08-25 ENCOUNTER — HOSPITAL ENCOUNTER (OUTPATIENT)
Dept: CARDIAC REHAB | Age: 80
Discharge: HOME | End: 2020-08-25
Payer: MEDICARE

## 2020-08-25 VITALS — WEIGHT: 170.2 LBS | BODY MASS INDEX: 25.88 KG/M2

## 2020-08-25 PROCEDURE — 93798 PHYS/QHP OP CAR RHAB W/ECG: CPT

## 2020-08-25 RX ORDER — FOLIC ACID 1 MG/1
TABLET ORAL
Qty: 90 TAB | Refills: 3 | Status: ON HOLD | OUTPATIENT
Start: 2020-08-25 | End: 2021-06-02

## 2020-08-27 ENCOUNTER — APPOINTMENT (OUTPATIENT)
Dept: CARDIAC REHAB | Age: 80
End: 2020-08-27
Payer: MEDICARE

## 2020-09-01 ENCOUNTER — HOSPITAL ENCOUNTER (OUTPATIENT)
Dept: CARDIAC REHAB | Age: 80
Discharge: HOME | End: 2020-09-01
Payer: MEDICARE

## 2020-09-01 VITALS — WEIGHT: 170.4 LBS | BODY MASS INDEX: 25.91 KG/M2

## 2020-09-01 PROCEDURE — 93798 PHYS/QHP OP CAR RHAB W/ECG: CPT

## 2020-09-08 ENCOUNTER — HOSPITAL ENCOUNTER (OUTPATIENT)
Dept: CARDIAC REHAB | Age: 80
Discharge: HOME | End: 2020-09-08
Payer: MEDICARE

## 2020-09-08 VITALS — BODY MASS INDEX: 26.03 KG/M2 | WEIGHT: 171.2 LBS

## 2020-09-08 PROCEDURE — 93798 PHYS/QHP OP CAR RHAB W/ECG: CPT

## 2020-09-10 ENCOUNTER — APPOINTMENT (OUTPATIENT)
Dept: CARDIAC REHAB | Age: 80
End: 2020-09-10
Payer: MEDICARE

## 2020-09-14 DIAGNOSIS — I25.118 CORONARY ARTERY DISEASE OF NATIVE ARTERY OF NATIVE HEART WITH STABLE ANGINA PECTORIS (HCC): ICD-10-CM

## 2020-09-14 RX ORDER — RANOLAZINE 500 MG/1
500 TABLET, EXTENDED RELEASE ORAL 2 TIMES DAILY
Qty: 60 TAB | Refills: 3 | Status: SHIPPED | OUTPATIENT
Start: 2020-09-14 | End: 2020-12-15

## 2020-09-15 ENCOUNTER — HOSPITAL ENCOUNTER (OUTPATIENT)
Dept: CARDIAC REHAB | Age: 80
Discharge: HOME | End: 2020-09-15
Payer: MEDICARE

## 2020-09-15 VITALS — BODY MASS INDEX: 26.4 KG/M2 | WEIGHT: 173.6 LBS

## 2020-09-15 PROCEDURE — 93798 PHYS/QHP OP CAR RHAB W/ECG: CPT

## 2020-09-22 ENCOUNTER — HOSPITAL ENCOUNTER (OUTPATIENT)
Dept: CARDIAC REHAB | Age: 80
Discharge: HOME | End: 2020-09-22
Payer: MEDICARE

## 2020-09-22 ENCOUNTER — TELEPHONE (OUTPATIENT)
Dept: FAMILY MEDICINE CLINIC | Age: 80
End: 2020-09-22

## 2020-09-22 VITALS — WEIGHT: 175 LBS | BODY MASS INDEX: 26.61 KG/M2

## 2020-09-22 PROCEDURE — 93798 PHYS/QHP OP CAR RHAB W/ECG: CPT

## 2020-09-22 RX ORDER — IPRATROPIUM BROMIDE 21 UG/1
2 SPRAY, METERED NASAL EVERY 12 HOURS
Qty: 30 ML | Refills: 5 | Status: SHIPPED | OUTPATIENT
Start: 2020-09-22 | End: 2021-05-24

## 2020-09-22 NOTE — TELEPHONE ENCOUNTER
Wife Ty Killian states at last vv 08/07/20 it was discuss about phlegm in patient's throat. The claritin has not helped. He choked really bad yesterday on his phlegm. Can you sent something to [facility] on file to help with his phlegm & choking?

## 2020-09-24 ENCOUNTER — APPOINTMENT (OUTPATIENT)
Dept: CARDIAC REHAB | Age: 80
End: 2020-09-24
Payer: MEDICARE

## 2020-09-29 ENCOUNTER — APPOINTMENT (OUTPATIENT)
Dept: CARDIAC REHAB | Age: 80
End: 2020-09-29
Payer: MEDICARE

## 2020-10-01 ENCOUNTER — HOSPITAL ENCOUNTER (OUTPATIENT)
Dept: CARDIAC REHAB | Age: 80
Discharge: HOME | End: 2020-10-01
Payer: MEDICARE

## 2020-10-01 VITALS — WEIGHT: 174.3 LBS | BODY MASS INDEX: 26.5 KG/M2

## 2020-10-01 PROCEDURE — 93798 PHYS/QHP OP CAR RHAB W/ECG: CPT

## 2020-10-06 ENCOUNTER — HOSPITAL ENCOUNTER (OUTPATIENT)
Dept: CARDIAC REHAB | Age: 80
Discharge: HOME | End: 2020-10-06
Payer: MEDICARE

## 2020-10-06 VITALS — WEIGHT: 174.3 LBS | BODY MASS INDEX: 26.5 KG/M2

## 2020-10-06 PROCEDURE — 93798 PHYS/QHP OP CAR RHAB W/ECG: CPT

## 2020-10-08 ENCOUNTER — APPOINTMENT (OUTPATIENT)
Dept: CARDIAC REHAB | Age: 80
End: 2020-10-08
Payer: MEDICARE

## 2020-10-13 ENCOUNTER — HOSPITAL ENCOUNTER (OUTPATIENT)
Dept: CARDIAC REHAB | Age: 80
Discharge: HOME | End: 2020-10-13
Payer: MEDICARE

## 2020-10-13 VITALS — WEIGHT: 174.6 LBS | BODY MASS INDEX: 26.55 KG/M2

## 2020-10-13 PROCEDURE — 93798 PHYS/QHP OP CAR RHAB W/ECG: CPT

## 2020-10-15 ENCOUNTER — HOSPITAL ENCOUNTER (OUTPATIENT)
Dept: CARDIAC REHAB | Age: 80
Discharge: HOME | End: 2020-10-15
Payer: MEDICARE

## 2020-10-15 VITALS — BODY MASS INDEX: 26.55 KG/M2 | WEIGHT: 174.6 LBS

## 2020-10-15 PROCEDURE — 93798 PHYS/QHP OP CAR RHAB W/ECG: CPT

## 2020-10-20 ENCOUNTER — HOSPITAL ENCOUNTER (OUTPATIENT)
Dept: CARDIAC REHAB | Age: 80
Discharge: HOME | End: 2020-10-20
Payer: MEDICARE

## 2020-10-20 VITALS — BODY MASS INDEX: 26.5 KG/M2 | WEIGHT: 174.3 LBS

## 2020-10-20 PROCEDURE — 93798 PHYS/QHP OP CAR RHAB W/ECG: CPT

## 2020-10-22 ENCOUNTER — APPOINTMENT (OUTPATIENT)
Dept: CARDIAC REHAB | Age: 80
End: 2020-10-22
Payer: MEDICARE

## 2020-10-27 ENCOUNTER — HOSPITAL ENCOUNTER (OUTPATIENT)
Dept: CARDIAC REHAB | Age: 80
Discharge: HOME | End: 2020-10-27
Payer: MEDICARE

## 2020-10-27 PROCEDURE — 93798 PHYS/QHP OP CAR RHAB W/ECG: CPT

## 2020-10-28 ENCOUNTER — OFFICE VISIT (OUTPATIENT)
Dept: CARDIOLOGY CLINIC | Age: 80
End: 2020-10-28
Payer: MEDICARE

## 2020-10-28 VITALS
HEART RATE: 60 BPM | DIASTOLIC BLOOD PRESSURE: 82 MMHG | SYSTOLIC BLOOD PRESSURE: 136 MMHG | WEIGHT: 174 LBS | BODY MASS INDEX: 26.37 KG/M2 | HEIGHT: 68 IN | OXYGEN SATURATION: 97 %

## 2020-10-28 DIAGNOSIS — I20.9 ANGINA, CLASS III (HCC): ICD-10-CM

## 2020-10-28 DIAGNOSIS — Z98.890 S/P CARDIAC CATH: ICD-10-CM

## 2020-10-28 DIAGNOSIS — E78.5 DYSLIPIDEMIA: ICD-10-CM

## 2020-10-28 DIAGNOSIS — I25.118 CORONARY ARTERY DISEASE OF NATIVE ARTERY OF NATIVE HEART WITH STABLE ANGINA PECTORIS (HCC): Primary | ICD-10-CM

## 2020-10-28 DIAGNOSIS — G47.33 OBSTRUCTIVE SLEEP APNEA: ICD-10-CM

## 2020-10-28 DIAGNOSIS — R06.09 DOE (DYSPNEA ON EXERTION): ICD-10-CM

## 2020-10-28 DIAGNOSIS — I10 HYPERTENSION, BENIGN: ICD-10-CM

## 2020-10-28 DIAGNOSIS — E11.9 DIABETES MELLITUS TYPE 2 IN NONOBESE (HCC): ICD-10-CM

## 2020-10-28 PROCEDURE — 99214 OFFICE O/P EST MOD 30 MIN: CPT | Performed by: NURSE PRACTITIONER

## 2020-10-28 PROCEDURE — G8419 CALC BMI OUT NRM PARAM NOF/U: HCPCS | Performed by: NURSE PRACTITIONER

## 2020-10-28 PROCEDURE — G8752 SYS BP LESS 140: HCPCS | Performed by: NURSE PRACTITIONER

## 2020-10-28 PROCEDURE — G8432 DEP SCR NOT DOC, RNG: HCPCS | Performed by: NURSE PRACTITIONER

## 2020-10-28 PROCEDURE — G0463 HOSPITAL OUTPT CLINIC VISIT: HCPCS | Performed by: NURSE PRACTITIONER

## 2020-10-28 PROCEDURE — G8536 NO DOC ELDER MAL SCRN: HCPCS | Performed by: NURSE PRACTITIONER

## 2020-10-28 PROCEDURE — G8427 DOCREV CUR MEDS BY ELIG CLIN: HCPCS | Performed by: NURSE PRACTITIONER

## 2020-10-28 PROCEDURE — G8754 DIAS BP LESS 90: HCPCS | Performed by: NURSE PRACTITIONER

## 2020-10-28 PROCEDURE — 1101F PT FALLS ASSESS-DOCD LE1/YR: CPT | Performed by: NURSE PRACTITIONER

## 2020-10-28 RX ORDER — ALBUTEROL SULFATE 90 UG/1
AEROSOL, METERED RESPIRATORY (INHALATION)
COMMUNITY
Start: 2020-10-19 | End: 2021-05-24

## 2020-10-28 RX ORDER — ISOSORBIDE MONONITRATE 30 MG/1
30 TABLET, EXTENDED RELEASE ORAL
COMMUNITY
End: 2021-06-04

## 2020-10-28 RX ORDER — ACETAMINOPHEN AND CODEINE PHOSPHATE 300; 30 MG/1; MG/1
TABLET ORAL
COMMUNITY
Start: 2020-06-01 | End: 2021-05-24

## 2020-10-28 RX ORDER — SILDENAFIL 100 MG/1
100 TABLET, FILM COATED ORAL AS NEEDED
COMMUNITY
End: 2021-05-24

## 2020-10-28 RX ORDER — AMLODIPINE BESYLATE 5 MG/1
5 TABLET ORAL DAILY
COMMUNITY
End: 2021-05-24

## 2020-10-28 NOTE — PROGRESS NOTES
[id number] [facility], [address] [facility]    Office [phone]  Fax [phone]       Layla Borrero is a 68 y.o. male last seen by Dr. Miguel Cuenca 3 months ago. Diagnoses    Encounter Diagnoses     ICD-10-CM ICD-9-CM   1. Coronary artery disease of native artery of native heart with stable angina pectoris ([facility] [address]  I25.118 414.01     413.9   2. Hypertension, benign  I10 401.1   3. Diabetes mellitus type 2 in nonobese (HCC)  E11.9 250.00   4. Obstructive sleep apnea  G47.33 327.23   5. Angina, class III ([facility])  I20.9 413.9   6. Dyslipidemia  E78.5 272.4   7. RG (dyspnea on exertion)  R06.00 786.09   8. S/P cardiac cath  Z98.890 V45.89       Assessment/Recommendations:  CAD - with multivessel PCI's dating back to 1996, most recently PCI 2011 to [facility] [place] with LILIAN.  Updated cath 1/7/20 demonstrated progressive LCX disease with  OM2, patent RCA stents. Attempted PCI OM  was unsuccessful by Dr. Claudetta Riding and later unsuccessful by Dr. Zeferino Quispe May 2020. Vessel is relatively small and epicardial retrograde approach was not recommended. He continues to have class 3 RG, but also is functionally limited by his chronic low back pain. Nitrates have not helped much in the past and were ultimately discontinued due to lightheadedness. No change in symptoms following 3 months of cardiac rehab. No improvement with PRN Albuterol inhaler.    - Increase Ranexa to 1000 mg bid. He will MyChart message me in 1-2 weeks to review sxs again.   - Continue DAPT  - Continue Amlodipine 10mg/d, Atenolol 50mg/d    Dyspnea - persistent  - Continue evaluation with Pulmonary  - Continue PPI, if this is GI in origin.   - Increased Ranexa as above, if this is coronary in origin.   - Recent CBC 8/2020 with stable low Hgb. No signs of bleeding on DAPT, but this could be contributing to his sxs     HTN - normotensive on current medication regimen.       ADELA - continue nightly CPAP     Dyslipidemia.  Updated labs from 8/2020 with LDL of 69.   - Continue Atorva 40mg/d      Chronic low back pain s/p multiple back surgeries, most recently Sept 2019. S/p recent DMITRY without much relief    F/U TBD          Subjective:  Mr. Morris Neff has remained active with thrice weekly cardiac rehab. He recently was discharge after completing the program.  He reports that he felt good while exercising (weights, bike, stretching), but he continues to have issue with limiting dyspnea with walking short distances. Ex. Walking from his car in parking lot today to the front door. He had to sit down and cath his breath. It is associated with chest pressure at times. He denies any cough, wheezing or orthopnea. Recently seen by Pulmonologist Dr. Roosevelt Parrish. Rx Albuterol inhaler, which he has been using for a week now. He denies any change in his sxs with the inhaler, but is scheduled to return to see her again tomorrow. He denies any lightheadedness of dizziness. Imdur discontinued in the past due to dizziness. No bleeding or bruising concerns on ASA and Plavix. Routine lab work and lipids per Dr. Pete Lion. Cardiac testing  Cardiolite (2/04):scheduled for comparison from previous stress pre PCI - similar study with no new acute changes. Exercise Cardiolite (8/29/11):  Reversible mid-distal inferolateral defect.  No fixed defects.  EF 63%. Lexiscan Cardiolite 12/18/19 - Normal perfusion study. EF 59%.   Echo 12/27/19 - Mild LVH, EF 60-65%, mild MR, PA 51 mmg Hg      Past Medical History:   Diagnosis Date    CAD (coronary artery disease)     Degenerative joint disease 1/28/2011    Diabetes mellitus ([facility] [address]     Essential hypertension     History of back surgery 09/2019    Hyperlipidemia     Kidney stones 1969    Obstructive sleep apnea 01/28/2011    Stopped using CPAP years ago        Social History     Socioeconomic History    Marital status:      Spouse name: Not on file    Number of children: Not on file    Years of education: Not on file    Highest education level: Not on file   Tobacco Use    Smoking status: Former Smoker     Packs/day: 1.00     Years: 16.00     Pack years: 16.00     Types: Cigarettes     Last attempt to quit:      Years since quittin.8    Smokeless tobacco: Never Used   Substance and Sexual Activity    Alcohol use: Yes     Alcohol/week: 0.0 standard drinks     Comment: rarely    Drug use: No    Sexual activity: Yes     Partners: Female       Current Outpatient Medications   Medication Sig Dispense Refill    ipratropium (ATROVENT) 0.03 % nasal spray 2 Sprays by Both Nostrils route every twelve (12) hours. 30 mL 5    ranolazine ER (Ranexa) 500 mg SR tablet Take 1 Tab by mouth two (2) times a day. 60 Tab 3    folic acid (FOLVITE) 1 mg tablet TAKE 1 TABLET DAILY, EXCEPT DAY THAT METHOTREXATE IS TAKEN 90 Tab 3    oxyCODONE-acetaminophen (PERCOCET) 5-325 mg per tablet Take 1 Tab by mouth daily as needed for Pain for up to 30 days. Indications: pain 30 Tab 0    metFORMIN (GLUCOPHAGE) 1,000 mg tablet Please restart Metformin on 2020 (Patient taking differently: two (2) times daily (with meals). Please restart Metformin on 2020) 180 Tab 4    clopidogreL (Plavix) 75 mg tab Take 1 Tab by mouth daily. 90 Tab 3    AVAPRO 150 mg tablet TAKE 1 TABLET NIGHTLY 90 Tab 4    aspirin delayed-release 81 mg tablet Take  by mouth daily.  ascorbic acid, vitamin C, (VITAMIN C) 500 mg tablet Take 500 mg by mouth two (2) times a day. + Vit D3      multivitamin (ONE A DAY) tablet Take 1 Tab by mouth daily.  atorvastatin (LIPITOR) 40 mg tablet TAKE 1 TABLET DAILY (START LIPITOR AFTER VYTORIN IS FINISHED) 90 Tab 4    omeprazole (PRILOSEC) 40 mg capsule TAKE 1 CAPSULE DAILY 90 Cap 4    atenolol (TENORMIN) 50 mg tablet TAKE 1 TABLET DAILY 90 Tab 4    tamsulosin (FLOMAX) 0.4 mg capsule Take 1 Cap by mouth daily.  30 Cap 0       Allergies   Allergen Reactions    Ace Inhibitors Swelling        Review of Symptoms:  Constitutional: Negative for fever, chills, malaise/fatigue and diaphoresis. Respiratory: Negative for cough, hemoptysis, sputum production, and wheezing. +RG  Cardiovascular: Negative for chest pain, palpitations, orthopnea, claudication, leg swelling and PND. Gastrointestinal: Negative for heartburn, nausea, vomiting, blood in stool and melena. Genitourinary: Negative for dysuria and flank pain. Musculoskeletal: Negative for joint pain and back pain. Skin: Negative for rash. Neurological: Negative for focal weakness, seizures, loss of consciousness, weakness and headaches. Endo/Heme/Allergies: Does not bruise/bleed easily. Psychiatric/Behavioral: Negative for memory loss. The patient does not have insomnia. Physical Exam  Visit Vitals  /82 (BP 1 Location: Right arm, BP Patient Position: Sitting)   Pulse 60   Ht 5' 8\" (1.727 m)   Wt 174 lb (78.9 kg)   SpO2 97%   BMI 26.46 kg/m²        Wt Readings from Last 3 Encounters:   10/28/20 174 lb (78.9 kg)   10/20/20 174 lb 4.8 oz (79.1 kg)   10/15/20 174 lb 9.6 oz (79.2 kg)     General - WDWN  HEENT: Eyes without jaundice, Hearing intact  Neck - JVP normal, thyroid nl  Cardiac - normal S1,S2, no murmurs, rubs or gallops.  No clicks  Vascular - carotids without bruits, radials, femorals and pedal pulses equal bilateral  Lungs - clear to auscultation bilaterals, no rales ,wheezing or rhonchi  Abd - soft nontender, no HSM, no abd bruits  Extremities - no edema  Neuro - nonfocal, normal gait  Psych - mood and affect normal    Labs:  Lab Results   Component Value Date/Time    Sodium 140 08/13/2020 02:57 PM    Potassium 4.3 08/13/2020 02:57 PM    Chloride 108 08/13/2020 02:57 PM    CO2 25 08/13/2020 02:57 PM    Anion gap 7 08/13/2020 02:57 PM    Glucose 85 08/13/2020 02:57 PM    BUN 9 08/13/2020 02:57 PM    Creatinine 0.94 08/13/2020 02:57 PM    BUN/Creatinine ratio 10 (L) 08/13/2020 02:57 PM    GFR est AA >60 08/13/2020 02:57 PM    GFR est non-AA >60 08/13/2020 02:57 PM    Calcium 8.7 08/13/2020 02:57 PM    Bilirubin, total 0.3 08/13/2020 02:57 PM    Alk.  phosphatase 90 08/13/2020 02:57 PM    Protein, total 6.8 08/13/2020 02:57 PM    Albumin 3.6 08/13/2020 02:57 PM    Globulin 3.2 08/13/2020 02:57 PM    A-G Ratio 1.1 08/13/2020 02:57 PM    ALT (SGPT) 11 (L) 08/13/2020 02:57 PM    AST (SGOT) 11 (L) 08/13/2020 02:57 PM     Lab Results   Component Value Date/Time    Cholesterol, total 139 08/13/2020 02:57 PM    HDL Cholesterol 53 08/13/2020 02:57 PM    LDL, calculated 69.8 08/13/2020 02:57 PM    VLDL, calculated 16.2 08/13/2020 02:57 PM    Triglyceride 81 08/13/2020 02:57 PM    CHOL/HDL Ratio 2.6 08/13/2020 02:57 PM     Lab Results   Component Value Date/Time    WBC 6.5 08/13/2020 02:57 PM    HGB 10.6 (L) 08/13/2020 02:57 PM    HCT 36.5 (L) 08/13/2020 02:57 PM    PLATELET 641 08/28/1404 02:57 PM    MCV 85.1 08/13/2020 02:57 PM         Dilcia Desir NP

## 2020-10-28 NOTE — PROGRESS NOTES
Chief Complaint   Patient presents with    Follow-up    Coronary Artery Disease    Cholesterol Problem    Hypertension     Visit Vitals  /82 (BP 1 Location: Right arm, BP Patient Position: Sitting)   Pulse 60   Ht 5' 8\" (1.727 m)   Wt 174 lb (78.9 kg)   SpO2 97%   BMI 26.46 kg/m²       Patient finished Cardiac Rehab yesterday    Chest pain denied  SOB - with activity  Dizziness denied  Swelling/Edema - denied  Recent hospital visit denied

## 2020-10-28 NOTE — PATIENT INSTRUCTIONS
To see if this is a cardiac issue I am going to recommend that we increase your Ranexa to 1000 mg twice daily. Please take 2 of the 500 mg capsules. Stay active with walking and light yard work and washing the car. Please MyChart me in 1 week to let me know how you are doing on the higher dose of medication.

## 2020-10-29 ENCOUNTER — APPOINTMENT (OUTPATIENT)
Dept: CARDIAC REHAB | Age: 80
End: 2020-10-29
Payer: MEDICARE

## 2020-11-03 ENCOUNTER — APPOINTMENT (OUTPATIENT)
Dept: CARDIAC REHAB | Age: 80
End: 2020-11-03

## 2020-11-05 ENCOUNTER — APPOINTMENT (OUTPATIENT)
Dept: CARDIAC REHAB | Age: 80
End: 2020-11-05

## 2020-11-10 ENCOUNTER — APPOINTMENT (OUTPATIENT)
Dept: CARDIAC REHAB | Age: 80
End: 2020-11-10

## 2020-11-12 ENCOUNTER — APPOINTMENT (OUTPATIENT)
Dept: CARDIAC REHAB | Age: 80
End: 2020-11-12

## 2020-11-17 ENCOUNTER — APPOINTMENT (OUTPATIENT)
Dept: CARDIAC REHAB | Age: 80
End: 2020-11-17

## 2020-11-17 NOTE — PERIOP NOTES
Rec'd VM from JESSICA Sanchez for Vincent, who states that pt was seen by Vincent 9/12/19 & is ready to proceed w/9/16/19 procedure. PCP has released pt for surgery therefore pt does not need to do PAT appt. She states DOS labs will be ordered. 0901:  Left VM for eRy requesting she send DOS orders \"ATTN: Di\" to [id number]. " I have no idea why I am here"

## 2020-11-18 ENCOUNTER — DOCUMENTATION ONLY (OUTPATIENT)
Dept: FAMILY MEDICINE CLINIC | Age: 80
End: 2020-11-18

## 2020-11-18 NOTE — PROGRESS NOTES
Faxed 08/07/20 office note/lab results to Chaya Box @[facility] per phone request. Fax #[phone] confirmation received.

## 2020-11-19 ENCOUNTER — APPOINTMENT (OUTPATIENT)
Dept: CARDIAC REHAB | Age: 80
End: 2020-11-19

## 2020-11-24 ENCOUNTER — APPOINTMENT (OUTPATIENT)
Dept: CARDIAC REHAB | Age: 80
End: 2020-11-24

## 2020-11-24 ENCOUNTER — TRANSCRIBE ORDER (OUTPATIENT)
Dept: INTERNAL MEDICINE CLINIC | Age: 80
End: 2020-11-24

## 2020-11-24 ENCOUNTER — TELEPHONE (OUTPATIENT)
Dept: FAMILY MEDICINE CLINIC | Age: 80
End: 2020-11-24

## 2020-11-24 NOTE — TELEPHONE ENCOUNTER
This is a narcotic and needs an appointment since he has not been seen in over 3 months.   I can send a short supply to hold him over to an appointment

## 2020-11-24 NOTE — TELEPHONE ENCOUNTER
----- Message from Daniel Ramey sent at 11/24/2020  1:49 PM EST -----  Regarding: Vanessa/Telephone  Patient return call    Caller's first and last name and relationship (if not the patient):      Best contact number(s):[phone]      Whose call is being returned:Vanessa      Details to clarify the request: Patient is returning your call      Daniel Ramey

## 2020-11-24 NOTE — TELEPHONE ENCOUNTER
Called and LVM for patient to call the office back. Needs to scheduled an apt for refill and Dr. Whitney Marsh will send in short term supply to last him until apt once apt is made.

## 2020-11-24 NOTE — TELEPHONE ENCOUNTER
----- Message from Adamaris Perrin sent at 11/24/2020 12:30 PM EST -----  Regarding: Ellamae Koyanagi, DO  Medication Refill    Caller (if not patient):      Relationship of caller (if not patient):      Best contact number(s): [phone]      Name of medication and dosage if known: \"Oxycodone\"      Is patient out of this medication (yes/no):  No       Pharmacy name: [id number] - [address] [facility], [address] [place] [address] AT [address] [place] [address] listed in chart? (yes/no):  Pharmacy phone number:      Details to clarify the request:      Adamaris Perrin

## 2020-11-25 NOTE — TELEPHONE ENCOUNTER
Called and spoke with patient. Advised that he needs to be seen. Patient verbalized understanding.  VV scheduled for 12/1/20

## 2020-12-01 ENCOUNTER — APPOINTMENT (OUTPATIENT)
Dept: CARDIAC REHAB | Age: 80
End: 2020-12-01

## 2020-12-01 ENCOUNTER — VIRTUAL VISIT (OUTPATIENT)
Dept: FAMILY MEDICINE CLINIC | Age: 80
End: 2020-12-01
Payer: MEDICARE

## 2020-12-01 DIAGNOSIS — M19.90 OSTEOARTHRITIS, UNSPECIFIED OSTEOARTHRITIS TYPE, UNSPECIFIED SITE: ICD-10-CM

## 2020-12-01 DIAGNOSIS — M51.9 LUMBAR DISC DISEASE: Primary | ICD-10-CM

## 2020-12-01 DIAGNOSIS — Z51.81 MEDICATION MONITORING ENCOUNTER: ICD-10-CM

## 2020-12-01 LAB
BARBITURATES UR POC: NEGATIVE
BENZODIAZEPINES UR POC: NEGATIVE
COCAINE QL URINE POC: NEGATIVE
LOT EXP DATE POC: NORMAL
LOT NUMBER POC: NORMAL
MARIJUANA (THC) QL URINE POC: NEGATIVE
MDMA/ECSTASY UR POC: NEGATIVE
METHADONE QL URINE POC: NORMAL
METHAMPHETAMINE QL URINE POC: NEGATIVE
NTI OTHER MICRO TRANSPORT 977598: NEGATIVE
OPIATES QL URINE POC: NEGATIVE
OXYCODONE UR POC: NEGATIVE
VALID INTERNAL CONTROL?: YES

## 2020-12-01 PROCEDURE — 1101F PT FALLS ASSESS-DOCD LE1/YR: CPT | Performed by: FAMILY MEDICINE

## 2020-12-01 PROCEDURE — G8427 DOCREV CUR MEDS BY ELIG CLIN: HCPCS | Performed by: FAMILY MEDICINE

## 2020-12-01 PROCEDURE — 80305 DRUG TEST PRSMV DIR OPT OBS: CPT | Performed by: FAMILY MEDICINE

## 2020-12-01 PROCEDURE — G8432 DEP SCR NOT DOC, RNG: HCPCS | Performed by: FAMILY MEDICINE

## 2020-12-01 PROCEDURE — G8756 NO BP MEASURE DOC: HCPCS | Performed by: FAMILY MEDICINE

## 2020-12-01 PROCEDURE — 99213 OFFICE O/P EST LOW 20 MIN: CPT | Performed by: FAMILY MEDICINE

## 2020-12-01 PROCEDURE — G0463 HOSPITAL OUTPT CLINIC VISIT: HCPCS | Performed by: FAMILY MEDICINE

## 2020-12-01 RX ORDER — OXYCODONE AND ACETAMINOPHEN 5; 325 MG/1; MG/1
1 TABLET ORAL
Qty: 30 TAB | Refills: 0 | Status: SHIPPED | OUTPATIENT
Start: 2021-01-30 | End: 2021-02-04 | Stop reason: SDUPTHER

## 2020-12-01 RX ORDER — OXYCODONE AND ACETAMINOPHEN 5; 325 MG/1; MG/1
1-2 TABLET ORAL
Qty: 30 TAB | Refills: 0 | Status: SHIPPED | OUTPATIENT
Start: 2020-12-31 | End: 2021-03-04 | Stop reason: SDUPTHER

## 2020-12-01 RX ORDER — OXYCODONE AND ACETAMINOPHEN 5; 325 MG/1; MG/1
1 TABLET ORAL
Qty: 30 TAB | Refills: 0 | Status: SHIPPED | OUTPATIENT
Start: 2020-12-01 | End: 2021-03-04 | Stop reason: SDUPTHER

## 2020-12-01 NOTE — PROGRESS NOTES
Progress Note    he is a 68y.o. year old male who presents for evalution. Subjective:     needs refill of percocet for chronic back pain, s/p multiple spinal surgeries. Medication allows him to be active at home. Retired. No issues with abuse or misuse. No issues with sedation. Pain from 7-8 to 3/10. Not taking everyday, last taken 2-3 days ago.       Opioid/Pain Management:     1. Has the patient signed a pain contract for chronic narcotic use? yes  2. Has the patient had a UDS or Serum screen as per guidelines as per [facility]?:   No, due but has only been seen virtually due to covid pandemicwill come into office today for utox  3. Does patient meet necessary guidelines for Naloxone treatement per the [facility]?:    no  4. Has the Prescription Monitoring Program been reviewed? yes  5. Does patient have a long term condition that requires long term use of a Narcotic?  yes  6. Has patient been tolerant of therapy and responsible to routine follow up and specialist follow-up? Yes      Reviewed PmHx, RxHx, FmHx, SocHx, AllgHx and updated and dated in the chart. Review of Systems - negative except as listed above in the HPI    Objective: There were no vitals filed for this visit. Current Outpatient Medications   Medication Sig    [START ON 1/30/2021] oxyCODONE-acetaminophen (PERCOCET) 5-325 mg per tablet Take 1 Tab by mouth daily as needed for Pain for up to 30 days. Indications: pain    [START ON 12/31/2020] oxyCODONE-acetaminophen (PERCOCET) 5-325 mg per tablet Take 1-2 Tabs by mouth daily as needed for Pain for up to 30 days. Indications: pain    oxyCODONE-acetaminophen (PERCOCET) 5-325 mg per tablet Take 1 Tab by mouth daily as needed for Pain for up to 30 days.  albuterol (ProAir HFA) 90 mcg/actuation inhaler     acetaminophen-codeine (TYLENOL #3) 300-30 mg per tablet     isosorbide mononitrate ER (IMDUR) 30 mg tablet Take  by mouth daily.     sildenafil citrate (VIAGRA) 100 mg tablet Take 100 mg by mouth as needed for Erectile Dysfunction.  amLODIPine (NORVASC) 5 mg tablet Take 5 mg by mouth daily.  ipratropium (ATROVENT) 0.03 % nasal spray 2 Sprays by Both Nostrils route every twelve (12) hours.  ranolazine ER (Ranexa) 500 mg SR tablet Take 1 Tab by mouth two (2) times a day.  folic acid (FOLVITE) 1 mg tablet TAKE 1 TABLET DAILY, EXCEPT DAY THAT METHOTREXATE IS TAKEN    metFORMIN (GLUCOPHAGE) 1,000 mg tablet Please restart Metformin on 5/9/2020 (Patient taking differently: two (2) times daily (with meals). Please restart Metformin on 5/9/2020)    clopidogreL (Plavix) 75 mg tab Take 1 Tab by mouth daily.  AVAPRO 150 mg tablet TAKE 1 TABLET NIGHTLY    aspirin delayed-release 81 mg tablet Take  by mouth daily.  ascorbic acid, vitamin C, (VITAMIN C) 500 mg tablet Take 500 mg by mouth two (2) times a day. + Vit D3    multivitamin (ONE A DAY) tablet Take 1 Tab by mouth daily.  atorvastatin (LIPITOR) 40 mg tablet TAKE 1 TABLET DAILY (START LIPITOR AFTER VYTORIN IS FINISHED)    omeprazole (PRILOSEC) 40 mg capsule TAKE 1 CAPSULE DAILY    atenolol (TENORMIN) 50 mg tablet TAKE 1 TABLET DAILY    tamsulosin (FLOMAX) 0.4 mg capsule Take 1 Cap by mouth daily. No current facility-administered medications for this visit. Physical Examination: General appearance - alert, well appearing, and in no distress  Mental status - alert, oriented to person, place, and time      Assessment/ Plan:   Diagnoses and all orders for this visit:    1. Lumbar disc disease  -     oxyCODONE-acetaminophen (PERCOCET) 5-325 mg per tablet; Take 1 Tab by mouth daily as needed for Pain for up to 30 days. Indications: pain  -     oxyCODONE-acetaminophen (PERCOCET) 5-325 mg per tablet; Take 1-2 Tabs by mouth daily as needed for Pain for up to 30 days. Indications: pain  -     oxyCODONE-acetaminophen (PERCOCET) 5-325 mg per tablet;  Take 1 Tab by mouth daily as needed for Pain for up to 30 days. 2. Medication monitoring encounter  -     AMB POC ALERE ICUP DX DRUG SCREEN 10    3. Osteoarthritis, unspecified osteoarthritis type, unspecified site  -     oxyCODONE-acetaminophen (PERCOCET) 5-325 mg per tablet; Take 1 Tab by mouth daily as needed for Pain for up to 30 days. Indications: pain  -     oxyCODONE-acetaminophen (PERCOCET) 5-325 mg per tablet; Take 1-2 Tabs by mouth daily as needed for Pain for up to 30 days. Indications: pain  -     oxyCODONE-acetaminophen (PERCOCET) 5-325 mg per tablet; Take 1 Tab by mouth daily as needed for Pain for up to 30 days. Follow-up and Dispositions    · Return if symptoms worsen or fail to improve. I have discussed the diagnosis with the patient and the intended plan as seen in the above orders. The patient has received an after-visit summary and questions were answered concerning future plans. Pt conveyed understanding of plan. Medication Side Effects and Warnings were discussed with patient      Liz Brand DO         Bell Noe is a 68 y.o. male being evaluated by a Virtual Visit (video visit) encounter to address concerns as mentioned above. A caregiver was present when appropriate. Due to this being a TeleHealth encounter (During [facility]- [facility] emergency), evaluation of the following organ systems was limited: Vitals/Constitutional/EENT/Resp/CV/GI//MS/Neuro/Skin/Heme-Lymph-Imm. Pursuant to the emergency declaration under the [address] [place] [address] and the [employer] Act, this Virtual Visit was conducted with patient's (and/or legal guardian's) consent, to reduce the risk of exposure to COVID-19 and provide necessary medical care. Services were provided through a video synchronous discussion virtually to substitute for in-person encounter.     --Liz Brand DO on 12/1/2020 at 9:08 AM    An electronic signature was used to authenticate this note.

## 2020-12-01 NOTE — PATIENT INSTRUCTIONS
A Healthy Lifestyle: Care Instructions Your Care Instructions A healthy lifestyle can help you feel good, stay at a healthy weight, and have plenty of energy for both work and play. A healthy lifestyle is something you can share with your whole family. A healthy lifestyle also can lower your risk for serious health problems, such as high blood pressure, heart disease, and diabetes. You can follow a few steps listed below to improve your health and the health of your family. Follow-up care is a key part of your treatment and safety. Be sure to make and go to all appointments, and call your doctor if you are having problems. It's also a good idea to know your test results and keep a list of the medicines you take. How can you care for yourself at home? · Do not eat too much sugar, fat, or fast foods. You can still have dessert and treats now and then. The goal is moderation. · Start small to improve your eating habits. Pay attention to portion sizes, drink less juice and soda pop, and eat more fruits and vegetables. ? Eat a healthy amount of food. A 3-ounce serving of meat, for example, is about the size of a deck of cards. Fill the rest of your plate with vegetables and whole grains. ? Limit the amount of soda and sports drinks you have every day. Drink more water when you are thirsty. ? Eat at least 5 servings of fruits and vegetables every day. It may seem like a lot, but it is not hard to reach this goal. A serving or helping is 1 piece of fruit, 1 cup of vegetables, or 2 cups of leafy, raw vegetables. Have an apple or some carrot sticks as an afternoon snack instead of a candy bar. Try to have fruits and/or vegetables at every meal. 
· Make exercise part of your daily routine. You may want to start with simple activities, such as walking, bicycling, or slow swimming. Try to be active 30 to 60 minutes every day.  You do not need to do all 30 to 60 minutes all at once. For example, you can exercise 3 times a day for 10 or 20 minutes. Moderate exercise is safe for most people, but it is always a good idea to talk to your doctor before starting an exercise program. 
· Keep moving. [place] the lawn, work in the garden, or [employer]. Take the stairs instead of the elevator at work. · If you smoke, quit. People who smoke have an increased risk for heart attack, stroke, cancer, and other lung illnesses. Quitting is hard, but there are ways to boost your chance of quitting tobacco for good. ? Use nicotine gum, patches, or lozenges. ? Ask your doctor about stop-smoking programs and medicines. ? Keep trying. In addition to reducing your risk of diseases in the future, you will notice some benefits soon after you stop using tobacco. If you have shortness of breath or asthma symptoms, they will likely get better within a few weeks after you quit. · Limit how much alcohol you drink. Moderate amounts of alcohol (up to 2 drinks a day for men, 1 drink a day for women) are okay. But drinking too much can lead to liver problems, high blood pressure, and other health problems. Family health If you have a family, there are many things you can do together to improve your health. · Eat meals together as a family as often as possible. · Eat healthy foods. This includes fruits, vegetables, lean meats and dairy, and whole grains. · Include your family in your fitness plan. Most people think of activities such as jogging or tennis as the way to fitness, but there are many ways you and your family can be more active. Anything that makes you breathe hard and gets your heart pumping is exercise. Here are some tips: 
? Walk to do errands or to take your child to school or the bus. 
? Go for a family bike ride after dinner instead of watching TV. Where can you learn more? Go to http://www.[employer]/ Enter A284 in the search box to learn more about \"A Healthy Lifestyle: Care Instructions. \" Current as of: January 31, 2020               Content Version: 12.6 © 0817-5370 [employer]. Care instructions adapted under license by [employer] (which disclaims liability or warranty for this information). If you have questions about a medical condition or this instruction, always ask your healthcare professional. [place] [address] any warranty or liability for your use of this information.

## 2020-12-03 ENCOUNTER — APPOINTMENT (OUTPATIENT)
Dept: CARDIAC REHAB | Age: 80
End: 2020-12-03

## 2020-12-18 DIAGNOSIS — E11.9 DIABETES MELLITUS TYPE 2 IN NONOBESE (HCC): ICD-10-CM

## 2020-12-18 RX ORDER — ATORVASTATIN CALCIUM 40 MG/1
TABLET, FILM COATED ORAL
Qty: 90 TAB | Refills: 3 | Status: SHIPPED | OUTPATIENT
Start: 2020-12-18

## 2020-12-18 RX ORDER — METFORMIN HYDROCHLORIDE 1000 MG/1
TABLET ORAL
Qty: 180 TAB | Refills: 3 | Status: SHIPPED | OUTPATIENT
Start: 2020-12-18 | End: 2021-11-23

## 2020-12-22 RX ORDER — CLOPIDOGREL BISULFATE 75 MG/1
TABLET ORAL
Qty: 90 TAB | Refills: 3 | Status: SHIPPED | OUTPATIENT
Start: 2020-12-22 | End: 2021-05-24

## 2021-01-19 ENCOUNTER — VIRTUAL VISIT (OUTPATIENT)
Dept: FAMILY MEDICINE CLINIC | Age: 81
End: 2021-01-19
Payer: MEDICARE

## 2021-01-19 DIAGNOSIS — M25.511 CHRONIC PAIN OF BOTH SHOULDERS: Primary | ICD-10-CM

## 2021-01-19 DIAGNOSIS — M25.512 CHRONIC PAIN OF BOTH SHOULDERS: Primary | ICD-10-CM

## 2021-01-19 DIAGNOSIS — G89.29 CHRONIC PAIN OF BOTH SHOULDERS: Primary | ICD-10-CM

## 2021-01-19 PROCEDURE — 99213 OFFICE O/P EST LOW 20 MIN: CPT | Performed by: FAMILY MEDICINE

## 2021-01-19 PROCEDURE — G8510 SCR DEP NEG, NO PLAN REQD: HCPCS | Performed by: FAMILY MEDICINE

## 2021-01-19 PROCEDURE — G8427 DOCREV CUR MEDS BY ELIG CLIN: HCPCS | Performed by: FAMILY MEDICINE

## 2021-01-19 PROCEDURE — G8756 NO BP MEASURE DOC: HCPCS | Performed by: FAMILY MEDICINE

## 2021-01-19 PROCEDURE — 1101F PT FALLS ASSESS-DOCD LE1/YR: CPT | Performed by: FAMILY MEDICINE

## 2021-01-19 PROCEDURE — G0463 HOSPITAL OUTPT CLINIC VISIT: HCPCS | Performed by: FAMILY MEDICINE

## 2021-01-19 NOTE — PATIENT INSTRUCTIONS
A Healthy Lifestyle: Care Instructions Your Care Instructions A healthy lifestyle can help you feel good, stay at a healthy weight, and have plenty of energy for both work and play. A healthy lifestyle is something you can share with your whole family. A healthy lifestyle also can lower your risk for serious health problems, such as high blood pressure, heart disease, and diabetes. You can follow a few steps listed below to improve your health and the health of your family. Follow-up care is a key part of your treatment and safety. Be sure to make and go to all appointments, and call your doctor if you are having problems. It's also a good idea to know your test results and keep a list of the medicines you take. How can you care for yourself at home? · Do not eat too much sugar, fat, or fast foods. You can still have dessert and treats now and then. The goal is moderation. · Start small to improve your eating habits. Pay attention to portion sizes, drink less juice and soda pop, and eat more fruits and vegetables. ? Eat a healthy amount of food. A 3-ounce serving of meat, for example, is about the size of a deck of cards. Fill the rest of your plate with vegetables and whole grains. ? Limit the amount of soda and sports drinks you have every day. Drink more water when you are thirsty. ? Eat at least 5 servings of fruits and vegetables every day. It may seem like a lot, but it is not hard to reach this goal. A serving or helping is 1 piece of fruit, 1 cup of vegetables, or 2 cups of leafy, raw vegetables. Have an apple or some carrot sticks as an afternoon snack instead of a candy bar.  Try to have fruits and/or vegetables at every meal. 
 · Make exercise part of your daily routine. You may want to start with simple activities, such as walking, bicycling, or slow swimming. Try to be active 30 to 60 minutes every day. You do not need to do all 30 to 60 minutes all at once. For example, you can exercise 3 times a day for 10 or 20 minutes. Moderate exercise is safe for most people, but it is always a good idea to talk to your doctor before starting an exercise program. 
· Keep moving. Brennan Alcazar the lawn, work in the garden, or [employer]. Take the stairs instead of the elevator at work. · If you smoke, quit. People who smoke have an increased risk for heart attack, stroke, cancer, and other lung illnesses. Quitting is hard, but there are ways to boost your chance of quitting tobacco for good. ? Use nicotine gum, patches, or lozenges. ? Ask your doctor about stop-smoking programs and medicines. ? Keep trying. In addition to reducing your risk of diseases in the future, you will notice some benefits soon after you stop using tobacco. If you have shortness of breath or asthma symptoms, they will likely get better within a few weeks after you quit. · Limit how much alcohol you drink. Moderate amounts of alcohol (up to 2 drinks a day for men, 1 drink a day for women) are okay. But drinking too much can lead to liver problems, high blood pressure, and other health problems. Family health If you have a family, there are many things you can do together to improve your health. · Eat meals together as a family as often as possible. · Eat healthy foods. This includes fruits, vegetables, lean meats and dairy, and whole grains. · Include your family in your fitness plan. Most people think of activities such as jogging or tennis as the way to fitness, but there are many ways you and your family can be more active. Anything that makes you breathe hard and gets your heart pumping is exercise. Here are some tips: ? Walk to do errands or to take your child to school or the bus. 
? Go for a family bike ride after dinner instead of watching TV. Where can you learn more? Go to http://www.gray.com/ Enter C241 in the search box to learn more about \"A Healthy Lifestyle: Care Instructions. \" Current as of: January 31, 2020               Content Version: 12.6 © 2006-2020 [employer]. Care instructions adapted under license by [employer] (which disclaims liability or warranty for this information). If you have questions about a medical condition or this instruction, always ask your healthcare professional. [address] any warranty or liability for your use of this information.

## 2021-01-19 NOTE — PROGRESS NOTES
Progress Note    he is a 68y.o. year old male who presents for evalution. Subjective:     Patient discusses shoulder pain. States bilateral shoulder is bothering him 7 out of 10. States it is hurting him to sleep missed the matter which way he turns his onto either shoulder and this is causing pain causing him to wake up. Patient is on chronic Percocet which he uses approximately once per day this is mainly for his back pain. Not using any NSAIDs. Uses Tylenol as needed With little relief. Reviewed PmHx, RxHx, FmHx, SocHx, AllgHx and updated and dated in the chart. Review of Systems - negative except as listed above in the HPI    Objective: There were no vitals filed for this visit. Current Outpatient Medications   Medication Sig    clopidogreL (PLAVIX) 75 mg tab TAKE 1 TABLET BY MOUTH DAILY    metFORMIN (GLUCOPHAGE) 1,000 mg tablet TAKE 1 TABLET TWICE A DAY WITH MEALS    atorvastatin (LIPITOR) 40 mg tablet TAKE 1 TABLET DAILY (START LIPITOR AFTER VYTORIN IS FINISHED)    ranolazine ER (RANEXA) 500 mg SR tablet TAKE 1 TABLET TWICE A DAY    [START ON 1/30/2021] oxyCODONE-acetaminophen (PERCOCET) 5-325 mg per tablet Take 1 Tab by mouth daily as needed for Pain for up to 30 days. Indications: pain    oxyCODONE-acetaminophen (PERCOCET) 5-325 mg per tablet Take 1-2 Tabs by mouth daily as needed for Pain for up to 30 days. Indications: pain    albuterol (ProAir HFA) 90 mcg/actuation inhaler     acetaminophen-codeine (TYLENOL #3) 300-30 mg per tablet     isosorbide mononitrate ER (IMDUR) 30 mg tablet Take  by mouth daily.  sildenafil citrate (VIAGRA) 100 mg tablet Take 100 mg by mouth as needed for Erectile Dysfunction.  amLODIPine (NORVASC) 5 mg tablet Take 5 mg by mouth daily.  ipratropium (ATROVENT) 0.03 % nasal spray 2 Sprays by Both Nostrils route every twelve (12) hours.     folic acid (FOLVITE) 1 mg tablet TAKE 1 TABLET DAILY, EXCEPT DAY THAT METHOTREXATE IS TAKEN    AVAPRO 150 mg tablet TAKE 1 TABLET NIGHTLY    aspirin delayed-release 81 mg tablet Take  by mouth daily.  ascorbic acid, vitamin C, (VITAMIN C) 500 mg tablet Take 500 mg by mouth two (2) times a day. + Vit D3    multivitamin (ONE A DAY) tablet Take 1 Tab by mouth daily.  omeprazole (PRILOSEC) 40 mg capsule TAKE 1 CAPSULE DAILY    atenolol (TENORMIN) 50 mg tablet TAKE 1 TABLET DAILY    tamsulosin (FLOMAX) 0.4 mg capsule Take 1 Cap by mouth daily. No current facility-administered medications for this visit. Physical Examination: General appearance - alert, well appearing, and in no distress  Mental status - alert, oriented to person, place, and time      Assessment/ Plan:   Diagnoses and all orders for this visit:    1. Chronic pain of both shoulders    Continue with Tylenol as needed and consider injections in office along with possible physical therapy. Follow-up and Dispositions    · Return if symptoms worsen or fail to improve. I have discussed the diagnosis with the patient and the intended plan as seen in the above orders. The patient has received an after-visit summary and questions were answered concerning future plans. Pt conveyed understanding of plan. Medication Side Effects and Warnings were discussed with patient      Farhan Arce is being evaluated by a Virtual Visit (video visit) encounter to address concerns as mentioned above. A caregiver was present when appropriate. Due to this being a TeleHealth encounter (During OLZUD-42 public health emergency), evaluation of the following organ systems was limited: Vitals/Constitutional/EENT/Resp/CV/GI//MS/Neuro/Skin/Heme-Lymph-Imm.   Pursuant to the emergency declaration under the [facility]5 waiver authority and the [employer] Act, this Virtual Visit was conducted with patient's (and/or legal guardian's) consent, to reduce the patient's risk of exposure to COVID-19 and provide necessary medical care. The patient (and/or legal guardian) has also been advised to contact this office for worsening conditions or problems, and seek emergency medical treatment and/or call 911 if deemed necessary. Patient identification was verified at the start of the visit: Yes    Services were provided through a video synchronous discussion virtually to substitute for in-person clinic visit. Patient and provider were located at their individual homes.   --Bienvenido Gomez DO on 1/19/2021 at 7:40 AM

## 2021-01-26 VITALS — WEIGHT: 174.6 LBS | BODY MASS INDEX: 26.55 KG/M2

## 2021-01-27 ENCOUNTER — OFFICE VISIT (OUTPATIENT)
Dept: FAMILY MEDICINE CLINIC | Age: 81
End: 2021-01-27
Payer: MEDICARE

## 2021-01-27 VITALS
WEIGHT: 177 LBS | TEMPERATURE: 98.1 F | DIASTOLIC BLOOD PRESSURE: 82 MMHG | HEIGHT: 68 IN | RESPIRATION RATE: 18 BRPM | BODY MASS INDEX: 26.83 KG/M2 | HEART RATE: 70 BPM | SYSTOLIC BLOOD PRESSURE: 137 MMHG | OXYGEN SATURATION: 94 %

## 2021-01-27 DIAGNOSIS — M19.012 LOCALIZED OSTEOARTHRITIS OF BOTH SHOULDER REGIONS: Primary | ICD-10-CM

## 2021-01-27 DIAGNOSIS — M19.011 LOCALIZED OSTEOARTHRITIS OF BOTH SHOULDER REGIONS: Primary | ICD-10-CM

## 2021-01-27 PROCEDURE — 99213 OFFICE O/P EST LOW 20 MIN: CPT | Performed by: FAMILY MEDICINE

## 2021-01-27 PROCEDURE — G8752 SYS BP LESS 140: HCPCS | Performed by: FAMILY MEDICINE

## 2021-01-27 PROCEDURE — G8754 DIAS BP LESS 90: HCPCS | Performed by: FAMILY MEDICINE

## 2021-01-27 PROCEDURE — G8419 CALC BMI OUT NRM PARAM NOF/U: HCPCS | Performed by: FAMILY MEDICINE

## 2021-01-27 PROCEDURE — 1101F PT FALLS ASSESS-DOCD LE1/YR: CPT | Performed by: FAMILY MEDICINE

## 2021-01-27 PROCEDURE — 20610 DRAIN/INJ JOINT/BURSA W/O US: CPT | Performed by: FAMILY MEDICINE

## 2021-01-27 PROCEDURE — G8536 NO DOC ELDER MAL SCRN: HCPCS | Performed by: FAMILY MEDICINE

## 2021-01-27 PROCEDURE — G8432 DEP SCR NOT DOC, RNG: HCPCS | Performed by: FAMILY MEDICINE

## 2021-01-27 PROCEDURE — G8427 DOCREV CUR MEDS BY ELIG CLIN: HCPCS | Performed by: FAMILY MEDICINE

## 2021-01-27 PROCEDURE — G0463 HOSPITAL OUTPT CLINIC VISIT: HCPCS | Performed by: FAMILY MEDICINE

## 2021-01-27 RX ORDER — LIDOCAINE HYDROCHLORIDE 10 MG/ML
4 INJECTION INFILTRATION; PERINEURAL ONCE
Qty: 4 ML | Refills: 0
Start: 2021-01-27 | End: 2021-01-27

## 2021-01-27 RX ORDER — METHYLPREDNISOLONE ACETATE 80 MG/ML
80 INJECTION, SUSPENSION INTRA-ARTICULAR; INTRALESIONAL; INTRAMUSCULAR; SOFT TISSUE ONCE
Qty: 2 ML | Refills: 0
Start: 2021-01-27 | End: 2021-01-27

## 2021-01-27 RX ORDER — BUPIVACAINE HYDROCHLORIDE 2.5 MG/ML
4 INJECTION, SOLUTION INFILTRATION; PERINEURAL ONCE
Qty: 4 ML | Refills: 0
Start: 2021-01-27 | End: 2021-01-27

## 2021-01-27 NOTE — PATIENT INSTRUCTIONS
A Healthy Lifestyle: Care Instructions Your Care Instructions A healthy lifestyle can help you feel good, stay at a healthy weight, and have plenty of energy for both work and play. A healthy lifestyle is something you can share with your whole family. A healthy lifestyle also can lower your risk for serious health problems, such as high blood pressure, heart disease, and diabetes. You can follow a few steps listed below to improve your health and the health of your family. Follow-up care is a key part of your treatment and safety. Be sure to make and go to all appointments, and call your doctor if you are having problems. It's also a good idea to know your test results and keep a list of the medicines you take. How can you care for yourself at home? · Do not eat too much sugar, fat, or fast foods. You can still have dessert and treats now and then. The goal is moderation. · Start small to improve your eating habits. Pay attention to portion sizes, drink less juice and soda pop, and eat more fruits and vegetables. ? Eat a healthy amount of food. A 3-ounce serving of meat, for example, is about the size of a deck of cards. Fill the rest of your plate with vegetables and whole grains. ? Limit the amount of soda and sports drinks you have every day. Drink more water when you are thirsty. ? Eat at least 5 servings of fruits and vegetables every day. It may seem like a lot, but it is not hard to reach this goal. A serving or helping is 1 piece of fruit, 1 cup of vegetables, or 2 cups of leafy, raw vegetables. Have an apple or some carrot sticks as an afternoon snack instead of a candy bar.  Try to have fruits and/or vegetables at every meal. 
 · Make exercise part of your daily routine. You may want to start with simple activities, such as walking, bicycling, or slow swimming. Try to be active 30 to 60 minutes every day. You do not need to do all 30 to 60 minutes all at once. For example, you can exercise 3 times a day for 10 or 20 minutes. Moderate exercise is safe for most people, but it is always a good idea to talk to your doctor before starting an exercise program. 
· Keep moving. Derinda Leyden the lawn, work in the garden, or [employer]. Take the stairs instead of the elevator at work. · If you smoke, quit. People who smoke have an increased risk for heart attack, stroke, cancer, and other lung illnesses. Quitting is hard, but there are ways to boost your chance of quitting tobacco for good. ? Use nicotine gum, patches, or lozenges. ? Ask your doctor about stop-smoking programs and medicines. ? Keep trying. In addition to reducing your risk of diseases in the future, you will notice some benefits soon after you stop using tobacco. If you have shortness of breath or asthma symptoms, they will likely get better within a few weeks after you quit. · Limit how much alcohol you drink. Moderate amounts of alcohol (up to 2 drinks a day for men, 1 drink a day for women) are okay. But drinking too much can lead to liver problems, high blood pressure, and other health problems. Family health If you have a family, there are many things you can do together to improve your health. · Eat meals together as a family as often as possible. · Eat healthy foods. This includes fruits, vegetables, lean meats and dairy, and whole grains. · Include your family in your fitness plan. Most people think of activities such as jogging or tennis as the way to fitness, but there are many ways you and your family can be more active. Anything that makes you breathe hard and gets your heart pumping is exercise. Here are some tips: ? Walk to do errands or to take your child to school or the bus. 
? Go for a family bike ride after dinner instead of watching TV. Where can you learn more? Go to http://www.gray.com/ Enter U393 in the search box to learn more about \"A Healthy Lifestyle: Care Instructions. \" Current as of: January 31, 2020               Content Version: 12.6 © 2006-2020 [employer], "[employer]". Care instructions adapted under license by [employer] (which disclaims liability or warranty for this information). If you have questions about a medical condition or this instruction, always ask your healthcare professional. [address] any warranty or liability for your use of this information.

## 2021-02-02 ENCOUNTER — DOCUMENTATION ONLY (OUTPATIENT)
Dept: FAMILY MEDICINE CLINIC | Age: 81
End: 2021-02-02

## 2021-02-02 NOTE — PROGRESS NOTES
Patients medical records request was faxed to [facility] at [phone] to be processed on 02/01/2021

## 2021-02-04 DIAGNOSIS — M19.90 OSTEOARTHRITIS, UNSPECIFIED OSTEOARTHRITIS TYPE, UNSPECIFIED SITE: ICD-10-CM

## 2021-02-04 DIAGNOSIS — M51.9 LUMBAR DISC DISEASE: ICD-10-CM

## 2021-02-04 RX ORDER — OXYCODONE AND ACETAMINOPHEN 5; 325 MG/1; MG/1
1 TABLET ORAL
Qty: 30 TAB | Refills: 0 | Status: SHIPPED | OUTPATIENT
Start: 2021-02-04 | End: 2021-03-04 | Stop reason: SDUPTHER

## 2021-02-04 NOTE — CARDIO/PULMONARY
Cardiac Rehab: Consult received and chart reviewed. Patient is not a candidate for cardiac rehab, at this time. Patient does not meet the following criteria for enrollment in cardiac rehab due to unsuccessful .

## 2021-02-22 ENCOUNTER — TRANSCRIBE ORDER (OUTPATIENT)
Dept: SCHEDULING | Age: 81
End: 2021-02-22

## 2021-02-22 DIAGNOSIS — M43.16 LUMBAR ADJACENT SEGMENT DISEASE WITH SPONDYLOLISTHESIS: ICD-10-CM

## 2021-02-22 DIAGNOSIS — M51.36 LUMBAR ADJACENT SEGMENT DISEASE WITH SPONDYLOLISTHESIS: ICD-10-CM

## 2021-02-22 DIAGNOSIS — Z98.1 HISTORY OF SPINAL ARTHRODESIS: Primary | ICD-10-CM

## 2021-02-22 DIAGNOSIS — M51.36 DEGENERATION OF LUMBAR INTERVERTEBRAL DISC: ICD-10-CM

## 2021-02-27 ENCOUNTER — HOSPITAL ENCOUNTER (OUTPATIENT)
Dept: MRI IMAGING | Age: 81
Discharge: HOME | End: 2021-02-27
Attending: ORTHOPAEDIC SURGERY
Payer: MEDICARE

## 2021-02-27 DIAGNOSIS — M51.36 LUMBAR ADJACENT SEGMENT DISEASE WITH SPONDYLOLISTHESIS: ICD-10-CM

## 2021-02-27 DIAGNOSIS — M51.36 DEGENERATION OF LUMBAR INTERVERTEBRAL DISC: ICD-10-CM

## 2021-02-27 DIAGNOSIS — M43.16 LUMBAR ADJACENT SEGMENT DISEASE WITH SPONDYLOLISTHESIS: ICD-10-CM

## 2021-02-27 DIAGNOSIS — Z98.1 HISTORY OF SPINAL ARTHRODESIS: ICD-10-CM

## 2021-02-27 PROCEDURE — 72148 MRI LUMBAR SPINE W/O DYE: CPT

## 2021-03-04 ENCOUNTER — OFFICE VISIT (OUTPATIENT)
Dept: FAMILY MEDICINE CLINIC | Age: 81
End: 2021-03-04
Payer: MEDICARE

## 2021-03-04 VITALS
OXYGEN SATURATION: 97 % | RESPIRATION RATE: 16 BRPM | HEIGHT: 68 IN | TEMPERATURE: 98.3 F | WEIGHT: 171 LBS | HEART RATE: 83 BPM | BODY MASS INDEX: 25.91 KG/M2 | DIASTOLIC BLOOD PRESSURE: 76 MMHG | SYSTOLIC BLOOD PRESSURE: 124 MMHG

## 2021-03-04 DIAGNOSIS — M51.9 LUMBAR DISC DISEASE: ICD-10-CM

## 2021-03-04 DIAGNOSIS — M19.90 OSTEOARTHRITIS, UNSPECIFIED OSTEOARTHRITIS TYPE, UNSPECIFIED SITE: ICD-10-CM

## 2021-03-04 PROCEDURE — G8536 NO DOC ELDER MAL SCRN: HCPCS | Performed by: FAMILY MEDICINE

## 2021-03-04 PROCEDURE — 99213 OFFICE O/P EST LOW 20 MIN: CPT | Performed by: FAMILY MEDICINE

## 2021-03-04 PROCEDURE — G8419 CALC BMI OUT NRM PARAM NOF/U: HCPCS | Performed by: FAMILY MEDICINE

## 2021-03-04 PROCEDURE — G8432 DEP SCR NOT DOC, RNG: HCPCS | Performed by: FAMILY MEDICINE

## 2021-03-04 PROCEDURE — G8752 SYS BP LESS 140: HCPCS | Performed by: FAMILY MEDICINE

## 2021-03-04 PROCEDURE — G0463 HOSPITAL OUTPT CLINIC VISIT: HCPCS | Performed by: FAMILY MEDICINE

## 2021-03-04 PROCEDURE — G8427 DOCREV CUR MEDS BY ELIG CLIN: HCPCS | Performed by: FAMILY MEDICINE

## 2021-03-04 PROCEDURE — 1101F PT FALLS ASSESS-DOCD LE1/YR: CPT | Performed by: FAMILY MEDICINE

## 2021-03-04 PROCEDURE — G8754 DIAS BP LESS 90: HCPCS | Performed by: FAMILY MEDICINE

## 2021-03-04 RX ORDER — OXYCODONE AND ACETAMINOPHEN 5; 325 MG/1; MG/1
1-2 TABLET ORAL
Qty: 30 TAB | Refills: 0 | Status: SHIPPED | OUTPATIENT
Start: 2021-04-03 | End: 2021-07-14 | Stop reason: SDUPTHER

## 2021-03-04 RX ORDER — OXYCODONE AND ACETAMINOPHEN 5; 325 MG/1; MG/1
1 TABLET ORAL
Qty: 30 TAB | Refills: 0 | Status: SHIPPED | OUTPATIENT
Start: 2021-03-04 | End: 2021-09-21 | Stop reason: SDUPTHER

## 2021-03-04 RX ORDER — OXYCODONE AND ACETAMINOPHEN 5; 325 MG/1; MG/1
1 TABLET ORAL
Qty: 30 TAB | Refills: 0 | Status: SHIPPED | OUTPATIENT
Start: 2021-05-03 | End: 2021-06-04

## 2021-03-04 NOTE — PROGRESS NOTES
Chief Complaint   Patient presents with    Medication Refill     Patient presents in office today for med refill of Percocet. No concerns. 1. Have you been to the ER, urgent care clinic since your last visit? Hospitalized since your last visit? No    2. Have you seen or consulted any other health care providers outside of the [address] [place] [address] since your last visit? Include any pap smears or colon screening.  No    Learning Assessment 2/25/2020   PRIMARY LEARNER Patient   HIGHEST LEVEL OF EDUCATION - PRIMARY LEARNER  -   BARRIERS PRIMARY LEARNER -   CO-LEARNER CAREGIVER -   PRIMARY LANGUAGE ENGLISH   LEARNER PREFERENCE PRIMARY DEMONSTRATION   ANSWERED BY self   RELATIONSHIP SELF

## 2021-03-04 NOTE — PROGRESS NOTES
Progress Note    he is a 80y.o. year old male who presents for evalution. Subjective:     Patient here for refill of pain medication. Uses 1 tablet/day. This does help his pain. Patient does not take medication until he is having significant pain. History of multiple spinal surgeries currently seeing spine. Quality of life is limited to her due to chronic back pain. He has hired somebody to take care of his lawn for the summer. Medication does help some with quality of life. Recent MRI thru ortho due to worsening pain and is waiting for results. Reviewed PmHx, RxHx, FmHx, SocHx, AllgHx and updated and dated in the chart. Review of Systems - negative except as listed above in the HPI    Objective:     Vitals:    03/04/21 1533   BP: 124/76   Pulse: 83   Resp: 16   Temp: 98.3 °F (36.8 °C)   TempSrc: Oral   SpO2: 97%   Weight: 171 lb (77.6 kg)   Height: 5' 8\" (1.727 m)       Current Outpatient Medications   Medication Sig    [START ON 5/3/2021] oxyCODONE-acetaminophen (PERCOCET) 5-325 mg per tablet Take 1 Tab by mouth daily as needed for Pain for up to 30 days. Indications: pain    [START ON 4/3/2021] oxyCODONE-acetaminophen (PERCOCET) 5-325 mg per tablet Take 1-2 Tabs by mouth daily as needed for Pain for up to 30 days. Indications: pain    oxyCODONE-acetaminophen (PERCOCET) 5-325 mg per tablet Take 1 Tab by mouth daily as needed for Pain for up to 30 days.  clopidogreL (PLAVIX) 75 mg tab TAKE 1 TABLET BY MOUTH DAILY    metFORMIN (GLUCOPHAGE) 1,000 mg tablet TAKE 1 TABLET TWICE A DAY WITH MEALS    atorvastatin (LIPITOR) 40 mg tablet TAKE 1 TABLET DAILY (START LIPITOR AFTER VYTORIN IS FINISHED)    ranolazine ER (RANEXA) 500 mg SR tablet TAKE 1 TABLET TWICE A DAY    isosorbide mononitrate ER (IMDUR) 30 mg tablet Take  by mouth daily.  amLODIPine (NORVASC) 5 mg tablet Take 5 mg by mouth daily.     folic acid (FOLVITE) 1 mg tablet TAKE 1 TABLET DAILY, EXCEPT DAY THAT METHOTREXATE IS TAKEN    AVAPRO 150 mg tablet TAKE 1 TABLET NIGHTLY    aspirin delayed-release 81 mg tablet Take  by mouth daily.  ascorbic acid, vitamin C, (VITAMIN C) 500 mg tablet Take 500 mg by mouth two (2) times a day. + Vit D3    multivitamin (ONE A DAY) tablet Take 1 Tab by mouth daily.  omeprazole (PRILOSEC) 40 mg capsule TAKE 1 CAPSULE DAILY    tamsulosin (FLOMAX) 0.4 mg capsule Take 1 Cap by mouth daily.  albuterol (ProAir HFA) 90 mcg/actuation inhaler     acetaminophen-codeine (TYLENOL #3) 300-30 mg per tablet     sildenafil citrate (VIAGRA) 100 mg tablet Take 100 mg by mouth as needed for Erectile Dysfunction.  ipratropium (ATROVENT) 0.03 % nasal spray 2 Sprays by Both Nostrils route every twelve (12) hours.  atenolol (TENORMIN) 50 mg tablet TAKE 1 TABLET DAILY     No current facility-administered medications for this visit. Physical Examination: General appearance - alert, well appearing, and in no distress  Mental status - alert, oriented to person, place, and time      Assessment/ Plan:   Diagnoses and all orders for this visit:    1. Osteoarthritis, unspecified osteoarthritis type, unspecified site  -     oxyCODONE-acetaminophen (PERCOCET) 5-325 mg per tablet; Take 1 Tab by mouth daily as needed for Pain for up to 30 days. Indications: pain  -     oxyCODONE-acetaminophen (PERCOCET) 5-325 mg per tablet; Take 1-2 Tabs by mouth daily as needed for Pain for up to 30 days. Indications: pain  -     oxyCODONE-acetaminophen (PERCOCET) 5-325 mg per tablet; Take 1 Tab by mouth daily as needed for Pain for up to 30 days. 2. Lumbar disc disease  -     oxyCODONE-acetaminophen (PERCOCET) 5-325 mg per tablet; Take 1 Tab by mouth daily as needed for Pain for up to 30 days. Indications: pain  -     oxyCODONE-acetaminophen (PERCOCET) 5-325 mg per tablet; Take 1-2 Tabs by mouth daily as needed for Pain for up to 30 days.  Indications: pain  -     oxyCODONE-acetaminophen (PERCOCET) 5-325 mg per tablet; Take 1 Tab by mouth daily as needed for Pain for up to 30 days. No change indicated  Follow-up and Dispositions    · Return if symptoms worsen or fail to improve. I have discussed the diagnosis with the patient and the intended plan as seen in the above orders. The patient has received an after-visit summary and questions were answered concerning future plans. Pt conveyed understanding of plan.     Medication Side Effects and Warnings were discussed with patient    An electronic signature was used to authenticate this note  Alden Homans, DO

## 2021-03-04 NOTE — PATIENT INSTRUCTIONS
A Healthy Lifestyle: Care Instructions Your Care Instructions A healthy lifestyle can help you feel good, stay at a healthy weight, and have plenty of energy for both work and play. A healthy lifestyle is something you can share with your whole family. A healthy lifestyle also can lower your risk for serious health problems, such as high blood pressure, heart disease, and diabetes. You can follow a few steps listed below to improve your health and the health of your family. Follow-up care is a key part of your treatment and safety. Be sure to make and go to all appointments, and call your doctor if you are having problems. It's also a good idea to know your test results and keep a list of the medicines you take. How can you care for yourself at home? · Do not eat too much sugar, fat, or fast foods. You can still have dessert and treats now and then. The goal is moderation. · Start small to improve your eating habits. Pay attention to portion sizes, drink less juice and soda pop, and eat more fruits and vegetables. ? Eat a healthy amount of food. A 3-ounce serving of meat, for example, is about the size of a deck of cards. Fill the rest of your plate with vegetables and whole grains. ? Limit the amount of soda and sports drinks you have every day. Drink more water when you are thirsty. ? Eat at least 5 servings of fruits and vegetables every day. It may seem like a lot, but it is not hard to reach this goal. A serving or helping is 1 piece of fruit, 1 cup of vegetables, or 2 cups of leafy, raw vegetables. Have an apple or some carrot sticks as an afternoon snack instead of a candy bar.  Try to have fruits and/or vegetables at every meal. 
 · Make exercise part of your daily routine. You may want to start with simple activities, such as walking, bicycling, or slow swimming. Try to be active 30 to 60 minutes every day. You do not need to do all 30 to 60 minutes all at once. For example, you can exercise 3 times a day for 10 or 20 minutes. Moderate exercise is safe for most people, but it is always a good idea to talk to your doctor before starting an exercise program. 
· Keep moving. Johnson Guy the lawn, work in the garden, or [email or web address]. Take the stairs instead of the elevator at work. · If you smoke, quit. People who smoke have an increased risk for heart attack, stroke, cancer, and other lung illnesses. Quitting is hard, but there are ways to boost your chance of quitting tobacco for good. ? Use nicotine gum, patches, or lozenges. ? Ask your doctor about stop-smoking programs and medicines. ? Keep trying. In addition to reducing your risk of diseases in the future, you will notice some benefits soon after you stop using tobacco. If you have shortness of breath or asthma symptoms, they will likely get better within a few weeks after you quit. · Limit how much alcohol you drink. Moderate amounts of alcohol (up to 2 drinks a day for men, 1 drink a day for women) are okay. But drinking too much can lead to liver problems, high blood pressure, and other health problems. Family health If you have a family, there are many things you can do together to improve your health. · Eat meals together as a family as often as possible. · Eat healthy foods. This includes fruits, vegetables, lean meats and dairy, and whole grains. · Include your family in your fitness plan. Most people think of activities such as jogging or tennis as the way to fitness, but there are many ways you and your family can be more active. Anything that makes you breathe hard and gets your heart pumping is exercise. Here are some tips: ? Walk to do errands or to take your child to school or the bus. 
? Go for a family bike ride after dinner instead of watching TV. Where can you learn more? Go to http://www.gray.com/ Enter N660 in the search box to learn more about \"A Healthy Lifestyle: Care Instructions. \" Current as of: January 31, 2020               Content Version: 12.6 © 2006-2020 [employer]. Care instructions adapted under license by [email or web address] (which disclaims liability or warranty for this information). If you have questions about a medical condition or this instruction, always ask your healthcare professional. [address] any warranty or liability for your use of this information.

## 2021-05-07 RX ORDER — OMEPRAZOLE 40 MG/1
CAPSULE, DELAYED RELEASE ORAL
Qty: 90 CAP | Refills: 3 | Status: SHIPPED | OUTPATIENT
Start: 2021-05-07 | End: 2021-05-24

## 2021-05-24 ENCOUNTER — HOSPITAL ENCOUNTER (OUTPATIENT)
Dept: PREADMISSION TESTING | Age: 81
Discharge: HOME | End: 2021-05-24
Payer: MEDICARE

## 2021-05-24 VITALS
OXYGEN SATURATION: 91 % | HEART RATE: 86 BPM | BODY MASS INDEX: 25.99 KG/M2 | DIASTOLIC BLOOD PRESSURE: 74 MMHG | WEIGHT: 171.52 LBS | HEIGHT: 68 IN | RESPIRATION RATE: 18 BRPM | TEMPERATURE: 97.3 F | SYSTOLIC BLOOD PRESSURE: 148 MMHG

## 2021-05-24 LAB
ALBUMIN SERPL-MCNC: 3.7 G/DL (ref 3.5–5)
ALBUMIN/GLOB SERPL: 1.2 {RATIO} (ref 1.1–2.2)
ALP SERPL-CCNC: 77 U/L (ref 45–117)
ALT SERPL-CCNC: 11 U/L (ref 12–78)
ANION GAP SERPL CALC-SCNC: 6 MMOL/L (ref 5–15)
APPEARANCE UR: CLEAR
APTT PPP: 25.9 SEC (ref 22.1–31)
AST SERPL-CCNC: 11 U/L (ref 15–37)
ATRIAL RATE: 78 BPM
BACTERIA URNS QL MICRO: NEGATIVE /HPF
BASOPHILS # BLD: 0 K/UL (ref 0–0.1)
BASOPHILS NFR BLD: 1 % (ref 0–1)
BILIRUB SERPL-MCNC: 0.6 MG/DL (ref 0.2–1)
BILIRUB UR QL: NEGATIVE
BUN SERPL-MCNC: 10 MG/DL (ref 6–20)
BUN/CREAT SERPL: 11 (ref 12–20)
CALCIUM SERPL-MCNC: 8.8 MG/DL (ref 8.5–10.1)
CALCULATED P AXIS, ECG09: 68 DEGREES
CALCULATED R AXIS, ECG10: 74 DEGREES
CALCULATED T AXIS, ECG11: 87 DEGREES
CHLORIDE SERPL-SCNC: 110 MMOL/L (ref 97–108)
CO2 SERPL-SCNC: 27 MMOL/L (ref 21–32)
COLOR UR: NORMAL
CREAT SERPL-MCNC: 0.87 MG/DL (ref 0.7–1.3)
DIAGNOSIS, 93000: NORMAL
DIFFERENTIAL METHOD BLD: ABNORMAL
EOSINOPHIL # BLD: 0.1 K/UL (ref 0–0.4)
EOSINOPHIL NFR BLD: 1 % (ref 0–7)
EPITH CASTS URNS QL MICRO: NORMAL /LPF
ERYTHROCYTE [DISTWIDTH] IN BLOOD BY AUTOMATED COUNT: 16.6 % (ref 11.5–14.5)
EST. AVERAGE GLUCOSE BLD GHB EST-MCNC: 126 MG/DL
GLOBULIN SER CALC-MCNC: 3.1 G/DL (ref 2–4)
GLUCOSE SERPL-MCNC: 83 MG/DL (ref 65–100)
GLUCOSE UR STRIP.AUTO-MCNC: NEGATIVE MG/DL
HBA1C MFR BLD: 6 % (ref 4–5.6)
HCT VFR BLD AUTO: 34.9 % (ref 36.6–50.3)
HGB BLD-MCNC: 10.4 G/DL (ref 12.1–17)
HGB UR QL STRIP: NEGATIVE
IMM GRANULOCYTES # BLD AUTO: 0 K/UL (ref 0–0.04)
IMM GRANULOCYTES NFR BLD AUTO: 0 % (ref 0–0.5)
INR PPP: 1.1 (ref 0.9–1.1)
KETONES UR QL STRIP.AUTO: NEGATIVE MG/DL
LEUKOCYTE ESTERASE UR QL STRIP.AUTO: NEGATIVE
LYMPHOCYTES # BLD: 1.7 K/UL (ref 0.8–3.5)
LYMPHOCYTES NFR BLD: 28 % (ref 12–49)
MCH RBC QN AUTO: 25.8 PG (ref 26–34)
MCHC RBC AUTO-ENTMCNC: 29.8 G/DL (ref 30–36.5)
MCV RBC AUTO: 86.6 FL (ref 80–99)
MONOCYTES # BLD: 0.6 K/UL (ref 0–1)
MONOCYTES NFR BLD: 10 % (ref 5–13)
NEUTS SEG # BLD: 3.7 K/UL (ref 1.8–8)
NEUTS SEG NFR BLD: 60 % (ref 32–75)
NITRITE UR QL STRIP.AUTO: NEGATIVE
NRBC # BLD: 0 K/UL (ref 0–0.01)
NRBC BLD-RTO: 0 PER 100 WBC
P-R INTERVAL, ECG05: 128 MS
PH UR STRIP: 5.5 [PH] (ref 5–8)
PLATELET # BLD AUTO: 361 K/UL (ref 150–400)
PMV BLD AUTO: 9.8 FL (ref 8.9–12.9)
POTASSIUM SERPL-SCNC: 4.5 MMOL/L (ref 3.5–5.1)
PROT SERPL-MCNC: 6.8 G/DL (ref 6.4–8.2)
PROT UR STRIP-MCNC: NEGATIVE MG/DL
PROTHROMBIN TIME: 11.4 SEC (ref 9–11.1)
Q-T INTERVAL, ECG07: 374 MS
QRS DURATION, ECG06: 86 MS
QTC CALCULATION (BEZET), ECG08: 426 MS
RBC # BLD AUTO: 4.03 M/UL (ref 4.1–5.7)
RBC #/AREA URNS HPF: NORMAL /HPF (ref 0–5)
SODIUM SERPL-SCNC: 143 MMOL/L (ref 136–145)
SP GR UR REFRACTOMETRY: 1.02 (ref 1–1.03)
THERAPEUTIC RANGE,PTTT: NORMAL SECS (ref 58–77)
UA: UC IF INDICATED,UAUC: NORMAL
UROBILINOGEN UR QL STRIP.AUTO: 0.2 EU/DL (ref 0.2–1)
VENTRICULAR RATE, ECG03: 78 BPM
WBC # BLD AUTO: 6.2 K/UL (ref 4.1–11.1)
WBC URNS QL MICRO: NORMAL /HPF (ref 0–4)

## 2021-05-24 PROCEDURE — 81001 URINALYSIS AUTO W/SCOPE: CPT

## 2021-05-24 PROCEDURE — 80053 COMPREHEN METABOLIC PANEL: CPT

## 2021-05-24 PROCEDURE — 85610 PROTHROMBIN TIME: CPT

## 2021-05-24 PROCEDURE — 85025 COMPLETE CBC W/AUTO DIFF WBC: CPT

## 2021-05-24 PROCEDURE — 36415 COLL VENOUS BLD VENIPUNCTURE: CPT

## 2021-05-24 PROCEDURE — 83036 HEMOGLOBIN GLYCOSYLATED A1C: CPT

## 2021-05-24 PROCEDURE — 86923 COMPATIBILITY TEST ELECTRIC: CPT

## 2021-05-24 PROCEDURE — 86901 BLOOD TYPING SEROLOGIC RH(D): CPT

## 2021-05-24 PROCEDURE — 93005 ELECTROCARDIOGRAM TRACING: CPT

## 2021-05-24 PROCEDURE — 82306 VITAMIN D 25 HYDROXY: CPT

## 2021-05-24 PROCEDURE — 85730 THROMBOPLASTIN TIME PARTIAL: CPT

## 2021-05-24 RX ORDER — ACETAMINOPHEN 500 MG
TABLET ORAL
COMMUNITY
End: 2021-05-24

## 2021-05-24 RX ORDER — IRBESARTAN 150 MG/1
150 TABLET ORAL 2 TIMES DAILY
COMMUNITY
End: 2021-06-04

## 2021-05-24 RX ORDER — DEXTROMETHORPHAN HYDROBROMIDE, GUAIFENESIN 5; 100 MG/5ML; MG/5ML
1300 LIQUID ORAL
COMMUNITY
End: 2021-06-04

## 2021-05-24 RX ORDER — CLOPIDOGREL BISULFATE 75 MG/1
75 TABLET ORAL
COMMUNITY
End: 2021-06-04

## 2021-05-24 RX ORDER — TAMSULOSIN HYDROCHLORIDE 0.4 MG/1
0.4 CAPSULE ORAL
COMMUNITY

## 2021-05-24 RX ORDER — OMEPRAZOLE 40 MG/1
40 CAPSULE, DELAYED RELEASE ORAL
COMMUNITY
End: 2022-04-12

## 2021-05-24 NOTE — H&P
Preoperative Evaluation                     History and Physical with Surgical Risk Stratification     5/24/2021    CC: Low back pain    HPI:   Esther Fernandez is a 80 y.o. male referred for pre-operative evaluation by Dr. Eloina Tom for surgery on 6/1/21. Goldy Sidhu presents today noting this will be his 5th back surgery. The last two were in 2016 and 2018. He notes his pain started again in 2020 and is now severe. The pain radiates from his low back down both legs. Legs have the same intensity. When he gets up in the morning both legs are weak and he has to be careful walking. Denies numbness. Sitting will give him mild relief. He has tried and failed all conservative measures. He is followed by Dr. Darlene Sparrow for his heart health and medication management of Plavix and ASA. We have received clearance with medication plan to stop 5 days prior to surgery. The patient was evaluated in the surgeon's office and it was determined that the most appropriate plan of care is to proceed with surgical intervention. Patient's PCP Georges Amador,     Review of Systems     Constitutional: Negative for chills and fever  HENT: Negative for congestion and sore throat  Eyes: negative for blurred vision and double vision  Respiratory: Negative for cough, shortness of breath and wheezing  Mouth: Negative for loose, broken or chipped teeth. Cardiovascular: Negative for chest pain and palpitations  Gastrointestinal: Negative for abdominal pain, nausea, diarrhea & constipation  Genitourinary: Negative for dysuria and hematuria  Musculoskeletal: Low back pain, bilateral leg pain  Skin: Negative for rash, open wounds. Neurological: Negative for dizziness, tremors and headaches  Psychiatric: The patient is not nervous/anxious.     Inherent Risk of Surgery     Surgical risk:  Intermediate  Intermediate:  Joint Replacement, Spinal surgery, abdominal, thoracic, carotid endarterctomy, prostate, head and neck    Patient Cardiac Risk Assessment     Revised Cardiac Risk Index (RCRI)  Hx of CVD  Rate if cardiac death, nonfatal MI, nonfatal cardiac arrest by number of risk factor- 1.0%    TJ/AHA 2007 Guidelines:   1) Surgery Emergency, Non-cardiac -> to surgery  2) If not, look at clinical predictors    Intermediate Minor   Abnormal EKGDiabetes  Blood Thinner: Plavix and ASA    METS      EQUAL TO 4 Care for self Walk indoors around house Walk 2-3 blocks on level ground (2-3 mph) Light work around house (dust, dishes)     Other Risk Factors:   Screening for ETOH use:  Done and low risk  Smoking status:  Former   Marijuana Use:  No    Personal or FH of bleeding problems:  No  Personal or FH of blood clots:  No  Personal or FH of anesthesia problems:   No    Pulmonary Risk:  Asthma or COPD:  No  Body mass index is 26.08 kg/m². Known ADELA:  YES    Past Medical, Surgical, Social History     Allergies: Allergies   Allergen Reactions    Ace Inhibitors Angioedema     Face       Current Outpatient Medications:     clopidogreL (PLAVIX) 75 mg tab, Take 75 mg by mouth every morning., Disp: , Rfl:     omeprazole (PRILOSEC) 40 mg capsule, Take 40 mg by mouth every morning., Disp: , Rfl:     calcium carbonate/vitamin D3 (CALCIUM WITH VITAMIN D3 PO), Take 1 Tablet by mouth two (2) times a day., Disp: , Rfl:     acetaminophen (Tylenol Arthritis Pain) 650 mg TbER, Take 1,300 mg by mouth every eight (8) hours as needed for Pain., Disp: , Rfl:     irbesartan (Avapro) 150 mg tablet, Take 150 mg by mouth every morning., Disp: , Rfl:     tamsulosin (FLOMAX) 0.4 mg capsule, Take 0.4 mg by mouth every morning., Disp: , Rfl:     oxyCODONE-acetaminophen (PERCOCET) 5-325 mg per tablet, Take 1 Tab by mouth daily as needed for Pain for up to 30 days.  Indications: pain, Disp: 30 Tab, Rfl: 0    metFORMIN (GLUCOPHAGE) 1,000 mg tablet, TAKE 1 TABLET TWICE A DAY WITH MEALS, Disp: 180 Tab, Rfl: 3    atorvastatin (LIPITOR) 40 mg tablet, TAKE 1 TABLET DAILY (START LIPITOR AFTER VYTORIN IS FINISHED), Disp: 90 Tab, Rfl: 3    ranolazine ER (RANEXA) 500 mg SR tablet, TAKE 1 TABLET TWICE A DAY, Disp: 60 Tab, Rfl: 3    isosorbide mononitrate ER (IMDUR) 30 mg tablet, Take 30 mg by mouth every morning., Disp: , Rfl:     folic acid (FOLVITE) 1 mg tablet, TAKE 1 TABLET DAILY, EXCEPT DAY THAT METHOTREXATE IS TAKEN, Disp: 90 Tab, Rfl: 3    aspirin delayed-release 81 mg tablet, Take  by mouth every morning., Disp: , Rfl:     ascorbic acid, vitamin C, (VITAMIN C) 500 mg tablet, Take 500 mg by mouth two (2) times a day. + Vit D3, Disp: , Rfl:     multivitamins chew, Take 2 Tablets by mouth every morning., Disp: , Rfl:      Past Medical History:   Diagnosis Date    CAD (coronary artery disease)     COVID-19 vaccine series completed 2021    Pfizer    Degenerative joint disease 2011    Diabetes mellitus ([facility] [address]     Essential hypertension     History of back surgery 2019    Hyperlipidemia     Kidney stones 1969    ADELA on CPAP 2011     Past Surgical History:   Procedure Laterality Date    HX CARPAL TUNNEL RELEASE      HX CORONARY STENT PLACEMENT Left -2011    x 3    HX HIP REPLACEMENT Bilateral  &     HX HIP REPLACEMENT Right 2012    Revision    HX LITHOTRIPSY N/A     HX LUMBAR DISKECTOMY N/A     L 4-5    HX LUMBAR FUSION N/A  &     HX LUMBAR LAMINECTOMY  1971    L 4-5     Social History     Tobacco Use    Smoking status: Former Smoker     Packs/day: 1.00     Years: 16.00     Pack years: 16.00     Types: Cigarettes     Quit date:      Years since quittin.4    Smokeless tobacco: Never Used   Substance Use Topics    Alcohol use:  Yes     Alcohol/week: 0.0 standard drinks     Comment: rarely    Drug use: No     Family History   Problem Relation Age of Onset   Abdirashid Julio Arthritis-osteo Mother     No Known Problems Father     Anesth Problems Neg Hx        Objective     Vitals:    21 0905   BP: (!) 148/74   Pulse: 86 Resp: 18   Temp: 97.3 °F (36.3 °C)   SpO2: 91%   Weight: 77.8 kg (171 lb 8.3 oz)   Height: 5' 8\" (1.727 m)       Constitutional:  Appears well,  No Acute Distress, Vitals noted  Psychiatric:   Affect normal, Alert and Oriented to person/place/time    Eyes:   Pupils equally round and reactive, EOMI, conjunctiva clear, eyelids normal  ENT:   External ears and nose normal, teeth normal, gums normal, TMs and Orophyarynx normal  Neck:   General inspection and Thyroid normal.  No abnormal cervical or supraclavicular nodes    Lungs:   Clear to auscultation, good respiratory effort  Heart: Ausculation normal.  Regular rhythm. No cardiac murmurs. No carotid bruits or palpable thrills  Chest wall normal  Musculoskeletal: Gait antalgic  Extremities:   Without edema, good peripheral pulses  Skin:   Warm to palpation, without rashes, bruising, or suspicious lesions     Recent Results (from the past 168 hour(s))   CBC WITH AUTOMATED DIFF    Collection Time: 05/24/21 10:01 AM   Result Value Ref Range    WBC 6.2 4.1 - 11.1 K/uL    RBC 4.03 (L) 4.10 - 5.70 M/uL    HGB 10.4 (L) 12.1 - 17.0 g/dL    HCT 34.9 (L) 36.6 - 50.3 %    MCV 86.6 80.0 - 99.0 FL    MCH 25.8 (L) 26.0 - 34.0 PG    MCHC 29.8 (L) 30.0 - 36.5 g/dL    RDW 16.6 (H) 11.5 - 14.5 %    PLATELET 063 132 - 135 K/uL    MPV 9.8 8.9 - 12.9 FL    NRBC 0.0 0  WBC    ABSOLUTE NRBC 0.00 0.00 - 0.01 K/uL    NEUTROPHILS 60 32 - 75 %    LYMPHOCYTES 28 12 - 49 %    MONOCYTES 10 5 - 13 %    EOSINOPHILS 1 0 - 7 %    BASOPHILS 1 0 - 1 %    IMMATURE GRANULOCYTES 0 0.0 - 0.5 %    ABS. NEUTROPHILS 3.7 1.8 - 8.0 K/UL    ABS. LYMPHOCYTES 1.7 0.8 - 3.5 K/UL    ABS. MONOCYTES 0.6 0.0 - 1.0 K/UL    ABS. EOSINOPHILS 0.1 0.0 - 0.4 K/UL    ABS. BASOPHILS 0.0 0.0 - 0.1 K/UL    ABS. IMM.  GRANS. 0.0 0.00 - 0.04 K/UL    DF AUTOMATED     METABOLIC PANEL, COMPREHENSIVE    Collection Time: 05/24/21 10:01 AM   Result Value Ref Range    Sodium 143 136 - 145 mmol/L    Potassium 4.5 3.5 - 5.1 mmol/L    Chloride 110 (H) 97 - 108 mmol/L    CO2 27 21 - 32 mmol/L    Anion gap 6 5 - 15 mmol/L    Glucose 83 65 - 100 mg/dL    BUN 10 6 - 20 MG/DL    Creatinine 0.87 0.70 - 1.30 MG/DL    BUN/Creatinine ratio 11 (L) 12 - 20      GFR est AA >60 >60 ml/min/1.73m2    GFR est non-AA >60 >60 ml/min/1.73m2    Calcium 8.8 8.5 - 10.1 MG/DL    Bilirubin, total 0.6 0.2 - 1.0 MG/DL    ALT (SGPT) 11 (L) 12 - 78 U/L    AST (SGOT) 11 (L) 15 - 37 U/L    Alk. phosphatase 77 45 - 117 U/L    Protein, total 6.8 6.4 - 8.2 g/dL    Albumin 3.7 3.5 - 5.0 g/dL    Globulin 3.1 2.0 - 4.0 g/dL    A-G Ratio 1.2 1.1 - 2.2     HEMOGLOBIN A1C WITH EAG    Collection Time: 05/24/21 10:01 AM   Result Value Ref Range    Hemoglobin A1c 6.0 (H) 4.0 - 5.6 %    Est. average glucose 126 mg/dL   CULTURE, MRSA    Collection Time: 05/24/21 10:01 AM    Specimen: Nares; Nasal   Result Value Ref Range    Special Requests: NO SPECIAL REQUESTS      Culture result: MRSA NOT PRESENT      Culture result:        Screening of patient nares for MRSA is for surveillance purposes and, if positive, to facilitate isolation considerations in high risk settings. It is not intended for automatic decolonization interventions per se as regimens are not sufficiently effective to warrant routine use.    PROTHROMBIN TIME + INR    Collection Time: 05/24/21 10:01 AM   Result Value Ref Range    INR 1.1 0.9 - 1.1      Prothrombin time 11.4 (H) 9.0 - 11.1 sec   PTT    Collection Time: 05/24/21 10:01 AM   Result Value Ref Range    aPTT 25.9 22.1 - 31.0 sec    aPTT, therapeutic range     58.0 - 77.0 SECS   URINALYSIS W/ REFLEX CULTURE    Collection Time: 05/24/21 10:01 AM    Specimen: Urine   Result Value Ref Range    Color YELLOW/STRAW      Appearance CLEAR CLEAR      Specific gravity 1.020 1.003 - 1.030      pH (UA) 5.5 5.0 - 8.0      Protein Negative NEG mg/dL    Glucose Negative NEG mg/dL    Ketone Negative NEG mg/dL    Bilirubin Negative NEG      Blood Negative NEG      Urobilinogen 0.2 0.2 - 1.0 EU/dL    Nitrites Negative NEG      Leukocyte Esterase Negative NEG      WBC 0-4 0 - 4 /hpf    RBC 0-5 0 - 5 /hpf    Epithelial cells FEW FEW /lpf    Bacteria Negative NEG /hpf    UA:UC IF INDICATED CULTURE NOT INDICATED BY UA RESULT CNI     VITAMIN D, 25 HYDROXY    Collection Time: 05/24/21 10:01 AM   Result Value Ref Range    Vitamin D 25-Hydroxy 47.7 30 - 100 ng/mL   TYPE & SCREEN    Collection Time: 05/24/21 10:01 AM   Result Value Ref Range    Crossmatch Expiration 06/04/2021,2359     ABO/Rh(D) O POSITIVE     Antibody screen NEG    EKG, 12 LEAD, INITIAL    Collection Time: 05/24/21 10:53 AM   Result Value Ref Range    Ventricular Rate 78 BPM    Atrial Rate 78 BPM    P-R Interval 128 ms    QRS Duration 86 ms    Q-T Interval 374 ms    QTC Calculation (Bezet) 426 ms    Calculated P Axis 68 degrees    Calculated R Axis 74 degrees    Calculated T Axis 87 degrees    Diagnosis       Normal sinus rhythm with sinus arrhythmia  Nonspecific ST and T wave abnormality  Abnormal ECG  When compared with ECG of 07-MAY-2020 14:33,  Vent. rate has increased BY  33 BPM  Confirmed by Minor Ca MD., Janice ([id number]) on 5/24/2021 11:28:54 PM         Assessment and Plan     Assessment/Plan:   1) Lumbar Stenosis  - Pre-Operative Evaluation    Labs and EKG reviewed. MRSA Negative    Plan:  L1-2 LAMINECTOMY AND POSTERIOR LUMBER FUSION WITH INSTRUMENTATION, L1-5 HARDWARE REMOVAL, OSTEO AMP, ILIAC CREST BONE MARROW ASPITATE AND IMAGE GUIDANCE     Preoperative Clearance  Per RCRI, the patient has a 1.0% risk of cardiac death, nonfatal MI, nonfatal cardiac arrest based on one risk factors. Per ACC/AHA guidelines, patient is intermediate risk for a(n) intermediate risk surgery and may proceed to planned surgery with the above noted risk.     Lizzy Lugo, JESSICA

## 2021-05-24 NOTE — PERIOP NOTES
[address] [address] [facility]   MAIN OR                                  [phone]   MAIN PRE OP                          [phone]                                                                                AMBULATORY PRE OP          [phone]  PRE-ADMISSION TESTING    [phone]     Surgery Date:  Tuesday June 1, 2001    Spine Class Tuesday May 25, 202 at [phone]    Is surgery arrival time given by surgeon? YES  NO  If NO, [address] staff will call you between 3 and 7pm the day before your surgery with your arrival time. (If your surgery is on a Monday, we will call you the Friday before.)    Call [phone] after 7pm Monday-Friday if you did not receive this call. INSTRUCTIONS BEFORE YOUR SURGERY   When You  Arrive Arrive at the [address] [facility] on the day of your surgery  Have your insurance card, photo ID, and any copayment (if needed)   Food   and   Drink NO food or drink after midnight the night before surgery    This means NO water, gum, mints, coffee, juice, etc.  No alcohol (beer, wine, liquor) 24 hours before and after surgery   Medications to   TAKE   Morning of Surgery MEDICATIONS TO TAKE THE MORNING OF SURGERY WITH A SIP OF WATER:    Tylenol Arthritis or Oxycodone if needed   Ranolazine, Atorvastatin, Tamsulosin, Omeprazole, Isosorbide    Do not take Avapro on the day of surgery! Medications  To  STOP      7 days before surgery  Non-Steroidal anti-inflammatory Drugs (NSAID's): for example, Ibuprofen (Advil, Motrin), Naproxen (Aleve)   Aspirin, if taking for pain    Herbal supplements, vitamins, and fish oil  (Pain medications not listed above, including Tylenol may be taken)   Blood  Thinners  If you take  Aspirin, Plavix, Coumadin, or any blood-thinning or anti-blood clot medicine, talk to the doctor who prescribed the medications for pre-operative instructions.  Per Dr. Lachelle Figueroa stop Plavix and Aspirin 5 days before surgery. Last dose on 5/26/21. Bathing Clothing  Jewelry  Valuables      If you shower the morning of surgery, please do not apply anything to your skin (lotions, powders, deodorant,)   Follow Chlorhexidine Care Fusion body wash instructions provided to you during PAT appointment. Begin 3 days prior to surgery.  Do not shave or trim anywhere 24 hours before surgery   Wear loose, comfortable, clean clothes   Wear glasses instead of contacts  [address], valuables, and jewelry, including body piercings, at home   If you were given an [employer], bring it on day of surgery.  Bring CPAP equipment with you on day of surgery. Going Home - or Spending the Night  SAME-DAY SURGERY: You must have a responsible adult drive you home and stay with you 24 hours after surgery   ADMITS: If your doctor is keeping you in the hospital after surgery, leave personal belongings/luggage in your car until you have a hospital room number. Hospital discharge time is 12 noon  Drivers must be here before 12 noon unless you are told differently   Special Instructions It is now mandated that all surgical patients be tested for COVID-19 prior to surgery. Testing has to be exactly 4 days prior to surgery. Your COVID test date is Friday May 28, 2021 between 8:00 am and 11:00 am.       COVID testing will be performed curbside at the [facility] [address]. There will be signs leading you to the testing site. You will need to bring a photo ID with you to be swabbed. Patients are advised to self-quarantine at home after testing and prior to your surgery date. You will be notified if your results are positive.     What to watch for:   Coronavirus (COVID-19) affects different people in different ways   It also appears with a wide range of symptoms from mild to severe   Signs usually appear 2-14 days after exposure     If you develop any of the following, notify your doctor immediately:  o Fever  o Chills, with or without a shiver  o Muscle pain  o Headache  o Sore throat  o Dry cough  o New loss of taste or smell  o Tiredness      If you develop any of the following, call 375:  o Shortness of breath  o Difficulty breathing  o Chest pain  o New confusion  o Blueness of fingers and/or lips       Follow all instructions so your surgery wont be cancelled. Please, be on time. If a situation occurs and you are delayed the day of surgery, call [phone]. If your physical condition changes (like a fever, cold, flu, etc.) call your surgeon. Home medication(s) reviewed and verified via  LIST   VERBAL   during PAT appointment. The patient was contacted by  IN-PERSON  The patient verbalizes understanding of all instructions and  DOES NOT   need reinforcement.

## 2021-05-25 LAB
25(OH)D3 SERPL-MCNC: 47.7 NG/ML (ref 30–100)
BACTERIA SPEC CULT: NORMAL
BACTERIA SPEC CULT: NORMAL
SERVICE CMNT-IMP: NORMAL

## 2021-05-25 NOTE — FACE TO FACE
The patient and  attended the pre-operative spine class. The content of the class was presented using a power point presentation and visual demonstrations specific for patients undergoing surgical spine procedures of the neck and back. A pre-operative Patient education booklet specific to spine surgery was given to patient. Incentive spirometer and CHG bath kits were demonstrated in class. Day of surgery routine and expectations, hospital routine and expectations, nutrition, alcohol, nicotine, medications, infection control, pain management, DVT precautions and equipment, ice therapy, durable medical equipment, exercises, mobility expectations and precautions, home preparation and safety were reviewed in class. During class the attendees had opportunities to ask questions of the material presented as well as any concerns about their upcoming surgery.

## 2021-05-28 ENCOUNTER — ANESTHESIA EVENT (OUTPATIENT)
Dept: SURGERY | Age: 81
DRG: 454 | End: 2021-05-28
Payer: MEDICARE

## 2021-05-28 ENCOUNTER — HOSPITAL ENCOUNTER (OUTPATIENT)
Dept: PREADMISSION TESTING | Age: 81
Discharge: HOME | End: 2021-05-28
Payer: MEDICARE

## 2021-05-28 PROCEDURE — U0003 INFECTIOUS AGENT DETECTION BY NUCLEIC ACID (DNA OR RNA); SEVERE ACUTE RESPIRATORY SYNDROME CORONAVIRUS 2 (SARS-COV-2) (CORONAVIRUS DISEASE [COVID-19]), AMPLIFIED PROBE TECHNIQUE, MAKING USE OF HIGH THROUGHPUT TECHNOLOGIES AS DESCRIBED BY CMS-2020-01-R: HCPCS

## 2021-05-29 LAB — SARS-COV-2, COV2NT: NOT DETECTED

## 2021-06-01 ENCOUNTER — APPOINTMENT (OUTPATIENT)
Dept: GENERAL RADIOLOGY | Age: 81
DRG: 454 | End: 2021-06-01
Attending: ORTHOPAEDIC SURGERY
Payer: MEDICARE

## 2021-06-01 ENCOUNTER — ANESTHESIA (OUTPATIENT)
Dept: SURGERY | Age: 81
DRG: 454 | End: 2021-06-01
Payer: MEDICARE

## 2021-06-01 ENCOUNTER — HOSPITAL ENCOUNTER (INPATIENT)
Age: 81
LOS: 3 days | Discharge: HOME HEALTH | DRG: 454 | End: 2021-06-04
Attending: ORTHOPAEDIC SURGERY | Admitting: ORTHOPAEDIC SURGERY
Payer: MEDICARE

## 2021-06-01 DIAGNOSIS — Z98.1 STATUS POST LUMBAR SPINAL FUSION: Primary | ICD-10-CM

## 2021-06-01 DIAGNOSIS — E11.9 DIABETES MELLITUS TYPE 2 IN NONOBESE (HCC): ICD-10-CM

## 2021-06-01 PROBLEM — M48.061 LUMBAR STENOSIS: Status: ACTIVE | Noted: 2021-06-01

## 2021-06-01 LAB
ANION GAP SERPL CALC-SCNC: 6 MMOL/L (ref 5–15)
BUN SERPL-MCNC: 10 MG/DL (ref 6–20)
BUN/CREAT SERPL: 10 (ref 12–20)
CALCIUM SERPL-MCNC: 7.3 MG/DL (ref 8.5–10.1)
CHLORIDE SERPL-SCNC: 110 MMOL/L (ref 97–108)
CO2 SERPL-SCNC: 25 MMOL/L (ref 21–32)
CREAT SERPL-MCNC: 0.96 MG/DL (ref 0.7–1.3)
GLUCOSE BLD STRIP.AUTO-MCNC: 104 MG/DL (ref 65–117)
GLUCOSE BLD STRIP.AUTO-MCNC: 187 MG/DL (ref 65–117)
GLUCOSE SERPL-MCNC: 172 MG/DL (ref 65–100)
MAGNESIUM SERPL-MCNC: 1.2 MG/DL (ref 1.6–2.4)
POTASSIUM SERPL-SCNC: 3.6 MMOL/L (ref 3.5–5.1)
SERVICE CMNT-IMP: ABNORMAL
SERVICE CMNT-IMP: NORMAL
SODIUM SERPL-SCNC: 141 MMOL/L (ref 136–145)
TROPONIN I SERPL-MCNC: <0.05 NG/ML

## 2021-06-01 PROCEDURE — 77030028271 HC SRGFL HEMSTAT MTRX KT J&J -C: Performed by: ORTHOPAEDIC SURGERY

## 2021-06-01 PROCEDURE — 77030013474 HC CRD BPLR DISP ADLR -A: Performed by: ORTHOPAEDIC SURGERY

## 2021-06-01 PROCEDURE — 74011000250 HC RX REV CODE- 250: Performed by: ORTHOPAEDIC SURGERY

## 2021-06-01 PROCEDURE — 74011250636 HC RX REV CODE- 250/636: Performed by: NURSE ANESTHETIST, CERTIFIED REGISTERED

## 2021-06-01 PROCEDURE — 74011000258 HC RX REV CODE- 258: Performed by: NURSE ANESTHETIST, CERTIFIED REGISTERED

## 2021-06-01 PROCEDURE — 77030040466 HC GRFT MATRIX VIBONE REGE -I1: Performed by: ORTHOPAEDIC SURGERY

## 2021-06-01 PROCEDURE — 0SG0071 FUSION OF LUMBAR VERTEBRAL JOINT WITH AUTOLOGOUS TISSUE SUBSTITUTE, POSTERIOR APPROACH, POSTERIOR COLUMN, OPEN APPROACH: ICD-10-PCS | Performed by: ORTHOPAEDIC SURGERY

## 2021-06-01 PROCEDURE — 80048 BASIC METABOLIC PNL TOTAL CA: CPT

## 2021-06-01 PROCEDURE — 77030040179 HC DEV DRSG WND PICO S&N -C: Performed by: ORTHOPAEDIC SURGERY

## 2021-06-01 PROCEDURE — 74011250636 HC RX REV CODE- 250/636: Performed by: ANESTHESIOLOGY

## 2021-06-01 PROCEDURE — 74011000250 HC RX REV CODE- 250: Performed by: NURSE ANESTHETIST, CERTIFIED REGISTERED

## 2021-06-01 PROCEDURE — C1889 IMPLANT/INSERT DEVICE, NOC: HCPCS | Performed by: ORTHOPAEDIC SURGERY

## 2021-06-01 PROCEDURE — 74011250636 HC RX REV CODE- 250/636: Performed by: ORTHOPAEDIC SURGERY

## 2021-06-01 PROCEDURE — 74011250637 HC RX REV CODE- 250/637: Performed by: NURSE PRACTITIONER

## 2021-06-01 PROCEDURE — 77030010507 HC ADH SKN DERMBND J&J -B: Performed by: ORTHOPAEDIC SURGERY

## 2021-06-01 PROCEDURE — C9290 INJ, BUPIVACAINE LIPOSOME: HCPCS | Performed by: ORTHOPAEDIC SURGERY

## 2021-06-01 PROCEDURE — 36415 COLL VENOUS BLD VENIPUNCTURE: CPT

## 2021-06-01 PROCEDURE — 77030005513 HC CATH URETH FOL11 MDII -B: Performed by: ORTHOPAEDIC SURGERY

## 2021-06-01 PROCEDURE — 77030037134 HC WRAP COMPR BACK THER SOLM -B

## 2021-06-01 PROCEDURE — C1713 ANCHOR/SCREW BN/BN,TIS/BN: HCPCS | Performed by: ORTHOPAEDIC SURGERY

## 2021-06-01 PROCEDURE — 77030003194 HC GRFT RECOMB BN MEDT -I1: Performed by: ORTHOPAEDIC SURGERY

## 2021-06-01 PROCEDURE — 65270000029 HC RM PRIVATE

## 2021-06-01 PROCEDURE — 77030013079 HC BLNKT BAIR HGGR 3M -A: Performed by: ANESTHESIOLOGY

## 2021-06-01 PROCEDURE — 77030035129: Performed by: ORTHOPAEDIC SURGERY

## 2021-06-01 PROCEDURE — 77030026438 HC STYL ET INTUB CARD -A: Performed by: ANESTHESIOLOGY

## 2021-06-01 PROCEDURE — 76060000043 HC ANESTHESIA 6 TO 6.5 HR: Performed by: ORTHOPAEDIC SURGERY

## 2021-06-01 PROCEDURE — 77030040465 HC GRFT MATRIX VIBONE REGE -H: Performed by: ORTHOPAEDIC SURGERY

## 2021-06-01 PROCEDURE — 77030040922 HC BLNKT HYPOTHRM STRY -A

## 2021-06-01 PROCEDURE — 93005 ELECTROCARDIOGRAM TRACING: CPT

## 2021-06-01 PROCEDURE — 77030039147 HC PWDR HEMSTS SURGICEL JNJ -D: Performed by: ORTHOPAEDIC SURGERY

## 2021-06-01 PROCEDURE — 77030018723 HC ELCTRD BLD COVD -A: Performed by: ORTHOPAEDIC SURGERY

## 2021-06-01 PROCEDURE — 74011250636 HC RX REV CODE- 250/636: Performed by: STUDENT IN AN ORGANIZED HEALTH CARE EDUCATION/TRAINING PROGRAM

## 2021-06-01 PROCEDURE — 83735 ASSAY OF MAGNESIUM: CPT

## 2021-06-01 PROCEDURE — 77030020268 HC MISC GENERAL SUPPLY: Performed by: ORTHOPAEDIC SURGERY

## 2021-06-01 PROCEDURE — 77030041279 HC DRSG PRMSL AG MDII -B: Performed by: ORTHOPAEDIC SURGERY

## 2021-06-01 PROCEDURE — 76210000000 HC OR PH I REC 2 TO 2.5 HR: Performed by: ORTHOPAEDIC SURGERY

## 2021-06-01 PROCEDURE — 2709999900 HC NON-CHARGEABLE SUPPLY: Performed by: ORTHOPAEDIC SURGERY

## 2021-06-01 PROCEDURE — 77030040361 HC SLV COMPR DVT MDII -B

## 2021-06-01 PROCEDURE — 76010000179 HC OR TIME 6 TO 6.5 HR INTENSV-TIER 1: Performed by: ORTHOPAEDIC SURGERY

## 2021-06-01 PROCEDURE — 77030040950 HC GRFT BN OSTEOAMP SELCT BTUS -H: Performed by: ORTHOPAEDIC SURGERY

## 2021-06-01 PROCEDURE — 0SG10AJ FUSION OF 2 OR MORE LUMBAR VERTEBRAL JOINTS WITH INTERBODY FUSION DEVICE, POSTERIOR APPROACH, ANTERIOR COLUMN, OPEN APPROACH: ICD-10-PCS | Performed by: ORTHOPAEDIC SURGERY

## 2021-06-01 PROCEDURE — 74011250636 HC RX REV CODE- 250/636: Performed by: NURSE PRACTITIONER

## 2021-06-01 PROCEDURE — 77030040951 HC GRFT BN OSTEOAMP SELCT BTUS -I: Performed by: ORTHOPAEDIC SURGERY

## 2021-06-01 PROCEDURE — 77030004391 HC BUR FLUT MEDT -C: Performed by: ORTHOPAEDIC SURGERY

## 2021-06-01 PROCEDURE — 77030008771 HC TU NG SALEM SUMP -A: Performed by: ANESTHESIOLOGY

## 2021-06-01 PROCEDURE — 77030029099 HC BN WAX SSPC -A: Performed by: ORTHOPAEDIC SURGERY

## 2021-06-01 PROCEDURE — 82962 GLUCOSE BLOOD TEST: CPT

## 2021-06-01 PROCEDURE — P9045 ALBUMIN (HUMAN), 5%, 250 ML: HCPCS | Performed by: NURSE ANESTHETIST, CERTIFIED REGISTERED

## 2021-06-01 PROCEDURE — 84484 ASSAY OF TROPONIN QUANT: CPT

## 2021-06-01 PROCEDURE — C1776 JOINT DEVICE (IMPLANTABLE): HCPCS | Performed by: ORTHOPAEDIC SURGERY

## 2021-06-01 PROCEDURE — 77030008684 HC TU ET CUF COVD -B: Performed by: ANESTHESIOLOGY

## 2021-06-01 DEVICE — GRAFT BONE 10 CC: Type: IMPLANTABLE DEVICE | Site: SPINE LUMBAR | Status: FUNCTIONAL

## 2021-06-01 DEVICE — BONE GRAFT KIT 7510200 INFUSE SMALL
Type: IMPLANTABLE DEVICE | Site: SPINE LUMBAR | Status: FUNCTIONAL
Brand: INFUSE® BONE GRAFT

## 2021-06-01 DEVICE — SOLID SCREW 7.50 X 40MM
Type: IMPLANTABLE DEVICE | Site: SPINE LUMBAR | Status: FUNCTIONAL
Brand: MARINER

## 2021-06-01 DEVICE — GRAFT BNE SUB 5CC VIABLE MTRX VIBNE: Type: IMPLANTABLE DEVICE | Site: SPINE LUMBAR | Status: FUNCTIONAL

## 2021-06-01 DEVICE — IMPLANTABLE DEVICE: Type: IMPLANTABLE DEVICE | Site: SPINE LUMBAR | Status: FUNCTIONAL

## 2021-06-01 DEVICE — SOLID SCREW 6.50 X 45MM
Type: IMPLANTABLE DEVICE | Site: SPINE LUMBAR | Status: FUNCTIONAL
Brand: MARINER

## 2021-06-01 DEVICE — SET SCREW
Type: IMPLANTABLE DEVICE | Site: SPINE LUMBAR | Status: FUNCTIONAL
Brand: MARINER

## 2021-06-01 DEVICE — POLYAXIAL HEAD
Type: IMPLANTABLE DEVICE | Site: SPINE LUMBAR | Status: FUNCTIONAL
Brand: MARINER

## 2021-06-01 DEVICE — SOLID SCREW 7.50 X 45MM
Type: IMPLANTABLE DEVICE | Site: SPINE LUMBAR | Status: FUNCTIONAL
Brand: MARINER

## 2021-06-01 DEVICE — Z DUP USE 2731790 SUBSTITUTE BNE 10CC VIABLE MTRX VIBNE: Type: IMPLANTABLE DEVICE | Site: SPINE LUMBAR | Status: FUNCTIONAL

## 2021-06-01 DEVICE — GRAFT BONE 5 CC: Type: IMPLANTABLE DEVICE | Site: SPINE LUMBAR | Status: FUNCTIONAL

## 2021-06-01 DEVICE — ROD, 5.5 X 450MM
Type: IMPLANTABLE DEVICE | Site: SPINE LUMBAR | Status: FUNCTIONAL
Brand: UCR

## 2021-06-01 RX ORDER — NALOXONE HYDROCHLORIDE 0.4 MG/ML
0.04 INJECTION, SOLUTION INTRAMUSCULAR; INTRAVENOUS; SUBCUTANEOUS
Status: DISCONTINUED | OUTPATIENT
Start: 2021-06-01 | End: 2021-06-01 | Stop reason: HOSPADM

## 2021-06-01 RX ORDER — SODIUM CHLORIDE 0.9 % (FLUSH) 0.9 %
5-40 SYRINGE (ML) INJECTION AS NEEDED
Status: DISCONTINUED | OUTPATIENT
Start: 2021-06-01 | End: 2021-06-04 | Stop reason: HOSPADM

## 2021-06-01 RX ORDER — PHENYLEPHRINE HCL IN 0.9% NACL 0.4MG/10ML
SYRINGE (ML) INTRAVENOUS AS NEEDED
Status: DISCONTINUED | OUTPATIENT
Start: 2021-06-01 | End: 2021-06-01 | Stop reason: HOSPADM

## 2021-06-01 RX ORDER — CYCLOBENZAPRINE HCL 10 MG
10 TABLET ORAL
Status: DISCONTINUED | OUTPATIENT
Start: 2021-06-01 | End: 2021-06-04 | Stop reason: HOSPADM

## 2021-06-01 RX ORDER — KETAMINE HYDROCHLORIDE 10 MG/ML
INJECTION, SOLUTION INTRAMUSCULAR; INTRAVENOUS AS NEEDED
Status: DISCONTINUED | OUTPATIENT
Start: 2021-06-01 | End: 2021-06-01 | Stop reason: HOSPADM

## 2021-06-01 RX ORDER — SODIUM CHLORIDE, SODIUM LACTATE, POTASSIUM CHLORIDE, CALCIUM CHLORIDE 600; 310; 30; 20 MG/100ML; MG/100ML; MG/100ML; MG/100ML
125 INJECTION, SOLUTION INTRAVENOUS CONTINUOUS
Status: DISCONTINUED | OUTPATIENT
Start: 2021-06-01 | End: 2021-06-01 | Stop reason: HOSPADM

## 2021-06-01 RX ORDER — ROCURONIUM BROMIDE 10 MG/ML
INJECTION, SOLUTION INTRAVENOUS AS NEEDED
Status: DISCONTINUED | OUTPATIENT
Start: 2021-06-01 | End: 2021-06-01 | Stop reason: HOSPADM

## 2021-06-01 RX ORDER — DIPHENHYDRAMINE HYDROCHLORIDE 50 MG/ML
12.5 INJECTION, SOLUTION INTRAMUSCULAR; INTRAVENOUS
Status: ACTIVE | OUTPATIENT
Start: 2021-06-01 | End: 2021-06-02

## 2021-06-01 RX ORDER — DEXAMETHASONE SODIUM PHOSPHATE 4 MG/ML
INJECTION, SOLUTION INTRA-ARTICULAR; INTRALESIONAL; INTRAMUSCULAR; INTRAVENOUS; SOFT TISSUE AS NEEDED
Status: DISCONTINUED | OUTPATIENT
Start: 2021-06-01 | End: 2021-06-01 | Stop reason: HOSPADM

## 2021-06-01 RX ORDER — LIDOCAINE HYDROCHLORIDE 20 MG/ML
INJECTION, SOLUTION EPIDURAL; INFILTRATION; INTRACAUDAL; PERINEURAL AS NEEDED
Status: DISCONTINUED | OUTPATIENT
Start: 2021-06-01 | End: 2021-06-01 | Stop reason: HOSPADM

## 2021-06-01 RX ORDER — SODIUM CHLORIDE 0.9 % (FLUSH) 0.9 %
5-40 SYRINGE (ML) INJECTION EVERY 8 HOURS
Status: DISCONTINUED | OUTPATIENT
Start: 2021-06-01 | End: 2021-06-01 | Stop reason: HOSPADM

## 2021-06-01 RX ORDER — HYDROMORPHONE HYDROCHLORIDE 1 MG/ML
.25-1 INJECTION, SOLUTION INTRAMUSCULAR; INTRAVENOUS; SUBCUTANEOUS
Status: DISCONTINUED | OUTPATIENT
Start: 2021-06-01 | End: 2021-06-01 | Stop reason: HOSPADM

## 2021-06-01 RX ORDER — NALOXONE HYDROCHLORIDE 0.4 MG/ML
0.4 INJECTION, SOLUTION INTRAMUSCULAR; INTRAVENOUS; SUBCUTANEOUS AS NEEDED
Status: DISCONTINUED | OUTPATIENT
Start: 2021-06-01 | End: 2021-06-04 | Stop reason: HOSPADM

## 2021-06-01 RX ORDER — INSULIN LISPRO 100 [IU]/ML
INJECTION, SOLUTION INTRAVENOUS; SUBCUTANEOUS
Status: DISCONTINUED | OUTPATIENT
Start: 2021-06-01 | End: 2021-06-04 | Stop reason: HOSPADM

## 2021-06-01 RX ORDER — ISOSORBIDE MONONITRATE 30 MG/1
30 TABLET, EXTENDED RELEASE ORAL
Status: DISCONTINUED | OUTPATIENT
Start: 2021-06-02 | End: 2021-06-02

## 2021-06-01 RX ORDER — EPHEDRINE SULFATE/0.9% NACL/PF 50 MG/5 ML
SYRINGE (ML) INTRAVENOUS AS NEEDED
Status: DISCONTINUED | OUTPATIENT
Start: 2021-06-01 | End: 2021-06-01 | Stop reason: HOSPADM

## 2021-06-01 RX ORDER — TRAMADOL HYDROCHLORIDE 50 MG/1
50 TABLET ORAL
Status: DISCONTINUED | OUTPATIENT
Start: 2021-06-01 | End: 2021-06-04 | Stop reason: HOSPADM

## 2021-06-01 RX ORDER — ONDANSETRON 2 MG/ML
4 INJECTION INTRAMUSCULAR; INTRAVENOUS AS NEEDED
Status: DISCONTINUED | OUTPATIENT
Start: 2021-06-01 | End: 2021-06-01 | Stop reason: HOSPADM

## 2021-06-01 RX ORDER — SODIUM CHLORIDE 0.9 % (FLUSH) 0.9 %
5-40 SYRINGE (ML) INJECTION EVERY 8 HOURS
Status: DISCONTINUED | OUTPATIENT
Start: 2021-06-01 | End: 2021-06-04 | Stop reason: HOSPADM

## 2021-06-01 RX ORDER — POLYETHYLENE GLYCOL 3350 17 G/17G
17 POWDER, FOR SOLUTION ORAL DAILY
Status: DISCONTINUED | OUTPATIENT
Start: 2021-06-02 | End: 2021-06-04 | Stop reason: HOSPADM

## 2021-06-01 RX ORDER — MAGNESIUM SULFATE HEPTAHYDRATE 40 MG/ML
2 INJECTION, SOLUTION INTRAVENOUS
Status: COMPLETED | OUTPATIENT
Start: 2021-06-01 | End: 2021-06-02

## 2021-06-01 RX ORDER — ALBUTEROL SULFATE 0.83 MG/ML
2.5 SOLUTION RESPIRATORY (INHALATION)
Status: DISCONTINUED | OUTPATIENT
Start: 2021-06-01 | End: 2021-06-04 | Stop reason: HOSPADM

## 2021-06-01 RX ORDER — SODIUM CHLORIDE 0.9 % (FLUSH) 0.9 %
5-40 SYRINGE (ML) INJECTION AS NEEDED
Status: DISCONTINUED | OUTPATIENT
Start: 2021-06-01 | End: 2021-06-01 | Stop reason: HOSPADM

## 2021-06-01 RX ORDER — FAMOTIDINE 20 MG/1
20 TABLET, FILM COATED ORAL 2 TIMES DAILY
Status: DISCONTINUED | OUTPATIENT
Start: 2021-06-01 | End: 2021-06-04 | Stop reason: HOSPADM

## 2021-06-01 RX ORDER — MAGNESIUM SULFATE 100 %
4 CRYSTALS MISCELLANEOUS AS NEEDED
Status: DISCONTINUED | OUTPATIENT
Start: 2021-06-01 | End: 2021-06-04 | Stop reason: HOSPADM

## 2021-06-01 RX ORDER — VANCOMYCIN HYDROCHLORIDE 1 G/20ML
INJECTION, POWDER, LYOPHILIZED, FOR SOLUTION INTRAVENOUS AS NEEDED
Status: DISCONTINUED | OUTPATIENT
Start: 2021-06-01 | End: 2021-06-01 | Stop reason: HOSPADM

## 2021-06-01 RX ORDER — LIDOCAINE HYDROCHLORIDE 10 MG/ML
0.1 INJECTION, SOLUTION EPIDURAL; INFILTRATION; INTRACAUDAL; PERINEURAL AS NEEDED
Status: DISCONTINUED | OUTPATIENT
Start: 2021-06-01 | End: 2021-06-01 | Stop reason: HOSPADM

## 2021-06-01 RX ORDER — ACETAMINOPHEN 500 MG
1000 TABLET ORAL EVERY 6 HOURS
Status: DISCONTINUED | OUTPATIENT
Start: 2021-06-01 | End: 2021-06-04 | Stop reason: HOSPADM

## 2021-06-01 RX ORDER — TAMSULOSIN HYDROCHLORIDE 0.4 MG/1
0.4 CAPSULE ORAL
Status: DISCONTINUED | OUTPATIENT
Start: 2021-06-02 | End: 2021-06-04 | Stop reason: HOSPADM

## 2021-06-01 RX ORDER — ADHESIVE BANDAGE
15 BANDAGE TOPICAL 2 TIMES DAILY
Status: DISCONTINUED | OUTPATIENT
Start: 2021-06-01 | End: 2021-06-04 | Stop reason: HOSPADM

## 2021-06-01 RX ORDER — ATORVASTATIN CALCIUM 20 MG/1
40 TABLET, FILM COATED ORAL DAILY
Status: DISCONTINUED | OUTPATIENT
Start: 2021-06-02 | End: 2021-06-04 | Stop reason: HOSPADM

## 2021-06-01 RX ORDER — RANOLAZINE 500 MG/1
500 TABLET, EXTENDED RELEASE ORAL 2 TIMES DAILY
Status: DISCONTINUED | OUTPATIENT
Start: 2021-06-01 | End: 2021-06-04 | Stop reason: HOSPADM

## 2021-06-01 RX ORDER — GENTAMICIN SULFATE 80 MG/100ML
INJECTION, SOLUTION INTRAVENOUS AS NEEDED
Status: DISCONTINUED | OUTPATIENT
Start: 2021-06-01 | End: 2021-06-01 | Stop reason: HOSPADM

## 2021-06-01 RX ORDER — KETOROLAC TROMETHAMINE 30 MG/ML
15 INJECTION, SOLUTION INTRAMUSCULAR; INTRAVENOUS
Status: ACTIVE | OUTPATIENT
Start: 2021-06-01 | End: 2021-06-02

## 2021-06-01 RX ORDER — FENTANYL CITRATE 50 UG/ML
INJECTION, SOLUTION INTRAMUSCULAR; INTRAVENOUS AS NEEDED
Status: DISCONTINUED | OUTPATIENT
Start: 2021-06-01 | End: 2021-06-01 | Stop reason: HOSPADM

## 2021-06-01 RX ORDER — FENTANYL CITRATE 50 UG/ML
25 INJECTION, SOLUTION INTRAMUSCULAR; INTRAVENOUS
Status: DISCONTINUED | OUTPATIENT
Start: 2021-06-01 | End: 2021-06-01 | Stop reason: HOSPADM

## 2021-06-01 RX ORDER — SODIUM CHLORIDE, SODIUM LACTATE, POTASSIUM CHLORIDE, CALCIUM CHLORIDE 600; 310; 30; 20 MG/100ML; MG/100ML; MG/100ML; MG/100ML
INJECTION, SOLUTION INTRAVENOUS
Status: DISCONTINUED | OUTPATIENT
Start: 2021-06-01 | End: 2021-06-01 | Stop reason: HOSPADM

## 2021-06-01 RX ORDER — MORPHINE SULFATE 2 MG/ML
2 INJECTION, SOLUTION INTRAMUSCULAR; INTRAVENOUS
Status: DISPENSED | OUTPATIENT
Start: 2021-06-01 | End: 2021-06-02

## 2021-06-01 RX ORDER — DEXTROSE 50 % IN WATER (D50W) INTRAVENOUS SYRINGE
25-50 AS NEEDED
Status: DISCONTINUED | OUTPATIENT
Start: 2021-06-01 | End: 2021-06-04 | Stop reason: HOSPADM

## 2021-06-01 RX ORDER — ALBUMIN HUMAN 50 G/1000ML
SOLUTION INTRAVENOUS AS NEEDED
Status: DISCONTINUED | OUTPATIENT
Start: 2021-06-01 | End: 2021-06-01 | Stop reason: HOSPADM

## 2021-06-01 RX ORDER — FLUMAZENIL 0.1 MG/ML
0.2 INJECTION INTRAVENOUS
Status: DISCONTINUED | OUTPATIENT
Start: 2021-06-01 | End: 2021-06-01 | Stop reason: HOSPADM

## 2021-06-01 RX ORDER — DIPHENHYDRAMINE HYDROCHLORIDE 50 MG/ML
12.5 INJECTION, SOLUTION INTRAMUSCULAR; INTRAVENOUS AS NEEDED
Status: DISCONTINUED | OUTPATIENT
Start: 2021-06-01 | End: 2021-06-01 | Stop reason: HOSPADM

## 2021-06-01 RX ORDER — AMOXICILLIN 250 MG
1 CAPSULE ORAL 2 TIMES DAILY
Status: DISCONTINUED | OUTPATIENT
Start: 2021-06-01 | End: 2021-06-04 | Stop reason: HOSPADM

## 2021-06-01 RX ORDER — OXYCODONE HYDROCHLORIDE 5 MG/1
5 TABLET ORAL
Status: DISCONTINUED | OUTPATIENT
Start: 2021-06-01 | End: 2021-06-04 | Stop reason: HOSPADM

## 2021-06-01 RX ORDER — OXYCODONE HYDROCHLORIDE 5 MG/1
10 TABLET ORAL
Status: DISCONTINUED | OUTPATIENT
Start: 2021-06-01 | End: 2021-06-04 | Stop reason: HOSPADM

## 2021-06-01 RX ORDER — HYDROMORPHONE HYDROCHLORIDE 2 MG/ML
INJECTION, SOLUTION INTRAMUSCULAR; INTRAVENOUS; SUBCUTANEOUS AS NEEDED
Status: DISCONTINUED | OUTPATIENT
Start: 2021-06-01 | End: 2021-06-01 | Stop reason: HOSPADM

## 2021-06-01 RX ORDER — PROPOFOL 10 MG/ML
INJECTION, EMULSION INTRAVENOUS AS NEEDED
Status: DISCONTINUED | OUTPATIENT
Start: 2021-06-01 | End: 2021-06-01 | Stop reason: HOSPADM

## 2021-06-01 RX ORDER — FACIAL-BODY WIPES
10 EACH TOPICAL DAILY PRN
Status: DISCONTINUED | OUTPATIENT
Start: 2021-06-03 | End: 2021-06-04 | Stop reason: HOSPADM

## 2021-06-01 RX ORDER — MIDAZOLAM HYDROCHLORIDE 1 MG/ML
INJECTION, SOLUTION INTRAMUSCULAR; INTRAVENOUS AS NEEDED
Status: DISCONTINUED | OUTPATIENT
Start: 2021-06-01 | End: 2021-06-01 | Stop reason: HOSPADM

## 2021-06-01 RX ORDER — VALSARTAN 80 MG/1
80 TABLET ORAL
Status: DISCONTINUED | OUTPATIENT
Start: 2021-06-02 | End: 2021-06-02

## 2021-06-01 RX ORDER — SODIUM CHLORIDE 9 MG/ML
125 INJECTION, SOLUTION INTRAVENOUS CONTINUOUS
Status: DISPENSED | OUTPATIENT
Start: 2021-06-01 | End: 2021-06-02

## 2021-06-01 RX ORDER — ONDANSETRON 2 MG/ML
4 INJECTION INTRAMUSCULAR; INTRAVENOUS
Status: ACTIVE | OUTPATIENT
Start: 2021-06-01 | End: 2021-06-02

## 2021-06-01 RX ORDER — ONDANSETRON 2 MG/ML
INJECTION INTRAMUSCULAR; INTRAVENOUS AS NEEDED
Status: DISCONTINUED | OUTPATIENT
Start: 2021-06-01 | End: 2021-06-01 | Stop reason: HOSPADM

## 2021-06-01 RX ORDER — ALBUTEROL SULFATE 0.83 MG/ML
2.5 SOLUTION RESPIRATORY (INHALATION) AS NEEDED
Status: DISCONTINUED | OUTPATIENT
Start: 2021-06-01 | End: 2021-06-01 | Stop reason: SDUPTHER

## 2021-06-01 RX ADMIN — FENTANYL CITRATE 100 MCG: 50 INJECTION, SOLUTION INTRAMUSCULAR; INTRAVENOUS at 13:35

## 2021-06-01 RX ADMIN — OXYCODONE 10 MG: 5 TABLET ORAL at 23:58

## 2021-06-01 RX ADMIN — SODIUM CHLORIDE, POTASSIUM CHLORIDE, SODIUM LACTATE AND CALCIUM CHLORIDE: 600; 310; 30; 20 INJECTION, SOLUTION INTRAVENOUS at 16:12

## 2021-06-01 RX ADMIN — Medication 10 ML: at 23:00

## 2021-06-01 RX ADMIN — HYDROMORPHONE HYDROCHLORIDE 0.5 MG: 1 INJECTION, SOLUTION INTRAMUSCULAR; INTRAVENOUS; SUBCUTANEOUS at 21:40

## 2021-06-01 RX ADMIN — HYDROMORPHONE HYDROCHLORIDE 0.3 MG: 2 INJECTION INTRAMUSCULAR; INTRAVENOUS; SUBCUTANEOUS at 16:55

## 2021-06-01 RX ADMIN — RANOLAZINE 500 MG: 500 TABLET, FILM COATED, EXTENDED RELEASE ORAL at 22:58

## 2021-06-01 RX ADMIN — CYCLOBENZAPRINE 10 MG: 10 TABLET, FILM COATED ORAL at 23:58

## 2021-06-01 RX ADMIN — FENTANYL CITRATE 50 MCG: 50 INJECTION, SOLUTION INTRAMUSCULAR; INTRAVENOUS at 15:39

## 2021-06-01 RX ADMIN — KETAMINE HYDROCHLORIDE 45 MG: 10 INJECTION INTRAMUSCULAR; INTRAVENOUS at 17:49

## 2021-06-01 RX ADMIN — HYDROMORPHONE HYDROCHLORIDE 0.5 MG: 2 INJECTION INTRAMUSCULAR; INTRAVENOUS; SUBCUTANEOUS at 19:20

## 2021-06-01 RX ADMIN — CEFAZOLIN SODIUM 2 G: 1 POWDER, FOR SOLUTION INTRAMUSCULAR; INTRAVENOUS at 14:50

## 2021-06-01 RX ADMIN — SODIUM CHLORIDE, POTASSIUM CHLORIDE, SODIUM LACTATE AND CALCIUM CHLORIDE: 600; 310; 30; 20 INJECTION, SOLUTION INTRAVENOUS at 18:44

## 2021-06-01 RX ADMIN — Medication 10 MG: at 13:43

## 2021-06-01 RX ADMIN — ALBUMIN (HUMAN) 250 ML: 12.5 SOLUTION INTRAVENOUS at 18:52

## 2021-06-01 RX ADMIN — SODIUM CHLORIDE, POTASSIUM CHLORIDE, SODIUM LACTATE AND CALCIUM CHLORIDE: 600; 310; 30; 20 INJECTION, SOLUTION INTRAVENOUS at 15:03

## 2021-06-01 RX ADMIN — Medication 80 MCG: at 13:48

## 2021-06-01 RX ADMIN — SODIUM CHLORIDE, POTASSIUM CHLORIDE, SODIUM LACTATE AND CALCIUM CHLORIDE: 600; 310; 30; 20 INJECTION, SOLUTION INTRAVENOUS at 13:15

## 2021-06-01 RX ADMIN — ROCURONIUM BROMIDE 40 MG: 10 INJECTION INTRAVENOUS at 14:24

## 2021-06-01 RX ADMIN — GENTAMICIN SULFATE 233 MG: 80 INJECTION, SOLUTION INTRAVENOUS at 14:55

## 2021-06-01 RX ADMIN — PROPOFOL 50 MG: 10 INJECTION, EMULSION INTRAVENOUS at 14:24

## 2021-06-01 RX ADMIN — PHENYLEPHRINE HYDROCHLORIDE 65 MCG/MIN: 10 INJECTION INTRAVENOUS at 20:44

## 2021-06-01 RX ADMIN — MAGNESIUM SULFATE HEPTAHYDRATE 2 G: 40 INJECTION, SOLUTION INTRAVENOUS at 23:39

## 2021-06-01 RX ADMIN — FAMOTIDINE 20 MG: 20 TABLET ORAL at 22:58

## 2021-06-01 RX ADMIN — KETAMINE HYDROCHLORIDE 50 MG: 10 INJECTION INTRAMUSCULAR; INTRAVENOUS at 20:02

## 2021-06-01 RX ADMIN — ALBUMIN (HUMAN) 250 ML: 12.5 SOLUTION INTRAVENOUS at 16:02

## 2021-06-01 RX ADMIN — Medication 10 MG: at 13:46

## 2021-06-01 RX ADMIN — LIDOCAINE HYDROCHLORIDE 100 MG: 20 INJECTION, SOLUTION EPIDURAL; INFILTRATION; INTRACAUDAL; PERINEURAL at 13:37

## 2021-06-01 RX ADMIN — ROCURONIUM BROMIDE 40 MG: 10 INJECTION INTRAVENOUS at 13:37

## 2021-06-01 RX ADMIN — MORPHINE SULFATE 2 MG: 2 INJECTION, SOLUTION INTRAMUSCULAR; INTRAVENOUS at 22:58

## 2021-06-01 RX ADMIN — CEFAZOLIN SODIUM 2 G: 1 POWDER, FOR SOLUTION INTRAMUSCULAR; INTRAVENOUS at 18:50

## 2021-06-01 RX ADMIN — Medication 20 MCG/MIN: at 14:01

## 2021-06-01 RX ADMIN — PROPOFOL 50 MCG/KG/MIN: 10 INJECTION, EMULSION INTRAVENOUS at 14:25

## 2021-06-01 RX ADMIN — ACETAMINOPHEN 1000 MG: 500 TABLET, FILM COATED ORAL at 22:58

## 2021-06-01 RX ADMIN — NOREPINEPHRINE BITARTRATE 2 MCG/MIN: 1 INJECTION, SOLUTION, CONCENTRATE INTRAVENOUS at 16:25

## 2021-06-01 RX ADMIN — SODIUM CHLORIDE 125 ML/HR: 9 INJECTION, SOLUTION INTRAVENOUS at 20:24

## 2021-06-01 RX ADMIN — DOCUSATE SODIUM 50MG AND SENNOSIDES 8.6MG 1 TABLET: 8.6; 5 TABLET, FILM COATED ORAL at 22:58

## 2021-06-01 RX ADMIN — HYDROMORPHONE HYDROCHLORIDE 0.2 MG: 2 INJECTION INTRAMUSCULAR; INTRAVENOUS; SUBCUTANEOUS at 16:25

## 2021-06-01 RX ADMIN — SODIUM CHLORIDE, POTASSIUM CHLORIDE, SODIUM LACTATE AND CALCIUM CHLORIDE 125 ML/HR: 600; 310; 30; 20 INJECTION, SOLUTION INTRAVENOUS at 20:26

## 2021-06-01 RX ADMIN — SODIUM CHLORIDE, POTASSIUM CHLORIDE, SODIUM LACTATE AND CALCIUM CHLORIDE: 600; 310; 30; 20 INJECTION, SOLUTION INTRAVENOUS at 19:58

## 2021-06-01 RX ADMIN — SODIUM CHLORIDE, POTASSIUM CHLORIDE, SODIUM LACTATE AND CALCIUM CHLORIDE: 600; 310; 30; 20 INJECTION, SOLUTION INTRAVENOUS at 17:02

## 2021-06-01 RX ADMIN — FENTANYL CITRATE 100 MCG: 50 INJECTION, SOLUTION INTRAMUSCULAR; INTRAVENOUS at 14:50

## 2021-06-01 RX ADMIN — Medication 120 MCG: at 17:05

## 2021-06-01 RX ADMIN — Medication 80 MCG: at 13:43

## 2021-06-01 RX ADMIN — MIDAZOLAM HYDROCHLORIDE 2 MG: 2 INJECTION, SOLUTION INTRAMUSCULAR; INTRAVENOUS at 13:31

## 2021-06-01 RX ADMIN — PROPOFOL 100 MG: 10 INJECTION, EMULSION INTRAVENOUS at 13:37

## 2021-06-01 RX ADMIN — SODIUM CHLORIDE, POTASSIUM CHLORIDE, SODIUM LACTATE AND CALCIUM CHLORIDE 125 ML/HR: 600; 310; 30; 20 INJECTION, SOLUTION INTRAVENOUS at 10:47

## 2021-06-01 RX ADMIN — ONDANSETRON HYDROCHLORIDE 4 MG: 2 SOLUTION INTRAMUSCULAR; INTRAVENOUS at 18:50

## 2021-06-01 RX ADMIN — DEXAMETHASONE SODIUM PHOSPHATE 8 MG: 4 INJECTION, SOLUTION INTRAMUSCULAR; INTRAVENOUS at 14:09

## 2021-06-01 RX ADMIN — Medication 10 MG: at 13:51

## 2021-06-01 RX ADMIN — Medication 120 MCG: at 17:12

## 2021-06-01 RX ADMIN — Medication 80 MCG: at 15:55

## 2021-06-01 NOTE — PERIOP NOTES
Collins catheter unable to be placed with significant resistance. Dr. Cadence Mcgregor called in to place after 2 unsuccessful attempts.

## 2021-06-01 NOTE — BRIEF OP NOTE
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 and pseudoarthrosis L2-3      Procedure(s):  L1-2 LAMINECTOMY AND POSTERIOR LUMBER FUSION WITH INSTRUMENTATION, L1-5 HARDWARE REMOVAL, OSTEO AMP, ILIAC CREST BONE MARROW ASPITATE AND IMAGE GUIDANCE (O-ARM)    Surgeon(s):  Reema Branch MD    Surgical Assistant: Nurse Practitioner: Pardeep Chao NP    Anesthesia: General     Estimated Blood Loss (mL): 137    Complications: None    Specimens: * No specimens in log *     Implants:   Implant Name Type Inv. Item Serial No.  Lot No. LRB No. Used Action   GRAFT BONE 10 CC - [id number]  GRAFT BONE 10 CC [id number] [employer] [id number] N/A 1 Implanted   Aziyo BiBone 10cc   [id number]   N/A 1 Implanted   GRAFT BONE 5 CC - [id number]  GRAFT BONE 5 CC [id number] [employer] [id number] N/A 1 Implanted   Aziyo BiBone 5cc   [id number]  N/A N/A 1 Implanted   KIT BNE GRFT SM RHBMP-2 4.2MG INJ 5ML CONTAIN NDL 20GA - SNA  KIT BNE GRFT SM RHBMP-2 4.2MG INJ 5ML CONTAIN NDL 20GA NA [employer]_ [id number] N/A 1 Implanted   CAGE SPNL 3D 10X26 MM LORDTC MTL STRL FORTILINK-L - SNA  CAGE SPNL 3D 10X26 MM LORDTC MTL STRL FORTILINK-L NA [employer]_ [id number] N/A 1 Implanted   SCREW BNE L14MM OD2. 7MM RO NONCANNULATED NONLOCKING THRD - SNA  SCREW BNE L14MM OD2. 7MM RO NONCANNULATED NONLOCKING THRD NA [employer]_WD NA N/A 4 Implanted   SET SCREWS   NA MELLYPINE NA N/A 4 Implanted   SCREW SPNL L45MM [id number] SLD MARINER - SNA  SCREW SPNL L45MM [id number] SLD MARINER NA [employer] SE[employer]INE_WD NA N/A 2 Implanted   SCREW SPNL SLD 7.5X45 MM IL MARINER - SNA  SCREW SPNL SLD 7.5X45 MM IL MARINER NA [employer] SEASPINE_WD NA N/A 1 Implanted   SCREW SPNL SLD 7.5X40 MM IL MARINER - SNA  SCREW SPNL SLD 7.5X40 MM IL MARINER NA [employer] SEASPINE_WD NA N/A 1 Implanted   RANDALL SPNL 5.5X450 MM UCR - SNA  RANDALL SPNL 5.5X450 MM UCR NA [employer] SEASPINE_WD NA N/A 1 Implanted   SET SCR SPNL L6MM DIA5. 5MM TI BRK OFF ANGI W/ DETACH [address] - SNA  SET SCR SPNL L6MM DIA5. 5MM TI BRK OFF ANGI W/ DETACH [address] NA [employer] SOFAMOR DANEK_WD NA N/A 6 Implanted       Drains:   Hemovac Back (Active)       Findings: severe prostatism, required intraop urology consultation with dilation and placement of cather. Severe stenosis L1-2. Healed interbody fusion L2-3, but not posterolaterally.      Electronically Signed by Homero Cano MD on 6/1/2021 at 7:22 PM

## 2021-06-01 NOTE — H&P
Date of Surgery Update:  Chester Young was seen and examined. History and physical has been reviewed. The patient has been examined. There have been no significant clinical changes since the completion of the originally dated History and Physical.  Patient identified by surgeon; surgical site was confirmed by patient and surgeon. Patient reports mostly midline pain in back, no shooting pain down legs, but reports legs feel weak first thing in morning, once moving around gets a little better. Has chronic dyspnea on exertion, CAD, COPD. He has seen both pulmonary and cardiology and is optimized as much as possible for surgery.          Signed By: Sagrario Rivera MD     June 1, 2021 12:46 PM

## 2021-06-01 NOTE — PERIOP NOTES
Spoke to patient's wife, Angelica Swan on phone. :  \"The patient is doing well, Dr. Derrick Medellin is still working. \"

## 2021-06-01 NOTE — PROGRESS NOTES
Recent Urology Office Note - 3/10/2021    Thea Ya is a 80year old male who presents today for \"annual exam\". He returns for follow-up. Patient with history of BPH and had some retention after knee surgery. Has been on Flomax and done well over the past year. When checking PSAs on him anymore. He had a heart catheterization shortly after we saw him last year which showed 100% blockage in one artery and about 85% blockage in another. To try to open things up unsuccessfully and are just managing him medically now. He is satisfied with his urinary symptoms and urinalysis is negative today. PAST MEDICAL HISTORY:    Allergies: ACE INHIBITORS (LISINOPRIL) (Severe)  DENIES: Latex, Shellfish, X-Ray Dye, Iodine. Medications: MULTI VITAMIN ORAL TABLET (MULTIPLE VITAMIN) daily; Route: ORAL  VITAMIN C CAPSULE (ASCORBIC ACID CAPS) daily; Route: ORAL  CALCIUM + D3 TABLET (CALCIUM CARB-CHOLECALCIFEROL TABS) 500mg + 200iu 2xdaily; Route: ORAL  TAMSULOSIN HCL 0.4 MG ORAL CAPSULE (TAMSULOSIN HCL) 1 po qd; Route: ORAL  ATORVASTATIN CALCIUM 40 MG ORAL TABLET (ATORVASTATIN CALCIUM) 1xdaily; Route: ORAL  AVAPRO 150 MG ORAL TABLET (IRBESARTAN) 1x a night; Route: ORAL  MYKE LOW DOSE 81 MG ORAL TABLET DELAYED RELEASE (ASPIRIN) 1xdaily; Route: ORAL  FOLIC ACID 1 MG ORAL TABLET (FOLIC ACID) 1xdaily; Route: ORAL  LEVOFLOXACIN 500 MG ORAL TABLET (LEVOFLOXACIN) 1xdaily; Route: ORAL  OMEPRAZOLE 40 MG ORAL CAPSULE DELAYED RELEASE (OMEPRAZOLE) qd; Route: ORAL  METFORMIN HCL 1000 MG ORAL TABLET (METFORMIN HCL) 1 tablet 2xdaily; Route: ORAL  ATENOLOL 50 MG ORAL TABLET (ATENOLOL) qd    Problems: UTI (ICD-599.0) (GPA18-D93.0)  BPH with lower urinary tract symptoms (ICD-600.01) (LHG21-R38.1)  Urinary retention (ICD-788.20) (AKP93-C16.9)    Illnesses: Diabetes, Heart Disease, and High Blood Pressure.    DENIES: Pacemaker/Defibrillator, Lung Disease, Bowel Problems, Stroke/Seizure, Kidney Problems, Bleeding Problems, HIV, Hepatitis, or Cancer. Surgeries: Kidney Stone Surgery, Joint Replacement Surgery, and Heart Stent Procedure. Family History: DENIES: Prostate cancer, Kidney cancer, Kidney disease, Kidney stones. Social History: Retired. . Smoking status: former smoker. Drinks alcohol the patient drinks alcohol. System Review: Admits to: Shortness of Breath and Other / Not listed. DENIES: Unexplained Weight Loss, Dry Eyes, Dry Mouth, Leg Swelling, Constipation, Involuntary Urine Loss, Lower Extremity Weakness, Dry Skin, Difficulty Walking, Psychiatric Problems, Impaired Sex Drive, Easy Bleeding, Rash. EXAMINATION: Vitals: Pulse: 79 BP: 154/74 Weight: 167 lbs Height: 5' 8\" BMI: 25.48 kg/m^2  Appearance: well-developed NAD       URINALYSIS from Voided specimen  Urine Dip: pH: 6.0, Bld: Neg, LE: Neg, Nit: Neg, Prot: Neg, Ket: Neg, Gluc: Neg  Urine Micro not done    PSA HISTORY  1.70 ng/ml on 02/24/2020    IMPRESSION:    1. BPH WITH LOWER URINARY TRACT SYMPTOMS (PLI40-N21.1) - Unchanged: We reviewed his symptoms and urinalysis and are things pretty stable from our standpoint. He can continue Flomax. Follow-up with us as needed. The patient was given a verbal/written log of Blood Pressure and recommendations. Moderate Hypertension: Instruct patient to have prompt (1-2 weeks) BP followup.       cc: Lian Montes MD  Today's Services  E&M Service, Urinalysis Dipstick    Upcoming Orders  Return Office Visit -  PRN          ]      Electronically signed by Carlie Ruff MD on 03/10/2021 at 2:44 PM

## 2021-06-01 NOTE — CONSULTS
[address]     Name:  Therese Whitaker  MR#:  [id number]  :  1940  ACCOUNT #:  [de-identified]  DATE OF SERVICE:  2021    INTRAOPERATIVE CONSULTATION    REASON FOR CONSULTATION:  Inability to place a Collins catheter. HISTORY OF PRESENT ILLNESS:  The patient is going to undergo spine surgery with Dr. aJy Gardner. His staff has been unable to place a Collins. Both physicians have tried as well. They have tried Coude's. Notes indicate no evidence of prostate issues; however, he required a 12-Japanese catheter with the previous back surgery. I have been asked to help place a catheter. PROCEDURE:  The patient is asleep, prepped and draped on the stretcher. I opened up the Bard difficult catheter tray. I used filiforms and was able to get the offset filiform into the bladder. There was resistance both distally around the fossa as well as near the bulb. I then dilated sequentially from 10 to 18 [place] atraumatically and no bleeding seen. I then slipped the 16-Japanese [place] over the wire, placed 10 mL in the balloon. Mildly blood-tinged urine was then drained. ASSESSMENT AND PLAN:  I recommended single dose of gentamicin for slight extra coverage due to the urological manipulation. After the case, we discussed with the patient's wife. She informs me he is a patient of [facility] and sees Dr. Khushi Hernandez. We will get records and review them, but I think bare minimum, he will need to have this catheter in at least three days or so and then we can give him a void trial.    Thank you for allowing me to see him and participate in his care.       Doc Section, MD CASTILLO/[email or web address]  D:  2021 14:54  T:  2021 19:51  JOB #:  [id number]

## 2021-06-01 NOTE — ANESTHESIA PREPROCEDURE EVALUATION
Anesthetic History   No history of anesthetic complications            Review of Systems / Medical History  Patient summary reviewed and pertinent labs reviewed    Pulmonary    COPD: moderate    Sleep apnea: CPAP           Neuro/Psych   Within defined limits          Comments: Chronic back pain, b/l radiculopathy, chronic pain meds Cardiovascular    Hypertension          CAD (on plavix, unable to stent one vessel), cardiac stents and hyperlipidemia    Exercise tolerance: <4 METS  Comments: Cardiology clearance, EF 55-60%   GI/Hepatic/Renal     GERD: well controlled           Endo/Other    Diabetes    Arthritis and anemia (hgb 10.4)     Other Findings              Physical Exam    Airway  Mallampati: II    Neck ROM: normal range of motion   Mouth opening: Normal     Cardiovascular    Rhythm: regular  Rate: normal         Dental    Dentition: Full upper dentures and Lower dentition intact     Pulmonary  Breath sounds clear to auscultation               Abdominal  GI exam deferred       Other Findings            Anesthetic Plan    ASA: 3  Anesthesia type: general    Monitoring Plan: Arterial line      Induction: Intravenous  Anesthetic plan and risks discussed with: Patient      Long discussion with patient that he is at high risk for perioperative event including MI, stroke, seizure, pneumonia, post-op intubation and even death. Patient states that the pain is severe and he wishes to accept these risks and proceed with surgery. Will place a-line, additional PIV, and consider post-op intubation if needed.

## 2021-06-02 PROBLEM — I95.9 HYPOTENSION: Status: ACTIVE | Noted: 2021-06-02

## 2021-06-02 PROBLEM — D64.9 ANEMIA: Status: ACTIVE | Noted: 2021-06-02

## 2021-06-02 LAB
ANION GAP SERPL CALC-SCNC: 5 MMOL/L (ref 5–15)
BUN SERPL-MCNC: 10 MG/DL (ref 6–20)
BUN/CREAT SERPL: 13 (ref 12–20)
CALCIUM SERPL-MCNC: 7.3 MG/DL (ref 8.5–10.1)
CHLORIDE SERPL-SCNC: 112 MMOL/L (ref 97–108)
CO2 SERPL-SCNC: 26 MMOL/L (ref 21–32)
CREAT SERPL-MCNC: 0.8 MG/DL (ref 0.7–1.3)
GLUCOSE BLD STRIP.AUTO-MCNC: 100 MG/DL (ref 65–117)
GLUCOSE BLD STRIP.AUTO-MCNC: 102 MG/DL (ref 65–117)
GLUCOSE BLD STRIP.AUTO-MCNC: 136 MG/DL (ref 65–117)
GLUCOSE BLD STRIP.AUTO-MCNC: 144 MG/DL (ref 65–117)
GLUCOSE SERPL-MCNC: 128 MG/DL (ref 65–100)
HCT VFR BLD AUTO: 25.2 % (ref 36.6–50.3)
HGB BLD-MCNC: 5.9 G/DL (ref 12.1–17)
HGB BLD-MCNC: 7.7 G/DL (ref 12.1–17)
HISTORY CHECKED?,CKHIST: NORMAL
MAGNESIUM SERPL-MCNC: 2.5 MG/DL (ref 1.6–2.4)
POTASSIUM SERPL-SCNC: 4.2 MMOL/L (ref 3.5–5.1)
SERVICE CMNT-IMP: ABNORMAL
SERVICE CMNT-IMP: ABNORMAL
SERVICE CMNT-IMP: NORMAL
SERVICE CMNT-IMP: NORMAL
SODIUM SERPL-SCNC: 143 MMOL/L (ref 136–145)

## 2021-06-02 PROCEDURE — 74011250636 HC RX REV CODE- 250/636: Performed by: NURSE PRACTITIONER

## 2021-06-02 PROCEDURE — 80048 BASIC METABOLIC PNL TOTAL CA: CPT

## 2021-06-02 PROCEDURE — P9016 RBC LEUKOCYTES REDUCED: HCPCS

## 2021-06-02 PROCEDURE — 30233N1 TRANSFUSION OF NONAUTOLOGOUS RED BLOOD CELLS INTO PERIPHERAL VEIN, PERCUTANEOUS APPROACH: ICD-10-PCS | Performed by: NURSE PRACTITIONER

## 2021-06-02 PROCEDURE — 2709999900 HC NON-CHARGEABLE SUPPLY

## 2021-06-02 PROCEDURE — 94760 N-INVAS EAR/PLS OXIMETRY 1: CPT

## 2021-06-02 PROCEDURE — 97530 THERAPEUTIC ACTIVITIES: CPT

## 2021-06-02 PROCEDURE — 82962 GLUCOSE BLOOD TEST: CPT

## 2021-06-02 PROCEDURE — 83735 ASSAY OF MAGNESIUM: CPT

## 2021-06-02 PROCEDURE — 85014 HEMATOCRIT: CPT

## 2021-06-02 PROCEDURE — 85018 HEMOGLOBIN: CPT

## 2021-06-02 PROCEDURE — L0627 LO SAG RI AN/POS PNL PRE CST: HCPCS

## 2021-06-02 PROCEDURE — 36415 COLL VENOUS BLD VENIPUNCTURE: CPT

## 2021-06-02 PROCEDURE — 74011250636 HC RX REV CODE- 250/636: Performed by: STUDENT IN AN ORGANIZED HEALTH CARE EDUCATION/TRAINING PROGRAM

## 2021-06-02 PROCEDURE — 74011636637 HC RX REV CODE- 636/637: Performed by: NURSE PRACTITIONER

## 2021-06-02 PROCEDURE — 74011250637 HC RX REV CODE- 250/637: Performed by: NURSE PRACTITIONER

## 2021-06-02 PROCEDURE — 36430 TRANSFUSION BLD/BLD COMPNT: CPT

## 2021-06-02 PROCEDURE — 97162 PT EVAL MOD COMPLEX 30 MIN: CPT

## 2021-06-02 PROCEDURE — 65270000029 HC RM PRIVATE

## 2021-06-02 PROCEDURE — 99222 1ST HOSP IP/OBS MODERATE 55: CPT | Performed by: FAMILY MEDICINE

## 2021-06-02 PROCEDURE — 77010033678 HC OXYGEN DAILY

## 2021-06-02 PROCEDURE — 74011000250 HC RX REV CODE- 250: Performed by: NURSE PRACTITIONER

## 2021-06-02 RX ORDER — FOLIC ACID 1 MG/1
1 TABLET ORAL DAILY
COMMUNITY
End: 2021-10-15

## 2021-06-02 RX ORDER — SODIUM CHLORIDE 9 MG/ML
250 INJECTION, SOLUTION INTRAVENOUS AS NEEDED
Status: DISCONTINUED | OUTPATIENT
Start: 2021-06-02 | End: 2021-06-04 | Stop reason: HOSPADM

## 2021-06-02 RX ADMIN — FAMOTIDINE 20 MG: 20 TABLET ORAL at 17:41

## 2021-06-02 RX ADMIN — OXYCODONE 5 MG: 5 TABLET ORAL at 21:12

## 2021-06-02 RX ADMIN — DOCUSATE SODIUM 50MG AND SENNOSIDES 8.6MG 1 TABLET: 8.6; 5 TABLET, FILM COATED ORAL at 09:20

## 2021-06-02 RX ADMIN — ACETAMINOPHEN 1000 MG: 500 TABLET, FILM COATED ORAL at 16:41

## 2021-06-02 RX ADMIN — Medication 10 ML: at 20:10

## 2021-06-02 RX ADMIN — OXYCODONE 5 MG: 5 TABLET ORAL at 17:41

## 2021-06-02 RX ADMIN — CEFAZOLIN 2 G: 1 INJECTION, POWDER, FOR SOLUTION INTRAMUSCULAR; INTRAVENOUS at 09:21

## 2021-06-02 RX ADMIN — TAMSULOSIN HYDROCHLORIDE 0.4 MG: 0.4 CAPSULE ORAL at 06:04

## 2021-06-02 RX ADMIN — RANOLAZINE 500 MG: 500 TABLET, FILM COATED, EXTENDED RELEASE ORAL at 09:20

## 2021-06-02 RX ADMIN — POLYETHYLENE GLYCOL 3350 17 G: 17 POWDER, FOR SOLUTION ORAL at 09:19

## 2021-06-02 RX ADMIN — CEFAZOLIN 2 G: 1 INJECTION, POWDER, FOR SOLUTION INTRAMUSCULAR; INTRAVENOUS at 00:49

## 2021-06-02 RX ADMIN — OXYCODONE 5 MG: 5 TABLET ORAL at 09:19

## 2021-06-02 RX ADMIN — Medication 10 ML: at 17:43

## 2021-06-02 RX ADMIN — DOCUSATE SODIUM 50MG AND SENNOSIDES 8.6MG 1 TABLET: 8.6; 5 TABLET, FILM COATED ORAL at 17:40

## 2021-06-02 RX ADMIN — ACETAMINOPHEN 1000 MG: 500 TABLET, FILM COATED ORAL at 03:44

## 2021-06-02 RX ADMIN — ACETAMINOPHEN 1000 MG: 500 TABLET, FILM COATED ORAL at 09:20

## 2021-06-02 RX ADMIN — CEFAZOLIN 2 G: 1 INJECTION, POWDER, FOR SOLUTION INTRAMUSCULAR; INTRAVENOUS at 17:41

## 2021-06-02 RX ADMIN — Medication 10 ML: at 09:32

## 2021-06-02 RX ADMIN — OXYCODONE 5 MG: 5 TABLET ORAL at 13:00

## 2021-06-02 RX ADMIN — MAGNESIUM SULFATE HEPTAHYDRATE 2 G: 40 INJECTION, SOLUTION INTRAVENOUS at 00:49

## 2021-06-02 RX ADMIN — INSULIN LISPRO 2 UNITS: 100 INJECTION, SOLUTION INTRAVENOUS; SUBCUTANEOUS at 09:20

## 2021-06-02 RX ADMIN — FAMOTIDINE 20 MG: 20 TABLET ORAL at 09:20

## 2021-06-02 RX ADMIN — ATORVASTATIN CALCIUM 40 MG: 20 TABLET, FILM COATED ORAL at 09:20

## 2021-06-02 RX ADMIN — RANOLAZINE 500 MG: 500 TABLET, FILM COATED, EXTENDED RELEASE ORAL at 17:40

## 2021-06-02 RX ADMIN — Medication 10 ML: at 06:04

## 2021-06-02 NOTE — PROGRESS NOTES
Problem: Falls - Risk of  Goal: *Absence of Falls  Description: Document Octavio Ch Fall Risk and appropriate interventions in the flowsheet.   Outcome: Progressing Towards Goal  Note: Fall Risk Interventions:  Mobility Interventions: Patient to call before getting OOB         Medication Interventions: Bed/chair exit alarm

## 2021-06-02 NOTE — ANESTHESIA POSTPROCEDURE EVALUATION
Procedure(s):  L1-2 LAMINECTOMY AND POSTERIOR LUMBER FUSION WITH INSTRUMENTATION, L1-5 HARDWARE REMOVAL, OSTEO AMP, ILIAC CREST BONE MARROW ASPITATE AND IMAGE GUIDANCE (O-ARM). general    Anesthesia Post Evaluation      Multimodal analgesia: multimodal analgesia not used between 6 hours prior to anesthesia start to PACU discharge  Patient location during evaluation: PACU  Patient participation: complete - patient participated  Level of consciousness: awake  Pain management: adequate  Airway patency: patent  Anesthetic complications: no  Cardiovascular status: acceptable, blood pressure returned to baseline and hemodynamically stable  Respiratory status: acceptable  Hydration status: acceptable  Post anesthesia nausea and vomiting:  controlled      INITIAL Post-op Vital signs:   Vitals Value Taken Time   /57 06/01/21 2130   Temp 36.6 °C (97.9 °F) 06/01/21 2045   Pulse 95 06/01/21 2133   Resp 19 06/01/21 2133   SpO2 95 % 06/01/21 2133   Vitals shown include unvalidated device data.

## 2021-06-02 NOTE — CONSULTS
[address] [place] [address] with U and [facility]  [address] [address]   Office [phone], Fax [phone]    Family Medicine Consult    Patient:  David Delgado  MRN:  [id number]  YOB: 1940  Age:  80 y.o. Primary care provider:  Bk Mena DO    Date of admission:  6/1/2021    Date of consultation:  6/1/2021    Requesting physician:   Amber Lancaster MD                   History of present illness  David Delgado is a 80 y.o. male w/ hx of DDD, CAD, ADELA, HLD, GERD, HTN, DM, BPH, chronic RG, chronic angina, lumbar stenosis s/p L1-2 LAMINECTOMY AND POSTERIOR LUMBER FUSION WITH INSTRUMENTATION, L1-5 HARDWARE REMOVAL, OSTEO AMP, ILIAC CREST BONE MARROW ASPITATE AND IMAGE GUIDANCE (O-ARM) on 6/1/21. The family medicine team was consulted for postop hypotension. Patient initially required up to 70mcg of brendon raul minute. Patient was quickly titrated down and weaned off within around 1 hour. Patients BP and heart rate is currently stable off of pressors. The patient denies any fever, chills, chest pain, sob, palpitations, n/v/d, cough, congestion, recent illness, palpitations, or dysuria. Pain well controlled.        Past Medical History:   Diagnosis Date    CAD (coronary artery disease)     COVID-19 vaccine series completed 01/2021    [facility]    Degenerative joint disease 1/28/2011    Diabetes mellitus ([facility] [address]     Essential hypertension     History of back surgery 09/2019    Hyperlipidemia     Kidney stones 1969    ADELA on CPAP 01/28/2011      Past Surgical History:   Procedure Laterality Date    HX CARPAL TUNNEL RELEASE      HX CORONARY STENT PLACEMENT Left 1991-2011    x 3    HX HIP REPLACEMENT Bilateral 2009 & 2015    HX HIP REPLACEMENT Right 2012    Revision    HX LITHOTRIPSY N/A 1969    HX LUMBAR DISKECTOMY N/A 1978    L 4-5    HX LUMBAR FUSION N/A 2016 & 2019    HX LUMBAR LAMINECTOMY  1971    L 4-5     Family History   Problem Relation Age of Onset   [facility] Arthritis-osteo Mother     No Known Problems Father     Anesth Problems Neg Hx       Social History     Tobacco Use    Smoking status: Former Smoker     Packs/day: 1.00     Years: 16.00     Pack years: 16.00     Types: Cigarettes     Quit date:      Years since quittin.4    Smokeless tobacco: Never Used   Substance Use Topics    Alcohol use: Yes     Alcohol/week: 0.0 standard drinks     Comment: rarely       Prior to Admission Medications   Prescriptions Last Dose Informant Patient Reported? Taking?   acetaminophen (Tylenol Arthritis Pain) 650 mg TbER 2021  Yes No   Sig: Take 1,300 mg by mouth every eight (8) hours as needed for Pain. ascorbic acid, vitamin C, (VITAMIN C) 500 mg tablet 2021 at Unknown time  Yes Yes   Sig: Take 500 mg by mouth two (2) times a day. + Vit D3   aspirin delayed-release 81 mg tablet 2021 at Unknown time  Yes Yes   Sig: Take  by mouth every morning. atorvastatin (LIPITOR) 40 mg tablet 2021 at 0700  No Yes   Sig: TAKE 1 TABLET DAILY (START LIPITOR AFTER VYTORIN IS FINISHED)   calcium carbonate/vitamin D3 (CALCIUM WITH VITAMIN D3 PO) 2021 at Unknown time  Yes Yes   Sig: Take 1 Tablet by mouth two (2) times a day. clopidogreL (PLAVIX) 75 mg tab 2021  Yes No   Sig: Take 75 mg by mouth every morning. folic acid (FOLVITE) 1 mg tablet 2021 at 1900  No Yes   Sig: TAKE 1 TABLET DAILY, EXCEPT DAY THAT METHOTREXATE IS TAKEN   irbesartan (Avapro) 150 mg tablet 2021 at 1900  Yes Yes   Sig: Take 150 mg by mouth every morning. isosorbide mononitrate ER (IMDUR) 30 mg tablet 2021 at 0700  Yes Yes   Sig: Take 30 mg by mouth every morning.    metFORMIN (GLUCOPHAGE) 1,000 mg tablet 2021 at 1900  No Yes   Sig: TAKE 1 TABLET TWICE A DAY WITH MEALS   multivitamins chew 2021 at Unknown time  Yes Yes   Sig: Take 2 Tablets by mouth every morning. omeprazole (PRILOSEC) 40 mg capsule 6/1/2021 at 0700  Yes Yes   Sig: Take 40 mg by mouth every morning. oxyCODONE-acetaminophen (PERCOCET) 5-325 mg per tablet 6/1/2021 at 0700  No Yes   Sig: Take 1 Tab by mouth daily as needed for Pain for up to 30 days. Indications: pain   ranolazine ER (RANEXA) 500 mg SR tablet 6/1/2021 at 0700  No Yes   Sig: TAKE 1 TABLET TWICE A DAY   tamsulosin (FLOMAX) 0.4 mg capsule 6/1/2021 at 0700  Yes Yes   Sig: Take 0.4 mg by mouth every morning.       Facility-Administered Medications: None       Current Facility-Administered Medications   Medication Dose Route Frequency Provider Last Rate Last Admin    promethazine (PHENERGAN) with saline injection 6.25 mg  6.25 mg IntraVENous PRN Vivienne Bence, MD        lactated Ringers infusion  125 mL/hr IntraVENous CONTINUOUS Vivienne Bence,  mL/hr at 06/01/21 2026 125 mL/hr at 06/01/21 2026    sodium chloride (NS) flush 5-40 mL  5-40 mL IntraVENous Q8H Vivienne Bence, MD        sodium chloride (NS) flush 5-40 mL  5-40 mL IntraVENous PRN Vivienne Bence, MD        fentaNYL citrate (PF) injection 25 mcg  25 mcg IntraVENous Multiple Vivienne Bence, MD        HYDROmorphone (DILAUDID) syringe 0.25-1 mg  0.25-1 mg IntraVENous Q10MIN PRN Vivienne Bence, MD   0.5 mg at 06/01/21 2140    ondansetron (ZOFRAN) injection 4 mg  4 mg IntraVENous PRN Vivienne Bence, MD        diphenhydrAMINE (BENADRYL) injection 12.5 mg  12.5 mg IntraVENous PRN Vivienne Bence, MD        [START ON 6/2/2021] atorvastatin (LIPITOR) tablet 40 mg  40 mg Oral DAILY JESSICA Bautista [START ON 6/2/2021] irbesartan (AVAPRO) tablet 150 mg  150 mg Oral 7am Ramakrishna Cruz NP        [START ON 6/2/2021] isosorbide mononitrate ER (IMDUR) tablet 30 mg  30 mg Oral 7am Ramakrishna Cruz NP        [START ON 6/2/2021] tamsulosin (FLOMAX) capsule 0.4 mg  0.4 mg Oral 7am Ramakrishna Cruz NP        ranolazine ER (RANEXA) tablet 500 mg  500 mg Oral BID Ramakrishna Cruz NP        0.9% sodium chloride infusion  125 mL/hr IntraVENous CONTINUOUS Xiang Jose,  mL/hr at 06/01/21 2024 125 mL/hr at 06/01/21 2024    sodium chloride (NS) flush 5-40 mL  5-40 mL IntraVENous Q8H Xiang Jose, NP        sodium chloride (NS) flush 5-40 mL  5-40 mL IntraVENous PRN Xiang Jose, NP        naloxone [facility]) injection 0.4 mg  0.4 mg IntraVENous PRN Xiang Jose, NP        senna-docusate (PERICOLACE) 8.6-50 mg per tablet 1 Tablet  1 Tablet Oral BID Xiang Jose, NP       [facility] [START ON 6/2/2021] polyethylene glycol (MIRALAX) packet 17 g  17 g Oral DAILY Xiang Jose, NP       [facility] [START ON 6/3/2021] bisacodyL (DULCOLAX) suppository 10 mg  10 mg Rectal DAILY PRN Xiang Jose, NP        acetaminophen (TYLENOL) tablet 1,000 mg  1,000 mg Oral Q6H Xiang Jose, NP        traMADoL O[facility]) tablet 50 mg  50 mg Oral Q6H PRN Xiang Jose, NP        oxyCODONE IR (ROXICODONE) tablet 5 mg  5 mg Oral Q3H PRN Xiang Jose, NP        oxyCODONE IR (ROXICODONE) tablet 10 mg  10 mg Oral Q3H PRN Xiang Jose, NP        morphine injection 2 mg  2 mg IntraVENous Q3H PRN Xiang Jose, NP       [facility] [START ON 6/2/2021] ceFAZolin (ANCEF) 2 g in sterile water (preservative free) 20 mL IV syringe  2 g IntraVENous Q8H Xiang Jose, NP        ondansetron [facility]) injection 4 mg  4 mg IntraVENous Q4H PRN Xiang Jose, NP        ketorolac (TORADOL) injection 15 mg  15 mg IntraVENous Q6H PRN Xiang Jose, NP        famotidine (PEPCID) tablet 20 mg  20 mg Oral BID Xiang Jose, NP        diphenhydrAMINE (BENADRYL) injection 12.5 mg  12.5 mg IntraVENous Q6H PRN Xiang Jose, NP        benzocaine-menthoL (CEPACOL) lozenge 1 Lozenge  1 Lozenge Oral PRN Xiang Jose, NP        cyclobenzaprine (FLEXERIL) tablet 10 mg  10 mg Oral BID PRN Xiagn Jose, NP        magnesium hydroxide (MILK OF MAGNESIA) 400 mg/5 mL oral suspension 15 mL  15 mL Oral BID Xiang Jose, NP        albuterol (PROVENTIL VENTOLIN) nebulizer solution 2.5 mg  2.5 mg Nebulization Q4H PRN Merlin Velazquez NP        glucose chewable tablet 16 g  4 Tablet Oral PRN Merlin Velazquez NP        dextrose (D50W) injection syrg 12.5-25 g  25-50 mL IntraVENous PRN Merlin Velazquez NP        glucagon (GLUCAGEN) injection 1 mg  1 mg IntraMUSCular PRN Merlin Velazquez NP        insulin lispro (HUMALOG) injection   SubCUTAneous QID WITH MEALS Merlin Velazquez NP        PHENYLephrine (LOVELY-SYNEPHRINE) 30 mg in 0.9% sodium chloride 250 mL infusion   mcg/min IntraVENous TITRATE Teryl Burkitt, MD   Stopped at 06/01/21 2132        Allergies   Allergen Reactions    Ace Inhibitors Angioedema     Face         Immunization History   Administered Date(s) Administered    Influenza Vaccine 09/25/2016, 10/05/2017    Influenza Vaccine (Quad) PF (>6 Mo Flulaval, Fluarix, and >3 Yrs Afluria, Fluzone 63619) 09/29/2015    Influenza Vaccine (Tri) Adjuvanted (>65 Yrs FLUAD TRI 33079) 09/21/2018, 09/26/2019    Pneumococcal Conjugate (PCV-13) 10/27/2015    Pneumococcal Polysaccharide (PPSV-23) 10/01/2012    Tdap 10/27/2015    Zoster Vaccine, Live 10/05/2017         Review of systems  A comprehensive review of systems was negative except for that written in the History of Present Illness. The remainder of the review of systems was reviewed and is non-contributory. Physical Examination   Visit Vitals  BP (!) 114/54   Pulse 95   Temp 97.9 °F (36.6 °C)   Resp 19   Ht 5' 8\" (1.727 m)   Wt 171 lb 8.3 oz (77.8 kg)   SpO2 93%   BMI 26.08 kg/m²       Intake and Output:    06/01 1901 - 06/02 0700  In: 1500 [I.V.:1500]  Out: 45 [Urine:45]  05/31 0701 - 06/01 1900  In: 9140 [I.V.:2270]  Out: 800 [Urine:300]    Visit Vitals  BP (!) 114/54   Pulse 95   Temp 97.9 °F (36.6 °C)   Resp 19   Ht 5' 8\" (1.727 m)   Wt 171 lb 8.3 oz (77.8 kg)   SpO2 93%   BMI 26.08 kg/m²     General:  Alert, cooperative, no distress, appears stated age. Head:  Normocephalic, without obvious abnormality, atraumatic. Eyes:  Conjunctivae/corneas clear.     Throat: Lips, mucosa, and tongue normal.    Neck: Supple, symmetrical, trachea midline, no adenopathy, thyroid: no enlargement/tenderness/nodules, no carotid bruit and no JVD. Lungs:   Clear to auscultation bilaterally. Chest wall:  No tenderness or deformity. Heart:  Regular rate and rhythm, S1, S2 normal, no murmur, click, rub or gallop. Abdomen:   Soft, non-tender. Bowel sounds normal. No masses,  No organomegaly. Extremities: No LE edema   Pulses: 2+ and symmetric all extremities. Skin: Skin color, texture, turgor normal. No rashes or lesions. Neurologic: Normal strength, sensation and reflexes throughout. Data Review:   Recent Results (from the past 24 hour(s))   GLUCOSE, POC    Collection Time: 06/01/21 10:47 AM   Result Value Ref Range    Glucose (POC) 104 65 - 117 mg/dL    Performed by Nisa Dooley          24 Hour Results:  Recent Results (from the past 24 hour(s))   GLUCOSE, POC    Collection Time: 06/01/21 10:47 AM   Result Value Ref Range    Glucose (POC) 104 65 - 117 mg/dL    Performed by Nisa Dooley      No results for input(s): WBC, HGB, HCT, PLT, HGBEXT, HCTEXT, PLTEXT in the last 72 hours. No results for input(s): NA, K, CL, CO2, GLU, BUN, CREA, CA, MG, PHOS, ALB, TBIL, ALT, INR, INREXT in the last 72 hours. No lab exists for component: SGOT      Impression/Recomendations   Felix Pendleton is a 80 y.o. male w/ hx of DDD, CAD, ADELA, HLD, GERD, HTN, DM, BPH, chronic RG, chronic angina, lumbar stenosis s/p L1-2 LAMINECTOMY AND POSTERIOR LUMBER FUSION WITH INSTRUMENTATION, L1-5 HARDWARE REMOVAL, OSTEO AMP, ILIAC CREST BONE MARROW ASPITATE AND IMAGE GUIDANCE (O-ARM) on 6/1/21. Family medicine team was consulted for hypotension and medical management    S/p L1-2 LAMINECTOMY AND POSTERIOR LUMBER FUSION WITH INSTRUMENTATION, L1-5 HARDWARE REMOVAL, OSTEO AMP, ILIAC CREST BONE MARROW ASPITATE AND IMAGE GUIDANCE (O-ARM): Pain well controlled.   - Management per Ortho    Postop hypotension: Patient was briefly on brendon though weaned off within the hour by anesthesiology. Likely related to surgical anesthesia though in s/o CAD cardiac causes must be ruled out  - Obtain troponin and EKG  - BMP and magnesium for electrolyte disturbances. Replete PRN    CAD s.p stent: Follows with Dr Esequiel Campos. Stent placed in 1991. Patient on plavix 75 and ASA at home and also ranexa 500 BID for angina.  - Continue ranexa  - Hold plavix and ASA. Will need to be restarted at discretion of ortho    HTN: Patient with postop hypotension. Follows with Dr Esequiel Campos. - Hold home meds atenolol 50, norvasc 5, imdur 30 every day, irbesartan 150 QD d/t hypotension. Restart as indicated  - Pharmacy to do a med rec tomorrow am  - Continue to monitor and adjust as tolerated    HLD: Patient at home on lipitor 40  - Continue lipitor 40    ADELA: Complaint with CPAP  - CPAP from home    GERD: Stable on 40 every day prilosec  - Hold prilosec  - Pepcid 20 BID per ortho    BPH: Patient on flomax 0.4mg every day. - Urology on board, consulted by ortho for guzman placement. Appreciate recs  - Continue flomax 0.4mg QD    DM: Stable. Last HA1c 6 on 5/24. On metformin 1000 QD  - POC Glucose checks with meals and at bedtime  - Lispro correctional scale  - Hold metformin. may resume at discharge    Pain Management: Per Ortho    DVT Prophylaxis: Per Ortho    Disposition: Per Ortho     Thank you very much for allowing us to participate in the care of this pleasant patient. The family medicine service will continue to follow the patient's medical progress along with you. Please do not hesitate to page us at [phone] with any questions or concerns.       Signed by:     Familia Hollins MD   Family Medicine Resident    June 1, 2021 at 9:59 PM

## 2021-06-02 NOTE — PERIOP NOTES
TRANSFER - OUT REPORT:    Verbal report given to Javi RN (name) on [facility]  being transferred to [facility](unit) for routine post - op       Report consisted of patients Situation, Background, Assessment and   Recommendations(SBAR). Information from the following report(s) SBAR, Kardex, Intake/Output, MAR, Recent Results and Cardiac Rhythm NSR was reviewed with the receiving nurse. Lines:   Peripheral IV 06/01/21 Posterior;Right Wrist (Active)   Site Assessment Clean, dry, & intact 06/01/21 1946   Phlebitis Assessment 0 06/01/21 1946   Infiltration Assessment 0 06/01/21 1946   Dressing Status Clean, dry, & intact 06/01/21 1946   Dressing Type Transparent 06/01/21 1946   Hub Color/Line Status Pink 06/01/21 1946   Action Taken Open ports on tubing capped 06/01/21 1946   Alcohol Cap Used Yes 06/01/21 1946       Peripheral IV 06/01/21 Distal;Left;Posterior Forearm (Active)   Site Assessment Clean, dry, & intact 06/01/21 1946   Phlebitis Assessment 0 06/01/21 1946   Infiltration Assessment 0 06/01/21 1946   Dressing Status Clean, dry, & intact 06/01/21 1946   Dressing Type Transparent 06/01/21 1946   Hub Color/Line Status Pink 06/01/21 1946   Action Taken Open ports on tubing capped 06/01/21 1946   Alcohol Cap Used Yes 06/01/21 1946        Opportunity for questions and clarification was provided.       Patient transported with:   O2 @ 2 liters

## 2021-06-02 NOTE — PROGRESS NOTES
Physical Therapy Note:    Orders acknowledged, chart reviewed, discussed with RN. PT evaluation deferred. RN advising PT follow after pt receives first of two units PRBCs (Hgb 5.9 g/dL) and agrees to notify PT when first unit is completed. Will continue to follow and proceed with PT evaluation when appropriate.     Rishi Connor, PT, DPT, [facility]ita Graf

## 2021-06-02 NOTE — PROGRESS NOTES
Critical lab result, hgb- 5.9 at 0642. perfectserved Dr. Gertie Babinski at [id number]. It was read by MD at 0645, no new orders at this time. Will pass on to oncoming nurse during shift report.

## 2021-06-02 NOTE — PROGRESS NOTES
[address] [facility] RESIDENCY PROGRAM  PROGRESS NOTE     6/2/2021  PCP: Aristeo Sánchez DO     Assessment/Plan:     Roland Oscar is a 80 y.o. male w/ hx of DDD, CAD, ADELA, HLD, GERD, HTN, DM, BPH, chronic RG, chronic angina, lumbar stenosis s/p L1-2 LAMINECTOMY AND POSTERIOR LUMBER FUSION WITH INSTRUMENTATION, L1-5 HARDWARE REMOVAL, OSTEO AMP, ILIAC CREST BONE MARROW ASPITATE AND IMAGE GUIDANCE (O-ARM) on 6/1/21. Family medicine team was consulted for hypotension and medical management    Overnight events: None. BP stable.     S/p L1-2 LAMINECTOMY AND POSTERIOR LUMBER FUSION WITH INSTRUMENTATION, L1-5 HARDWARE REMOVAL, OSTEO AMP, ILIAC CREST BONE MARROW ASPITATE AND IMAGE GUIDANCE (O-ARM): Pain well controlled. - Management per Ortho     Postop hypotension: Patient was briefly on brendon though weaned off within the hour by anesthesiology. Likely related to surgical anesthesia. Trop negx1. EKG nonischemic (NSR. t wave inversions in V1-3 and AVR, AVL = unchanged from previous. QTc prolonged. ). BMP unremarkable. Mg was low and repleted though unlikely to have caused patients hypotension.  - Hold home: atenolol 50, norvasc 5, imdur 30 every day, irbesartan 150 QD d/t hypotension. Restart as indicated     CAD s.p stent: Follows with Dr Ashok Riggins. Stent placed in 1991. Patient on plavix 75 and ASA at home and also ranexa 500 BID for angina.  - Continue ranexa  - Hold plavix and ASA. Will need to be restarted at discretion of ortho     HTN: Patient with postop hypotension. Follows with Dr Ashok Riggins. - Hold home: atenolol 50, norvasc 5, imdur 30 every day, irbesartan 150 QD d/t hypotension. Restart as indicated  - Pharmacy to do a med rec tomorrow am  - Continue to monitor and adjust as tolerated    QTc prolonged: 487 on postop EKG. - Avoid QTc prolonging agents    Hypomagnesemia: 1.2. S/p repletion  - Mg daily. Replete as clinically indicated  - Holding milk of mag d/t repletion.  Restart tomorrow am if morning magnesium wnl     HLD: Patient at home on lipitor 40  - Continue lipitor 40     ADELA: Complaint with CPAP  - CPAP from home     GERD: Stable on 40 every day prilosec  - Hold prilosec  - Pepcid 20 BID per ortho     BPH: Patient on flomax 0.4mg every day. - Urology on board, consulted by ortho for guzman placement. Appreciate any recs  - Continue flomax 0.4mg QD     DM: Stable. Last HA1c 6 on 5/24. On metformin 1000 QD  - POC Glucose checks with meals and at bedtime  - Lispro correctional scale  - Hold metformin. may resume at discharge     Pain Management: Per Ortho     DVT Prophylaxis: Per Ortho     Disposition: Per Ortho    Yesica Cardenas discussed with Dr. Quinten Berger. Subjective:   Pt was seen and examined at bedside. No concerns. Denies CP, SOB, abd pain, N, V. Endorses back pain though states it has improved compared to preop.     Objective:     Patient Vitals for the past 24 hrs:   Temp Pulse Resp BP SpO2   06/02/21 0123 97.7 °F (36.5 °C) 78 17 (!) 104/56 95 %   06/02/21 0025 97.7 °F (36.5 °C) 80 18 (!) 95/54 95 %   06/01/21 2325 98.4 °F (36.9 °C) 83 18 106/60 95 %   06/01/21 2225 97.6 °F (36.4 °C) 93 19 (!) 102/57 93 %   06/01/21 2200 -- 94 19 (!) 110/55 95 %   06/01/21 2145 -- 95 19 (!) 114/54 93 %   06/01/21 2140 -- 94 21 -- 94 %   06/01/21 2130 -- 93 19 (!) 116/57 94 %   06/01/21 2120 97.9 °F (36.6 °C) -- -- -- --   06/01/21 2115 -- 87 17 (!) 124/56 96 %   06/01/21 2100 -- 87 16 (!) 122/58 96 %   06/01/21 2050 -- 93 20 -- 94 %   06/01/21 2045 97.9 °F (36.6 °C) 90 20 -- 98 %   06/01/21 2040 -- 89 17 -- 97 %   06/01/21 2035 -- 90 18 -- 97 %   06/01/21 2030 -- 92 19 (!) 120/50 97 %   06/01/21 2025 -- 90 20 -- 97 %   06/01/21 2020 -- -- 18 -- 96 %   06/01/21 2015 -- 91 21 -- 96 %   06/01/21 2010 -- -- -- -- 96 %   06/01/21 2005 -- -- -- -- 98 %   06/01/21 2000 -- -- -- -- 100 %   06/01/21 1957 96.9 °F (36.1 °C) 88 24 134/78 100 %   06/01/21 1955 -- 93 19 121/63 100 %   06/01/21 1950 -- 92 20 107/63 100 %   21 -- -- 21 (!) 114/43 100 %   21 1032 98.4 °F (36.9 °C) 84 16 (!) 128/58 97 %      Temp (24hrs), Av.8 °F (36.6 °C), Min:96.9 °F (36.1 °C), Max:98.4 °F (36.9 °C)     O2 Flow Rate (L/min): 2 l/min   O2 Device: Nasal cannula    Date 21 - 21 - 21 06   Shift 4210-8702 5938-9593 24 Hour Total 5597-8092 0137-2760 24 Hour Total   INTAKE   I.V.(mL/kg/hr) 2270(2.4) 1500 3770        Volume (lactated Ringers infusion) 2000 500 2500        Volume (lactated Ringers infusion)  1000 1000        Volume (ceFAZolin (ANCEF) 2 g in sterile water (preservative free) 20 mL IV syringe) 20  20        Volume (albumin human 5% (BUMINATE) solution) 250  250      Shift Total(mL/kg) 5881(59.6) 1500(19.3) 3770(48.5)      OUTPUT   Urine(mL/kg/hr) 300(0.3) 295 595        Urine Output 300 45 345        Urine Output (mL) (Urinary Catheter 21 2- way; Collins; Other (Comment))  250 250      Drains  125 125        Output (ml) (Hemovac Back)  125 125      Blood 500  500        Estimated Blood Loss 500  500      Shift Total(mL/kg) 800(10.3) 420(5.4) 1220(15.7)      NET 1470 1080 2550      Weight (kg) 77.8 77.8 77.8 77.8 77.8 77.8       Physical exam:   General:   Alert, cooperative, no acute distress   Chest wall:    No tenderness or deformities    Lungs:   Clear to auscultation bilaterally    Heart:   Normal rate, regular rhythm, no murmur, rubs or gallops   Abdomen:    Soft, non-tender   No masses or organomegaly   No guarding or rebound tenderness   Extremities:  No LE edema   Pulses:  Symmetric all extremities   Skin:  Warm and dry    No rashes or lesions   Neurologic:  Alert and oriented x4   No focal deficits         Data Review:     CBC:  No results for input(s): WBC, HGB, HCT, PLT, HGBEXT, HCTEXT, PLTEXT in the last 72 hours.   Metabolic Panel:  Recent Labs     21  2203      K 3.6   *   CO2 25   BUN 10   CREA 0.96   *   CA 7.3*   MG 1.2* Micro:  Lab Results   Component Value Date/Time    Culture result: MRSA NOT PRESENT 05/24/2021 10:01 AM    Culture result:  05/24/2021 10:01 AM     Screening of patient nares for MRSA is for surveillance purposes and, if positive, to facilitate isolation considerations in high risk settings. It is not intended for automatic decolonization interventions per se as regimens are not sufficiently effective to warrant routine use. Culture result: NO GROWTH 1 DAY 09/17/2019 10:02 AM     Imaging:  NC XR TECHNOLOGIST SERVICE    Result Date: 6/1/2021  INTERPRETATION PROVIDED FOR COMPLIANCE ONLY AT NO CHARGE FINDINGS: Intraoperative spot radiographs of the lumbar spine were obtained during multilevel posterior fusion with pedicle screws and rods. No operative complication is evident. Posterior lumbar fusion in progress.     Medications reviewed  Current Facility-Administered Medications   Medication Dose Route Frequency    atorvastatin (LIPITOR) tablet 40 mg  40 mg Oral DAILY    tamsulosin (FLOMAX) capsule 0.4 mg  0.4 mg Oral 7am    ranolazine ER (RANEXA) tablet 500 mg  500 mg Oral BID    0.9% sodium chloride infusion  125 mL/hr IntraVENous CONTINUOUS    sodium chloride (NS) flush 5-40 mL  5-40 mL IntraVENous Q8H    sodium chloride (NS) flush 5-40 mL  5-40 mL IntraVENous PRN    naloxone (NARCAN) injection 0.4 mg  0.4 mg IntraVENous PRN    senna-docusate (PERICOLACE) 8.6-50 mg per tablet 1 Tablet  1 Tablet Oral BID    polyethylene glycol (MIRALAX) packet 17 g  17 g Oral DAILY    [START ON 6/3/2021] bisacodyL (DULCOLAX) suppository 10 mg  10 mg Rectal DAILY PRN    acetaminophen (TYLENOL) tablet 1,000 mg  1,000 mg Oral Q6H    traMADoL (ULTRAM) tablet 50 mg  50 mg Oral Q6H PRN    oxyCODONE IR (ROXICODONE) tablet 5 mg  5 mg Oral Q3H PRN    oxyCODONE IR (ROXICODONE) tablet 10 mg  10 mg Oral Q3H PRN    morphine injection 2 mg  2 mg IntraVENous Q3H PRN    ceFAZolin (ANCEF) 2 g in sterile water (preservative free) 20 mL IV syringe  2 g IntraVENous Q8H    ondansetron (ZOFRAN) injection 4 mg  4 mg IntraVENous Q4H PRN    ketorolac (TORADOL) injection 15 mg  15 mg IntraVENous Q6H PRN    famotidine (PEPCID) tablet 20 mg  20 mg Oral BID    diphenhydrAMINE (BENADRYL) injection 12.5 mg  12.5 mg IntraVENous Q6H PRN    benzocaine-menthoL (CEPACOL) lozenge 1 Lozenge  1 Lozenge Oral PRN    cyclobenzaprine (FLEXERIL) tablet 10 mg  10 mg Oral BID PRN    [Held by provider] magnesium hydroxide (MILK OF MAGNESIA) 400 mg/5 mL oral suspension 15 mL  15 mL Oral BID    albuterol (PROVENTIL VENTOLIN) nebulizer solution 2.5 mg  2.5 mg Nebulization Q4H PRN    glucose chewable tablet 16 g  4 Tablet Oral PRN    dextrose (D50W) injection syrg 12.5-25 g  25-50 mL IntraVENous PRN    glucagon (GLUCAGEN) injection 1 mg  1 mg IntraMUSCular PRN    insulin lispro (HUMALOG) injection   SubCUTAneous QID WITH MEALS    magnesium sulfate 2 g/50 ml IVPB (premix or compounded)  2 g IntraVENous Q1H         Signed:   Ainsley Thomas MD   Resident, Family Medicine      Attending note: Attending note to follow. ..

## 2021-06-02 NOTE — PROGRESS NOTES
Occupational Therapy Note:  Orders acknowledged and chart reviewd. Patient receiving PRBCs this AM and unavailable for OT interventions. When attempting back this afternoon, patient states he just returned to bed and is preparing for additional transfusion. Will follow-up next tx day for OT evaluation.   Gabriella Chavira, OTR/L

## 2021-06-02 NOTE — PERIOP NOTES
Pt received and care assumed from OR. Report received per OR nurse and anesthesia. Pt on LOVELY gtt and has been on pressor support during the extent of the case. Loveyl gtt order received and titration started per orders. See MAR for titration rate changes. 2130  Lovely gtt weaned off. VSS.      2140  Anesthesia called and updated on pt status and VS.      2200  Report called to 4th floor. Dr. Heidi Aquino at bedside. Updated on pt status. Lab work drawn for [employer], [id number] [facility].

## 2021-06-02 NOTE — PROGRESS NOTES
ORTHOPAEDIC LUMBAR FUSION PROGRESS NOTE    NAME:     Yesica Cardenas   :       1940   MRN:       [id number]   DATE:      2021    POD:              1 Day Post-Op  S/P:              Procedure(s):  L1-2 LAMINECTOMY AND POSTERIOR LUMBER FUSION WITH INSTRUMENTATION, L1-5 HARDWARE REMOVAL, OSTEO AMP, ILIAC CREST BONE MARROW ASPITATE AND IMAGE GUIDANCE (O-ARM)    SUBJECTIVE:    Reports improvement in pre op back pain. Postop pain controlled  With medications. Tolerating PO intake  liquids  Ambulation tolerance - Not OOB yet   No flatus   Collins +  Denies nausea/vomiting, headache, chest pain or shortness of breath  Recent Labs     21  0546   HGB 5.9*      K 4.2   *   CO2 26   BUN 10   CREA 0.80   *     Patient Vitals for the past 12 hrs:   BP Temp Pulse Resp SpO2   21 0414 109/66 97.9 °F (36.6 °C) 75 18 97 %   21 0123 (!) 104/56 97.7 °F (36.5 °C) 78 17 95 %   21 0025 (!) 95/54 97.7 °F (36.5 °C) 80 18 95 %   21 2325 106/60 98.4 °F (36.9 °C) 83 18 95 %   21 2225 (!) 102/57 97.6 °F (36.4 °C) 93 19 93 %   21 2200 (!) 110/55 -- 94 19 95 %   21 2145 (!) 114/54 -- 95 19 93 %   21 -- -- 94 21 94 %   21 (!) 116/57 -- 93 19 94 %   21 -- 97.9 °F (36.6 °C) -- -- --   21 (!) 124/56 -- 87 17 96 %   21 (!) 122/58 -- 87 16 96 %   21 -- -- 93 20 94 %   21 -- 97.9 °F (36.6 °C) 90 20 98 %   21 -- -- 89 17 97 %   21 -- -- 90 18 97 %   21 (!) 120/50 -- 92 19 97 %   21 -- -- 90 20 97 %   21 -- -- -- 18 96 %   21 -- -- 91 21 96 %   21 -- -- -- -- 96 %   21 -- -- -- -- 98 %   21 -- -- -- -- 100 %   21 134/78 96.9 °F (36.1 °C) 88 24 100 %   21 121/63 -- 93 19 100 %   21 1950 107/63 -- 92 20 100 %     Exam:  Positive strength/ROM bilat lower ext.   Neuro intact to sensation  Dressings clean and dry, JULIUS is functioning. Hemovac: Holding suction , output 100ml last shift  Lower extremities warm and well perfused      PLAN: 1 Day Post-Op, improved   Continue PO pain medications as needed  Continue SCD's, frequent ambulation  - Continue Hemovac,   - Keep guzman, do not discontinue until urology order. Diet:  Advance to full liquids  Continue bowel regimen   Continue lumbar orthosis- to be provided from hospital.  Continue Vit D3  Medical comorbities stable -   BP is maintained stable over night, Hold Antihypertensives,9 Hospitalist following ) Hb this morning is 5.9, will transfuse 2 units RBC. DM is controlled with correctional insulin. Discharge planning - When co morbidities stable,  Clears PT        I have seen and examined patient today and agree with above, patient currently denies and chest pain, dizziness or shortness of breath. He reports no leg pain or feelings of leg weakness and improved from preop. Mild postop back pain. Reports passing flatus this am.   Reviewed intraop consult with urology yesterday, he understands and if needed will keep catheter and followup with urology (he reports he has had to do this before). Postop anemia due to surgery with history of CAD, reviewed recommendation for blood and he wishes to proceed.      Papa Sesay MD  Spine Surgery  [facility]

## 2021-06-02 NOTE — PROGRESS NOTES
Problem: Mobility Impaired (Adult and Pediatric)  Goal: *Acute Goals and Plan of Care (Insert Text)  Description: FUNCTIONAL STATUS PRIOR TO ADMISSION: Patient was independent and active without use of DME.    HOME SUPPORT PRIOR TO ADMISSION: The patient lived with spouse but did not require assist.    Physical Therapy Goals  Initiated 6/2/2021    1. Patient will move from supine to sit and sit to supine  in bed with supervision/set-up within 4 days. 2. Patient will perform sit to stand with supervision/set-up within 4 days. 3. Patient will ambulate with supervision/set-up for 150 feet with the least restrictive device within 4 days. 4. Patient will ascend/descend 1 stairs with one handrail(s) with supervision/set-up within 4 days. 5. Patient will verbalize and demonstrate understanding of spinal precautions (No bending, lifting greater than 5 lbs, or twisting; log-roll technique; frequent repositioning as instructed) within 4 days. Outcome: Not Met     PHYSICAL THERAPY EVALUATION  Patient: Karla Christie (82 y.o. 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]  Back stiffness [M25.69]  Degeneration of lumbar intervertebral disc [M51.36]  History of spinal arthrodesis [Z98.1]  Lumbar stenosis with neurogenic claudication [M48.062]  Lumbar stenosis [M48.061]  Procedure(s) (LRB):  L1-2 LAMINECTOMY AND POSTERIOR LUMBER FUSION WITH INSTRUMENTATION, L1-5 HARDWARE REMOVAL, OSTEO AMP, ILIAC CREST BONE MARROW ASPITATE AND IMAGE GUIDANCE (O-ARM) (N/A) 1 Day Post-Op   Precautions:   Contact, Fall, Back    ASSESSMENT  Based on the objective data described below, the patient presents with back pain rated 5/10, decreased endurance, and limited functional mobility on POD 1 s/p L1-2 PLIF and removal of hardware at L1-L5.  Pt demonstrates good understanding regarding back precautions and tolerates activity without complaints. He ambulate using RW today and remains out of bed in chair awaiting second unit PRBCs after encounter. Pt initially denies history of home O2 use but later reports using supplemental O2 PRN via portable or stationary concentrator at baseline due to history of COPD. SpO2 mid 80s with activity using room air today (pt asymptomatic) and rebounds with seated rest + reapplication of 2LNCO2. RN aware. Current Level of Function Impacting Discharge (mobility/balance): min assist    Functional Outcome Measure: The patient scored 45 on the Barthel outcome measure which is indicative of 55% functional impairment. Other factors to consider for discharge: none additional     Patient will benefit from skilled therapy intervention to address the above noted impairments. PLAN :  Recommendations and Planned Interventions: bed mobility training, transfer training, gait training, therapeutic exercises, neuromuscular re-education, edema management/control, patient and family training/education, and therapeutic activities      Frequency/Duration: Patient will be followed by physical therapy:  twice daily to address goals. Recommendation for discharge: (in order for the patient to meet his/her long term goals)  Physical therapy at least 2 days/week in the home AND ensure assist and/or supervision for safety with all functional mobility vs. Outpatient PT when cleared by MD.    This discharge recommendation:  Has not yet been discussed the attending provider and/or case management    IF patient discharges home will need the following DME: none         SUBJECTIVE:   Patient stated It feels pretty good, re: mobility. Pt received supine, agreeable to PT and cleared by RN.       OBJECTIVE DATA SUMMARY:   HISTORY:    Past Medical History:   Diagnosis Date    CAD (coronary artery disease)     COVID-19 vaccine series completed 01/2021    Pfizer    Degenerative joint disease 1/28/2011    Diabetes mellitus ([facility] [address]     Essential hypertension     History of back surgery 09/2019    Hyperlipidemia     Kidney stones 1969    ADELA on CPAP 01/28/2011     Past Surgical History:   Procedure Laterality Date    HX CARPAL TUNNEL RELEASE      HX CORONARY STENT PLACEMENT Left 1991-2011    x 3    HX HIP REPLACEMENT Bilateral 2009 & 2015    HX HIP REPLACEMENT Right 2012    Revision    HX LITHOTRIPSY N/A 1969    HX LUMBAR DISKECTOMY N/A 1978    L 4-5    HX LUMBAR FUSION N/A 2016 & 2019    HX LUMBAR LAMINECTOMY  1971    L 4-5       Personal factors and/or comorbidities impacting plan of care: as above    Home Situation  Home Environment: Private residence  # Steps to Enter: 1  Rails to Enter: Yes  Hand Rails : Right  One/Two Story Residence:  (3 story; able to live on first floor)  Lift Chair Available: Yes  Living Alone: No  Support Systems: Spouse/Significant Other/Partner  Patient Expects to be Discharged to[de-identified] Private residence  Current DME Used/Available at Home: CPAP, Walker, rolling, Adaptive dressing aides, Cane, straight, Shower chair, Commode, bedside  Tub or Shower Type: Shower    EXAMINATION/PRESENTATION/DECISION MAKING:   Critical Behavior:  Neurologic State: Alert  Orientation Level: Oriented X4  Cognition: Appropriate for age attention/concentration, Appropriate safety awareness, Follows commands     Hearing: Auditory  Auditory Impairment: None  Hearing Aids/Status: Does not own  Skin:  LE exposed skin intact; tiny amount sanguinous drainage to dressing, +hemovac  Edema: none noted LEs.   Range Of Motion:  AROM: Generally decreased, functional                       Strength:    Strength: Generally decreased, functional      5/5 BLE ankle dorsiflexion and EHL, R hip flexion at least 3/5, L hip flexion 2/5 +increased pain              Tone & Sensation:   Tone: Normal              Sensation: Intact (LEs)               Coordination:  Coordination: Within functional limits Functional Mobility:  Bed Mobility:  Rolling: Additional time;Minimum assistance; Adaptive equipment;Bed Modified  Supine to Sit: Additional time;Minimum assistance; Adaptive equipment     Scooting: Additional time;Contact guard assistance  Transfers:  Sit to Stand: Contact guard assistance  Stand to Sit: Contact guard assistance                       Balance:   Sitting: Without support  Sitting - Static: Good (unsupported)  Sitting - Dynamic: Good (unsupported)  Standing: With support  Standing - Static: Good  Standing - Dynamic : Good  Ambulation/Gait Training:  Distance (ft): 50 Feet (ft)  Assistive Device: Walker, rolling;Gait belt;Brace/Splint  Ambulation - Level of Assistance: Contact guard assistance        Gait Abnormalities: Decreased step clearance              Speed/Pat: Pace decreased (<100 feet/min)                            Therapeutic Exercises: Ankle pumps x 10    Functional Measure:  Barthel Index:    Bathin  Bladder: 0  Bowels: 10  Groomin  Dressin  Feeding: 10  Mobility: 0  Stairs: 0  Toilet Use: 5  Transfer (Bed to Chair and Back): 10  Total: 45/100       The Barthel ADL Index: Guidelines  1. The index should be used as a record of what a patient does, not as a record of what a patient could do. 2. The main aim is to establish degree of independence from any help, physical or verbal, however minor and for whatever reason. 3. The need for supervision renders the patient not independent. 4. A patient's performance should be established using the best available evidence. Asking the patient, friends/relatives and nurses are the usual sources, but direct observation and common sense are also important. However direct testing is not needed. 5. Usually the patient's performance over the preceding 24-48 hours is important, but occasionally longer periods will be relevant. 6. Middle categories imply that the patient supplies over 50 per cent of the effort.   7. Use of aids to be independent is allowed. Tung Kamara., Barthel, D.W. (2650). Functional evaluation: the Barthel Index. [address] (14)2. DELORIS Cai, Deena Neal., Smith Zambrano., [place], [address] (). Measuring the change indisability after inpatient rehabilitation; comparison of the responsiveness of the Barthel Index and Functional Richardson Measure. Journal of Neurology, Neurosurgery, and Psychiatry, 66(4), 340-351. DAYNE Mcleod, DANNY Peter, & Jered Ruiz M.A. (2004.) Assessment of post-stroke quality of life in cost-effectiveness studies: The usefulness of the Barthel Index and the EuroQoL-5D. Quality of Life Research, 15, 396-85            Physical Therapy Evaluation Charge Determination   History Examination Presentation Decision-Making   HIGH Complexity :3+ comorbidities / personal factors will impact the outcome/ POC  HIGH Complexity : 4+ Standardized tests and measures addressing body structure, function, activity limitation and / or participation in recreation  HIGH Complexity : Unstable and unpredictable characteristics  Other outcome measures barthel  MEDIUM      Based on the above components, the patient evaluation is determined to be of the following complexity level: MEDIUM    Pain Ratin/10 back pain  Offered rest, repositioning, education    Activity Tolerance:   desaturates with exertion and requires oxygen    After treatment patient left in no apparent distress:   Sitting in chair, Call bell within reach, and Bed / chair alarm activated, +SCDs    Reviewed back precautions including no bending, no lifting more than 5 lbs, no twisting, (BLTs); log roll technique for bed mobility; and importance of position change every 30-45 mins when out of bed. Pt verbalizes understanding. Brace donned with moderate assistance and education regarding wear schedule and techniques.       COMMUNICATION/EDUCATION:   The patients plan of care was discussed with: Occupational therapist, Registered nurse, and Rehabilitation technician. Fall prevention education was provided and the patient/caregiver indicated understanding., Patient/family have participated as able in goal setting and plan of care. , and Patient/family agree to work toward stated goals and plan of care.     Thank you for this referral.  Juanito Causey, PT, DPT   Time Calculation: 32 mins

## 2021-06-02 NOTE — PROGRESS NOTES
[address] [facility] [address] [place] [address]   Office [phone]  Fax [phone]          Assessment and Plan     Michelle Boyce is a 80 y.o. male  w/ hx of DDD, CAD, ADELA, HLD, GERD, HTN, DM, BPH, chronic RG, chronic angina, lumbar stenosis s/p L1-2 LAMINECTOMY AND POSTERIOR LUMBER FUSION WITH INSTRUMENTATION, L1-5 HARDWARE REMOVAL, OSTEO AMP, ILIAC CREST BONE MARROW ASPITATE AND IMAGE GUIDANCE (O-ARM) on 6/1/21. Family medicine team was consulted for hypotension and medical management. 24 Hour Events: S/p 2U pRBC Hgb is stable. MAP stable above 65. S/p L1-2 LAMINECTOMY AND POSTERIOR LUMBER FUSION WITH INSTRUMENTATION, L1-5 HARDWARE REMOVAL, OSTEO AMP, ILIAC CREST BONE MARROW ASPITATE AND IMAGE GUIDANCE (O-ARM): Pain well controlled. - Management per Ortho     Postop hypotension: RESOLVED. Likely 2/2 anemia and anaesthesia. Weaned off of Guevara within the hour in PACU. Trop negx1. EKG nonischemic (NSR. t wave inversions in V1-3 and AVR, AVL = unchanged from previous. QTc prolonged. ). BMP unremarkable. Mg was low and repleted though unlikely to have caused patients hypotension.  - HOLD home: atenolol 50, norvasc 5, imdur 30 every day, irbesartan 150 QD d/t hypotension. Restart as indicated  - Recommend patient keeping home BP log to discuss with PCP  - Will make courtesy PCP follow up for next week      Postop Anemia: Pre-op Hgb 10.4 > post-op Hgb 5.2. Hgb stable s/p 2U pRBC. CAD s.p stent: Follows with Dr Folra Downey. Stent placed in 1991. Patient on plavix 75 and ASA at home and also ranexa 500 BID for angina.  - Continue ranexa  - Hold plavix and ASA. Will need to be restarted at discretion of ortho     HTN: Patient with postop hypotension. Follows with Dr Flora Downey. - Hold home: atenolol 50, norvasc 5, imdur 30 every day, irbesartan 150 QD d/t hypotension.  Restart as indicated   - Continue holding home BP medications  - Recommend patient keeping home BP log to discuss with PCP  - Will make courtesy PCP follow up for next week      QTc prolonged: 487 on postop EKG. - Avoid QTc prolonging agents     Hypomagnesemia: Mg 1.2. S/p repletion  - Mg daily. Replete as clinically indicated  - Holding milk of mag d/t repletion. Restart tomorrow am if morning magnesium wnl     HLD: Patient at home on lipitor 40  - Continue lipitor 40     ADELA: Complaint with CPAP  - CPAP from home     GERD: Stable on 40 every day prilosec  - Hold prilosec  - Pepcid 20 BID per ortho     BPH: Patient on flomax 0.4mg every day. - Urology on board, consulted by ortho for guzman placement. Appreciate any recs  - Continue flomax 0.4mg QD     DM: Stable. Last HA1c 6 on 5/24. On metformin 1000 QD  - POC Glucose checks with meals and at bedtime  - Lispro correctional scale  - Hold metformin. may resume at discharge     Pain Management: Per Ortho     DVT Prophylaxis: Per Ortho     Disposition: Per Ortho       Patient is medically stable. Will sign off at this time, please reconsult if needed. Eddi Garcia MD  Family Medicine Resident         Subjective / Objective     Subjective: Pt was seen and examined at bedside. Afebrile and hemodynamically stable. Concerns overnight include: none. Denies palpitations, chest pain, SOB, vomiting, abdominal pain, dizziness, lightheadedness. Objective:    Physical Exam:  General: No acute distress. Alert. Cooperative. Respiratory: CTAB. No w/r/r/c.   Cardiovascular: Normal S1,S2. No m/r/g.   GI: Nondistended.+ bowel sounds. Nontender. No rebound tenderness or guarding. Extremities: No LE edema. Skin: Warm, dry. No rashes.    Neuro: Alert and oriented    Respiratory: O2 Flow Rate (L/min): 3 l/min O2 Device: Nasal cannula   Visit Vitals  /65 (BP 1 Location: Left upper arm, BP Patient Position: At rest)   Pulse 73   Temp 98.5 °F (36.9 °C)   Resp 16   Ht 5' 8\" (1.727 m)   Wt 171 lb 8.3 oz (77.8 kg)   SpO2 97%   BMI 26.08 kg/m²       I/O:  Date 06/02/21 0700 - 06/03/21 0659 06/03/21 0700 - 06/04/21 0659   Shift 8350-6349 3316-2584 24 Hour Total 3224-4755 6907-9815 24 Hour Total   INTAKE   P.O. 450 480 930        P. O. 450 480 930      I. V.(mL/kg/hr) 534(0.6)  534(0.3)        Volume (0.9% sodium chloride infusion) 494  494        Volume (ceFAZolin (ANCEF) 2 g in sterile water (preservative free) 20 mL IV syringe) 40  40      Blood 366.7 402.9 769.6        Volume (TRANSFUSE PACKED RBC'S) 366.7  366.7        Volume (TRANSFUSE PACKED RBC'S)  402.9 402.9      Shift Total(mL/kg) 1350. 7(17.4) 882. 9(11.3) 2233. 6(28.7)      OUTPUT   Urine(mL/kg/hr) 1650(1.8) 950(1) 2600(1.4)        Urine Output (mL) (Urinary Catheter 06/01/21 2- way; Collins; Other (Comment)) 5071 678 4115      Drains 110 130 240        Output (ml) (Hemovac Back) 110 130 240      Shift Total(mL/kg) 1760(22.6) 1080(13.9) 2840(36.5)      NET -409.3 -197.1 -606.4      Weight (kg) 77.8 77.8 77.8 77.8 77.8 77.8       Inpatient Medications  Current Facility-Administered Medications   Medication Dose Route Frequency    0.9% sodium chloride infusion 250 mL  250 mL IntraVENous PRN    atorvastatin (LIPITOR) tablet 40 mg  40 mg Oral DAILY    tamsulosin (FLOMAX) capsule 0.4 mg  0.4 mg Oral 7am    ranolazine ER (RANEXA) tablet 500 mg  500 mg Oral BID    sodium chloride (NS) flush 5-40 mL  5-40 mL IntraVENous Q8H    sodium chloride (NS) flush 5-40 mL  5-40 mL IntraVENous PRN    naloxone (NARCAN) injection 0.4 mg  0.4 mg IntraVENous PRN    senna-docusate (PERICOLACE) 8.6-50 mg per tablet 1 Tablet  1 Tablet Oral BID    polyethylene glycol (MIRALAX) packet 17 g  17 g Oral DAILY    bisacodyL (DULCOLAX) suppository 10 mg  10 mg Rectal DAILY PRN    acetaminophen (TYLENOL) tablet 1,000 mg  1,000 mg Oral Q6H    traMADoL (ULTRAM) tablet 50 mg  50 mg Oral Q6H PRN    oxyCODONE IR (ROXICODONE) tablet 5 mg  5 mg Oral Q3H PRN    oxyCODONE IR (ROXICODONE) tablet 10 mg  10 mg Oral Q3H PRN    famotidine (PEPCID) tablet 20 mg  20 mg Oral BID    benzocaine-menthoL (CEPACOL) lozenge 1 Lozenge  1 Lozenge Oral PRN    cyclobenzaprine (FLEXERIL) tablet 10 mg  10 mg Oral BID PRN    [Held by provider] magnesium hydroxide (MILK OF MAGNESIA) 400 mg/5 mL oral suspension 15 mL  15 mL Oral BID    albuterol (PROVENTIL VENTOLIN) nebulizer solution 2.5 mg  2.5 mg Nebulization Q4H PRN    glucose chewable tablet 16 g  4 Tablet Oral PRN    dextrose (D50W) injection syrg 12.5-25 g  25-50 mL IntraVENous PRN    glucagon (GLUCAGEN) injection 1 mg  1 mg IntraMUSCular PRN    insulin lispro (HUMALOG) injection   SubCUTAneous QID WITH MEALS         Allergies  Allergies   Allergen Reactions    Ace Inhibitors Angioedema     Face         CBC:  Recent Labs     06/03/21 0207 06/02/21 2128 06/02/21  0546   HGB 7.6* 7.7* 5.9*   HCT 24.6* 25.2*  --        Metabolic Panel:  Recent Labs     06/03/21 0207 06/02/21  0546 06/01/21  2203   NA  --  143 141   K  --  4.2 3.6   CL  --  112* 110*   CO2  --  26 25   BUN  --  10 10   CREA  --  0.80 0.96   GLU  --  128* 172*   CA  --  7.3* 7.3*   MG 2.0 2.5* 1.2*           Procedures: L1-2 LAMINECTOMY AND POSTERIOR LUMBER FUSION WITH INSTRUMENTATION, L1-5 HARDWARE REMOVAL, OSTEO AMP, ILIAC CREST BONE MARROW ASPITATE AND IMAGE GUIDANCE (O-ARM) on 6/1/21    Imaging/Diagnostic Studies:  Results from Hospital Encounter encounter on 06/01/21    [facility] XR TECHNOLOGIST SERVICE    Narrative  INTERPRETATION PROVIDED FOR COMPLIANCE ONLY AT NO CHARGE    FINDINGS: Intraoperative spot radiographs of the lumbar spine were obtained  during multilevel posterior fusion with pedicle screws and rods. No operative  complication is evident. Impression  Posterior lumbar fusion in progress. No results found for this or any previous visit.            Results from [place] encounter on 09/13/19    CT CHEST W CONT    Narrative  INDICATION: mediastinal adenopathy    COMPARISON: Chest radiograph performed same date    TECHNIQUE:  Following the uneventful intravenous administration of 100 cc  Isovue-300, 5 mm axial images were obtained through the chest. Coronal and  sagittal reconstructions were generated. CT dose reduction was achieved through  use of a standardized protocol tailored for this examination and automatic  exposure control for dose modulation. FINDINGS:    THYROID: No nodule. MEDIASTINUM: No mass or lymphadenopathy. JENNIFER: No mass or lymphadenopathy. THORACIC AORTA: No dissection or aneurysm. MAIN PULMONARY ARTERY: Normal in caliber. TRACHEA/BRONCHI: Patent. ESOPHAGUS: No wall thickening or dilatation. HEART: Normal in size. Three-vessel coronary atherosclerotic disease  PLEURA: No effusion or pneumothorax. LUNGS: Small calcified granuloma in the right lower lobe medially. Small  calcified granuloma in the left lower lobe. INCIDENTALLY IMAGED UPPER ABDOMEN: No focal abnormality. BONES: No destructive bone lesion. Mild degenerative changes in the thoracic  spine. Impression  IMPRESSION:  No lymphadenopathy. No acute cardiopulmonary pathology                  For Billing    No chief complaint on file. Hospital Problems  Date Reviewed: 6/2/2021        Codes Class Noted POA    * (Principal) Hypotension ICD-10-CM: I95.9  ICD-9-CM: 458.9  6/2/2021 Unknown        Anemia ICD-10-CM: D64.9  ICD-9-CM: 285.9  6/2/2021 Unknown        Lumbar stenosis ICD-10-CM: M48.061  ICD-9-CM: 724.02  6/1/2021 Unknown        Gastroesophageal reflux disease ICD-10-CM: K21.9  ICD-9-CM: 530.81  8/7/2020 Yes        Angina, class III ([address] ICD-10-CM: I20.9  ICD-9-CM: 413.9  5/7/2020 Yes        Diabetes mellitus type 2 in nonobese [facility]) ICD-10-CM: E11.9  ICD-9-CM: 250.00  7/28/2015 Yes        Obstructive sleep apnea ICD-10-CM: G47.33  ICD-9-CM: 327.23  1/28/2011 Yes        Coronary artery disease ICD-10-CM: I25.10  ICD-9-CM: 414.00  Unknown Yes    Overview Addendum 9/16/2011 10:32 AM by MD yeyo Denis 1996 PCI/stents of Lcx  b.  Cath (10/03): severe prox (40-70%) and mid RCA (80%) stenosis - PCI to both areas  c. Cardiolite (2/04):scheduled for comparison from previous stress pre PCI - similar study with no new acute changes. D.  Exercise Cardiolite (8/29/11):  Reversible mid-distal inferolateral defect. No fixed defects. EF 63%. E.  Cath (9/16/11):  LM ost20, d20. LAD m20. LCx m70; OM1 70, OM2 100, OM3 100. RCA p20, d30; PDA 99. F.  PCI PDA (9/16/11):  2.5x18 Xience. Dyslipidemia ICD-10-CM: E78.5  ICD-9-CM: 272.4  Unknown Yes    Overview Addendum 12/12/2016  2:31 PM by Melanie Sweeney MD     a. FLP 10/06: LDL 62, HDL 50, Trig 107, Chol 133 (vytorin 10/80). b. Colbert Godwin (6/20/8): Tot 143, , HDL 40, LDL 79 (Vytorin 10/80mg qd). c. FLP (12/20/8): Tot 137, TG 81, HDL 45, LDL 76 (Vytorin 10/80mg qd). D.  FLP (8/19/11): Tot 133, TG 78, HDL 48, LDL 69 (Vytorin 10/80mg qd). E.  FLP (10/22/13): Tot 155, TG 85, HDL 55, LDL 83 (Vytorin 10/80). F.  FLP (9/29/15): Tot 148, , HDL 64, LDL 60 (Vytorin 10/80). G.  FLP (10/28/16): Tot 148, TG 83, HDL 67, LDL 64 (Vytorin 10/80).              Hypertension, benign ICD-10-CM: I10  ICD-9-CM: 401.1  Unknown Yes

## 2021-06-03 PROBLEM — N35.919 URETHRAL STRICTURE: Status: ACTIVE | Noted: 2021-06-03

## 2021-06-03 PROBLEM — Z96.649 S/P TOTAL HIP ARTHROPLASTY: Status: ACTIVE | Noted: 2021-06-03

## 2021-06-03 LAB
ABO + RH BLD: NORMAL
BLD PROD TYP BPU: NORMAL
BLD PROD TYP BPU: NORMAL
BLOOD GROUP ANTIBODIES SERPL: NORMAL
BPU ID: NORMAL
BPU ID: NORMAL
CROSSMATCH RESULT,%XM: NORMAL
CROSSMATCH RESULT,%XM: NORMAL
GLUCOSE BLD STRIP.AUTO-MCNC: 107 MG/DL (ref 65–117)
GLUCOSE BLD STRIP.AUTO-MCNC: 71 MG/DL (ref 65–117)
GLUCOSE BLD STRIP.AUTO-MCNC: 73 MG/DL (ref 65–117)
GLUCOSE BLD STRIP.AUTO-MCNC: 76 MG/DL (ref 65–117)
GLUCOSE BLD STRIP.AUTO-MCNC: 91 MG/DL (ref 65–117)
GLUCOSE BLD STRIP.AUTO-MCNC: 98 MG/DL (ref 65–117)
HCT VFR BLD AUTO: 24.6 % (ref 36.6–50.3)
HGB BLD-MCNC: 7.6 G/DL (ref 12.1–17)
MAGNESIUM SERPL-MCNC: 2 MG/DL (ref 1.6–2.4)
SERVICE CMNT-IMP: NORMAL
SPECIMEN EXP DATE BLD: NORMAL
STATUS OF UNIT,%ST: NORMAL
STATUS OF UNIT,%ST: NORMAL
UNIT DIVISION, %UDIV: 0
UNIT DIVISION, %UDIV: 0

## 2021-06-03 PROCEDURE — 77010033678 HC OXYGEN DAILY

## 2021-06-03 PROCEDURE — 99233 SBSQ HOSP IP/OBS HIGH 50: CPT | Performed by: CLINICAL NURSE SPECIALIST

## 2021-06-03 PROCEDURE — 97165 OT EVAL LOW COMPLEX 30 MIN: CPT

## 2021-06-03 PROCEDURE — 85018 HEMOGLOBIN: CPT

## 2021-06-03 PROCEDURE — 97535 SELF CARE MNGMENT TRAINING: CPT

## 2021-06-03 PROCEDURE — 97116 GAIT TRAINING THERAPY: CPT

## 2021-06-03 PROCEDURE — 94760 N-INVAS EAR/PLS OXIMETRY 1: CPT

## 2021-06-03 PROCEDURE — 97530 THERAPEUTIC ACTIVITIES: CPT

## 2021-06-03 PROCEDURE — 65270000029 HC RM PRIVATE

## 2021-06-03 PROCEDURE — 74011250637 HC RX REV CODE- 250/637: Performed by: NURSE PRACTITIONER

## 2021-06-03 PROCEDURE — 83735 ASSAY OF MAGNESIUM: CPT

## 2021-06-03 PROCEDURE — 82962 GLUCOSE BLOOD TEST: CPT

## 2021-06-03 PROCEDURE — 36415 COLL VENOUS BLD VENIPUNCTURE: CPT

## 2021-06-03 RX ORDER — ENOXAPARIN SODIUM 100 MG/ML
40 INJECTION SUBCUTANEOUS EVERY 24 HOURS
Status: DISCONTINUED | OUTPATIENT
Start: 2021-06-03 | End: 2021-06-04 | Stop reason: HOSPADM

## 2021-06-03 RX ADMIN — Medication 10 ML: at 05:58

## 2021-06-03 RX ADMIN — DOCUSATE SODIUM 50MG AND SENNOSIDES 8.6MG 1 TABLET: 8.6; 5 TABLET, FILM COATED ORAL at 08:57

## 2021-06-03 RX ADMIN — ACETAMINOPHEN 1000 MG: 500 TABLET, FILM COATED ORAL at 22:21

## 2021-06-03 RX ADMIN — ACETAMINOPHEN 1000 MG: 500 TABLET, FILM COATED ORAL at 15:18

## 2021-06-03 RX ADMIN — TAMSULOSIN HYDROCHLORIDE 0.4 MG: 0.4 CAPSULE ORAL at 06:00

## 2021-06-03 RX ADMIN — ACETAMINOPHEN 1000 MG: 500 TABLET, FILM COATED ORAL at 08:58

## 2021-06-03 RX ADMIN — OXYCODONE 5 MG: 5 TABLET ORAL at 05:58

## 2021-06-03 RX ADMIN — DOCUSATE SODIUM 50MG AND SENNOSIDES 8.6MG 1 TABLET: 8.6; 5 TABLET, FILM COATED ORAL at 17:24

## 2021-06-03 RX ADMIN — FAMOTIDINE 20 MG: 20 TABLET ORAL at 17:24

## 2021-06-03 RX ADMIN — ATORVASTATIN CALCIUM 40 MG: 20 TABLET, FILM COATED ORAL at 08:58

## 2021-06-03 RX ADMIN — POLYETHYLENE GLYCOL 3350 17 G: 17 POWDER, FOR SOLUTION ORAL at 08:57

## 2021-06-03 RX ADMIN — OXYCODONE 5 MG: 5 TABLET ORAL at 09:02

## 2021-06-03 RX ADMIN — RANOLAZINE 500 MG: 500 TABLET, FILM COATED, EXTENDED RELEASE ORAL at 08:57

## 2021-06-03 RX ADMIN — ACETAMINOPHEN 1000 MG: 500 TABLET, FILM COATED ORAL at 01:26

## 2021-06-03 RX ADMIN — RANOLAZINE 500 MG: 500 TABLET, FILM COATED, EXTENDED RELEASE ORAL at 17:24

## 2021-06-03 RX ADMIN — Medication 10 ML: at 15:18

## 2021-06-03 RX ADMIN — FAMOTIDINE 20 MG: 20 TABLET ORAL at 08:58

## 2021-06-03 NOTE — PROGRESS NOTES
ORTHOPAEDIC LUMBAR FUSION PROGRESS NOTE    NAME:     Gayle Estrada   :       1940   MRN:       [id number]   DATE:      6/3/2021    POD:              2 Days Post-Op  S/P:              Procedure(s):  L1-2 LAMINECTOMY AND POSTERIOR LUMBER FUSION WITH INSTRUMENTATION, L1-5 HARDWARE REMOVAL, OSTEO AMP, ILIAC CREST BONE MARROW ASPITATE AND IMAGE GUIDANCE (O-ARM)    SUBJECTIVE:    Reports improvement in preop  Leg pain, reports some back soreness. Postop pain controlled  Well with medications. Feeling better today,  Tolerating PO intake   Ambulation tolerance - Got up and walked with PT yesterday afternoon. Passing flatus  ++, NO BM yet   Guzman +  Denies nausea/vomiting, headache, chest pain or shortness of breath  Recent Labs     21  0207 21  0546   HGB 7.6* 5.9*   HCT 24.6*  --    NA  --  143   K  --  4.2   CL  --  112*   CO2  --  26   BUN  --  10   CREA  --  0.80   GLU  --  128*     Patient Vitals for the past 12 hrs:   BP Temp Pulse Resp SpO2   21 0340 109/65 98.5 °F (36.9 °C) 73 16 97 %   21 2233 112/68 98.8 °F (37.1 °C) 71 16 97 %   21 103/70 98.4 °F (36.9 °C) 75 16 97 %     Exam:  Positive strength/ROM bilat lower ext. Neuro intact to sensation  Dressings clean and dry, JULIUS is functioning. Hemovac: Holding suction, output 70 ml last shift. Lower extremities warm and well perfused      PLAN: 2 Days Post-Op, improved   Continue PO pain medications as needed  Continue SCD's, frequent ambulation  Diet:  Diabetic  - Discontinue Hemovac  - will start lovenox today. - Keep guzman, until urology order, Will contact Dr. Gabino Klein  For Guzman D/C recommendation,  Continue bowel regimen   Continue lumbar orthosis  Continue Vit D3  Medical comorbities stable -   DM is controlled with Correctional insulin. Discharge planning - Once patient clears PT. I have seen and examined patient today and agree with above. Doing well, hemoglobin responded well to transfusion.  Feels good, denies shortness of breath, lightheadedness or dizziness. Legs feel good. Postop pain controlled. Passing flatus and tolerating PO. Collins remains. I contacted Dr. Santiago Horta today, he recommends keep catheter at discharge and followup Monday or Tuesday in office for voiding trial. Will use antibiotics until catheter out.      Cami Sequeira MD  Spine Surgery  [facility]

## 2021-06-03 NOTE — PROGRESS NOTES
Problem: Self Care Deficits Care Plan (Adult)  Goal: *Acute Goals and Plan of Care (Insert Text)  Description: FUNCTIONAL STATUS PRIOR TO ADMISSION: Patient was independent and active without use of DME.    HOME SUPPORT: The patient lived with spouse but did not require assist.    Occupational Therapy Goals  Initiated 6/3/2021    1. Patient will perform lower body dressing with supervision/set-up using AE PRN within 7 days. 2.  Patient will perform toilet transfer with supervision/set-up using most appropriate DME within 7 days. 3.  Patient will grooming tasks, standing at sink, at supervision/set-up within 7 days. 4.  Patient will don/doff back brace at supervision/set-up within 7 days. 5.  Patient will verbalize/demonstrate 3/3 back precautions during ADL tasks without cues within 7 days. Outcome: Progressing Towards Goal   OCCUPATIONAL THERAPY EVALUATION  Patient: Dillan Dacosta (17 y.o. 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 Patient received supine in bed, agreeable to activity. Patient requires min assist for log roll and min assist to move into sitting. Patient requires min assist to don brace in sitting.   Patient is CGA for sit to stand and ambulation to sink for grooming tasks. Patient able to perform tasks with CGA. Patient with recognizable signs of decreasing BP, and OT assisted patient to chair for remainder of tasks. Unable to capture O2 reading but patient with 2L O2 during entire evaluation. Patient BP stable in sitting at 157/71. Patient completed grooming tasks in sitting. Patient left in NAD in chair. Current Level of Function Impacting Discharge (ADLs/self-care): min A- CGA    Functional Outcome Measure: The patient scored 45/100 on the Barthel Index  outcome measure. Other factors to consider for discharge:     Patient will benefit from skilled therapy intervention to address the above noted impairments. PLAN :  Recommendations and Planned Interventions: self care training, functional mobility training, therapeutic exercise, balance training, therapeutic activities, endurance activities, patient education, home safety training, and family training/education    Frequency/Duration: Patient will be followed by occupational therapy 5 times a week to address goals. Recommendation for discharge: (in order for the patient to meet his/her long term goals)  Occupational therapy at least 2 days/week in the home     This discharge recommendation:  Has been made in collaboration with the attending provider and/or case management    IF patient discharges home will need the following DME: none       SUBJECTIVE:   Patient stated I guess I better sit down.     OBJECTIVE DATA SUMMARY:   HISTORY:   Past Medical History:   Diagnosis Date    CAD (coronary artery disease)     COVID-19 vaccine series completed 01/2021    Pfizer    Degenerative joint disease 1/28/2011    Diabetes mellitus ([facility] [address]     Essential hypertension     History of back surgery 09/2019    Hyperlipidemia     Kidney stones 1969    ADELA on CPAP 01/28/2011     Past Surgical History:   Procedure Laterality Date    HX CARPAL TUNNEL RELEASE      HX CORONARY STENT PLACEMENT Left 1991-2011    x 3 HX HIP REPLACEMENT Bilateral 2009 & 2015    HX HIP REPLACEMENT Right 2012    Revision    HX LITHOTRIPSY N/A 1969    HX LUMBAR DISKECTOMY N/A 1978    L 4-5    HX LUMBAR FUSION N/A 2016 & 2019    HX LUMBAR LAMINECTOMY  1971    L 4-5       Expanded or extensive additional review of patient history:     Home Situation  Home Environment: Private residence  # Steps to Enter: 1  Rails to Enter: Yes  Hand Rails : Right  One/Two Story Residence:  (three story, able to stay on 1st floor)  Lift Chair Available: Yes  Living Alone: No  Support Systems: Spouse/Significant Other/Partner  Patient Expects to be Discharged to[de-identified] Private residence  Current DME Used/Available at Home: CPAP, Walker, rolling, Adaptive dressing aides, Cane, straight, Shower chair, Commode, bedside  Tub or Shower Type: Shower    Hand dominance: Right    EXAMINATION OF PERFORMANCE DEFICITS:  Cognitive/Behavioral Status:  Neurologic State: Alert  Orientation Level: Oriented X4  Cognition: Appropriate decision making; Follows commands  Perception: Appears intact  Perseveration: No perseveration noted  Safety/Judgement: Awareness of environment    Skin: intact as seen    Edema: none noted     Hearing: Auditory  Auditory Impairment: None  Hearing Aids/Status: Does not own    Vision/Perceptual:                                     Range of Motion:  AROM: Within functional limits                         Strength:  Strength: Within functional limits                Coordination:  Coordination: Within functional limits  Fine Motor Skills-Upper: Left Intact; Right Intact    Gross Motor Skills-Upper: Left Intact; Right Intact    Tone & Sensation:  Tone: Normal  Sensation: Intact                      Balance:  Sitting: Intact  Standing: With support    Functional Mobility and Transfers for ADLs:  Bed Mobility:  Rolling:  Additional time;Minimum assistance  Supine to Sit: Minimum assistance    Transfers:  Sit to Stand: Contact guard assistance  Stand to Sit: Contact guard assistance  Toilet Transfer : Contact guard assistance  Assistive Device : Walker, rolling    ADL Assessment:  Feeding: Independent    Oral Facial Hygiene/Grooming: Contact guard assistance (standing at sink/sitting as needed )    Bathing: Minimum assistance (distal LEs )    Upper Body Dressing: Minimum assistance (including brace )    Lower Body Dressing: Minimum assistance    Toileting: Minimum assistance                ADL Intervention and task modifications:                                     Cognitive Retraining  Safety/Judgement: Awareness of environment    Therapeutic Exercise:   Functional Measure:  Barthel Index:    Bathin  Bladder: 0  Bowels: 10  Groomin  Dressin  Feeding: 10  Mobility: 0  Stairs: 0  Toilet Use: 5  Transfer (Bed to Chair and Back): 10  Total: 45/100        The Barthel ADL Index: Guidelines  1. The index should be used as a record of what a patient does, not as a record of what a patient could do. 2. The main aim is to establish degree of independence from any help, physical or verbal, however minor and for whatever reason. 3. The need for supervision renders the patient not independent. 4. A patient's performance should be established using the best available evidence. Asking the patient, friends/relatives and nurses are the usual sources, but direct observation and common sense are also important. However direct testing is not needed. 5. Usually the patient's performance over the preceding 24-48 hours is important, but occasionally longer periods will be relevant. 6. Middle categories imply that the patient supplies over 50 per cent of the effort. 7. Use of aids to be independent is allowed. Domingo Chamorro, Barthel, D.W. (1300). Functional evaluation: the Barthel Index. [facility] (14)2. DELORIS Erickson, Elida Hobbs, Bryson Moses. [place], [address] [place], [place] [address]e ().  Measuring the change indisability after inpatient rehabilitation; comparison of the responsiveness of the Barthel Index and Functional [place] Measure. Journal of Neurology, Neurosurgery, and Psychiatry, 66(4), 642-100. TONYA Montez.A, DANNY Peter, & Caty Jack M.A. (2004.) Assessment of post-stroke quality of life in cost-effectiveness studies: The usefulness of the Barthel Index and the EuroQoL-5D. Quality of Life Research, 15, 097-79         Occupational Therapy Evaluation Charge Determination   History Examination Decision-Making   LOW Complexity : Brief history review  LOW Complexity : 1-3 performance deficits relating to physical, cognitive , or psychosocial skils that result in activity limitations and / or participation restrictions  LOW Complexity : No comorbidities that affect functional and no verbal or physical assistance needed to complete eval tasks       Based on the above components, the patient evaluation is determined to be of the following complexity level: LOW   Pain Rating:  Patient rating 6/10 in supine and 5/10 when up in chair at end of sesion    Activity Tolerance:   Good    After treatment patient left in no apparent distress:    Sitting in chair, Call bell within reach, and Bed / chair alarm activated    COMMUNICATION/EDUCATION:   The patients plan of care was discussed with: Physical therapy assistant and Registered nurse. Home safety education was provided and the patient/caregiver indicated understanding., Patient/family have participated as able in goal setting and plan of care. , and Patient/family agree to work toward stated goals and plan of care. This patients plan of care is appropriate for delegation to [facility] [place].     Thank you for this referral.  Esther Patrick OTR/L  Time Calculation: 42 mins

## 2021-06-03 NOTE — PROGRESS NOTES
Physician Progress Note      PATIENT:               Junior Sage  CSN #:                  [id number]  :                       1940  ADMIT DATE:       2021 9:59 AM  DISCH DATE:  RESPONDING  PROVIDER #:        Jacinda Benítez MD          [facility]    This patient admitted for Planned Spinal Fusion. Progress notes on ---notes \"post op anemia\" only, without specification regarding any blood loss, and this needs to be further specified please. If possible can you please further specify further the type of anemia an please document in the  progress notes and discharge summary further specificity regarding the acuity and type of anemia:      The medical record reflects the following:  Risk Factors: Post op spinal fusion, Hematuria  Clinical Indicators: Hgb 10.4 pre op and post op down to 5.2, + Hematuria  EBL from surgery @ 500cc, documented systolic BP in the 93E----  Treatment: Monitor hgb/hct, transfusion of 2 units PRBCS, close monitoring of BP, VS.    Thank you,  Paulette Vazquez [place], [address] [facility], [address] [place] [address], [facility]  [phone]  Options provided:  -- Anemia due to acute blood loss  -- Anemia due to chronic blood loss  -- Anemia due to acute on chronic blood loss  -- Anemia due to postoperative blood loss  -- Other - I will add my own diagnosis  -- Disagree - Not applicable / Not valid  -- Disagree - Clinically unable to determine / Unknown  -- Refer to Clinical Documentation Reviewer    PROVIDER RESPONSE TEXT:    This patient has anemia due to postoperative blood loss.     Query created by: Trenton Mortimer on 6/3/2021 3:14 PM      Electronically signed by:  Jacinda Benítez MD 6/3/2021 5:23 PM

## 2021-06-03 NOTE — PROGRESS NOTES
Urology Progress Note    Patient: Dillan Dacosta MRN: [id number]  SSN: [id number]    YOB: 1940  Age: 80 y.o. Sex: male        Assessment:     Dillan Dacosta is a 80 y.o. male admitted to the hospital on 6/1/2021 spine surgery. Urology consulted from the OR for catheter placement. Urethral dilation and 16 fr catheter placement by Dr. Lili Castillo on 6/1/2021. He is an established patient of [facility], last seen by Dr. Manasa Rodriguez on 3/10/2021 for BPH and post operative retention managed with Flomax. Plan:     1. Continue tamsulosin  2. Plan to send home with catheter and outpatient follow up next week for VT. Please call if needed. Reason For Visit:     Follow up for unable to place catheter. Interval History:     No acute events overnight. Transfused yesterday for hgb 5.9. Tolerating catheter. No hematuria. ROS:     Denies fevers  Denies abdominal pain  Denies hematuria, frequency    Objective:     Visit Vitals  /65 (BP 1 Location: Left upper arm, BP Patient Position: At rest)   Pulse 73   Temp 98.5 °F (36.9 °C)   Resp 16   Ht 5' 8\" (1.727 m)   Wt 77.8 kg (171 lb 8.3 oz)   SpO2 97%   BMI 26.08 kg/m²         Intake/Output Summary (Last 24 hours) at 6/3/2021 6244  Last data filed at 6/3/2021 0600  Gross per 24 hour   Intake 2233.6 ml   Output 2840 ml   Net -606.4 ml       Physical Exam  General: NAD  Respiratory: no distress, on O2  Abdomen: soft, no distention  : normal external exam, catheter in place, yellow urine  Neuro: Appropriate, no focal neurological deficits    Labs reviewed, Le 3, 2021  Recent Results (from the past 24 hour(s))   RBC, ALLOCATE    Collection Time: 06/02/21  7:45 AM   Result Value Ref Range    HISTORY CHECKED?  Historical check performed    GLUCOSE, POC    Collection Time: 06/02/21 11:10 AM   Result Value Ref Range    Glucose (POC) 136 (H) 65 - 117 mg/dL    Performed by Royal Fowler(PCT)    GLUCOSE, POC    Collection Time: 06/02/21  5:36 PM   Result Value Ref Range    Glucose (POC) 100 65 - 117 mg/dL    Performed by Winifred Hernandez (PCT)    HGB & HCT    Collection Time: 06/02/21  9:28 PM   Result Value Ref Range    HGB 7.7 (L) 12.1 - 17.0 g/dL    HCT 25.2 (L) 36.6 - 50.3 %   GLUCOSE, POC    Collection Time: 06/02/21 10:36 PM   Result Value Ref Range    Glucose (POC) 102 65 - 117 mg/dL    Performed by Winifred Hernandez (PCT)    MAGNESIUM    Collection Time: 06/03/21  2:07 AM   Result Value Ref Range    Magnesium 2.0 1.6 - 2.4 mg/dL   HGB & HCT    Collection Time: 06/03/21  2:07 AM   Result Value Ref Range    HGB 7.6 (L) 12.1 - 17.0 g/dL    HCT 24.6 (L) 36.6 - 50.3 %   GLUCOSE, POC    Collection Time: 06/03/21  6:30 AM   Result Value Ref Range    Glucose (POC) 91 65 - 117 mg/dL    Performed by Oliver Saravia (PCT)        Imaging:  CT Results  (Last 48 hours)    None          Signed By: Israel Jenkins NP - Le 3, 2021

## 2021-06-03 NOTE — DIABETES MGMT
[facility] SPECIALIST CONSULT NOTE    Presentation   Hebert Dancer is a 80 y.o. male admitted 6/1/21 after L1-L2 laminectomy and posterior fusion. HX:   Past Medical History:   Diagnosis Date    CAD (coronary artery disease)     COVID-19 vaccine series completed 01/2021    Zachary Rincon    Degenerative joint disease 1/28/2011    Diabetes mellitus ([facility] [address]     Essential hypertension     History of back surgery 09/2019    Hyperlipidemia     Kidney stones 1969    ADELA on CPAP 01/28/2011       Current clinical course has been uncomplicated. Diabetes: Patient has known Type 2 diabetes, diagnosed approximately 10 years ago. Home diabetes medication is Metformin 1000 mg. Patient reports he has been able to maintain control of diabetes with diet and Metformin alone. Admission  and A1c 6% (5/24/21) indicate good diabetes control. Consulted by Provider for advanced diabetes nursing assessment and care, specifically related to      [x] Inpatient management strategy    Diabetes-related medical history  Acute complications  denies  Neurological complications  denies  Microvascular disease  denies  Macrovascular disease  CAD  Other associated conditions     denies    Diabetes medication history  Drug class Currently in use Discontinued Never used   Biguanide Metformin 1000 mg     DPP-4 inhibitor       Sulfonylurea      Thiazolidinedione      GLP-1 RA      SGLT-2 inhibitors      Basal insulin      Bolus insulin      Fixed Dose  Combinations        Subjective   Patient laying in bed, alert and oriented. Patient without complaints at this time.      Patient reports the following home diabetes self-care practices:  Eating pattern  [x] Breakfast Toast with sausage or cereal  [x] Lunch  Salad or sandwich  [x] Dinner  Salad or sandwich  [x] Snacks \"I eat a bowl of ice cream every other night\"  [x] Beverages Water/ occasional regular soda  Physical activity pattern  Patient denies any exercise routine/pattern at this time. Patient reports has been unable to do his usual activity due to feeling short of breath  Monitoring pattern  Patient does not check BG at home. Taking medications pattern  [x] Consistent administration  [x] Affordable    Social determinants of health impacting diabetes self-management practices   Patient denies any issues or concerns at this time. Objective   Physical exam  General Alert, oriented and in no acute distress. Conversant and cooperative. Vital Signs   Visit Vitals  /67 (BP 1 Location: Right arm, BP Patient Position: Sitting)   Pulse 71   Temp 98.6 °F (37 °C)   Resp 16   Ht 5' 8\" (1.727 m)   Wt 77.8 kg (171 lb 8.3 oz)   SpO2 98%   BMI 26.08 kg/m²     Skin  Warm and dry. No Acanthosis noted along neckline. Heart   Regular rate and rhythm. No murmurs, rubs or gallops  Lungs  Clear to auscultation without rales or rhonchi  Extremities No foot wounds         Laboratory  Lab Results   Component Value Date/Time    Hemoglobin A1c 6.0 (H) 05/24/2021 10:01 AM    Hemoglobin A1c, External 6.9 06/25/2015 12:00 AM     Lab Results   Component Value Date/Time    LDL, calculated 69.8 08/13/2020 02:57 PM     Lab Results   Component Value Date/Time    Creatinine 0.80 06/02/2021 05:46 AM     Lab Results   Component Value Date/Time    Sodium 143 06/02/2021 05:46 AM    Potassium 4.2 06/02/2021 05:46 AM    Chloride 112 (H) 06/02/2021 05:46 AM    CO2 26 06/02/2021 05:46 AM    Anion gap 5 06/02/2021 05:46 AM    Glucose 128 (H) 06/02/2021 05:46 AM    BUN 10 06/02/2021 05:46 AM    Creatinine 0.80 06/02/2021 05:46 AM    BUN/Creatinine ratio 13 06/02/2021 05:46 AM    GFR est AA >60 06/02/2021 05:46 AM    GFR est non-AA >60 06/02/2021 05:46 AM    Calcium 7.3 (L) 06/02/2021 05:46 AM    Bilirubin, total 0.6 05/24/2021 10:01 AM    Alk.  phosphatase 77 05/24/2021 10:01 AM    Protein, total 6.8 05/24/2021 10:01 AM    Albumin 3.7 05/24/2021 10:01 AM Globulin 3.1 05/24/2021 10:01 AM    A-G Ratio 1.2 05/24/2021 10:01 AM    ALT (SGPT) 11 (L) 05/24/2021 10:01 AM     Lab Results   Component Value Date/Time    ALT (SGPT) 11 (L) 05/24/2021 10:01 AM         Factors affecting BG management  Factor Dose Comments   Nutrition:  Carb-controlled meals   60 grams/meal    Drugs:  Vasopressor load  Steroids    HIV   Epogen  Blood transfusion(s)  Other: n/a   n/a  Decadron 8mg x 1 during surgery  n/a  n/a  n/a         A1cs inaccurate  A1cs inaccurate   Pain 0/10    Infection Ancef- course complete Surgical prophylaxis   Other: n/a n/a      Blood glucose pattern          Evaluation   This 80year old male, with Type 2 diabetes, did achieve diabetes control prior to admission (PTA), as evidenced by admission BG of 104 and A1c of 6%. Based on assessment of diabetes self-care practices, interventions that warrant action while hospitalized include:  None at this time. The patient would also benefit from diabetes self-management education and support [facility]) after discharge. During this hospitalization, the patient has achieved inpatient blood glucose target of 100-180mg/dl. BG's have ranged  over the past 24 hours. Factors that have played a role include:  None at this time      Basal insulin isn't in use. Bolus insulin isn't in use. Patient is eating meals. Corrective insulin is in use. Patient has not required any corrective insulin over the past 24 hours. To optimize BG control and support a positive health outcome, would utilize the Subcutaneous Insulin Order set (6746). Diabetes Management Team to sign off at this point as patient's blood glucose remains stable. Please re-consult us if additional diabetes management is needed. Thank you for including us in their care. Impression: It is likely that the current regimen of corrective insulin only during admission will be sufficient to maintain glycemic control.   It is suspected that, given patient's BG and A1c on admission on Metformin alone with diet at home, this trend will continue     Assessment and Plan   Nursing Diagnosis Risk for unstable blood glucose pattern   Nursing Intervention Domain 7496 Decision-making Support   Nursing Interventions Examined current inpatient diabetes control   Explored factors facilitating and impeding inpatient management  Identified self-management practices impeding diabetes control  Explored corrective strategies with patient and responsible inpatient provider   Informed patient of rational for insulin strategy while hospitalized       Recommendations   Recommend:    Continue current treatment regimen of corrective insulin only during admission. Patient to resume home regimen of Metformin 1000 mg BID at discharge    [x] Referral to    [x] Diabetes Self-Management Training through [facility] (Phone [phone] to schedule appointment)    Billing Code(s)   Total time spent with patient: 35 Minutes. I personally reviewed medical record, including notes, data and current medications, and examined the patient at bedside before making care recommendations (circumstances permitting).        [x] [id number]  subsequent hospital care - 35 minutes     Mikayla Gomez DNP, APRN-BC, [address] [place] [address]  Diabetes Clinical Nurse Specialist  [facility]  Access via [employer]

## 2021-06-03 NOTE — PROGRESS NOTES
Problem: Mobility Impaired (Adult and Pediatric)  Goal: *Acute Goals and Plan of Care (Insert Text)  Description: FUNCTIONAL STATUS PRIOR TO ADMISSION: Patient was independent and active without use of DME.    HOME SUPPORT PRIOR TO ADMISSION: The patient lived with spouse but did not require assist.    Physical Therapy Goals  Initiated 6/2/2021    1. Patient will move from supine to sit and sit to supine  in bed with supervision/set-up within 4 days. 2. Patient will perform sit to stand with supervision/set-up within 4 days. 3. Patient will ambulate with supervision/set-up for 150 feet with the least restrictive device within 4 days. 4. Patient will ascend/descend 1 stairs with one handrail(s) with supervision/set-up within 4 days. 5. Patient will verbalize and demonstrate understanding of spinal precautions (No bending, lifting greater than 5 lbs, or twisting; log-roll technique; frequent repositioning as instructed) within 4 days. Note:   PHYSICAL THERAPY TREATMENT  Patient: Hoang Ruiz (27 y.o. 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 ASSESSMENT  Patient continues with skilled PT services. Pt SPO2 was 95% on RA sitting in chair. Pt sit to stand with back brace in place CGA. Pt ambulated a total of 100ft with RW CGA.Pts SPO2 dropped to 85% on RA with ambulation. Pt was then given  2L of O2 ambulating with SPO2 increased to 93%. Pts SPO2 was 95% on 2L post ambulation sitting. Pt progressing well with mobility. Pt only has 1 step to enter home and has  pleasantly declined practice. Pt is ready for D/C from PT stand point when medically stable. PLAN :  Patient continues to benefit from skilled intervention to address the above impairments. Continue treatment per established plan of care. to address goals.     Recommendation for discharge: (in order for the patient to meet his/her long term goals)  Physical therapy at least 2 days/week in the home     This discharge recommendation:  Has been made in collaboration with the attending provider and/or case management    IF patient discharges home will need the following DME: rolling walker       SUBJECTIVE:       OBJECTIVE DATA SUMMARY:   Critical Behavior:  Neurologic State: Alert  Orientation Level: Oriented X4  Cognition: Appropriate decision making, Follows commands  Safety/Judgement: Awareness of environment  Functional Mobility Training:  Bed Mobility:          Transfers:  Sit to Stand: Contact guard assistance  Stand to Sit: Contact guard assistance                             Balance:  Sitting: Intact  Standing: With support  Ambulation/Gait Traininft with RW CGA         Documentation for home O2:     ROOM AIR    AT REST   O2 SATS  95%    ROOM AIR WITH ACTIVITY 02 SATS  85%    (2  ) LITERS OF O2 WITH ACTIVITY O2 SATS  93%    (2    )LITERS OF 02 PATIENT LEFT COMFORTABLY  SITTING/SUPINE 02 SATS  95%          Activity Tolerance:   Good    After treatment patient left in no apparent distress:   Sitting in chair    COMMUNICATION/COLLABORATION:   The patients plan of care was discussed with: Physical therapist.     Sindy Cuevas PTA   Time Calculation: 38 mins

## 2021-06-03 NOTE — DIABETES MGMT
Diabetes management consult received. This CNS in to assess patient, OT was just starting to work with patient. Will follow up.     Mode Argueta DNP, APRN-BC, CCNS  FLACA Ashraf  Diabetes Clinical Nurse Specialist  [facility]  Access via [facility]

## 2021-06-03 NOTE — PROGRESS NOTES
Problem: Falls - Risk of  Goal: *Absence of Falls  Description: Document Jose Shove Fall Risk and appropriate interventions in the flowsheet. Outcome: Progressing Towards Goal  Note: Fall Risk Interventions:  Mobility Interventions: Communicate number of staff needed for ambulation/transfer, OT consult for ADLs, Patient to call before getting OOB, PT Consult for mobility concerns, PT Consult for assist device competence, Utilize walker, cane, or other assistive device         Medication Interventions: Patient to call before getting OOB, Teach patient to arise slowly    Elimination Interventions: Call light in reach, Patient to call for help with toileting needs, Toileting schedule/hourly rounds, Urinal in reach              Problem: Pressure Injury - Risk of  Goal: *Prevention of pressure injury  Description: Document Darion Scale and appropriate interventions in the flowsheet. Outcome: Progressing Towards Goal  Note: Pressure Injury Interventions:             Activity Interventions: Increase time out of bed, PT/OT evaluation, Pressure redistribution bed/mattress(bed type)    Mobility Interventions: HOB 30 degrees or less, PT/OT evaluation, Pressure redistribution bed/mattress (bed type)    Nutrition Interventions: Document food/fluid/supplement intake

## 2021-06-03 NOTE — PROGRESS NOTES
6/3/2021  10:24 AM  Reason for Admission: Elective - Lumbar adjacent segment disease. Surgery required. Assessment:   [x]In person with pt   []Via p/c with pt   []With family member in person. Who/Relation:     []With family member via p/c. Who/Relation:   []Chart Review    RUR:  14%  Risk Level: [x]Low []Moderate []High  Value-based purchasing: [x] Yes [] No  Bundle patient: [] Yes [x] No   Specify:     Advance Directive: Prior. [] No AD on file. [] AD on file. [x] Current AD not on file. Copy requested. [] Requests AD, and referral submitted to [facility]. Healthcare Decision Maker:  Soniata Castleman / wife - [phone]        Assessment:    Age: 80    Sex: [x] Male []Female     Residency: [x]Private residence []Apartment []Assisted Living []LTC []Other:   Exterior Steps: 1  Interior Steps: 0    Lives With: [x]With spouse []Other family members []Underage children []Alone []Care provider []Other:    Prior functioning:  [x]Independent with ADLs and iADLS []Dependent with ADLs and iADLs []Partial dependence, Specify:     Prior DME required:  []None []RW []Cane []Crutches []Bedside commode [x]CPAP [x]Home O2 (Liter/Provider: prn / [facility]) []Nebulizer   []Shower Chair []Wheelchair []Hospital Bed []Nima []Stair lift []Rollator []Other:    DME available: []None [x]RW []Cane []Crutches []Bedside commode [x]CPAP [x]Home O2 (Liter/Provider: ) []Nebulizer   []Shower Chair []Wheelchair []Hospital Bed []Nima []Stair lift []Rollator []Other:    Rehab history: []None []Outpatient PT [x]Home Health (Provider/Date: At [address] / within last 2 years) []SNF (Provider/Date: ) []IPR (Provider/Date: ) []LTC (Provider/Date: ) []Hospice (Provider/Date: )  []Other:     Discharge Concerns: []Yes [x]No []Unknown   Describe:    Comments:      Insurer:   Scar [address] [employer] Phone: [phone]    Subscriber: Racquel cMmillan Subscriber#: [id number]    Group#: Precert#:         /[id number]  [employer] Phone:     Subscriber: Trenton Soriano Subscriber#: [id number]    Group#:  Precert#:           PCP: Kaleigh Enciso   Address: [address] [facility] / [place] [address]   Phone number: [phone]   Current patient: [x]Yes []No   Approximate date of last visit: 3/4/2021   Access to virtual PCP visits: []Yes [x]No    Pharmacy:  [id number] Transport: Family       Transition of care plan:    []Unable to determine at this time. Awaiting clinical progress, and disposition recommendations. [] Home with outpatient follow-up    [] Home with Outpatient PT and outpatient follow-up   Pt aware of OP appt? []Yes, Provider:   []Not scheduled   Transport provider:     [] Home with family assistance as needed and outpatient follow-up   Family able to assist:    Schedule:  Transport provider:      [x] Home with Home Health   - Provider:  CM consult for [facility] noted. CM completed referral to pt preference, At [facility], and they accepted. []SNF/IPR   -[]Preferences given:   []Listing provided and preferences requested   -Status: []Pending []Accepted:    -Auth required: []Yes []No    -Auth initiated date:   -3 midnight stay required: []Yes []No  Date satisfied:     [] Home with Hospice   -Provider:     [] [facility] information provided. [] Other:     Gonsalo Penny MA    Care Management Interventions  PCP Verified by CM: Yes ([facility])  Mode of Transport at Discharge:  Other (see comment) (Family)  Transition of Care Consult (CM Consult): Discharge Planning, [address]: No  Reason Outside [place]: Physician referred to specific agency  MyChart Signup: No  Discharge Durable Medical Equipment: No  Physical Therapy Consult: Yes  Occupational Therapy Consult: Yes  Speech Therapy Consult: No  Current Support Network: Lives with Spouse  Confirm Follow Up Transport: Family  The Plan for Transition of Care is Related to the Following Treatment Goals : Lumbar adajent segment disease  The Patient and/or Patient Representative was Provided with a Choice of Provider and Agrees with the Discharge Plan?: (S) Yes  Name of the Patient Representative Who was Provided with a Choice of Provider and Agrees with the Discharge Plan: Self  Freedom of Choice List was Provided with Basic Dialogue that Supports the Patient's Individualized Plan of Care/Goals, Treatment Preferences and Shares the Quality Data Associated with the Providers?: (S) Yes  Discharge Location  Discharge Placement: Home with home health

## 2021-06-04 VITALS
HEIGHT: 68 IN | DIASTOLIC BLOOD PRESSURE: 71 MMHG | BODY MASS INDEX: 25.99 KG/M2 | TEMPERATURE: 99.2 F | WEIGHT: 171.52 LBS | RESPIRATION RATE: 16 BRPM | HEART RATE: 72 BPM | SYSTOLIC BLOOD PRESSURE: 127 MMHG | OXYGEN SATURATION: 98 %

## 2021-06-04 LAB
GLUCOSE BLD STRIP.AUTO-MCNC: 92 MG/DL (ref 65–117)
GLUCOSE BLD STRIP.AUTO-MCNC: 97 MG/DL (ref 65–117)
SERVICE CMNT-IMP: NORMAL
SERVICE CMNT-IMP: NORMAL

## 2021-06-04 PROCEDURE — 82962 GLUCOSE BLOOD TEST: CPT

## 2021-06-04 PROCEDURE — 77010033678 HC OXYGEN DAILY

## 2021-06-04 PROCEDURE — 97535 SELF CARE MNGMENT TRAINING: CPT

## 2021-06-04 PROCEDURE — 74011250637 HC RX REV CODE- 250/637: Performed by: NURSE PRACTITIONER

## 2021-06-04 PROCEDURE — 74011250636 HC RX REV CODE- 250/636: Performed by: NURSE PRACTITIONER

## 2021-06-04 PROCEDURE — 94760 N-INVAS EAR/PLS OXIMETRY 1: CPT

## 2021-06-04 RX ORDER — ENOXAPARIN SODIUM 100 MG/ML
40 INJECTION SUBCUTANEOUS DAILY
Qty: 13 SYRINGE | Refills: 0 | Status: SHIPPED | OUTPATIENT
Start: 2021-06-04 | End: 2022-04-12

## 2021-06-04 RX ORDER — SULFAMETHOXAZOLE AND TRIMETHOPRIM 800; 160 MG/1; MG/1
1 TABLET ORAL 2 TIMES DAILY
Qty: 10 TABLET | Refills: 0 | Status: SHIPPED | OUTPATIENT
Start: 2021-06-04 | End: 2022-04-12

## 2021-06-04 RX ORDER — OXYCODONE HYDROCHLORIDE 5 MG/1
5-10 TABLET ORAL
Qty: 56 TABLET | Refills: 0 | Status: SHIPPED | OUTPATIENT
Start: 2021-06-04 | End: 2021-06-07

## 2021-06-04 RX ORDER — ACETAMINOPHEN 500 MG
1000 TABLET ORAL EVERY 6 HOURS
Qty: 90 TABLET | Refills: 0 | Status: SHIPPED | OUTPATIENT
Start: 2021-06-04

## 2021-06-04 RX ORDER — CYCLOBENZAPRINE HCL 10 MG
10 TABLET ORAL
Qty: 60 TABLET | Refills: 0 | Status: SHIPPED | OUTPATIENT
Start: 2021-06-04

## 2021-06-04 RX ADMIN — POLYETHYLENE GLYCOL 3350 17 G: 17 POWDER, FOR SOLUTION ORAL at 09:17

## 2021-06-04 RX ADMIN — ACETAMINOPHEN 1000 MG: 500 TABLET, FILM COATED ORAL at 02:24

## 2021-06-04 RX ADMIN — Medication 10 ML: at 00:25

## 2021-06-04 RX ADMIN — DOCUSATE SODIUM 50MG AND SENNOSIDES 8.6MG 1 TABLET: 8.6; 5 TABLET, FILM COATED ORAL at 09:17

## 2021-06-04 RX ADMIN — Medication 10 ML: at 05:25

## 2021-06-04 RX ADMIN — TAMSULOSIN HYDROCHLORIDE 0.4 MG: 0.4 CAPSULE ORAL at 05:25

## 2021-06-04 RX ADMIN — OXYCODONE 5 MG: 5 TABLET ORAL at 00:20

## 2021-06-04 RX ADMIN — ATORVASTATIN CALCIUM 40 MG: 20 TABLET, FILM COATED ORAL at 09:17

## 2021-06-04 RX ADMIN — ACETAMINOPHEN 1000 MG: 500 TABLET, FILM COATED ORAL at 09:16

## 2021-06-04 RX ADMIN — FAMOTIDINE 20 MG: 20 TABLET ORAL at 09:17

## 2021-06-04 RX ADMIN — OXYCODONE 10 MG: 5 TABLET ORAL at 14:18

## 2021-06-04 RX ADMIN — RANOLAZINE 500 MG: 500 TABLET, FILM COATED, EXTENDED RELEASE ORAL at 09:17

## 2021-06-04 RX ADMIN — ENOXAPARIN SODIUM 40 MG: 40 INJECTION SUBCUTANEOUS at 00:21

## 2021-06-04 NOTE — PROGRESS NOTES
Doing well this evening  Pain well controlled  Walked well  Used O2 with ambulation  Passing flatus  preop leg pain resolved  Discussed Dr. Marsha Chaparro for catheter to remain, plan for antibiotics and removal in urology clinic Monday or Tuesday at [facility] location. Have asked Kip Venegas to help arrange this prior to discharge tomorrow. lovenox x 14 days, then return to plavix.

## 2021-06-04 NOTE — PROGRESS NOTES
Bedside shift change report given to Bello Looney (oncoming nurse) by Joyce Crowder (offgoing nurse). Report included the following information SBAR, Kardex, Intake/Output, MAR, Recent Results, Alarm Parameters  and Quality Measures.

## 2021-06-04 NOTE — PROGRESS NOTES
6/4/2021  10:10 AM  RUR:  14%  Risk Level: [x]Low []Moderate []High  Value-based purchasing: [] Yes [] No  Bundle patient: [] Yes [x] No   Specify:     Transition of care plan:  1. Discharge order submitted. Pt has medically cleared. 2. Home with [facility] with [facility], and they were notified of pt's discharge. Pt has portable O2 tank for use in transport. 3. Outpatient follow-up (see AVS). 4. Pt's family to transport. Care Management Interventions  PCP Verified by CM: Yes ([facility])  Mode of Transport at Discharge:  Other (see comment) (Family)  Transition of Care Consult (CM Consult): Discharge Planning, [address]: No  Reason Outside [place]: Physician referred to specific agency  MyChart Signup: No  Discharge Durable Medical Equipment: No  Physical Therapy Consult: Yes  Occupational Therapy Consult: Yes  Speech Therapy Consult: No  Current Support Network: Lives with Spouse  Confirm Follow Up Transport: Family  The Plan for Transition of Care is Related to the Following Treatment Goals : Lumbar adajent segment disease  The Patient and/or Patient Representative was Provided with a Choice of Provider and Agrees with the Discharge Plan?: (S) Yes  Name of the Patient Representative Who was Provided with a Choice of Provider and Agrees with the Discharge Plan: Self  Freedom of Choice List was Provided with Basic Dialogue that Supports the Patient's Individualized Plan of Care/Goals, Treatment Preferences and Shares the Quality Data Associated with the Providers?: (S) Yes  Discharge Location  Discharge Placement: Home with home health

## 2021-06-04 NOTE — DISCHARGE INSTRUCTIONS
Paul Mckeon MD  [facility]  Office Phone: [phone]  Lumbar Surgery Discharge Instructions    Activities   You are going home a well person, be as active as possible. Your only exercise should be walking. Start with short frequent walks and increase your walking distance each day. Start with walking three times per day for 5 minutes and increase your distance each day 2-3 minutes. Limit the amount of time you sit to 20-30 minute intervals. Sitting for prolonged periods of time will be uncomfortable for you following your surgery.  Do not lift anything over 20 pounds for 10-12 weeks, and do not do any bending or straining.  When you are in the bed, you may lay on your back or on either side. Do not lay on your stomach.  Continue using your incentive spirometer regularly for deep breathing exercises   You may resume sexual relations 6 weeks after your surgery      Diet   You may resume your normal diet. Be sure to drink plenty of fluids, it is important to keep yourself hydrated.  MAKE SURE YOU ARE GETTING GOOD NUTRITION (Lean Protein, Vitamin D AND Calcium)   Avoid alcoholic beverages and ABSOLUTELY NO tobacco products. Tobacco products will interfere with your healing. If you continue to use tobacco, you may end up needing another surgery in the future. Medications   Do not take anti-inflammatory medications or aspirin unless instructed by your physician. Resume plavix and Aspirin after completing Lovenox 2 weeks.  Take your pain medication as directed.  Do NOT take additional Tylenol if your prescribed pain medication has acetaminophen in it (Endocet/Percocet, Lortab, Norco).  It is important to have regular bowel movements. Pain medications may cause constipation. Stool softeners, prune juice, and increasing your water and fiber intake may help in preventing constipation.  Do NOT take laxatives if at all possible except in severe situations.  It can results in a vicious cycle of constipation and diarrhea.  Do not be alarmed if you still have some of the same symptoms you had prior to surgery. The nerves often require time to heal after the pressure has been relieved. You may experience pain in your shoulders or between your shoulder blades, which is common after this surgery. The level of pain you experience should improve as your body heals. Driving   You may not drive or return to work until instructed by your physician. However, you may ride in the car for short periods of time. Brace   If you have a back brace, you should wear your brace at all times when you are out of bed. Do not wear the brace while in bed or showering.  Remember to always wear a cotton t-shirt underneath your brace.  Do not bend or twist when your brace is off. Showering   For now, you may shower the front side of your body. Approximately 5-7 days after your surgery, if your incision is not draining, you may begin taking full showers. Your dressing is waterproof.  Do not rub or apply lotion or ointments to the incision site.  Do not use tub baths, swimming pools or Jacuzzis. Caring for your incision   Keep the JULIUS  on until 7 days after discharge. At that point, if the incision is dry and without drainage, you may keep the wound open to air without cover.  You may have steri-strips on your incision (small, white pieces of tape). Do not pull the steri-strips; they will fall off on their own after several days. If you have sutures or staples, they will be removed by home health or when you see your physician.  Do not rub or apply any lotions or ointments to your incision site. Follow Up  · You should already have your follow-up visit scheduled with Dr. Ha Kat for 2-3 weeks after surgery. Please call the office if there are any questions or concerns regarding your follow-up.        Notify your physician if you develop any of the following conditions:   Fever above 101 degrees for 24 hours.  Nausea or vomiting.  Severe headache.  Inability to urinate.  Loss of bowel or bladder control (sudden onset of incontinence).  Changes in sensation in your extremities (numbness, tingling, loss of color).  Severe pain or pain not relieved by medications.  Redness, swelling, or drainage from your incision.  Persistent pain in the chest.    Pain in the calf of either leg.  Increased weakness (if this is greater than before your surgery). If you have any questions, contact your Orthopaedic Surgeons office. Family Practice Discharge Instructions    Your blood pressure medications were stopped due to your blood pressures being low after surgery. Please keep a log of your pressure measurements at home and discuss it with your PCP.

## 2021-06-04 NOTE — PROGRESS NOTES
1600  Collins care and  Lovenox injection teaching given. Collins leg bag given . Pt an spouse states verbal understanding.

## 2021-06-04 NOTE — PROGRESS NOTES
Bedside shift change report given to Elier Corrales (oncoming nurse) by Anupama Tamayo (offgoing nurse). Report included the following information SBAR, Kardex, Procedure Summary, Intake/Output, Recent Results, Pre Procedure Checklist and Quality Measures.

## 2021-06-04 NOTE — PROGRESS NOTES
Problem: Self Care Deficits Care Plan (Adult)  Goal: *Acute Goals and Plan of Care (Insert Text)  Description: FUNCTIONAL STATUS PRIOR TO ADMISSION: Patient was independent and active without use of DME.    HOME SUPPORT: The patient lived with spouse but did not require assist.    Occupational Therapy Goals  Initiated 6/3/2021    1. Patient will perform lower body dressing with supervision/set-up using AE PRN within 7 days. 2.  Patient will perform toilet transfer with supervision/set-up using most appropriate DME within 7 days. 3.  Patient will grooming tasks, standing at sink, at supervision/set-up within 7 days. 4.  Patient will don/doff back brace at supervision/set-up within 7 days. 5.  Patient will verbalize/demonstrate 3/3 back precautions during ADL tasks without cues within 7 days. Outcome: Progressing Towards Goal   OCCUPATIONAL THERAPY TREATMENT  Patient: Mariela Morales (44 y.o. 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 ASSESSMENT  Patient continues with skilled OT services and is progressing towards goals. Pt dressed sitting side of bed with assist from family member for LB dressing as pt has guzman catheter. Pt stated having all AE at home for bathing and dressing.  He is clear from an OT standpoint for home when medically clear. Current Level of Function Impacting Discharge (ADLs): Min assist for LB dress as pt has guzman catheter    Other factors to consider for discharge:          PLAN :  Patient continues to benefit from skilled intervention to address the above impairments. Continue treatment per established plan of care to address goals. Recommend with staff: Out of bed to chair for ADL's, there ex, there act    Recommend next OT session: Pt clear from an OT standpoint for home with family    Recommendation for discharge: (in order for the patient to meet his/her long term goals)  No skilled occupational therapy/ follow up rehabilitation needs identified at this time. This discharge recommendation:  Has not yet been discussed the attending provider and/or case management    IF patient discharges home will need the following DME: Pt has DME       SUBJECTIVE:   Patient stated I would love to get dressed.     OBJECTIVE DATA SUMMARY:   Cognitive/Behavioral Status:  Neurologic State: Alert  Orientation Level: Oriented X4  Cognition: Appropriate decision making; Follows commands             Functional Mobility and Transfers for ADLs:  Bed Mobility:     Min assist supine to sit with use of bed rail  Transfers:      Stand by assist sit to stand       Balance:Intact sitting balance       ADL Intervention:          Upper Body Dressing Assistance  Dressing Assistance: Set-up  Pullover Shirt: Set-up    Lower Body Dressing Assistance  Dressing Assistance: Minimum assistance (family assists)  Underpants: Minimum assistance  Pants With Elastic Waist: Minimum assistance  Socks: Minimum assistance            Activity Tolerance:   Good    After treatment patient left in no apparent distress:   Call bell within reach    COMMUNICATION/COLLABORATION:   The patients plan of care was discussed with: Physical therapy assistant, Occupational therapist, and Registered nurse.      HAO Gage Calculation: 20 mins

## 2021-06-04 NOTE — PROGRESS NOTES
ORTHOPAEDIC LUMBAR FUSION PROGRESS NOTE    NAME:     Eh Ford   :       1940   MRN:       [id number]   DATE:      2021    POD:              3 Days Post-Op  S/P:              Procedure(s):  L1-2 LAMINECTOMY AND POSTERIOR LUMBER FUSION WITH INSTRUMENTATION, L1-5 HARDWARE REMOVAL, OSTEO AMP, ILIAC CREST BONE MARROW ASPITATE AND IMAGE GUIDANCE (O-ARM)    SUBJECTIVE:    Reports improvement in pre op leg pain,  Denies pain with ambulation. post op pain is controlled with medications. Tolerating PO intake- well  Ambulation tolerance - getting up and walking, walked with PT yesterday. Has to do stairs today  Passing flatus +++, No BM yet   Collins  Denies nausea/vomiting, headache, chest pain or shortness of breath  Recent Labs     21  0207 21  0546   HGB 7.6* 5.9*   HCT 24.6*  --    NA  --  143   K  --  4.2   CL  --  112*   CO2  --  26   BUN  --  10   CREA  --  0.80   GLU  --  128*     Patient Vitals for the past 12 hrs:   BP Temp Pulse Resp SpO2   21 0214 129/65 99.1 °F (37.3 °C) 79 16 94 %   21 2350 137/71 99 °F (37.2 °C) 85 16 96 %   21 2056 126/67 98.7 °F (37.1 °C) 80 16 100 %   21 1945 -- -- -- -- 96 %     Exam:  Positive strength/ROM bilat lower ext. Neuro intact to sensation  Dressings clean and dry, JULIUS is functioning. Lower extremities warm and well perfused      PLAN: 3 Days Post-Op, improved   Continue PO pain medications as needed  Continue SCD's, frequent ambulation  - Tolerated lovenox, Continue lovenox  - will dc home with Karina, , - will keep him on PO ABx. OP follow up with Urology on Mon/ Tuesday. Follow up to be scheduled before discharge. Diet:  Diabetic  Continue bowel regimen   Continue lumbar orthosis  Continue Vit D3  Medical comorbities stable   Discharge planning - Today once patient clears PT. Will need lovenox teaching.

## 2021-06-04 NOTE — PROGRESS NOTES
Problem: Risk for Spread of Infection  Goal: Prevent transmission of infectious organism to others  Description: Prevent the transmission of infectious organisms to other patients, staff members, and visitors. Outcome: Progressing Towards Goal     Problem: Patient Education:  Go to Education Activity  Goal: Patient/Family Education  Outcome: Progressing Towards Goal     Problem: Falls - Risk of  Goal: *Absence of Falls  Description: Document Nery Reddy Fall Risk and appropriate interventions in the flowsheet.   Outcome: Progressing Towards Goal  Note: Fall Risk Interventions:  Mobility Interventions: Communicate number of staff needed for ambulation/transfer         Medication Interventions: Patient to call before getting OOB    Elimination Interventions: Call light in reach

## 2021-06-04 NOTE — PROGRESS NOTES
Medication was E-scribed to his pharmacy. Nurse handed patient a copy of discharge instructions. Copy also included a list of medications and changes in medication. Nurse read and explained medication and instructions. Patient aware he is to check and record his blood pressure during the day and bring the recorded blood pressures to his PCP.  Verbalized understanding

## 2021-06-06 LAB
ATRIAL RATE: 85 BPM
CALCULATED P AXIS, ECG09: 70 DEGREES
CALCULATED R AXIS, ECG10: 62 DEGREES
CALCULATED T AXIS, ECG11: 92 DEGREES
DIAGNOSIS, 93000: NORMAL
P-R INTERVAL, ECG05: 128 MS
Q-T INTERVAL, ECG07: 410 MS
QRS DURATION, ECG06: 88 MS
QTC CALCULATION (BEZET), ECG08: 487 MS
VENTRICULAR RATE, ECG03: 85 BPM

## 2021-06-07 ENCOUNTER — PATIENT OUTREACH (OUTPATIENT)
Dept: CASE MANAGEMENT | Age: 81
End: 2021-06-07

## 2021-06-07 NOTE — PROGRESS NOTES
Care Transitions Initial Call    Call within 2 business days of discharge: Yes     Patient: Kaushik Bentley Patient : 1940 MRN: [id number]    Last Discharge [address]       Complaint Diagnosis Description Type Department Provider    21  Status post lumbar spinal fusion . .. Admission (Discharged) Keshia John MD        Was this an external facility discharge? No     Challenges to be reviewed by the provider   Additional needs identified to be addressed with provider: performing home blood pressure monitoring three times per day, this /66         Method of communication with provider : chart routing    Discussed COVID-19 related testing which was available at this time. Test results were negative. Test performed prior to  Hospitalization. Patient informed of results, if available? yes    Advance Care Planning:   Does patient have an Advance Directive: not on file. Inpatient Readmission Risk score: Unplanned Readmit Risk Score: 15    Was this a readmission? no   Patient stated reason for the admission: Planned procedure    Patients top risk factors for readmission: none identified at this time   Interventions to address risk factors: Scheduled appointment with PCP-6/10, Scheduled appointment with Specialist- (urology);  (Ortho), Obtained and reviewed discharge summary and/or continuity of care documents and Education of patient/family/caregiver/guardian to support self-management-monitor for s/sx of infection    Care Transition Nurse (CTN) contacted the patient by telephone to perform post hospital discharge assessment. Verified name and  with patient as identifiers. Provided introduction to self, and explanation of the CTN role. Reports doing well. Mild pain to back. Took pain medication this AM. Appetite gradually improving. Reviewed the importance of protein consumption to promote wound healing. Back brace intact. Ambulating with walker.  Using incentive spirometer five times per hour. Able to pull approx. 2200 ml. Reviewed s/sx infection/DVT/PE. Performing home blood pressure monitoring three times per day. Collins catheter intact. Draining clear light yellow urine. No hematuria. No concerns with Lovenox injections, rotating sites. Having regular BMs    CTN reviewed discharge instructions, medical action plan and red flags with patient who verbalized understanding. Were discharge instructions available to patient? yes. Reviewed appropriate site of care based on symptoms and resources available to patient including: PCP, Specialist and [address] [place] [address] [place]. Patient given an opportunity to ask questions and does not have any further questions or concerns at this time. The patient agrees to contact the PCP office for questions related to their healthcare. Medication reconciliation was performed with patient, who verbalizes understanding of administration of home medications. Referral to Pharm D needed: no     Home Health/Outpatient orders at discharge: home health care, PT, OT and [address]: [facility] [address]  Date of initial visit: 6/6/2021    Durable Medical Equipment ordered at discharge: None    Covid Risk Education    Educated patient about risk for severe COVID-19 due to risk factors according to CDC guidelines. CTN reviewed discharge instructions, medical action plan and red flag symptoms with the patient who verbalized understanding. Discussed COVID vaccination status: no. Education provided on COVID-19 vaccination as appropriate. Discussed exposure protocols and quarantine with CDC Guidelines. Patient was given an opportunity to verbalize any questions and concerns and agrees to contact CTN or health care provider for questions related to their healthcare. Was patient discharged with a pulse oximeter? no.   Discussed follow-up appointments.  If no appointment was previously scheduled, appointment scheduling offered: no. Is follow up appointment scheduled within 7 days of discharge? yes. [address] follow up appointment(s):   Future Appointments   Date Time Provider Rafa Chapini   6/10/2021  9:15 AM Naresh Amador,  [facility]     [facility] follow up appointment(s): [facility]8 [facility]; 6/22 NP Roz Garcia (Ortho)    Plan for follow-up call in 5-7 days based on severity of symptoms and risk factors. Plan for next call: self management-pain, , follow up appointment-urology/PCP, community resources-HH and inquire about acp  CTN provided contact information for future needs. Goals Addressed                 This Visit's Progress     Attend follow up appointments on schedule          06/07/21   Will attend follow up appt with Urology/PCP/Ortho       Understands red flags post discharge. 06/07/21   Will monitor for- increased redness, swelling, drainage or pain at incision site.  Temp greater than 101   S/s DVT/PE i.e calf pain/swelling, increased SOB   Will continue to use incentive spirometer to improve lung function   Will increase activity- per instruction, HH PT/OT   Will continue to monitor BP three times per day

## 2021-06-10 ENCOUNTER — OFFICE VISIT (OUTPATIENT)
Dept: FAMILY MEDICINE CLINIC | Age: 81
End: 2021-06-10
Payer: MEDICARE

## 2021-06-10 VITALS
SYSTOLIC BLOOD PRESSURE: 120 MMHG | HEIGHT: 68 IN | DIASTOLIC BLOOD PRESSURE: 60 MMHG | OXYGEN SATURATION: 98 % | HEART RATE: 73 BPM | WEIGHT: 168 LBS | BODY MASS INDEX: 25.46 KG/M2 | RESPIRATION RATE: 18 BRPM | TEMPERATURE: 98.2 F

## 2021-06-10 DIAGNOSIS — M51.9 LUMBAR DISC DISEASE: ICD-10-CM

## 2021-06-10 DIAGNOSIS — D64.9 ANEMIA, UNSPECIFIED TYPE: Primary | ICD-10-CM

## 2021-06-10 PROCEDURE — 99495 TRANSJ CARE MGMT MOD F2F 14D: CPT | Performed by: FAMILY MEDICINE

## 2021-06-10 PROCEDURE — G8427 DOCREV CUR MEDS BY ELIG CLIN: HCPCS | Performed by: FAMILY MEDICINE

## 2021-06-10 NOTE — PROGRESS NOTES
Patient presents in office for a BP check and hospital follow up from his back surgery    Patient states he feels good, following all protocols, and getting around fine. Pt expresses concern with back dressing, states no one from home care has reached out or been to his house to remove this. Patient is fully vaccinated, Zachary Rincon, done at [facility].       1. Have you been to the ER, urgent care clinic since your last visit? Hospitalized since your last visit? Yes, [facility], back surgery    2. Have you seen or consulted any other health care providers outside of the [address] [place] [address] since your last visit? Include any pap smears or colon screening.  No

## 2021-06-10 NOTE — PROGRESS NOTES
Progress Note    he is a 80y.o. year old male who presents for evalution. Subjective:     Pt here for f/u from recent spine surgery with Dr Janay Delvalle and he is feeling much better pain free in his L spine. Just some pain in incision site. Will be seeing ortho in 2 weeks. PT is coming to his home. Has been keeping a BP log and has been mostly normal tho a little low in office today. Not on any BP medications. No dizzy spells. Recent A1C was 6.0. Anemia hgb 7.6 does not believe he is on iron. Reviewed PmHx, RxHx, FmHx, SocHx, AllgHx and updated and dated in the chart. Review of Systems - negative except as listed above in the HPI    Objective:     Vitals:    06/10/21 0854 06/10/21 0935   BP: (!) 93/55 120/60   Pulse: 73    Resp: 18    Temp: 98.2 °F (36.8 °C)    TempSrc: Oral    SpO2: 98%    Weight: 168 lb (76.2 kg)    Height: 5' 8\" (1.727 m)        Current Outpatient Medications   Medication Sig    acetaminophen (TYLENOL) 500 mg tablet Take 2 Tablets by mouth every six (6) hours.  cyclobenzaprine (FLEXERIL) 10 mg tablet Take 1 Tablet by mouth two (2) times daily as needed for Muscle Spasm(s).  enoxaparin (Lovenox) 40 mg/0.4 mL 0.4 mL by SubCUTAneous route daily.  trimethoprim-sulfamethoxazole (BACTRIM DS, SEPTRA DS) 160-800 mg per tablet Take 1 Tablet by mouth two (2) times a day.  folic acid (FOLVITE) 1 mg tablet Take 1 mg by mouth daily. Indications: iron deficiency    omeprazole (PRILOSEC) 40 mg capsule Take 40 mg by mouth every morning.  calcium carbonate/vitamin D3 (CALCIUM WITH VITAMIN D3 PO) Take 1 Tablet by mouth two (2) times a day.  tamsulosin (FLOMAX) 0.4 mg capsule Take 0.4 mg by mouth every morning.     metFORMIN (GLUCOPHAGE) 1,000 mg tablet TAKE 1 TABLET TWICE A DAY WITH MEALS    atorvastatin (LIPITOR) 40 mg tablet TAKE 1 TABLET DAILY (START LIPITOR AFTER VYTORIN IS FINISHED)    ranolazine ER (RANEXA) 500 mg SR tablet TAKE 1 TABLET TWICE A DAY    ascorbic acid, vitamin C, (VITAMIN C) 500 mg tablet Take 500 mg by mouth two (2) times a day.  multivitamins chew Take 1 Dose by mouth every morning. 1 dose is two tablets     No current facility-administered medications for this visit. Physical Examination: General appearance - alert, well appearing, and in no distress  Chest - clear to auscultation, no wheezes, rales or rhonchi, symmetric air entry  Heart - normal rate, regular rhythm, normal S1, S2, no murmurs, rubs, clicks or gallops      Assessment/ Plan:   Diagnoses and all orders for this visit:    1. Anemia, unspecified type  -     CBC W/O DIFF; Future  -     IRON PROFILE; Future    2. Lumbar disc disease  doing well post op. Blood pressure normal.  Not any treatment no treatment indicated at this time. Follow-up and Dispositions    · Return in 3 months (on 9/10/2021), or if symptoms worsen or fail to improve. I have discussed the diagnosis with the patient and the intended plan as seen in the above orders. The patient has received an after-visit summary and questions were answered concerning future plans. Pt conveyed understanding of plan.     Medication Side Effects and Warnings were discussed with patient    An electronic signature was used to authenticate this note  Lum DO Pamela

## 2021-06-10 NOTE — PATIENT INSTRUCTIONS
A Healthy Lifestyle: Care Instructions Your Care Instructions A healthy lifestyle can help you feel good, stay at a healthy weight, and have plenty of energy for both work and play. A healthy lifestyle is something you can share with your whole family. A healthy lifestyle also can lower your risk for serious health problems, such as high blood pressure, heart disease, and diabetes. You can follow a few steps listed below to improve your health and the health of your family. Follow-up care is a key part of your treatment and safety. Be sure to make and go to all appointments, and call your doctor if you are having problems. It's also a good idea to know your test results and keep a list of the medicines you take. How can you care for yourself at home? · Do not eat too much sugar, fat, or fast foods. You can still have dessert and treats now and then. The goal is moderation. · Start small to improve your eating habits. Pay attention to portion sizes, drink less juice and soda pop, and eat more fruits and vegetables. ? Eat a healthy amount of food. A 3-ounce serving of meat, for example, is about the size of a deck of cards. Fill the rest of your plate with vegetables and whole grains. ? Limit the amount of soda and sports drinks you have every day. Drink more water when you are thirsty. ? Eat plenty of fruits and vegetables every day. Have an apple or some carrot sticks as an afternoon snack instead of a candy bar. Try to have fruits and/or vegetables at every meal. 
· Make exercise part of your daily routine. You may want to start with simple activities, such as walking, bicycling, or slow swimming. Try to be active 30 to 60 minutes every day. You do not need to do all 30 to 60 minutes all at once. For example, you can exercise 3 times a day for 10 or 20 minutes.  Moderate exercise is safe for most people, but it is always a good idea to talk to your doctor before starting an exercise program. 
· Keep moving. Sunil Kern the lawn, work in the garden, or [employer]. Take the stairs instead of the elevator at work. · If you smoke, quit. People who smoke have an increased risk for heart attack, stroke, cancer, and other lung illnesses. Quitting is hard, but there are ways to boost your chance of quitting tobacco for good. ? Use nicotine gum, patches, or lozenges. ? Ask your doctor about stop-smoking programs and medicines. ? Keep trying. In addition to reducing your risk of diseases in the future, you will notice some benefits soon after you stop using tobacco. If you have shortness of breath or asthma symptoms, they will likely get better within a few weeks after you quit. · Limit how much alcohol you drink. Moderate amounts of alcohol (up to 2 drinks a day for men, 1 drink a day for women) are okay. But drinking too much can lead to liver problems, high blood pressure, and other health problems. Family health If you have a family, there are many things you can do together to improve your health. · Eat meals together as a family as often as possible. · Eat healthy foods. This includes fruits, vegetables, lean meats and dairy, and whole grains. · Include your family in your fitness plan. Most people think of activities such as jogging or tennis as the way to fitness, but there are many ways you and your family can be more active. Anything that makes you breathe hard and gets your heart pumping is exercise. Here are some tips: 
? Walk to do errands or to take your child to school or the bus. 
? Go for a family bike ride after dinner instead of watching TV. Where can you learn more? Go to http://www.gray.com/ Enter L207 in the search box to learn more about \"A Healthy Lifestyle: Care Instructions. \" Current as of: September 23, 2020               Content Version: 12.8 © 8253-1908 [employer].   
Care instructions adapted under license by [employer] (which disclaims liability or warranty for this information). If you have questions about a medical condition or this instruction, always ask your healthcare professional. [address] any warranty or liability for your use of this information.

## 2021-06-11 LAB
ERYTHROCYTE [DISTWIDTH] IN BLOOD BY AUTOMATED COUNT: 16.3 % (ref 11.5–14.5)
HCT VFR BLD AUTO: 30 % (ref 36.6–50.3)
HGB BLD-MCNC: 8.7 G/DL (ref 12.1–17)
IRON SATN MFR SERPL: 4 % (ref 20–50)
IRON SERPL-MCNC: 14 UG/DL (ref 35–150)
MCH RBC QN AUTO: 26.3 PG (ref 26–34)
MCHC RBC AUTO-ENTMCNC: 29 G/DL (ref 30–36.5)
MCV RBC AUTO: 90.6 FL (ref 80–99)
NRBC # BLD: 0 K/UL (ref 0–0.01)
NRBC BLD-RTO: 0 PER 100 WBC
PLATELET # BLD AUTO: 446 K/UL (ref 150–400)
PMV BLD AUTO: 10.2 FL (ref 8.9–12.9)
RBC # BLD AUTO: 3.31 M/UL (ref 4.1–5.7)
TIBC SERPL-MCNC: 349 UG/DL (ref 250–450)
WBC # BLD AUTO: 7.4 K/UL (ref 4.1–11.1)

## 2021-06-11 RX ORDER — LANOLIN ALCOHOL/MO/W.PET/CERES
325 CREAM (GRAM) TOPICAL 2 TIMES DAILY
Qty: 60 TABLET | Refills: 3 | Status: SHIPPED | OUTPATIENT
Start: 2021-06-11 | End: 2021-10-05

## 2021-06-11 NOTE — PROGRESS NOTES
Your anemia is improving but your iron stores are low. I sent in iron to take twice daily with food. If this is upsetting your stomach you can back off to once daily.   I would like to recheck your levels in 6 weeks so please follow-up in 6 weeks

## 2021-06-16 ENCOUNTER — PATIENT OUTREACH (OUTPATIENT)
Dept: CASE MANAGEMENT | Age: 81
End: 2021-06-16

## 2021-06-16 NOTE — PROGRESS NOTES
Care Transitions Follow Up Call    Care Transition Nurse (CTN) contacted the patient by telephone to follow up after admission on 2021. Verified name and  with patient as identifiers. Reports doing well. Catheter removed. No bladder concerns. Saw PCP and started on Iron pills. Reports compliance. Not having any BP concerns. Back brace intact. Addressed changes since last contact: none  Follow up appointment completed? yes. Was follow up appointment scheduled within 7 days of discharge? yes. Advance Care Planning:   Does patient have an Advance Directive: not on file. CTN reviewed discharge instructions, medical action plan and red flags with patient and discussed any barriers to care and/or understanding of plan of care after discharge. Discussed appropriate site of care based on symptoms and resources available to patient including: PCP, Specialist and  [facility]. The patient agrees to contact the PCP office for questions related to their healthcare. Patients top risk factors for readmission: None identified at this time   Interventions to address risk factors: [facility] follow up appointment(s): No future appointments. Non-[facility] follow up appointment(s):  Surgeon    CTN provided contact information for future needs. Plan for follow-up call in 10-14 days based on severity of symptoms and risk factors. Plan for next call: follow up appointment-surgeon     Goals Addressed                 This Visit's Progress     Attend follow up appointments on schedule          21   Will attend follow up appt with Urology/PCP/Ortho    21   Attended follow up appt with urology/PCP   Scheduled to see Surgeon on          Understands red flags post discharge. 21   Will monitor for- increased redness, swelling, drainage or pain at incision site.  Temp greater than 101   S/s DVT/PE i.e calf pain/swelling, increased SOB   Will continue to use incentive spirometer to improve lung function   Will increase activity- per instruction, [place] [facility]   Will continue to monitor BP three times per day    06/16/21   No evidence of infection noted to procedure site   Bandage removed by [place] [facility]

## 2021-06-30 ENCOUNTER — PATIENT OUTREACH (OUTPATIENT)
Dept: CASE MANAGEMENT | Age: 81
End: 2021-06-30

## 2021-07-06 ENCOUNTER — PATIENT OUTREACH (OUTPATIENT)
Dept: CASE MANAGEMENT | Age: 81
End: 2021-07-06

## 2021-07-06 NOTE — PROGRESS NOTES
Patient has graduated from the Transitions of Care Coordination  program on 7/6/2021. Patient/family has the ability to self-manage at this time Care management goals have been completed. Patient was not referred to the [facility] team for further management. Goals Addressed                 This Visit's Progress     COMPLETED: Attend follow up appointments on schedule          06/07/21   Will attend follow up appt with Urology/PCP/Ortho    06/16/21   Attended follow up appt with urology/PCP   Scheduled to see Surgeon on 6/22         COMPLETED: Understands red flags post discharge. 06/07/21   Will monitor for- increased redness, swelling, drainage or pain at incision site. Temp greater than 101   S/s DVT/PE i.e calf pain/swelling, increased SOB   Will continue to use incentive spirometer to improve lung function   Will increase activity- per instruction, [place] [facility]   Will continue to monitor BP three times per day    06/16/21   No evidence of infection noted to procedure site   Bandage removed by [place] [facility]            Patient has Care Transition Nurse's contact information for any further questions, concerns, or needs.   Patients upcoming visits:  No future appointments. '

## 2021-07-14 ENCOUNTER — TELEPHONE (OUTPATIENT)
Dept: FAMILY MEDICINE CLINIC | Age: 81
End: 2021-07-14

## 2021-07-14 DIAGNOSIS — M51.9 LUMBAR DISC DISEASE: ICD-10-CM

## 2021-07-14 DIAGNOSIS — M19.90 OSTEOARTHRITIS, UNSPECIFIED OSTEOARTHRITIS TYPE, UNSPECIFIED SITE: ICD-10-CM

## 2021-07-14 RX ORDER — OXYCODONE AND ACETAMINOPHEN 5; 325 MG/1; MG/1
1-2 TABLET ORAL
Qty: 30 TABLET | Refills: 0 | Status: SHIPPED | OUTPATIENT
Start: 2021-07-14 | End: 2021-08-13 | Stop reason: SDUPTHER

## 2021-08-13 DIAGNOSIS — M51.9 LUMBAR DISC DISEASE: ICD-10-CM

## 2021-08-13 DIAGNOSIS — M19.90 OSTEOARTHRITIS, UNSPECIFIED OSTEOARTHRITIS TYPE, UNSPECIFIED SITE: ICD-10-CM

## 2021-08-13 RX ORDER — OXYCODONE AND ACETAMINOPHEN 5; 325 MG/1; MG/1
1-2 TABLET ORAL
Qty: 30 TABLET | Refills: 0 | Status: SHIPPED | OUTPATIENT
Start: 2021-08-13 | End: 2021-09-21 | Stop reason: SDUPTHER

## 2021-09-02 ENCOUNTER — DOCUMENTATION ONLY (OUTPATIENT)
Dept: FAMILY MEDICINE CLINIC | Age: 81
End: 2021-09-02

## 2021-09-02 NOTE — PROGRESS NOTES
[facility] report was put on JESSICA Paula's desk (covering paperwork for Dr Harika Sweet)

## 2021-09-21 ENCOUNTER — OFFICE VISIT (OUTPATIENT)
Dept: FAMILY MEDICINE CLINIC | Age: 81
End: 2021-09-21
Payer: MEDICARE

## 2021-09-21 VITALS
HEART RATE: 72 BPM | HEIGHT: 68 IN | OXYGEN SATURATION: 96 % | WEIGHT: 167 LBS | TEMPERATURE: 98.6 F | BODY MASS INDEX: 25.31 KG/M2 | SYSTOLIC BLOOD PRESSURE: 122 MMHG | DIASTOLIC BLOOD PRESSURE: 71 MMHG | RESPIRATION RATE: 16 BRPM

## 2021-09-21 DIAGNOSIS — M19.90 OSTEOARTHRITIS, UNSPECIFIED OSTEOARTHRITIS TYPE, UNSPECIFIED SITE: ICD-10-CM

## 2021-09-21 DIAGNOSIS — M51.9 LUMBAR DISC DISEASE: ICD-10-CM

## 2021-09-21 DIAGNOSIS — Z51.81 MEDICATION MONITORING ENCOUNTER: ICD-10-CM

## 2021-09-21 DIAGNOSIS — Z00.00 MEDICARE ANNUAL WELLNESS VISIT, SUBSEQUENT: Primary | ICD-10-CM

## 2021-09-21 LAB
AMPHETAMINE QL URINE POC: NEGATIVE
BARBITURATES UR POC: NEGATIVE
BENZODIAZEPINES UR POC: NEGATIVE
COCAINE QL URINE POC: NEGATIVE
LOT EXP DATE POC: NORMAL
LOT NUMBER POC: NORMAL
MARIJUANA (THC) QL URINE POC: NEGATIVE
MDMA/ECSTASY UR POC: NEGATIVE
METHADONE QL URINE POC: NEGATIVE
METHAMPHETAMINE QL URINE POC: NEGATIVE
NTI OTHER MICRO TRANSPORT 977598: NEGATIVE
OPIATES QL URINE POC: NEGATIVE
OXYCODONE UR POC: NEGATIVE
VALID INTERNAL CONTROL?: YES

## 2021-09-21 PROCEDURE — G8419 CALC BMI OUT NRM PARAM NOF/U: HCPCS | Performed by: FAMILY MEDICINE

## 2021-09-21 PROCEDURE — 80305 DRUG TEST PRSMV DIR OPT OBS: CPT | Performed by: FAMILY MEDICINE

## 2021-09-21 PROCEDURE — G8536 NO DOC ELDER MAL SCRN: HCPCS | Performed by: FAMILY MEDICINE

## 2021-09-21 PROCEDURE — G8427 DOCREV CUR MEDS BY ELIG CLIN: HCPCS | Performed by: FAMILY MEDICINE

## 2021-09-21 PROCEDURE — G0463 HOSPITAL OUTPT CLINIC VISIT: HCPCS | Performed by: FAMILY MEDICINE

## 2021-09-21 PROCEDURE — G8754 DIAS BP LESS 90: HCPCS | Performed by: FAMILY MEDICINE

## 2021-09-21 PROCEDURE — 99213 OFFICE O/P EST LOW 20 MIN: CPT | Performed by: FAMILY MEDICINE

## 2021-09-21 PROCEDURE — G8510 SCR DEP NEG, NO PLAN REQD: HCPCS | Performed by: FAMILY MEDICINE

## 2021-09-21 PROCEDURE — G0439 PPPS, SUBSEQ VISIT: HCPCS | Performed by: FAMILY MEDICINE

## 2021-09-21 PROCEDURE — G8752 SYS BP LESS 140: HCPCS | Performed by: FAMILY MEDICINE

## 2021-09-21 PROCEDURE — 1101F PT FALLS ASSESS-DOCD LE1/YR: CPT | Performed by: FAMILY MEDICINE

## 2021-09-21 RX ORDER — OXYCODONE AND ACETAMINOPHEN 5; 325 MG/1; MG/1
1-2 TABLET ORAL
Qty: 30 TABLET | Refills: 0 | Status: SHIPPED | OUTPATIENT
Start: 2021-11-20 | End: 2021-12-22 | Stop reason: SDUPTHER

## 2021-09-21 RX ORDER — OXYCODONE AND ACETAMINOPHEN 5; 325 MG/1; MG/1
1 TABLET ORAL
Qty: 30 TABLET | Refills: 0 | Status: SHIPPED | OUTPATIENT
Start: 2021-09-21 | End: 2021-10-21

## 2021-09-21 RX ORDER — OXYCODONE AND ACETAMINOPHEN 5; 325 MG/1; MG/1
1-2 TABLET ORAL
Qty: 30 TABLET | Refills: 0 | Status: SHIPPED | OUTPATIENT
Start: 2021-10-21 | End: 2021-11-20

## 2021-09-21 NOTE — PROGRESS NOTES
Chief Complaint   Patient presents with    Medication Refill     Patient presents in office today for med refill of Percocet. No concerns. 1. Have you been to the ER, urgent care clinic since your last visit? Hospitalized since your last visit? No    2. Have you seen or consulted any other health care providers outside of the [facility] since your last visit? Include any pap smears or colon screening.  No      Learning Assessment 2/25/2020   PRIMARY LEARNER Patient   HIGHEST LEVEL OF EDUCATION - PRIMARY LEARNER  -   BARRIERS PRIMARY LEARNER -   CO-LEARNER CAREGIVER -   PRIMARY LANGUAGE ENGLISH   LEARNER PREFERENCE PRIMARY DEMONSTRATION   ANSWERED BY self   RELATIONSHIP SELF

## 2021-09-21 NOTE — PATIENT INSTRUCTIONS
Medicare Wellness Visit, Male    The best way to live healthy is to have a lifestyle where you eat a well-balanced diet, exercise regularly, limit alcohol use, and quit all forms of tobacco/nicotine, if applicable. Regular preventive services are another way to keep healthy. Preventive services (vaccines, screening tests, monitoring & exams) can help personalize your care plan, which helps you manage your own care. Screening tests can find health problems at the earliest stages, when they are easiest to treat. [facility] follows the current, evidence-based guidelines published by the [facility] ([facility]) when recommending preventive services for our patients. Because we follow these guidelines, sometimes recommendations change over time as research supports it. (For example, a prostate screening blood test is no longer routinely recommended for men with no symptoms). Of course, you and your doctor may decide to screen more often for some diseases, based on your risk and co-morbidities (chronic disease you are already diagnosed with). Preventive services for you include:  - Medicare offers their members a free annual wellness visit, which is time for you and your primary care provider to discuss and plan for your preventive service needs. Take advantage of this benefit every year!  -All adults over age 72 should receive the recommended pneumonia vaccines. Current USPSTF guidelines recommend a series of two vaccines for the best pneumonia protection.   -All adults should have a flu vaccine yearly and tetanus vaccine every 10 years.  -All adults age 48 and older should receive the shingles vaccines (series of two vaccines).        -All adults age 38-68 who are overweight should have a diabetes screening test once every three years.   -Other screening tests & preventive services for persons with diabetes include: an eye exam to screen for diabetic retinopathy, a kidney function test, a foot exam, and stricter control over your cholesterol.   -Cardiovascular screening for adults with routine risk involves an electrocardiogram (ECG) at intervals determined by the provider.   -Colorectal cancer screening should be done for adults age 54-65 with no increased risk factors for colorectal cancer. There are a number of acceptable methods of screening for this type of cancer. Each test has its own benefits and drawbacks. Discuss with your provider what is most appropriate for you during your annual wellness visit. The different tests include: colonoscopy (considered the best screening method), a fecal occult blood test, a fecal DNA test, and sigmoidoscopy.  -All adults born between [facility] should be screened once for Hepatitis C.  -An Abdominal Aortic Aneurysm (AAA) Screening is recommended for men age 73-68 who has ever smoked in their lifetime.      Here is a list of your current Health Maintenance items (your personalized list of preventive services) with a due date:  Health Maintenance Due   Topic Date Due    Hepatitis C Test  Never done    Shingles Vaccine (1 of 2) Never done    Diabetic Foot Care  02/21/2019    Eye Exam  01/31/2021    Albumin Urine Test  08/13/2021    Cholesterol Test   08/13/2021    Yearly Flu Vaccine (1) 09/01/2021

## 2021-09-21 NOTE — PROGRESS NOTES
Progress Note    he is a 80y.o. year old male who presents for evalution. Subjective:     Patient in for refill of Percocet use for his chronic back pain. Uses minimally around 1 time per day max and some days none. This does cause significant meaningful improvement in his back pain. There is no history of abuse or misuse.  is checked and appropriate. Back pain is from his service in the [employer], 24 years. Opioid/Pain Management:    1. Has the patient signed a pain contract for chronic narcotic use? yes  2. Has the patient had a UDS or Serum screen as per guidelines as per [facility]?:   yes    3. Does patient meet necessary guidelines for Naloxone treatement per the [facility]?:    no  4. Has the Prescription Monitoring Program been reviewed? yes  5. Does patient have a long term condition that requires long term use of a Narcotic?  yes  6. Has patient been tolerant of therapy and responsible to routine follow up and specialist follow-up? Yes      Reviewed PmHx, RxHx, FmHx, SocHx, AllgHx and updated and dated in the chart. Review of Systems - negative except as listed above in the HPI    Objective:     Vitals:    09/21/21 1322   BP: 122/71   Pulse: 72   Resp: 16   Temp: 98.6 °F (37 °C)   TempSrc: Oral   SpO2: 96%   Weight: 167 lb (75.8 kg)   Height: 5' 8\" (1.727 m)       Current Outpatient Medications   Medication Sig    [START ON 11/20/2021] oxyCODONE-acetaminophen (PERCOCET) 5-325 mg per tablet Take 1-2 Tablets by mouth daily as needed for Pain for up to 30 days. Indications: pain    [START ON 10/21/2021] oxyCODONE-acetaminophen (PERCOCET) 5-325 mg per tablet Take 1-2 Tablets by mouth daily as needed for Pain for up to 30 days. Indications: pain    oxyCODONE-acetaminophen (PERCOCET) 5-325 mg per tablet Take 1 Tablet by mouth daily as needed for Pain for up to 30 days.  ferrous sulfate 325 mg (65 mg iron) tablet Take 1 Tablet by mouth two (2) times a day.  acetaminophen (TYLENOL) 500 mg tablet Take 2 Tablets by mouth every six (6) hours.  folic acid (FOLVITE) 1 mg tablet Take 1 mg by mouth daily. Indications: iron deficiency    omeprazole (PRILOSEC) 40 mg capsule Take 40 mg by mouth every morning.  calcium carbonate/vitamin D3 (CALCIUM WITH VITAMIN D3 PO) Take 1 Tablet by mouth two (2) times a day.  tamsulosin (FLOMAX) 0.4 mg capsule Take 0.4 mg by mouth every morning.  metFORMIN (GLUCOPHAGE) 1,000 mg tablet TAKE 1 TABLET TWICE A DAY WITH MEALS    atorvastatin (LIPITOR) 40 mg tablet TAKE 1 TABLET DAILY (START LIPITOR AFTER VYTORIN IS FINISHED)    ranolazine ER (RANEXA) 500 mg SR tablet TAKE 1 TABLET TWICE A DAY    ascorbic acid, vitamin C, (VITAMIN C) 500 mg tablet Take 500 mg by mouth two (2) times a day.  multivitamins chew Take 1 Dose by mouth every morning. 1 dose is two tablets    cyclobenzaprine (FLEXERIL) 10 mg tablet Take 1 Tablet by mouth two (2) times daily as needed for Muscle Spasm(s). (Patient not taking: Reported on 9/21/2021)    enoxaparin (Lovenox) 40 mg/0.4 mL 0.4 mL by SubCUTAneous route daily. (Patient not taking: Reported on 9/21/2021)    trimethoprim-sulfamethoxazole (BACTRIM DS, SEPTRA DS) 160-800 mg per tablet Take 1 Tablet by mouth two (2) times a day. (Patient not taking: Reported on 9/21/2021)     No current facility-administered medications for this visit. Physical Examination: General appearance - alert, well appearing, and in no distress  Mental status - alert, oriented to person, place, and time  Chest - clear to auscultation, no wheezes, rales or rhonchi, symmetric air entry  Heart - normal rate, regular rhythm, normal S1, S2, no murmurs, rubs, clicks or gallops      Assessment/ Plan:   Diagnoses and all orders for this visit:    1. Medicare annual wellness visit, subsequent    2. Medication monitoring encounter  -     [facility] DX DRUG SCREEN 10    3.  Osteoarthritis, unspecified osteoarthritis type, unspecified site  -     oxyCODONE-acetaminophen (PERCOCET) 5-325 mg per tablet; Take 1-2 Tablets by mouth daily as needed for Pain for up to 30 days. Indications: pain  -     oxyCODONE-acetaminophen (PERCOCET) 5-325 mg per tablet; Take 1-2 Tablets by mouth daily as needed for Pain for up to 30 days. Indications: pain  -     oxyCODONE-acetaminophen (PERCOCET) 5-325 mg per tablet; Take 1 Tablet by mouth daily as needed for Pain for up to 30 days. 4. Lumbar disc disease  -     oxyCODONE-acetaminophen (PERCOCET) 5-325 mg per tablet; Take 1-2 Tablets by mouth daily as needed for Pain for up to 30 days. Indications: pain  -     oxyCODONE-acetaminophen (PERCOCET) 5-325 mg per tablet; Take 1-2 Tablets by mouth daily as needed for Pain for up to 30 days. Indications: pain  -     oxyCODONE-acetaminophen (PERCOCET) 5-325 mg per tablet; Take 1 Tablet by mouth daily as needed for Pain for up to 30 days. No change in medication indicated.  and U tox appropriate  Follow-up and Dispositions    · Return in about 3 months (around 12/21/2021), or if symptoms worsen or fail to improve. I have discussed the diagnosis with the patient and the intended plan as seen in the above orders. The patient has received an after-visit summary and questions were answered concerning future plans. Pt conveyed understanding of plan. Medication Side Effects and Warnings were discussed with patient    An electronic signature was used to authenticate this note  Ramon Alarcon, DO    This is the Subsequent Medicare Annual Wellness Exam, performed 12 months or more after the Initial AWV or the last Subsequent AWV    I have reviewed the patient's medical history in detail and updated the computerized patient record. Assessment/Plan   Education and counseling provided:  Are appropriate based on today's review and evaluation    1. Medicare annual wellness visit, subsequent  2.  Medication monitoring encounter  -     AMB POC [facility] DX DRUG SCREEN 10  3. Osteoarthritis, unspecified osteoarthritis type, unspecified site  -     oxyCODONE-acetaminophen (PERCOCET) 5-325 mg per tablet; Take 1-2 Tablets by mouth daily as needed for Pain for up to 30 days. Indications: pain, Normal, Disp-30 Tablet, R-0  -     oxyCODONE-acetaminophen (PERCOCET) 5-325 mg per tablet; Take 1-2 Tablets by mouth daily as needed for Pain for up to 30 days. Indications: pain, Normal, Disp-30 Tablet, R-0  -     oxyCODONE-acetaminophen (PERCOCET) 5-325 mg per tablet; Take 1 Tablet by mouth daily as needed for Pain for up to 30 days. , Normal, Disp-30 Tablet, R-0  4. Lumbar disc disease  -     oxyCODONE-acetaminophen (PERCOCET) 5-325 mg per tablet; Take 1-2 Tablets by mouth daily as needed for Pain for up to 30 days. Indications: pain, Normal, Disp-30 Tablet, R-0  -     oxyCODONE-acetaminophen (PERCOCET) 5-325 mg per tablet; Take 1-2 Tablets by mouth daily as needed for Pain for up to 30 days. Indications: pain, Normal, Disp-30 Tablet, R-0  -     oxyCODONE-acetaminophen (PERCOCET) 5-325 mg per tablet; Take 1 Tablet by mouth daily as needed for Pain for up to 30 days. , Normal, Disp-30 Tablet, R-0       Depression Risk Factor Screening     3 most recent PHQ Screens 9/21/2021   PHQ Not Done -   Little interest or pleasure in doing things Not at all   Feeling down, depressed, irritable, or hopeless Not at all   Total Score PHQ 2 0   Trouble falling or staying asleep, or sleeping too much -   Feeling tired or having little energy -   Poor appetite, weight loss, or overeating -   Feeling bad about yourself - or that you are a failure or have let yourself or your family down -   Trouble concentrating on things such as school, work, reading, or watching TV -   Moving or speaking so slowly that other people could have noticed; or the opposite being so fidgety that others notice -   Thoughts of being better off dead, or hurting yourself in some way -   PHQ 9 Score -   How difficult have these problems made it for you to do your work, take care of your home and get along with others -       Alcohol Risk Screen    Do you average more than 1 drink per night or more than 7 drinks a week: No    In the past three months have you have had more than 4 drinks containing alcohol on one occasion: No         Functional Ability and Level of Safety    Hearing: Hearing is good. Activities of Daily Living: The home contains: has a shower chair  Patient does total self care      Ambulation: with no difficulty     Fall Risk:  Fall Risk Assessment, last 12 mths 6/10/2021   Able to walk? Yes   Fall in past 12 months? 0   Do you feel unsteady?  0   Are you worried about falling 0      Abuse Screen:  Patient is not abused       Cognitive Screening    Has your family/caregiver stated any concerns about your memory: no         Health Maintenance Due     Health Maintenance Due   Topic Date Due    Hepatitis C Screening  Never done    Shingrix Vaccine Age 49> (1 of 2) Never done    Foot Exam Q1  02/21/2019    Eye Exam Retinal or Dilated  01/31/2021    MICROALBUMIN Q1  08/13/2021    Lipid Screen  08/13/2021    Flu Vaccine (1) 09/01/2021       Patient Care Team   Patient Care Team:  Jameson Vasquez DO as PCP - General (Family Medicine)  Jameson Vasquez DO as PCP - [address] [place] [address] [facility]  Lilian Mares MD (Orthopedic Surgery)  Jovita Jalloh MD (Orthopedic Surgery)  Mala Garnica MD (Orthopedic Surgery)  Izabella Clemente MD (Orthopedic Surgery)  Fatoumata Foss MD as Physician (Cardiology)  Fatoumata Foss MD (Cardiology)    History     Patient Active Problem List   Diagnosis Code    Coronary artery disease I25.10    Dyslipidemia E78.5    Hypertension, benign I10    Obstructive sleep apnea G47.33    Degenerative joint disease M19.90    Diabetes mellitus type 2 in nonobese ([address] E11.9    Lumbar disc disease M51.9    Cervical disc disease M50.90    Dupuytren's contracture of right hand M72.0    ACP (advance care planning) Z71.89    Primary osteoarthritis involving multiple joints M89.49    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    RG (dyspnea on exertion) R06.00    Other chest pain R07.89    S/P cardiac cath Z98.890    Angina, class III (HCC) I20.9    Gastroesophageal reflux disease K21.9    Lumbar stenosis M48.061    Hypotension I95.9    Anemia D64.9    S/P total hip arthroplasty Z96.649    Urethral stricture N35.919     Past Medical History:   Diagnosis Date    CAD (coronary artery disease)     COVID-19 vaccine series completed 01/2021    [employer]    Degenerative joint disease 1/28/2011    Diabetes mellitus ([facility] [address]     Essential hypertension     History of back surgery 09/2019    Hyperlipidemia     Kidney stones 1969    ADELA on CPAP 01/28/2011      Past Surgical History:   Procedure Laterality Date    HX CARPAL TUNNEL RELEASE      HX CORONARY STENT PLACEMENT Left 1991-2011    x 3    HX HIP REPLACEMENT Bilateral 2009 & 2015    HX HIP REPLACEMENT Right 2012    Revision    HX LITHOTRIPSY N/A 1969    HX LUMBAR DISKECTOMY N/A 1978    L 4-5    HX LUMBAR FUSION N/A 2016 & 2019    HX LUMBAR LAMINECTOMY  1971    L 4-5    IR INJ SPINE FLUORO GUIDED  4/2/2021     Current Outpatient Medications   Medication Sig Dispense Refill    [START ON 11/20/2021] oxyCODONE-acetaminophen (PERCOCET) 5-325 mg per tablet Take 1-2 Tablets by mouth daily as needed for Pain for up to 30 days. Indications: pain 30 Tablet 0    [START ON 10/21/2021] oxyCODONE-acetaminophen (PERCOCET) 5-325 mg per tablet Take 1-2 Tablets by mouth daily as needed for Pain for up to 30 days. Indications: pain 30 Tablet 0    oxyCODONE-acetaminophen (PERCOCET) 5-325 mg per tablet Take 1 Tablet by mouth daily as needed for Pain for up to 30 days. 30 Tablet 0    ferrous sulfate 325 mg (65 mg iron) tablet Take 1 Tablet by mouth two (2) times a day.  60 Tablet 3    acetaminophen (TYLENOL) 500 mg tablet Take 2 Tablets by mouth every six (6) hours. 90 Tablet 0    folic acid (FOLVITE) 1 mg tablet Take 1 mg by mouth daily. Indications: iron deficiency      omeprazole (PRILOSEC) 40 mg capsule Take 40 mg by mouth every morning.  calcium carbonate/vitamin D3 (CALCIUM WITH VITAMIN D3 PO) Take 1 Tablet by mouth two (2) times a day.  tamsulosin (FLOMAX) 0.4 mg capsule Take 0.4 mg by mouth every morning.  metFORMIN (GLUCOPHAGE) 1,000 mg tablet TAKE 1 TABLET TWICE A DAY WITH MEALS 180 Tab 3    atorvastatin (LIPITOR) 40 mg tablet TAKE 1 TABLET DAILY (START LIPITOR AFTER VYTORIN IS FINISHED) 90 Tab 3    ranolazine ER (RANEXA) 500 mg SR tablet TAKE 1 TABLET TWICE A DAY 60 Tab 3    ascorbic acid, vitamin C, (VITAMIN C) 500 mg tablet Take 500 mg by mouth two (2) times a day.  multivitamins chew Take 1 Dose by mouth every morning. 1 dose is two tablets      cyclobenzaprine (FLEXERIL) 10 mg tablet Take 1 Tablet by mouth two (2) times daily as needed for Muscle Spasm(s). (Patient not taking: Reported on 2021) 60 Tablet 0    enoxaparin (Lovenox) 40 mg/0.4 mL 0.4 mL by SubCUTAneous route daily. (Patient not taking: Reported on 2021) 13 Syringe 0    trimethoprim-sulfamethoxazole (BACTRIM DS, SEPTRA DS) 160-800 mg per tablet Take 1 Tablet by mouth two (2) times a day. (Patient not taking: Reported on 2021) 10 Tablet 0     Allergies   Allergen Reactions    Ace Inhibitors Angioedema     Face       Family History   Problem Relation Age of Onset   24 [facility] Arthritis-osteo Mother     No Known Problems Father     Anesth Problems Neg Hx      Social History     Tobacco Use    Smoking status: Former Smoker     Packs/day: 1.00     Years: 16.00     Pack years: 16.00     Types: Cigarettes     Quit date:      Years since quittin.7    Smokeless tobacco: Never Used   Substance Use Topics    Alcohol use:  Yes     Alcohol/week: 0.0 standard drinks     Comment: rarely         Wilbern Shillings Lobb, DO

## 2021-10-05 RX ORDER — FERROUS SULFATE TAB 325 MG (65 MG ELEMENTAL FE) 325 (65 FE) MG
TAB ORAL
Qty: 60 TABLET | Refills: 3 | Status: SHIPPED | OUTPATIENT
Start: 2021-10-05 | End: 2021-11-17 | Stop reason: SDUPTHER

## 2021-10-15 RX ORDER — FOLIC ACID 1 MG/1
TABLET ORAL
Qty: 90 TABLET | Refills: 3 | Status: SHIPPED | OUTPATIENT
Start: 2021-10-15 | End: 2022-10-11

## 2021-11-17 ENCOUNTER — OFFICE VISIT (OUTPATIENT)
Dept: FAMILY MEDICINE CLINIC | Age: 81
End: 2021-11-17
Payer: MEDICARE

## 2021-11-17 VITALS
SYSTOLIC BLOOD PRESSURE: 146 MMHG | WEIGHT: 172 LBS | HEIGHT: 68 IN | OXYGEN SATURATION: 94 % | TEMPERATURE: 98.1 F | RESPIRATION RATE: 16 BRPM | BODY MASS INDEX: 26.07 KG/M2 | HEART RATE: 73 BPM | DIASTOLIC BLOOD PRESSURE: 75 MMHG

## 2021-11-17 DIAGNOSIS — E11.9 DIABETES MELLITUS TYPE 2 IN NONOBESE (HCC): Primary | ICD-10-CM

## 2021-11-17 DIAGNOSIS — I25.118 CORONARY ARTERY DISEASE OF NATIVE ARTERY OF NATIVE HEART WITH STABLE ANGINA PECTORIS (HCC): ICD-10-CM

## 2021-11-17 DIAGNOSIS — D50.9 IRON DEFICIENCY ANEMIA, UNSPECIFIED IRON DEFICIENCY ANEMIA TYPE: ICD-10-CM

## 2021-11-17 DIAGNOSIS — E78.5 DYSLIPIDEMIA: ICD-10-CM

## 2021-11-17 DIAGNOSIS — M19.91 PRIMARY LOCALIZED OSTEOARTHROSIS: ICD-10-CM

## 2021-11-17 LAB
ALBUMIN SERPL-MCNC: 3.6 G/DL (ref 3.5–5)
ALBUMIN/GLOB SERPL: 1.1 {RATIO} (ref 1.1–2.2)
ALP SERPL-CCNC: 96 U/L (ref 45–117)
ALT SERPL-CCNC: 12 U/L (ref 12–78)
ANION GAP SERPL CALC-SCNC: 5 MMOL/L (ref 5–15)
AST SERPL-CCNC: 12 U/L (ref 15–37)
BILIRUB SERPL-MCNC: 0.4 MG/DL (ref 0.2–1)
BUN SERPL-MCNC: 7 MG/DL (ref 6–20)
BUN/CREAT SERPL: 9 (ref 12–20)
CALCIUM SERPL-MCNC: 9.1 MG/DL (ref 8.5–10.1)
CHLORIDE SERPL-SCNC: 107 MMOL/L (ref 97–108)
CHOLEST SERPL-MCNC: 156 MG/DL
CO2 SERPL-SCNC: 27 MMOL/L (ref 21–32)
COMMENT, HOLDF: NORMAL
CREAT SERPL-MCNC: 0.78 MG/DL (ref 0.7–1.3)
CREAT UR-MCNC: 104 MG/DL
ERYTHROCYTE [DISTWIDTH] IN BLOOD BY AUTOMATED COUNT: 19 % (ref 11.5–14.5)
EST. AVERAGE GLUCOSE BLD GHB EST-MCNC: 123 MG/DL
GLOBULIN SER CALC-MCNC: 3.3 G/DL (ref 2–4)
GLUCOSE SERPL-MCNC: 86 MG/DL (ref 65–100)
HBA1C MFR BLD: 5.9 % (ref 4–5.6)
HCT VFR BLD AUTO: 42.2 % (ref 36.6–50.3)
HDLC SERPL-MCNC: 59 MG/DL
HDLC SERPL: 2.6 {RATIO} (ref 0–5)
HGB BLD-MCNC: 13.2 G/DL (ref 12.1–17)
IRON SATN MFR SERPL: 19 % (ref 20–50)
IRON SERPL-MCNC: 69 UG/DL (ref 35–150)
LDLC SERPL CALC-MCNC: 82.8 MG/DL (ref 0–100)
MCH RBC QN AUTO: 29 PG (ref 26–34)
MCHC RBC AUTO-ENTMCNC: 31.3 G/DL (ref 30–36.5)
MCV RBC AUTO: 92.7 FL (ref 80–99)
MICROALBUMIN UR-MCNC: 0.69 MG/DL
MICROALBUMIN/CREAT UR-RTO: 7 MG/G (ref 0–30)
NRBC # BLD: 0 K/UL (ref 0–0.01)
NRBC BLD-RTO: 0 PER 100 WBC
PLATELET # BLD AUTO: 292 K/UL (ref 150–400)
PMV BLD AUTO: 10.7 FL (ref 8.9–12.9)
POTASSIUM SERPL-SCNC: 3.8 MMOL/L (ref 3.5–5.1)
PROT SERPL-MCNC: 6.9 G/DL (ref 6.4–8.2)
RBC # BLD AUTO: 4.55 M/UL (ref 4.1–5.7)
SAMPLES BEING HELD,HOLD: NORMAL
SODIUM SERPL-SCNC: 139 MMOL/L (ref 136–145)
TIBC SERPL-MCNC: 361 UG/DL (ref 250–450)
TRIGL SERPL-MCNC: 71 MG/DL (ref ?–150)
VLDLC SERPL CALC-MCNC: 14.2 MG/DL
WBC # BLD AUTO: 5.2 K/UL (ref 4.1–11.1)

## 2021-11-17 PROCEDURE — 1101F PT FALLS ASSESS-DOCD LE1/YR: CPT | Performed by: FAMILY MEDICINE

## 2021-11-17 PROCEDURE — G8510 SCR DEP NEG, NO PLAN REQD: HCPCS | Performed by: FAMILY MEDICINE

## 2021-11-17 PROCEDURE — G8419 CALC BMI OUT NRM PARAM NOF/U: HCPCS | Performed by: FAMILY MEDICINE

## 2021-11-17 PROCEDURE — G0463 HOSPITAL OUTPT CLINIC VISIT: HCPCS | Performed by: FAMILY MEDICINE

## 2021-11-17 PROCEDURE — G8427 DOCREV CUR MEDS BY ELIG CLIN: HCPCS | Performed by: FAMILY MEDICINE

## 2021-11-17 PROCEDURE — 20610 DRAIN/INJ JOINT/BURSA W/O US: CPT | Performed by: FAMILY MEDICINE

## 2021-11-17 PROCEDURE — G8754 DIAS BP LESS 90: HCPCS | Performed by: FAMILY MEDICINE

## 2021-11-17 PROCEDURE — 99214 OFFICE O/P EST MOD 30 MIN: CPT | Performed by: FAMILY MEDICINE

## 2021-11-17 PROCEDURE — G8753 SYS BP > OR = 140: HCPCS | Performed by: FAMILY MEDICINE

## 2021-11-17 PROCEDURE — G8536 NO DOC ELDER MAL SCRN: HCPCS | Performed by: FAMILY MEDICINE

## 2021-11-17 RX ORDER — TRIAMCINOLONE ACETONIDE 40 MG/ML
40 INJECTION, SUSPENSION INTRA-ARTICULAR; INTRAMUSCULAR ONCE
Qty: 1 ML | Refills: 0
Start: 2021-11-17 | End: 2021-11-17

## 2021-11-17 RX ORDER — BUPIVACAINE HYDROCHLORIDE 2.5 MG/ML
2 INJECTION, SOLUTION EPIDURAL; INFILTRATION; INTRACAUDAL ONCE
Qty: 2 ML | Refills: 0
Start: 2021-11-17 | End: 2021-11-17

## 2021-11-17 RX ORDER — LIDOCAINE HYDROCHLORIDE 10 MG/ML
2 INJECTION, SOLUTION EPIDURAL; INFILTRATION; INTRACAUDAL; PERINEURAL ONCE
Qty: 2 ML | Refills: 0
Start: 2021-11-17 | End: 2021-11-17

## 2021-11-17 RX ORDER — LANOLIN ALCOHOL/MO/W.PET/CERES
325 CREAM (GRAM) TOPICAL 2 TIMES DAILY
Qty: 180 TABLET | Refills: 3 | Status: SHIPPED | OUTPATIENT
Start: 2021-11-17

## 2021-11-17 NOTE — PROGRESS NOTES
Chief Complaint   Patient presents with    Shoulder Pain     Patient presents in office today with c/o b/l shoulder pain for several months. Would like to see about a getting injections in both shoulders. No other concerns. 1. Have you been to the ER, urgent care clinic since your last visit? Hospitalized since your last visit? No    2. Have you seen or consulted any other health care providers outside of the [address] [place] [address] since your last visit? Include any pap smears or colon screening.  No    Learning Assessment 2/25/2020   PRIMARY LEARNER Patient   HIGHEST LEVEL OF EDUCATION - PRIMARY LEARNER  -   BARRIERS PRIMARY LEARNER -   CO-LEARNER CAREGIVER -   PRIMARY LANGUAGE ENGLISH   LEARNER PREFERENCE PRIMARY DEMONSTRATION   ANSWERED BY self   RELATIONSHIP SELF

## 2021-11-17 NOTE — PATIENT INSTRUCTIONS
A Healthy Lifestyle: Care Instructions  Your Care Instructions     A healthy lifestyle can help you feel good, stay at a healthy weight, and have plenty of energy for both work and play. A healthy lifestyle is something you can share with your whole family. A healthy lifestyle also can lower your risk for serious health problems, such as high blood pressure, heart disease, and diabetes. You can follow a few steps listed below to improve your health and the health of your family. Follow-up care is a key part of your treatment and safety. Be sure to make and go to all appointments, and call your doctor if you are having problems. It's also a good idea to know your test results and keep a list of the medicines you take. How can you care for yourself at home? · Do not eat too much sugar, fat, or fast foods. You can still have dessert and treats now and then. The goal is moderation. · Start small to improve your eating habits. Pay attention to portion sizes, drink less juice and soda pop, and eat more fruits and vegetables. ? Eat a healthy amount of food. A 3-ounce serving of meat, for example, is about the size of a deck of cards. Fill the rest of your plate with vegetables and whole grains. ? Limit the amount of soda and sports drinks you have every day. Drink more water when you are thirsty. ? Eat plenty of fruits and vegetables every day. Have an apple or some carrot sticks as an afternoon snack instead of a candy bar. Try to have fruits and/or vegetables at every meal.  · Make exercise part of your daily routine. You may want to start with simple activities, such as walking, bicycling, or slow swimming. Try to be active 30 to 60 minutes every day. You do not need to do all 30 to 60 minutes all at once. For example, you can exercise 3 times a day for 10 or 20 minutes.  Moderate exercise is safe for most people, but it is always a good idea to talk to your doctor before starting an exercise program.  · Keep moving. Jessika Licona the lawn, work in the garden, or [place]. Take the stairs instead of the elevator at work. · If you smoke, quit. People who smoke have an increased risk for heart attack, stroke, cancer, and other lung illnesses. Quitting is hard, but there are ways to boost your chance of quitting tobacco for good. ? Use nicotine gum, patches, or lozenges. ? Ask your doctor about stop-smoking programs and medicines. ? Keep trying. In addition to reducing your risk of diseases in the future, you will notice some benefits soon after you stop using tobacco. If you have shortness of breath or asthma symptoms, they will likely get better within a few weeks after you quit. · Limit how much alcohol you drink. Moderate amounts of alcohol (up to 2 drinks a day for men, 1 drink a day for women) are okay. But drinking too much can lead to liver problems, high blood pressure, and other health problems. Family health  If you have a family, there are many things you can do together to improve your health. · Eat meals together as a family as often as possible. · Eat healthy foods. This includes fruits, vegetables, lean meats and dairy, and whole grains. · Include your family in your fitness plan. Most people think of activities such as jogging or tennis as the way to fitness, but there are many ways you and your family can be more active. Anything that makes you breathe hard and gets your heart pumping is exercise. Here are some tips:  ? Walk to do errands or to take your child to school or the bus.  ? Go for a family bike ride after dinner instead of watching TV. Where can you learn more? Go to http://www.gray.com/  Enter G722 in the search box to learn more about \"A Healthy Lifestyle: Care Instructions. \"  Current as of: June 16, 2021               Content Version: 13.0  © 6606-2317 [employer].    Care instructions adapted under license by [employer] (which disclaims liability or warranty for this information). If you have questions about a medical condition or this instruction, always ask your healthcare professional. [address] any warranty or liability for your use of this information.

## 2021-11-17 NOTE — PROGRESS NOTES
Progress Note    he is a 80y.o. year old male who presents for evalution. Subjective:     Patient with bilateral shoulder pain secondary to osteoarthritis. Currently on chronic pain management with Percocet for his back. He is requesting bilateral shoulder injections which have proved beneficial to him in the past.  He has tolerated these as well. Patient with hyperlipidemia on statin tolerating with no side effects with coronary artery disease. Will recheck today. History of diabetes previous hemoglobin A1c was 6.0 down from 6.1. Currently taking Metformin tolerates. History of anemia with low iron stores have been improving on the last check. He is taking iron daily with food. We will recheck today. Reviewed PmHx, RxHx, FmHx, SocHx, AllgHx and updated and dated in the chart. Review of Systems - negative except as listed above in the HPI    Objective:     Vitals:    11/17/21 0707   BP: (!) 146/75   Pulse: 73   Resp: 16   Temp: 98.1 °F (36.7 °C)   TempSrc: Oral   SpO2: 94%   Weight: 172 lb (78 kg)   Height: 5' 8\" (1.727 m)       Current Outpatient Medications   Medication Sig    ferrous sulfate (FeroSuL) 325 mg (65 mg iron) tablet Take 1 Tablet by mouth two (2) times a day.  triamcinolone acetonide (Kenalog) 40 mg/mL injection 1 mL by Intra artICUlar route once for 1 dose.  lidocaine, PF, (XYLOCAINE) 10 mg/mL (1 %) injection 2 mL by Intra artICUlar route once for 1 dose.  bupivacaine, PF, (MARCAINE) 0.25 % (2.5 mg/mL) injection 2 mL by Intra artICUlar route once for 1 dose.  triamcinolone acetonide (Kenalog) 40 mg/mL injection 1 mL by Intra artICUlar route once for 1 dose.  lidocaine, PF, (XYLOCAINE) 10 mg/mL (1 %) injection 2 mL by Intra artICUlar route once for 1 dose.  bupivacaine, PF, (MARCAINE) 0.25 % (2.5 mg/mL) injection 2 mL by Intra artICUlar route once for 1 dose.     folic acid (FOLVITE) 1 mg tablet TAKE 1 TABLET DAILY, EXCEPT DAY THAT METHOTREXATE IS TAKEN    [START ON 11/20/2021] oxyCODONE-acetaminophen (PERCOCET) 5-325 mg per tablet Take 1-2 Tablets by mouth daily as needed for Pain for up to 30 days. Indications: pain    oxyCODONE-acetaminophen (PERCOCET) 5-325 mg per tablet Take 1-2 Tablets by mouth daily as needed for Pain for up to 30 days. Indications: pain    acetaminophen (TYLENOL) 500 mg tablet Take 2 Tablets by mouth every six (6) hours.  omeprazole (PRILOSEC) 40 mg capsule Take 40 mg by mouth every morning.  calcium carbonate/vitamin D3 (CALCIUM WITH VITAMIN D3 PO) Take 1 Tablet by mouth two (2) times a day.  tamsulosin (FLOMAX) 0.4 mg capsule Take 0.4 mg by mouth every morning.  metFORMIN (GLUCOPHAGE) 1,000 mg tablet TAKE 1 TABLET TWICE A DAY WITH MEALS    atorvastatin (LIPITOR) 40 mg tablet TAKE 1 TABLET DAILY (START LIPITOR AFTER VYTORIN IS FINISHED)    ranolazine ER (RANEXA) 500 mg SR tablet TAKE 1 TABLET TWICE A DAY    ascorbic acid, vitamin C, (VITAMIN C) 500 mg tablet Take 500 mg by mouth two (2) times a day.  multivitamins chew Take 1 Dose by mouth every morning. 1 dose is two tablets    cyclobenzaprine (FLEXERIL) 10 mg tablet Take 1 Tablet by mouth two (2) times daily as needed for Muscle Spasm(s). (Patient not taking: Reported on 9/21/2021)    enoxaparin (Lovenox) 40 mg/0.4 mL 0.4 mL by SubCUTAneous route daily. (Patient not taking: Reported on 9/21/2021)    trimethoprim-sulfamethoxazole (BACTRIM DS, SEPTRA DS) 160-800 mg per tablet Take 1 Tablet by mouth two (2) times a day. (Patient not taking: Reported on 9/21/2021)     No current facility-administered medications for this visit. Physical Examination: General appearance - alert, well appearing, and in no distress  Musculoskeletal -decreased active range of motion bilateral shoulders. No erythema no edema.   Extremities - peripheral pulses normal, no pedal edema, no clubbing or cyanosis, feet normal, good pulses, normal color, temperature and sensation, monofilament sensory exam is normal in both feet      Assessment/ Plan:   Diagnoses and all orders for this visit:    1. Diabetes mellitus type 2 in nonobese (HCC)  -     METABOLIC PANEL, COMPREHENSIVE; Future  -     LIPID PANEL; Future  -     HEMOGLOBIN A1C WITH EAG; Future  -      DIABETES FOOT EXAM  -     MICROALBUMIN, UR, RAND W/ MICROALB/CREAT RATIO; Future    2. Coronary artery disease of native artery of native heart with stable angina pectoris (HCC)  -     METABOLIC PANEL, COMPREHENSIVE; Future  -     LIPID PANEL; Future    3. Dyslipidemia  -     METABOLIC PANEL, COMPREHENSIVE; Future  -     LIPID PANEL; Future    4. Iron deficiency anemia, unspecified iron deficiency anemia type  -     CBC W/O DIFF; Future  -     IRON PROFILE; Future    5. Primary localized osteoarthrosis  -     NV DRAIN/INJECT LARGE JOINT/BURSA  -     TRIAMCINOLONE ACETONIDE INJ  -     triamcinolone acetonide (Kenalog) 40 mg/mL injection; 1 mL by Intra artICUlar route once for 1 dose. -     lidocaine, PF, (XYLOCAINE) 10 mg/mL (1 %) injection; 2 mL by Intra artICUlar route once for 1 dose. -     bupivacaine, PF, (MARCAINE) 0.25 % (2.5 mg/mL) injection; 2 mL by Intra artICUlar route once for 1 dose. -     NV DRAIN/INJECT LARGE JOINT/BURSA  -     TRIAMCINOLONE ACETONIDE INJ  -     triamcinolone acetonide (Kenalog) 40 mg/mL injection; 1 mL by Intra artICUlar route once for 1 dose. -     lidocaine, PF, (XYLOCAINE) 10 mg/mL (1 %) injection; 2 mL by Intra artICUlar route once for 1 dose. -     bupivacaine, PF, (MARCAINE) 0.25 % (2.5 mg/mL) injection; 2 mL by Intra artICUlar route once for 1 dose. Other orders  -     ferrous sulfate (FeroSuL) 325 mg (65 mg iron) tablet; Take 1 Tablet by mouth two (2) times a day. Follow-up and Dispositions    · Return in 3 months (on 2/17/2022), or if symptoms worsen or fail to improve. I have discussed the diagnosis with the patient and the intended plan as seen in the above orders.   The patient has received an after-visit summary and questions were answered concerning future plans. Pt conveyed understanding of plan. Medication Side Effects and Warnings were discussed with patient    An electronic signature was used to authenticate this note  Lee Colin DO  Patient has agreed to the procedure and understands the risk of adverse reactions. Procedure:  Joint Injection: Bilateral shoulder injection    Injected joint(s) with sterile technique using 5cc of mixed 1% Lidocaine 2cc with Kenalog 40mg/mL 1 mL, 2cc Marcaine 0.25% with relief and no adverse reaction to procedure. Patient given instructions and expectations of after visit care. [facility]  OFFICE PROCEDURE PROGRESS NOTE        Chart reviewed for the following:   Jono NINO DO, have reviewed the History, Physical and updated the Allergic reactions.     TIME OUT performed immediately prior to start of procedure:   Jono NINO DO, have performed the following reviews prior to the start of the procedure:            * Patient was identified by name and date of birth   * Agreement on procedure being performed was verified  * Risks and Benefits explained to the patient  * Procedure site verified and marked as necessary  * Patient was positioned for comfort  * Consent was signed and verified     Time: 8 AM      Date of procedure: 11/17/2021    Procedure performed by:  Lee Colin DO    Provider assisted by: Self DO    Patient assisted by: self    How tolerated by patient: tolerated the procedure well with no complications    Post Procedural Pain Scale: 0 - No Hurt    Comments: none

## 2021-11-18 NOTE — PROGRESS NOTES
All of your labs look great. We can recheck everything in 6 months. Back off the iron to once daily.

## 2021-11-22 DIAGNOSIS — E11.9 DIABETES MELLITUS TYPE 2 IN NONOBESE (HCC): ICD-10-CM

## 2021-11-23 RX ORDER — METFORMIN HYDROCHLORIDE 1000 MG/1
TABLET ORAL
Qty: 180 TABLET | Refills: 3 | Status: SHIPPED | OUTPATIENT
Start: 2021-11-23

## 2021-12-22 ENCOUNTER — VIRTUAL VISIT (OUTPATIENT)
Dept: FAMILY MEDICINE CLINIC | Age: 81
End: 2021-12-22
Payer: MEDICARE

## 2021-12-22 DIAGNOSIS — M19.90 OSTEOARTHRITIS, UNSPECIFIED OSTEOARTHRITIS TYPE, UNSPECIFIED SITE: ICD-10-CM

## 2021-12-22 DIAGNOSIS — M51.9 LUMBAR DISC DISEASE: ICD-10-CM

## 2021-12-22 PROCEDURE — G8419 CALC BMI OUT NRM PARAM NOF/U: HCPCS | Performed by: FAMILY MEDICINE

## 2021-12-22 PROCEDURE — 1101F PT FALLS ASSESS-DOCD LE1/YR: CPT | Performed by: FAMILY MEDICINE

## 2021-12-22 PROCEDURE — G8756 NO BP MEASURE DOC: HCPCS | Performed by: FAMILY MEDICINE

## 2021-12-22 PROCEDURE — G8536 NO DOC ELDER MAL SCRN: HCPCS | Performed by: FAMILY MEDICINE

## 2021-12-22 PROCEDURE — G0463 HOSPITAL OUTPT CLINIC VISIT: HCPCS | Performed by: FAMILY MEDICINE

## 2021-12-22 PROCEDURE — 99213 OFFICE O/P EST LOW 20 MIN: CPT | Performed by: FAMILY MEDICINE

## 2021-12-22 PROCEDURE — G8510 SCR DEP NEG, NO PLAN REQD: HCPCS | Performed by: FAMILY MEDICINE

## 2021-12-22 PROCEDURE — G8427 DOCREV CUR MEDS BY ELIG CLIN: HCPCS | Performed by: FAMILY MEDICINE

## 2021-12-22 RX ORDER — OXYCODONE AND ACETAMINOPHEN 5; 325 MG/1; MG/1
1-2 TABLET ORAL
Qty: 30 TABLET | Refills: 0 | Status: SHIPPED | OUTPATIENT
Start: 2022-01-21 | End: 2022-04-12 | Stop reason: SDUPTHER

## 2021-12-22 RX ORDER — OXYCODONE AND ACETAMINOPHEN 5; 325 MG/1; MG/1
1-2 TABLET ORAL
Qty: 30 TABLET | Refills: 0 | Status: SHIPPED | OUTPATIENT
Start: 2021-12-22 | End: 2022-04-12 | Stop reason: SDUPTHER

## 2021-12-22 RX ORDER — OXYCODONE AND ACETAMINOPHEN 5; 325 MG/1; MG/1
1-2 TABLET ORAL
Qty: 30 TABLET | Refills: 0 | Status: SHIPPED | OUTPATIENT
Start: 2022-02-20 | End: 2022-04-12 | Stop reason: SDUPTHER

## 2021-12-22 NOTE — PROGRESS NOTES
Progress Note    he is a 80y.o. year old male who presents for evalution. Subjective:     Patient in for refill of Percocet use for his chronic back pain. Uses minimally around 1 time per day max and some days none. This does cause significant meaningful improvement in his back pain. There is no history of abuse or misuse.  is checked and appropriate. Back pain is from his service in the [employer], 24 years. His shoulders are doing much better after the injections recently.     Opioid/Pain Management:     1. Has the patient signed a pain contract for chronic narcotic use? yes  2. Has the patient had a UDS or Serum screen as per guidelines as per [facility]?:   yes    3. Does patient meet necessary guidelines for Naloxone treatement per the [facility]?:    no  4. Has the Prescription Monitoring Program been reviewed? yes  5. Does patient have a long term condition that requires long term use of a Narcotic?  yes  6. Has patient been tolerant of therapy and responsible to routine follow up and specialist follow-up? Yes      Reviewed PmHx, RxHx, FmHx, SocHx, AllgHx and updated and dated in the chart. Review of Systems - negative except as listed above in the HPI    Objective: There were no vitals filed for this visit. Current Outpatient Medications   Medication Sig    [START ON 2/20/2022] oxyCODONE-acetaminophen (PERCOCET) 5-325 mg per tablet Take 1-2 Tablets by mouth daily as needed for Pain for up to 30 days. Indications: pain    [START ON 1/21/2022] oxyCODONE-acetaminophen (PERCOCET) 5-325 mg per tablet Take 1-2 Tablets by mouth daily as needed for Pain for up to 30 days. Indications: pain    oxyCODONE-acetaminophen (PERCOCET) 5-325 mg per tablet Take 1-2 Tablets by mouth daily as needed for Pain for up to 30 days.  Indications: pain    metFORMIN (GLUCOPHAGE) 1,000 mg tablet TAKE 1 TABLET TWICE A DAY WITH MEALS    ferrous sulfate (FeroSuL) 325 mg (65 mg iron) tablet Take 1 Tablet by mouth two (2) times a day.  folic acid (FOLVITE) 1 mg tablet TAKE 1 TABLET DAILY, EXCEPT DAY THAT METHOTREXATE IS TAKEN    acetaminophen (TYLENOL) 500 mg tablet Take 2 Tablets by mouth every six (6) hours.  cyclobenzaprine (FLEXERIL) 10 mg tablet Take 1 Tablet by mouth two (2) times daily as needed for Muscle Spasm(s). (Patient not taking: Reported on 9/21/2021)    enoxaparin (Lovenox) 40 mg/0.4 mL 0.4 mL by SubCUTAneous route daily. (Patient not taking: Reported on 9/21/2021)    trimethoprim-sulfamethoxazole (BACTRIM DS, SEPTRA DS) 160-800 mg per tablet Take 1 Tablet by mouth two (2) times a day. (Patient not taking: Reported on 9/21/2021)    omeprazole (PRILOSEC) 40 mg capsule Take 40 mg by mouth every morning.  calcium carbonate/vitamin D3 (CALCIUM WITH VITAMIN D3 PO) Take 1 Tablet by mouth two (2) times a day.  tamsulosin (FLOMAX) 0.4 mg capsule Take 0.4 mg by mouth every morning.  atorvastatin (LIPITOR) 40 mg tablet TAKE 1 TABLET DAILY (START LIPITOR AFTER VYTORIN IS FINISHED)    ranolazine ER (RANEXA) 500 mg SR tablet TAKE 1 TABLET TWICE A DAY    ascorbic acid, vitamin C, (VITAMIN C) 500 mg tablet Take 500 mg by mouth two (2) times a day.  multivitamins chew Take 1 Dose by mouth every morning. 1 dose is two tablets     No current facility-administered medications for this visit. Physical Examination: General appearance - alert, well appearing, and in no distress  Mental status - alert, oriented to person, place, and time      Assessment/ Plan:   Diagnoses and all orders for this visit:    1. Osteoarthritis, unspecified osteoarthritis type, unspecified site  -     oxyCODONE-acetaminophen (PERCOCET) 5-325 mg per tablet; Take 1-2 Tablets by mouth daily as needed for Pain for up to 30 days. Indications: pain  -     oxyCODONE-acetaminophen (PERCOCET) 5-325 mg per tablet; Take 1-2 Tablets by mouth daily as needed for Pain for up to 30 days.  Indications: pain  - oxyCODONE-acetaminophen (PERCOCET) 5-325 mg per tablet; Take 1-2 Tablets by mouth daily as needed for Pain for up to 30 days. Indications: pain    2. Lumbar disc disease  -     oxyCODONE-acetaminophen (PERCOCET) 5-325 mg per tablet; Take 1-2 Tablets by mouth daily as needed for Pain for up to 30 days. Indications: pain  -     oxyCODONE-acetaminophen (PERCOCET) 5-325 mg per tablet; Take 1-2 Tablets by mouth daily as needed for Pain for up to 30 days. Indications: pain  -     oxyCODONE-acetaminophen (PERCOCET) 5-325 mg per tablet; Take 1-2 Tablets by mouth daily as needed for Pain for up to 30 days. Indications: pain       Follow-up and Dispositions    · Return if symptoms worsen or fail to improve. I have discussed the diagnosis with the patient and the intended plan as seen in the above orders. The patient has received an after-visit summary and questions were answered concerning future plans. Pt conveyed understanding of plan. Medication Side Effects and Warnings were discussed with patient      Melquiades Blanco is being evaluated by a Virtual Visit (video visit) encounter to address concerns as mentioned above. A caregiver was present when appropriate. Due to this being a TeleHealth encounter (During [facility]-07 public health emergency), evaluation of the following organ systems was limited: Vitals/Constitutional/EENT/Resp/CV/GI//MS/Neuro/Skin/Heme-Lymph-Imm. Pursuant to the emergency declaration under the [address] [place] [address] authority and the [employer] and [employer] Act, this Virtual Visit was conducted with patient's (and/or legal guardian's) consent, to reduce the patient's risk of exposure to COVID-19 and provide necessary medical care.   The patient (and/or legal guardian) has also been advised to contact this office for worsening conditions or problems, and seek emergency medical treatment and/or call 911 if deemed necessary. Patient identification was verified at the start of the visit: Yes    Services were provided through a video synchronous discussion virtually to substitute for in-person clinic visit. Patient and provider were located at their individual homes.   --Mel Tobar DO on 12/22/2021 at 9:28 AM

## 2022-03-18 PROBLEM — K21.9 GASTROESOPHAGEAL REFLUX DISEASE: Status: ACTIVE | Noted: 2020-08-07

## 2022-03-18 PROBLEM — M15.0 PRIMARY OSTEOARTHRITIS INVOLVING MULTIPLE JOINTS: Status: ACTIVE | Noted: 2017-08-18

## 2022-03-18 PROBLEM — R06.09 DOE (DYSPNEA ON EXERTION): Status: ACTIVE | Noted: 2019-12-11

## 2022-03-18 PROBLEM — I95.9 HYPOTENSION: Status: ACTIVE | Noted: 2021-06-02

## 2022-03-18 PROBLEM — M15.9 PRIMARY OSTEOARTHRITIS INVOLVING MULTIPLE JOINTS: Status: ACTIVE | Noted: 2017-08-18

## 2022-03-19 PROBLEM — D64.9 ANEMIA: Status: ACTIVE | Noted: 2021-06-02

## 2022-03-19 PROBLEM — I20.9 ANGINA, CLASS III (HCC): Status: ACTIVE | Noted: 2020-05-07

## 2022-03-19 PROBLEM — M48.061 LUMBAR STENOSIS: Status: ACTIVE | Noted: 2021-06-01

## 2022-03-19 PROBLEM — N35.919 URETHRAL STRICTURE: Status: ACTIVE | Noted: 2021-06-03

## 2022-03-19 PROBLEM — R07.89 OTHER CHEST PAIN: Status: ACTIVE | Noted: 2019-12-11

## 2022-03-20 PROBLEM — Z71.89 ACP (ADVANCE CARE PLANNING): Status: ACTIVE | Noted: 2017-08-09

## 2022-03-20 PROBLEM — Z96.649 S/P TOTAL HIP ARTHROPLASTY: Status: ACTIVE | Noted: 2021-06-03

## 2022-03-20 PROBLEM — Z98.890 S/P CARDIAC CATH: Status: ACTIVE | Noted: 2020-01-23

## 2022-04-05 ENCOUNTER — DOCUMENTATION ONLY (OUTPATIENT)
Dept: FAMILY MEDICINE CLINIC | Age: 82
End: 2022-04-05

## 2022-04-12 ENCOUNTER — VIRTUAL VISIT (OUTPATIENT)
Dept: FAMILY MEDICINE CLINIC | Age: 82
End: 2022-04-12
Payer: MEDICARE

## 2022-04-12 DIAGNOSIS — M51.9 LUMBAR DISC DISEASE: ICD-10-CM

## 2022-04-12 DIAGNOSIS — M19.90 OSTEOARTHRITIS, UNSPECIFIED OSTEOARTHRITIS TYPE, UNSPECIFIED SITE: ICD-10-CM

## 2022-04-12 DIAGNOSIS — I25.118 CORONARY ARTERY DISEASE OF NATIVE ARTERY OF NATIVE HEART WITH STABLE ANGINA PECTORIS (HCC): ICD-10-CM

## 2022-04-12 DIAGNOSIS — E11.9 DIABETES MELLITUS TYPE 2 IN NONOBESE (HCC): ICD-10-CM

## 2022-04-12 PROCEDURE — G8419 CALC BMI OUT NRM PARAM NOF/U: HCPCS | Performed by: FAMILY MEDICINE

## 2022-04-12 PROCEDURE — 1101F PT FALLS ASSESS-DOCD LE1/YR: CPT | Performed by: FAMILY MEDICINE

## 2022-04-12 PROCEDURE — G8536 NO DOC ELDER MAL SCRN: HCPCS | Performed by: FAMILY MEDICINE

## 2022-04-12 PROCEDURE — G0463 HOSPITAL OUTPT CLINIC VISIT: HCPCS | Performed by: FAMILY MEDICINE

## 2022-04-12 PROCEDURE — G8756 NO BP MEASURE DOC: HCPCS | Performed by: FAMILY MEDICINE

## 2022-04-12 PROCEDURE — G8510 SCR DEP NEG, NO PLAN REQD: HCPCS | Performed by: FAMILY MEDICINE

## 2022-04-12 PROCEDURE — 99214 OFFICE O/P EST MOD 30 MIN: CPT | Performed by: FAMILY MEDICINE

## 2022-04-12 PROCEDURE — G8427 DOCREV CUR MEDS BY ELIG CLIN: HCPCS | Performed by: FAMILY MEDICINE

## 2022-04-12 RX ORDER — OXYCODONE AND ACETAMINOPHEN 5; 325 MG/1; MG/1
1-2 TABLET ORAL
Qty: 30 TABLET | Refills: 0 | Status: SHIPPED | OUTPATIENT
Start: 2022-04-12 | End: 2022-07-12 | Stop reason: SDUPTHER

## 2022-04-12 RX ORDER — OXYCODONE AND ACETAMINOPHEN 5; 325 MG/1; MG/1
1-2 TABLET ORAL
Qty: 30 TABLET | Refills: 0 | Status: SHIPPED | OUTPATIENT
Start: 2022-05-12 | End: 2022-07-12 | Stop reason: SDUPTHER

## 2022-04-12 RX ORDER — OXYCODONE AND ACETAMINOPHEN 5; 325 MG/1; MG/1
1-2 TABLET ORAL
Qty: 30 TABLET | Refills: 0 | Status: SHIPPED | OUTPATIENT
Start: 2022-06-11 | End: 2022-07-12 | Stop reason: SDUPTHER

## 2022-04-12 NOTE — PROGRESS NOTES
Progress Note    he is a [de-identified]y.o. year old male who presents for evalution. Subjective:     Patient in for refill of Percocet use for his chronic back pain.  Uses minimally around 1 time per day max and some days none.  This does cause significant meaningful improvement in his back pain. Angela Foss is no history of abuse or misuse.   is checked and appropriate.  Back pain is from his service in the [employer], 24 years.    His shoulders are doing much better after the injections recently.     Opioid/Pain Management:     1.  Has the patient signed a pain contract for chronic narcotic use? yes  2.  Has the patient had a UDS or Serum screen as per guidelines as per [facility]?:   yes    3.  Does patient meet necessary guidelines for Naloxone treatement per the [facility]? Kaitlin Rose  4.  Has the Prescription Monitoring Program been reviewed?  yes  5.  Does patient have a long term condition that requires long term use of a Narcotic?  yes  6.  Has patient been tolerant of therapy and responsible to routine follow up and specialist follow-up?  Yes    Patient with type 2 diabetes which has been well controlled with diet and Metformin. Previous check was 5.9. History of coronary artery disease on atorvastatin tolerating this with no issues. Last LDL was just above goal in the 80s prior to that was 69. We will recheck  Reviewed PmHx, RxHx, FmHx, SocHx, AllgHx and updated and dated in the chart. Review of Systems - negative except as listed above in the HPI    Objective: There were no vitals filed for this visit. Current Outpatient Medications   Medication Sig    [START ON 6/11/2022] oxyCODONE-acetaminophen (PERCOCET) 5-325 mg per tablet Take 1-2 Tablets by mouth daily as needed for Pain for up to 30 days. Indications: pain    [START ON 5/12/2022] oxyCODONE-acetaminophen (PERCOCET) 5-325 mg per tablet Take 1-2 Tablets by mouth daily as needed for Pain for up to 30 days.  Indications: pain    oxyCODONE-acetaminophen (PERCOCET) 5-325 mg per tablet Take 1-2 Tablets by mouth daily as needed for Pain for up to 30 days. Indications: pain    metFORMIN (GLUCOPHAGE) 1,000 mg tablet TAKE 1 TABLET TWICE A DAY WITH MEALS    ferrous sulfate (FeroSuL) 325 mg (65 mg iron) tablet Take 1 Tablet by mouth two (2) times a day.  folic acid (FOLVITE) 1 mg tablet TAKE 1 TABLET DAILY, EXCEPT DAY THAT METHOTREXATE IS TAKEN    acetaminophen (TYLENOL) 500 mg tablet Take 2 Tablets by mouth every six (6) hours.  cyclobenzaprine (FLEXERIL) 10 mg tablet Take 1 Tablet by mouth two (2) times daily as needed for Muscle Spasm(s). (Patient not taking: Reported on 9/21/2021)    calcium carbonate/vitamin D3 (CALCIUM WITH VITAMIN D3 PO) Take 1 Tablet by mouth two (2) times a day.  tamsulosin (FLOMAX) 0.4 mg capsule Take 0.4 mg by mouth every morning.  atorvastatin (LIPITOR) 40 mg tablet TAKE 1 TABLET DAILY (START LIPITOR AFTER VYTORIN IS FINISHED)    ranolazine ER (RANEXA) 500 mg SR tablet TAKE 1 TABLET TWICE A DAY    ascorbic acid, vitamin C, (VITAMIN C) 500 mg tablet Take 500 mg by mouth two (2) times a day.  multivitamins chew Take 1 Dose by mouth every morning. 1 dose is two tablets     No current facility-administered medications for this visit. Physical Examination: General appearance - alert, well appearing, and in no distress  Mental status - alert, oriented to person, place, and time      Assessment/ Plan:   Diagnoses and all orders for this visit:    1. Coronary artery disease of native artery of native heart with stable angina pectoris ([facility] [address]  -     LIPID PANEL; Future  -     METABOLIC PANEL, COMPREHENSIVE; Future    2. Diabetes mellitus type 2 in nonobese (HCC)  -     HEMOGLOBIN A1C WITH EAG; Future  -     LIPID PANEL; Future  -     METABOLIC PANEL, COMPREHENSIVE; Future    3.  Osteoarthritis, unspecified osteoarthritis type, unspecified site  -     oxyCODONE-acetaminophen (PERCOCET) 5-325 mg per tablet; Take 1-2 Tablets by mouth daily as needed for Pain for up to 30 days. Indications: pain  -     oxyCODONE-acetaminophen (PERCOCET) 5-325 mg per tablet; Take 1-2 Tablets by mouth daily as needed for Pain for up to 30 days. Indications: pain  -     oxyCODONE-acetaminophen (PERCOCET) 5-325 mg per tablet; Take 1-2 Tablets by mouth daily as needed for Pain for up to 30 days. Indications: pain    4. Lumbar disc disease  -     oxyCODONE-acetaminophen (PERCOCET) 5-325 mg per tablet; Take 1-2 Tablets by mouth daily as needed for Pain for up to 30 days. Indications: pain  -     oxyCODONE-acetaminophen (PERCOCET) 5-325 mg per tablet; Take 1-2 Tablets by mouth daily as needed for Pain for up to 30 days. Indications: pain  -     oxyCODONE-acetaminophen (PERCOCET) 5-325 mg per tablet; Take 1-2 Tablets by mouth daily as needed for Pain for up to 30 days. Indications: pain       Follow-up and Dispositions    · Return in about 3 months (around 7/12/2022), or if symptoms worsen or fail to improve. I have discussed the diagnosis with the patient and the intended plan as seen in the above orders. The patient has received an after-visit summary and questions were answered concerning future plans. Pt conveyed understanding of plan. Medication Side Effects and Warnings were discussed with patient      Karin Angela is being evaluated by a Virtual Visit (video visit) encounter to address concerns as mentioned above. A caregiver was present when appropriate. Due to this being a TeleHealth encounter (During [facility] public [facility] emergency), evaluation of the following organ systems was limited: Vitals/Constitutional/EENT/Resp/CV/GI//MS/Neuro/Skin/Heme-Lymph-Imm.   Pursuant to the emergency declaration under the [facility] waiver authority and the [employer] Act, this Virtual Visit was conducted with patient's (and/or legal guardian's) consent, to reduce the patient's risk of exposure to COVID-19 and provide necessary medical care. The patient (and/or legal guardian) has also been advised to contact this office for worsening conditions or problems, and seek emergency medical treatment and/or call 911 if deemed necessary. Patient identification was verified at the start of the visit: Yes    Services were provided through a video synchronous discussion virtually to substitute for in-person clinic visit. Patient and provider were located at their individual homes.   --Cisco Mejia DO on 4/12/2022 at 8:01 AM

## 2022-04-12 NOTE — PATIENT INSTRUCTIONS
A Healthy Lifestyle: Care Instructions  Your Care Instructions     A healthy lifestyle can help you feel good, stay at a healthy weight, and have plenty of energy for both work and play. A healthy lifestyle is something you can share with your whole family. A healthy lifestyle also can lower your risk for serious health problems, such as high blood pressure, heart disease, and diabetes. You can follow a few steps listed below to improve your health and the health of your family. Follow-up care is a key part of your treatment and safety. Be sure to make and go to all appointments, and call your doctor if you are having problems. It's also a good idea to know your test results and keep a list of the medicines you take. How can you care for yourself at home? · Do not eat too much sugar, fat, or fast foods. You can still have dessert and treats now and then. The goal is moderation. · Start small to improve your eating habits. Pay attention to portion sizes, drink less juice and soda pop, and eat more fruits and vegetables. ? Eat a healthy amount of food. A 3-ounce serving of meat, for example, is about the size of a deck of cards. Fill the rest of your plate with vegetables and whole grains. ? Limit the amount of soda and sports drinks you have every day. Drink more water when you are thirsty. ? Eat plenty of fruits and vegetables every day. Have an apple or some carrot sticks as an afternoon snack instead of a candy bar. Try to have fruits and/or vegetables at every meal.  · Make exercise part of your daily routine. You may want to start with simple activities, such as walking, bicycling, or slow swimming. Try to be active 30 to 60 minutes every day. You do not need to do all 30 to 60 minutes all at once. For example, you can exercise 3 times a day for 10 or 20 minutes.  Moderate exercise is safe for most people, but it is always a good idea to talk to your doctor before starting an exercise program.  · Keep moving. Richard [place] the lawn, work in the garden, or [employer]. Take the stairs instead of the elevator at work. · If you smoke, quit. People who smoke have an increased risk for heart attack, stroke, cancer, and other lung illnesses. Quitting is hard, but there are ways to boost your chance of quitting tobacco for good. ? Use nicotine gum, patches, or lozenges. ? Ask your doctor about stop-smoking programs and medicines. ? Keep trying. In addition to reducing your risk of diseases in the future, you will notice some benefits soon after you stop using tobacco. If you have shortness of breath or asthma symptoms, they will likely get better within a few weeks after you quit. · Limit how much alcohol you drink. Moderate amounts of alcohol (up to 2 drinks a day for men, 1 drink a day for women) are okay. But drinking too much can lead to liver problems, high blood pressure, and other health problems. Family health  If you have a family, there are many things you can do together to improve your health. · Eat meals together as a family as often as possible. · Eat healthy foods. This includes fruits, vegetables, lean meats and dairy, and whole grains. · Include your family in your fitness plan. Most people think of activities such as jogging or tennis as the way to fitness, but there are many ways you and your family can be more active. Anything that makes you breathe hard and gets your heart pumping is exercise. Here are some tips:  ? Walk to do errands or to take your child to school or the bus.  ? Go for a family bike ride after dinner instead of watching TV. Where can you learn more? Go to http://www.gray.com/  Enter Y280 in the search box to learn more about \"A Healthy Lifestyle: Care Instructions. \"  Current as of: June 16, 2021               Content Version: 13.2  © 4104-6534 Healthwise, Incorporated.    Care instructions adapted under license by [employer] (which disclaims liability or warranty for this information). If you have questions about a medical condition or this instruction, always ask your healthcare professional. [address] any warranty or liability for your use of this information.

## 2022-04-20 LAB
ALBUMIN SERPL-MCNC: 4.1 G/DL (ref 3.7–4.7)
ALBUMIN/GLOB SERPL: 1.6 {RATIO} (ref 1.2–2.2)
ALP SERPL-CCNC: 93 IU/L (ref 44–121)
ALT SERPL-CCNC: 8 IU/L (ref 0–44)
AST SERPL-CCNC: 14 IU/L (ref 0–40)
BILIRUB SERPL-MCNC: 0.2 MG/DL (ref 0–1.2)
BUN SERPL-MCNC: 7 MG/DL (ref 8–27)
BUN/CREAT SERPL: 8 (ref 10–24)
CALCIUM SERPL-MCNC: 9.3 MG/DL (ref 8.6–10.2)
CHLORIDE SERPL-SCNC: 104 MMOL/L (ref 96–106)
CHOLEST SERPL-MCNC: 162 MG/DL (ref 100–199)
CO2 SERPL-SCNC: 22 MMOL/L (ref 20–29)
CREAT SERPL-MCNC: 0.93 MG/DL (ref 0.76–1.27)
EGFR: 83 ML/MIN/1.73
EST. AVERAGE GLUCOSE BLD GHB EST-MCNC: 117 MG/DL
GLOBULIN SER CALC-MCNC: 2.5 G/DL (ref 1.5–4.5)
GLUCOSE SERPL-MCNC: 90 MG/DL (ref 65–99)
HBA1C MFR BLD: 5.7 % (ref 4.8–5.6)
HDLC SERPL-MCNC: 57 MG/DL
IMP & REVIEW OF LAB RESULTS: NORMAL
LDLC SERPL CALC-MCNC: 90 MG/DL (ref 0–99)
POTASSIUM SERPL-SCNC: 4.5 MMOL/L (ref 3.5–5.2)
PROT SERPL-MCNC: 6.6 G/DL (ref 6–8.5)
SODIUM SERPL-SCNC: 143 MMOL/L (ref 134–144)
TRIGL SERPL-MCNC: 81 MG/DL (ref 0–149)
VLDLC SERPL CALC-MCNC: 15 MG/DL (ref 5–40)

## 2022-04-29 RX ORDER — OMEPRAZOLE 40 MG/1
CAPSULE, DELAYED RELEASE ORAL
Qty: 90 CAPSULE | Refills: 3 | Status: SHIPPED | OUTPATIENT
Start: 2022-04-29

## 2022-07-12 ENCOUNTER — VIRTUAL VISIT (OUTPATIENT)
Dept: FAMILY MEDICINE CLINIC | Age: 82
End: 2022-07-12
Payer: MEDICARE

## 2022-07-12 DIAGNOSIS — M51.9 LUMBAR DISC DISEASE: ICD-10-CM

## 2022-07-12 DIAGNOSIS — M19.90 OSTEOARTHRITIS, UNSPECIFIED OSTEOARTHRITIS TYPE, UNSPECIFIED SITE: ICD-10-CM

## 2022-07-12 PROCEDURE — G8417 CALC BMI ABV UP PARAM F/U: HCPCS | Performed by: FAMILY MEDICINE

## 2022-07-12 PROCEDURE — G8536 NO DOC ELDER MAL SCRN: HCPCS | Performed by: FAMILY MEDICINE

## 2022-07-12 PROCEDURE — G8510 SCR DEP NEG, NO PLAN REQD: HCPCS | Performed by: FAMILY MEDICINE

## 2022-07-12 PROCEDURE — 1101F PT FALLS ASSESS-DOCD LE1/YR: CPT | Performed by: FAMILY MEDICINE

## 2022-07-12 PROCEDURE — G0463 HOSPITAL OUTPT CLINIC VISIT: HCPCS | Performed by: FAMILY MEDICINE

## 2022-07-12 PROCEDURE — 1123F ACP DISCUSS/DSCN MKR DOCD: CPT | Performed by: FAMILY MEDICINE

## 2022-07-12 PROCEDURE — 99214 OFFICE O/P EST MOD 30 MIN: CPT | Performed by: FAMILY MEDICINE

## 2022-07-12 PROCEDURE — G8756 NO BP MEASURE DOC: HCPCS | Performed by: FAMILY MEDICINE

## 2022-07-12 PROCEDURE — G8427 DOCREV CUR MEDS BY ELIG CLIN: HCPCS | Performed by: FAMILY MEDICINE

## 2022-07-12 RX ORDER — OXYCODONE AND ACETAMINOPHEN 5; 325 MG/1; MG/1
1-2 TABLET ORAL
Qty: 40 TABLET | Refills: 0 | Status: SHIPPED | OUTPATIENT
Start: 2022-09-10 | End: 2022-10-05 | Stop reason: SDUPTHER

## 2022-07-12 RX ORDER — OXYCODONE AND ACETAMINOPHEN 5; 325 MG/1; MG/1
1-2 TABLET ORAL
Qty: 40 TABLET | Refills: 0 | Status: SHIPPED | OUTPATIENT
Start: 2022-08-11 | End: 2022-10-05 | Stop reason: SDUPTHER

## 2022-07-12 RX ORDER — OXYCODONE AND ACETAMINOPHEN 5; 325 MG/1; MG/1
1-2 TABLET ORAL
Qty: 40 TABLET | Refills: 0 | Status: SHIPPED | OUTPATIENT
Start: 2022-07-12 | End: 2022-10-05 | Stop reason: SDUPTHER

## 2022-07-12 NOTE — PROGRESS NOTES
Progress Note    he is a [de-identified]y.o. year old male who presents for evalution. Subjective:     Patient here for refill of Percocet use for chronic low back pain. Patient has had multiple surgeries of his low back recently received steroid injection through spine this helped for 1 to 2 weeks. He is written for 1-2 tabs daily he usually takes 1 tab daily however he has been having more days with needing up to 2. Currently written for 30/month he is requesting an increase in the number per month. When he does take the medication there is a meaningful reduction in his pain from an 8 out of 10 to a 4 out of 10. [facility] also [facility]. No history of abuse or misuse. Opioid/Pain Management:    1. Has the patient signed a pain contract for chronic narcotic use? yes  2. Has the patient had a UDS or Serum screen as per guidelines as per [facility]?:   yes    3. Does patient meet necessary guidelines for Naloxone treatement per the [facility]?:    no  4. Has the Prescription Monitoring Program been reviewed? yes  5. Does patient have a long term condition that requires long term use of a Narcotic?  yes  6. Has patient been tolerant of therapy and responsible to routine follow up and specialist follow-up? Yes          Reviewed PmHx, RxHx, FmHx, SocHx, AllgHx and updated and dated in the chart. Review of Systems - negative except as listed above in the HPI    Objective: There were no vitals filed for this visit. Current Outpatient Medications   Medication Sig    [START ON 9/10/2022] oxyCODONE-acetaminophen (PERCOCET) 5-325 mg per tablet Take 1-2 Tablets by mouth daily as needed for Pain for up to 30 days. Indications: pain    [START ON 8/11/2022] oxyCODONE-acetaminophen (PERCOCET) 5-325 mg per tablet Take 1-2 Tablets by mouth daily as needed for Pain for up to 30 days.  Indications: pain    oxyCODONE-acetaminophen (PERCOCET) 5-325 mg per tablet Take 1-2 Tablets by mouth daily as needed for Pain for up to 30 days. Indications: pain    omeprazole (PRILOSEC) 40 mg capsule TAKE 1 CAPSULE DAILY    metFORMIN (GLUCOPHAGE) 1,000 mg tablet TAKE 1 TABLET TWICE A DAY WITH MEALS    ferrous sulfate (FeroSuL) 325 mg (65 mg iron) tablet Take 1 Tablet by mouth two (2) times a day.  folic acid (FOLVITE) 1 mg tablet TAKE 1 TABLET DAILY, EXCEPT DAY THAT METHOTREXATE IS TAKEN    acetaminophen (TYLENOL) 500 mg tablet Take 2 Tablets by mouth every six (6) hours.  cyclobenzaprine (FLEXERIL) 10 mg tablet Take 1 Tablet by mouth two (2) times daily as needed for Muscle Spasm(s). (Patient not taking: Reported on 9/21/2021)    calcium carbonate/vitamin D3 (CALCIUM WITH VITAMIN D3 PO) Take 1 Tablet by mouth two (2) times a day.  tamsulosin (FLOMAX) 0.4 mg capsule Take 0.4 mg by mouth every morning.  atorvastatin (LIPITOR) 40 mg tablet TAKE 1 TABLET DAILY (START LIPITOR AFTER VYTORIN IS FINISHED)    ranolazine ER (RANEXA) 500 mg SR tablet TAKE 1 TABLET TWICE A DAY    ascorbic acid, vitamin C, (VITAMIN C) 500 mg tablet Take 500 mg by mouth two (2) times a day.  multivitamins chew Take 1 Dose by mouth every morning. 1 dose is two tablets     No current facility-administered medications for this visit. Physical Examination: General appearance - alert, well appearing, and in no distress  Mental status - alert, oriented to person, place, and time      Assessment/ Plan:   Diagnoses and all orders for this visit:    1. Osteoarthritis, unspecified osteoarthritis type, unspecified site  -     oxyCODONE-acetaminophen (PERCOCET) 5-325 mg per tablet; Take 1-2 Tablets by mouth daily as needed for Pain for up to 30 days. Indications: pain  -     oxyCODONE-acetaminophen (PERCOCET) 5-325 mg per tablet; Take 1-2 Tablets by mouth daily as needed for Pain for up to 30 days. Indications: pain  -     oxyCODONE-acetaminophen (PERCOCET) 5-325 mg per tablet;  Take 1-2 Tablets by mouth daily as needed for Pain for up to 30 days. Indications: pain    2. Lumbar disc disease  -     oxyCODONE-acetaminophen (PERCOCET) 5-325 mg per tablet; Take 1-2 Tablets by mouth daily as needed for Pain for up to 30 days. Indications: pain  -     oxyCODONE-acetaminophen (PERCOCET) 5-325 mg per tablet; Take 1-2 Tablets by mouth daily as needed for Pain for up to 30 days. Indications: pain  -     oxyCODONE-acetaminophen (PERCOCET) 5-325 mg per tablet; Take 1-2 Tablets by mouth daily as needed for Pain for up to 30 days. Indications: pain  He has unfortunately been having increasing low back pain/exacerbation. Steroid injection provided minimal relief. Increased quantity of Percocet per month. Follow-up and Dispositions    · Return in about 3 months (around 10/12/2022), or if symptoms worsen or fail to improve. I have discussed the diagnosis with the patient and the intended plan as seen in the above orders. The patient has received an after-visit summary and questions were answered concerning future plans. Pt conveyed understanding of plan. Medication Side Effects and Warnings were discussed with patient      Gavi Bates is being evaluated by a Virtual Visit (video visit) encounter to address concerns as mentioned above. A caregiver was present when appropriate. Due to this being a TeleHealth encounter (During GCGLR-36 public health emergency), evaluation of the following organ systems was limited: Vitals/Constitutional/EENT/Resp/CV/GI//MS/Neuro/Skin/Heme-Lymph-Imm. Pursuant to the emergency declaration under the [address] [place] [address], [address] [place] [address] authority and the [employer] Act, this Virtual Visit was conducted with patient's (and/or legal guardian's) consent, to reduce the patient's risk of exposure to COVID-19 and provide necessary medical care.   The patient (and/or legal guardian) has also been advised to contact this office for worsening conditions or problems, and seek emergency medical treatment and/or call 911 if deemed necessary. Patient identification was verified at the start of the visit: Yes    Services were provided through a video synchronous discussion virtually to substitute for in-person clinic visit. Patient and provider were located at their individual homes.   --Verner Bunkers, DO on 7/12/2022 at 7:21 AM

## 2022-07-12 NOTE — PATIENT INSTRUCTIONS
A Healthy Lifestyle: Care Instructions  Your Care Instructions     A healthy lifestyle can help you feel good, stay at a healthy weight, and have plenty of energy for both work and play. A healthy lifestyle is something you can share with your whole family. A healthy lifestyle also can lower your risk for serious health problems, such as high blood pressure, heart disease, and diabetes. You can follow a few steps listed below to improve your health and the health of your family. Follow-up care is a key part of your treatment and safety. Be sure to make and go to all appointments, and call your doctor if you are having problems. It's also a good idea to know your test results and keep a list of the medicines you take. How can you care for yourself at home? · Do not eat too much sugar, fat, or fast foods. You can still have dessert and treats now and then. The goal is moderation. · Start small to improve your eating habits. Pay attention to portion sizes, drink less juice and soda pop, and eat more fruits and vegetables. ? Eat a healthy amount of food. A 3-ounce serving of meat, for example, is about the size of a deck of cards. Fill the rest of your plate with vegetables and whole grains. ? Limit the amount of soda and sports drinks you have every day. Drink more water when you are thirsty. ? Eat plenty of fruits and vegetables every day. Have an apple or some carrot sticks as an afternoon snack instead of a candy bar. Try to have fruits and/or vegetables at every meal.  · Make exercise part of your daily routine. You may want to start with simple activities, such as walking, bicycling, or slow swimming. Try to be active 30 to 60 minutes every day. You do not need to do all 30 to 60 minutes all at once. For example, you can exercise 3 times a day for 10 or 20 minutes.  Moderate exercise is safe for most people, but it is always a good idea to talk to your doctor before starting an exercise program.  · Keep moving. [place] the lawn, work in the garden, or [employer]. Take the stairs instead of the elevator at work. · If you smoke, quit. People who smoke have an increased risk for heart attack, stroke, cancer, and other lung illnesses. Quitting is hard, but there are ways to boost your chance of quitting tobacco for good. ? Use nicotine gum, patches, or lozenges. ? Ask your doctor about stop-smoking programs and medicines. ? Keep trying. In addition to reducing your risk of diseases in the future, you will notice some benefits soon after you stop using tobacco. If you have shortness of breath or asthma symptoms, they will likely get better within a few weeks after you quit. · Limit how much alcohol you drink. Moderate amounts of alcohol (up to 2 drinks a day for men, 1 drink a day for women) are okay. But drinking too much can lead to liver problems, high blood pressure, and other health problems. Family health  If you have a family, there are many things you can do together to improve your health. · Eat meals together as a family as often as possible. · Eat healthy foods. This includes fruits, vegetables, lean meats and dairy, and whole grains. · Include your family in your fitness plan. Most people think of activities such as jogging or tennis as the way to fitness, but there are many ways you and your family can be more active. Anything that makes you breathe hard and gets your heart pumping is exercise. Here are some tips:  ? Walk to do errands or to take your child to school or the bus.  ? Go for a family bike ride after dinner instead of watching TV. Where can you learn more? Go to http://www.gray.com/  Enter Y328 in the search box to learn more about \"A Healthy Lifestyle: Care Instructions. \"  Current as of: June 16, 2021               Content Version: 13.2  © 5159-2733 Healthwise, Incorporated.    Care instructions adapted under license by [employer] (which disclaims liability or warranty for this information). If you have questions about a medical condition or this instruction, always ask your healthcare professional. [address] any warranty or liability for your use of this information.

## 2022-10-05 ENCOUNTER — OFFICE VISIT (OUTPATIENT)
Dept: FAMILY MEDICINE CLINIC | Age: 82
End: 2022-10-05
Payer: MEDICARE

## 2022-10-05 VITALS
RESPIRATION RATE: 20 BRPM | DIASTOLIC BLOOD PRESSURE: 66 MMHG | HEIGHT: 68 IN | TEMPERATURE: 98.8 F | OXYGEN SATURATION: 93 % | WEIGHT: 166 LBS | BODY MASS INDEX: 25.16 KG/M2 | HEART RATE: 76 BPM | SYSTOLIC BLOOD PRESSURE: 128 MMHG

## 2022-10-05 DIAGNOSIS — I25.118 CORONARY ARTERY DISEASE OF NATIVE ARTERY OF NATIVE HEART WITH STABLE ANGINA PECTORIS (HCC): ICD-10-CM

## 2022-10-05 DIAGNOSIS — J34.89 SINUS DRAINAGE: ICD-10-CM

## 2022-10-05 DIAGNOSIS — Z51.81 MEDICATION MONITORING ENCOUNTER: ICD-10-CM

## 2022-10-05 DIAGNOSIS — E11.9 DIABETES MELLITUS TYPE 2 IN NONOBESE (HCC): ICD-10-CM

## 2022-10-05 DIAGNOSIS — Z00.00 MEDICARE ANNUAL WELLNESS VISIT, SUBSEQUENT: Primary | ICD-10-CM

## 2022-10-05 DIAGNOSIS — M19.90 OSTEOARTHRITIS, UNSPECIFIED OSTEOARTHRITIS TYPE, UNSPECIFIED SITE: ICD-10-CM

## 2022-10-05 DIAGNOSIS — M51.9 LUMBAR DISC DISEASE: ICD-10-CM

## 2022-10-05 DIAGNOSIS — J44.9 CHRONIC OBSTRUCTIVE PULMONARY DISEASE, UNSPECIFIED COPD TYPE (HCC): ICD-10-CM

## 2022-10-05 LAB
ALBUMIN SERPL-MCNC: 3.7 G/DL (ref 3.5–5)
ALBUMIN/GLOB SERPL: 1.3 {RATIO} (ref 1.1–2.2)
ALP SERPL-CCNC: 76 U/L (ref 45–117)
ALT SERPL-CCNC: 17 U/L (ref 12–78)
ANION GAP SERPL CALC-SCNC: 4 MMOL/L (ref 5–15)
AST SERPL-CCNC: 15 U/L (ref 15–37)
BILIRUB SERPL-MCNC: 0.4 MG/DL (ref 0.2–1)
BUN SERPL-MCNC: 8 MG/DL (ref 6–20)
BUN/CREAT SERPL: 10 (ref 12–20)
CALCIUM SERPL-MCNC: 9 MG/DL (ref 8.5–10.1)
CHLORIDE SERPL-SCNC: 107 MMOL/L (ref 97–108)
CHOLEST SERPL-MCNC: 160 MG/DL
CO2 SERPL-SCNC: 31 MMOL/L (ref 21–32)
CREAT SERPL-MCNC: 0.84 MG/DL (ref 0.7–1.3)
EST. AVERAGE GLUCOSE BLD GHB EST-MCNC: 117 MG/DL
GLOBULIN SER CALC-MCNC: 2.9 G/DL (ref 2–4)
GLUCOSE SERPL-MCNC: 86 MG/DL (ref 65–100)
HBA1C MFR BLD: 5.7 % (ref 4–5.6)
HDLC SERPL-MCNC: 66 MG/DL
HDLC SERPL: 2.4 {RATIO} (ref 0–5)
LDLC SERPL CALC-MCNC: 80.2 MG/DL (ref 0–100)
POTASSIUM SERPL-SCNC: 3.8 MMOL/L (ref 3.5–5.1)
PROT SERPL-MCNC: 6.6 G/DL (ref 6.4–8.2)
SODIUM SERPL-SCNC: 142 MMOL/L (ref 136–145)
TRIGL SERPL-MCNC: 69 MG/DL (ref ?–150)
VLDLC SERPL CALC-MCNC: 13.8 MG/DL

## 2022-10-05 PROCEDURE — G8417 CALC BMI ABV UP PARAM F/U: HCPCS | Performed by: FAMILY MEDICINE

## 2022-10-05 PROCEDURE — 3044F HG A1C LEVEL LT 7.0%: CPT | Performed by: FAMILY MEDICINE

## 2022-10-05 PROCEDURE — G8752 SYS BP LESS 140: HCPCS | Performed by: FAMILY MEDICINE

## 2022-10-05 PROCEDURE — 1101F PT FALLS ASSESS-DOCD LE1/YR: CPT | Performed by: FAMILY MEDICINE

## 2022-10-05 PROCEDURE — G8510 SCR DEP NEG, NO PLAN REQD: HCPCS | Performed by: FAMILY MEDICINE

## 2022-10-05 PROCEDURE — G8754 DIAS BP LESS 90: HCPCS | Performed by: FAMILY MEDICINE

## 2022-10-05 PROCEDURE — G8427 DOCREV CUR MEDS BY ELIG CLIN: HCPCS | Performed by: FAMILY MEDICINE

## 2022-10-05 PROCEDURE — G8536 NO DOC ELDER MAL SCRN: HCPCS | Performed by: FAMILY MEDICINE

## 2022-10-05 PROCEDURE — 99214 OFFICE O/P EST MOD 30 MIN: CPT | Performed by: FAMILY MEDICINE

## 2022-10-05 PROCEDURE — G0463 HOSPITAL OUTPT CLINIC VISIT: HCPCS | Performed by: FAMILY MEDICINE

## 2022-10-05 PROCEDURE — 1123F ACP DISCUSS/DSCN MKR DOCD: CPT | Performed by: FAMILY MEDICINE

## 2022-10-05 PROCEDURE — G0439 PPPS, SUBSEQ VISIT: HCPCS | Performed by: FAMILY MEDICINE

## 2022-10-05 RX ORDER — OXYCODONE AND ACETAMINOPHEN 5; 325 MG/1; MG/1
1-2 TABLET ORAL
Qty: 40 TABLET | Refills: 0 | Status: SHIPPED | OUTPATIENT
Start: 2022-11-22 | End: 2022-12-22

## 2022-10-05 RX ORDER — OXYCODONE AND ACETAMINOPHEN 5; 325 MG/1; MG/1
1-2 TABLET ORAL
Qty: 40 TABLET | Refills: 0 | Status: SHIPPED | OUTPATIENT
Start: 2022-12-21 | End: 2023-01-20

## 2022-10-05 RX ORDER — CETIRIZINE HCL 10 MG
10 TABLET ORAL DAILY
Qty: 90 TABLET | Refills: 3 | Status: SHIPPED | OUTPATIENT
Start: 2022-10-05

## 2022-10-05 RX ORDER — IRBESARTAN 150 MG/1
TABLET ORAL
COMMUNITY
Start: 2022-09-22

## 2022-10-05 RX ORDER — OXYCODONE AND ACETAMINOPHEN 5; 325 MG/1; MG/1
1-2 TABLET ORAL
Qty: 40 TABLET | Refills: 0 | Status: SHIPPED | OUTPATIENT
Start: 2022-10-24 | End: 2022-11-23

## 2022-10-05 RX ORDER — FLUTICASONE PROPIONATE AND SALMETEROL 100; 50 UG/1; UG/1
1 POWDER RESPIRATORY (INHALATION) EVERY 12 HOURS
Qty: 60 EACH | Refills: 2 | Status: SHIPPED | OUTPATIENT
Start: 2022-10-05

## 2022-10-05 NOTE — LETTER
10/12/2022    [address] [id number]      Dear Edmund Ruelas:    Please find your most recent results below. Resulted Orders   HEMOGLOBIN A1C WITH EAG   Result Value Ref Range    Hemoglobin A1c 5.7 (H) 4.0 - 5.6 %    Est. average glucose 994 mg/dL   METABOLIC PANEL, COMPREHENSIVE   Result Value Ref Range    Sodium 142 136 - 145 mmol/L    Potassium 3.8 3.5 - 5.1 mmol/L    Chloride 107 97 - 108 mmol/L    CO2 31 21 - 32 mmol/L    Anion gap 4 (L) 5 - 15 mmol/L    Glucose 86 65 - 100 mg/dL    BUN 8 6 - 20 MG/DL    Creatinine 0.84 0.70 - 1.30 MG/DL    BUN/Creatinine ratio 10 (L) 12 - 20      eGFR >60 >60 ml/min/1.73m2    Calcium 9.0 8.5 - 10.1 MG/DL    Bilirubin, total 0.4 0.2 - 1.0 MG/DL    ALT (SGPT) 17 12 - 78 U/L    AST (SGOT) 15 15 - 37 U/L    Alk.  phosphatase 76 45 - 117 U/L    Protein, total 6.6 6.4 - 8.2 g/dL    Albumin 3.7 3.5 - 5.0 g/dL    Globulin 2.9 2.0 - 4.0 g/dL    A-G Ratio 1.3 1.1 - 2.2         RECOMMENDATIONS:    Labs are stable.  We can recheck in 6 months    Please call me if you have any questions: [phone]    Sincerely,      Dr. Cristi Thurston

## 2022-10-05 NOTE — PROGRESS NOTES
Progress Note    he is a [de-identified]y.o. year old male who presents for evalution. Subjective:   Pt for DM f/up very well controlled no issue with meds. HLD/CAD LDL goal less than 70. On statin. Has chronic sob. He had issues where they were not able to place stents. But then states he had a stress test recently and this was normal.  He was Dx with copd by Dr Manpreet Finley. Not on mx inhaler will start advair and see if this helps. Patient here for refill of Percocet use for chronic low back pain. Patient has had multiple surgeries of his low back recently received steroid injection through spine this helped for 1 to 2 weeks. He is written for 1-2 tabs daily he usually takes 1 tab daily however he has been having more days with needing up to 2. The 40-month is working well for him. When he does take the medication there is a meaningful reduction in his pain from an 8 out of 10 to a 4 out of 10. Opioid/Pain Management:    1. Has the patient signed a pain contract for chronic narcotic use? yes  2. Has the patient had a UDS or Serum screen as per guidelines as per [facility]?:   yes    3. Does patient meet necessary guidelines for Naloxone treatement per the [facility]?:    no  4. Has the Prescription Monitoring Program been reviewed? yes  5. Does patient have a long term condition that requires long term use of a Narcotic?  yes  6. Has patient been tolerant of therapy and responsible to routine follow up and specialist follow-up? Yes      Reviewed PmHx, RxHx, FmHx, SocHx, AllgHx and updated and dated in the chart.     Review of Systems - negative except as listed above in the HPI    Objective:     Vitals:    10/05/22 0728   BP: 128/66   Pulse: 76   Resp: 20   Temp: 98.8 °F (37.1 °C)   TempSrc: Oral   SpO2: 93%   Weight: 166 lb (75.3 kg)   Height: 5' 8\" (1.727 m)       Current Outpatient Medications   Medication Sig    irbesartan (AVAPRO) 150 mg tablet     [START ON 12/21/2022] oxyCODONE-acetaminophen (PERCOCET) 5-325 mg per tablet Take 1-2 Tablets by mouth daily as needed for Pain for up to 30 days. Indications: pain    [START ON 11/22/2022] oxyCODONE-acetaminophen (PERCOCET) 5-325 mg per tablet Take 1-2 Tablets by mouth daily as needed for Pain for up to 30 days. Indications: pain    [START ON 10/24/2022] oxyCODONE-acetaminophen (PERCOCET) 5-325 mg per tablet Take 1-2 Tablets by mouth daily as needed for Pain for up to 30 days. Indications: pain    cetirizine (ZYRTEC) 10 mg tablet Take 1 Tablet by mouth daily. fluticasone propion-salmeteroL (ADVAIR/WIXELA) 100-50 mcg/dose diskus inhaler Take 1 Puff by inhalation every twelve (12) hours. omeprazole (PRILOSEC) 40 mg capsule TAKE 1 CAPSULE DAILY    metFORMIN (GLUCOPHAGE) 1,000 mg tablet TAKE 1 TABLET TWICE A DAY WITH MEALS    ferrous sulfate (FeroSuL) 325 mg (65 mg iron) tablet Take 1 Tablet by mouth two (2) times a day. folic acid (FOLVITE) 1 mg tablet TAKE 1 TABLET DAILY, EXCEPT DAY THAT METHOTREXATE IS TAKEN    acetaminophen (TYLENOL) 500 mg tablet Take 2 Tablets by mouth every six (6) hours. calcium carbonate/vitamin D3 (CALCIUM WITH VITAMIN D3 PO) Take 1 Tablet by mouth two (2) times a day. tamsulosin (FLOMAX) 0.4 mg capsule Take 0.4 mg by mouth every morning. atorvastatin (LIPITOR) 40 mg tablet TAKE 1 TABLET DAILY (START LIPITOR AFTER VYTORIN IS FINISHED)    ranolazine ER (RANEXA) 500 mg SR tablet TAKE 1 TABLET TWICE A DAY    ascorbic acid, vitamin C, (VITAMIN C) 500 mg tablet Take 500 mg by mouth two (2) times a day. multivitamins chew Take 1 Dose by mouth every morning. 1 dose is two tablets    cyclobenzaprine (FLEXERIL) 10 mg tablet Take 1 Tablet by mouth two (2) times daily as needed for Muscle Spasm(s). (Patient not taking: No sig reported)     No current facility-administered medications for this visit.        Physical Examination: General appearance - alert, well appearing, and in no distress  Mental status - alert, oriented to person, place, and time  Chest - clear to auscultation, no wheezes, rales or rhonchi, symmetric air entry  Heart - normal rate, regular rhythm, normal S1, S2, no murmurs, rubs, clicks or gallops      Assessment/ Plan:   Diagnoses and all orders for this visit:    1. Medicare annual wellness visit, subsequent    2. Coronary artery disease of native artery of native heart with stable angina pectoris ([facility]  -     LIPID PANEL; Future  -     METABOLIC PANEL, COMPREHENSIVE; Future    3. Diabetes mellitus type 2 in nonobese (HCC)  -     LIPID PANEL; Future  -     METABOLIC PANEL, COMPREHENSIVE; Future  -     HEMOGLOBIN A1C WITH EAG; Future    4. Osteoarthritis, unspecified osteoarthritis type, unspecified site  -     oxyCODONE-acetaminophen (PERCOCET) 5-325 mg per tablet; Take 1-2 Tablets by mouth daily as needed for Pain for up to 30 days. Indications: pain  -     oxyCODONE-acetaminophen (PERCOCET) 5-325 mg per tablet; Take 1-2 Tablets by mouth daily as needed for Pain for up to 30 days. Indications: pain  -     oxyCODONE-acetaminophen (PERCOCET) 5-325 mg per tablet; Take 1-2 Tablets by mouth daily as needed for Pain for up to 30 days. Indications: pain    5. Lumbar disc disease  -     oxyCODONE-acetaminophen (PERCOCET) 5-325 mg per tablet; Take 1-2 Tablets by mouth daily as needed for Pain for up to 30 days. Indications: pain  -     oxyCODONE-acetaminophen (PERCOCET) 5-325 mg per tablet; Take 1-2 Tablets by mouth daily as needed for Pain for up to 30 days. Indications: pain  -     oxyCODONE-acetaminophen (PERCOCET) 5-325 mg per tablet; Take 1-2 Tablets by mouth daily as needed for Pain for up to 30 days. Indications: pain    6. Medication monitoring encounter  -     DRUG SCREEN-10 W/CONFIRM, SERUM; Future    7. Sinus drainage  -     cetirizine (ZYRTEC) 10 mg tablet; Take 1 Tablet by mouth daily.     8. Chronic obstructive pulmonary disease, unspecified COPD type ([facility]  - fluticasone propion-salmeteroL (ADVAIR/WIXELA) 100-50 mcg/dose diskus inhaler; Take 1 Puff by inhalation every twelve (12) hours. Follow-up and Dispositions    Return in about 1 month (around 11/5/2022), or if symptoms worsen or fail to improve. I have discussed the diagnosis with the patient and the intended plan as seen in the above orders. The patient has received an after-visit summary and questions were answered concerning future plans. Pt conveyed understanding of plan. Medication Side Effects and Warnings were discussed with patient    An electronic signature was used to authenticate this note  Delphine Nolasco, DO      This is the Subsequent Medicare Annual Wellness Exam, performed 12 months or more after the Initial AWV or the last Subsequent AWV    I have reviewed the patient's medical history in detail and updated the computerized patient record. Assessment/Plan   Education and counseling provided:  Are appropriate based on today's review and evaluation    1. Medicare annual wellness visit, subsequent  2. Coronary artery disease of native artery of native heart with stable angina pectoris ([facility]  -     LIPID PANEL; Future  -     METABOLIC PANEL, COMPREHENSIVE; Future  3. Diabetes mellitus type 2 in nonobese (HCC)  -     LIPID PANEL; Future  -     METABOLIC PANEL, COMPREHENSIVE; Future  -     HEMOGLOBIN A1C WITH EAG; Future  4. Osteoarthritis, unspecified osteoarthritis type, unspecified site  -     oxyCODONE-acetaminophen (PERCOCET) 5-325 mg per tablet; Take 1-2 Tablets by mouth daily as needed for Pain for up to 30 days. Indications: pain, Normal, Disp-40 Tablet, R-0  -     oxyCODONE-acetaminophen (PERCOCET) 5-325 mg per tablet; Take 1-2 Tablets by mouth daily as needed for Pain for up to 30 days. Indications: pain, Normal, Disp-40 Tablet, R-0  -     oxyCODONE-acetaminophen (PERCOCET) 5-325 mg per tablet; Take 1-2 Tablets by mouth daily as needed for Pain for up to 30 days. Indications: pain, Normal, Disp-40 Tablet, R-0  5. Lumbar disc disease  -     oxyCODONE-acetaminophen (PERCOCET) 5-325 mg per tablet; Take 1-2 Tablets by mouth daily as needed for Pain for up to 30 days. Indications: pain, Normal, Disp-40 Tablet, R-0  -     oxyCODONE-acetaminophen (PERCOCET) 5-325 mg per tablet; Take 1-2 Tablets by mouth daily as needed for Pain for up to 30 days. Indications: pain, Normal, Disp-40 Tablet, R-0  -     oxyCODONE-acetaminophen (PERCOCET) 5-325 mg per tablet; Take 1-2 Tablets by mouth daily as needed for Pain for up to 30 days. Indications: pain, Normal, Disp-40 Tablet, R-0  6. Medication monitoring encounter  -     DRUG SCREEN-10 W/CONFIRM, SERUM; Future  7. Sinus drainage  -     cetirizine (ZYRTEC) 10 mg tablet; Take 1 Tablet by mouth daily. , Normal, Disp-90 Tablet, R-3  8. Chronic obstructive pulmonary disease, unspecified COPD type (HCC)  -     fluticasone propion-salmeteroL (ADVAIR/WIXELA) 100-50 mcg/dose diskus inhaler; Take 1 Puff by inhalation every twelve (12) hours. , Normal, Disp-60 Each, R-2     Depression Risk Factor Screening     3 most recent PHQ Screens 10/5/2022   PHQ Not Done -   Little interest or pleasure in doing things Not at all   Feeling down, depressed, irritable, or hopeless Not at all   Total Score PHQ 2 0   Trouble falling or staying asleep, or sleeping too much -   Feeling tired or having little energy -   Poor appetite, weight loss, or overeating -   Feeling bad about yourself - or that you are a failure or have let yourself or your family down -   Trouble concentrating on things such as school, work, reading, or watching TV -   Moving or speaking so slowly that other people could have noticed; or the opposite being so fidgety that others notice -   Thoughts of being better off dead, or hurting yourself in some way -   PHQ 9 Score -   How difficult have these problems made it for you to do your work, take care of your home and get along with others - Alcohol & Drug Abuse Risk Screen    Do you average more than 1 drink per night or more than 7 drinks a week: No    In the past three months have you have had more than 4 drinks containing alcohol on one occasion: No                Functional Ability and Level of Safety    Hearing: Hearing is good. Activities of Daily Living: The home contains: no safety equipment. Has a shower seat he installed  Patient does total self care      Ambulation: with mild difficulty     Fall Risk:  Fall Risk Assessment, last 12 mths 10/5/2022   Able to walk? Yes   Fall in past 12 months? 0   Do you feel unsteady?  0   Are you worried about falling 0      Abuse Screen:  Patient is not abused       Cognitive Screening    Has your family/caregiver stated any concerns about your memory: no         Health Maintenance Due     Health Maintenance Due   Topic Date Due    Shingrix Vaccine Age 49> (1 of 2) Never done    COVID-19 Vaccine (4 - Booster for Sharma Peter series) 01/28/2022       Patient Care Team   Patient Care Team:  Liu Santiago DO as PCP - General (Family Medicine)  Liu Santiago DO as PCP - [facility]  Femi Garay MD (Orthopedic Surgery)  Brennan Abbasi MD (Orthopedic Surgery)  Max Leiva MD (Orthopedic Surgery)  Estefany Santiago MD (Orthopedic Surgery)  Marvin Cortez MD as Physician (Cardiovascular Disease Physician)  Marvin Cortez MD (Cardiovascular Disease Physician)    History     Patient Active Problem List   Diagnosis Code    Coronary artery disease I25.10    Dyslipidemia E78.5    Hypertension, benign I10    Obstructive sleep apnea G47.33    Degenerative joint disease M19.90    Diabetes mellitus type 2 in nonobese ([facility] E11.9    Lumbar disc disease M51.9    Cervical disc disease M50.90    Dupuytren's contracture of right hand M72.0    ACP (advance care planning) Z71.89    Primary osteoarthritis involving multiple joints M15.9    Adjacent segment disease with spinal stenosis [id number] RG (dyspnea on exertion) R06.09    Other chest pain R07.89    S/P cardiac cath Z98.890    Angina, class III (HCC) I20.9    Gastroesophageal reflux disease K21.9    Lumbar stenosis M48.061    Hypotension I95.9    Anemia D64.9    S/P total hip arthroplasty Z96.649    Urethral stricture N35.919     Past Medical History:   Diagnosis Date    CAD (coronary artery disease)     COVID-19 vaccine series completed 01/2021    Pfizer    Degenerative joint disease 1/28/2011    Diabetes mellitus ([facility]     Essential hypertension     History of back surgery 09/2019    Hyperlipidemia     Kidney stones 1969    ADELA on CPAP 01/28/2011      Past Surgical History:   Procedure Laterality Date    HX CARPAL TUNNEL RELEASE      HX CORONARY STENT PLACEMENT Left 1991-2011    x 3    HX HIP REPLACEMENT Bilateral 2009 & 2015    HX HIP REPLACEMENT Right 2012    Revision    HX LITHOTRIPSY N/A 1969    HX LUMBAR DISKECTOMY N/A 1978    L 4-5    HX LUMBAR FUSION N/A 2016 & 2019    HX LUMBAR LAMINECTOMY  1971    L 4-5    IR INJ SPINE FLUORO GUIDED  4/2/2021    IR INJ SPINE FLUORO GUIDED  4/30/2021     Current Outpatient Medications   Medication Sig Dispense Refill    irbesartan (AVAPRO) 150 mg tablet       [START ON 12/21/2022] oxyCODONE-acetaminophen (PERCOCET) 5-325 mg per tablet Take 1-2 Tablets by mouth daily as needed for Pain for up to 30 days. Indications: pain 40 Tablet 0    [START ON 11/22/2022] oxyCODONE-acetaminophen (PERCOCET) 5-325 mg per tablet Take 1-2 Tablets by mouth daily as needed for Pain for up to 30 days. Indications: pain 40 Tablet 0    [START ON 10/24/2022] oxyCODONE-acetaminophen (PERCOCET) 5-325 mg per tablet Take 1-2 Tablets by mouth daily as needed for Pain for up to 30 days. Indications: pain 40 Tablet 0    cetirizine (ZYRTEC) 10 mg tablet Take 1 Tablet by mouth daily. 90 Tablet 3    fluticasone propion-salmeteroL (ADVAIR/WIXELA) 100-50 mcg/dose diskus inhaler Take 1 Puff by inhalation every twelve (12) hours.  60 Each 2 omeprazole (PRILOSEC) 40 mg capsule TAKE 1 CAPSULE DAILY 90 Capsule 3    metFORMIN (GLUCOPHAGE) 1,000 mg tablet TAKE 1 TABLET TWICE A DAY WITH MEALS 180 Tablet 3    ferrous sulfate (FeroSuL) 325 mg (65 mg iron) tablet Take 1 Tablet by mouth two (2) times a day. 856 Tablet 3    folic acid (FOLVITE) 1 mg tablet TAKE 1 TABLET DAILY, EXCEPT DAY THAT METHOTREXATE IS TAKEN 90 Tablet 3    acetaminophen (TYLENOL) 500 mg tablet Take 2 Tablets by mouth every six (6) hours. 90 Tablet 0    calcium carbonate/vitamin D3 (CALCIUM WITH VITAMIN D3 PO) Take 1 Tablet by mouth two (2) times a day. tamsulosin (FLOMAX) 0.4 mg capsule Take 0.4 mg by mouth every morning. atorvastatin (LIPITOR) 40 mg tablet TAKE 1 TABLET DAILY (START LIPITOR AFTER VYTORIN IS FINISHED) 90 Tab 3    ranolazine ER (RANEXA) 500 mg SR tablet TAKE 1 TABLET TWICE A DAY 60 Tab 3    ascorbic acid, vitamin C, (VITAMIN C) 500 mg tablet Take 500 mg by mouth two (2) times a day. multivitamins chew Take 1 Dose by mouth every morning. 1 dose is two tablets      cyclobenzaprine (FLEXERIL) 10 mg tablet Take 1 Tablet by mouth two (2) times daily as needed for Muscle Spasm(s). (Patient not taking: No sig reported) 60 Tablet 0     Allergies   Allergen Reactions    Ace Inhibitors Angioedema     Face       Family History   Problem Relation Age of Onset    OSTEOARTHRITIS Mother     No Known Problems Father     Anesth Problems Neg Hx      Social History     Tobacco Use    Smoking status: Former     Packs/day: 1.00     Years: 16.00     Pack years: 16.00     Types: Cigarettes     Quit date:      Years since quittin.7    Smokeless tobacco: Never   Substance Use Topics    Alcohol use:  Yes     Alcohol/week: 0.0 standard drinks     Comment: rarely         Helga Amador, DO

## 2022-10-05 NOTE — PATIENT INSTRUCTIONS
Medicare Wellness Visit, Male    The best way to live healthy is to have a lifestyle where you eat a well-balanced diet, exercise regularly, limit alcohol use, and quit all forms of tobacco/nicotine, if applicable. Regular preventive services are another way to keep healthy. Preventive services (vaccines, screening tests, monitoring & exams) can help personalize your care plan, which helps you manage your own care. Screening tests can find health problems at the earliest stages, when they are easiest to treat. [facility] follows the current, evidence-based guidelines published by the [facility] ([facility]) when recommending preventive services for our patients. Because we follow these guidelines, sometimes recommendations change over time as research supports it. (For example, a prostate screening blood test is no longer routinely recommended for men with no symptoms). Of course, you and your doctor may decide to screen more often for some diseases, based on your risk and co-morbidities (chronic disease you are already diagnosed with). Preventive services for you include:  - Medicare offers their members a free annual wellness visit, which is time for you and your primary care provider to discuss and plan for your preventive service needs. Take advantage of this benefit every year!  -All adults over age 72 should receive the recommended pneumonia vaccines. Current USPSTF guidelines recommend a series of two vaccines for the best pneumonia protection.   -All adults should have a flu vaccine yearly and tetanus vaccine every 10 years.  -All adults age 48 and older should receive the shingles vaccines (series of two vaccines).        -All adults age 38-68 who are overweight should have a diabetes screening test once every three years.   -Other screening tests & preventive services for persons with diabetes include: an eye exam to screen for diabetic retinopathy, a kidney function test, a foot exam, and stricter control over your cholesterol.   -Cardiovascular screening for adults with routine risk involves an electrocardiogram (ECG) at intervals determined by the provider.   -Colorectal cancer screening should be done for adults age 54-65 with no increased risk factors for colorectal cancer. There are a number of acceptable methods of screening for this type of cancer. Each test has its own benefits and drawbacks. Discuss with your provider what is most appropriate for you during your annual wellness visit. The different tests include: colonoscopy (considered the best screening method), a fecal occult blood test, a fecal DNA test, and sigmoidoscopy.  -All adults born between [facility] should be screened once for Hepatitis C.  -An Abdominal Aortic Aneurysm (AAA) Screening is recommended for men age 73-68 who has ever smoked in their lifetime.      Here is a list of your current Health Maintenance items (your personalized list of preventive services) with a due date:  Health Maintenance Due   Topic Date Due    Shingles Vaccine (1 of 2) Never done    COVID-19 Vaccine (4 - Booster for Sharma Peter series) 01/28/2022

## 2022-10-05 NOTE — PROGRESS NOTES
Chief Complaint   Patient presents with    Follow-up    Diabetes    Labs     Pt being seen for fuv for diabetes   -labs    1. Have you been to the ER, urgent care clinic since your last visit? Hospitalized since your last visit? No    2. Have you seen or consulted any other health care providers outside of the [address] [place] [address] since your last visit? Include any pap smears or colon screening.  No    Pt has no other concerns

## 2022-10-11 RX ORDER — FOLIC ACID 1 MG/1
TABLET ORAL
Qty: 90 TABLET | Refills: 3 | Status: SHIPPED | OUTPATIENT
Start: 2022-10-11

## 2022-10-20 LAB
BENZODIAZEPINES, 791542: NEGATIVE NG/ML
MEPERIDINE SERPLBLD-MCNC: NEGATIVE NG/ML
MEPERIDINE SERPLBLD-MCNC: NEGATIVE UG/ML
METHADONE, 791633: NEGATIVE NG/ML
O-NORTRAMADOL BLD-MCNC: NEGATIVE NG/ML
OPIATES: NEGATIVE NG/ML
OXYCOCONE: 3 NG/ML
OXYCODONES CONFIRMATION, 738393: POSITIVE
OXYCODONES, 738315: ABNORMAL NG/ML
OXYMORPHONE, 763902: NEGATIVE NG/ML
PROPOXYPHENE, 791635: NEGATIVE NG/ML
THC (MARIJUANA) METABOLITE, 761546: NEGATIVE NG/ML
TRAMADOL BLD-MCNC: NEGATIVE NG/ML

## 2022-11-08 ENCOUNTER — VIRTUAL VISIT (OUTPATIENT)
Dept: FAMILY MEDICINE CLINIC | Age: 82
End: 2022-11-08
Payer: MEDICARE

## 2022-11-08 DIAGNOSIS — J44.9 CHRONIC OBSTRUCTIVE PULMONARY DISEASE, UNSPECIFIED COPD TYPE (HCC): Primary | ICD-10-CM

## 2022-11-08 PROCEDURE — G8427 DOCREV CUR MEDS BY ELIG CLIN: HCPCS | Performed by: FAMILY MEDICINE

## 2022-11-08 PROCEDURE — G0463 HOSPITAL OUTPT CLINIC VISIT: HCPCS | Performed by: FAMILY MEDICINE

## 2022-11-08 PROCEDURE — 1123F ACP DISCUSS/DSCN MKR DOCD: CPT | Performed by: FAMILY MEDICINE

## 2022-11-08 PROCEDURE — G8756 NO BP MEASURE DOC: HCPCS | Performed by: FAMILY MEDICINE

## 2022-11-08 PROCEDURE — 99213 OFFICE O/P EST LOW 20 MIN: CPT | Performed by: FAMILY MEDICINE

## 2022-11-08 PROCEDURE — G8417 CALC BMI ABV UP PARAM F/U: HCPCS | Performed by: FAMILY MEDICINE

## 2022-11-08 PROCEDURE — G8510 SCR DEP NEG, NO PLAN REQD: HCPCS | Performed by: FAMILY MEDICINE

## 2022-11-08 PROCEDURE — G8536 NO DOC ELDER MAL SCRN: HCPCS | Performed by: FAMILY MEDICINE

## 2022-11-08 PROCEDURE — 1101F PT FALLS ASSESS-DOCD LE1/YR: CPT | Performed by: FAMILY MEDICINE

## 2022-11-08 NOTE — PROGRESS NOTES
Progress Note    he is a [de-identified]y.o. year old male who presents for evalution. Subjective:     MrEleazar Man had been diagnosed by his pulmonologist with COPD. We had started him last month on Advair inhaler. Per his insurance he had to  3 months of this at one time. However, he does feel like he is getting some benefit from it he is not interested in titrating up the dose at this time. Reviewed PmHx, RxHx, FmHx, SocHx, AllgHx and updated and dated in the chart. Review of Systems - negative except as listed above in the HPI    Objective: There were no vitals filed for this visit. Current Outpatient Medications   Medication Sig    folic acid (FOLVITE) 1 mg tablet TAKE 1 TABLET DAILY, EXCEPT DAY THAT METHOTREXATE IS TAKEN    irbesartan (AVAPRO) 150 mg tablet     [START ON 12/21/2022] oxyCODONE-acetaminophen (PERCOCET) 5-325 mg per tablet Take 1-2 Tablets by mouth daily as needed for Pain for up to 30 days. Indications: pain    [START ON 11/22/2022] oxyCODONE-acetaminophen (PERCOCET) 5-325 mg per tablet Take 1-2 Tablets by mouth daily as needed for Pain for up to 30 days. Indications: pain    oxyCODONE-acetaminophen (PERCOCET) 5-325 mg per tablet Take 1-2 Tablets by mouth daily as needed for Pain for up to 30 days. Indications: pain    cetirizine (ZYRTEC) 10 mg tablet Take 1 Tablet by mouth daily. fluticasone propion-salmeteroL (ADVAIR/WIXELA) 100-50 mcg/dose diskus inhaler Take 1 Puff by inhalation every twelve (12) hours. omeprazole (PRILOSEC) 40 mg capsule TAKE 1 CAPSULE DAILY    metFORMIN (GLUCOPHAGE) 1,000 mg tablet TAKE 1 TABLET TWICE A DAY WITH MEALS    ferrous sulfate (FeroSuL) 325 mg (65 mg iron) tablet Take 1 Tablet by mouth two (2) times a day. acetaminophen (TYLENOL) 500 mg tablet Take 2 Tablets by mouth every six (6) hours. cyclobenzaprine (FLEXERIL) 10 mg tablet Take 1 Tablet by mouth two (2) times daily as needed for Muscle Spasm(s).  (Patient not taking: No sig reported)    calcium carbonate/vitamin D3 (CALCIUM WITH VITAMIN D3 PO) Take 1 Tablet by mouth two (2) times a day. tamsulosin (FLOMAX) 0.4 mg capsule Take 0.4 mg by mouth every morning. atorvastatin (LIPITOR) 40 mg tablet TAKE 1 TABLET DAILY (START LIPITOR AFTER VYTORIN IS FINISHED)    ranolazine ER (RANEXA) 500 mg SR tablet TAKE 1 TABLET TWICE A DAY    ascorbic acid, vitamin C, (VITAMIN C) 500 mg tablet Take 500 mg by mouth two (2) times a day. multivitamins chew Take 1 Dose by mouth every morning. 1 dose is two tablets     No current facility-administered medications for this visit. Physical Examination: General appearance - alert, well appearing, and in no distress  Mental status - alert, oriented to person, place, and time      Assessment/ Plan:   Diagnoses and all orders for this visit:    1. Chronic obstructive pulmonary disease, unspecified COPD type ([facility]  Continue with Advair 100/50. We will reevaluate in 2 months. Follow-up and Dispositions    Return in about 2 months (around 1/8/2023), or if symptoms worsen or fail to improve. I have discussed the diagnosis with the patient and the intended plan as seen in the above orders. The patient has received an after-visit summary and questions were answered concerning future plans. Pt conveyed understanding of plan. Medication Side Effects and Warnings were discussed with patient      Silvestre Weaver is being evaluated by a Virtual Visit (video visit) encounter to address concerns as mentioned above. A caregiver was present when appropriate. Due to this being a TeleHealth encounter (During XJVPE-74 public health emergency), evaluation of the following organ systems was limited: Vitals/Constitutional/EENT/Resp/CV/GI//MS/Neuro/Skin/Heme-Lymph-Imm.   Pursuant to the emergency declaration under the [facility] waiver authority and the [employer] Appropriations Act, this Virtual Visit was conducted with patient's (and/or legal guardian's) consent, to reduce the patient's risk of exposure to COVID-19 and provide necessary medical care. The patient (and/or legal guardian) has also been advised to contact this office for worsening conditions or problems, and seek emergency medical treatment and/or call 911 if deemed necessary. Patient identification was verified at the start of the visit: Yes    Services were provided through a video synchronous discussion virtually to substitute for in-person clinic visit. Patient and provider were located at their individual homes.   --Antionette Leigh DO on 11/8/2022 at 7:55 AM

## 2022-11-08 NOTE — PATIENT INSTRUCTIONS

## 2022-11-16 DIAGNOSIS — E11.9 DIABETES MELLITUS TYPE 2 IN NONOBESE (HCC): ICD-10-CM

## 2022-11-16 RX ORDER — METFORMIN HYDROCHLORIDE 1000 MG/1
TABLET ORAL
Qty: 180 TABLET | Refills: 3 | Status: SHIPPED | OUTPATIENT
Start: 2022-11-16

## 2022-12-21 ENCOUNTER — OFFICE VISIT (OUTPATIENT)
Dept: FAMILY MEDICINE CLINIC | Age: 82
End: 2022-12-21
Payer: MEDICARE

## 2022-12-21 VITALS
OXYGEN SATURATION: 96 % | DIASTOLIC BLOOD PRESSURE: 72 MMHG | HEIGHT: 68 IN | RESPIRATION RATE: 16 BRPM | HEART RATE: 69 BPM | SYSTOLIC BLOOD PRESSURE: 149 MMHG | TEMPERATURE: 98.2 F | BODY MASS INDEX: 25.61 KG/M2 | WEIGHT: 169 LBS

## 2022-12-21 DIAGNOSIS — M19.011 PRIMARY OSTEOARTHRITIS OF BOTH SHOULDERS: Primary | ICD-10-CM

## 2022-12-21 DIAGNOSIS — M19.012 PRIMARY OSTEOARTHRITIS OF BOTH SHOULDERS: Primary | ICD-10-CM

## 2022-12-21 DIAGNOSIS — M51.9 LUMBAR DISC DISEASE: ICD-10-CM

## 2022-12-21 DIAGNOSIS — M19.90 OSTEOARTHRITIS, UNSPECIFIED OSTEOARTHRITIS TYPE, UNSPECIFIED SITE: ICD-10-CM

## 2022-12-21 PROCEDURE — G8510 SCR DEP NEG, NO PLAN REQD: HCPCS | Performed by: FAMILY MEDICINE

## 2022-12-21 PROCEDURE — G0463 HOSPITAL OUTPT CLINIC VISIT: HCPCS | Performed by: FAMILY MEDICINE

## 2022-12-21 PROCEDURE — G8753 SYS BP > OR = 140: HCPCS | Performed by: FAMILY MEDICINE

## 2022-12-21 PROCEDURE — 20610 DRAIN/INJ JOINT/BURSA W/O US: CPT | Performed by: FAMILY MEDICINE

## 2022-12-21 PROCEDURE — G8417 CALC BMI ABV UP PARAM F/U: HCPCS | Performed by: FAMILY MEDICINE

## 2022-12-21 PROCEDURE — 1101F PT FALLS ASSESS-DOCD LE1/YR: CPT | Performed by: FAMILY MEDICINE

## 2022-12-21 PROCEDURE — G8536 NO DOC ELDER MAL SCRN: HCPCS | Performed by: FAMILY MEDICINE

## 2022-12-21 PROCEDURE — 1123F ACP DISCUSS/DSCN MKR DOCD: CPT | Performed by: FAMILY MEDICINE

## 2022-12-21 PROCEDURE — G8427 DOCREV CUR MEDS BY ELIG CLIN: HCPCS | Performed by: FAMILY MEDICINE

## 2022-12-21 PROCEDURE — 3074F SYST BP LT 130 MM HG: CPT | Performed by: FAMILY MEDICINE

## 2022-12-21 PROCEDURE — 3078F DIAST BP <80 MM HG: CPT | Performed by: FAMILY MEDICINE

## 2022-12-21 PROCEDURE — G8754 DIAS BP LESS 90: HCPCS | Performed by: FAMILY MEDICINE

## 2022-12-21 PROCEDURE — 99213 OFFICE O/P EST LOW 20 MIN: CPT | Performed by: FAMILY MEDICINE

## 2022-12-21 RX ORDER — OXYCODONE AND ACETAMINOPHEN 5; 325 MG/1; MG/1
1-2 TABLET ORAL
Qty: 40 TABLET | Refills: 0 | Status: SHIPPED | OUTPATIENT
Start: 2023-02-26 | End: 2023-03-28

## 2022-12-21 RX ORDER — OXYCODONE AND ACETAMINOPHEN 5; 325 MG/1; MG/1
1-2 TABLET ORAL
Qty: 40 TABLET | Refills: 0 | Status: SHIPPED | OUTPATIENT
Start: 2022-12-28 | End: 2023-01-27

## 2022-12-21 RX ORDER — TRIAMCINOLONE ACETONIDE 40 MG/ML
40 INJECTION, SUSPENSION INTRA-ARTICULAR; INTRAMUSCULAR ONCE
Qty: 2 ML | Refills: 0
Start: 2022-12-21 | End: 2022-12-21

## 2022-12-21 RX ORDER — OXYCODONE AND ACETAMINOPHEN 5; 325 MG/1; MG/1
1-2 TABLET ORAL
Qty: 40 TABLET | Refills: 0 | Status: SHIPPED | OUTPATIENT
Start: 2023-01-27 | End: 2023-02-26

## 2022-12-21 RX ORDER — LIDOCAINE HYDROCHLORIDE 10 MG/ML
INJECTION, SOLUTION EPIDURAL; INFILTRATION; INTRACAUDAL; PERINEURAL
Qty: 4 ML | Refills: 0
Start: 2022-12-21

## 2022-12-21 NOTE — PATIENT INSTRUCTIONS

## 2022-12-21 NOTE — PROGRESS NOTES
Triamcinolone Acetonide 40MG/ML  NDC: 99159-593-47  LOT: [id number]  EXP: 4/30/24  [facility]    Lidocaine 10MG/ML  NDC: 9915-8795-30  LOT: [id number]  EXP: 7/1/23  : Kerri Romeo

## 2022-12-21 NOTE — PROGRESS NOTES
Progress Note    he is a [de-identified]y.o. year old male who presents for evalution. Subjective:     Patient here for refill of Percocet use for his chronic arthritis pain. No history of abuse or misuse. Has had multiple spinal surgeries. Medication causes a meaningful reduction in his pain.  checked and appropriate. No indication for naloxone. U tox up-to-date. Both of his shpulders are bothering him quite a bit, he would like b/l shoulder injections today. Last over a year ago and has helped tremendously in the past.    Reviewed PmHx, RxHx, FmHx, SocHx, AllgHx and updated and dated in the chart. Review of Systems - negative except as listed above in the HPI    Objective:     Vitals:    12/21/22 0729   BP: (!) 149/72   Pulse: 69   Resp: 16   Temp: 98.2 °F (36.8 °C)   TempSrc: Oral   SpO2: 96%   Weight: 169 lb (76.7 kg)   Height: 5' 8\" (1.727 m)       Current Outpatient Medications   Medication Sig    [START ON 2/26/2023] oxyCODONE-acetaminophen (PERCOCET) 5-325 mg per tablet Take 1-2 Tablets by mouth daily as needed for Pain for up to 30 days. Indications: pain    [START ON 1/27/2023] oxyCODONE-acetaminophen (PERCOCET) 5-325 mg per tablet Take 1-2 Tablets by mouth daily as needed for Pain for up to 30 days. Indications: pain    [START ON 12/28/2022] oxyCODONE-acetaminophen (PERCOCET) 5-325 mg per tablet Take 1-2 Tablets by mouth daily as needed for Pain for up to 30 days. Indications: pain    triamcinolone acetonide (Kenalog) 40 mg/mL injection 1 mL by Intra artICUlar route once for 1 dose. To each shoulder    lidocaine, PF, (XYLOCAINE) 10 mg/mL (1 %) injection 2ml into each shoulder joint    metFORMIN (GLUCOPHAGE) 1,000 mg tablet TAKE 1 TABLET TWICE A DAY WITH MEALS    folic acid (FOLVITE) 1 mg tablet TAKE 1 TABLET DAILY, EXCEPT DAY THAT METHOTREXATE IS TAKEN    irbesartan (AVAPRO) 150 mg tablet     cetirizine (ZYRTEC) 10 mg tablet Take 1 Tablet by mouth daily.     omeprazole (PRILOSEC) 40 mg capsule TAKE 1 CAPSULE DAILY    ferrous sulfate (FeroSuL) 325 mg (65 mg iron) tablet Take 1 Tablet by mouth two (2) times a day. acetaminophen (TYLENOL) 500 mg tablet Take 2 Tablets by mouth every six (6) hours. calcium carbonate/vitamin D3 (CALCIUM WITH VITAMIN D3 PO) Take 1 Tablet by mouth two (2) times a day. tamsulosin (FLOMAX) 0.4 mg capsule Take 0.4 mg by mouth every morning. atorvastatin (LIPITOR) 40 mg tablet TAKE 1 TABLET DAILY (START LIPITOR AFTER VYTORIN IS FINISHED)    ranolazine ER (RANEXA) 500 mg SR tablet TAKE 1 TABLET TWICE A DAY    ascorbic acid, vitamin C, (VITAMIN C) 500 mg tablet Take 500 mg by mouth two (2) times a day. multivitamins chew Take 1 Dose by mouth every morning. 1 dose is two tablets    fluticasone propion-salmeteroL (ADVAIR/WIXELA) 100-50 mcg/dose diskus inhaler Take 1 Puff by inhalation every twelve (12) hours. (Patient not taking: Reported on 12/21/2022)    cyclobenzaprine (FLEXERIL) 10 mg tablet Take 1 Tablet by mouth two (2) times daily as needed for Muscle Spasm(s). (Patient not taking: No sig reported)     No current facility-administered medications for this visit. Physical Examination: General appearance - alert, well appearing, and in no distress  Mental status - alert, oriented to person, place, and time      Assessment/ Plan:   Diagnoses and all orders for this visit:    1. Primary osteoarthritis of both shoulders  -     CA DRAIN/INJECT LARGE JOINT/BURSA  -     TRIAMCINOLONE ACETONIDE INJ  -     triamcinolone acetonide (Kenalog) 40 mg/mL injection; 1 mL by Intra artICUlar route once for 1 dose. To each shoulder  -     lidocaine, PF, (XYLOCAINE) 10 mg/mL (1 %) injection; 2ml into each shoulder joint  -     CA DRAIN/INJECT LARGE JOINT/BURSA    2. Osteoarthritis, unspecified osteoarthritis type, unspecified site  -     oxyCODONE-acetaminophen (PERCOCET) 5-325 mg per tablet; Take 1-2 Tablets by mouth daily as needed for Pain for up to 30 days. Indications: pain  -     oxyCODONE-acetaminophen (PERCOCET) 5-325 mg per tablet; Take 1-2 Tablets by mouth daily as needed for Pain for up to 30 days. Indications: pain  -     oxyCODONE-acetaminophen (PERCOCET) 5-325 mg per tablet; Take 1-2 Tablets by mouth daily as needed for Pain for up to 30 days. Indications: pain    3. Lumbar disc disease  -     oxyCODONE-acetaminophen (PERCOCET) 5-325 mg per tablet; Take 1-2 Tablets by mouth daily as needed for Pain for up to 30 days. Indications: pain  -     oxyCODONE-acetaminophen (PERCOCET) 5-325 mg per tablet; Take 1-2 Tablets by mouth daily as needed for Pain for up to 30 days. Indications: pain  -     oxyCODONE-acetaminophen (PERCOCET) 5-325 mg per tablet; Take 1-2 Tablets by mouth daily as needed for Pain for up to 30 days. Indications: pain           I have discussed the diagnosis with the patient and the intended plan as seen in the above orders. The patient has received an after-visit summary and questions were answered concerning future plans. Pt conveyed understanding of plan. Medication Side Effects and Warnings were discussed with patient    An electronic signature was used to authenticate this note  Joaquin Gosselin, DO  Patient has agreed to the procedure and understands the risk of adverse reactions. Procedure:  Joint Injection:  bilateral shoulders    Injected joint(s) with sterile technique using 3cc of mixed 1% Lidocaine 2cc with Kenalog 40mg/mL 1 mL, with relief and no adverse reaction to procedure. Patient given instructions and expectations of after visit care. [facility]  OFFICE PROCEDURE PROGRESS NOTE        Chart reviewed for the following:   Josh NINO DO, have reviewed the History, Physical and updated the Allergic reactions.     TIME OUT performed immediately prior to start of procedure:   Josh NINO DO, have performed the following reviews prior to the start of the procedure:            * Patient was identified by name and date of birth   * Agreement on procedure being performed was verified  * Risks and Benefits explained to the patient  * Procedure site verified and marked as necessary  * Patient was positioned for comfort  * Consent was signed and verified     Time: 8:30 AM      Date of procedure: 12/21/2022    Procedure performed by:  Matilde Awan DO    Provider assisted by: Self     Patient assisted by: self    How tolerated by patient: tolerated the procedure well with no complications    Post Procedural Pain Scale: 0 - No Hurt    Comments: none

## 2022-12-21 NOTE — PROGRESS NOTES
Chief Complaint   Patient presents with    Medication Refill     Patient presents in office today for med refill of percocet. Has c/o b/l shoulder pain that keeps him up at night. Would like to discuss getting shoulder injections. No other concerns. 1. Have you been to the ER, urgent care clinic since your last visit? Hospitalized since your last visit? No    2. Have you seen or consulted any other health care providers outside of the [address] [place] [address] since your last visit? Include any pap smears or colon screening.  No    Learning Assessment 2/25/2020   PRIMARY LEARNER Patient   HIGHEST LEVEL OF EDUCATION - PRIMARY LEARNER  -   BARRIERS PRIMARY LEARNER -   CO-LEARNER CAREGIVER -   PRIMARY LANGUAGE ENGLISH   LEARNER PREFERENCE PRIMARY DEMONSTRATION   ANSWERED BY self   RELATIONSHIP SELF

## 2023-01-01 NOTE — PROGRESS NOTES
Admission Medication Reconciliation:     Information obtained from:    Patient and spouse in room 416  Medication list provided by the patient. Found to be inaccurate as to Avapro dose/frequency. A copy of the list has been placed on the chart for scanning into the EMR. RxQuery data available¹:  YES    Comments/Recommendations:   Patient able to confirm name, , allergies, and preferred pharmacy  After introduction the patient and spouse specifically note that they have completed a medication reconciliation interview with the pre-op nurse prior to surgery and the nurse on admission to the hospital.  The patient cannot recall last doses so he refers the pharmacist to the last doses documented by the admitting nurse. He reports he provided a list of last doses which he does not have a copy of at this time. The patient provides a typed medication list. A copy has been placed on the paper chart. The list was reviewed in detail with patient and spouse. The list is accurate accept for the Avapro. The Avapro is listed as 150 mg daily. Pharmacist notes that the patient had Avapro 150 mg filled per Dr. Radha Lubin on 21 for 90 pills for a 90 days supply but on 21 the patient had Avapro 150 mg filled per Dr. Jhoan Adam for 180 pills for a 90 day supply. The patient and spouse recall that the Avapro was increased from 150 mg once daily to 150 mg twice daily at this time. The patient reports he took his blood pressure once in the morning and evening 7 days prior to surgery. He reports both blood pressure checks were \"good\" meaning SBP between 130 to 140. The patient also reports he was seen by multiple providers prior to surgery and at each office visit his blood pressure was measured and noted to be \"good\". The patient denies taking amlodipine. He reports he took amlodipine at one time but the 5 mg dose did not help his blood pressure and the 10 mg dose made him dizzy.  The patient reports he stopped the amlodipine \"sometime\" ago.       [facility] benefit data reflects medications filled and processed through the patient's insurance, however   this data does NOT capture whether the medication was picked up or is currently being taken by the patient. Prior to Admission Medications   Prescriptions Last Dose Informant Taking?   acetaminophen (Tylenol Arthritis Pain) 650 mg TbER 5/30/2021  Yes   Sig: Take 1,300 mg by mouth every eight (8) hours as needed for Pain. ascorbic acid, vitamin C, (VITAMIN C) 500 mg tablet 5/25/2021 at Unknown time  Yes   Sig: Take 500 mg by mouth two (2) times a day. aspirin delayed-release 81 mg tablet 5/26/2021 at Unknown time  Yes   Sig: Take 81 mg by mouth every morning. atorvastatin (LIPITOR) 40 mg tablet 6/1/2021 at 0700  Yes   Sig: TAKE 1 TABLET DAILY (START LIPITOR AFTER VYTORIN IS FINISHED)   calcium carbonate/vitamin D3 (CALCIUM WITH VITAMIN D3 PO) 5/25/2021 at Unknown time  Yes   Sig: Take 1 Tablet by mouth two (2) times a day. clopidogreL (PLAVIX) 75 mg tab 5/26/2021  Yes   Sig: Take 75 mg by mouth every morning. folic acid (FOLVITE) 1 mg tablet   Yes   Sig: Take 1 mg by mouth daily. Indications: iron deficiency   irbesartan (Avapro) 150 mg tablet 5/31/2021 at 1900  Yes   Sig: Take 150 mg by mouth two (2) times a day. isosorbide mononitrate ER (IMDUR) 30 mg tablet 6/1/2021 at 0700  Yes   Sig: Take 30 mg by mouth every morning. metFORMIN (GLUCOPHAGE) 1,000 mg tablet 5/31/2021 at 1900  Yes   Sig: TAKE 1 TABLET TWICE A DAY WITH MEALS   multivitamins chew 5/25/2021 at Unknown time  Yes   Sig: Take 1 Dose by mouth every morning. 1 dose is two tablets   omeprazole (PRILOSEC) 40 mg capsule 6/1/2021 at 0700  Yes   Sig: Take 40 mg by mouth every morning. oxyCODONE-acetaminophen (PERCOCET) 5-325 mg per tablet 6/1/2021 at 0700  Yes   Sig: Take 1 Tab by mouth daily as needed for Pain for up to 30 days.  Indications: pain   ranolazine ER (RANEXA) 500 mg SR tablet 6/1/2021 at 0700  Yes   Sig: TAKE 1 TABLET TWICE A DAY   tamsulosin (FLOMAX) 0.4 mg capsule 6/1/2021 at 0700  Yes   Sig: Take 0.4 mg by mouth every morning. Facility-Administered Medications: None         Please contact the main inpatient pharmacy with any questions or concerns at [phone] and we will direct you to the clinical pharmacist covering this patient's care while in-house.    Jake Leslie, MeganD, BCPS Obliterated CSP on prenatal ultrasound.  HUS this week

## 2023-04-05 RX ORDER — OMEPRAZOLE 40 MG/1
CAPSULE, DELAYED RELEASE ORAL
Qty: 90 CAPSULE | Refills: 3
Start: 2023-04-05

## 2023-05-19 ENCOUNTER — OFFICE VISIT (OUTPATIENT)
Age: 83
End: 2023-05-19
Payer: MEDICARE

## 2023-05-19 VITALS
OXYGEN SATURATION: 92 % | TEMPERATURE: 98.2 F | SYSTOLIC BLOOD PRESSURE: 143 MMHG | WEIGHT: 167 LBS | DIASTOLIC BLOOD PRESSURE: 70 MMHG | BODY MASS INDEX: 25.31 KG/M2 | HEART RATE: 70 BPM | HEIGHT: 68 IN | RESPIRATION RATE: 16 BRPM

## 2023-05-19 DIAGNOSIS — Z51.81 MEDICATION MONITORING ENCOUNTER: ICD-10-CM

## 2023-05-19 DIAGNOSIS — E11.9 TYPE 2 DIABETES MELLITUS WITHOUT COMPLICATION, WITHOUT LONG-TERM CURRENT USE OF INSULIN (HCC): ICD-10-CM

## 2023-05-19 DIAGNOSIS — M15.9 PRIMARY OSTEOARTHRITIS INVOLVING MULTIPLE JOINTS: ICD-10-CM

## 2023-05-19 DIAGNOSIS — J44.9 CHRONIC OBSTRUCTIVE PULMONARY DISEASE, UNSPECIFIED COPD TYPE (HCC): Primary | ICD-10-CM

## 2023-05-19 DIAGNOSIS — M50.90 CERVICAL DISC DISEASE: ICD-10-CM

## 2023-05-19 DIAGNOSIS — M51.9 LUMBAR DISC DISEASE: ICD-10-CM

## 2023-05-19 DIAGNOSIS — I25.118 ATHEROSCLEROTIC HEART DISEASE OF NATIVE CORONARY ARTERY WITH OTHER FORMS OF ANGINA PECTORIS (HCC): ICD-10-CM

## 2023-05-19 LAB
AMPHETAMINE, URINE, POC: NEGATIVE
BARBITURATES, URINE, POC: NEGATIVE
BENZODIAZEPINES, URINE, POC: NEGATIVE
COCAINE, URINE, POC: NEGATIVE
LOT EXP DATE, POC: NORMAL
Lab: NORMAL
MARIJUANA (THC), URINE, POC: NEGATIVE
MDMA/ECSTASY, URINE, POC: NEGATIVE
METHADONE, URINE, POC: NEGATIVE
METHAMPHETAMINE, URINE, POC: NEGATIVE
NTI OTHER MICRO TRANSPORT: NEGATIVE
OPIATES, URINE, POC: NEGATIVE
OXYCODONE, URINE, POC: NORMAL
VALID INTERNAL CONTROL, POC: YES

## 2023-05-19 PROCEDURE — 99214 OFFICE O/P EST MOD 30 MIN: CPT | Performed by: FAMILY MEDICINE

## 2023-05-19 PROCEDURE — 80305 DRUG TEST PRSMV DIR OPT OBS: CPT | Performed by: FAMILY MEDICINE

## 2023-05-19 RX ORDER — CLOPIDOGREL BISULFATE 75 MG/1
75 TABLET ORAL DAILY
COMMUNITY
Start: 2020-12-22

## 2023-05-19 RX ORDER — OXYCODONE HYDROCHLORIDE AND ACETAMINOPHEN 5; 325 MG/1; MG/1
1 TABLET ORAL EVERY 12 HOURS PRN
Qty: 30 TABLET | Refills: 0 | Status: SHIPPED | OUTPATIENT
Start: 2023-07-19 | End: 2023-08-18

## 2023-05-19 RX ORDER — OXYCODONE HYDROCHLORIDE AND ACETAMINOPHEN 5; 325 MG/1; MG/1
1 TABLET ORAL EVERY 12 HOURS PRN
Qty: 40 TABLET | Refills: 0 | Status: SHIPPED | OUTPATIENT
Start: 2023-05-20 | End: 2023-06-19

## 2023-05-19 RX ORDER — CHOLECALCIFEROL (VITAMIN D3) 1250 MCG
1 CAPSULE ORAL 2 TIMES DAILY
COMMUNITY

## 2023-05-19 RX ORDER — OXYCODONE HYDROCHLORIDE AND ACETAMINOPHEN 5; 325 MG/1; MG/1
TABLET ORAL
COMMUNITY
Start: 2023-04-23 | End: 2023-05-19 | Stop reason: SDUPTHER

## 2023-05-19 RX ORDER — OXYCODONE HYDROCHLORIDE AND ACETAMINOPHEN 5; 325 MG/1; MG/1
1-2 TABLET ORAL DAILY PRN
Qty: 40 TABLET | Refills: 0 | Status: SHIPPED | OUTPATIENT
Start: 2023-06-19 | End: 2023-07-19

## 2023-05-19 SDOH — ECONOMIC STABILITY: FOOD INSECURITY: WITHIN THE PAST 12 MONTHS, YOU WORRIED THAT YOUR FOOD WOULD RUN OUT BEFORE YOU GOT MONEY TO BUY MORE.: NEVER TRUE

## 2023-05-19 SDOH — ECONOMIC STABILITY: HOUSING INSECURITY
IN THE LAST 12 MONTHS, WAS THERE A TIME WHEN YOU DID NOT HAVE A STEADY PLACE TO SLEEP OR SLEPT IN A SHELTER (INCLUDING NOW)?: NO

## 2023-05-19 SDOH — ECONOMIC STABILITY: INCOME INSECURITY: HOW HARD IS IT FOR YOU TO PAY FOR THE VERY BASICS LIKE FOOD, HOUSING, MEDICAL CARE, AND HEATING?: NOT HARD AT ALL

## 2023-05-19 SDOH — ECONOMIC STABILITY: FOOD INSECURITY: WITHIN THE PAST 12 MONTHS, THE FOOD YOU BOUGHT JUST DIDN'T LAST AND YOU DIDN'T HAVE MONEY TO GET MORE.: NEVER TRUE

## 2023-05-19 ASSESSMENT — PATIENT HEALTH QUESTIONNAIRE - PHQ9
SUM OF ALL RESPONSES TO PHQ QUESTIONS 1-9: 0
1. LITTLE INTEREST OR PLEASURE IN DOING THINGS: 0
SUM OF ALL RESPONSES TO PHQ9 QUESTIONS 1 & 2: 0
SUM OF ALL RESPONSES TO PHQ QUESTIONS 1-9: 0
2. FEELING DOWN, DEPRESSED OR HOPELESS: 0
SUM OF ALL RESPONSES TO PHQ QUESTIONS 1-9: 0
SUM OF ALL RESPONSES TO PHQ QUESTIONS 1-9: 0

## 2023-05-19 NOTE — PATIENT INSTRUCTIONS
Patient Education        A Healthy Lifestyle: Care Instructions  A healthy lifestyle can help you feel good, have more energy, and stay at a weight that's healthy for you. You can share a healthy lifestyle with your friends and family. And you can do it on your own. Eat meals with your friends or family. You could try cooking together. Plan activities with other people. Go for a walk with a friend, try a free online fitness class, or join a sports league. Eat a variety of healthy foods. These include fruits, vegetables, whole grains, low-fat dairy, and lean protein. Choose healthy portions of food. You can use the Nutrition Facts label on food packages as a guide. Eat more fruits and vegetables. You could add vegetables to sandwiches or add fruit to cereal.   Drink water when you are thirsty. Limit soda, juice, and sports drinks. Try to exercise most days. Aim for at least 2½ hours of exercise each week. Keep moving. Work in the garden or take your dog on a walk. Use the stairs instead of the elevator. If you use tobacco or nicotine, try to quit. Ask your doctor about programs and medicines to help you quit. Limit alcohol. Men should have no more than 2 drinks a day. Women should have no more than 1. For some people, no alcohol is the best choice. Follow-up care is a key part of your treatment and safety. Be sure to make and go to all appointments, and call your doctor if you are having problems. It's also a good idea to know your test results and keep a list of the medicines you take. Where can you learn more? Go to http://www.gao.com/ and enter U807 to learn more about \"A Healthy Lifestyle: Care Instructions. \"  Current as of: November 14, 2022               Content Version: 13.6  © 6297-5639 [employer], [employer]. Care instructions adapted under license by [facility] ([facility]).  If you have questions about a medical condition or this instruction, always ask your

## 2023-05-19 NOTE — PROGRESS NOTES
Chief Complaint   Patient presents with    Follow-up     Patient presents in office today for f/u and fasting labs. Needs a refill of his Percocet  No concerns. 1. Have you been to the ER, urgent care clinic since your last visit? Hospitalized since your last visit? No    2. Have you seen or consulted any other health care providers outside of the [address] [place] [address] since your last visit? Include any pap smears or colon screening.  No
capsule by mouth daily    ranolazine (RANEXA) 500 MG extended release tablet Take 1 tablet by mouth 2 times daily    tamsulosin (FLOMAX) 0.4 MG capsule Take 1 capsule by mouth    cetirizine (ZYRTEC) 10 MG tablet Take 10 mg by mouth daily (Patient not taking: Reported on 5/19/2023)    cyclobenzaprine (FLEXERIL) 10 MG tablet Take 10 mg by mouth 2 times daily as needed (Patient not taking: Reported on 5/19/2023)    lidocaine 1 % injection 2ml into each shoulder joint (Patient not taking: Reported on 5/19/2023)     No current facility-administered medications for this visit. Physical Examination: General appearance - alert, well appearing, and in no distress  Chest - clear to auscultation, no wheezes, rales or rhonchi, symmetric air entry  Heart - normal rate, regular rhythm, normal S1, S2, no murmurs, rubs, clicks or gallops      Assessment/ Plan:   Deneen Connelly was seen today for follow-up. Diagnoses and all orders for this visit:    Chronic obstructive pulmonary disease, unspecified COPD type ([facility] [address]  Followed by pulmonary associates Dr. Malathi Wang  Atherosclerotic heart disease of native coronary artery with other forms of angina pectoris ([facility] [address]  -     Lipid Panel; Future  -     Comprehensive Metabolic Panel; Future    Type 2 diabetes mellitus without complication, without long-term current use of insulin ([facility])  -     Lipid Panel; Future  -     Comprehensive Metabolic Panel; Future  -     Hemoglobin A1C; Future  -     Microalbumin / Creatinine Urine Ratio; Future    Primary osteoarthritis involving multiple joints  -     oxyCODONE-acetaminophen (PERCOCET) 5-325 MG per tablet; Take 1 tablet by mouth every 12 hours as needed for Pain for up to 30 days. Max Daily Amount: 2 tablets  -     oxyCODONE-acetaminophen (PERCOCET) 5-325 MG per tablet; Take 1-2 tablets by mouth daily as needed for Pain for up to 30 days. Max Daily Amount: 2 tablets  -     oxyCODONE-acetaminophen (PERCOCET) 5-325 MG per tablet;  Take 1 tablet by mouth

## 2023-05-20 LAB
ALBUMIN SERPL-MCNC: 3.8 G/DL (ref 3.5–5)
ALBUMIN/GLOB SERPL: 1.2 (ref 1.1–2.2)
ALP SERPL-CCNC: 83 U/L (ref 45–117)
ALT SERPL-CCNC: 19 U/L (ref 12–78)
ANION GAP SERPL CALC-SCNC: 5 MMOL/L (ref 5–15)
AST SERPL-CCNC: 19 U/L (ref 15–37)
BILIRUB SERPL-MCNC: 0.4 MG/DL (ref 0.2–1)
BUN SERPL-MCNC: 7 MG/DL (ref 6–20)
BUN/CREAT SERPL: 9 (ref 12–20)
CALCIUM SERPL-MCNC: 9.1 MG/DL (ref 8.5–10.1)
CHLORIDE SERPL-SCNC: 107 MMOL/L (ref 97–108)
CHOLEST SERPL-MCNC: 163 MG/DL
CO2 SERPL-SCNC: 28 MMOL/L (ref 21–32)
CREAT SERPL-MCNC: 0.82 MG/DL (ref 0.7–1.3)
CREAT UR-MCNC: 173 MG/DL
EST. AVERAGE GLUCOSE BLD GHB EST-MCNC: 108 MG/DL
GLOBULIN SER CALC-MCNC: 3.2 G/DL (ref 2–4)
GLUCOSE SERPL-MCNC: 81 MG/DL (ref 65–100)
HBA1C MFR BLD: 5.4 % (ref 4–5.6)
HDLC SERPL-MCNC: 64 MG/DL
HDLC SERPL: 2.5 (ref 0–5)
LDLC SERPL CALC-MCNC: 83.6 MG/DL (ref 0–100)
MICROALBUMIN UR-MCNC: 0.94 MG/DL
MICROALBUMIN/CREAT UR-RTO: 5 MG/G (ref 0–30)
POTASSIUM SERPL-SCNC: 3.5 MMOL/L (ref 3.5–5.1)
PROT SERPL-MCNC: 7 G/DL (ref 6.4–8.2)
SODIUM SERPL-SCNC: 140 MMOL/L (ref 136–145)
TRIGL SERPL-MCNC: 77 MG/DL
VLDLC SERPL CALC-MCNC: 15.4 MG/DL

## 2023-06-06 ENCOUNTER — TELEPHONE (OUTPATIENT)
Age: 83
End: 2023-06-06

## 2023-06-06 RX ORDER — SILDENAFIL 100 MG/1
100 TABLET, FILM COATED ORAL DAILY PRN
Qty: 18 TABLET | Refills: 11 | Status: SHIPPED | OUTPATIENT
Start: 2023-06-06

## 2023-06-06 NOTE — TELEPHONE ENCOUNTER
Called and spoke with patient. He states that he does not know that dosage and that we should know as we were the one's who prescribed it. I advised that we do not have record of it ever having been prescribed by our office nor do we have it on file at all. He states that he usually gets 18 dispensed but he does not know the dosage.

## 2023-06-06 NOTE — TELEPHONE ENCOUNTER
[facility] needs a refill of Viagra. They have 0 pills/supply left and are requesting a ? day supply with refills. Pharmacy has been updated in the chart. Patient was advised or scheduled an appointment for the future and to request refills thru the [facility] or by requesting a refill from their pharmacy in the future. Patient was also advised to check with their pharmacy for status of when refills are available.

## 2023-06-06 NOTE — TELEPHONE ENCOUNTER
I do not have that on his med list and I looked back historically. What dose is he taking and how many does he get at a time?

## 2023-08-22 ENCOUNTER — TELEMEDICINE (OUTPATIENT)
Age: 83
End: 2023-08-22
Payer: MEDICARE

## 2023-08-22 DIAGNOSIS — M51.9 LUMBAR DISC DISEASE: ICD-10-CM

## 2023-08-22 DIAGNOSIS — M50.90 CERVICAL DISC DISEASE: ICD-10-CM

## 2023-08-22 DIAGNOSIS — J44.9 CHRONIC OBSTRUCTIVE PULMONARY DISEASE, UNSPECIFIED COPD TYPE (HCC): Primary | ICD-10-CM

## 2023-08-22 DIAGNOSIS — M15.9 PRIMARY OSTEOARTHRITIS INVOLVING MULTIPLE JOINTS: ICD-10-CM

## 2023-08-22 DIAGNOSIS — F11.20 OPIOID DEPENDENCE WITH CURRENT USE (HCC): ICD-10-CM

## 2023-08-22 PROCEDURE — 99214 OFFICE O/P EST MOD 30 MIN: CPT | Performed by: FAMILY MEDICINE

## 2023-08-22 PROCEDURE — G8427 DOCREV CUR MEDS BY ELIG CLIN: HCPCS | Performed by: FAMILY MEDICINE

## 2023-08-22 PROCEDURE — 3023F SPIROM DOC REV: CPT | Performed by: FAMILY MEDICINE

## 2023-08-22 PROCEDURE — 1123F ACP DISCUSS/DSCN MKR DOCD: CPT | Performed by: FAMILY MEDICINE

## 2023-08-22 PROCEDURE — G8419 CALC BMI OUT NRM PARAM NOF/U: HCPCS | Performed by: FAMILY MEDICINE

## 2023-08-22 PROCEDURE — 1036F TOBACCO NON-USER: CPT | Performed by: FAMILY MEDICINE

## 2023-08-22 RX ORDER — OXYCODONE HYDROCHLORIDE AND ACETAMINOPHEN 5; 325 MG/1; MG/1
1 TABLET ORAL EVERY 12 HOURS PRN
Qty: 40 TABLET | Refills: 0 | Status: SHIPPED | OUTPATIENT
Start: 2023-08-22 | End: 2023-09-21

## 2023-08-22 RX ORDER — OXYCODONE HYDROCHLORIDE AND ACETAMINOPHEN 5; 325 MG/1; MG/1
1-2 TABLET ORAL DAILY PRN
Qty: 40 TABLET | Refills: 0 | Status: SHIPPED | OUTPATIENT
Start: 2023-09-21 | End: 2023-10-21

## 2023-08-22 RX ORDER — OXYCODONE HYDROCHLORIDE AND ACETAMINOPHEN 5; 325 MG/1; MG/1
1 TABLET ORAL EVERY 12 HOURS PRN
Qty: 40 TABLET | Refills: 0 | Status: SHIPPED | OUTPATIENT
Start: 2023-10-21 | End: 2023-11-20

## 2023-08-22 NOTE — PROGRESS NOTES
Progress Note    he is a 82y.o. year old male who presents for evaluation. Subjective:     Patient here for refill of Percocet. Used occasionally for his chronic back pain. Works well when needed with no issues with sedation. We will bring his pain from 7-8 out of 10 to a 3 out of 10. He did also recently received an injection in his back which is fortunately helping him.  she has been checked and appropriate. No indication for naloxone. He is up-to-date on the drug screen. He has signed a pain contract with me previously. Patient also reports he has been seeing pulmonary for the past 3 years still having shortness of breath he had been worked up multiple times negative by cardiology for being his heart. States he was diagnosed with COPD by pulmonary and has tried a couple different inhalers did not seem to help. He is willing to try something different and he is not sure which inhalers he has tried. Reviewed PmHx, RxHx, FmHx, SocHx, AllgHx and updated and dated in the chart. Review of Systems - negative except as listed above in the HPI    Objective: There were no vitals filed for this visit. Current Outpatient Medications   Medication Sig    [START ON 10/21/2023] oxyCODONE-acetaminophen (PERCOCET) 5-325 MG per tablet Take 1 tablet by mouth every 12 hours as needed for Pain for up to 30 days. Max Daily Amount: 2 tablets    [START ON 9/21/2023] oxyCODONE-acetaminophen (PERCOCET) 5-325 MG per tablet Take 1-2 tablets by mouth daily as needed for Pain for up to 30 days. Max Daily Amount: 2 tablets    oxyCODONE-acetaminophen (PERCOCET) 5-325 MG per tablet Take 1 tablet by mouth every 12 hours as needed for Pain for up to 30 days.  Max Daily Amount: 2 tablets    sildenafil (VIAGRA) 100 MG tablet Take 1 tablet by mouth daily as needed for Erectile Dysfunction    ASPIRIN 81 PO Take 81 mg by mouth    Cholecalciferol (VITAMIN D3) 1.25 MG (84957 UT) CAPS Take 1 capsule by mouth 2 times

## 2023-10-05 RX ORDER — FOLIC ACID 1 MG/1
TABLET ORAL
Qty: 90 TABLET | Refills: 3 | Status: SHIPPED | OUTPATIENT
Start: 2023-10-05

## 2024-01-31 ENCOUNTER — OFFICE VISIT (OUTPATIENT)
Age: 84
End: 2024-01-31
Payer: MEDICARE

## 2024-01-31 VITALS
OXYGEN SATURATION: 94 % | HEIGHT: 68 IN | HEART RATE: 68 BPM | DIASTOLIC BLOOD PRESSURE: 81 MMHG | BODY MASS INDEX: 24.25 KG/M2 | SYSTOLIC BLOOD PRESSURE: 149 MMHG | TEMPERATURE: 98.4 F | WEIGHT: 160 LBS

## 2024-01-31 DIAGNOSIS — G47.33 OBSTRUCTIVE SLEEP APNEA: ICD-10-CM

## 2024-01-31 DIAGNOSIS — J44.9 CHRONIC OBSTRUCTIVE PULMONARY DISEASE, UNSPECIFIED COPD TYPE (HCC): ICD-10-CM

## 2024-01-31 DIAGNOSIS — Z01.818 PREOP EXAMINATION: ICD-10-CM

## 2024-01-31 DIAGNOSIS — I25.119 CORONARY ARTERY DISEASE INVOLVING NATIVE CORONARY ARTERY OF NATIVE HEART WITH ANGINA PECTORIS (HCC): ICD-10-CM

## 2024-01-31 DIAGNOSIS — E11.9 DIABETES MELLITUS TYPE 2 IN NONOBESE (HCC): ICD-10-CM

## 2024-01-31 DIAGNOSIS — E78.5 DYSLIPIDEMIA: ICD-10-CM

## 2024-01-31 DIAGNOSIS — Z98.890 S/P CARDIAC CATH: ICD-10-CM

## 2024-01-31 DIAGNOSIS — F11.20 OPIOID DEPENDENCE WITH CURRENT USE (HCC): ICD-10-CM

## 2024-01-31 DIAGNOSIS — H26.9 CATARACT OF BOTH EYES, UNSPECIFIED CATARACT TYPE: Primary | ICD-10-CM

## 2024-01-31 PROCEDURE — G8427 DOCREV CUR MEDS BY ELIG CLIN: HCPCS | Performed by: STUDENT IN AN ORGANIZED HEALTH CARE EDUCATION/TRAINING PROGRAM

## 2024-01-31 PROCEDURE — 3077F SYST BP >= 140 MM HG: CPT | Performed by: STUDENT IN AN ORGANIZED HEALTH CARE EDUCATION/TRAINING PROGRAM

## 2024-01-31 PROCEDURE — 3079F DIAST BP 80-89 MM HG: CPT | Performed by: STUDENT IN AN ORGANIZED HEALTH CARE EDUCATION/TRAINING PROGRAM

## 2024-01-31 PROCEDURE — 3023F SPIROM DOC REV: CPT | Performed by: STUDENT IN AN ORGANIZED HEALTH CARE EDUCATION/TRAINING PROGRAM

## 2024-01-31 PROCEDURE — 99214 OFFICE O/P EST MOD 30 MIN: CPT | Performed by: STUDENT IN AN ORGANIZED HEALTH CARE EDUCATION/TRAINING PROGRAM

## 2024-01-31 PROCEDURE — G8420 CALC BMI NORM PARAMETERS: HCPCS | Performed by: STUDENT IN AN ORGANIZED HEALTH CARE EDUCATION/TRAINING PROGRAM

## 2024-01-31 PROCEDURE — 1123F ACP DISCUSS/DSCN MKR DOCD: CPT | Performed by: STUDENT IN AN ORGANIZED HEALTH CARE EDUCATION/TRAINING PROGRAM

## 2024-01-31 PROCEDURE — 1036F TOBACCO NON-USER: CPT | Performed by: STUDENT IN AN ORGANIZED HEALTH CARE EDUCATION/TRAINING PROGRAM

## 2024-01-31 PROCEDURE — G8484 FLU IMMUNIZE NO ADMIN: HCPCS | Performed by: STUDENT IN AN ORGANIZED HEALTH CARE EDUCATION/TRAINING PROGRAM

## 2024-01-31 RX ORDER — OMEPRAZOLE 40 MG/1
40 CAPSULE, DELAYED RELEASE ORAL DAILY
Qty: 90 CAPSULE | Refills: 3 | Status: SHIPPED | OUTPATIENT
Start: 2024-01-31

## 2024-01-31 NOTE — ASSESSMENT & PLAN NOTE
Hemoglobin A1C   Date Value Ref Range Status   05/19/2023 5.4 4.0 - 5.6 % Final     Comment:     NEW METHOD  PLEASE NOTE NEW REFERENCE RANGE  (NOTE)  HbA1C Interpretive Ranges  <5.7              Normal  5.7 - 6.4         Consider Prediabetes  >6.5              Consider Diabetes     Diabetes has been well-controlled with metformin  Due for labs, provided with lab order slip today

## 2024-01-31 NOTE — PATIENT INSTRUCTIONS
Contact you heart doctor to get surgery clearance from them as well.    [facility].

## 2024-01-31 NOTE — ASSESSMENT & PLAN NOTE
Significant cardiac history, defer cardiac clearance to patient's cardiologist.  Preop form faxed with mundo to Dr. Castle' office to call patient for a preop appointment ASAP

## 2024-01-31 NOTE — ASSESSMENT & PLAN NOTE
Oxygen dependence, nocturnal and as needed  Follows with pulmonology  No current inhaled medications  Persistent dyspnea on moderate exertion could be due to COPD or CAD

## 2024-01-31 NOTE — PROGRESS NOTES
Bo Greenberg is a 83 y.o. male , id x 2(name and ). Reviewed record, history, and  medications.      Chief Complaint   Patient presents with    Pre-op Exam     Cataract surgery first surgery is scheduled for  is          Vitals:    24 0855   BP: (!) 149/81   Pulse: 68   Temp: 98.4 °F (36.9 °C)   SpO2: 94%   Weight: 72.6 kg (160 lb)   Height: 1.727 m (5' 8\")       Coordination of Care Questionnaire:   1. Have you been to the ER, urgent care clinic since your last visit?  Hospitalized since your last visit?No    2. Have you seen or consulted any other health care providers outside of the [facility] since your last visit?  Include any pap smears or colon screening. No          2023     8:11 AM   PHQ-9    Little interest or pleasure in doing things 0   Feeling down, depressed, or hopeless 0   PHQ-2 Score 0   PHQ-9 Total Score 0            No data to display                Social Determinants of Health     Tobacco Use: Medium Risk (2024)    Patient History     Smoking Tobacco Use: Former     Smokeless Tobacco Use: Never     Passive Exposure: Not on file   Alcohol Use: Not on file   Financial Resource Strain: Low Risk  (2023)    Overall Financial Resource Strain (CARDIA)     Difficulty of Paying Living Expenses: Not hard at all   Food Insecurity: Not on file (2023)   Transportation Needs: Unknown (2023)    PRAPARE - Transportation     Lack of Transportation (Medical): Not on file     Lack of Transportation (Non-Medical): No   Physical Activity: Not on file   Stress: Not on file   Social Connections: Not on file   Intimate Partner Violence: Not on file   Depression: Not at risk (2023)    PHQ-2     PHQ-2 Score: 0   Housing Stability: Unknown (2023)    Housing Stability Vital Sign     Unable to Pay for Housing in the Last Year: Not on file     Number of Places Lived in the Last Year: Not on file     Unstable Housing in the Last Year: No   Interpersonal 
mouth every 6 hours      ascorbic acid (VITAMIN C) 500 MG tablet Take 1 tablet by mouth 2 times daily      atorvastatin (LIPITOR) 40 MG tablet TAKE 1 TABLET DAILY (START LIPITOR AFTER VYTORIN IS FINISHED)      ferrous sulfate (IRON 325) 325 (65 Fe) MG tablet Take 1 tablet by mouth 2 times daily      irbesartan (AVAPRO) 150 MG tablet ceived the following from [facility]: Outside name: irbesartan (AVAPRO) 150 mg tablet      omeprazole (PRILOSEC) 40 MG delayed release capsule Take 1 capsule by mouth daily      ranolazine (RANEXA) 500 MG extended release tablet Take 1 tablet by mouth 2 times daily       No current facility-administered medications for this visit.     Allergies   Allergen Reactions    Ace Inhibitors Angioedema     Face     Review of Systems  Pertinent items are noted in HPI.      Objective:      Physical Exam  Vitals and nursing note reviewed.   Constitutional:       General: He is not in acute distress.     Appearance: Normal appearance. He is not ill-appearing or toxic-appearing.   HENT:      Head: Normocephalic and atraumatic.      Mouth/Throat:      Mouth: Mucous membranes are moist.      Pharynx: Oropharynx is clear.      Comments: Top partial dentures  Eyes:      General:         Right eye: No discharge.         Left eye: No discharge.      Conjunctiva/sclera: Conjunctivae normal.   Cardiovascular:      Rate and Rhythm: Normal rate and regular rhythm.      Pulses: Normal pulses.      Heart sounds: Normal heart sounds.   Pulmonary:      Effort: Pulmonary effort is normal. No respiratory distress.      Breath sounds: Normal breath sounds.   Musculoskeletal:         General: No tenderness.      Cervical back: No rigidity.      Right lower leg: No edema.      Left lower leg: No edema.   Skin:     General: Skin is warm and dry.      Findings: No rash (over visualized areas).   Neurological:      General: No focal deficit present.      Mental Status: He is alert.   Psychiatric:

## 2024-02-01 LAB
ALBUMIN SERPL-MCNC: 4 G/DL (ref 3.7–4.7)
ALBUMIN/GLOB SERPL: 1.6 {RATIO} (ref 1.2–2.2)
ALP SERPL-CCNC: 91 IU/L (ref 44–121)
ALT SERPL-CCNC: 8 IU/L (ref 0–44)
AST SERPL-CCNC: 15 IU/L (ref 0–40)
BILIRUB SERPL-MCNC: 0.5 MG/DL (ref 0–1.2)
BUN SERPL-MCNC: 8 MG/DL (ref 8–27)
BUN/CREAT SERPL: 11 (ref 10–24)
CALCIUM SERPL-MCNC: 9.5 MG/DL (ref 8.6–10.2)
CHLORIDE SERPL-SCNC: 105 MMOL/L (ref 96–106)
CHOLEST SERPL-MCNC: 178 MG/DL (ref 100–199)
CO2 SERPL-SCNC: 24 MMOL/L (ref 20–29)
CREAT SERPL-MCNC: 0.75 MG/DL (ref 0.76–1.27)
EGFRCR SERPLBLD CKD-EPI 2021: 91 ML/MIN/1.73
ERYTHROCYTE [DISTWIDTH] IN BLOOD BY AUTOMATED COUNT: 13.1 % (ref 11.6–15.4)
GLOBULIN SER CALC-MCNC: 2.5 G/DL (ref 1.5–4.5)
GLUCOSE SERPL-MCNC: 86 MG/DL (ref 70–99)
HBA1C MFR BLD: 5.8 % (ref 4.8–5.6)
HCT VFR BLD AUTO: 43.1 % (ref 37.5–51)
HDLC SERPL-MCNC: 64 MG/DL
HGB BLD-MCNC: 14.1 G/DL (ref 13–17.7)
IMP & REVIEW OF LAB RESULTS: NORMAL
LDLC SERPL CALC-MCNC: 101 MG/DL (ref 0–99)
Lab: NORMAL
MCH RBC QN AUTO: 32.8 PG (ref 26.6–33)
MCHC RBC AUTO-ENTMCNC: 32.7 G/DL (ref 31.5–35.7)
MCV RBC AUTO: 100 FL (ref 79–97)
PLATELET # BLD AUTO: 322 X10E3/UL (ref 150–450)
POTASSIUM SERPL-SCNC: 4.3 MMOL/L (ref 3.5–5.2)
PROT SERPL-MCNC: 6.5 G/DL (ref 6–8.5)
RBC # BLD AUTO: 4.3 X10E6/UL (ref 4.14–5.8)
SODIUM SERPL-SCNC: 144 MMOL/L (ref 134–144)
TRIGL SERPL-MCNC: 66 MG/DL (ref 0–149)
TSH SERPL DL<=0.005 MIU/L-ACNC: 1.65 UIU/ML (ref 0.45–4.5)
VLDLC SERPL CALC-MCNC: 13 MG/DL (ref 5–40)
WBC # BLD AUTO: 6.7 X10E3/UL (ref 3.4–10.8)

## 2024-02-09 ENCOUNTER — TELEPHONE (OUTPATIENT)
Age: 84
End: 2024-02-09

## 2024-02-09 NOTE — TELEPHONE ENCOUNTER
Called and spoke with pts wife, verified .  Informed that I do not have his pre-op on file still.  Advised that final clearance was to come from cardiology.  Advised on how to print my office note from our pre-op consultation through his AMSChart.  She verbalized understanding and appreciation.

## 2024-02-09 NOTE — TELEPHONE ENCOUNTER
Kate Greenberg/ patient's wife would like to discuss paperwork for surgery that was completed at our office. Kate's phone: [phone]

## 2024-02-09 NOTE — TELEPHONE ENCOUNTER
Returned call to pt's wife. She would like to know if she coiuld put up the pt's pre-op form to take with them to his surgery. Advised that I would have to see if we kept it or sent it to scan. She verbalized understanding.       I do not have it in my paperwork, do you?

## 2024-04-15 NOTE — PROCEDURES
Caller: Markus Pires    Relationship to patient: Self    Best call back number: [phone]    Patient is needing: NEEDLES (MISC) TO GO OVER TO [facility] [place] [place] [address] - [phone]  - [phone] FX, DOES NOT USE RICKS   Cardiac Catheterization    Date of Procedure: 1/7/2020   Preoperative Diagnosis: class 3 angina, CAD  Postoperative Diagnosis: see below    Procedure: Left heart cath, coronary angiography via right radial artery  Cardiologist: Saima Alva MD  Anesthesia: local + IV sedation  Estimated Blood Loss: Minimal  Specimens removed: None  Findings: EDP nl, no AV gradient, LM ostial 30%, LAD mild plaque, LCX mid vessel diffuse 80%, OM1 ostial eccentric 50%, OM2 small 100%, OM3  with L-L filling, RCA multiple prox>=distal stents patent, %, PDA mild plaque  See full cath note.   Complications: none    Progressive CAD with severe mid LCX disease, OM3   Patent RCA stents  Normal LVEDP  Normal LVEF by recent echo    Elective PCI LCX/OM3 at [facility], may require atherectomy

## 2024-04-16 NOTE — ED TRIAGE NOTES
Please have labs/testing performed before next visit.  
Triage:  Monday started having chills. Was scheduled for back surgery on Tuesday when they did blood work his WBC were elevated and he had a fever. Went back on Wednesday and was told everything was ok. This morning woke up chills, shaking, 98.5 per EMS.
normal rate, regular rhythm, and no murmur.

## 2024-04-30 ENCOUNTER — TELEMEDICINE (OUTPATIENT)
Age: 84
End: 2024-04-30
Payer: MEDICARE

## 2024-04-30 DIAGNOSIS — M15.9 PRIMARY OSTEOARTHRITIS INVOLVING MULTIPLE JOINTS: ICD-10-CM

## 2024-04-30 DIAGNOSIS — M50.90 CERVICAL DISC DISEASE: ICD-10-CM

## 2024-04-30 DIAGNOSIS — I25.10 CORONARY ARTERY DISEASE DUE TO LIPID RICH PLAQUE: Primary | ICD-10-CM

## 2024-04-30 DIAGNOSIS — M51.9 LUMBAR DISC DISEASE: ICD-10-CM

## 2024-04-30 DIAGNOSIS — I25.83 CORONARY ARTERY DISEASE DUE TO LIPID RICH PLAQUE: Primary | ICD-10-CM

## 2024-04-30 PROCEDURE — 1123F ACP DISCUSS/DSCN MKR DOCD: CPT | Performed by: FAMILY MEDICINE

## 2024-04-30 PROCEDURE — G8420 CALC BMI NORM PARAMETERS: HCPCS | Performed by: FAMILY MEDICINE

## 2024-04-30 PROCEDURE — 1036F TOBACCO NON-USER: CPT | Performed by: FAMILY MEDICINE

## 2024-04-30 PROCEDURE — 99214 OFFICE O/P EST MOD 30 MIN: CPT | Performed by: FAMILY MEDICINE

## 2024-04-30 PROCEDURE — G8427 DOCREV CUR MEDS BY ELIG CLIN: HCPCS | Performed by: FAMILY MEDICINE

## 2024-04-30 RX ORDER — OXYCODONE HYDROCHLORIDE AND ACETAMINOPHEN 5; 325 MG/1; MG/1
1-2 TABLET ORAL DAILY PRN
Qty: 40 TABLET | Refills: 0 | Status: SHIPPED | OUTPATIENT
Start: 2024-06-29 | End: 2024-07-29

## 2024-04-30 RX ORDER — EZETIMIBE 10 MG/1
10 TABLET ORAL DAILY
Qty: 90 TABLET | Refills: 1 | Status: SHIPPED | OUTPATIENT
Start: 2024-04-30

## 2024-04-30 RX ORDER — OXYCODONE HYDROCHLORIDE AND ACETAMINOPHEN 5; 325 MG/1; MG/1
1-2 TABLET ORAL DAILY PRN
Qty: 40 TABLET | Refills: 0 | Status: SHIPPED | OUTPATIENT
Start: 2024-05-30 | End: 2024-06-29

## 2024-04-30 RX ORDER — OXYCODONE HYDROCHLORIDE AND ACETAMINOPHEN 5; 325 MG/1; MG/1
1-2 TABLET ORAL DAILY PRN
Qty: 40 TABLET | Refills: 0 | Status: SHIPPED | OUTPATIENT
Start: 2024-04-30 | End: 2024-05-30

## 2024-04-30 SDOH — ECONOMIC STABILITY: FOOD INSECURITY: WITHIN THE PAST 12 MONTHS, YOU WORRIED THAT YOUR FOOD WOULD RUN OUT BEFORE YOU GOT MONEY TO BUY MORE.: NEVER TRUE

## 2024-04-30 SDOH — ECONOMIC STABILITY: FOOD INSECURITY: WITHIN THE PAST 12 MONTHS, THE FOOD YOU BOUGHT JUST DIDN'T LAST AND YOU DIDN'T HAVE MONEY TO GET MORE.: NEVER TRUE

## 2024-04-30 SDOH — ECONOMIC STABILITY: INCOME INSECURITY: HOW HARD IS IT FOR YOU TO PAY FOR THE VERY BASICS LIKE FOOD, HOUSING, MEDICAL CARE, AND HEATING?: NOT HARD AT ALL

## 2024-04-30 ASSESSMENT — PATIENT HEALTH QUESTIONNAIRE - PHQ9
SUM OF ALL RESPONSES TO PHQ QUESTIONS 1-9: 0
SUM OF ALL RESPONSES TO PHQ QUESTIONS 1-9: 0
1. LITTLE INTEREST OR PLEASURE IN DOING THINGS: NOT AT ALL
SUM OF ALL RESPONSES TO PHQ QUESTIONS 1-9: 0
SUM OF ALL RESPONSES TO PHQ9 QUESTIONS 1 & 2: 0
2. FEELING DOWN, DEPRESSED OR HOPELESS: NOT AT ALL
SUM OF ALL RESPONSES TO PHQ QUESTIONS 1-9: 0

## 2024-04-30 NOTE — PATIENT INSTRUCTIONS

## 2024-05-09 ENCOUNTER — OFFICE VISIT (OUTPATIENT)
Age: 84
End: 2024-05-09
Payer: MEDICARE

## 2024-05-09 VITALS
DIASTOLIC BLOOD PRESSURE: 75 MMHG | SYSTOLIC BLOOD PRESSURE: 148 MMHG | RESPIRATION RATE: 16 BRPM | WEIGHT: 159 LBS | HEIGHT: 68 IN | OXYGEN SATURATION: 94 % | BODY MASS INDEX: 24.1 KG/M2 | TEMPERATURE: 97.9 F | HEART RATE: 62 BPM

## 2024-05-09 DIAGNOSIS — M19.012 BILATERAL SHOULDER REGION ARTHRITIS: Primary | ICD-10-CM

## 2024-05-09 DIAGNOSIS — Z51.81 MEDICATION MONITORING ENCOUNTER: ICD-10-CM

## 2024-05-09 DIAGNOSIS — M19.011 BILATERAL SHOULDER REGION ARTHRITIS: Primary | ICD-10-CM

## 2024-05-09 LAB
AMPHETAMINE, URINE, POC: NEGATIVE
BARBITURATES, URINE, POC: NEGATIVE
BENZODIAZEPINES, URINE, POC: NEGATIVE
COCAINE, URINE, POC: NEGATIVE
LOT EXP DATE, POC: NORMAL
Lab: NORMAL
MARIJUANA (THC), URINE, POC: NEGATIVE
MDMA/ECSTASY, URINE, POC: NEGATIVE
METHADONE, URINE, POC: NEGATIVE
METHAMPHETAMINE, URINE, POC: NEGATIVE
OPIATES 300, URINE, POC: NEGATIVE
OXYCODONE, URINE, POC: NORMAL
PHENCYCLIDINE, URINE, POC: NEGATIVE
TRICYCLIC ANTIDEPRESSANTS, URINE, POC: NEGATIVE

## 2024-05-09 PROCEDURE — 99214 OFFICE O/P EST MOD 30 MIN: CPT | Performed by: FAMILY MEDICINE

## 2024-05-09 PROCEDURE — 20610 DRAIN/INJ JOINT/BURSA W/O US: CPT | Performed by: FAMILY MEDICINE

## 2024-05-09 PROCEDURE — 99213 OFFICE O/P EST LOW 20 MIN: CPT | Performed by: FAMILY MEDICINE

## 2024-05-09 PROCEDURE — 80305 DRUG TEST PRSMV DIR OPT OBS: CPT | Performed by: FAMILY MEDICINE

## 2024-05-09 RX ORDER — LIDOCAINE HCL/PF 50 MG/5 ML
4 SYRINGE (ML) INJECTION ONCE
Status: COMPLETED | OUTPATIENT
Start: 2024-05-09 | End: 2024-05-09

## 2024-05-09 RX ORDER — TRIAMCINOLONE ACETONIDE 40 MG/ML
80 INJECTION, SUSPENSION INTRA-ARTICULAR; INTRAMUSCULAR ONCE
Status: COMPLETED | OUTPATIENT
Start: 2024-05-09 | End: 2024-05-09

## 2024-05-09 RX ADMIN — TRIAMCINOLONE ACETONIDE 80 MG: 40 INJECTION, SUSPENSION INTRA-ARTICULAR; INTRAMUSCULAR at 13:58

## 2024-05-09 RX ADMIN — Medication 4 ML: at 13:57

## 2024-05-09 NOTE — PROGRESS NOTES
Progress Note    he is a 84 y.o. year old male who presents for evaluation.    Subjective:     Patient here with bilateral shoulder pain history of osteoarthritis.  We had previously injected his shoulders with good relief.  This lasted for many many months.  It has been greater than 3 months since his last injections.  He would like to proceed with these today.  Written consent obtained.  Risks and benefits side effects discussed with patient.  Patient is also on chronic pain medication last Percocet was taken yesterday he does take these intermittently.  Due for U tox.  Reviewed PmHx, RxHx, FmHx, SocHx, AllgHx and updated and dated in the chart.    Review of Systems - negative except as listed above in the HPI    Objective:     Vitals:    05/09/24 1337   BP: (!) 148/75   Site: Right Upper Arm   Position: Sitting   Pulse: 62   Resp: 16   Temp: 97.9 °F (36.6 °C)   TempSrc: Oral   SpO2: 94%   Weight: 72.1 kg (159 lb)   Height: 1.727 m (5' 8\")       Current Outpatient Medications   Medication Sig    [START ON 6/29/2024] oxyCODONE-acetaminophen (PERCOCET) 5-325 MG per tablet Take 1-2 tablets by mouth daily as needed for Pain for up to 30 days. Max Daily Amount: 2 tablets    [START ON 5/30/2024] oxyCODONE-acetaminophen (PERCOCET) 5-325 MG per tablet Take 1-2 tablets by mouth daily as needed for Pain for up to 30 days. Max Daily Amount: 2 tablets    oxyCODONE-acetaminophen (PERCOCET) 5-325 MG per tablet Take 1-2 tablets by mouth daily as needed for Pain for up to 30 days. Max Daily Amount: 2 tablets    ezetimibe (ZETIA) 10 MG tablet Take 1 tablet by mouth daily    omeprazole (PRILOSEC) 40 MG delayed release capsule TAKE 1 CAPSULE DAILY    metFORMIN (GLUCOPHAGE) 1000 MG tablet TAKE 1 TABLET TWICE A DAY WITH MEALS    folic acid (FOLVITE) 1 MG tablet TAKE 1 TABLET DAILY, EXCEPT DAY THAT METHOTREXATE IS TAKEN    sildenafil (VIAGRA) 100 MG tablet Take 1 tablet by mouth daily as needed for Erectile Dysfunction    ASPIRIN

## 2024-06-19 ENCOUNTER — HOSPITAL ENCOUNTER (OUTPATIENT)
Facility: HOSPITAL | Age: 84
Discharge: HOME | End: 2024-06-22
Attending: ORTHOPAEDIC SURGERY
Payer: MEDICARE

## 2024-06-19 DIAGNOSIS — M43.16 LUMBAR ADJACENT SEGMENT DISEASE WITH SPONDYLOLISTHESIS: ICD-10-CM

## 2024-06-19 DIAGNOSIS — M51.36 LUMBAR ADJACENT SEGMENT DISEASE WITH SPONDYLOLISTHESIS: ICD-10-CM

## 2024-06-19 DIAGNOSIS — M54.16 RIGHT LUMBAR RADICULOPATHY: ICD-10-CM

## 2024-06-19 PROCEDURE — 72148 MRI LUMBAR SPINE W/O DYE: CPT

## 2024-08-20 ENCOUNTER — OFFICE VISIT (OUTPATIENT)
Age: 84
End: 2024-08-20
Payer: MEDICARE

## 2024-08-20 VITALS
WEIGHT: 157 LBS | TEMPERATURE: 98.1 F | OXYGEN SATURATION: 97 % | SYSTOLIC BLOOD PRESSURE: 128 MMHG | HEART RATE: 70 BPM | DIASTOLIC BLOOD PRESSURE: 73 MMHG | HEIGHT: 68 IN | RESPIRATION RATE: 18 BRPM | BODY MASS INDEX: 23.79 KG/M2

## 2024-08-20 DIAGNOSIS — F11.20 OPIOID DEPENDENCE WITH CURRENT USE (HCC): ICD-10-CM

## 2024-08-20 DIAGNOSIS — E11.9 TYPE 2 DIABETES MELLITUS WITHOUT COMPLICATION, WITHOUT LONG-TERM CURRENT USE OF INSULIN (HCC): ICD-10-CM

## 2024-08-20 DIAGNOSIS — I10 PRIMARY HYPERTENSION: ICD-10-CM

## 2024-08-20 DIAGNOSIS — J44.9 CHRONIC OBSTRUCTIVE PULMONARY DISEASE, UNSPECIFIED COPD TYPE (HCC): ICD-10-CM

## 2024-08-20 DIAGNOSIS — Z96.649 STATUS POST TOTAL REPLACEMENT OF HIP, UNSPECIFIED LATERALITY: ICD-10-CM

## 2024-08-20 DIAGNOSIS — M15.9 PRIMARY OSTEOARTHRITIS INVOLVING MULTIPLE JOINTS: ICD-10-CM

## 2024-08-20 DIAGNOSIS — M50.90 CERVICAL DISC DISEASE: ICD-10-CM

## 2024-08-20 DIAGNOSIS — M51.9 UNSPECIFIED THORACIC, THORACOLUMBAR AND LUMBOSACRAL INTERVERTEBRAL DISC DISORDER: ICD-10-CM

## 2024-08-20 DIAGNOSIS — M51.9 LUMBAR DISC DISEASE: ICD-10-CM

## 2024-08-20 DIAGNOSIS — M48.062 SPINAL STENOSIS OF LUMBAR REGION WITH NEUROGENIC CLAUDICATION: ICD-10-CM

## 2024-08-20 DIAGNOSIS — K21.9 GASTROESOPHAGEAL REFLUX DISEASE WITHOUT ESOPHAGITIS: ICD-10-CM

## 2024-08-20 DIAGNOSIS — I25.118 CORONARY ARTERY DISEASE OF NATIVE ARTERY OF NATIVE HEART WITH STABLE ANGINA PECTORIS (HCC): ICD-10-CM

## 2024-08-20 DIAGNOSIS — Z00.00 MEDICARE ANNUAL WELLNESS VISIT, SUBSEQUENT: Primary | ICD-10-CM

## 2024-08-20 LAB
ALBUMIN SERPL-MCNC: 4.1 G/DL (ref 3.5–5)
ALBUMIN/GLOB SERPL: 1.3 (ref 1.1–2.2)
ALP SERPL-CCNC: 84 U/L (ref 45–117)
ALT SERPL-CCNC: 14 U/L (ref 12–78)
ANION GAP SERPL CALC-SCNC: 3 MMOL/L (ref 5–15)
AST SERPL-CCNC: 20 U/L (ref 15–37)
BASOPHILS # BLD: 0 K/UL (ref 0–0.1)
BASOPHILS NFR BLD: 1 % (ref 0–1)
BILIRUB SERPL-MCNC: 0.5 MG/DL (ref 0.2–1)
BUN SERPL-MCNC: 12 MG/DL (ref 6–20)
BUN/CREAT SERPL: 14 (ref 12–20)
CALCIUM SERPL-MCNC: 9.6 MG/DL (ref 8.5–10.1)
CHLORIDE SERPL-SCNC: 106 MMOL/L (ref 97–108)
CHOLEST SERPL-MCNC: 180 MG/DL
CO2 SERPL-SCNC: 30 MMOL/L (ref 21–32)
CREAT SERPL-MCNC: 0.83 MG/DL (ref 0.7–1.3)
DIFFERENTIAL METHOD BLD: ABNORMAL
EOSINOPHIL # BLD: 0 K/UL (ref 0–0.4)
EOSINOPHIL NFR BLD: 1 % (ref 0–7)
ERYTHROCYTE [DISTWIDTH] IN BLOOD BY AUTOMATED COUNT: 12.6 % (ref 11.5–14.5)
EST. AVERAGE GLUCOSE BLD GHB EST-MCNC: 114 MG/DL
GLOBULIN SER CALC-MCNC: 3.2 G/DL (ref 2–4)
GLUCOSE SERPL-MCNC: 89 MG/DL (ref 65–100)
HBA1C MFR BLD: 5.6 % (ref 4–5.6)
HCT VFR BLD AUTO: 44.4 % (ref 36.6–50.3)
HDLC SERPL-MCNC: 80 MG/DL
HDLC SERPL: 2.3 (ref 0–5)
HGB BLD-MCNC: 14.5 G/DL (ref 12.1–17)
IMM GRANULOCYTES # BLD AUTO: 0 K/UL (ref 0–0.04)
IMM GRANULOCYTES NFR BLD AUTO: 0 % (ref 0–0.5)
LDLC SERPL CALC-MCNC: 85.2 MG/DL (ref 0–100)
LYMPHOCYTES # BLD: 1.9 K/UL (ref 0.8–3.5)
LYMPHOCYTES NFR BLD: 31 % (ref 12–49)
MCH RBC QN AUTO: 32.6 PG (ref 26–34)
MCHC RBC AUTO-ENTMCNC: 32.7 G/DL (ref 30–36.5)
MCV RBC AUTO: 99.8 FL (ref 80–99)
MONOCYTES # BLD: 0.6 K/UL (ref 0–1)
MONOCYTES NFR BLD: 9 % (ref 5–13)
NEUTS SEG # BLD: 3.5 K/UL (ref 1.8–8)
NEUTS SEG NFR BLD: 58 % (ref 32–75)
NRBC # BLD: 0 K/UL (ref 0–0.01)
NRBC BLD-RTO: 0 PER 100 WBC
PLATELET # BLD AUTO: 292 K/UL (ref 150–400)
PMV BLD AUTO: 10.1 FL (ref 8.9–12.9)
POTASSIUM SERPL-SCNC: 4.1 MMOL/L (ref 3.5–5.1)
PROT SERPL-MCNC: 7.3 G/DL (ref 6.4–8.2)
RBC # BLD AUTO: 4.45 M/UL (ref 4.1–5.7)
SODIUM SERPL-SCNC: 139 MMOL/L (ref 136–145)
TRIGL SERPL-MCNC: 74 MG/DL
TSH SERPL DL<=0.05 MIU/L-ACNC: 1.86 UIU/ML (ref 0.36–3.74)
VLDLC SERPL CALC-MCNC: 14.8 MG/DL
WBC # BLD AUTO: 6 K/UL (ref 4.1–11.1)

## 2024-08-20 PROCEDURE — 99214 OFFICE O/P EST MOD 30 MIN: CPT

## 2024-08-20 PROCEDURE — 3044F HG A1C LEVEL LT 7.0%: CPT

## 2024-08-20 PROCEDURE — G8420 CALC BMI NORM PARAMETERS: HCPCS

## 2024-08-20 PROCEDURE — G0439 PPPS, SUBSEQ VISIT: HCPCS

## 2024-08-20 PROCEDURE — 3023F SPIROM DOC REV: CPT

## 2024-08-20 PROCEDURE — 3078F DIAST BP <80 MM HG: CPT

## 2024-08-20 PROCEDURE — 1036F TOBACCO NON-USER: CPT

## 2024-08-20 PROCEDURE — 1123F ACP DISCUSS/DSCN MKR DOCD: CPT

## 2024-08-20 PROCEDURE — 3074F SYST BP LT 130 MM HG: CPT

## 2024-08-20 PROCEDURE — G8427 DOCREV CUR MEDS BY ELIG CLIN: HCPCS

## 2024-08-20 RX ORDER — OXYCODONE HYDROCHLORIDE AND ACETAMINOPHEN 5; 325 MG/1; MG/1
2 TABLET ORAL DAILY PRN
Qty: 40 TABLET | Refills: 0 | Status: SHIPPED | OUTPATIENT
Start: 2024-10-19 | End: 2024-11-18

## 2024-08-20 RX ORDER — OXYCODONE HYDROCHLORIDE AND ACETAMINOPHEN 5; 325 MG/1; MG/1
2 TABLET ORAL DAILY PRN
Qty: 40 TABLET | Refills: 0 | Status: SHIPPED | OUTPATIENT
Start: 2024-09-19 | End: 2024-10-19

## 2024-08-20 RX ORDER — OXYCODONE HYDROCHLORIDE AND ACETAMINOPHEN 5; 325 MG/1; MG/1
2 TABLET ORAL DAILY PRN
Qty: 40 TABLET | Refills: 0 | Status: SHIPPED | OUTPATIENT
Start: 2024-08-20 | End: 2024-09-19

## 2024-08-20 ASSESSMENT — PATIENT HEALTH QUESTIONNAIRE - PHQ9
2. FEELING DOWN, DEPRESSED OR HOPELESS: NOT AT ALL
SUM OF ALL RESPONSES TO PHQ QUESTIONS 1-9: 0
SUM OF ALL RESPONSES TO PHQ QUESTIONS 1-9: 0
SUM OF ALL RESPONSES TO PHQ9 QUESTIONS 1 & 2: 0
1. LITTLE INTEREST OR PLEASURE IN DOING THINGS: NOT AT ALL
SUM OF ALL RESPONSES TO PHQ QUESTIONS 1-9: 0
SUM OF ALL RESPONSES TO PHQ QUESTIONS 1-9: 0

## 2024-08-20 ASSESSMENT — LIFESTYLE VARIABLES
HOW OFTEN DO YOU HAVE A DRINK CONTAINING ALCOHOL: NEVER
HOW MANY STANDARD DRINKS CONTAINING ALCOHOL DO YOU HAVE ON A TYPICAL DAY: PATIENT DOES NOT DRINK

## 2024-08-20 NOTE — ASSESSMENT & PLAN NOTE
Currently taking very low-dose of opiates-Percocet 5 mg 1 to 2 tablets/day for pain.  He admits to not even taking it every day.  PDMP as well as urine test here in the office have been appropriate.  Regardless, we discussed the dangers of taking opiates long-term, especially at his age.  At this point I would like to refer to palliative medicine for further workup to see if the opiates are indeed appropriate.  UDS ordered today

## 2024-08-20 NOTE — ASSESSMENT & PLAN NOTE
Seeing orthopedics, plan per them.  Currently on opiates for pain, I am referring to palliative medicine for further evaluation in this regard.

## 2024-08-20 NOTE — ASSESSMENT & PLAN NOTE
Oxygen dependence, nocturnal and as needed  Follows with pulmonology  No current inhaled medications  Persistent dyspnea on moderate exertion could be due to COPD or CAD    Referral to palliative medicine.

## 2024-08-20 NOTE — PATIENT INSTRUCTIONS
Learning About Being Active as an Older Adult  Why is being active important as you get older?     Being active is one of the best things you can do for your health. And it's never too late to start. Being active--or getting active, if you aren't already--has definite benefits. It can:  Give you more energy,  Keep your mind sharp.  Improve balance to reduce your risk of falls.  Help you manage chronic illness with fewer medicines.  No matter how old you are, how fit you are, or what health problems you have, there is a form of activity that will work for you. And the more physical activity you can do, the better your overall health will be.  What kinds of activity can help you stay healthy?  Being more active will make your daily activities easier. Physical activity includes planned exercise and things you do in daily life. There are four types of activity:  Aerobic.  Doing aerobic activity makes your heart and lungs strong.  Includes walking, dancing, and gardening.  Aim for at least 2½ hours spread throughout the week.  It improves your energy and can help you sleep better.  Muscle-strengthening.  This type of activity can help maintain muscle and strengthen bones.  Includes climbing stairs, using resistance bands, and lifting or carrying heavy loads.  Aim for at least twice a week.  It can help protect the knees and other joints.  Stretching.  Stretching gives you better range of motion in joints and muscles.  Includes upper arm stretches, calf stretches, and gentle yoga.  Aim for at least twice a week, preferably after your muscles are warmed up from other activities.  It can help you function better in daily life.  Balancing.  This helps you stay coordinated and have good posture.  Includes heel-to-toe walking, kristen chi, and certain types of yoga.  Aim for at least 3 days a week.  It can reduce your risk of falling.  Even if you have a hard time meeting the recommendations, it's better to be more active

## 2024-08-20 NOTE — PROGRESS NOTES
Chief Complaint   Patient presents with    Medicare AWV    Back Pain     Med refill  Dr Villaseñor    Hypertension    COPD     Pulmonary: Dr Zhang  Morning chest congestion    Lab Collection     \"Have you been to the ER, urgent care clinic since your last visit?  Hospitalized since your last visit?\"    NO    “Have you seen or consulted any other health care providers outside of [facility] since your last visit?”    Pulmonary: Dr Zhang Pain management. Injections done.  Ablation scheduled. Dr Harding              Click Here for Release of Records Request    
exercise (like a brisk walk)?: 0 days (!) Abnormal  On average, how many minutes do you engage in exercise at this level?: 0 min  Interventions:  See AVS for additional education material                         Objective   Vitals:    08/20/24 0922   BP: 128/73   Pulse: 70   Resp: 18   Temp: 98.1 °F (36.7 °C)   TempSrc: Oral   SpO2: 97%   Weight: 71.2 kg (157 lb)   Height: 1.727 m (5' 8\")      Body mass index is 23.87 kg/m².        Physical Exam  Vitals and nursing note reviewed.   Constitutional:       General: He is not in acute distress.     Appearance: Normal appearance. He is not ill-appearing.   HENT:      Head: Normocephalic and atraumatic.      Right Ear: Tympanic membrane, ear canal and external ear normal. There is no impacted cerumen.      Left Ear: Tympanic membrane, ear canal and external ear normal. There is no impacted cerumen.      Nose: Nose normal. No congestion or rhinorrhea.      Mouth/Throat:      Mouth: Mucous membranes are moist.      Pharynx: Oropharynx is clear.   Eyes:      General: No scleral icterus.        Right eye: No discharge.         Left eye: No discharge.      Extraocular Movements: Extraocular movements intact.      Conjunctiva/sclera: Conjunctivae normal.      Pupils: Pupils are equal, round, and reactive to light.   Cardiovascular:      Rate and Rhythm: Normal rate and regular rhythm.      Pulses: Normal pulses.      Heart sounds: Normal heart sounds. No murmur heard.     No friction rub. No gallop.   Pulmonary:      Effort: Pulmonary effort is normal. No respiratory distress.      Breath sounds: Normal breath sounds. No stridor. No wheezing, rhonchi or rales.   Abdominal:      General: Abdomen is flat. Bowel sounds are normal. There is no distension.      Palpations: Abdomen is soft. There is no mass.      Tenderness: There is no abdominal tenderness. There is no guarding.   Musculoskeletal:         General: Normal range of motion.      Cervical back: Normal range of motion.

## 2024-08-22 LAB
AMPHETAMINES UR QL SCN: NEGATIVE NG/ML
BARBITURATES UR QL SCN: NEGATIVE NG/ML
BENZODIAZ UR QL SCN: NEGATIVE NG/ML
BZE UR QL SCN: NEGATIVE NG/ML
CANNABINOIDS UR QL SCN: NEGATIVE NG/ML
COMMENT:: NORMAL
OPIATES UR QL SCN: NEGATIVE NG/ML
PCP UR QL SCN: NEGATIVE NG/ML

## 2024-08-27 ENCOUNTER — TELEPHONE (OUTPATIENT)
Age: 84
End: 2024-08-27

## 2024-09-16 ENCOUNTER — TELEPHONE (OUTPATIENT)
Age: 84
End: 2024-09-16

## 2024-09-16 DIAGNOSIS — I25.10 CORONARY ARTERY DISEASE DUE TO LIPID RICH PLAQUE: ICD-10-CM

## 2024-09-16 DIAGNOSIS — I25.83 CORONARY ARTERY DISEASE DUE TO LIPID RICH PLAQUE: ICD-10-CM

## 2024-09-17 RX ORDER — EZETIMIBE 10 MG/1
10 TABLET ORAL DAILY
Qty: 90 TABLET | Refills: 1 | Status: SHIPPED | OUTPATIENT
Start: 2024-09-17

## 2024-09-30 ENCOUNTER — TELEPHONE (OUTPATIENT)
Age: 84
End: 2024-09-30

## 2024-10-01 RX ORDER — FOLIC ACID 1 MG/1
TABLET ORAL
Qty: 90 TABLET | Refills: 3 | Status: SHIPPED | OUTPATIENT
Start: 2024-10-01

## 2024-10-29 RX ORDER — SILDENAFIL 100 MG/1
TABLET, FILM COATED ORAL
Qty: 18 TABLET | Refills: 5 | Status: SHIPPED | OUTPATIENT
Start: 2024-10-29

## 2024-11-04 ENCOUNTER — CLINICAL DOCUMENTATION (OUTPATIENT)
Age: 84
End: 2024-11-04

## 2024-11-08 ENCOUNTER — TELEPHONE (OUTPATIENT)
Age: 84
End: 2024-11-08

## 2024-11-08 NOTE — TELEPHONE ENCOUNTER
We received a fax refill request for Bo Greenberg.  Please escribe Metformin to their pharmacy.  The pharmacy is correct in the chart and they are requesting a 90 day supply.

## 2024-11-20 ENCOUNTER — OFFICE VISIT (OUTPATIENT)
Facility: CLINIC | Age: 84
End: 2024-11-20
Payer: MEDICARE

## 2024-11-20 VITALS
WEIGHT: 162 LBS | HEIGHT: 68 IN | DIASTOLIC BLOOD PRESSURE: 76 MMHG | HEART RATE: 71 BPM | SYSTOLIC BLOOD PRESSURE: 150 MMHG | BODY MASS INDEX: 24.55 KG/M2 | RESPIRATION RATE: 18 BRPM | OXYGEN SATURATION: 96 %

## 2024-11-20 DIAGNOSIS — M51.9 UNSPECIFIED THORACIC, THORACOLUMBAR AND LUMBOSACRAL INTERVERTEBRAL DISC DISORDER: ICD-10-CM

## 2024-11-20 DIAGNOSIS — M15.0 PRIMARY OSTEOARTHRITIS INVOLVING MULTIPLE JOINTS: ICD-10-CM

## 2024-11-20 DIAGNOSIS — M51.9 LUMBAR DISC DISEASE: Primary | ICD-10-CM

## 2024-11-20 PROCEDURE — G8420 CALC BMI NORM PARAMETERS: HCPCS

## 2024-11-20 PROCEDURE — 1159F MED LIST DOCD IN RCRD: CPT

## 2024-11-20 PROCEDURE — 3078F DIAST BP <80 MM HG: CPT

## 2024-11-20 PROCEDURE — 1036F TOBACCO NON-USER: CPT

## 2024-11-20 PROCEDURE — 1160F RVW MEDS BY RX/DR IN RCRD: CPT

## 2024-11-20 PROCEDURE — 1123F ACP DISCUSS/DSCN MKR DOCD: CPT

## 2024-11-20 PROCEDURE — G8427 DOCREV CUR MEDS BY ELIG CLIN: HCPCS

## 2024-11-20 PROCEDURE — 3077F SYST BP >= 140 MM HG: CPT

## 2024-11-20 PROCEDURE — G8484 FLU IMMUNIZE NO ADMIN: HCPCS

## 2024-11-20 PROCEDURE — 99214 OFFICE O/P EST MOD 30 MIN: CPT

## 2024-11-20 RX ORDER — OXYCODONE AND ACETAMINOPHEN 5; 325 MG/1; MG/1
1 TABLET ORAL 2 TIMES DAILY
Qty: 60 TABLET | Refills: 0 | Status: SHIPPED | OUTPATIENT
Start: 2024-11-20 | End: 2024-12-20

## 2024-11-20 RX ORDER — OXYCODONE AND ACETAMINOPHEN 5; 325 MG/1; MG/1
1 TABLET ORAL 2 TIMES DAILY
Qty: 60 TABLET | Refills: 0 | Status: SHIPPED | OUTPATIENT
Start: 2024-12-20 | End: 2025-01-19

## 2024-11-20 NOTE — PROGRESS NOTES
Bo Greenberg (:  1940) is a 84 y.o. male,Established patient, here for evaluation of the following chief complaint(s):  Follow-up (Pt is here today for 3 month follow up. He has no concerns today.//He needs a Pre- op visit for a Back surgery./2025 is the surgery date./We may have paperwork.) and Medication Refill (He does state that you have refilled his pain medication. He received this in the past from Dr. Molina. He states he is taking Oxycodone 5/325 mg.)         Assessment & Plan  Lumbar disc disease   On opioids for pain, refills today plus PDMP checked and new pain agreement signed. Referring to pain management for continued care. Surgery planned for back in late January. He takes the pills at most once per day.    Orders:    External Referral To Pain Clinic    oxyCODONE-acetaminophen (PERCOCET) 5-325 MG per tablet; Take 1 tablet by mouth in the morning and at bedtime for 30 days. Intended supply: 3 days. Take lowest dose possible to manage pain Max Daily Amount: 2 tablets    oxyCODONE-acetaminophen (PERCOCET) 5-325 MG per tablet; Take 1 tablet by mouth in the morning and at bedtime for 30 days. Intended supply: 3 days. Take lowest dose possible to manage pain Max Daily Amount: 2 tablets    Unspecified thoracic, thoracolumbar and lumbosacral intervertebral disc disorder    As above    Orders:    External Referral To Pain Clinic    oxyCODONE-acetaminophen (PERCOCET) 5-325 MG per tablet; Take 1 tablet by mouth in the morning and at bedtime for 30 days. Intended supply: 3 days. Take lowest dose possible to manage pain Max Daily Amount: 2 tablets    oxyCODONE-acetaminophen (PERCOCET) 5-325 MG per tablet; Take 1 tablet by mouth in the morning and at bedtime for 30 days. Intended supply: 3 days. Take lowest dose possible to manage pain Max Daily Amount: 2 tablets    Primary osteoarthritis involving multiple joints    As above    Orders:    External Referral To Pain Clinic

## 2024-11-20 NOTE — ASSESSMENT & PLAN NOTE
On opioids for pain, refills today plus PDMP checked and new pain agreement signed. Referring to pain management for continued care. Surgery planned for back in late January. He takes the pills at most once per day.    Orders:    External Referral To Pain Clinic    oxyCODONE-acetaminophen (PERCOCET) 5-325 MG per tablet; Take 1 tablet by mouth in the morning and at bedtime for 30 days. Intended supply: 3 days. Take lowest dose possible to manage pain Max Daily Amount: 2 tablets    oxyCODONE-acetaminophen (PERCOCET) 5-325 MG per tablet; Take 1 tablet by mouth in the morning and at bedtime for 30 days. Intended supply: 3 days. Take lowest dose possible to manage pain Max Daily Amount: 2 tablets

## 2024-11-20 NOTE — ASSESSMENT & PLAN NOTE
As above    Orders:    External Referral To Pain Clinic    oxyCODONE-acetaminophen (PERCOCET) 5-325 MG per tablet; Take 1 tablet by mouth in the morning and at bedtime for 30 days. Intended supply: 3 days. Take lowest dose possible to manage pain Max Daily Amount: 2 tablets    oxyCODONE-acetaminophen (PERCOCET) 5-325 MG per tablet; Take 1 tablet by mouth in the morning and at bedtime for 30 days. Intended supply: 3 days. Take lowest dose possible to manage pain Max Daily Amount: 2 tablets

## 2024-11-20 NOTE — PROGRESS NOTES
Bo Greenberg is a 84 y.o. male , id x 2(name and ). Reviewed record, history, and  medications.    Chief Complaint   Patient presents with    Follow-up     Pt is here today for 3 month follow up. He has no concerns today.    He needs a Pre- op visit for a Back surgery.  2025 is the surgery date.  We may have paperwork.    Medication Refill     He does state that you have refilled his pain medication. He received this in the past from Dr. Molina. He states he is taking Oxycodone 5/325 mg.        Health Maintenance Due   Topic    Respiratory Syncytial Virus (RSV) Pregnant or age 60 yrs+ (1 - 1-dose 75+ series)    Shingles vaccine (2 of 3)    Diabetic Alb to Cr ratio (uACR) test     Flu vaccine (1)    COVID-19 Vaccine ( -  season)       Wt Readings from Last 1 Encounters:   24 73.5 kg (162 lb)     BP Readings from Last 3 Encounters:   24 (!) 150/76   24 128/73   24 (!) 148/75     Pulse Readings from Last 1 Encounters:   24 71         Patient is currently accompanied by n/a & I have received verbal consent from Bo Greenberg to discuss any/all medical information while he/she is present in the room.    Home BP cuff present today:  no    Home medication list or bottles present today: no  - All medications were reviewed and updated with the patient today in office.    Forms for completion: no    Chest pain/SOB no    Surgery within the next month:  yes - 25 Back surgery , he will need a pre- op visit within 30 days.       Depression Screening:  :     Depression: Not at risk (2024)    PHQ-2     PHQ-2 Score: 0          Coordination of Care Questionnaire:  :     \"Have you been to the ER, urgent care clinic since your last visit?  Hospitalized since your last visit?\"    NO    “Have you seen or consulted any other health care providers outside of [facility] since your last visit?”    NO      Click Here for Release of Records

## 2025-01-08 ENCOUNTER — HOSPITAL ENCOUNTER (OUTPATIENT)
Facility: HOSPITAL | Age: 85
Discharge: HOME | End: 2025-01-11
Payer: MEDICARE

## 2025-01-08 ENCOUNTER — OFFICE VISIT (OUTPATIENT)
Facility: CLINIC | Age: 85
End: 2025-01-08
Payer: MEDICARE

## 2025-01-08 VITALS
HEIGHT: 68 IN | DIASTOLIC BLOOD PRESSURE: 91 MMHG | SYSTOLIC BLOOD PRESSURE: 155 MMHG | HEART RATE: 71 BPM | BODY MASS INDEX: 24.25 KG/M2 | WEIGHT: 160 LBS | TEMPERATURE: 98.8 F | RESPIRATION RATE: 19 BRPM | OXYGEN SATURATION: 97 %

## 2025-01-08 VITALS
SYSTOLIC BLOOD PRESSURE: 166 MMHG | TEMPERATURE: 97.8 F | DIASTOLIC BLOOD PRESSURE: 70 MMHG | BODY MASS INDEX: 24.23 KG/M2 | HEART RATE: 69 BPM | WEIGHT: 159.9 LBS | HEIGHT: 68 IN | OXYGEN SATURATION: 94 % | RESPIRATION RATE: 16 BRPM

## 2025-01-08 DIAGNOSIS — Z01.818 PREOPERATIVE EXAMINATION: Primary | ICD-10-CM

## 2025-01-08 LAB
ABO + RH BLD: NORMAL
ALBUMIN SERPL-MCNC: 3.6 G/DL (ref 3.5–5)
ALBUMIN/GLOB SERPL: 1.1 (ref 1.1–2.2)
ALP SERPL-CCNC: 87 U/L (ref 45–117)
ALT SERPL-CCNC: 18 U/L (ref 12–78)
ANION GAP SERPL CALC-SCNC: 6 MMOL/L (ref 2–12)
APPEARANCE UR: CLEAR
APTT PPP: 27.3 SEC (ref 22.1–31)
AST SERPL-CCNC: 17 U/L (ref 15–37)
BACTERIA URNS QL MICRO: NEGATIVE /HPF
BASOPHILS # BLD: 0.02 K/UL (ref 0–0.1)
BASOPHILS NFR BLD: 0.3 % (ref 0–1)
BILIRUB SERPL-MCNC: 0.5 MG/DL (ref 0.2–1)
BILIRUB UR QL: NEGATIVE
BLOOD GROUP ANTIBODIES SERPL: NORMAL
BUN SERPL-MCNC: 9 MG/DL (ref 6–20)
BUN/CREAT SERPL: 12 (ref 12–20)
CALCIUM SERPL-MCNC: 9.4 MG/DL (ref 8.5–10.1)
CHLORIDE SERPL-SCNC: 107 MMOL/L (ref 97–108)
CO2 SERPL-SCNC: 27 MMOL/L (ref 21–32)
COLOR UR: YELLOW
CREAT SERPL-MCNC: 0.78 MG/DL (ref 0.7–1.3)
DIFFERENTIAL METHOD BLD: NORMAL
EOSINOPHIL # BLD: 0.04 K/UL (ref 0–0.4)
EOSINOPHIL NFR BLD: 0.7 % (ref 0–7)
EPITH CASTS URNS QL MICRO: ABNORMAL /LPF
ERYTHROCYTE [DISTWIDTH] IN BLOOD BY AUTOMATED COUNT: 13.2 % (ref 11.5–14.5)
EST. AVERAGE GLUCOSE BLD GHB EST-MCNC: 114 MG/DL
GLOBULIN SER CALC-MCNC: 3.3 G/DL (ref 2–4)
GLUCOSE SERPL-MCNC: 93 MG/DL (ref 65–100)
GLUCOSE UR STRIP.AUTO-MCNC: NEGATIVE MG/DL
HBA1C MFR BLD: 5.6 % (ref 4–5.6)
HCT VFR BLD AUTO: 41.7 % (ref 36.6–50.3)
HGB BLD-MCNC: 13.9 G/DL (ref 12.1–17)
HGB UR QL STRIP: NEGATIVE
HYALINE CASTS URNS QL MICRO: ABNORMAL /LPF (ref 0–2)
IMM GRANULOCYTES # BLD AUTO: 0.01 K/UL (ref 0–0.04)
IMM GRANULOCYTES NFR BLD AUTO: 0.2 % (ref 0–0.5)
INR PPP: 1.1 (ref 0.9–1.1)
KETONES UR QL STRIP.AUTO: ABNORMAL MG/DL
LEUKOCYTE ESTERASE UR QL STRIP.AUTO: ABNORMAL
LYMPHOCYTES # BLD: 2.1 K/UL (ref 0.8–3.5)
LYMPHOCYTES NFR BLD: 36.2 % (ref 12–49)
MCH RBC QN AUTO: 32.3 PG (ref 26–34)
MCHC RBC AUTO-ENTMCNC: 33.3 G/DL (ref 30–36.5)
MCV RBC AUTO: 96.8 FL (ref 80–99)
MONOCYTES # BLD: 0.59 K/UL (ref 0–1)
MONOCYTES NFR BLD: 10.2 % (ref 5–13)
NEUTS SEG # BLD: 3.04 K/UL (ref 1.8–8)
NEUTS SEG NFR BLD: 52.4 % (ref 32–75)
NITRITE UR QL STRIP.AUTO: NEGATIVE
NRBC # BLD: 0 K/UL (ref 0–0.01)
NRBC BLD-RTO: 0 PER 100 WBC
PH UR STRIP: 5.5 (ref 5–8)
PLATELET # BLD AUTO: 269 K/UL (ref 150–400)
PMV BLD AUTO: 10.1 FL (ref 8.9–12.9)
POTASSIUM SERPL-SCNC: 3.8 MMOL/L (ref 3.5–5.1)
PROT SERPL-MCNC: 6.9 G/DL (ref 6.4–8.2)
PROT UR STRIP-MCNC: NEGATIVE MG/DL
PROTHROMBIN TIME: 11.4 SEC (ref 9–11.1)
RBC # BLD AUTO: 4.31 M/UL (ref 4.1–5.7)
RBC #/AREA URNS HPF: ABNORMAL /HPF (ref 0–5)
SODIUM SERPL-SCNC: 140 MMOL/L (ref 136–145)
SP GR UR REFRACTOMETRY: 1.02 (ref 1–1.03)
SPECIMEN EXP DATE BLD: NORMAL
THERAPEUTIC RANGE: NORMAL SECS (ref 58–77)
URINE CULTURE IF INDICATED: ABNORMAL
UROBILINOGEN UR QL STRIP.AUTO: 1 EU/DL (ref 0.2–1)
WBC # BLD AUTO: 5.8 K/UL (ref 4.1–11.1)
WBC URNS QL MICRO: ABNORMAL /HPF (ref 0–4)

## 2025-01-08 PROCEDURE — 83036 HEMOGLOBIN GLYCOSYLATED A1C: CPT

## 2025-01-08 PROCEDURE — 85610 PROTHROMBIN TIME: CPT

## 2025-01-08 PROCEDURE — 99213 OFFICE O/P EST LOW 20 MIN: CPT

## 2025-01-08 PROCEDURE — 86850 RBC ANTIBODY SCREEN: CPT

## 2025-01-08 PROCEDURE — 93005 ELECTROCARDIOGRAM TRACING: CPT | Performed by: ORTHOPAEDIC SURGERY

## 2025-01-08 PROCEDURE — 36415 COLL VENOUS BLD VENIPUNCTURE: CPT

## 2025-01-08 PROCEDURE — 86900 BLOOD TYPING SEROLOGIC ABO: CPT

## 2025-01-08 PROCEDURE — 80053 COMPREHEN METABOLIC PANEL: CPT

## 2025-01-08 PROCEDURE — 85730 THROMBOPLASTIN TIME PARTIAL: CPT

## 2025-01-08 PROCEDURE — 82306 VITAMIN D 25 HYDROXY: CPT

## 2025-01-08 PROCEDURE — 85025 COMPLETE CBC W/AUTO DIFF WBC: CPT

## 2025-01-08 PROCEDURE — 86901 BLOOD TYPING SEROLOGIC RH(D): CPT

## 2025-01-08 PROCEDURE — 81001 URINALYSIS AUTO W/SCOPE: CPT

## 2025-01-08 SDOH — ECONOMIC STABILITY: FOOD INSECURITY: WITHIN THE PAST 12 MONTHS, YOU WORRIED THAT YOUR FOOD WOULD RUN OUT BEFORE YOU GOT MONEY TO BUY MORE.: NEVER TRUE

## 2025-01-08 ASSESSMENT — PATIENT HEALTH QUESTIONNAIRE - PHQ9
SUM OF ALL RESPONSES TO PHQ9 QUESTIONS 1 & 2: 0
1. LITTLE INTEREST OR PLEASURE IN DOING THINGS: NOT AT ALL
2. FEELING DOWN, DEPRESSED OR HOPELESS: NOT AT ALL
SUM OF ALL RESPONSES TO PHQ QUESTIONS 1-9: 0

## 2025-01-08 ASSESSMENT — PAIN DESCRIPTION - LOCATION: LOCATION: BACK

## 2025-01-08 ASSESSMENT — PAIN DESCRIPTION - PAIN TYPE: TYPE: CHRONIC PAIN

## 2025-01-08 ASSESSMENT — PAIN DESCRIPTION - DESCRIPTORS: DESCRIPTORS: ACHING

## 2025-01-08 ASSESSMENT — PAIN DESCRIPTION - ORIENTATION: ORIENTATION: POSTERIOR;LOWER

## 2025-01-08 NOTE — PROGRESS NOTES
Bo Greenberg is a 84 y.o. male , id x 2(name and ). Reviewed record, history, and  medications.    Chief Complaint   Patient presents with    Pre-op Exam     Back surgery-        Health Maintenance Due   Topic    Respiratory Syncytial Virus (RSV) Pregnant or age 60 yrs+ (1 - 1-dose 75+ series)    Shingles vaccine (2 of 3)    Diabetic Alb to Cr ratio (uACR) test     Flu vaccine (1)    COVID-19 Vaccine ( -  season)       Wt Readings from Last 1 Encounters:   25 72.6 kg (160 lb)     BP Readings from Last 3 Encounters:   25 (!) 155/91   24 (!) 150/76   24 128/73     Pulse Readings from Last 1 Encounters:   25 71         Patient is currently accompanied by self & I have received verbal consent from Bo Greenberg to discuss any/all medical information while he/she is present in the room.    Home BP cuff present today:  no    Home medication list or bottles present today: yes - meds list  - All medications were reviewed and updated with the patient today in office.    Forms for completion: yes - pre op    Chest pain/ Shortness of breath: no    Surgery within the next month:  yes - - back      Depression Screening:  :     Depression: Not at risk (2025)    PHQ-2     PHQ-2 Score: 0          Coordination of Care Questionnaire:  :     \"Have you been to the ER, urgent care clinic since your last visit?  Hospitalized since your last visit?\"    NO    “Have you seen or consulted any other health care providers outside of [facility] since your last visit?”    NO            Click Here for Release of Records Request      --Rosamaria Goss CMA       ------------------------------------------------

## 2025-01-08 NOTE — DISCHARGE INSTRUCTIONS
[facility]                   [address] [place] [address]   MAIN OR                                  [phone]   MAIN PRE OP                          [phone]                                                                                AMBULATORY PRE OP          [phone]  PRE-ADMISSION TESTING    [phone]     Surgery Date:  Monday 1/20/2025 and Tuesday 1/21/2025       Is surgery arrival time given by surgeon?  NO  If “NO”, [facility] staff will call you between 3 and 7pm the day before your surgery with your arrival time. (If your surgery is on a Monday, we will call you the Friday before.)    Call [phone] after 7pm Monday-Friday if you did not receive this call.    INSTRUCTIONS BEFORE YOUR SURGERY   When You  Arrive Arrive at the 2nd Floor Admitting Desk on the day of your surgery  Have your insurance card, photo ID, and any copayment (if needed)   Food   and   Drink    No food or drink (gum, mints, coffee, juice, etc) after midnight the night before surgery.   EXCEPT:  You may drink WATER ONLY up until 2 hours prior to your surgery time. Please do not add anything to your water as this may result in your surgery being postponed.        No marijuana or alcohol (beer, wine, liquor) 24 hours before or after surgery.    Medications to   TAKE   Morning of Surgery MEDICATIONS TO TAKE THE MORNING OF SURGERY WITH A SIP OF WATER:   Ranolazine, omeprazole, atorvastatin  If needed: oxycodone with tylenol    Do not take metformin the morning of surgery  Do not take ezetimibe (zetia) the day of surgery or day before  Do not take Avapro the morning of (or night before) surgery  Do not take sildenafil 72 hrs prior to surgery    DISCUSS when and if  okay to hold aspirin and plavix  prior to surgery with the prescribing providers (Dr. Sepulveda).    DISCUSS pre-surgery plan for taking diabetic medication with prescribing provider (PCP) prior to surgery (nothing to

## 2025-01-08 NOTE — PERIOP NOTE
Request for pre-op ASA and plavix plan refaxed to pt's cardiologist office  DOS 1/20/2025.   
Request for pre-surgery aspirin and plavix plan sent via fax to pt's cardiologist, Dr Sepulveda.  DOS 1/20/2025  
Spine Surgery Education class and Surgery Guidebook prior to surgery.    Attend planned upcoming pre-surgery appointments with you cardiologist and pulmonologist prior to surgery per Dr. Villaseñor.       Follow all instructions so your surgery won’t be cancelled.  Please, be on time.                    If a situation occurs and you are delayed the day of surgery, call [phone] or [phone].    If your physical condition changes (like a fever, cold, flu, etc.) call your surgeon.    Home medication(s) reviewed and verified verbally with list during PAT appointment.    The patient was contacted in person.    The patient verbalizes understanding of all instructions and does not need reinforcement.

## 2025-01-08 NOTE — PROGRESS NOTES
Subjective:      Bo Greenberg is a 84 y.o. male who presents to the office today for a preoperative consultation at the request of surgeon Dr. Villaseñor who plans on performing L1-L5 hardware removal followed by L5-S1 posterior fusion on January 20.  This consultation is requested for the specific conditions prompting preoperative evaluation (i.e. because of potential affect on operative risk): routine.  Planned anesthesia is General.  The patient has the following known anesthesia issues:  none   Patient has a bleeding risk of : no recent abnormal bleeding, no remote history of abnormal bleeding  Patient does not have objection to receiving blood products if needed.  Patient's medications, allergies, past medical, surgical, social and family histories were reviewed and updated as appropriate.  Review of Systems  A comprehensive review of systems was negative.      Objective:      BP (!) 155/91 (Site: Left Upper Arm, Position: Sitting, Cuff Size: Medium Adult)   Pulse 71   Temp 98.8 °F (37.1 °C)   Resp 19   Ht 1.727 m (5' 8\")   Wt 72.6 kg (160 lb)   SpO2 97%   BMI 24.33 kg/m²     General Appearance:  Alert, cooperative, no distress, appears stated age   Head:  Normocephalic, without obvious abnormality, atraumatic   Eyes:  PERRL, conjunctiva/corneas clear, EOM's intact, fundi benign, both eyes   Ears:  Normal TM's and external ear canals, both ears   Nose: Nares normal, septum midline, mucosa normal, no drainage or sinus tenderness   Throat: Lips, mucosa, and tongue normal; teeth and gums normal   Neck: Supple, symmetrical, trachea midline, no adenopathy, thyroid: not enlarged, symmetric, no tenderness/mass/nodules, no carotid bruit or JVD   Back:   Symmetric, no curvature, ROM normal, no CVA tenderness   Lungs:   Clear to auscultation bilaterally, respirations unlabored   Chest Wall:  No tenderness or deformity   Heart:  Regular rate and rhythm, S1, S2 normal, no murmur, rub or gallop   Abdomen:   Soft,

## 2025-01-09 LAB
25(OH)D3 SERPL-MCNC: 91.1 NG/ML (ref 30–100)
EKG ATRIAL RATE: 68 BPM
EKG DIAGNOSIS: NORMAL
EKG P AXIS: 76 DEGREES
EKG P-R INTERVAL: 148 MS
EKG Q-T INTERVAL: 430 MS
EKG QRS DURATION: 88 MS
EKG QTC CALCULATION (BAZETT): 457 MS
EKG R AXIS: 82 DEGREES
EKG T AXIS: 90 DEGREES
EKG VENTRICULAR RATE: 68 BPM

## 2025-01-10 LAB
BACTERIA SPEC CULT: NORMAL
BACTERIA SPEC CULT: NORMAL
SERVICE CMNT-IMP: NORMAL

## 2025-01-14 ENCOUNTER — CLINICAL DOCUMENTATION (OUTPATIENT)
Facility: CLINIC | Age: 85
End: 2025-01-14

## 2025-01-15 NOTE — TELEPHONE ENCOUNTER
[facility] Palliative Medicine Office  Nursing Note  [phone]  Fax [phone]      Name:  Bo Greenberg  YOB: 1940    Received outpatient Palliative Medicine referral from PCP Jairo Mcgowan DO to see patient for symptom management and supportive care. Chart  reviewed. Bo Greenberg is a 84 y.o. male with PMH of CAD, HTN, GERD, DM type 2, COPD, DJD, osteoarthritis, lumbar disc disease with chronic back pain and current opioid use.  Patient's most recent office visit with Dr. Mcgowan was on 8/20/24.      Nurse called patient to explain [facility] outpatient Palliative Medicine services and to schedule appointment.  No answer, nurse left message without any PHI requesting call back.     Ruby Hebert RN, Gerontological Nursing-BC, PN

## 2025-01-16 NOTE — PERIOP NOTE
Left voice mail with Dr. Villaseñor's office notifying that preoperative aspirin instructions from Cardiologist Dr. Mac are to NOT stop aspirin before surgery; requested that Dr. Villaseñor is aware of aspirin and a call back to PAT to [phone] with confirmation;  message was  left as a follow up to [employer] to Edu Moore NP regarding aspirin with no response.

## 2025-01-17 NOTE — PERIOP NOTE
Voicemail left at Dr Villaseñor's office requesting clarification/ correction: orders for consent do not match surgical posting for DOS 1/20/25.  \"with instrumentation\" on paper order but not on posting.

## 2025-01-20 ENCOUNTER — ANESTHESIA (OUTPATIENT)
Facility: HOSPITAL | Age: 85
DRG: 428 | End: 2025-01-20
Payer: MEDICARE

## 2025-01-20 ENCOUNTER — APPOINTMENT (OUTPATIENT)
Facility: HOSPITAL | Age: 85
DRG: 428 | End: 2025-01-20
Attending: ORTHOPAEDIC SURGERY
Payer: MEDICARE

## 2025-01-20 ENCOUNTER — ANESTHESIA EVENT (OUTPATIENT)
Facility: HOSPITAL | Age: 85
DRG: 428 | End: 2025-01-20
Payer: MEDICARE

## 2025-01-20 ENCOUNTER — HOSPITAL ENCOUNTER (INPATIENT)
Facility: HOSPITAL | Age: 85
LOS: 3 days | Discharge: HOME HEALTH | DRG: 428 | End: 2025-01-23
Attending: ORTHOPAEDIC SURGERY | Admitting: ORTHOPAEDIC SURGERY
Payer: MEDICARE

## 2025-01-20 DIAGNOSIS — Z98.1 S/P LUMBAR SPINAL FUSION: ICD-10-CM

## 2025-01-20 DIAGNOSIS — M51.369 LUMBAR ADJACENT SEGMENT DISEASE WITH SPONDYLOLISTHESIS: Primary | ICD-10-CM

## 2025-01-20 DIAGNOSIS — M43.16 LUMBAR ADJACENT SEGMENT DISEASE WITH SPONDYLOLISTHESIS: Primary | ICD-10-CM

## 2025-01-20 PROBLEM — M54.16 LUMBAR RADICULITIS: Status: ACTIVE | Noted: 2025-01-20

## 2025-01-20 LAB
GLUCOSE BLD STRIP.AUTO-MCNC: 84 MG/DL (ref 65–117)
SERVICE CMNT-IMP: NORMAL

## 2025-01-20 PROCEDURE — 6360000002 HC RX W HCPCS: Performed by: ORTHOPAEDIC SURGERY

## 2025-01-20 PROCEDURE — 2500000003 HC RX 250 WO HCPCS: Performed by: ORTHOPAEDIC SURGERY

## 2025-01-20 PROCEDURE — 97530 THERAPEUTIC ACTIVITIES: CPT

## 2025-01-20 PROCEDURE — 97116 GAIT TRAINING THERAPY: CPT

## 2025-01-20 PROCEDURE — 7100000000 HC PACU RECOVERY - FIRST 15 MIN: Performed by: ORTHOPAEDIC SURGERY

## 2025-01-20 PROCEDURE — 1100000000 HC RM PRIVATE

## 2025-01-20 PROCEDURE — 0SG30A0 FUSION OF LUMBOSACRAL JOINT WITH INTERBODY FUSION DEVICE, ANTERIOR APPROACH, ANTERIOR COLUMN, OPEN APPROACH: ICD-10-PCS | Performed by: ORTHOPAEDIC SURGERY

## 2025-01-20 PROCEDURE — C1713 ANCHOR/SCREW BN/BN,TIS/BN: HCPCS | Performed by: ORTHOPAEDIC SURGERY

## 2025-01-20 PROCEDURE — 6370000000 HC RX 637 (ALT 250 FOR IP): Performed by: NURSE PRACTITIONER

## 2025-01-20 PROCEDURE — 3700000001 HC ADD 15 MINUTES (ANESTHESIA): Performed by: ORTHOPAEDIC SURGERY

## 2025-01-20 PROCEDURE — 6370000000 HC RX 637 (ALT 250 FOR IP): Performed by: ORTHOPAEDIC SURGERY

## 2025-01-20 PROCEDURE — 2580000003 HC RX 258: Performed by: NURSE PRACTITIONER

## 2025-01-20 PROCEDURE — 97161 PT EVAL LOW COMPLEX 20 MIN: CPT

## 2025-01-20 PROCEDURE — 2500000003 HC RX 250 WO HCPCS: Performed by: NURSE PRACTITIONER

## 2025-01-20 PROCEDURE — 6360000002 HC RX W HCPCS: Performed by: NURSE ANESTHETIST, CERTIFIED REGISTERED

## 2025-01-20 PROCEDURE — 2709999900 HC NON-CHARGEABLE SUPPLY: Performed by: ORTHOPAEDIC SURGERY

## 2025-01-20 PROCEDURE — 3600000014 HC SURGERY LEVEL 4 ADDTL 15MIN: Performed by: ORTHOPAEDIC SURGERY

## 2025-01-20 PROCEDURE — 2580000003 HC RX 258: Performed by: STUDENT IN AN ORGANIZED HEALTH CARE EDUCATION/TRAINING PROGRAM

## 2025-01-20 PROCEDURE — 72131 CT LUMBAR SPINE W/O DYE: CPT

## 2025-01-20 PROCEDURE — 6360000002 HC RX W HCPCS: Performed by: ANESTHESIOLOGY

## 2025-01-20 PROCEDURE — 6360000002 HC RX W HCPCS: Performed by: NURSE PRACTITIONER

## 2025-01-20 PROCEDURE — 3600000004 HC SURGERY LEVEL 4 BASE: Performed by: ORTHOPAEDIC SURGERY

## 2025-01-20 PROCEDURE — C1889 IMPLANT/INSERT DEVICE, NOC: HCPCS | Performed by: ORTHOPAEDIC SURGERY

## 2025-01-20 PROCEDURE — 82962 GLUCOSE BLOOD TEST: CPT

## 2025-01-20 PROCEDURE — 6360000002 HC RX W HCPCS: Performed by: STUDENT IN AN ORGANIZED HEALTH CARE EDUCATION/TRAINING PROGRAM

## 2025-01-20 PROCEDURE — 2500000003 HC RX 250 WO HCPCS: Performed by: NURSE ANESTHETIST, CERTIFIED REGISTERED

## 2025-01-20 PROCEDURE — 7100000001 HC PACU RECOVERY - ADDTL 15 MIN: Performed by: ORTHOPAEDIC SURGERY

## 2025-01-20 PROCEDURE — 2720000010 HC SURG SUPPLY STERILE: Performed by: ORTHOPAEDIC SURGERY

## 2025-01-20 PROCEDURE — 3700000000 HC ANESTHESIA ATTENDED CARE: Performed by: ORTHOPAEDIC SURGERY

## 2025-01-20 PROCEDURE — 3E0U0GB INTRODUCTION OF RECOMBINANT BONE MORPHOGENETIC PROTEIN INTO JOINTS, OPEN APPROACH: ICD-10-PCS | Performed by: ORTHOPAEDIC SURGERY

## 2025-01-20 DEVICE — ANTERIOR PLATE LOCKING COVER, 18MM
Type: IMPLANTABLE DEVICE | Site: SPINE LUMBAR | Status: FUNCTIONAL
Brand: MERIDIAN

## 2025-01-20 DEVICE — VARIABLE ANGLE SCREW, 5.5MM DIA. X 20MM
Type: IMPLANTABLE DEVICE | Site: SPINE LUMBAR | Status: FUNCTIONAL
Brand: MERIDIAN

## 2025-01-20 DEVICE — 4 HOLE ANTERIOR PLATE, 18MM
Type: IMPLANTABLE DEVICE | Site: SPINE LUMBAR | Status: FUNCTIONAL
Brand: MERIDIAN

## 2025-01-20 DEVICE — GRAFT BNE 5 ML SUBSTITUTE VIABLE OSSIGRAFT: Type: IMPLANTABLE DEVICE | Site: SPINE LUMBAR | Status: FUNCTIONAL

## 2025-01-20 DEVICE — ALLOGRAFT BNE SM 2.5 CC DBM FIBER OSTEOSTRAND PLUS: Type: IMPLANTABLE DEVICE | Site: SPINE LUMBAR | Status: FUNCTIONAL

## 2025-01-20 DEVICE — 3-HOLE NO-PROFILE INTERBODY, 39MM X 30MM X 14MM, 15 DEG, 3D
Type: IMPLANTABLE DEVICE | Site: SPINE LUMBAR | Status: FUNCTIONAL
Brand: WAVEFORM A

## 2025-01-20 DEVICE — VARIABLE ANGLE SCREW, 5.5MM DIA. X 25MM
Type: IMPLANTABLE DEVICE | Site: SPINE LUMBAR | Status: FUNCTIONAL
Brand: MERIDIAN

## 2025-01-20 DEVICE — BONE GRAFT KIT 7510200 INFUSE SMALL
Type: IMPLANTABLE DEVICE | Site: SPINE LUMBAR | Status: FUNCTIONAL
Brand: INFUSE® BONE GRAFT

## 2025-01-20 RX ORDER — SODIUM CHLORIDE 9 MG/ML
INJECTION, SOLUTION INTRAVENOUS PRN
Status: DISCONTINUED | OUTPATIENT
Start: 2025-01-20 | End: 2025-01-21

## 2025-01-20 RX ORDER — BISACODYL 5 MG/1
5 TABLET, DELAYED RELEASE ORAL DAILY PRN
Status: DISCONTINUED | OUTPATIENT
Start: 2025-01-20 | End: 2025-01-23 | Stop reason: HOSPADM

## 2025-01-20 RX ORDER — LIDOCAINE HYDROCHLORIDE 10 MG/ML
1 INJECTION, SOLUTION EPIDURAL; INFILTRATION; INTRACAUDAL; PERINEURAL
Status: DISCONTINUED | OUTPATIENT
Start: 2025-01-20 | End: 2025-01-20 | Stop reason: HOSPADM

## 2025-01-20 RX ORDER — GABAPENTIN 300 MG/1
300 CAPSULE ORAL ONCE
Status: COMPLETED | OUTPATIENT
Start: 2025-01-20 | End: 2025-01-20

## 2025-01-20 RX ORDER — LIDOCAINE HYDROCHLORIDE 20 MG/ML
INJECTION, SOLUTION EPIDURAL; INFILTRATION; INTRACAUDAL; PERINEURAL
Status: DISCONTINUED | OUTPATIENT
Start: 2025-01-20 | End: 2025-01-20 | Stop reason: SDUPTHER

## 2025-01-20 RX ORDER — SODIUM CHLORIDE 0.9 % (FLUSH) 0.9 %
5-40 SYRINGE (ML) INJECTION EVERY 12 HOURS SCHEDULED
Status: DISCONTINUED | OUTPATIENT
Start: 2025-01-20 | End: 2025-01-23 | Stop reason: HOSPADM

## 2025-01-20 RX ORDER — ONDANSETRON 2 MG/ML
INJECTION INTRAMUSCULAR; INTRAVENOUS
Status: DISCONTINUED | OUTPATIENT
Start: 2025-01-20 | End: 2025-01-20 | Stop reason: SDUPTHER

## 2025-01-20 RX ORDER — CYCLOBENZAPRINE HCL 10 MG
10 TABLET ORAL EVERY 12 HOURS PRN
Status: DISCONTINUED | OUTPATIENT
Start: 2025-01-20 | End: 2025-01-23 | Stop reason: HOSPADM

## 2025-01-20 RX ORDER — MIDAZOLAM HYDROCHLORIDE 2 MG/2ML
2 INJECTION, SOLUTION INTRAMUSCULAR; INTRAVENOUS
Status: DISCONTINUED | OUTPATIENT
Start: 2025-01-20 | End: 2025-01-20 | Stop reason: HOSPADM

## 2025-01-20 RX ORDER — FENTANYL CITRATE 50 UG/ML
INJECTION, SOLUTION INTRAMUSCULAR; INTRAVENOUS
Status: DISCONTINUED | OUTPATIENT
Start: 2025-01-20 | End: 2025-01-20 | Stop reason: SDUPTHER

## 2025-01-20 RX ORDER — ALBUTEROL SULFATE 0.83 MG/ML
2.5 SOLUTION RESPIRATORY (INHALATION) ONCE
Status: COMPLETED | OUTPATIENT
Start: 2025-01-20 | End: 2025-01-20

## 2025-01-20 RX ORDER — RANOLAZINE 500 MG/1
500 TABLET, EXTENDED RELEASE ORAL 2 TIMES DAILY
Status: DISCONTINUED | OUTPATIENT
Start: 2025-01-20 | End: 2025-01-23 | Stop reason: HOSPADM

## 2025-01-20 RX ORDER — SODIUM CHLORIDE 0.9 % (FLUSH) 0.9 %
5-40 SYRINGE (ML) INJECTION PRN
Status: DISCONTINUED | OUTPATIENT
Start: 2025-01-20 | End: 2025-01-23 | Stop reason: HOSPADM

## 2025-01-20 RX ORDER — SODIUM CHLORIDE, SODIUM LACTATE, POTASSIUM CHLORIDE, CALCIUM CHLORIDE 600; 310; 30; 20 MG/100ML; MG/100ML; MG/100ML; MG/100ML
INJECTION, SOLUTION INTRAVENOUS CONTINUOUS
Status: DISCONTINUED | OUTPATIENT
Start: 2025-01-20 | End: 2025-01-20 | Stop reason: HOSPADM

## 2025-01-20 RX ORDER — DIPHENHYDRAMINE HCL 25 MG
25 CAPSULE ORAL EVERY 6 HOURS PRN
Status: DISCONTINUED | OUTPATIENT
Start: 2025-01-20 | End: 2025-01-23 | Stop reason: HOSPADM

## 2025-01-20 RX ORDER — ONDANSETRON 2 MG/ML
4 INJECTION INTRAMUSCULAR; INTRAVENOUS
Status: DISCONTINUED | OUTPATIENT
Start: 2025-01-20 | End: 2025-01-20 | Stop reason: HOSPADM

## 2025-01-20 RX ORDER — EPHEDRINE SULFATE/0.9% NACL/PF 50 MG/5 ML
SYRINGE (ML) INTRAVENOUS
Status: DISCONTINUED | OUTPATIENT
Start: 2025-01-20 | End: 2025-01-20 | Stop reason: SDUPTHER

## 2025-01-20 RX ORDER — NEOSTIGMINE METHYLSULFATE 1 MG/ML
INJECTION, SOLUTION INTRAVENOUS
Status: DISCONTINUED | OUTPATIENT
Start: 2025-01-20 | End: 2025-01-20 | Stop reason: SDUPTHER

## 2025-01-20 RX ORDER — ACETAMINOPHEN 500 MG
1000 TABLET ORAL EVERY 6 HOURS
Status: DISCONTINUED | OUTPATIENT
Start: 2025-01-20 | End: 2025-01-23 | Stop reason: HOSPADM

## 2025-01-20 RX ORDER — POLYETHYLENE GLYCOL 3350 17 G/17G
17 POWDER, FOR SOLUTION ORAL DAILY
Status: DISCONTINUED | OUTPATIENT
Start: 2025-01-20 | End: 2025-01-23 | Stop reason: HOSPADM

## 2025-01-20 RX ORDER — CELECOXIB 100 MG/1
200 CAPSULE ORAL ONCE
Status: COMPLETED | OUTPATIENT
Start: 2025-01-20 | End: 2025-01-20

## 2025-01-20 RX ORDER — HEPARIN SODIUM 1000 [USP'U]/ML
INJECTION, SOLUTION INTRAVENOUS; SUBCUTANEOUS PRN
Status: DISCONTINUED | OUTPATIENT
Start: 2025-01-20 | End: 2025-01-20 | Stop reason: HOSPADM

## 2025-01-20 RX ORDER — ROCURONIUM BROMIDE 10 MG/ML
INJECTION, SOLUTION INTRAVENOUS
Status: DISCONTINUED | OUTPATIENT
Start: 2025-01-20 | End: 2025-01-20 | Stop reason: SDUPTHER

## 2025-01-20 RX ORDER — FENTANYL CITRATE 50 UG/ML
100 INJECTION, SOLUTION INTRAMUSCULAR; INTRAVENOUS
Status: DISCONTINUED | OUTPATIENT
Start: 2025-01-20 | End: 2025-01-20 | Stop reason: HOSPADM

## 2025-01-20 RX ORDER — MIDAZOLAM HYDROCHLORIDE 1 MG/ML
INJECTION, SOLUTION INTRAMUSCULAR; INTRAVENOUS
Status: DISCONTINUED | OUTPATIENT
Start: 2025-01-20 | End: 2025-01-20 | Stop reason: SDUPTHER

## 2025-01-20 RX ORDER — CELECOXIB 100 MG/1
100 CAPSULE ORAL ONCE
Status: DISCONTINUED | OUTPATIENT
Start: 2025-01-21 | End: 2025-01-23 | Stop reason: HOSPADM

## 2025-01-20 RX ORDER — ACETAMINOPHEN 500 MG
1000 TABLET ORAL ONCE
Status: COMPLETED | OUTPATIENT
Start: 2025-01-20 | End: 2025-01-20

## 2025-01-20 RX ORDER — GLYCOPYRROLATE 0.2 MG/ML
INJECTION INTRAMUSCULAR; INTRAVENOUS
Status: DISCONTINUED | OUTPATIENT
Start: 2025-01-20 | End: 2025-01-20 | Stop reason: SDUPTHER

## 2025-01-20 RX ORDER — ATORVASTATIN CALCIUM 20 MG/1
40 TABLET, FILM COATED ORAL EVERY MORNING
Status: DISCONTINUED | OUTPATIENT
Start: 2025-01-20 | End: 2025-01-23 | Stop reason: HOSPADM

## 2025-01-20 RX ORDER — LOSARTAN POTASSIUM 50 MG/1
50 TABLET ORAL DAILY
Status: DISCONTINUED | OUTPATIENT
Start: 2025-01-20 | End: 2025-01-23 | Stop reason: HOSPADM

## 2025-01-20 RX ORDER — DIPHENHYDRAMINE HYDROCHLORIDE 50 MG/ML
25 INJECTION INTRAMUSCULAR; INTRAVENOUS EVERY 6 HOURS PRN
Status: DISCONTINUED | OUTPATIENT
Start: 2025-01-20 | End: 2025-01-23 | Stop reason: HOSPADM

## 2025-01-20 RX ORDER — FAMOTIDINE 10 MG/ML
INJECTION, SOLUTION INTRAVENOUS
Status: DISCONTINUED | OUTPATIENT
Start: 2025-01-20 | End: 2025-01-20 | Stop reason: SDUPTHER

## 2025-01-20 RX ORDER — GABAPENTIN 300 MG/1
300 CAPSULE ORAL ONCE
Status: DISCONTINUED | OUTPATIENT
Start: 2025-01-21 | End: 2025-01-23 | Stop reason: HOSPADM

## 2025-01-20 RX ORDER — ONDANSETRON 2 MG/ML
4 INJECTION INTRAMUSCULAR; INTRAVENOUS EVERY 6 HOURS PRN
Status: DISCONTINUED | OUTPATIENT
Start: 2025-01-20 | End: 2025-01-23 | Stop reason: HOSPADM

## 2025-01-20 RX ORDER — FAMOTIDINE 20 MG/1
20 TABLET, FILM COATED ORAL 2 TIMES DAILY
Status: DISCONTINUED | OUTPATIENT
Start: 2025-01-20 | End: 2025-01-23 | Stop reason: HOSPADM

## 2025-01-20 RX ORDER — ALBUTEROL SULFATE 0.83 MG/ML
2.5 SOLUTION RESPIRATORY (INHALATION) EVERY 4 HOURS PRN
Status: DISCONTINUED | OUTPATIENT
Start: 2025-01-20 | End: 2025-01-23 | Stop reason: HOSPADM

## 2025-01-20 RX ORDER — EZETIMIBE 10 MG/1
10 TABLET ORAL EVERY MORNING
Status: DISCONTINUED | OUTPATIENT
Start: 2025-01-20 | End: 2025-01-23 | Stop reason: HOSPADM

## 2025-01-20 RX ORDER — NALOXONE HYDROCHLORIDE 0.4 MG/ML
INJECTION, SOLUTION INTRAMUSCULAR; INTRAVENOUS; SUBCUTANEOUS PRN
Status: DISCONTINUED | OUTPATIENT
Start: 2025-01-20 | End: 2025-01-20 | Stop reason: HOSPADM

## 2025-01-20 RX ORDER — ONDANSETRON 4 MG/1
4 TABLET, ORALLY DISINTEGRATING ORAL EVERY 8 HOURS PRN
Status: DISCONTINUED | OUTPATIENT
Start: 2025-01-20 | End: 2025-01-23 | Stop reason: HOSPADM

## 2025-01-20 RX ORDER — BISACODYL 10 MG
10 SUPPOSITORY, RECTAL RECTAL DAILY PRN
Status: DISCONTINUED | OUTPATIENT
Start: 2025-01-20 | End: 2025-01-23 | Stop reason: HOSPADM

## 2025-01-20 RX ORDER — PROPOFOL 10 MG/ML
INJECTION, EMULSION INTRAVENOUS
Status: DISCONTINUED | OUTPATIENT
Start: 2025-01-20 | End: 2025-01-20 | Stop reason: SDUPTHER

## 2025-01-20 RX ORDER — DIPHENHYDRAMINE HYDROCHLORIDE 50 MG/ML
12.5 INJECTION INTRAMUSCULAR; INTRAVENOUS
Status: DISCONTINUED | OUTPATIENT
Start: 2025-01-20 | End: 2025-01-20 | Stop reason: HOSPADM

## 2025-01-20 RX ORDER — OXYCODONE HYDROCHLORIDE 5 MG/1
5 TABLET ORAL EVERY 4 HOURS PRN
Status: DISCONTINUED | OUTPATIENT
Start: 2025-01-20 | End: 2025-01-23 | Stop reason: HOSPADM

## 2025-01-20 RX ORDER — OXYCODONE HYDROCHLORIDE 5 MG/1
10 TABLET ORAL EVERY 4 HOURS PRN
Status: DISCONTINUED | OUTPATIENT
Start: 2025-01-20 | End: 2025-01-23 | Stop reason: HOSPADM

## 2025-01-20 RX ORDER — SUCCINYLCHOLINE CHLORIDE 20 MG/ML
INJECTION INTRAMUSCULAR; INTRAVENOUS
Status: DISCONTINUED | OUTPATIENT
Start: 2025-01-20 | End: 2025-01-20 | Stop reason: SDUPTHER

## 2025-01-20 RX ORDER — SODIUM CHLORIDE 9 MG/ML
INJECTION, SOLUTION INTRAVENOUS CONTINUOUS
Status: DISCONTINUED | OUTPATIENT
Start: 2025-01-20 | End: 2025-01-23 | Stop reason: HOSPADM

## 2025-01-20 RX ORDER — SODIUM CHLORIDE 9 MG/ML
INJECTION, SOLUTION INTRAVENOUS CONTINUOUS
Status: DISCONTINUED | OUTPATIENT
Start: 2025-01-20 | End: 2025-01-20 | Stop reason: HOSPADM

## 2025-01-20 RX ORDER — MORPHINE SULFATE 4 MG/ML
2 INJECTION, SOLUTION INTRAMUSCULAR; INTRAVENOUS
Status: DISCONTINUED | OUTPATIENT
Start: 2025-01-20 | End: 2025-01-23 | Stop reason: HOSPADM

## 2025-01-20 RX ADMIN — LOSARTAN POTASSIUM 50 MG: 50 TABLET, FILM COATED ORAL at 18:25

## 2025-01-20 RX ADMIN — FENTANYL CITRATE 25 MCG: 50 INJECTION, SOLUTION INTRAMUSCULAR; INTRAVENOUS at 10:28

## 2025-01-20 RX ADMIN — Medication 15 MG: at 08:45

## 2025-01-20 RX ADMIN — MIDAZOLAM HYDROCHLORIDE 1 MG: 1 INJECTION, SOLUTION INTRAMUSCULAR; INTRAVENOUS at 08:07

## 2025-01-20 RX ADMIN — FAMOTIDINE 20 MG: 20 TABLET, FILM COATED ORAL at 20:43

## 2025-01-20 RX ADMIN — Medication 15 MG: at 08:22

## 2025-01-20 RX ADMIN — SODIUM CHLORIDE: 9 INJECTION, SOLUTION INTRAVENOUS at 16:43

## 2025-01-20 RX ADMIN — ALBUTEROL SULFATE 2.5 MG: 2.5 SOLUTION RESPIRATORY (INHALATION) at 07:42

## 2025-01-20 RX ADMIN — SODIUM CHLORIDE: 9 INJECTION, SOLUTION INTRAVENOUS at 07:09

## 2025-01-20 RX ADMIN — ACETAMINOPHEN 1000 MG: 500 TABLET ORAL at 06:56

## 2025-01-20 RX ADMIN — NEOSTIGMINE METHYLSULFATE 3 MG: 1 INJECTION, SOLUTION INTRAVENOUS at 10:27

## 2025-01-20 RX ADMIN — SUCCINYLCHOLINE CHLORIDE 140 MG: 20 INJECTION, SOLUTION INTRAMUSCULAR; INTRAVENOUS at 08:14

## 2025-01-20 RX ADMIN — LIDOCAINE HYDROCHLORIDE 20 MG: 20 INJECTION, SOLUTION EPIDURAL; INFILTRATION; INTRACAUDAL; PERINEURAL at 09:30

## 2025-01-20 RX ADMIN — SODIUM CHLORIDE: 9 INJECTION, SOLUTION INTRAVENOUS at 18:19

## 2025-01-20 RX ADMIN — ONDANSETRON 4 MG: 2 INJECTION INTRAMUSCULAR; INTRAVENOUS at 08:07

## 2025-01-20 RX ADMIN — ATORVASTATIN CALCIUM 40 MG: 20 TABLET, FILM COATED ORAL at 18:24

## 2025-01-20 RX ADMIN — ROCURONIUM BROMIDE 25 MG: 10 INJECTION, SOLUTION INTRAVENOUS at 08:32

## 2025-01-20 RX ADMIN — LIDOCAINE HYDROCHLORIDE 20 MG: 20 INJECTION, SOLUTION EPIDURAL; INFILTRATION; INTRACAUDAL; PERINEURAL at 09:58

## 2025-01-20 RX ADMIN — SODIUM CHLORIDE: 9 INJECTION, SOLUTION INTRAVENOUS at 09:37

## 2025-01-20 RX ADMIN — GABAPENTIN 300 MG: 300 CAPSULE ORAL at 06:56

## 2025-01-20 RX ADMIN — Medication 20 MG: at 09:15

## 2025-01-20 RX ADMIN — OXYCODONE 10 MG: 5 TABLET ORAL at 22:30

## 2025-01-20 RX ADMIN — Medication 20 MG: at 08:45

## 2025-01-20 RX ADMIN — LIDOCAINE HYDROCHLORIDE 20 MG: 20 INJECTION, SOLUTION EPIDURAL; INFILTRATION; INTRACAUDAL; PERINEURAL at 09:41

## 2025-01-20 RX ADMIN — PROPOFOL 100 MCG/KG/MIN: 10 INJECTION, EMULSION INTRAVENOUS at 08:20

## 2025-01-20 RX ADMIN — EZETIMIBE 10 MG: 10 TABLET ORAL at 18:25

## 2025-01-20 RX ADMIN — ROCURONIUM BROMIDE 5 MG: 10 INJECTION, SOLUTION INTRAVENOUS at 08:14

## 2025-01-20 RX ADMIN — ACETAMINOPHEN 1000 MG: 500 TABLET ORAL at 16:38

## 2025-01-20 RX ADMIN — OXYCODONE 10 MG: 5 TABLET ORAL at 18:25

## 2025-01-20 RX ADMIN — LIDOCAINE HYDROCHLORIDE 40 MG: 20 INJECTION, SOLUTION EPIDURAL; INFILTRATION; INTRACAUDAL; PERINEURAL at 08:45

## 2025-01-20 RX ADMIN — GLYCOPYRROLATE 0.4 MG: 0.2 INJECTION, SOLUTION INTRAMUSCULAR; INTRAVENOUS at 10:28

## 2025-01-20 RX ADMIN — LIDOCAINE HYDROCHLORIDE 40 MG: 20 INJECTION, SOLUTION EPIDURAL; INFILTRATION; INTRACAUDAL; PERINEURAL at 09:15

## 2025-01-20 RX ADMIN — LIDOCAINE HYDROCHLORIDE 70 MG: 20 INJECTION, SOLUTION EPIDURAL; INFILTRATION; INTRACAUDAL; PERINEURAL at 08:12

## 2025-01-20 RX ADMIN — ROCURONIUM BROMIDE 10 MG: 10 INJECTION, SOLUTION INTRAVENOUS at 08:59

## 2025-01-20 RX ADMIN — WATER 2000 MG: 1 INJECTION INTRAMUSCULAR; INTRAVENOUS; SUBCUTANEOUS at 08:40

## 2025-01-20 RX ADMIN — FAMOTIDINE 20 MG: 10 INJECTION, SOLUTION INTRAVENOUS at 08:07

## 2025-01-20 RX ADMIN — WATER 2000 MG: 1 INJECTION INTRAMUSCULAR; INTRAVENOUS; SUBCUTANEOUS at 16:38

## 2025-01-20 RX ADMIN — HYDROMORPHONE HYDROCHLORIDE 0.5 MG: 1 INJECTION, SOLUTION INTRAMUSCULAR; INTRAVENOUS; SUBCUTANEOUS at 12:22

## 2025-01-20 RX ADMIN — MIDAZOLAM HYDROCHLORIDE 1 MG: 1 INJECTION, SOLUTION INTRAMUSCULAR; INTRAVENOUS at 08:11

## 2025-01-20 RX ADMIN — Medication 10 MG: at 09:30

## 2025-01-20 RX ADMIN — ACETAMINOPHEN 1000 MG: 500 TABLET ORAL at 20:43

## 2025-01-20 RX ADMIN — FENTANYL CITRATE 50 MCG: 50 INJECTION, SOLUTION INTRAMUSCULAR; INTRAVENOUS at 08:12

## 2025-01-20 RX ADMIN — RANOLAZINE 500 MG: 500 TABLET, FILM COATED, EXTENDED RELEASE ORAL at 20:43

## 2025-01-20 RX ADMIN — CELECOXIB 200 MG: 100 CAPSULE ORAL at 06:56

## 2025-01-20 RX ADMIN — FENTANYL CITRATE 100 MCG: 50 INJECTION, SOLUTION INTRAMUSCULAR; INTRAVENOUS at 08:54

## 2025-01-20 RX ADMIN — PROPOFOL 120 MG: 10 INJECTION, EMULSION INTRAVENOUS at 08:14

## 2025-01-20 RX ADMIN — LIDOCAINE HYDROCHLORIDE 20 MG: 20 INJECTION, SOLUTION EPIDURAL; INFILTRATION; INTRACAUDAL; PERINEURAL at 10:22

## 2025-01-20 RX ADMIN — Medication 10 MG: at 09:33

## 2025-01-20 ASSESSMENT — PAIN DESCRIPTION - DESCRIPTORS
DESCRIPTORS: DISCOMFORT
DESCRIPTORS: ACHING
DESCRIPTORS: SHOOTING
DESCRIPTORS: DISCOMFORT
DESCRIPTORS: ACHING

## 2025-01-20 ASSESSMENT — PAIN DESCRIPTION - PAIN TYPE: TYPE: SURGICAL PAIN

## 2025-01-20 ASSESSMENT — PAIN DESCRIPTION - FREQUENCY: FREQUENCY: INTERMITTENT

## 2025-01-20 ASSESSMENT — PAIN SCALES - GENERAL
PAINLEVEL_OUTOF10: 3
PAINLEVEL_OUTOF10: 5
PAINLEVEL_OUTOF10: 7
PAINLEVEL_OUTOF10: 7
PAINLEVEL_OUTOF10: 0
PAINLEVEL_OUTOF10: 7
PAINLEVEL_OUTOF10: 0
PAINLEVEL_OUTOF10: 7

## 2025-01-20 ASSESSMENT — PAIN DESCRIPTION - LOCATION
LOCATION: BACK;HIP
LOCATION: LEG
LOCATION: ABDOMEN
LOCATION: BACK;HIP

## 2025-01-20 ASSESSMENT — PAIN - FUNCTIONAL ASSESSMENT
PAIN_FUNCTIONAL_ASSESSMENT: 0-10
PAIN_FUNCTIONAL_ASSESSMENT: PREVENTS OR INTERFERES SOME ACTIVE ACTIVITIES AND ADLS

## 2025-01-20 ASSESSMENT — PAIN DESCRIPTION - ONSET
ONSET: ON-GOING
ONSET: ON-GOING

## 2025-01-20 ASSESSMENT — PAIN DESCRIPTION - ORIENTATION
ORIENTATION: LEFT;RIGHT
ORIENTATION: LEFT;RIGHT;LOWER
ORIENTATION: LEFT;RIGHT;LOWER
ORIENTATION: LEFT

## 2025-01-20 ASSESSMENT — COPD QUESTIONNAIRES: CAT_SEVERITY: MODERATE

## 2025-01-20 NOTE — CONSULTS
Name: Bo Greenberg Hospital: [facility]   : 1940 Admit Date: 2025   Phone: [phone]  Room: OR/PL   PCP: Jairo Mcgowan DO  MRN: [id number]   Date: 2025  Code: Full Code        HPI:      Chart and notes reviewed. Data reviewed. I review the patient's current medications in the medical record at each encounter.  I have evaluated and examined the patient.     11:52 AM       History was obtained from patient.    I was asked by Arsen Villaseñor MD to see Bo Greenberg in consultation for a chief complaint of     History of Present Illness:   is an 83 yo gentleman with a PMH of COPD (FEV1 59%), pulmonary hypertension, TYREE (does not use CPAP), CAD s/p PCI, DM, hypertension, GERD, chronic back pain, former tobacco use, Agent Orange exposure that was admitted today for a two part L5-S1 interbody fusion.  He had the supine portion of his surgery today with plans for fusion tomorrow.      He was last seen by me in the office on January 10 for preoperative visit.  At that time he was using albuterol nebs once a day.  He does not require supplemental O2 at baseline. He has not found benefit with other long acting bronchodilators in the past.      He reports that he feels fine but a little congested in his chest; requesting a neb. No cough or sputum production.     Images:  No recent chest imaging to review    Sats 97% on 2L  No recent labs to review      Past Medical History:   Diagnosis Date    CAD (coronary artery disease)     COPD (chronic obstructive pulmonary disease) (HCC)     uses 3LNC at home prn SOB, reports approx 2xwkly average use (as of 2025)    COVID-19 vaccine series completed 2021    Pfizer    Degenerative joint disease 2011    Diabetes mellitus (HCC)     Essential hypertension     GERD (gastroesophageal reflux disease)     History of back surgery 2019    Hyperlipidemia     Kidney stones     TYREE (obstructive sleep apnea)     untreated       Past

## 2025-01-20 NOTE — ANESTHESIA POSTPROCEDURE EVALUATION
Department of Anesthesiology  Postprocedure Note    Patient: Bo Greenberg  MRN: [id number]  YOB: 1940  Date of evaluation: 1/20/2025    Procedure Summary       Date: 01/20/25 Room / Location: [facility] MAIN OR F7 / [facility] MAIN OR    Anesthesia Start: 0807 Anesthesia Stop: 1054    Procedures:       L5-S1 ANTERIOR LUMBAR INTERBODY FUSION WITH INSTRUMENTATION (Spine Lumbar)      . (Abdomen) Diagnosis:       Neural foraminal stenosis of lumbar spine      Lumbar adjacent segment disease with spondylolisthesis      (Neural foraminal stenosis of lumbar spine [M48.061])      (Lumbar adjacent segment disease with spondylolisthesis [M51.369, M43.16])    Surgeons: Arsen Villaseñor MD; Ra Carvajal MD Responsible Provider: Albaro Castellon MD    Anesthesia Type: General ASA Status: 3            Anesthesia Type: General    Vic Phase I: Vic Score: 8    Vic Phase II:      Anesthesia Post Evaluation    Patient location during evaluation: PACU  Patient participation: complete - patient participated  Level of consciousness: awake  Airway patency: patent  Nausea & Vomiting: no vomiting and no nausea  Cardiovascular status: hemodynamically stable  Respiratory status: acceptable  Hydration status: stable  Pain management: adequate    No notable events documented.

## 2025-01-20 NOTE — PERIOP NOTE
Patient off the floor with transport to CT. Will return to PACU. Dr Castellon aware and gave the verbal approval for patient to leave with transport for this test.

## 2025-01-20 NOTE — ANESTHESIA PRE PROCEDURE
Department of Anesthesiology  Preprocedure Note       Name:  Bo Greenberg   Age:  84 y.o.  :  1940                                          MRN:  [id number]         Date:  2025      Surgeon: Surgeon(s):  Arsen Villaseñor MD Johnson, Bryan A, MD    Procedure: Procedure(s):  L5-S1 ANTERIOR LUMBAR INTERBODY FUSION WITH INSTRUMENTATION  .    Medications prior to admission:   Prior to Admission medications    Medication Sig Start Date End Date Taking? Authorizing Provider   OXYGEN Inhale 3 L into the lungs as needed for Shortness of Breath   Yes Provider, MD Fuad   metFORMIN (GLUCOPHAGE) 1000 MG tablet Take 1 tablet by mouth with breakfast and with evening meal 24  Yes Jairo Mcgowan DO   folic acid (FOLVITE) 1 MG tablet Take 1 tablet by mouth daily, except on the day that methotrexate is taken. 10/1/24  Yes Jairo Mcgowan DO   ezetimibe (ZETIA) 10 MG tablet Take 1 tablet by mouth daily  Patient taking differently: Take 1 tablet by mouth every morning 24  Yes Jairo Mcgowan DO   omeprazole (PRILOSEC) 40 MG delayed release capsule TAKE 1 CAPSULE DAILY  Patient taking differently: Take 1 capsule by mouth every morning 24  Yes Venita Molina DO   acetaminophen (TYLENOL) 500 MG tablet Take 2 tablets by mouth every 6 hours 21  Yes Automatic Reconciliation, Ar   atorvastatin (LIPITOR) 40 MG tablet Take 1 tablet by mouth every morning 20  Yes Automatic Reconciliation, Ar   ferrous sulfate (IRON 325) 325 (65 Fe) MG tablet Take 1 tablet by mouth daily (with breakfast) 21  Yes Automatic Reconciliation, Ar   irbesartan (AVAPRO) 150 MG tablet Take 1 tablet by mouth every morning ceived the following from [facility]: Outside name: irbesartan (AVAPRO) 150 mg tablet 22  Yes Automatic Reconciliation, Ar   ranolazine (RANEXA) 500 MG extended release tablet Take 1 tablet by mouth 2 times daily 12/15/20  Yes Automatic Reconciliation, Ar   Calcium

## 2025-01-20 NOTE — H&P
H&P Update:  was seen and examined.  History and physical has been reviewed. The patient has been examined. There have been no significant clinical changes since the completion of the originally dated History and Physical.  Patient identified by surgeon; surgical site was confirmed by patient and surgeon.  Equal back and bilateral hip pain, pins and needles, radiates to both calves equally.   Received breathing treatment this morning.   Going from sit to stand is worst with radiation to legs with weakness, uses cane.   While sitting pretty comfortable.   Patient is under pain contract for chronic oxycodone with PCP.  Currently 5 mg twice a day.  This has been instituted over the last month.  Prior to that it was 10 mg once a day for several years.

## 2025-01-21 ENCOUNTER — APPOINTMENT (OUTPATIENT)
Facility: HOSPITAL | Age: 85
DRG: 428 | End: 2025-01-21
Attending: ORTHOPAEDIC SURGERY
Payer: MEDICARE

## 2025-01-21 ENCOUNTER — ANESTHESIA (OUTPATIENT)
Facility: HOSPITAL | Age: 85
DRG: 428 | End: 2025-01-21
Payer: MEDICARE

## 2025-01-21 LAB
ANION GAP SERPL CALC-SCNC: 4 MMOL/L (ref 2–12)
BUN SERPL-MCNC: 7 MG/DL (ref 6–20)
BUN/CREAT SERPL: 9 (ref 12–20)
CALCIUM SERPL-MCNC: 8.4 MG/DL (ref 8.5–10.1)
CHLORIDE SERPL-SCNC: 112 MMOL/L (ref 97–108)
CO2 SERPL-SCNC: 27 MMOL/L (ref 21–32)
CREAT SERPL-MCNC: 0.76 MG/DL (ref 0.7–1.3)
ERYTHROCYTE [DISTWIDTH] IN BLOOD BY AUTOMATED COUNT: 13.2 % (ref 11.5–14.5)
GLUCOSE SERPL-MCNC: 96 MG/DL (ref 65–100)
HCT VFR BLD AUTO: 36.3 % (ref 36.6–50.3)
HGB BLD-MCNC: 12 G/DL (ref 12.1–17)
MCH RBC QN AUTO: 32 PG (ref 26–34)
MCHC RBC AUTO-ENTMCNC: 33.1 G/DL (ref 30–36.5)
MCV RBC AUTO: 96.8 FL (ref 80–99)
NRBC # BLD: 0 K/UL (ref 0–0.01)
NRBC BLD-RTO: 0 PER 100 WBC
PLATELET # BLD AUTO: 210 K/UL (ref 150–400)
PMV BLD AUTO: 9.6 FL (ref 8.9–12.9)
POTASSIUM SERPL-SCNC: 3.9 MMOL/L (ref 3.5–5.1)
RBC # BLD AUTO: 3.75 M/UL (ref 4.1–5.7)
SODIUM SERPL-SCNC: 143 MMOL/L (ref 136–145)
WBC # BLD AUTO: 5.8 K/UL (ref 4.1–11.1)

## 2025-01-21 PROCEDURE — 80048 BASIC METABOLIC PNL TOTAL CA: CPT

## 2025-01-21 PROCEDURE — 6360000002 HC RX W HCPCS: Performed by: ANESTHESIOLOGY

## 2025-01-21 PROCEDURE — 6360000002 HC RX W HCPCS: Performed by: NURSE ANESTHETIST, CERTIFIED REGISTERED

## 2025-01-21 PROCEDURE — 07DR3ZZ EXTRACTION OF ILIAC BONE MARROW, PERCUTANEOUS APPROACH: ICD-10-PCS | Performed by: ORTHOPAEDIC SURGERY

## 2025-01-21 PROCEDURE — 2580000003 HC RX 258: Performed by: NURSE ANESTHETIST, CERTIFIED REGISTERED

## 2025-01-21 PROCEDURE — 2580000003 HC RX 258: Performed by: NURSE PRACTITIONER

## 2025-01-21 PROCEDURE — 2500000003 HC RX 250 WO HCPCS: Performed by: NURSE PRACTITIONER

## 2025-01-21 PROCEDURE — 97530 THERAPEUTIC ACTIVITIES: CPT

## 2025-01-21 PROCEDURE — 6360000002 HC RX W HCPCS: Performed by: ORTHOPAEDIC SURGERY

## 2025-01-21 PROCEDURE — 85027 COMPLETE CBC AUTOMATED: CPT

## 2025-01-21 PROCEDURE — 7100000001 HC PACU RECOVERY - ADDTL 15 MIN: Performed by: ORTHOPAEDIC SURGERY

## 2025-01-21 PROCEDURE — 3600000004 HC SURGERY LEVEL 4 BASE: Performed by: ORTHOPAEDIC SURGERY

## 2025-01-21 PROCEDURE — 1100000000 HC RM PRIVATE

## 2025-01-21 PROCEDURE — 3700000000 HC ANESTHESIA ATTENDED CARE: Performed by: ORTHOPAEDIC SURGERY

## 2025-01-21 PROCEDURE — 6360000002 HC RX W HCPCS: Performed by: NURSE PRACTITIONER

## 2025-01-21 PROCEDURE — 2500000003 HC RX 250 WO HCPCS: Performed by: NURSE ANESTHETIST, CERTIFIED REGISTERED

## 2025-01-21 PROCEDURE — 2720000010 HC SURG SUPPLY STERILE: Performed by: ORTHOPAEDIC SURGERY

## 2025-01-21 PROCEDURE — 36415 COLL VENOUS BLD VENIPUNCTURE: CPT

## 2025-01-21 PROCEDURE — 0SG30K1 FUSION OF LUMBOSACRAL JOINT WITH NONAUTOLOGOUS TISSUE SUBSTITUTE, POSTERIOR APPROACH, POSTERIOR COLUMN, OPEN APPROACH: ICD-10-PCS | Performed by: ORTHOPAEDIC SURGERY

## 2025-01-21 PROCEDURE — 01NB0ZZ RELEASE LUMBAR NERVE, OPEN APPROACH: ICD-10-PCS | Performed by: ORTHOPAEDIC SURGERY

## 2025-01-21 PROCEDURE — C1713 ANCHOR/SCREW BN/BN,TIS/BN: HCPCS | Performed by: ORTHOPAEDIC SURGERY

## 2025-01-21 PROCEDURE — 3700000001 HC ADD 15 MINUTES (ANESTHESIA): Performed by: ORTHOPAEDIC SURGERY

## 2025-01-21 PROCEDURE — 3600000014 HC SURGERY LEVEL 4 ADDTL 15MIN: Performed by: ORTHOPAEDIC SURGERY

## 2025-01-21 PROCEDURE — 0SG00K1 FUSION OF LUMBAR VERTEBRAL JOINT WITH NONAUTOLOGOUS TISSUE SUBSTITUTE, POSTERIOR APPROACH, POSTERIOR COLUMN, OPEN APPROACH: ICD-10-PCS | Performed by: ORTHOPAEDIC SURGERY

## 2025-01-21 PROCEDURE — 97116 GAIT TRAINING THERAPY: CPT

## 2025-01-21 PROCEDURE — 2709999900 HC NON-CHARGEABLE SUPPLY: Performed by: ORTHOPAEDIC SURGERY

## 2025-01-21 PROCEDURE — APPNB30 APP NON BILLABLE TIME 0-30 MINS: Performed by: NURSE PRACTITIONER

## 2025-01-21 PROCEDURE — 7100000000 HC PACU RECOVERY - FIRST 15 MIN: Performed by: ORTHOPAEDIC SURGERY

## 2025-01-21 PROCEDURE — P9045 ALBUMIN (HUMAN), 5%, 250 ML: HCPCS | Performed by: NURSE ANESTHETIST, CERTIFIED REGISTERED

## 2025-01-21 PROCEDURE — 94761 N-INVAS EAR/PLS OXIMETRY MLT: CPT

## 2025-01-21 PROCEDURE — 6370000000 HC RX 637 (ALT 250 FOR IP): Performed by: NURSE PRACTITIONER

## 2025-01-21 DEVICE — SCREW 55840007545 5.5/6 MAS 7.5X45 CC
Type: IMPLANTABLE DEVICE | Site: BACK | Status: FUNCTIONAL
Brand: CD HORIZON® SPINAL SYSTEM

## 2025-01-21 DEVICE — ROD 1555501040 5.5 CCM NS CURV 40MM
Type: IMPLANTABLE DEVICE | Site: BACK | Status: FUNCTIONAL
Brand: CD HORIZON® SPINAL SYSTEM

## 2025-01-21 DEVICE — DOMINO 5135251 VARIABLE ANGLE 5.5/6.0
Type: IMPLANTABLE DEVICE | Site: BACK | Status: FUNCTIONAL
Brand: CD HORIZON® SPINAL SYSTEM

## 2025-01-21 DEVICE — GRAFT BNE LG: Type: IMPLANTABLE DEVICE | Site: BACK | Status: FUNCTIONAL

## 2025-01-21 DEVICE — SET SCREW 5540030 5.5 TI NS BRK OFF
Type: IMPLANTABLE DEVICE | Site: BACK | Status: FUNCTIONAL
Brand: CD HORIZON® SPINAL SYSTEM

## 2025-01-21 DEVICE — ROD 1555501045 5.5 CCM NS CURV 45MM
Type: IMPLANTABLE DEVICE | Site: BACK | Status: FUNCTIONAL
Brand: CD HORIZON® SPINAL SYSTEM

## 2025-01-21 RX ORDER — LIDOCAINE HYDROCHLORIDE 20 MG/ML
INJECTION, SOLUTION EPIDURAL; INFILTRATION; INTRACAUDAL; PERINEURAL
Status: DISCONTINUED | OUTPATIENT
Start: 2025-01-21 | End: 2025-01-21 | Stop reason: SDUPTHER

## 2025-01-21 RX ORDER — EPHEDRINE SULFATE/0.9% NACL/PF 25 MG/5 ML
SYRINGE (ML) INTRAVENOUS
Status: DISCONTINUED | OUTPATIENT
Start: 2025-01-21 | End: 2025-01-21 | Stop reason: SDUPTHER

## 2025-01-21 RX ORDER — SODIUM CHLORIDE 9 MG/ML
INJECTION, SOLUTION INTRAVENOUS PRN
Status: DISCONTINUED | OUTPATIENT
Start: 2025-01-21 | End: 2025-01-23 | Stop reason: HOSPADM

## 2025-01-21 RX ORDER — SODIUM CHLORIDE, SODIUM LACTATE, POTASSIUM CHLORIDE, CALCIUM CHLORIDE 600; 310; 30; 20 MG/100ML; MG/100ML; MG/100ML; MG/100ML
INJECTION, SOLUTION INTRAVENOUS CONTINUOUS
Status: DISCONTINUED | OUTPATIENT
Start: 2025-01-21 | End: 2025-01-21 | Stop reason: HOSPADM

## 2025-01-21 RX ORDER — ONDANSETRON 2 MG/ML
4 INJECTION INTRAMUSCULAR; INTRAVENOUS
Status: DISCONTINUED | OUTPATIENT
Start: 2025-01-21 | End: 2025-01-21 | Stop reason: HOSPADM

## 2025-01-21 RX ORDER — SODIUM CHLORIDE 9 MG/ML
INJECTION, SOLUTION INTRAVENOUS
Status: DISCONTINUED | OUTPATIENT
Start: 2025-01-21 | End: 2025-01-21 | Stop reason: SDUPTHER

## 2025-01-21 RX ORDER — SODIUM CHLORIDE 9 MG/ML
INJECTION, SOLUTION INTRAVENOUS CONTINUOUS
Status: DISCONTINUED | OUTPATIENT
Start: 2025-01-21 | End: 2025-01-23 | Stop reason: HOSPADM

## 2025-01-21 RX ORDER — DEXAMETHASONE SODIUM PHOSPHATE 4 MG/ML
INJECTION, SOLUTION INTRA-ARTICULAR; INTRALESIONAL; INTRAMUSCULAR; INTRAVENOUS; SOFT TISSUE
Status: DISCONTINUED | OUTPATIENT
Start: 2025-01-21 | End: 2025-01-21 | Stop reason: SDUPTHER

## 2025-01-21 RX ORDER — ALBUMIN HUMAN 50 G/1000ML
SOLUTION INTRAVENOUS
Status: DISCONTINUED | OUTPATIENT
Start: 2025-01-21 | End: 2025-01-21 | Stop reason: SDUPTHER

## 2025-01-21 RX ORDER — VANCOMYCIN HYDROCHLORIDE 1 G/20ML
INJECTION, POWDER, LYOPHILIZED, FOR SOLUTION INTRAVENOUS PRN
Status: DISCONTINUED | OUTPATIENT
Start: 2025-01-21 | End: 2025-01-21 | Stop reason: ALTCHOICE

## 2025-01-21 RX ORDER — DEXMEDETOMIDINE HYDROCHLORIDE 100 UG/ML
INJECTION, SOLUTION INTRAVENOUS
Status: DISCONTINUED | OUTPATIENT
Start: 2025-01-21 | End: 2025-01-21 | Stop reason: SDUPTHER

## 2025-01-21 RX ORDER — NALOXONE HYDROCHLORIDE 0.4 MG/ML
INJECTION, SOLUTION INTRAMUSCULAR; INTRAVENOUS; SUBCUTANEOUS PRN
Status: DISCONTINUED | OUTPATIENT
Start: 2025-01-21 | End: 2025-01-21 | Stop reason: HOSPADM

## 2025-01-21 RX ORDER — FENTANYL CITRATE 50 UG/ML
INJECTION, SOLUTION INTRAMUSCULAR; INTRAVENOUS
Status: DISCONTINUED | OUTPATIENT
Start: 2025-01-21 | End: 2025-01-21 | Stop reason: SDUPTHER

## 2025-01-21 RX ORDER — KETOROLAC TROMETHAMINE 15 MG/ML
15 INJECTION, SOLUTION INTRAMUSCULAR; INTRAVENOUS EVERY 6 HOURS
Status: COMPLETED | OUTPATIENT
Start: 2025-01-21 | End: 2025-01-22

## 2025-01-21 RX ORDER — DIPHENHYDRAMINE HYDROCHLORIDE 50 MG/ML
12.5 INJECTION INTRAMUSCULAR; INTRAVENOUS
Status: DISCONTINUED | OUTPATIENT
Start: 2025-01-21 | End: 2025-01-21 | Stop reason: HOSPADM

## 2025-01-21 RX ORDER — ROCURONIUM BROMIDE 10 MG/ML
INJECTION, SOLUTION INTRAVENOUS
Status: DISCONTINUED | OUTPATIENT
Start: 2025-01-21 | End: 2025-01-21 | Stop reason: SDUPTHER

## 2025-01-21 RX ORDER — PROPOFOL 10 MG/ML
INJECTION, EMULSION INTRAVENOUS
Status: DISCONTINUED | OUTPATIENT
Start: 2025-01-21 | End: 2025-01-21 | Stop reason: SDUPTHER

## 2025-01-21 RX ORDER — PHENYLEPHRINE HCL IN 0.9% NACL 0.4MG/10ML
SYRINGE (ML) INTRAVENOUS
Status: DISCONTINUED | OUTPATIENT
Start: 2025-01-21 | End: 2025-01-21 | Stop reason: SDUPTHER

## 2025-01-21 RX ORDER — SUCCINYLCHOLINE/SOD CL,ISO/PF 100 MG/5ML
SYRINGE (ML) INTRAVENOUS
Status: DISCONTINUED | OUTPATIENT
Start: 2025-01-21 | End: 2025-01-21 | Stop reason: SDUPTHER

## 2025-01-21 RX ADMIN — WATER 2000 MG: 1 INJECTION INTRAMUSCULAR; INTRAVENOUS; SUBCUTANEOUS at 20:43

## 2025-01-21 RX ADMIN — DEXMEDETOMIDINE 8 MCG: 100 INJECTION, SOLUTION INTRAVENOUS at 13:45

## 2025-01-21 RX ADMIN — SODIUM CHLORIDE: 9 INJECTION, SOLUTION INTRAVENOUS at 12:55

## 2025-01-21 RX ADMIN — FENTANYL CITRATE 50 MCG: 50 INJECTION, SOLUTION INTRAMUSCULAR; INTRAVENOUS at 12:55

## 2025-01-21 RX ADMIN — OXYCODONE 10 MG: 5 TABLET ORAL at 22:38

## 2025-01-21 RX ADMIN — OXYCODONE 10 MG: 5 TABLET ORAL at 07:07

## 2025-01-21 RX ADMIN — PROPOFOL 100 MCG/KG/MIN: 10 INJECTION, EMULSION INTRAVENOUS at 13:34

## 2025-01-21 RX ADMIN — PROPOFOL 50 MG: 10 INJECTION, EMULSION INTRAVENOUS at 15:25

## 2025-01-21 RX ADMIN — PROPOFOL 120 MG: 10 INJECTION, EMULSION INTRAVENOUS at 13:05

## 2025-01-21 RX ADMIN — KETOROLAC TROMETHAMINE 15 MG: 15 INJECTION, SOLUTION INTRAMUSCULAR; INTRAVENOUS at 20:42

## 2025-01-21 RX ADMIN — Medication 20 MG: at 16:54

## 2025-01-21 RX ADMIN — FAMOTIDINE 20 MG: 20 TABLET, FILM COATED ORAL at 20:42

## 2025-01-21 RX ADMIN — FENTANYL CITRATE 50 MCG: 50 INJECTION, SOLUTION INTRAMUSCULAR; INTRAVENOUS at 13:20

## 2025-01-21 RX ADMIN — SODIUM CHLORIDE: 9 INJECTION, SOLUTION INTRAVENOUS at 20:43

## 2025-01-21 RX ADMIN — EPHEDRINE SULFATE 5 MG: 5 INJECTION INTRAVENOUS at 13:28

## 2025-01-21 RX ADMIN — LIDOCAINE HYDROCHLORIDE 80 MG: 20 INJECTION, SOLUTION EPIDURAL; INFILTRATION; INTRACAUDAL; PERINEURAL at 13:05

## 2025-01-21 RX ADMIN — WATER 2000 MG: 1 INJECTION INTRAMUSCULAR; INTRAVENOUS; SUBCUTANEOUS at 02:13

## 2025-01-21 RX ADMIN — Medication 100 MG: at 13:06

## 2025-01-21 RX ADMIN — PROPOFOL 30 MG: 10 INJECTION, EMULSION INTRAVENOUS at 16:53

## 2025-01-21 RX ADMIN — ACETAMINOPHEN 1000 MG: 500 TABLET ORAL at 02:11

## 2025-01-21 RX ADMIN — PROPOFOL 50 MG: 10 INJECTION, EMULSION INTRAVENOUS at 13:20

## 2025-01-21 RX ADMIN — HYDROMORPHONE HYDROCHLORIDE 0.5 MG: 1 INJECTION, SOLUTION INTRAMUSCULAR; INTRAVENOUS; SUBCUTANEOUS at 18:45

## 2025-01-21 RX ADMIN — RANOLAZINE 500 MG: 500 TABLET, FILM COATED, EXTENDED RELEASE ORAL at 20:42

## 2025-01-21 RX ADMIN — FAMOTIDINE 20 MG: 10 INJECTION, SOLUTION INTRAVENOUS at 09:52

## 2025-01-21 RX ADMIN — FENTANYL CITRATE 50 MCG: 50 INJECTION, SOLUTION INTRAMUSCULAR; INTRAVENOUS at 13:05

## 2025-01-21 RX ADMIN — ALBUMIN (HUMAN) 12.5 G: 12.5 INJECTION, SOLUTION INTRAVENOUS at 17:00

## 2025-01-21 RX ADMIN — FENTANYL CITRATE 50 MCG: 50 INJECTION, SOLUTION INTRAMUSCULAR; INTRAVENOUS at 15:25

## 2025-01-21 RX ADMIN — Medication 80 MCG: at 14:10

## 2025-01-21 RX ADMIN — SODIUM CHLORIDE, PRESERVATIVE FREE 10 ML: 5 INJECTION INTRAVENOUS at 20:43

## 2025-01-21 RX ADMIN — HYDROMORPHONE HYDROCHLORIDE 0.5 MG: 1 INJECTION, SOLUTION INTRAMUSCULAR; INTRAVENOUS; SUBCUTANEOUS at 19:00

## 2025-01-21 RX ADMIN — ACETAMINOPHEN 1000 MG: 500 TABLET ORAL at 20:42

## 2025-01-21 RX ADMIN — ROCURONIUM BROMIDE 45 MG: 10 INJECTION, SOLUTION INTRAVENOUS at 14:03

## 2025-01-21 RX ADMIN — DEXMEDETOMIDINE 8 MCG: 100 INJECTION, SOLUTION INTRAVENOUS at 16:54

## 2025-01-21 RX ADMIN — DEXAMETHASONE SODIUM PHOSPHATE 8 MG: 4 INJECTION INTRA-ARTICULAR; INTRALESIONAL; INTRAMUSCULAR; INTRAVENOUS; SOFT TISSUE at 13:40

## 2025-01-21 RX ADMIN — EPHEDRINE SULFATE 10 MG: 5 INJECTION INTRAVENOUS at 13:25

## 2025-01-21 RX ADMIN — SODIUM CHLORIDE: 9 INJECTION, SOLUTION INTRAVENOUS at 02:14

## 2025-01-21 RX ADMIN — ROCURONIUM BROMIDE 5 MG: 10 INJECTION, SOLUTION INTRAVENOUS at 13:05

## 2025-01-21 RX ADMIN — FENTANYL CITRATE 50 MCG: 50 INJECTION, SOLUTION INTRAMUSCULAR; INTRAVENOUS at 14:45

## 2025-01-21 ASSESSMENT — PAIN SCALES - GENERAL
PAINLEVEL_OUTOF10: 5
PAINLEVEL_OUTOF10: 2
PAINLEVEL_OUTOF10: 8
PAINLEVEL_OUTOF10: 1
PAINLEVEL_OUTOF10: 5
PAINLEVEL_OUTOF10: 0

## 2025-01-21 ASSESSMENT — PAIN DESCRIPTION - LOCATION
LOCATION: BACK
LOCATION: ABDOMEN
LOCATION: BACK
LOCATION: HIP;BACK

## 2025-01-21 ASSESSMENT — PAIN - FUNCTIONAL ASSESSMENT: PAIN_FUNCTIONAL_ASSESSMENT: 0-10

## 2025-01-21 ASSESSMENT — PAIN DESCRIPTION - ORIENTATION
ORIENTATION: LOWER
ORIENTATION: LOWER;LEFT;RIGHT
ORIENTATION: LOWER

## 2025-01-21 ASSESSMENT — PAIN DESCRIPTION - DESCRIPTORS: DESCRIPTORS: DULL

## 2025-01-21 ASSESSMENT — PAIN DESCRIPTION - PAIN TYPE: TYPE: SURGICAL PAIN

## 2025-01-21 ASSESSMENT — COPD QUESTIONNAIRES: CAT_SEVERITY: MODERATE

## 2025-01-21 NOTE — OP NOTE
[facility]          [address] [place]  [address]                            OPERATIVE REPORT      PATIENT NAME: EVON VILLARREAL              : 1940  MED REC NO: [id number]                       ROOM: OR  ACCOUNT NO: [id number]                       ADMIT DATE: 2025  PROVIDER: Ra Carvajal MD    DATE OF SERVICE:  2025    PREOPERATIVE DIAGNOSES:  Neural foraminal stenosis, lumbar spine with lumbar adjacent disease with spondylolisthesis.    POSTOPERATIVE DIAGNOSES:  Neural foraminal stenosis, lumbar spine with lumbar adjacent disease with spondylolisthesis.    PROCEDURES PERFORMED:  L5-S1 interbody fusion instrumentation surgery.    SURGEON:  Ra Carvajal MD    ASSISTANT:  Sujit Sinclair SA.    ANESTHESIA:  General.    ESTIMATED BLOOD LOSS:  Minimal.    SPECIMENS REMOVED:  None.    INTRAOPERATIVE FINDINGS:  Spondylolisthesis.     COMPLICATIONS:  None.    IMPLANTS:  See Dr. Villaseñor's operative report.    INDICATIONS:  The patient is an 84-year-old male multiple posterior and lateral lumbar fusions in the past.  The patient developed spondylolisthesis with radiculopathy, L5-S1 segment.  He was failed non operatively and now presents for surgical management.    DESCRIPTION OF PROCEDURE:  Consent was obtained.  He was taken to the operating room and placed in supine position.  SCDs were turned on.  After induction of general endotracheal anesthesia, the abdomen was prepped and draped in usual sterile fashion.  A time-out was performed per protocol.  Neuro monitoring was placed.  Next, preincision C-arm was used to localize the appropriate segment.  A generous and appropriate lower midline incision was made with skin knife down to the rectus fascia.  The left rectus fascia was opened with cautery.  The peritoneum was mobilized to the right with the left ureter.  The retractor was placed around the bifurcation to very gently cephalad.  Most 
[id number]  
[facility]  [address] [place] [address]    OPERATIVE REPORT      NAME: Bo Greenberg    AGE: 84 y.o.    YOB: 1940    MEDICAL RECORD NUMBER: [id number]    DATE OF SURGERY: 1/20/2025    PREOPERATIVE DIAGNOSES:  Adjacent segment disease lumbar spine L5-S1  Lumbar foraminal stenosis  Bilateral lumbar radiculopathy    POSTOPERATIVE DIAGNOSES:  same      INTRAOPERATIVE FINDINGS: Disk space collapse bilateral foraminal stenosis      PROCEDURES PERFORMED:     -Anterior lumbar interbody fusion L5-S1   -Insertion of Structural interbody cage at L5-S1, this was the [employer] Waveform A lumbar, size 14 x30x39, 15 degree lordosis  morselized allograft ossium cancellous chips, BMP, seaspine osteostrand for placement inside of the cage for interbody fusion.  -Anterior lumbar instrumentation utilizing [employer] anterior lumbar plate, size 18 mm with 20 mm screws x 4.      SURGEON: Arsen Villaseñor MD      CO-SURGEON: Ra Carvajal MD, for the anterior lumbar approach.      Assistant: Edu Moore NP was present and scrubbed for the entire procedure and assisted with retractors, suction, exposure, graft preparation, instrumentation preparation and wound closure.     .imp   COMPLICATIONS: None.     ESTIMATED BLOOD LOSS: <25 mL.     SPECIMENS REMOVED: None.     DRAINS: None.     ANESTHESIA: General.     Implant Name Type Inv. Item Serial No.  Lot No. LRB No. Used Action   KIT GRAFT BONE SM RHBMP-2 4.2MG INJ 5ML CONTAIN NDL 20GA - SN/A  KIT GRAFT BONE SM RHBMP-2 4.2MG INJ 5ML CONTAIN NDL 20GA N/A [employer] SPINALGRAFT TECH-WD [id number] N/A 1 Implanted   ALLOGRAFT BNE SM 2.5 CC DBM FIBER OSTEOSTRAND PLUS - [id number]  ALLOGRAFT BNE SM 2.5 CC DBM FIBER OSTEOSTRAND PLUS [id number] [employer] [id number] N/A 1 Implanted   CAGE SPNL 3D 15 DEG [id number] MM 3 HOLE NO PROF WAVEFORM A - SNA  CAGE SPNL 3D 15 DEG [id number] MM 3 HOLE NO PROF WAVEFORM A NA [employer] 
procedure and confirmed by Anesthesia Staff, OR staff and myself. The patient received preop antibiotic Ancef.     I then proceeded to utilize patient's previous bilateral Wiltsie posterolateral incisions.  I began on the left-hand side.  Sharp section was taken down to the level of the lumbar fascia.  Lumbar fascia was then divided with Bovie cautery.  Further dissection taken down to previous instrumentation which was exposed L4 and L5.  I then exposed the posterior superior iliac spine on the left.  I then exposed the lateral aspect of the L4-5 facet joint and L5-S1 facet joints as well as the dorsal sacrum and sacral ala on the left.   I then proceeded with an identical procedure on the right-hand side utilizing the right-sided Wiltsie and posterolateral incision, sharp dissection was taken down to the level of the lumbar fascia lumbar fascia was then divided with Bovie cautery.  Further dissection taken down to the previous instrumentation on the right-hand side and expose the L4 and L5 pedicle screws and trinidad.  I then exposed the posterior superior iliac spine on the right and then exposed the lateral aspect of the L4-5 and L5-S1 facet joint as well as the dorsal sacrum and sacral ala on the right.        And then proceeded to palpate the posterior superior iliac spine on the right.  I exposed the PSIS through separate fascial incision, I then utilized Jamshidi needle and obtained bone marrow aspirate from the ileum. y.     I then proceeded with placement of the right side posterior superior iliac pin for the [employer] O-arm.  For the Medtronic O arm and obtained my 1st O arm spin.  After confirming excellent images I then proceeded to utilize navigated instruments for  hole preparation. I utilized the navigated bur followed by navigated gearshift followed by a navigated tap. I then proceeded to manually probe all  holes and there was no evidence of breech and intact inferior endpoints.  I then

## 2025-01-22 LAB
ANION GAP SERPL CALC-SCNC: 6 MMOL/L (ref 2–12)
BUN SERPL-MCNC: 10 MG/DL (ref 6–20)
BUN/CREAT SERPL: 11 (ref 12–20)
CALCIUM SERPL-MCNC: 8.3 MG/DL (ref 8.5–10.1)
CHLORIDE SERPL-SCNC: 114 MMOL/L (ref 97–108)
CO2 SERPL-SCNC: 22 MMOL/L (ref 21–32)
CREAT SERPL-MCNC: 0.88 MG/DL (ref 0.7–1.3)
GLUCOSE SERPL-MCNC: 108 MG/DL (ref 65–100)
HCT VFR BLD AUTO: 34.2 % (ref 36.6–50.3)
HGB BLD-MCNC: 11.2 G/DL (ref 12.1–17)
POTASSIUM SERPL-SCNC: 3.7 MMOL/L (ref 3.5–5.1)
SODIUM SERPL-SCNC: 142 MMOL/L (ref 136–145)

## 2025-01-22 PROCEDURE — 85014 HEMATOCRIT: CPT

## 2025-01-22 PROCEDURE — 2580000003 HC RX 258: Performed by: NURSE PRACTITIONER

## 2025-01-22 PROCEDURE — APPNB60 APP NON BILLABLE TIME 46-60 MINS: Performed by: NURSE PRACTITIONER

## 2025-01-22 PROCEDURE — 6360000002 HC RX W HCPCS: Performed by: NURSE PRACTITIONER

## 2025-01-22 PROCEDURE — 97530 THERAPEUTIC ACTIVITIES: CPT

## 2025-01-22 PROCEDURE — 80048 BASIC METABOLIC PNL TOTAL CA: CPT

## 2025-01-22 PROCEDURE — 97165 OT EVAL LOW COMPLEX 30 MIN: CPT

## 2025-01-22 PROCEDURE — 97116 GAIT TRAINING THERAPY: CPT

## 2025-01-22 PROCEDURE — 97535 SELF CARE MNGMENT TRAINING: CPT

## 2025-01-22 PROCEDURE — 1100000000 HC RM PRIVATE

## 2025-01-22 PROCEDURE — 85018 HEMOGLOBIN: CPT

## 2025-01-22 PROCEDURE — 94761 N-INVAS EAR/PLS OXIMETRY MLT: CPT

## 2025-01-22 PROCEDURE — 6370000000 HC RX 637 (ALT 250 FOR IP): Performed by: NURSE PRACTITIONER

## 2025-01-22 PROCEDURE — 2500000003 HC RX 250 WO HCPCS: Performed by: NURSE PRACTITIONER

## 2025-01-22 RX ORDER — ENOXAPARIN SODIUM 100 MG/ML
40 INJECTION SUBCUTANEOUS DAILY
Status: DISCONTINUED | OUTPATIENT
Start: 2025-01-22 | End: 2025-01-23 | Stop reason: HOSPADM

## 2025-01-22 RX ADMIN — ACETAMINOPHEN 1000 MG: 500 TABLET ORAL at 08:19

## 2025-01-22 RX ADMIN — OXYCODONE 5 MG: 5 TABLET ORAL at 13:45

## 2025-01-22 RX ADMIN — WATER 2000 MG: 1 INJECTION INTRAMUSCULAR; INTRAVENOUS; SUBCUTANEOUS at 04:16

## 2025-01-22 RX ADMIN — FAMOTIDINE 20 MG: 20 TABLET, FILM COATED ORAL at 20:41

## 2025-01-22 RX ADMIN — ENOXAPARIN SODIUM 40 MG: 100 INJECTION SUBCUTANEOUS at 08:20

## 2025-01-22 RX ADMIN — SODIUM CHLORIDE, PRESERVATIVE FREE 10 ML: 5 INJECTION INTRAVENOUS at 08:21

## 2025-01-22 RX ADMIN — KETOROLAC TROMETHAMINE 15 MG: 15 INJECTION, SOLUTION INTRAMUSCULAR; INTRAVENOUS at 08:20

## 2025-01-22 RX ADMIN — OXYCODONE 5 MG: 5 TABLET ORAL at 08:21

## 2025-01-22 RX ADMIN — ACETAMINOPHEN 1000 MG: 500 TABLET ORAL at 15:48

## 2025-01-22 RX ADMIN — ATORVASTATIN CALCIUM 40 MG: 20 TABLET, FILM COATED ORAL at 08:20

## 2025-01-22 RX ADMIN — LOSARTAN POTASSIUM 50 MG: 50 TABLET, FILM COATED ORAL at 08:19

## 2025-01-22 RX ADMIN — RANOLAZINE 500 MG: 500 TABLET, FILM COATED, EXTENDED RELEASE ORAL at 20:42

## 2025-01-22 RX ADMIN — KETOROLAC TROMETHAMINE 15 MG: 15 INJECTION, SOLUTION INTRAMUSCULAR; INTRAVENOUS at 04:16

## 2025-01-22 RX ADMIN — FAMOTIDINE 20 MG: 20 TABLET, FILM COATED ORAL at 08:19

## 2025-01-22 RX ADMIN — MAGNESIUM HYDROXIDE 15 ML: 400 SUSPENSION ORAL at 17:54

## 2025-01-22 RX ADMIN — KETOROLAC TROMETHAMINE 15 MG: 15 INJECTION, SOLUTION INTRAMUSCULAR; INTRAVENOUS at 15:52

## 2025-01-22 RX ADMIN — EZETIMIBE 10 MG: 10 TABLET ORAL at 08:20

## 2025-01-22 RX ADMIN — SODIUM CHLORIDE, PRESERVATIVE FREE 10 ML: 5 INJECTION INTRAVENOUS at 20:43

## 2025-01-22 RX ADMIN — RANOLAZINE 500 MG: 500 TABLET, FILM COATED, EXTENDED RELEASE ORAL at 08:20

## 2025-01-22 RX ADMIN — NALOXEGOL OXALATE 12.5 MG: 25 TABLET, FILM COATED ORAL at 08:14

## 2025-01-22 RX ADMIN — SODIUM CHLORIDE: 9 INJECTION, SOLUTION INTRAVENOUS at 04:32

## 2025-01-22 RX ADMIN — ACETAMINOPHEN 1000 MG: 500 TABLET ORAL at 04:15

## 2025-01-22 RX ADMIN — MAGNESIUM HYDROXIDE 15 ML: 400 SUSPENSION ORAL at 08:18

## 2025-01-22 RX ADMIN — POLYETHYLENE GLYCOL 3350 17 G: 17 POWDER, FOR SOLUTION ORAL at 08:20

## 2025-01-22 ASSESSMENT — PAIN DESCRIPTION - LOCATION
LOCATION: HIP

## 2025-01-22 ASSESSMENT — PAIN SCALES - GENERAL
PAINLEVEL_OUTOF10: 3
PAINLEVEL_OUTOF10: 0
PAINLEVEL_OUTOF10: 0
PAINLEVEL_OUTOF10: 3

## 2025-01-22 ASSESSMENT — PAIN DESCRIPTION - DESCRIPTORS
DESCRIPTORS: ACHING
DESCRIPTORS: SORE

## 2025-01-22 NOTE — ANESTHESIA POSTPROCEDURE EVALUATION
Department of Anesthesiology  Postprocedure Note    Patient: Bo Greenberg  MRN: [id number]  YOB: 1940  Date of evaluation: 1/22/2025    Procedure Summary       Date: 01/21/25 Room / Location: [facility] MAIN OR F6 / [facility] MAIN OR    Anesthesia Start: 1255 Anesthesia Stop: 1834    Procedure: L4-L5 LAMINECTOMY AND FUSION, L5-S1 POSTERIOR FUSION, L1-L5 HARDWARE REMOVAL, L1-S1 INSTRUMENTION (Back) Diagnosis:       Neuroforaminal stenosis of lumbar spine      (Neuroforaminal stenosis of lumbar spine [M48.061])    Surgeons: Arsen Villaseñor MD Responsible Provider: Albaro Castellon MD    Anesthesia Type: general ASA Status: 3            Anesthesia Type: No value filed.    Vic Phase I: Vic Score: 6    Vic Phase II:      Anesthesia Post Evaluation    Patient location during evaluation: PACU  Patient participation: complete - patient participated  Level of consciousness: awake  Airway patency: patent  Nausea & Vomiting: no vomiting and no nausea  Cardiovascular status: hemodynamically stable  Respiratory status: acceptable  Hydration status: stable  Pain management: adequate    No notable events documented.

## 2025-01-22 NOTE — CARE COORDINATION
1/22/2025  2:36 PM  Care Management Progress Note    Reason for Admission:   Neural foraminal stenosis of lumbar spine [M48.061]  Lumbar adjacent segment disease with spondylolisthesis [M51.369, M43.16]  Lumbar radiculitis [M54.16]  Procedure(s) (LRB):  L4-L5 LAMINECTOMY AND FUSION, L5-S1 POSTERIOR FUSION, L1-L5 HARDWARE REMOVAL, L1-S1 INSTRUMENTION (N/A)  1 Day Post-Op    Patient Admission Status: Inpatient  Date Admitted to INP: 1/20/25 []NA - OBS/Outpatient  RUR: Readmission Risk Score: 7.9    Hospitalization in the last 30 days (Readmission):  No        Transition of care plan:  Awaiting medical clearance and DC order. Therapy following.  Home with family. Therapy indicated no home therapy needs. No CM consults noted.  Date IM given: 1/20/25  []NA  Outpatient follow-up.  Discharge transport: Family       01/22/25 1132   Social/Functional History   Lives With Spouse   Type of Home House   Home Layout Multi-level;Able to Live on Main level with bedroom/bathroom  (3 levels)   Home Access Stairs to enter with rails   Entrance Stairs - Number of Steps 2   Bathroom Shower/Tub Walk-in shower;Shower chair without back   Bathroom Equipment 3-in-1 Commode;Toilet raiser   Prior Level of Assist for ADLs Independent   Prior Level of Assist for Homemaking Independent   Homemaking Responsibilities Yes   Active  Yes   Services At/After Discharge   Services At/After Discharge None   Mode of Transport at Discharge Other (see comment)   Confirm Follow Up Transport Family

## 2025-01-23 VITALS
RESPIRATION RATE: 18 BRPM | WEIGHT: 158.73 LBS | DIASTOLIC BLOOD PRESSURE: 66 MMHG | HEART RATE: 69 BPM | HEIGHT: 68 IN | BODY MASS INDEX: 24.06 KG/M2 | SYSTOLIC BLOOD PRESSURE: 118 MMHG | OXYGEN SATURATION: 92 % | TEMPERATURE: 97.9 F

## 2025-01-23 LAB
HCT VFR BLD AUTO: 30.9 % (ref 36.6–50.3)
HGB BLD-MCNC: 10.2 G/DL (ref 12.1–17)

## 2025-01-23 PROCEDURE — 85018 HEMOGLOBIN: CPT

## 2025-01-23 PROCEDURE — APPNB30 APP NON BILLABLE TIME 0-30 MINS: Performed by: NURSE PRACTITIONER

## 2025-01-23 PROCEDURE — 6370000000 HC RX 637 (ALT 250 FOR IP): Performed by: NURSE PRACTITIONER

## 2025-01-23 PROCEDURE — 36415 COLL VENOUS BLD VENIPUNCTURE: CPT

## 2025-01-23 PROCEDURE — 6360000002 HC RX W HCPCS: Performed by: NURSE PRACTITIONER

## 2025-01-23 PROCEDURE — 94761 N-INVAS EAR/PLS OXIMETRY MLT: CPT

## 2025-01-23 PROCEDURE — 85014 HEMATOCRIT: CPT

## 2025-01-23 PROCEDURE — 2500000003 HC RX 250 WO HCPCS: Performed by: NURSE PRACTITIONER

## 2025-01-23 RX ORDER — CLOPIDOGREL BISULFATE 75 MG/1
75 TABLET ORAL EVERY MORNING
Qty: 30 TABLET | Refills: 3 | Status: SHIPPED
Start: 2025-01-23

## 2025-01-23 RX ORDER — OXYCODONE HYDROCHLORIDE 5 MG/1
5-10 TABLET ORAL EVERY 6 HOURS PRN
Qty: 56 TABLET | Refills: 0 | Status: SHIPPED | OUTPATIENT
Start: 2025-01-23 | End: 2025-01-30

## 2025-01-23 RX ORDER — CYCLOBENZAPRINE HCL 10 MG
10 TABLET ORAL EVERY 12 HOURS PRN
Qty: 40 TABLET | Refills: 0 | Status: SHIPPED | OUTPATIENT
Start: 2025-01-23 | End: 2025-02-12

## 2025-01-23 RX ORDER — ASPIRIN 81 MG/1
81 TABLET, CHEWABLE ORAL DAILY
Qty: 30 TABLET | Refills: 0 | Status: SHIPPED
Start: 2025-01-23

## 2025-01-23 RX ORDER — ENOXAPARIN SODIUM 100 MG/ML
40 INJECTION SUBCUTANEOUS DAILY
Qty: 4.8 ML | Refills: 0 | Status: SHIPPED | OUTPATIENT
Start: 2025-01-23 | End: 2025-02-04

## 2025-01-23 RX ADMIN — MAGNESIUM HYDROXIDE 15 ML: 400 SUSPENSION ORAL at 08:33

## 2025-01-23 RX ADMIN — OXYCODONE 5 MG: 5 TABLET ORAL at 08:31

## 2025-01-23 RX ADMIN — ACETAMINOPHEN 1000 MG: 500 TABLET ORAL at 08:30

## 2025-01-23 RX ADMIN — ACETAMINOPHEN 1000 MG: 500 TABLET ORAL at 03:26

## 2025-01-23 RX ADMIN — OXYCODONE 5 MG: 5 TABLET ORAL at 03:28

## 2025-01-23 RX ADMIN — NALOXEGOL OXALATE 12.5 MG: 25 TABLET, FILM COATED ORAL at 08:30

## 2025-01-23 RX ADMIN — RANOLAZINE 500 MG: 500 TABLET, FILM COATED, EXTENDED RELEASE ORAL at 08:30

## 2025-01-23 RX ADMIN — FAMOTIDINE 20 MG: 20 TABLET, FILM COATED ORAL at 08:31

## 2025-01-23 RX ADMIN — LOSARTAN POTASSIUM 50 MG: 50 TABLET, FILM COATED ORAL at 08:30

## 2025-01-23 RX ADMIN — EZETIMIBE 10 MG: 10 TABLET ORAL at 08:31

## 2025-01-23 RX ADMIN — POLYETHYLENE GLYCOL 3350 17 G: 17 POWDER, FOR SOLUTION ORAL at 08:33

## 2025-01-23 RX ADMIN — ATORVASTATIN CALCIUM 40 MG: 20 TABLET, FILM COATED ORAL at 08:29

## 2025-01-23 RX ADMIN — SODIUM CHLORIDE, PRESERVATIVE FREE 10 ML: 5 INJECTION INTRAVENOUS at 08:34

## 2025-01-23 RX ADMIN — ENOXAPARIN SODIUM 40 MG: 100 INJECTION SUBCUTANEOUS at 08:33

## 2025-01-23 ASSESSMENT — PAIN DESCRIPTION - LOCATION: LOCATION: BACK

## 2025-01-23 ASSESSMENT — PAIN SCALES - GENERAL
PAINLEVEL_OUTOF10: 4
PAINLEVEL_OUTOF10: 4

## 2025-01-23 ASSESSMENT — PAIN DESCRIPTION - DESCRIPTORS
DESCRIPTORS: ACHING
DESCRIPTORS: ACHING;SORE

## 2025-01-23 ASSESSMENT — PAIN DESCRIPTION - ORIENTATION: ORIENTATION: LOWER

## 2025-01-23 NOTE — PLAN OF CARE
Problem: Chronic Conditions and Co-morbidities  Goal: Patient's chronic conditions and co-morbidity symptoms are monitored and maintained or improved  1/23/2025 1114 by Hilary Mccord LPN  Outcome: Progressing  1/23/2025 0550 by Memo Moses RN  Outcome: Progressing     Problem: Safety - Adult  Goal: Free from fall injury  1/23/2025 1114 by Hilary Mccord LPN  Outcome: Progressing  1/23/2025 0550 by Memo Moses RN  Outcome: Progressing     Problem: Pain  Goal: Verbalizes/displays adequate comfort level or baseline comfort level  1/23/2025 1114 by Hilary Mccord LPN  Outcome: Progressing  1/23/2025 0550 by Memo Moses RN  Outcome: Progressing     Problem: Discharge Planning  Goal: Discharge to home or other facility with appropriate resources  1/23/2025 1114 by Hilary Mccord LPN  Outcome: Progressing  1/23/2025 0550 by Memo Moses RN  Outcome: Progressing  Flowsheets (Taken 1/22/2025 2045)  Discharge to home or other facility with appropriate resources:   Identify barriers to discharge with patient and caregiver   Arrange for needed discharge resources and transportation as appropriate   Identify discharge learning needs (meds, wound care, etc)   Refer to discharge planning if patient needs post-hospital services based on physician order or complex needs related to functional status, cognitive ability or social support system     
  Problem: Chronic Conditions and Co-morbidities  Goal: Patient's chronic conditions and co-morbidity symptoms are monitored and maintained or improved  Outcome: Progressing     Problem: Safety - Adult  Goal: Free from fall injury  Outcome: Progressing     Problem: Pain  Goal: Verbalizes/displays adequate comfort level or baseline comfort level  Outcome: Progressing     Problem: Discharge Planning  Goal: Discharge to home or other facility with appropriate resources  Outcome: Progressing     
  Problem: Chronic Conditions and Co-morbidities  Goal: Patient's chronic conditions and co-morbidity symptoms are monitored and maintained or improved  Outcome: Progressing     Problem: Safety - Adult  Goal: Free from fall injury  Outcome: Progressing     Problem: Pain  Goal: Verbalizes/displays adequate comfort level or baseline comfort level  Outcome: Progressing     Problem: Discharge Planning  Goal: Discharge to home or other facility with appropriate resources  Outcome: Progressing  Flowsheets (Taken 1/22/2025 2045)  Discharge to home or other facility with appropriate resources:   Identify barriers to discharge with patient and caregiver   Arrange for needed discharge resources and transportation as appropriate   Identify discharge learning needs (meds, wound care, etc)   Refer to discharge planning if patient needs post-hospital services based on physician order or complex needs related to functional status, cognitive ability or social support system     
  Problem: Physical Therapy - Adult  Goal: By Discharge: Performs mobility at highest level of function for planned discharge setting.  See evaluation for individualized goals.  Description: FUNCTIONAL STATUS PRIOR TO ADMISSION: Patient was independent and active without use of DME.    HOME SUPPORT PRIOR TO ADMISSION: The patient lived with spouse but did not require assistance.    Physical Therapy Goals  Initiated 1/20/2025  1.  Patient will move from supine to sit and sit to supine, scoot up and down, and roll side to side in bed with independence within 7 day(s).    2.  Patient will perform sit to stand with independence within 7 day(s).  3.  Patient will transfer from bed to chair and chair to bed with independence using the least restrictive device within 7 day(s).  4.  Patient will ambulate with independence for 200 feet with the least restrictive device within 7 day(s).   5.  Patient will ascend/descend 2 stairs with  handrail(s) with independence within 7 day(s).   Outcome: Progressing   PHYSICAL THERAPY TREATMENT    Patient: Bo Greenberg (84 y.o. 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 (N/A) 1 Day Post-Op  Precautions:                        ASSESSMENT:  Patient continues to benefit from skilled PT services and is progressing towards goals. Pt mobilizing with excellent tolerance, no reports of pain and increased independence. CGA to log roll to EOB with verbal cueing. Min A to don brace in sitting. Stands with CGA and increases ambulation distance to 250ft w/o reports of radicular symptoms. Decreased step clearance and mild forward head posture. States significantly improved low back pain and no tightness in 
  Problem: Physical Therapy - Adult  Goal: By Discharge: Performs mobility at highest level of function for planned discharge setting.  See evaluation for individualized goals.  Description: FUNCTIONAL STATUS PRIOR TO ADMISSION: Patient was independent and active without use of DME.    HOME SUPPORT PRIOR TO ADMISSION: The patient lived with spouse but did not require assistance.    Physical Therapy Goals  Initiated 1/20/2025  1.  Patient will move from supine to sit and sit to supine, scoot up and down, and roll side to side in bed with independence within 7 day(s).    2.  Patient will perform sit to stand with independence within 7 day(s).  3.  Patient will transfer from bed to chair and chair to bed with independence using the least restrictive device within 7 day(s).  4.  Patient will ambulate with independence for 200 feet with the least restrictive device within 7 day(s).   5.  Patient will ascend/descend 2 stairs with  handrail(s) with independence within 7 day(s).   Outcome: Progressing   PHYSICAL THERAPY TREATMENT    Patient: Bo Greenberg (84 y.o. 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 Communicated with nurse cleared for therapy. Patient supine on bed when received, spouse at bed side and agreed with all goals set for the patient, reviewed back precautions and donning of back brace verbalized understanding. Mobilized patient today without assistive device with 
  Problem: Safety - Adult  Goal: Free from fall injury  Outcome: Progressing     Problem: Physical Therapy - Adult  Goal: By Discharge: Performs mobility at highest level of function for planned discharge setting.  See evaluation for individualized goals.  Description: FUNCTIONAL STATUS PRIOR TO ADMISSION: Patient was independent and active without use of DME.    HOME SUPPORT PRIOR TO ADMISSION: The patient lived with spouse but did not require assistance.    Physical Therapy Goals  Initiated 1/20/2025  1.  Patient will move from supine to sit and sit to supine, scoot up and down, and roll side to side in bed with independence within 7 day(s).    2.  Patient will perform sit to stand with independence within 7 day(s).  3.  Patient will transfer from bed to chair and chair to bed with independence using the least restrictive device within 7 day(s).  4.  Patient will ambulate with independence for 200 feet with the least restrictive device within 7 day(s).   5.  Patient will ascend/descend 2 stairs with  handrail(s) with independence within 7 day(s).   1/20/2025 1629 by Ruiz Kahn, PT  Outcome: Progressing     
D/c paperwork reviewed with pt, no questions or concerned. PIV removed, VSS. Pt wheeled out via w/c.           Problem: Chronic Conditions and Co-morbidities  Goal: Patient's chronic conditions and co-morbidity symptoms are monitored and maintained or improved  1/23/2025 1204 by Hilary Mccord LPN  Outcome: Completed  1/23/2025 1114 by Hilary Mccord LPN  Outcome: Progressing  1/23/2025 0550 by Memo Moses, RN  Outcome: Progressing     Problem: Safety - Adult  Goal: Free from fall injury  1/23/2025 1204 by Hilary Mccord LPN  Outcome: Completed  1/23/2025 1114 by Hilary Mccord LPN  Outcome: Progressing  1/23/2025 0550 by Memo Moses RN  Outcome: Progressing     Problem: Pain  Goal: Verbalizes/displays adequate comfort level or baseline comfort level  1/23/2025 1204 by Hilary Mccord LPN  Outcome: Completed  1/23/2025 1114 by Hilary Mccord LPN  Outcome: Progressing  1/23/2025 0550 by Memo Moses RN  Outcome: Progressing     Problem: Discharge Planning  Goal: Discharge to home or other facility with appropriate resources  1/23/2025 1204 by Hilary Mccord LPN  Outcome: Completed  1/23/2025 1114 by Hilary Mccord LPN  Outcome: Progressing  1/23/2025 0550 by Memo Moses RN  Outcome: Progressing  Flowsheets (Taken 1/22/2025 2045)  Discharge to home or other facility with appropriate resources:   Identify barriers to discharge with patient and caregiver   Arrange for needed discharge resources and transportation as appropriate   Identify discharge learning needs (meds, wound care, etc)   Refer to discharge planning if patient needs post-hospital services based on physician order or complex needs related to functional status, cognitive ability or social support system     
[id number]  2. Baljinder ZAZUETA, Hayden GALARZA, Darian GALARZA, Tyler GALARZA. Association of AM-PAC \"6-Clicks\" Basic Mobility and Daily Activity Scores With Discharge Destination. Phys Ther. 2021 4;101(4):uvsw175. doi: 10.1093/ptj/xhwy675. PMID: 87198061.  3. Shelbie GALARZA, Duyen HERNANDEZ, Nataliia S, Lion BONDS, Dc WOLFE. Activity Measure for Post-Acute Care \"6-Clicks\" Basic Mobility Scores Predict Discharge Destination After Acute Care Hospitalization in Select Patient Groups: A Retrospective, Observational Study. Arch Rehabil Res Clin Transl. 2022 16;4(3):221724. doi: 10.1016/j.[id number]. PMID: 90511673; PMCID: [id number].  4. Jean Claude PEREZ, Bonnie S, Markie W, Naomi P. AM-PAC Short Forms Manual 4.0. Revised 2020.                                                                                                                                                                                                                              Pain Ratin/10   Pain Intervention(s):   nursing notified and addressing    Activity Tolerance:   Good    After treatment:   Patient left in no apparent distress in bed and placed in chair position., Call bell within reach, Side rails x3, and Heels elevated for pressure relief    COMMUNICATION/EDUCATION:   The patient's plan of care was discussed with: registered nurse    Patient Education  Education Given To: Patient  Education Provided: Role of Therapy;Mobility Training;Plan of Care;Energy Conservation;Fall Prevention Strategies;Home Exercise Program;IADL Safety;Precautions;Orientation;Transfer Training;Equipment  Education Method: Verbal  Barriers to Learning: None  Education Outcome: Verbalized understanding;Continued education needed    Thank you for this referral.  JUNIOR RATLIFF, PT,[facility].  Minutes: 33      Physical Therapy Evaluation Charge Determination   History Examination Presentation Decision-Making   LOW Complexity : Zero comorbidities / personal factors that will impact the

## 2025-01-23 NOTE — DISCHARGE SUMMARY
Orthopedic Service Discharge Summary    Patient ID:  Bo Greenberg  [id number]  male  84 y.o.  1940    Admit date: 1/20/2025    Discharge date and time: No discharge date for patient encounter.     Admitting Physician: Arsen Villaseñor MD     Discharge Physician: Arsen Villaseñor MD    Consulting Physician(s): Treatment Team:   Arsen Villaseñor MD Crowl, Adam, MD Stephens, [place], Krista Cummins, COLLEEN Alexander, Airam Schofield, Hilary Chan, Sobeida Padilla, PT  Shannan Rincon    Date of Surgery: 1/20/2025 - 1/21/2025      Preoperative Diagnosis:  Neuroforaminal stenosis of lumbar spine [M48.061]    Postoperative Diagnosis: Post-Op Diagnosis Codes:     * Neuroforaminal stenosis of lumbar spine [M48.061]   Procedure(s): Procedure(s):  L4-L5 LAMINECTOMY AND FUSION, L5-S1 POSTERIOR FUSION, L1-L5 HARDWARE REMOVAL, L1-S1 INSTRUMENTION    Surgeon: Surgeons and Role:     * Arsen Villaseñor MD - Primary      Anesthesia:  General    Preoperative Medical Clearance:                           HPI:  Pt is a 84 y.o. 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  following the failure of conservative management.    PMH:   Past Medical History:   Diagnosis Date    CAD (coronary artery disease)     COPD (chronic obstructive pulmonary disease) ([facility])     uses 3LNC at home prn SOB, reports approx 2xwkly average use (as of 1/8/2025)    COVID-19 vaccine series completed 01/2021    [employer]    Degenerative joint disease 1/28/2011    Diabetes mellitus ([facility])     Essential hypertension     GERD (gastroesophageal reflux disease)     History of back surgery 09/2019    Hyperlipidemia     Kidney stones 1969    TYREE

## 2025-01-23 NOTE — DISCHARGE INSTRUCTIONS
patients experience favorable outcomes after surgery for lumbar radiculitis/radiculopathy. Typically, patients experience relief from much of their pre-operative radiating pain. Post-operative pain (mostly muscular/incisional) is patient-specific, though should improve dramatically in first 2-3 weeks.  Do not be alarmed if you still have some of the same symptoms you had prior to surgery. The nerves often require time to heal after the pressure has been relieved. You may experience pain new pain in your back, which is common after this surgery. The level of pain you experience should improve as your body heals.  When can I advance my lifting restrictions?  In general, no lifting greater than 20 pounds during the first 3 months after surgery. After 3 months, patients can GRADUALLY increase amount of weight lifted to tolerance. (Most patients will not tolerate more than lifting about a gallon of milk in the first week or two, but as muscle healing takes place and postop pain diminishes can tolerate up to about 20 pounds).  - Example: If your child or grandchild is 22 pounds, that is okay, if 40 pounds NOT okay.  The most important than the actual amount of weight that is lifting is the body posture and alignment while lifting. Picking up a 8 pound purse with poor body position is more likely to cause problems than picking up a 30 pound object with excellent body posture (spine neutral, back straight, no stooping or tilting).    Notify your physician if you develop any of the following conditions:  Fever above 101 degrees for 24 hours.  Nausea or vomiting.  Severe headache.  Inability to urinate.  Loss of bowel or bladder control (sudden onset of incontinence).  Changes in sensation in your extremities (numbness, tingling, loss of color).  Severe pain or pain not relieved by medications.  Redness, swelling, or drainage from your incision.  Persistent pain in the chest.   Pain in the calf of either leg.  Increased

## 2025-01-23 NOTE — PROGRESS NOTES
Rounded on patient, f/u from Spine Pre-op Patient Education Class.   Patient states class information was valuable in preparing for surgery.   Patient states their home space is prepared and safe.   Reviewed incentive spirometry use   Call, don't fall expectations reviewed with patient  Reviewed plan of care with patient, including pain management, positioning, mobility precautions.  Opportunity provided for patient to ask questions and provide comments.  Questions answered.  Wife at bedside  
ORTHOPAEDIC LUMBAR FUSION PROGRESS NOTE    NAME:     Bo Greenberg   :       1940   MRN:       [id number]   DATE:      2025    POD:              1 Day Post-Op  S/P:              Procedure(s):  L5-S1 ANTERIOR LUMBAR INTERBODY FUSION WITH INSTRUMENTATION  .    SUBJECTIVE:    Reports improvement in preop  right leg pain and tingling  Postop pain controlled  well with medications.   Tolerating PO intake  Liquids  Ambulation tolerance  - 50 ft with PT yesterday, No leg pain with ambulation.   Passing flatus - No yet  Voiding - Bo +  Denies nausea/vomiting, headache, chest pain or shortness of breath,   No results for input(s): \"HGB\", \"HCT\", \"INR\", \"NA\", \"K\", \"CL\", \"CO2\", \"BUN\", \"GLU\" in the last 72 hours.    Invalid input(s): \"CREA\"  Patient Vitals for the past 12 hrs:   BP Temp Temp src Pulse Resp SpO2   25 0437 (!) 103/54 97.3 °F (36.3 °C) Oral 68 16 93 %   25 0430 -- -- -- -- -- (!) 87 %   25 2254 -- -- -- 72 -- --   25 2239 100/63 97.5 °F (36.4 °C) Oral 70 16 94 %   25 1940 (!) 145/69 98.1 °F (36.7 °C) Oral 65 18 94 %     Exam:  Positive strength/ROM bilat lower ext.  Neuro intact to sensation  Dressings clean and dry  Lower extremities warm and well perfused   Labs- Pending   Pre op CT Scan done.   PLAN: 1 Day Post-Op, improved   Continue PO pain medications as needed  Continue SCD's, frequent ambulation  Diet: NPO   Continue bowel regimen   Continue lumbar orthosis  Continue Vit D3  Medical comorbities stable -  On remote Telemetry,   Maintained Sinus rhythm over night.    H/O COPD- Pulmonology seen,recommended Albuterol nebulization PRN.     Stage 2 Posterior Lumbar fusion today  - NPO  - Abx on call    Edu Moore NP    Patient seen and examined this morning is doing great.  Patient initially had some tightness and discomfort in the legs last evening for the first couple hours postop, that has now resolved and he has been able to stop the ambulatory and walk and 
ORTHOPAEDIC LUMBAR FUSION PROGRESS NOTE    NAME:     Bo Greenberg   :       1940   MRN:       [id number]   DATE:      2025    POD:                            3 days post Op L5-S1 ALIF, 2 Days Post-Op  S/P:              Procedure(s):  L4-L5 LAMINECTOMY AND FUSION, L5-S1 POSTERIOR FUSION, L1-L5 HARDWARE REMOVAL, L1-S1 INSTRUMENTION    SUBJECTIVE:    Reports improvement in preop  leg pain  Postop pain controlled  well with medications  Tolerating PO intake  well  Ambulation tolerance - Walked 300 ft and trained stairs. Getting up and walking inside the room. PT cleared.   Passing flatus ++  Voiding  without difficulty  Denies nausea/vomiting, headache, chest pain or shortness of breath  Recent Labs     25  0437 25  0341   HGB 11.2* 10.2*   HCT 34.2* 30.9*     --    K 3.7  --    *  --    CO2 22  --    BUN 10  --      Patient Vitals for the past 12 hrs:   BP Temp Temp src Pulse Resp SpO2   25 0355 130/71 99.1 °F (37.3 °C) Oral 76 24 95 %   25 0328 -- -- -- -- 17 --   25 2314 123/68 98.1 °F (36.7 °C) Oral 74 18 95 %   25 2112 115/63 98.1 °F (36.7 °C) Oral 72 16 93 %     Exam:  Positive strength/ROM bilat lower ext.  Neuro intact to sensation  Dressings clean and dry  Lower extremities warm and well perfused      PLAN: 2 Days Post-Op, improved   Continue PO pain medications as needed  Continue SCD's, frequent ambulation  - Continue Lovenox  Diet:  Regular  Continue bowel regimen   Continue lumbar orthosis  Continue Vit D3  Medical comorbities stable   Discharge planning  - Todaywith Home Health.     Discharge Rx sent to hospital Pharmacy.    Edu Moore NP    Patient seen and examined this morning is doing great.  He is walked over therapy cleared by PT.  Positive void positive flatus tolerating regular diet.  Preoperative symptoms under percent resolved.  Postop pain is well-controlled.  He is ready for discharge today.  Reviewed postop plan of care.  Will 
ORTHOPAEDIC LUMBAR FUSION PROGRESS NOTE    NAME:     Bo Greenberg   :       1940   MRN:       [id number]   DATE:      2025    POD:           Day 2 post op L5-S1 ALIF    1 Day Post-Op  S/P:              Procedure(s):  L4-L5 LAMINECTOMY AND FUSION, L5-S1 POSTERIOR FUSION, L1-L5 HARDWARE REMOVAL, L1-S1 INSTRUMENTION    SUBJECTIVE:  Reports improvement in preop right leg pain  Postop pain controlled  Tolerating PO intake  Cleared by PT  Passing flatus  Voiding after mera removal  Denies nausea/vomiting, headache, chest pain or shortness of breath    Recent Labs     25  0437   HGB 11.2*   HCT 34.2*      K 3.7   *   CO2 22   BUN 10     Patient Vitals for the past 12 hrs:   BP Temp Temp src Pulse Resp SpO2   25 1144 126/74 98.4 °F (36.9 °C) Oral 72 16 95 %   25 0842 137/81 97.9 °F (36.6 °C) Oral 77 14 96 %   25 0700 -- -- -- 75 -- --   25 0600 123/65 97.7 °F (36.5 °C) Oral 72 -- 98 %     Exam:  Calm, alert, NAD  Respirations unlabored  Positive strength/ROM bilat lower ext.  Neuro intact to sensation  Lower extremities warm and well perfused    PLAN: 1 Day Post-Op, improved   Continue PO pain medications as needed  Continue SCD's, frequent ambulation  Lovenox 40mg daily  Continue bowel regimen   Continue lumbar orthosis  Continue Vit D3    Discharge planning -  possibly tomorrow, per Dr. Villaseñor  
ORTHOPAEDIC LUMBAR FUSION PROGRESS NOTE    NAME:     Bo Greenberg   :       1940   MRN:       [id number]   DATE:      2025    POD:           Day 2 post op L5-S1 ALIF    1 Day Post-Op  S/P:              Procedure(s):  L4-L5 LAMINECTOMY AND FUSION, L5-S1 POSTERIOR FUSION, L1-L5 HARDWARE REMOVAL, L1-S1 INSTRUMENTION    SUBJECTIVE:    Reports improvement in preop  right leg pain, tingling  Postop pain controlled  well with medications  Tolerating PO intake   Liquids, had Ice cream  Ambulation tolerance - Got up and walked to bathroom  Passing flatus ++  Voiding - Mera +  Denies nausea/vomiting, headache, chest pain or shortness of breath  Recent Labs     25  0437   HGB 11.2*   HCT 34.2*      K 3.7   *   CO2 22   BUN 10     Patient Vitals for the past 12 hrs:   BP Temp Temp src Pulse Resp SpO2   25 0600 123/65 97.7 °F (36.5 °C) Oral 72 -- 98 %   25 2301 130/68 97.3 °F (36.3 °C) Oral 80 16 93 %   25 -- -- -- 76 -- --   25 (!) 152/79 97.9 °F (36.6 °C) Oral 88 18 93 %   25 138/77 98.6 °F (37 °C) Oral 73 18 93 %   25 133/75 97.5 °F (36.4 °C) Axillary 81 18 90 %   25 (!) 141/65 97.7 °F (36.5 °C) Oral 80 20 92 %   25 135/60 -- -- 77 20 (!) 89 %   25 102/70 -- -- 77 16 94 %   25 (!) 107/53 -- -- 77 18 98 %     Exam:  Positive strength/ROM bilat lower ext.  Neuro intact to sensation  Dressings clean and dry  Lower extremities warm and well perfused      PLAN: 1 Day Post-Op, improved   Continue PO pain medications as needed  Continue SCD's, frequent ambulation  - Will start Lovenox this am.    Remove mera, Straight cat PRN per protocol  Diet:  Advance to regular  Continue bowel regimen   Continue lumbar orthosis  Continue Vit D3  Medical comorbities stable - On remote telemetry, Maintained NSR overnight  Discharge planning -  Pending PT clearance, possibly tomorrow.    Edu Moore NP    Patient 
OT order received, chart reviewed. OT evaluation held until medically appropriate following stage 2 procedure (planned for today)  Leatha Meléndez OTR/L  
Patient seen in recovery room   Denied  pre op leg pain  Extubated uneventfully  Postop pain  well controlled    Patient Vitals for the past 4 hrs:   Temp Pulse Resp BP SpO2   01/21/25 1833 98.1 °F (36.7 °C) 75 24 (!) 110/52 99 %   01/21/25 1830 -- 78 26 (!) 111/56 100 %   01/21/25 1825 -- 76 26 (!) 116/55 99 %   01/21/25 1820 98.1 °F (36.7 °C) 77 25 (!) 110/52 96 %        Sleepy,  arousable,   Dressing clean and dry     Moving all extremities  Sensation grossly intact to LT     Stable postop Posterior Lumbar Fusion  -discussed surgery with family, answered questions  -periop antibiotics  -SCD's  -Clear liquids, advance to full liquids as tolerated  -mobilize, LSO when OOB  -pain control  PO/IV as needed    
Patient seen in recovery room   Denies pre op leg pain  Extubated uneventfully  Postop pain  is well controlled    Patient Vitals for the past 4 hrs:   Temp Pulse Resp BP SpO2   01/20/25 1055 -- 62 18 (!) 116/54 100 %   01/20/25 1053 97.5 °F (36.4 °C) 63 18 (!) 107/46 99 %   01/20/25 1050 -- 63 18 (!) 109/49 100 %   01/20/25 1048 97.5 °F (36.4 °C) 63 18 (!) 107/46 100 %       Following commands  Dressing clean and dry  Strong motor RUE and LUE, RLE and LLE   Sensation grossly intact to LT     Stable postop  L5-S1 ALIF  -discussed surgery with family, answered questions  -periop antibiotics  -SCD's  -advance to full liquid diet as tolerated-mobilize  -pain control  Iv/PO medication as needed  - Ambulate today to evaluate leg pain, LSO when OOB  - LSO  to be fitted from hospital     Plan - Stage 2 Posterior fusion tomorrow AM   NPO after Midnight  -  Abx on Call   Labs AM  - CT Lumbar Spine today for surgery planning    Edu Moore NP    Seen and eval'd in pacu, doing well  Discussed with pulmonary  Reports mild incisional discomfort and some anterior thigh tightness  Motor strong and equal bilateral Les  Feet warm and well perfused    POD#0, doing great  -COPD, HAUSER-appreciate pulmonary consult  Ambulate today, needs brace from PT  CT tonight for surgery planning for stage 2 tomorrow  SCD's for now, will add lovenox after stage 2 and for discharge for 14 days, then resume plavix/aspirin.   Reviewed surgery and postop images with wife.   Arsen Villaseñor MD      Reexamined, patient reports he is doing pretty well.  He had a little bit of tightness in his thighs and legs, this resolved after pain medication.  Motor strength and sensation normal right and left lower extremity.  Plan as above.  Arsen Villaseñor MD  Spine Surgery  [facility]  
Per NISREEN Wray, hold all PO meds this morning.Pt is having surgery this afternoon    
Physical therapy:    Chart reviewed and spoke with patient at bedside. Pt declined therapy session this morning and reported he felt comfortable with his mobility to d/c home later today and denied any questions. Per last PT note, pt was cleared from a therapy standpoint to d/c home. Session deferred.     Sobeida Moreno, PT, DPT  
bedroom/bathroom (3 levels)  Home Access: Stairs to enter with rails  Entrance Stairs - Number of Steps: 2  Bathroom Shower/Tub: Walk-in shower, Shower chair without back  Bathroom Equipment: 3-in-1 Commode, Toilet raiser  Prior Level of Assist for ADLs: Independent  Prior Level of Assist for Homemaking: Independent  Homemaking Responsibilities: Yes  Active : Yes    Hand Dominance: right     EXAMINATION OF PERFORMANCE DEFICITS:    Cognitive/Behavioral Status:    Orientation  Overall Orientation Status: Within Normal Limits  Orientation Level: Oriented X4  Cognition  Overall Cognitive Status: WNL      Vision/Perceptual:    Vision - Basic Assessment  Prior Vision: Wears glasses only for reading                 Range of Motion:   AROM: Within functional limits         Strength:  Strength: Within functional limits      Coordination:  Coordination: Within functional limits            Tone & Sensation:   Tone: Normal  Sensation: Intact      Functional Mobility and Transfers for ADLs:  Bed Mobility:     Bed Mobility Training  Bed Mobility Training: No (verbalized sequencing for log rolling)    Transfers:     Transfer Training  Transfer Training: Yes  Sit to Stand: Stand-by assistance  Stand to Sit: Stand-by assistance  Bed to Chair: Stand-by assistance                                   Balance:      Balance  Sitting: Intact  Standing: Intact      ADL Assessment:     Equipment Provided: Reacher;Dressing stick;Sock aid;Long-handled sponge;Long-handled shoe horn    Feeding: Independent       Grooming: Supervision;Based on clinical judgement    Product Used : Bath wipes    LE Bathing: Minimal assistance  LE Bathing Skilled Clinical Factors: assist for accessing distal aspects       UE Dressing Skilled Clinical Factors: received with LSO on in chair--PT assisted    LE Dressing: Minimal assistance  LE Dressing Skilled Clinical Factors: unable to access distal aspects,  wife will look for dressing AD at home and is availble 
infusion   IntraVENous Continuous    ketorolac (TORADOL) injection 15 mg  15 mg IntraVENous Q6H    magnesium hydroxide (MILK OF MAGNESIA) 400 MG/5ML suspension 15 mL  15 mL Oral BID    atorvastatin (LIPITOR) tablet 40 mg  40 mg Oral QAM    ezetimibe (ZETIA) tablet 10 mg  10 mg Oral QAM    losartan (COZAAR) tablet 50 mg  50 mg Oral Daily    ranolazine (RANEXA) extended release tablet 500 mg  500 mg Oral BID    sodium chloride flush 0.9 % injection 5-40 mL  5-40 mL IntraVENous 2 times per day    sodium chloride flush 0.9 % injection 5-40 mL  5-40 mL IntraVENous PRN    acetaminophen (TYLENOL) tablet 1,000 mg  1,000 mg Oral Q6H    ondansetron (ZOFRAN-ODT) disintegrating tablet 4 mg  4 mg Oral Q8H PRN    Or    ondansetron (ZOFRAN) injection 4 mg  4 mg IntraVENous Q6H PRN    0.9 % sodium chloride infusion   IntraVENous Continuous    oxyCODONE (ROXICODONE) immediate release tablet 5 mg  5 mg Oral Q4H PRN    Or    oxyCODONE (ROXICODONE) immediate release tablet 10 mg  10 mg Oral Q4H PRN    morphine sulfate (PF) injection 2 mg  2 mg IntraVENous Q3H PRN    cyclobenzaprine (FLEXERIL) tablet 10 mg  10 mg Oral Q12H PRN    diphenhydrAMINE (BENADRYL) capsule 25 mg  25 mg Oral Q6H PRN    Or    diphenhydrAMINE (BENADRYL) injection 25 mg  25 mg IntraVENous Q6H PRN    polyethylene glycol (GLYCOLAX) packet 17 g  17 g Oral Daily    bisacodyl (DULCOLAX) EC tablet 5 mg  5 mg Oral Daily PRN    bisacodyl (DULCOLAX) suppository 10 mg  10 mg Rectal Daily PRN    famotidine (PEPCID) tablet 20 mg  20 mg Oral BID    Or    famotidine (PEPCID) 20 mg in sodium chloride (PF) 0.9 % 10 mL injection  20 mg IntraVENous BID    benzocaine-menthol (CEPACOL SORE THROAT) lozenge 1 lozenge  1 lozenge Oral Q2H PRN    naloxegol (MOVANTIK) tablet 12.5 mg  12.5 mg Oral QAM AC    gabapentin (NEURONTIN) capsule 300 mg  300 mg Oral Once    celecoxib (CELEBREX) capsule 100 mg  100 mg Oral Once    albuterol (PROVENTIL) (2.5 MG/3ML) 0.083% nebulizer solution 2.5 mg  2.5 
sodium chloride flush 0.9 % injection 5-40 mL  5-40 mL IntraVENous PRN    0.9 % sodium chloride infusion   IntraVENous PRN    acetaminophen (TYLENOL) tablet 1,000 mg  1,000 mg Oral Q6H    ondansetron (ZOFRAN-ODT) disintegrating tablet 4 mg  4 mg Oral Q8H PRN    Or    ondansetron (ZOFRAN) injection 4 mg  4 mg IntraVENous Q6H PRN    0.9 % sodium chloride infusion   IntraVENous Continuous    oxyCODONE (ROXICODONE) immediate release tablet 5 mg  5 mg Oral Q4H PRN    Or    oxyCODONE (ROXICODONE) immediate release tablet 10 mg  10 mg Oral Q4H PRN    morphine sulfate (PF) injection 2 mg  2 mg IntraVENous Q3H PRN    cyclobenzaprine (FLEXERIL) tablet 10 mg  10 mg Oral Q12H PRN    diphenhydrAMINE (BENADRYL) capsule 25 mg  25 mg Oral Q6H PRN    Or    diphenhydrAMINE (BENADRYL) injection 25 mg  25 mg IntraVENous Q6H PRN    polyethylene glycol (GLYCOLAX) packet 17 g  17 g Oral Daily    bisacodyl (DULCOLAX) EC tablet 5 mg  5 mg Oral Daily PRN    bisacodyl (DULCOLAX) suppository 10 mg  10 mg Rectal Daily PRN    famotidine (PEPCID) tablet 20 mg  20 mg Oral BID    Or    famotidine (PEPCID) 20 mg in sodium chloride (PF) 0.9 % 10 mL injection  20 mg IntraVENous BID    benzocaine-menthol (CEPACOL SORE THROAT) lozenge 1 lozenge  1 lozenge Oral Q2H PRN    naloxegol (MOVANTIK) tablet 12.5 mg  12.5 mg Oral QAM AC    gabapentin (NEURONTIN) capsule 300 mg  300 mg Oral Once    celecoxib (CELEBREX) capsule 100 mg  100 mg Oral Once    ceFAZolin (ANCEF) 2,000 mg in sterile water 20 mL IV syringe  2,000 mg IntraVENous On Call to OR    albuterol (PROVENTIL) (2.5 MG/3ML) 0.083% nebulizer solution 2.5 mg  2.5 mg Nebulization Q4H PRN         REVIEW OF SYSTEMS   Negative except as stated in the HPI.      Physical Exam:   Vitals:    01/21/25 0730   BP: 125/67   Pulse: 70   Resp: 16   Temp: 98.4 °F (36.9 °C)   SpO2: 100%       General:  Alert, cooperative, uncomfortable appearing but not distress appears stated age.   Head:  Normocephalic, without

## 2025-01-23 NOTE — CARE COORDINATION
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 Pt has medically cleared.   Home with family. Therapy indicated no home therapy needs. Pt to purchase RW independently if desired as not recommended by PT.   Date IM given: 1/23/25  []NA  Outpatient follow-up.  Discharge transport: Family       01/22/25 1132   Social/Functional History   Lives With Spouse   Type of Home House   Home Layout Multi-level;Able to Live on Main level with bedroom/bathroom  (3 levels)   Home Access Stairs to enter with rails   Entrance Stairs - Number of Steps 2   Bathroom Shower/Tub Walk-in shower;Shower chair without back   Bathroom Equipment 3-in-1 Commode;Toilet raiser   Prior Level of Assist for ADLs Independent   Prior Level of Assist for Homemaking Independent   Homemaking Responsibilities Yes   Active  Yes   Services At/After Discharge   Services At/After Discharge None   Mode of Transport at Discharge Other (see comment)   Confirm Follow Up Transport Family

## 2025-03-06 ENCOUNTER — APPOINTMENT (OUTPATIENT)
Facility: HOSPITAL | Age: 85
End: 2025-03-06
Payer: MEDICARE

## 2025-03-06 ENCOUNTER — HOSPITAL ENCOUNTER (EMERGENCY)
Facility: HOSPITAL | Age: 85
Discharge: HOME | End: 2025-03-06
Attending: STUDENT IN AN ORGANIZED HEALTH CARE EDUCATION/TRAINING PROGRAM
Payer: MEDICARE

## 2025-03-06 VITALS
SYSTOLIC BLOOD PRESSURE: 143 MMHG | HEIGHT: 68 IN | HEART RATE: 80 BPM | TEMPERATURE: 98 F | BODY MASS INDEX: 24.25 KG/M2 | OXYGEN SATURATION: 100 % | DIASTOLIC BLOOD PRESSURE: 72 MMHG | WEIGHT: 160 LBS | RESPIRATION RATE: 18 BRPM

## 2025-03-06 DIAGNOSIS — R07.89 CHEST WALL PAIN: Primary | ICD-10-CM

## 2025-03-06 DIAGNOSIS — J10.1 INFLUENZA A: ICD-10-CM

## 2025-03-06 LAB
ALBUMIN SERPL-MCNC: 3.2 G/DL (ref 3.5–5)
ALBUMIN/GLOB SERPL: 0.8 (ref 1.1–2.2)
ALP SERPL-CCNC: 144 U/L (ref 45–117)
ALT SERPL-CCNC: 17 U/L (ref 12–78)
ANION GAP SERPL CALC-SCNC: 10 MMOL/L (ref 2–12)
AST SERPL-CCNC: 21 U/L (ref 15–37)
BASOPHILS # BLD: 0.02 K/UL (ref 0–0.1)
BASOPHILS NFR BLD: 0.5 % (ref 0–1)
BILIRUB SERPL-MCNC: 0.4 MG/DL (ref 0.2–1)
BUN SERPL-MCNC: 8 MG/DL (ref 6–20)
BUN/CREAT SERPL: 9 (ref 12–20)
CALCIUM SERPL-MCNC: 8.6 MG/DL (ref 8.5–10.1)
CHLORIDE SERPL-SCNC: 105 MMOL/L (ref 97–108)
CO2 SERPL-SCNC: 26 MMOL/L (ref 21–32)
CREAT SERPL-MCNC: 0.9 MG/DL (ref 0.7–1.3)
DIFFERENTIAL METHOD BLD: ABNORMAL
EOSINOPHIL # BLD: 0.01 K/UL (ref 0–0.4)
EOSINOPHIL NFR BLD: 0.2 % (ref 0–7)
ERYTHROCYTE [DISTWIDTH] IN BLOOD BY AUTOMATED COUNT: 13.5 % (ref 11.5–14.5)
FLUAV RNA SPEC QL NAA+PROBE: DETECTED
FLUBV RNA SPEC QL NAA+PROBE: NOT DETECTED
GLOBULIN SER CALC-MCNC: 4.2 G/DL (ref 2–4)
GLUCOSE SERPL-MCNC: 82 MG/DL (ref 65–100)
HCT VFR BLD AUTO: 37.9 % (ref 36.6–50.3)
HGB BLD-MCNC: 12.2 G/DL (ref 12.1–17)
IMM GRANULOCYTES # BLD AUTO: 0.01 K/UL (ref 0–0.04)
IMM GRANULOCYTES NFR BLD AUTO: 0.2 % (ref 0–0.5)
LYMPHOCYTES # BLD: 1.23 K/UL (ref 0.8–3.5)
LYMPHOCYTES NFR BLD: 28.7 % (ref 12–49)
MCH RBC QN AUTO: 31.1 PG (ref 26–34)
MCHC RBC AUTO-ENTMCNC: 32.2 G/DL (ref 30–36.5)
MCV RBC AUTO: 96.7 FL (ref 80–99)
MONOCYTES # BLD: 0.64 K/UL (ref 0–1)
MONOCYTES NFR BLD: 14.9 % (ref 5–13)
NEUTS SEG # BLD: 2.38 K/UL (ref 1.8–8)
NEUTS SEG NFR BLD: 55.5 % (ref 32–75)
NRBC # BLD: 0 K/UL (ref 0–0.01)
NRBC BLD-RTO: 0 PER 100 WBC
PLATELET # BLD AUTO: 292 K/UL (ref 150–400)
PMV BLD AUTO: 9.4 FL (ref 8.9–12.9)
POTASSIUM SERPL-SCNC: 3.2 MMOL/L (ref 3.5–5.1)
PROT SERPL-MCNC: 7.4 G/DL (ref 6.4–8.2)
RBC # BLD AUTO: 3.92 M/UL (ref 4.1–5.7)
SARS-COV-2 RNA RESP QL NAA+PROBE: NOT DETECTED
SODIUM SERPL-SCNC: 141 MMOL/L (ref 136–145)
SOURCE: ABNORMAL
TROPONIN I SERPL HS-MCNC: 28 NG/L (ref 0–76)
TROPONIN I SERPL HS-MCNC: 28 NG/L (ref 0–76)
WBC # BLD AUTO: 4.3 K/UL (ref 4.1–11.1)

## 2025-03-06 PROCEDURE — 85025 COMPLETE CBC W/AUTO DIFF WBC: CPT

## 2025-03-06 PROCEDURE — 93005 ELECTROCARDIOGRAM TRACING: CPT | Performed by: STUDENT IN AN ORGANIZED HEALTH CARE EDUCATION/TRAINING PROGRAM

## 2025-03-06 PROCEDURE — 99285 EMERGENCY DEPT VISIT HI MDM: CPT

## 2025-03-06 PROCEDURE — 36415 COLL VENOUS BLD VENIPUNCTURE: CPT

## 2025-03-06 PROCEDURE — 87636 SARSCOV2 & INF A&B AMP PRB: CPT

## 2025-03-06 PROCEDURE — 84484 ASSAY OF TROPONIN QUANT: CPT

## 2025-03-06 PROCEDURE — 6370000000 HC RX 637 (ALT 250 FOR IP): Performed by: STUDENT IN AN ORGANIZED HEALTH CARE EDUCATION/TRAINING PROGRAM

## 2025-03-06 PROCEDURE — 71046 X-RAY EXAM CHEST 2 VIEWS: CPT

## 2025-03-06 PROCEDURE — 80053 COMPREHEN METABOLIC PANEL: CPT

## 2025-03-06 RX ORDER — POTASSIUM CHLORIDE 750 MG/1
40 TABLET, EXTENDED RELEASE ORAL ONCE
Status: COMPLETED | OUTPATIENT
Start: 2025-03-06 | End: 2025-03-06

## 2025-03-06 RX ADMIN — POTASSIUM CHLORIDE 40 MEQ: 750 TABLET, FILM COATED, EXTENDED RELEASE ORAL at 17:24

## 2025-03-06 ASSESSMENT — PAIN SCALES - GENERAL: PAINLEVEL_OUTOF10: 2

## 2025-03-06 ASSESSMENT — PAIN - FUNCTIONAL ASSESSMENT: PAIN_FUNCTIONAL_ASSESSMENT: 0-10

## 2025-03-06 ASSESSMENT — PAIN DESCRIPTION - LOCATION: LOCATION: CHEST

## 2025-03-06 ASSESSMENT — HEART SCORE: ECG: NORMAL

## 2025-03-06 NOTE — ED PROVIDER NOTES
MG TABLET    Take 1 tablet by mouth daily, except on the day that methotrexate is taken.    IRBESARTAN (AVAPRO) 150 MG TABLET    Take 1 tablet by mouth every morning ceived the following from [facility]: Outside name: irbesartan (AVAPRO) 150 mg tablet    METFORMIN (GLUCOPHAGE) 1000 MG TABLET    Take 1 tablet by mouth with breakfast and with evening meal    MULTIPLE VITAMIN (MULTI-VITAMIN DAILY PO)    Take 1 Dose by mouth every morning    OMEPRAZOLE (PRILOSEC) 40 MG DELAYED RELEASE CAPSULE    TAKE 1 CAPSULE DAILY    OXYGEN    Inhale 3 L into the lungs as needed for Shortness of Breath    RANOLAZINE (RANEXA) 500 MG EXTENDED RELEASE TABLET    Take 1 tablet by mouth 2 times daily    SILDENAFIL (VIAGRA) 100 MG TABLET    TAKE 1 TABLET DAILY AS NEEDED FOR ERECTILE DYSFUNCTION       ALLERGIES     Ace inhibitors and Lisinopril    FAMILY HISTORY       Family History   Problem Relation Age of Onset    Osteoarthritis Mother     No Known Problems Father     Anesth Problems Neg Hx           SOCIAL HISTORY       Social History     Socioeconomic History    Marital status:    Tobacco Use    Smoking status: Former     Current packs/day: 0.00     Average packs/day: 1 pack/day for 20.0 years (20.0 ttl pk-yrs)     Types: Cigarettes     Quit date: 1989     Years since quittin.2    Smokeless tobacco: Never   Vaping Use    Vaping status: Never Used   Substance and Sexual Activity    Alcohol use: Not Currently    Drug use: No    Sexual activity: Not Currently     Social Determinants of Health     Financial Resource Strain: Low Risk  (2024)    Overall Financial Resource Strain (CARDIA)     Difficulty of Paying Living Expenses: Not hard at all   Food Insecurity: No Food Insecurity (2025)    Hunger Vital Sign     Worried About Running Out of Food in the Last Year: Never true     Ran Out of Food in the Last Year: Never true   Transportation Needs: No Transportation Needs (2025)    PRAPARE -

## 2025-03-06 NOTE — ED TRIAGE NOTES
Patient arrives to ed via pov with c/o cough x few days that is now causing chest pain. Pt denies any known covid/flu exposures or any further complaints. Patient sts he took tylenol today with relief.

## 2025-03-07 LAB
EKG ATRIAL RATE: 74 BPM
EKG DIAGNOSIS: NORMAL
EKG P AXIS: 80 DEGREES
EKG P-R INTERVAL: 130 MS
EKG Q-T INTERVAL: 386 MS
EKG QRS DURATION: 86 MS
EKG QTC CALCULATION (BAZETT): 428 MS
EKG R AXIS: 80 DEGREES
EKG T AXIS: 86 DEGREES
EKG VENTRICULAR RATE: 74 BPM

## 2025-03-09 ENCOUNTER — APPOINTMENT (OUTPATIENT)
Facility: HOSPITAL | Age: 85
DRG: 192 | End: 2025-03-09
Payer: MEDICARE

## 2025-03-09 ENCOUNTER — HOSPITAL ENCOUNTER (INPATIENT)
Facility: HOSPITAL | Age: 85
LOS: 1 days | Discharge: HOME | DRG: 192 | End: 2025-03-10
Attending: EMERGENCY MEDICINE | Admitting: FAMILY MEDICINE
Payer: MEDICARE

## 2025-03-09 DIAGNOSIS — R09.02 HYPOXIA: ICD-10-CM

## 2025-03-09 DIAGNOSIS — J44.1 ACUTE EXACERBATION OF CHRONIC OBSTRUCTIVE PULMONARY DISEASE (COPD) (HCC): Primary | ICD-10-CM

## 2025-03-09 DIAGNOSIS — J11.1 INFLUENZA: ICD-10-CM

## 2025-03-09 PROBLEM — J10.1 INFLUENZA A: Status: ACTIVE | Noted: 2025-03-09

## 2025-03-09 LAB
ALBUMIN SERPL-MCNC: 3 G/DL (ref 3.5–5)
ALBUMIN/GLOB SERPL: 0.8 (ref 1.1–2.2)
ALP SERPL-CCNC: 145 U/L (ref 45–117)
ALT SERPL-CCNC: 20 U/L (ref 12–78)
ANION GAP SERPL CALC-SCNC: 5 MMOL/L (ref 2–12)
AST SERPL-CCNC: 28 U/L (ref 15–37)
BASOPHILS # BLD: 0.01 K/UL (ref 0–0.1)
BASOPHILS NFR BLD: 0.3 % (ref 0–1)
BILIRUB SERPL-MCNC: 0.3 MG/DL (ref 0.2–1)
BUN SERPL-MCNC: 9 MG/DL (ref 6–20)
BUN/CREAT SERPL: 11 (ref 12–20)
CALCIUM SERPL-MCNC: 8.8 MG/DL (ref 8.5–10.1)
CHLORIDE SERPL-SCNC: 108 MMOL/L (ref 97–108)
CO2 SERPL-SCNC: 28 MMOL/L (ref 21–32)
COMMENT:: NORMAL
CREAT SERPL-MCNC: 0.81 MG/DL (ref 0.7–1.3)
DIFFERENTIAL METHOD BLD: ABNORMAL
EKG ATRIAL RATE: 71 BPM
EKG DIAGNOSIS: NORMAL
EKG P AXIS: 84 DEGREES
EKG P-R INTERVAL: 134 MS
EKG Q-T INTERVAL: 394 MS
EKG QRS DURATION: 88 MS
EKG QTC CALCULATION (BAZETT): 428 MS
EKG R AXIS: 78 DEGREES
EKG T AXIS: 71 DEGREES
EKG VENTRICULAR RATE: 71 BPM
EOSINOPHIL # BLD: 0.03 K/UL (ref 0–0.4)
EOSINOPHIL NFR BLD: 0.8 % (ref 0–7)
ERYTHROCYTE [DISTWIDTH] IN BLOOD BY AUTOMATED COUNT: 13.4 % (ref 11.5–14.5)
GLOBULIN SER CALC-MCNC: 4 G/DL (ref 2–4)
GLUCOSE BLD STRIP.AUTO-MCNC: 167 MG/DL (ref 65–117)
GLUCOSE BLD STRIP.AUTO-MCNC: 267 MG/DL (ref 65–117)
GLUCOSE SERPL-MCNC: 87 MG/DL (ref 65–100)
HCT VFR BLD AUTO: 39.2 % (ref 36.6–50.3)
HGB BLD-MCNC: 12.7 G/DL (ref 12.1–17)
IMM GRANULOCYTES # BLD AUTO: 0.01 K/UL (ref 0–0.04)
IMM GRANULOCYTES NFR BLD AUTO: 0.3 % (ref 0–0.5)
LYMPHOCYTES # BLD: 1.53 K/UL (ref 0.8–3.5)
LYMPHOCYTES NFR BLD: 41.6 % (ref 12–49)
MCH RBC QN AUTO: 31.1 PG (ref 26–34)
MCHC RBC AUTO-ENTMCNC: 32.4 G/DL (ref 30–36.5)
MCV RBC AUTO: 96.1 FL (ref 80–99)
MONOCYTES # BLD: 0.38 K/UL (ref 0–1)
MONOCYTES NFR BLD: 10.3 % (ref 5–13)
NEUTS SEG # BLD: 1.72 K/UL (ref 1.8–8)
NEUTS SEG NFR BLD: 46.7 % (ref 32–75)
NRBC # BLD: 0 K/UL (ref 0–0.01)
NRBC BLD-RTO: 0 PER 100 WBC
PLATELET # BLD AUTO: 297 K/UL (ref 150–400)
PMV BLD AUTO: 9.1 FL (ref 8.9–12.9)
POTASSIUM SERPL-SCNC: 3.9 MMOL/L (ref 3.5–5.1)
PROT SERPL-MCNC: 7 G/DL (ref 6.4–8.2)
RBC # BLD AUTO: 4.08 M/UL (ref 4.1–5.7)
SERVICE CMNT-IMP: ABNORMAL
SERVICE CMNT-IMP: ABNORMAL
SODIUM SERPL-SCNC: 141 MMOL/L (ref 136–145)
SPECIMEN HOLD: NORMAL
TROPONIN I SERPL HS-MCNC: 16 NG/L (ref 0–76)
WBC # BLD AUTO: 3.7 K/UL (ref 4.1–11.1)

## 2025-03-09 PROCEDURE — 99285 EMERGENCY DEPT VISIT HI MDM: CPT

## 2025-03-09 PROCEDURE — 85025 COMPLETE CBC W/AUTO DIFF WBC: CPT

## 2025-03-09 PROCEDURE — 1100000000 HC RM PRIVATE

## 2025-03-09 PROCEDURE — 2500000003 HC RX 250 WO HCPCS

## 2025-03-09 PROCEDURE — 84484 ASSAY OF TROPONIN QUANT: CPT

## 2025-03-09 PROCEDURE — 36415 COLL VENOUS BLD VENIPUNCTURE: CPT

## 2025-03-09 PROCEDURE — 94640 AIRWAY INHALATION TREATMENT: CPT

## 2025-03-09 PROCEDURE — 6360000002 HC RX W HCPCS: Performed by: EMERGENCY MEDICINE

## 2025-03-09 PROCEDURE — 6370000000 HC RX 637 (ALT 250 FOR IP): Performed by: EMERGENCY MEDICINE

## 2025-03-09 PROCEDURE — 71045 X-RAY EXAM CHEST 1 VIEW: CPT

## 2025-03-09 PROCEDURE — 6370000000 HC RX 637 (ALT 250 FOR IP)

## 2025-03-09 PROCEDURE — 96374 THER/PROPH/DIAG INJ IV PUSH: CPT

## 2025-03-09 PROCEDURE — 6360000002 HC RX W HCPCS

## 2025-03-09 PROCEDURE — 82962 GLUCOSE BLOOD TEST: CPT

## 2025-03-09 PROCEDURE — 6360000004 HC RX CONTRAST MEDICATION: Performed by: RADIOLOGY

## 2025-03-09 PROCEDURE — 2500000003 HC RX 250 WO HCPCS: Performed by: EMERGENCY MEDICINE

## 2025-03-09 PROCEDURE — 93010 ELECTROCARDIOGRAM REPORT: CPT | Performed by: STUDENT IN AN ORGANIZED HEALTH CARE EDUCATION/TRAINING PROGRAM

## 2025-03-09 PROCEDURE — 94761 N-INVAS EAR/PLS OXIMETRY MLT: CPT

## 2025-03-09 PROCEDURE — 93005 ELECTROCARDIOGRAM TRACING: CPT | Performed by: EMERGENCY MEDICINE

## 2025-03-09 PROCEDURE — 80053 COMPREHEN METABOLIC PANEL: CPT

## 2025-03-09 PROCEDURE — 71275 CT ANGIOGRAPHY CHEST: CPT

## 2025-03-09 RX ORDER — ONDANSETRON 4 MG/1
4 TABLET, ORALLY DISINTEGRATING ORAL EVERY 8 HOURS PRN
Status: DISCONTINUED | OUTPATIENT
Start: 2025-03-09 | End: 2025-03-10 | Stop reason: HOSPADM

## 2025-03-09 RX ORDER — PREDNISONE 20 MG/1
60 TABLET ORAL DAILY
Status: DISCONTINUED | OUTPATIENT
Start: 2025-03-11 | End: 2025-03-10 | Stop reason: HOSPADM

## 2025-03-09 RX ORDER — INSULIN LISPRO 100 [IU]/ML
0-4 INJECTION, SOLUTION INTRAVENOUS; SUBCUTANEOUS
Status: DISCONTINUED | OUTPATIENT
Start: 2025-03-09 | End: 2025-03-10 | Stop reason: HOSPADM

## 2025-03-09 RX ORDER — OSELTAMIVIR PHOSPHATE 75 MG/1
75 CAPSULE ORAL ONCE
Status: COMPLETED | OUTPATIENT
Start: 2025-03-09 | End: 2025-03-09

## 2025-03-09 RX ORDER — ALBUTEROL SULFATE 0.83 MG/ML
10 SOLUTION RESPIRATORY (INHALATION)
Status: COMPLETED | OUTPATIENT
Start: 2025-03-09 | End: 2025-03-09

## 2025-03-09 RX ORDER — IPRATROPIUM BROMIDE AND ALBUTEROL SULFATE 2.5; .5 MG/3ML; MG/3ML
3 SOLUTION RESPIRATORY (INHALATION)
Status: COMPLETED | OUTPATIENT
Start: 2025-03-09 | End: 2025-03-09

## 2025-03-09 RX ORDER — ATORVASTATIN CALCIUM 20 MG/1
40 TABLET, FILM COATED ORAL EVERY MORNING
Status: DISCONTINUED | OUTPATIENT
Start: 2025-03-10 | End: 2025-03-10 | Stop reason: HOSPADM

## 2025-03-09 RX ORDER — FERROUS SULFATE 325(65) MG
325 TABLET ORAL
Status: DISCONTINUED | OUTPATIENT
Start: 2025-03-10 | End: 2025-03-10 | Stop reason: HOSPADM

## 2025-03-09 RX ORDER — OSELTAMIVIR PHOSPHATE 75 MG/1
75 CAPSULE ORAL 2 TIMES DAILY
Status: DISCONTINUED | OUTPATIENT
Start: 2025-03-10 | End: 2025-03-10 | Stop reason: HOSPADM

## 2025-03-09 RX ORDER — PREDNISONE 20 MG/1
20 TABLET ORAL DAILY
Status: DISCONTINUED | OUTPATIENT
Start: 2025-03-13 | End: 2025-03-10 | Stop reason: HOSPADM

## 2025-03-09 RX ORDER — ARFORMOTEROL TARTRATE 15 UG/2ML
15 SOLUTION RESPIRATORY (INHALATION)
Status: DISCONTINUED | OUTPATIENT
Start: 2025-03-09 | End: 2025-03-10 | Stop reason: HOSPADM

## 2025-03-09 RX ORDER — PREDNISONE 20 MG/1
40 TABLET ORAL DAILY
Status: DISCONTINUED | OUTPATIENT
Start: 2025-03-12 | End: 2025-03-10 | Stop reason: HOSPADM

## 2025-03-09 RX ORDER — POTASSIUM CHLORIDE 750 MG/1
40 TABLET, EXTENDED RELEASE ORAL PRN
Status: CANCELLED | OUTPATIENT
Start: 2025-03-09

## 2025-03-09 RX ORDER — RANOLAZINE 500 MG/1
500 TABLET, EXTENDED RELEASE ORAL 2 TIMES DAILY
Status: DISCONTINUED | OUTPATIENT
Start: 2025-03-09 | End: 2025-03-10 | Stop reason: HOSPADM

## 2025-03-09 RX ORDER — SODIUM CHLORIDE 0.9 % (FLUSH) 0.9 %
5-40 SYRINGE (ML) INJECTION EVERY 12 HOURS SCHEDULED
Status: DISCONTINUED | OUTPATIENT
Start: 2025-03-09 | End: 2025-03-10 | Stop reason: HOSPADM

## 2025-03-09 RX ORDER — PREDNISONE 5 MG/1
10 TABLET ORAL DAILY
Status: DISCONTINUED | OUTPATIENT
Start: 2025-03-14 | End: 2025-03-10 | Stop reason: HOSPADM

## 2025-03-09 RX ORDER — POLYETHYLENE GLYCOL 3350 17 G/17G
17 POWDER, FOR SOLUTION ORAL DAILY PRN
Status: DISCONTINUED | OUTPATIENT
Start: 2025-03-09 | End: 2025-03-10 | Stop reason: HOSPADM

## 2025-03-09 RX ORDER — ENOXAPARIN SODIUM 100 MG/ML
40 INJECTION SUBCUTANEOUS DAILY
Status: DISCONTINUED | OUTPATIENT
Start: 2025-03-10 | End: 2025-03-10 | Stop reason: HOSPADM

## 2025-03-09 RX ORDER — CLOPIDOGREL BISULFATE 75 MG/1
75 TABLET ORAL EVERY MORNING
Status: DISCONTINUED | OUTPATIENT
Start: 2025-03-10 | End: 2025-03-10 | Stop reason: HOSPADM

## 2025-03-09 RX ORDER — SODIUM CHLORIDE 9 MG/ML
INJECTION, SOLUTION INTRAVENOUS PRN
Status: DISCONTINUED | OUTPATIENT
Start: 2025-03-09 | End: 2025-03-10 | Stop reason: HOSPADM

## 2025-03-09 RX ORDER — PREDNISONE 20 MG/1
40 TABLET ORAL 2 TIMES DAILY
Status: DISCONTINUED | OUTPATIENT
Start: 2025-03-10 | End: 2025-03-10 | Stop reason: HOSPADM

## 2025-03-09 RX ORDER — ONDANSETRON 2 MG/ML
4 INJECTION INTRAMUSCULAR; INTRAVENOUS EVERY 6 HOURS PRN
Status: DISCONTINUED | OUTPATIENT
Start: 2025-03-09 | End: 2025-03-10 | Stop reason: HOSPADM

## 2025-03-09 RX ORDER — BUDESONIDE 0.5 MG/2ML
0.5 INHALANT ORAL
Status: DISCONTINUED | OUTPATIENT
Start: 2025-03-09 | End: 2025-03-10 | Stop reason: HOSPADM

## 2025-03-09 RX ORDER — IPRATROPIUM BROMIDE AND ALBUTEROL SULFATE 2.5; .5 MG/3ML; MG/3ML
1 SOLUTION RESPIRATORY (INHALATION)
Status: DISCONTINUED | OUTPATIENT
Start: 2025-03-09 | End: 2025-03-10

## 2025-03-09 RX ORDER — LOSARTAN POTASSIUM 50 MG/1
50 TABLET ORAL DAILY
Status: DISCONTINUED | OUTPATIENT
Start: 2025-03-10 | End: 2025-03-10 | Stop reason: HOSPADM

## 2025-03-09 RX ORDER — ASPIRIN 81 MG/1
81 TABLET, CHEWABLE ORAL DAILY
Status: DISCONTINUED | OUTPATIENT
Start: 2025-03-10 | End: 2025-03-10 | Stop reason: HOSPADM

## 2025-03-09 RX ORDER — CHOLECALCIFEROL (VITAMIN D3) 1250 MCG
50000 CAPSULE ORAL 2 TIMES DAILY
Status: DISCONTINUED | OUTPATIENT
Start: 2025-03-09 | End: 2025-03-09

## 2025-03-09 RX ORDER — ACETAMINOPHEN 325 MG/1
650 TABLET ORAL EVERY 6 HOURS PRN
Status: DISCONTINUED | OUTPATIENT
Start: 2025-03-09 | End: 2025-03-10 | Stop reason: HOSPADM

## 2025-03-09 RX ORDER — ACETAMINOPHEN 650 MG/1
650 SUPPOSITORY RECTAL EVERY 6 HOURS PRN
Status: DISCONTINUED | OUTPATIENT
Start: 2025-03-09 | End: 2025-03-10 | Stop reason: HOSPADM

## 2025-03-09 RX ORDER — POTASSIUM CHLORIDE 7.45 MG/ML
10 INJECTION INTRAVENOUS PRN
Status: CANCELLED | OUTPATIENT
Start: 2025-03-09

## 2025-03-09 RX ORDER — PANTOPRAZOLE SODIUM 40 MG/1
40 TABLET, DELAYED RELEASE ORAL
Status: DISCONTINUED | OUTPATIENT
Start: 2025-03-10 | End: 2025-03-10 | Stop reason: HOSPADM

## 2025-03-09 RX ORDER — IOPAMIDOL 755 MG/ML
100 INJECTION, SOLUTION INTRAVASCULAR
Status: COMPLETED | OUTPATIENT
Start: 2025-03-09 | End: 2025-03-09

## 2025-03-09 RX ORDER — FOLIC ACID 1 MG/1
1 TABLET ORAL DAILY
Status: DISCONTINUED | OUTPATIENT
Start: 2025-03-10 | End: 2025-03-10 | Stop reason: HOSPADM

## 2025-03-09 RX ORDER — SODIUM CHLORIDE 0.9 % (FLUSH) 0.9 %
5-40 SYRINGE (ML) INJECTION PRN
Status: DISCONTINUED | OUTPATIENT
Start: 2025-03-09 | End: 2025-03-10 | Stop reason: HOSPADM

## 2025-03-09 RX ORDER — ASCORBIC ACID 500 MG
500 TABLET ORAL DAILY
Status: DISCONTINUED | OUTPATIENT
Start: 2025-03-10 | End: 2025-03-10 | Stop reason: HOSPADM

## 2025-03-09 RX ORDER — EZETIMIBE 10 MG/1
10 TABLET ORAL DAILY
Status: DISCONTINUED | OUTPATIENT
Start: 2025-03-10 | End: 2025-03-10 | Stop reason: HOSPADM

## 2025-03-09 RX ORDER — MAGNESIUM SULFATE IN WATER 40 MG/ML
2000 INJECTION, SOLUTION INTRAVENOUS PRN
Status: CANCELLED | OUTPATIENT
Start: 2025-03-09

## 2025-03-09 RX ADMIN — ARFORMOTEROL TARTRATE 15 MCG: 15 SOLUTION RESPIRATORY (INHALATION) at 20:20

## 2025-03-09 RX ADMIN — OSELTAMIVIR PHOSPHATE 75 MG: 75 CAPSULE ORAL at 15:04

## 2025-03-09 RX ADMIN — SODIUM CHLORIDE, PRESERVATIVE FREE 10 ML: 5 INJECTION INTRAVENOUS at 19:50

## 2025-03-09 RX ADMIN — METHYLPREDNISOLONE SODIUM SUCCINATE 125 MG: 125 INJECTION, POWDER, LYOPHILIZED, FOR SOLUTION INTRAMUSCULAR; INTRAVENOUS at 11:23

## 2025-03-09 RX ADMIN — BUDESONIDE 500 MCG: 0.5 INHALANT RESPIRATORY (INHALATION) at 20:20

## 2025-03-09 RX ADMIN — ACETAMINOPHEN 650 MG: 325 TABLET ORAL at 19:47

## 2025-03-09 RX ADMIN — IPRATROPIUM BROMIDE AND ALBUTEROL SULFATE 1 DOSE: .5; 3 SOLUTION RESPIRATORY (INHALATION) at 20:25

## 2025-03-09 RX ADMIN — ALBUTEROL SULFATE 10 MG: 2.5 SOLUTION RESPIRATORY (INHALATION) at 15:04

## 2025-03-09 RX ADMIN — IOPAMIDOL 63 ML: 755 INJECTION, SOLUTION INTRAVENOUS at 13:11

## 2025-03-09 RX ADMIN — RANOLAZINE 500 MG: 500 TABLET, FILM COATED, EXTENDED RELEASE ORAL at 19:47

## 2025-03-09 RX ADMIN — IPRATROPIUM BROMIDE AND ALBUTEROL SULFATE 3 DOSE: .5; 3 SOLUTION RESPIRATORY (INHALATION) at 11:29

## 2025-03-09 RX ADMIN — INSULIN LISPRO 2 UNITS: 100 INJECTION, SOLUTION INTRAVENOUS; SUBCUTANEOUS at 21:20

## 2025-03-09 ASSESSMENT — PAIN SCALES - GENERAL
PAINLEVEL_OUTOF10: 0
PAINLEVEL_OUTOF10: 3

## 2025-03-09 ASSESSMENT — PAIN DESCRIPTION - DESCRIPTORS: DESCRIPTORS: ACHING

## 2025-03-09 ASSESSMENT — PAIN - FUNCTIONAL ASSESSMENT: PAIN_FUNCTIONAL_ASSESSMENT: 0-10

## 2025-03-09 ASSESSMENT — PAIN DESCRIPTION - LOCATION: LOCATION: HEAD

## 2025-03-09 NOTE — H&P
Result Value Ref Range    WBC 3.7 (L) 4.1 - 11.1 K/uL    RBC 4.08 (L) 4.10 - 5.70 M/uL    Hemoglobin 12.7 12.1 - 17.0 g/dL    Hematocrit 39.2 36.6 - 50.3 %    MCV 96.1 80.0 - 99.0 FL    MCH 31.1 26.0 - 34.0 PG    MCHC 32.4 30.0 - 36.5 g/dL    RDW 13.4 11.5 - 14.5 %    Platelets 297 150 - 400 K/uL    MPV 9.1 8.9 - 12.9 FL    Nucleated RBCs 0.0 0  WBC    nRBC 0.00 0.00 - 0.01 K/uL    Neutrophils % 46.7 32.0 - 75.0 %    Lymphocytes % 41.6 12.0 - 49.0 %    Monocytes % 10.3 5.0 - 13.0 %    Eosinophils % 0.8 0.0 - 7.0 %    Basophils % 0.3 0.0 - 1.0 %    Immature Granulocytes % 0.3 0.0 - 0.5 %    Neutrophils Absolute 1.72 (L) 1.80 - 8.00 K/UL    Lymphocytes Absolute 1.53 0.80 - 3.50 K/UL    Monocytes Absolute 0.38 0.00 - 1.00 K/UL    Eosinophils Absolute 0.03 0.00 - 0.40 K/UL    Basophils Absolute 0.01 0.00 - 0.10 K/UL    Immature Granulocytes Absolute 0.01 0.00 - 0.04 K/UL    Differential Type AUTOMATED     Comprehensive Metabolic Panel    Collection Time: 03/09/25 12:03 PM   Result Value Ref Range    Sodium 141 136 - 145 mmol/L    Potassium 3.9 3.5 - 5.1 mmol/L    Chloride 108 97 - 108 mmol/L    CO2 28 21 - 32 mmol/L    Anion Gap 5 2 - 12 mmol/L    Glucose 87 65 - 100 mg/dL    BUN 9 6 - 20 MG/DL    Creatinine 0.81 0.70 - 1.30 MG/DL    BUN/Creatinine Ratio 11 (L) 12 - 20      Est, Glom Filt Rate 87 >60 ml/min/1.73m2    Calcium 8.8 8.5 - 10.1 MG/DL    Total Bilirubin 0.3 0.2 - 1.0 MG/DL    ALT 20 12 - 78 U/L    AST 28 15 - 37 U/L    Alk Phosphatase 145 (H) 45 - 117 U/L    Total Protein 7.0 6.4 - 8.2 g/dL    Albumin 3.0 (L) 3.5 - 5.0 g/dL    Globulin 4.0 2.0 - 4.0 g/dL    Albumin/Globulin Ratio 0.8 (L) 1.1 - 2.2     Extra Tubes Hold    Collection Time: 03/09/25 12:03 PM   Result Value Ref Range    Specimen HOld BLUE,RED,SST     Comment:        Add-on orders for these samples will be processed based on acceptable specimen integrity and analyte stability, which may vary by analyte.   Troponin    Collection Time:

## 2025-03-09 NOTE — ED NOTES
TRANSFER - OUT REPORT:  Verbal report given to COLLEEN Larkin on Bo Greenberg  being transferred to [facility] for routine progression of patient care     Report consisted of patient's Situation, Background, Assessment and Recommendations(SBAR).   Information from the following report(s) Nurse Handoff Report, ED Encounter Summary, ED SBAR, MAR, Recent Results, Cardiac Rhythm NSR, and Neuro Assessment was reviewed with the receiving nurse.  Opportunity for questions and clarification was provided.      Patient transported with:  Monitor and Tech

## 2025-03-09 NOTE — ED TRIAGE NOTES
Pt arrives to the ED with complaints of SOB that started last night, pt reports he was recently diagnosed with the flu. Pt has hx COPD. Received a breathing treatment PTA, pt reports feeling improvement.

## 2025-03-09 NOTE — ED PROVIDER NOTES
[facility] EMERGENCY DEPARTMENT  EMERGENCY DEPARTMENT ENCOUNTER      Pt Name: Bo Greenberg  MRN: [id number]  Birthdate 1940  Date of evaluation: 3/9/2025  Provider: Kevin Lucas MD    CHIEF COMPLAINT       Chief Complaint   Patient presents with    Shortness of Breath         HISTORY OF PRESENT ILLNESS   (Location/Symptom, Timing/Onset, Context/Setting, Quality, Duration, Modifying Factors, Severity)  Note limiting factors.   85M w/ hx CAD, COPD (O2 as needed), DM, HTN, HLD, kidney stones p/w 2d sob. Pt reports 2d difficulty breathing w/ productive cough and wheezing. Used nebs at home w/o relief. No F/C, N/V/D, dizziness or syncope. No leg pain or swelling. Had episode of chest pain 3d ago for which was seen here in ER. Former smoker.            Review of External Medical Records:     Nursing Notes were reviewed.    REVIEW OF SYSTEMS    (2-9 systems for level 4, 10 or more for level 5)     Review of Systems   Constitutional:  Negative for diaphoresis and fever.   HENT:  Negative for nosebleeds.    Eyes:  Negative for visual disturbance.   Respiratory:  Positive for cough, shortness of breath and wheezing.    Cardiovascular:  Negative for chest pain, palpitations and leg swelling.   Gastrointestinal:  Negative for abdominal distention, abdominal pain, anal bleeding, blood in stool, diarrhea, nausea and vomiting.   Endocrine: Negative for polyuria.   Genitourinary:  Negative for difficulty urinating, dysuria and hematuria.   Musculoskeletal:  Negative for joint swelling.   Skin:  Negative for wound.   Neurological:  Negative for dizziness, syncope and light-headedness.   Hematological:  Does not bruise/bleed easily.   Psychiatric/Behavioral:  Negative for confusion.        Except as noted above the remainder of the review of systems was reviewed and negative.       PAST MEDICAL HISTORY     Past Medical History:   Diagnosis Date    CAD (coronary artery disease)     COPD (chronic

## 2025-03-10 VITALS
WEIGHT: 160 LBS | SYSTOLIC BLOOD PRESSURE: 125 MMHG | DIASTOLIC BLOOD PRESSURE: 61 MMHG | BODY MASS INDEX: 24.25 KG/M2 | HEIGHT: 68 IN | TEMPERATURE: 98.4 F | OXYGEN SATURATION: 93 % | HEART RATE: 74 BPM | RESPIRATION RATE: 16 BRPM

## 2025-03-10 PROBLEM — R09.02 HYPOXIA: Status: ACTIVE | Noted: 2025-03-10

## 2025-03-10 PROBLEM — J11.1 INFLUENZA: Status: ACTIVE | Noted: 2025-03-10

## 2025-03-10 PROBLEM — J44.1 ACUTE EXACERBATION OF CHRONIC OBSTRUCTIVE PULMONARY DISEASE (COPD) (HCC): Status: ACTIVE | Noted: 2025-03-10

## 2025-03-10 LAB
ANION GAP SERPL CALC-SCNC: 7 MMOL/L (ref 2–12)
BASOPHILS # BLD: 0 K/UL (ref 0–0.1)
BASOPHILS NFR BLD: 0 % (ref 0–1)
BUN SERPL-MCNC: 9 MG/DL (ref 6–20)
BUN/CREAT SERPL: 12 (ref 12–20)
CALCIUM SERPL-MCNC: 9.2 MG/DL (ref 8.5–10.1)
CHLORIDE SERPL-SCNC: 110 MMOL/L (ref 97–108)
CO2 SERPL-SCNC: 23 MMOL/L (ref 21–32)
CREAT SERPL-MCNC: 0.73 MG/DL (ref 0.7–1.3)
DIFFERENTIAL METHOD BLD: ABNORMAL
EOSINOPHIL # BLD: 0 K/UL (ref 0–0.4)
EOSINOPHIL NFR BLD: 0 % (ref 0–7)
ERYTHROCYTE [DISTWIDTH] IN BLOOD BY AUTOMATED COUNT: 13.3 % (ref 11.5–14.5)
GLUCOSE BLD STRIP.AUTO-MCNC: 118 MG/DL (ref 65–117)
GLUCOSE BLD STRIP.AUTO-MCNC: 135 MG/DL (ref 65–117)
GLUCOSE SERPL-MCNC: 140 MG/DL (ref 65–100)
HCT VFR BLD AUTO: 34.8 % (ref 36.6–50.3)
HGB BLD-MCNC: 11.5 G/DL (ref 12.1–17)
IMM GRANULOCYTES # BLD AUTO: 0 K/UL (ref 0–0.04)
IMM GRANULOCYTES NFR BLD AUTO: 0 % (ref 0–0.5)
LYMPHOCYTES # BLD: 1.04 K/UL (ref 0.8–3.5)
LYMPHOCYTES NFR BLD: 36 % (ref 12–49)
MCH RBC QN AUTO: 31.3 PG (ref 26–34)
MCHC RBC AUTO-ENTMCNC: 33 G/DL (ref 30–36.5)
MCV RBC AUTO: 94.6 FL (ref 80–99)
MONOCYTES # BLD: 0.29 K/UL (ref 0–1)
MONOCYTES NFR BLD: 10 % (ref 5–13)
NEUTS SEG # BLD: 1.57 K/UL (ref 1.8–8)
NEUTS SEG NFR BLD: 54 % (ref 32–75)
NRBC # BLD: 0 K/UL (ref 0–0.01)
NRBC BLD-RTO: 0 PER 100 WBC
PLATELET # BLD AUTO: 292 K/UL (ref 150–400)
PMV BLD AUTO: 9.4 FL (ref 8.9–12.9)
POTASSIUM SERPL-SCNC: 3.6 MMOL/L (ref 3.5–5.1)
RBC # BLD AUTO: 3.68 M/UL (ref 4.1–5.7)
RBC MORPH BLD: ABNORMAL
SERVICE CMNT-IMP: ABNORMAL
SERVICE CMNT-IMP: ABNORMAL
SODIUM SERPL-SCNC: 140 MMOL/L (ref 136–145)
WBC # BLD AUTO: 2.9 K/UL (ref 4.1–11.1)

## 2025-03-10 PROCEDURE — 6370000000 HC RX 637 (ALT 250 FOR IP): Performed by: FAMILY MEDICINE

## 2025-03-10 PROCEDURE — 85025 COMPLETE CBC W/AUTO DIFF WBC: CPT

## 2025-03-10 PROCEDURE — 6360000002 HC RX W HCPCS

## 2025-03-10 PROCEDURE — 6370000000 HC RX 637 (ALT 250 FOR IP)

## 2025-03-10 PROCEDURE — 36415 COLL VENOUS BLD VENIPUNCTURE: CPT

## 2025-03-10 PROCEDURE — 80048 BASIC METABOLIC PNL TOTAL CA: CPT

## 2025-03-10 PROCEDURE — 82962 GLUCOSE BLOOD TEST: CPT

## 2025-03-10 PROCEDURE — 94640 AIRWAY INHALATION TREATMENT: CPT

## 2025-03-10 PROCEDURE — 99235 HOSP IP/OBS SAME DATE MOD 70: CPT | Performed by: FAMILY MEDICINE

## 2025-03-10 PROCEDURE — 2500000003 HC RX 250 WO HCPCS

## 2025-03-10 RX ORDER — PREDNISONE 10 MG/1
TABLET ORAL
Qty: 21 TABLET | Refills: 0 | Status: SHIPPED | OUTPATIENT
Start: 2025-03-10 | End: 2025-03-15

## 2025-03-10 RX ORDER — IPRATROPIUM BROMIDE AND ALBUTEROL SULFATE 2.5; .5 MG/3ML; MG/3ML
1 SOLUTION RESPIRATORY (INHALATION)
Status: DISCONTINUED | OUTPATIENT
Start: 2025-03-10 | End: 2025-03-10 | Stop reason: HOSPADM

## 2025-03-10 RX ORDER — PANTOPRAZOLE SODIUM 40 MG/1
40 TABLET, DELAYED RELEASE ORAL
Qty: 30 TABLET | Refills: 0 | Status: SHIPPED | OUTPATIENT
Start: 2025-03-11

## 2025-03-10 RX ORDER — OSELTAMIVIR PHOSPHATE 75 MG/1
75 CAPSULE ORAL 2 TIMES DAILY
Qty: 8 CAPSULE | Refills: 0 | Status: SHIPPED | OUTPATIENT
Start: 2025-03-10 | End: 2025-03-14

## 2025-03-10 RX ADMIN — IPRATROPIUM BROMIDE AND ALBUTEROL SULFATE 1 DOSE: .5; 3 SOLUTION RESPIRATORY (INHALATION) at 07:37

## 2025-03-10 RX ADMIN — PANTOPRAZOLE SODIUM 40 MG: 40 TABLET, DELAYED RELEASE ORAL at 05:31

## 2025-03-10 RX ADMIN — SODIUM CHLORIDE, PRESERVATIVE FREE 10 ML: 5 INJECTION INTRAVENOUS at 08:20

## 2025-03-10 RX ADMIN — OSELTAMIVIR PHOSPHATE 75 MG: 75 CAPSULE ORAL at 08:19

## 2025-03-10 RX ADMIN — FERROUS SULFATE TAB 325 MG (65 MG ELEMENTAL FE) 325 MG: 325 (65 FE) TAB at 08:20

## 2025-03-10 RX ADMIN — ASPIRIN 81 MG: 81 TABLET, CHEWABLE ORAL at 08:19

## 2025-03-10 RX ADMIN — PREDNISONE 40 MG: 20 TABLET ORAL at 08:19

## 2025-03-10 RX ADMIN — BUDESONIDE 500 MCG: 0.5 INHALANT RESPIRATORY (INHALATION) at 07:42

## 2025-03-10 RX ADMIN — RANOLAZINE 500 MG: 500 TABLET, FILM COATED, EXTENDED RELEASE ORAL at 08:19

## 2025-03-10 RX ADMIN — LOSARTAN POTASSIUM 50 MG: 50 TABLET, FILM COATED ORAL at 08:19

## 2025-03-10 RX ADMIN — FOLIC ACID 1 MG: 1 TABLET ORAL at 08:20

## 2025-03-10 RX ADMIN — Medication 500 MG: at 08:24

## 2025-03-10 RX ADMIN — EZETIMIBE 10 MG: 10 TABLET ORAL at 08:19

## 2025-03-10 RX ADMIN — ENOXAPARIN SODIUM 40 MG: 100 INJECTION SUBCUTANEOUS at 08:20

## 2025-03-10 RX ADMIN — ATORVASTATIN CALCIUM 40 MG: 20 TABLET, FILM COATED ORAL at 08:20

## 2025-03-10 RX ADMIN — ARFORMOTEROL TARTRATE 15 MCG: 15 SOLUTION RESPIRATORY (INHALATION) at 07:42

## 2025-03-10 RX ADMIN — CLOPIDOGREL BISULFATE 75 MG: 75 TABLET ORAL at 08:19

## 2025-03-10 NOTE — PROGRESS NOTES
Pulse ox    Pt ambulated in [place] without oxygen. Oxygen saturation dropped to 85% on RA. Pt states he has supplemental oxygen at home.  He uses 3 lpm PRN.

## 2025-03-10 NOTE — DISCHARGE SUMMARY
thickening or dilatation. TRACHEA/BRONCHI: Patent. PLEURA: No effusion or pneumothorax. LUNGS: No nodule, mass, or airspace disease. UPPER ABDOMEN: Partially imaged. No acute pathology. BONES: No aggressive bone lesion or fracture.     No evidence of pulmonary embolism or pneumonia. Electronically signed by TEE GRIMM    XR CHEST PORTABLE  Result Date: 3/9/2025  EXAM: XR CHEST PORTABLE INDICATION: SOB COMPARISON: Chest radiograph March 6, 2025 TECHNIQUE: Single view of the chest. FINDINGS: Cardiomediastinal silhouette: Stable. Lungs: No focal airspace opacity. Pleura: No pleural effusion. No pneumothorax. Bones: No acute displaced fracture. Soft tissues: Within normal limits.     No findings of an acute cardiopulmonary process. Electronically signed by Vinod Garcia    X-ray lumbar spine 2 or 3 views ([id number])  Result Date: 3/6/2025  Weight Bearing. AP, Lat.     L1-S1 posterior instrumentation with excellent alignment.  No signs of hardware failure or loosening.  Recent anterior lumbar fusion construct L5-S1 also in excellent position with progressive arthrodesis.    XR CHEST (2 VW)  Result Date: 3/6/2025  EXAM: XR CHEST (2 VW) ACC#: [id number] INDICATION: chest pain, cough ? pna COMPARISON: 9/13/2019 TECHNIQUE: Frontal and lateral views of the chest. FINDINGS: Lungs are clear. The cardiomediastinal configuration is within normal limits. No acute bony abnormalities.     No acute cardiopulmonary abnormalities. Electronically signed by RAZ Turner        Diet: Diabetic diet   Activity:  As tolerated     Disposition: Home    Discharge instructions to patient/family  Please seek medical attention for any new or worsening symptoms particularly fever, chest pain, shortness of breath, abdominal pain, nausea, vomiting    Follow up plans/appointments  Follow-up Information       Follow up With Specialties Details Why Contact Info    Jairo Mcgowan, DO Family Medicine Follow up  [id number] [address]

## 2025-03-10 NOTE — PLAN OF CARE
Discharge instructions reviewed with patient and wife.   All questions answered regarding prescriptions, follow up appt., and when to return to hospital.

## 2025-03-10 NOTE — PROGRESS NOTES
[address] [place] [address]   Office [phone]  Fax [phone]          Subjective / Objective     Subjective  Overnight Events: NAEO  Patient seen and examined at bedside. Reports feeling well this AM. Denies CP, SOB, N/V, dysuria.    Respiratory: RA  BP (!) 113/53 Comment: nurse notified  Pulse 72   Temp 98.1 °F (36.7 °C) (Oral)   Resp 15   Ht 1.727 m (5' 8\")   Wt 72.6 kg (160 lb)   SpO2 93%   BMI 24.33 kg/m²    Physical Examination:   General appearance - alert, well appearing, and in no distress  Chest - mild inspiratory wheeze, symmetric air entry  Heart - normal rate, regular rhythm, normal S1, S2, no murmurs, rubs, clicks or gallops,   Abdomen - soft, nontender, nondistended, no masses or organomegaly  Neurological - alert, oriented, normal speech, no focal findings  Skin - warm, dry. No notable rashes  Extremities - peripheral pulses normal, no pedal edema, no clubbing or cyanosis  Psychiatric - normal speech and thought processes    I/O:  No intake/output data recorded.  Inpatient Medications  Current Facility-Administered Medications   Medication Dose Route Frequency    ascorbic acid (VITAMIN C) tablet 500 mg  500 mg Oral Daily    aspirin chewable tablet 81 mg  81 mg Oral Daily    atorvastatin (LIPITOR) tablet 40 mg  40 mg Oral QAM    clopidogrel (PLAVIX) tablet 75 mg  75 mg Oral QAM    ezetimibe (ZETIA) tablet 10 mg  10 mg Oral Daily    ferrous sulfate (IRON 325) tablet 325 mg  325 mg Oral Daily with breakfast    folic acid (FOLVITE) tablet 1 mg  1 mg Oral Daily    losartan (COZAAR) tablet 50 mg  50 mg Oral Daily    pantoprazole (PROTONIX) tablet 40 mg  40 mg Oral QAM AC    ranolazine (RANEXA) extended release tablet 500 mg  500 mg Oral BID    oseltamivir (TAMIFLU) capsule 75 mg  75 mg Oral BID    ipratropium 0.5 mg-albuterol 2.5 mg (DUONEB) nebulizer solution 1 Dose  1 Dose Inhalation Q4H WA RT    sodium chloride flush 0.9 % injection 5-40 mL  5-40 mL IntraVENous 2 times

## 2025-03-10 NOTE — PLAN OF CARE
Problem: Chronic Conditions and Co-morbidities  Goal: Patient's chronic conditions and co-morbidity symptoms are monitored and maintained or improved  Outcome: Progressing     Problem: Pain  Goal: Verbalizes/displays adequate comfort level or baseline comfort level  Outcome: Progressing  Flowsheets (Taken 3/9/2025 1959)  Verbalizes/displays adequate comfort level or baseline comfort level:   Encourage patient to monitor pain and request assistance   Assess pain using appropriate pain scale   Administer analgesics based on type and severity of pain and evaluate response   Implement non-pharmacological measures as appropriate and evaluate response   Consider cultural and social influences on pain and pain management   Notify Licensed Independent Practitioner if interventions unsuccessful or patient reports new pain     Problem: ABCDS Injury Assessment  Goal: Absence of physical injury  Outcome: Progressing     Problem: Safety - Adult  Goal: Free from fall injury  Outcome: Progressing     Problem: Respiratory - Adult  Goal: Achieves optimal ventilation and oxygenation  3/9/2025 2203 by Cooper Horta RN  Outcome: Progressing

## 2025-03-10 NOTE — DISCHARGE INSTRUCTIONS
HOME DISCHARGE INSTRUCTIONS    Bo Greenberg / [id number] : 1940    Admission date: 3/9/2025 Discharge date: 3/10/2025     Please bring this form with you to show your care provider at your follow-up appointment.    Primary care provider:  Jairo Mcgowan    Discharging provider:  Haja Roman MD  - Family Medicine Resident  Dr. Kerri Ceron. Attending, Family Medicine     You have been admitted to the hospital with the following diagnoses:    ACUTE DIAGNOSES:  Influenza [J11.1]  Influenza A [J10.1]  Acute exacerbation of chronic obstructive pulmonary disease (COPD) ([facility]) [J44.1]  Hypoxia [R09.02]    . . . . . . . . . . . . . . . . . . . . . . . . . . . . . . . . . . . . . . . . . . . . . . . . . . . . . . . . . . . . . . . . . . . . . . . .   Attending physician at discharge/transfer:     FOLLOW-UP CARE RECOMMENDATIONS:  You are well enough to be discharged from the hospital. You were in the hospital for difficulty in breathing which was due to Influenza causing worsening of your COPD. You did not require additional oxygen during admission and you were treated with antiviral medication and breathing treatment. Please continue your medications as prescribed. For the next few days you may require your home oxygen treatment of 3L NC, so please continue them at home especially if you are moving around. Discuss with your Pulmonologist regarding using a maintenance inhaler for COPD.     MEDICATION CHANGES  Current Discharge Medication List           START Tamiflu 75 mg twice daily for 4 more days               Symbicort inhaler 2 puffs twice a day.                - Take Prednisone oral medication as below   -  Prednisone 40mg twice a day for one day, then   - 60 mg once for 1 day, then   - 40 mg once for 1 day, then   - 20 mg once for 1 day, then   - 10 mg once for 1 day.     STOP none    CHANGES  No other changes were made to your medications, please take all your other home medicines as previously

## 2025-03-10 NOTE — PROGRESS NOTES
NUTRITION    Best practice alert was triggered based on results obtained during nursing admission assessment for Weight loss 2 -13#     Wt Readings from Last 30 Encounters:   03/09/25 72.6 kg (160 lb)   03/06/25 72.6 kg (160 lb)   01/20/25 72 kg (158 lb 11.7 oz)   01/08/25 72.5 kg (159 lb 14.4 oz)   01/08/25 72.6 kg (160 lb)   11/20/24 73.5 kg (162 lb)   08/20/24 71.2 kg (157 lb)   05/09/24 72.1 kg (159 lb)   01/31/24 72.6 kg (160 lb)   05/19/23 75.8 kg (167 lb)   12/21/22 76.7 kg (169 lb)   10/05/22 75.3 kg (166 lb)   11/17/21 78 kg (172 lb)   09/21/21 75.8 kg (167 lb)   06/10/21 76.2 kg (168 lb)   03/04/21 77.6 kg (171 lb)        Meal Intake:   No data found.  Supplement Intake:  No data found.      The patient's chart was reviewed and nutrition assessment is not indicated at this time.  Plan to see patient for rescreen per policy.  Thank you.       Ed Stevenson RD  Ext: [phone], or via [employer] - Financial Reconciliation

## 2025-03-17 DIAGNOSIS — I25.10 CORONARY ARTERY DISEASE DUE TO LIPID RICH PLAQUE: ICD-10-CM

## 2025-03-17 DIAGNOSIS — I25.83 CORONARY ARTERY DISEASE DUE TO LIPID RICH PLAQUE: ICD-10-CM

## 2025-03-17 RX ORDER — EZETIMIBE 10 MG/1
10 TABLET ORAL DAILY
Qty: 90 TABLET | Refills: 3 | Status: SHIPPED | OUTPATIENT
Start: 2025-03-17

## 2025-03-18 ENCOUNTER — OFFICE VISIT (OUTPATIENT)
Facility: CLINIC | Age: 85
End: 2025-03-18

## 2025-03-18 VITALS
WEIGHT: 156 LBS | BODY MASS INDEX: 23.64 KG/M2 | HEART RATE: 79 BPM | OXYGEN SATURATION: 98 % | SYSTOLIC BLOOD PRESSURE: 142 MMHG | HEIGHT: 68 IN | DIASTOLIC BLOOD PRESSURE: 83 MMHG | RESPIRATION RATE: 21 BRPM

## 2025-03-18 DIAGNOSIS — Z09 HOSPITAL DISCHARGE FOLLOW-UP: ICD-10-CM

## 2025-03-18 DIAGNOSIS — K21.9 GASTROESOPHAGEAL REFLUX DISEASE WITHOUT ESOPHAGITIS: ICD-10-CM

## 2025-03-18 DIAGNOSIS — G89.4 PAIN SYNDROME, CHRONIC: ICD-10-CM

## 2025-03-18 DIAGNOSIS — J18.9 COMMUNITY ACQUIRED PNEUMONIA, UNSPECIFIED LATERALITY: Primary | ICD-10-CM

## 2025-03-18 RX ORDER — PANTOPRAZOLE SODIUM 40 MG/1
40 TABLET, DELAYED RELEASE ORAL
Qty: 30 TABLET | Refills: 0 | Status: SHIPPED | OUTPATIENT
Start: 2025-03-18

## 2025-03-18 RX ORDER — AZITHROMYCIN 250 MG/1
TABLET, FILM COATED ORAL
Qty: 6 TABLET | Refills: 0 | Status: SHIPPED | OUTPATIENT
Start: 2025-03-18

## 2025-03-18 RX ORDER — OXYCODONE AND ACETAMINOPHEN 5; 325 MG/1; MG/1
1 TABLET ORAL 2 TIMES DAILY
Qty: 60 TABLET | Refills: 0 | Status: SHIPPED | OUTPATIENT
Start: 2025-05-17 | End: 2025-06-16

## 2025-03-18 RX ORDER — OXYCODONE AND ACETAMINOPHEN 5; 325 MG/1; MG/1
1 TABLET ORAL 2 TIMES DAILY
Qty: 60 TABLET | Refills: 0 | Status: SHIPPED | OUTPATIENT
Start: 2025-03-18 | End: 2025-04-17

## 2025-03-18 RX ORDER — OXYCODONE AND ACETAMINOPHEN 5; 325 MG/1; MG/1
TABLET ORAL
COMMUNITY
Start: 2025-02-06

## 2025-03-18 RX ORDER — OXYCODONE AND ACETAMINOPHEN 5; 325 MG/1; MG/1
1 TABLET ORAL 2 TIMES DAILY
Qty: 60 TABLET | Refills: 0 | Status: SHIPPED | OUTPATIENT
Start: 2025-04-17 | End: 2025-05-17

## 2025-03-18 RX ORDER — BENZONATATE 100 MG/1
CAPSULE ORAL
COMMUNITY
Start: 2025-03-13

## 2025-03-18 NOTE — PROGRESS NOTES
Bo Greenberg is a 85 y.o. male , id x 2(name and ). Reviewed record, history, and  medications.    Chief Complaint   Patient presents with    Follow-Up from Hospital     Flu admitted for 27hrs till has the flu.  Spitting up green flem. Taking cough medication and doing albuterol treatments and oxygen.    Pt states that they are still weak, has chest pain, SOB,and has a cough.    Medication Refill     Pantoprazole, told not to take omeprazole by another provider they are seeing        Health Maintenance Due   Topic    Diabetic Alb to Cr ratio (uACR) test     COVID-19 Vaccine ( season)       Wt Readings from Last 1 Encounters:   25 70.8 kg (156 lb)     BP Readings from Last 3 Encounters:   25 (!) 142/83   03/10/25 125/61   25 (!) 143/72     Pulse Readings from Last 1 Encounters:   25 79         Patient is currently accompanied by spouse & I have received verbal consent from Bo Greenberg to discuss any/all medical information while he/she is present in the room.    Home BP cuff present today:  no    Home medication list or bottles present today: yes - med bottles for refill  - All medications were reviewed and updated with the patient today in office.    Forms for completion: no    Chest pain/ Shortness of breath: yes - SOB and chest pain from flu    Surgery within the next month:  no      Depression Screening:  :     Depression: Not at risk (2025)    PHQ-2     PHQ-2 Score: 0          Coordination of Care Questionnaire:  :     \"Have you been to the ER, urgent care clinic since your last visit?  Hospitalized since your last visit?\"    YES - When: approximately 2  weeks ago.  Where and Why: Flu.    “Have you seen or consulted any other health care providers outside of [facility] since your last visit?”    NO  Click Here for Release of Records Request      --Rosamaria Goss CMA       ------------------------------------------------

## 2025-03-18 NOTE — PROGRESS NOTES
Post-Discharge Transitional Care Follow Up      Bo Greenberg   YOB: 1940    Date of Office Visit:  3/18/2025  Date of Hospital Admission: 3/9/25  Date of Hospital Discharge: 3/10/25  Readmission Risk Score (high >=14%. Medium >=10%):Readmission Risk Score: 16.1      Care management risk score Rising risk (score 2-5) and Complex Care (Scores >=6): No Risk Score On File     Non face to face  following discharge, date last encounter closed (first attempt may have been earlier): 03/11/2025     Call initiated 2 business days of discharge: Yes     Community acquired pneumonia, unspecified laterality  -     amoxicillin-clavulanate (AUGMENTIN) 875-125 MG per tablet; Take 1 tablet by mouth 2 times daily for 7 days, Disp-14 tablet, R-0Normal  -     azithromycin (ZITHROMAX) 250 MG tablet; Take two tablets by mouth on the first day, followed by taking one tablet by mouth daily thereafter., Disp-6 tablet, R-0Normal  Gastroesophageal reflux disease without esophagitis  -     pantoprazole (PROTONIX) 40 MG tablet; Take 1 tablet by mouth every morning (before breakfast), Disp-30 tablet, R-0Normal  Hospital discharge follow-up  -     FL DISCHARGE MEDS RECONCILED W/ CURRENT OUTPATIENT MED LIST  Pain syndrome, chronic  -     oxyCODONE-acetaminophen (PERCOCET) 5-325 MG per tablet; Take 1 tablet by mouth in the morning and at bedtime for 30 days. Max Daily Amount: 2 tablets, Disp-60 tablet, R-0Normal  -     oxyCODONE-acetaminophen (PERCOCET) 5-325 MG per tablet; Take 1 tablet by mouth in the morning and at bedtime for 30 days. Take lowest dose possible to manage pain Max Daily Amount: 2 tablets, Disp-60 tablet, R-0Normal  -     oxyCODONE-acetaminophen (PERCOCET) 5-325 MG per tablet; Take 1 tablet by mouth in the morning and at bedtime for 30 days. Take lowest dose possible to manage pain Max Daily Amount: 2 tablets, Disp-60 tablet, R-0Normal      Medical Decision Making: high complexity  Return in about 3 months

## 2025-03-25 ENCOUNTER — OFFICE VISIT (OUTPATIENT)
Facility: CLINIC | Age: 85
End: 2025-03-25
Payer: MEDICARE

## 2025-03-25 ENCOUNTER — HOSPITAL ENCOUNTER (OUTPATIENT)
Facility: HOSPITAL | Age: 85
Discharge: HOME | End: 2025-03-28
Payer: MEDICARE

## 2025-03-25 ENCOUNTER — RESULTS FOLLOW-UP (OUTPATIENT)
Facility: CLINIC | Age: 85
End: 2025-03-25

## 2025-03-25 VITALS
OXYGEN SATURATION: 97 % | BODY MASS INDEX: 23.64 KG/M2 | RESPIRATION RATE: 19 BRPM | HEIGHT: 68 IN | HEART RATE: 73 BPM | SYSTOLIC BLOOD PRESSURE: 137 MMHG | DIASTOLIC BLOOD PRESSURE: 82 MMHG | WEIGHT: 156 LBS

## 2025-03-25 DIAGNOSIS — R05.2 SUBACUTE COUGH: ICD-10-CM

## 2025-03-25 DIAGNOSIS — R05.2 SUBACUTE COUGH: Primary | ICD-10-CM

## 2025-03-25 PROCEDURE — 99213 OFFICE O/P EST LOW 20 MIN: CPT

## 2025-03-25 PROCEDURE — 71046 X-RAY EXAM CHEST 2 VIEWS: CPT

## 2025-03-25 PROCEDURE — 3075F SYST BP GE 130 - 139MM HG: CPT

## 2025-03-25 PROCEDURE — 1036F TOBACCO NON-USER: CPT

## 2025-03-25 PROCEDURE — 1111F DSCHRG MED/CURRENT MED MERGE: CPT

## 2025-03-25 PROCEDURE — 1159F MED LIST DOCD IN RCRD: CPT

## 2025-03-25 PROCEDURE — G8427 DOCREV CUR MEDS BY ELIG CLIN: HCPCS

## 2025-03-25 PROCEDURE — 1160F RVW MEDS BY RX/DR IN RCRD: CPT

## 2025-03-25 PROCEDURE — 3079F DIAST BP 80-89 MM HG: CPT

## 2025-03-25 PROCEDURE — 1123F ACP DISCUSS/DSCN MKR DOCD: CPT

## 2025-03-25 PROCEDURE — G8420 CALC BMI NORM PARAMETERS: HCPCS

## 2025-03-25 RX ORDER — HYDROCODONE POLISTIREX AND CHLORPHENIRAMINE POLISTIREX 10; 8 MG/5ML; MG/5ML
5 SUSPENSION, EXTENDED RELEASE ORAL EVERY 12 HOURS PRN
Qty: 115 ML | Refills: 0 | Status: SHIPPED | OUTPATIENT
Start: 2025-03-25 | End: 2025-04-06

## 2025-03-25 NOTE — PROGRESS NOTES
Bo Greenberg is a 85 y.o. male , id x 2(name and ). Reviewed record, history, and  medications.    Chief Complaint   Patient presents with    Cough     Was starting to feel fine then Friday started to cough up more mucus. Still using oxygen, albuterol, and cough medication        Health Maintenance Due   Topic    Diabetic Alb to Cr ratio (uACR) test     COVID-19 Vaccine ( season)       Wt Readings from Last 1 Encounters:   25 70.8 kg (156 lb)     BP Readings from Last 3 Encounters:   25 137/82   25 (!) 142/83   03/10/25 125/61     Pulse Readings from Last 1 Encounters:   25 73         Patient is currently accompanied by spouse & I have received verbal consent from Bo Greenberg to discuss any/all medical information while he/she is present in the room.    Home BP cuff present today:  no    Home medication list or bottles present today: no  - All medications were reviewed and updated with the patient today in office.    Forms for completion: no    Chest pain/ Shortness of breath: yes - SOB from cough    Surgery within the next month:  no      Depression Screening:  :     Depression: Not at risk (2025)    PHQ-2     PHQ-2 Score: 0          Coordination of Care Questionnaire:  :     \"Have you been to the ER, urgent care clinic since your last visit?  Hospitalized since your last visit?\"    NO    “Have you seen or consulted any other health care providers outside of [facility] since your last visit?”    NO  Click Here for Release of Records Request      --Rosamaria Goss CMA       ------------------------------------------------

## 2025-03-25 NOTE — PROGRESS NOTES
Bo Greenberg (:  1940) is a 85 y.o. male,Established patient, here for evaluation of the following chief complaint(s):  Cough (Was starting to feel fine then Friday started to cough up more mucus. Still using oxygen, albuterol, and cough medication. Has ran out of anabiotics.)         Assessment & Plan  Subacute cough   He is now s/p hospitalization for the Flu, followed by steroids and Tessalon, followed by Augmentin and Zithromax for presumed secondary PNA. At this point, it is likely that he has some bronchitis which could last quite a while. Will order CXR to be safe, and will treat with Tussionex given the severity of his sx. He is advised that he must STOP Percocet use while on the Tussionex, which he is agreeable to.    Orders:    XR CHEST (2 VIEWS); Future    HYDROcodone-chlorpheniramine (TUSSIONEX) 10-8 MG/5ML SUER; Take 5 mLs by mouth every 12 hours as needed (severe cough) for up to 12 days. Max Daily Amount: 10 mLs      Return if symptoms worsen or fail to improve.       Subjective   · Cough      Was starting to feel fine then Friday started to cough up more mucus. Still using oxygen, albuterol, and cough medication        Review of Systems   All other systems reviewed and are negative.         Objective   Physical Exam  Constitutional:       General: He is not in acute distress.     Appearance: Normal appearance. He is not ill-appearing.   HENT:      Head: Normocephalic and atraumatic.      Right Ear: External ear normal.      Left Ear: External ear normal.      Nose: Nose normal.   Eyes:      General: No scleral icterus.        Right eye: No discharge.         Left eye: No discharge.      Extraocular Movements: Extraocular movements intact.      Conjunctiva/sclera: Conjunctivae normal.   Cardiovascular:      Rate and Rhythm: Normal rate and regular rhythm.      Pulses: Normal pulses.      Heart sounds: Normal heart sounds. No murmur heard.     No friction rub. No gallop.   Pulmonary:

## 2025-04-08 PROBLEM — J10.1 INFLUENZA A: Status: RESOLVED | Noted: 2025-03-09 | Resolved: 2025-04-08

## 2025-04-22 ENCOUNTER — PATIENT MESSAGE (OUTPATIENT)
Facility: CLINIC | Age: 85
End: 2025-04-22

## 2025-04-28 ENCOUNTER — CLINICAL DOCUMENTATION (OUTPATIENT)
Facility: CLINIC | Age: 85
End: 2025-04-28

## 2025-04-28 NOTE — PROGRESS NOTES
Arjun You. [facility]. Requested Medical Records from [facility] @ [phone].

## 2025-05-01 ENCOUNTER — CLINICAL DOCUMENTATION (OUTPATIENT)
Facility: CLINIC | Age: 85
End: 2025-05-01

## 2025-05-01 NOTE — TELEPHONE ENCOUNTER
Left VM @ [phone], request refaxed to [employer] @ [phone].  Confusion regarding the original request, they were not sure who was going to be receiving the records. If any questions patient can call [phone] [employer] to check the status of request.or Call our office at [phone] and we will try to help.

## 2025-05-01 NOTE — PROGRESS NOTES
Resent (faxed) the patient's request for records to be sent to the [facility] . Faxed to [employer] @ [phone]. TC to [employer] [phone] spoke to Myra , states the the original request had been cancelled, confusion on who the records were going to . The additional information was faxed with a copy of the original request. TC to patient's spouse / Kate @ [phone] , left VM / no ones name attached to the #, so just left basic information with my name & #, no patient information. If any questions patient can call [employer] @ [phone]

## 2025-05-16 RX ORDER — OMEPRAZOLE 40 MG/1
40 CAPSULE, DELAYED RELEASE ORAL DAILY
Qty: 90 CAPSULE | Refills: 3 | Status: SHIPPED | OUTPATIENT
Start: 2025-05-16

## 2025-06-13 ENCOUNTER — CLINICAL DOCUMENTATION (OUTPATIENT)
Facility: CLINIC | Age: 85
End: 2025-06-13

## 2025-06-13 NOTE — PROGRESS NOTES
Jairo Mcgowna DO. [facility]. Requested Medical Records from [facility] @ [phone].

## 2025-08-14 RX ORDER — SILDENAFIL 100 MG/1
TABLET, FILM COATED ORAL
Qty: 18 TABLET | Refills: 5 | OUTPATIENT
Start: 2025-08-14

## (undated) DEVICE — DRESSING ANTIMIC FOAM OPTIFOAM POSTOP ADH 4 X 6 IN

## (undated) DEVICE — GOWN,SIRUS,NONRNF,SETINSLV,2XL,18/CS: Brand: MEDLINE

## (undated) DEVICE — COVER,TABLE,60X90,STERILE: Brand: MEDLINE

## (undated) DEVICE — CANISTER, RIGID, 3000CC: Brand: MEDLINE INDUSTRIES, INC.

## (undated) DEVICE — SYR 10ML LUER LOK 1/5ML GRAD --

## (undated) DEVICE — NEEDLE HYPO 22GA L1.5IN BLK POLYPR HUB S STL REG BVL STR

## (undated) DEVICE — CUSTOM KT PTCA INFL DEV K05 00053H

## (undated) DEVICE — TOOL MR8-14BA50 MR8 14CM BALL 5MM: Brand: MIDAS REX MR8

## (undated) DEVICE — JACKSON TABLE POSITIONER KIT: Brand: MEDLINE INDUSTRIES, INC.

## (undated) DEVICE — TRAP,MUCUS SPECIMEN, 80CC: Brand: MEDLINE

## (undated) DEVICE — Device: Brand: ASAHI GLADIUS MONGO14

## (undated) DEVICE — DRESSING HYDROCOLLOID BORDER 35X10 IN ALUM PRIMASEAL

## (undated) DEVICE — SPECIAL PROCEDURE DRAPE 32" X 34": Brand: SPECIAL PROCEDURE DRAPE

## (undated) DEVICE — SUTURE MONOCRYL + SZ 3-0 L27IN ABSRB UD PS1 L24MM 3/8 CIR REV MCP936H

## (undated) DEVICE — ROSEN CURVED WIRE GUIDE: Brand: ROSEN

## (undated) DEVICE — MAGNETIC INSTR DRAPE 20X16: Brand: MEDLINE INDUSTRIES, INC.

## (undated) DEVICE — HI-TORQUE PILOT 200 GUIDE WIRE .014 STRAIGHT TIP 3.0 CM X 190 CM: Brand: HI-TORQUE PILOT

## (undated) DEVICE — TOBRA BONE BASKET- AUTOGRAFT BONE COLLECTION SYSTEM WITH MESH FILTER AND GRAFT COMPRESSOR: Brand: TOBRA BONE BASKET

## (undated) DEVICE — BONE WAX WHITE: Brand: BONE WAX WHITE

## (undated) DEVICE — 3M™ TEGADERM™ TRANSPARENT FILM DRESSING FRAME STYLE, 1626, 4 IN X 4-3/4 IN (10 CM X 12 CM), 50/CT 4CT/CASE: Brand: 3M™ TEGADERM™

## (undated) DEVICE — STRIP,CLOSURE,WOUND,MEDI-STRIP,1/2X4: Brand: MEDLINE

## (undated) DEVICE — Device: Brand: EAGLE EYE PLATINUM RX DIGITAL IVUS CATHETER

## (undated) DEVICE — SPONGE: SPECIALTY PEANUT XR 100/CS: Brand: MEDICAL ACTION INDUSTRIES

## (undated) DEVICE — STERILE POLYISOPRENE POWDER-FREE SURGICAL GLOVES: Brand: PROTEXIS

## (undated) DEVICE — SUTURE STRATAFIX SPRL MCRYL + SZ 3-0 L24IN ABSRB UD PS-2 SXMP1B108

## (undated) DEVICE — SKIN PREP TRAY 4 COMPARTM TRAY: Brand: MEDLINE INDUSTRIES, INC.

## (undated) DEVICE — INTENDED FOR TISSUE SEPARATION, AND OTHER PROCEDURES THAT REQUIRE A SHARP SURGICAL BLADE TO PUNCTURE OR CUT.: Brand: BARD-PARKER ® CARBON RIB-BACK BLADES

## (undated) DEVICE — PACK PROCEDURE SURG HRT CATH

## (undated) DEVICE — Device: Brand: MIRACLEBROS 6

## (undated) DEVICE — CATH MCR SUPR CRSS 150CM 90DEG --

## (undated) DEVICE — Device: Brand: JELCO

## (undated) DEVICE — 1LYRTR 16FR10ML100%SIL UMS SNP: Brand: MEDLINE INDUSTRIES, INC.

## (undated) DEVICE — STERILE POLYISOPRENE POWDER-FREE SURGICAL GLOVES WITH EMOLLIENT COATING: Brand: PROTEXIS

## (undated) DEVICE — GLOVE SURG SZ 7 L12IN FNGR THK79MIL GRN LTX FREE

## (undated) DEVICE — CATHETER GUID TURNPIKE 135CM DIA2.9X2.9X1.6FR 0.014IN

## (undated) DEVICE — CATH BLLN DIL 2.5 X15MM RX -- EUPHORA

## (undated) DEVICE — SOL IRR SOD CL 0.9% 1000ML BTL --

## (undated) DEVICE — SUTURE PERMAHAND SZ 3-0 L30IN NONABSORBABLE BLK SILK BRAID A304H

## (undated) DEVICE — KIT MFLD ISOLATN NACL CNTRST PRT TBNG SPIK W/ PRSS TRNSDUC

## (undated) DEVICE — SEALANT HEMOSTAT W/THROM 8ML -- SURGIFLO MATRIX

## (undated) DEVICE — CATH DIAG D 5F 155 MULTIPK X3 -- IMPULSE 16391-302

## (undated) DEVICE — MARKER,SKIN,WI/RULER AND LABELS: Brand: MEDLINE

## (undated) DEVICE — PINNACLE PRECISION ACCESS SYSTEM INTRODUCER SHEATH: Brand: PINNACLE PRECISION ACCESS SYSTEM

## (undated) DEVICE — PROCEDURE KIT FLUID MGMT CUST MAINFOLD STRL

## (undated) DEVICE — LAMINECTOMY RICHMOND-LF: Brand: MEDLINE INDUSTRIES, INC.

## (undated) DEVICE — DRSG AQUACEL SURG 3.5X6IN -- CONVERT TO ITEM 369227

## (undated) DEVICE — TOOL MR8-31TD3030 MR8 TWST DRIL 3MMX30MM: Brand: MIDAS REX

## (undated) DEVICE — SUTURE PERMAHAND SZ 2-0 L30IN NONABSORBABLE BLK SILK W/O A305H

## (undated) DEVICE — CATH BLLN DIL 2.0X12MM RX -- EUPHORA

## (undated) DEVICE — SPONGE GZ W4XL4IN COT 12 PLY TYP VII WVN C FLD DSGN

## (undated) DEVICE — COPILOT BLEEDBACK CONTROL VALVE: Brand: COPILOT

## (undated) DEVICE — PREP SKN CHLRAPRP APL 26ML STR --

## (undated) DEVICE — CONNECTOR GX0617052 55/60 SL-TL DOM TI S
Type: IMPLANTABLE DEVICE | Site: BACK | Status: NON-FUNCTIONAL
Brand: CD HORIZON® SPINAL SYSTEM
Removed: 2025-01-21

## (undated) DEVICE — KIT HND CTRL 3 W STPCOCK ROT END 54IN PREM HI PRSS TBNG AT

## (undated) DEVICE — 450 ML BOTTLE OF 0.05% CHLORHEXIDINE GLUCONATE IN 99.95% STERILE WATER FOR IRRIGATION, USP AND APPLICATOR.: Brand: IRRISEPT ANTIMICROBIAL WOUND LAVAGE

## (undated) DEVICE — SUTURE VICRYL + SZ 3-0 L27IN ABSRB UD L26MM SH 1/2 CIR VCP416H

## (undated) DEVICE — CLEANER ES TIP W2XL2IN ADH BK RADPQ FOR S STL ELECTRD

## (undated) DEVICE — Device: Brand: ASAHI SION BLUE

## (undated) DEVICE — PROBE BALL TIP STIMULATING 25

## (undated) DEVICE — ELECTRODE BLDE L4IN NONINSULATED EDGE

## (undated) DEVICE — KIT ANGIOGRAPHY CUST MRMC

## (undated) DEVICE — Device: Brand: FIELDER XT

## (undated) DEVICE — INFECTION CONTROL KIT SYS

## (undated) DEVICE — MARKER SURG PASS SPHR NDI

## (undated) DEVICE — PICO 7 10CM X 40CM: Brand: PICO™ 7

## (undated) DEVICE — SPONGE,PEANUT,XRAY,ST,SM,3/8",5/CARD: Brand: MEDLINE INDUSTRIES, INC.

## (undated) DEVICE — TUBE SET SONICONE SHARPVAC DISP (MIN ORDER 5)

## (undated) DEVICE — SUPPORT WRST AD W3.5XL9IN DIA14.5IN ART SFT ADJ HK AND LOOP

## (undated) DEVICE — LAMINECTOMY-SFMC: Brand: MEDLINE INDUSTRIES, INC.

## (undated) DEVICE — NEEDLE,22GX1.5",REG,BEVEL: Brand: MEDLINE

## (undated) DEVICE — TIP CLEANER: Brand: VALLEYLAB

## (undated) DEVICE — TR BAND RADIAL ARTERY COMPRESSION DEVICE: Brand: TR BAND

## (undated) DEVICE — TOWEL,OR,DSP,ST,BLUE,STD,4/PK,20PK/CS: Brand: MEDLINE

## (undated) DEVICE — GLOVE ORANGE PI 7 1/2   MSG9075

## (undated) DEVICE — DRAPE,REIN 53X77,STERILE: Brand: MEDLINE

## (undated) DEVICE — GLIDESHEATH SLENDER ACCESS KIT: Brand: GLIDESHEATH SLENDER

## (undated) DEVICE — DRAPE,UTILITY,TAPE,15X26,STERILE: Brand: MEDLINE

## (undated) DEVICE — PICO SINGLE USE NEGATIVE PRESSURE WOUND THERAPY SYSTEM 15CM X 20CM 6IN. X 8IN.: Brand: PICO

## (undated) DEVICE — YANKAUER,POOLE TIP,STERILE,50/CS: Brand: MEDLINE

## (undated) DEVICE — MARKER SKIN PREP-RESISTANT ST: Brand: MEDLINE INDUSTRIES, INC.

## (undated) DEVICE — ANGIOGRAPHY KIT

## (undated) DEVICE — GLOVE SURG SZ 85 L12IN THK75MIL DK GRN LTX FREE

## (undated) DEVICE — SOLUTION IRRIG 1000ML 0.9% SOD CHL USP POUR PLAS BTL

## (undated) DEVICE — AEGIS 1" DISK 4MM HOLE, PEEL OPEN: Brand: MEDLINE

## (undated) DEVICE — ALCOHOL RUBBING ISO 16OZ 70%

## (undated) DEVICE — DERMABOND SKIN ADH 0.7ML -- DERMABOND ADVANCED 12/BX

## (undated) DEVICE — BLANKET WRM W35.4XL86.6IN FULL UNDERBODY + FORC AIR

## (undated) DEVICE — BONE MARROW KIT ASPIR 11 GA

## (undated) DEVICE — DRAPE PRB US TRNSDCR 6X96IN --

## (undated) DEVICE — TAPE,ELASTIC,FOAM,CURAD,3"X5.5YD,LF: Brand: CURAD

## (undated) DEVICE — NDL PRT INJ NSAF BLNT 18GX1.5 --

## (undated) DEVICE — CODMAN® SURGICAL PATTIES 3/4" X 3/4" (1.91CM X 1.91CM): Brand: CODMAN®

## (undated) DEVICE — SOLUTION IRRIG 1000ML H2O STRL BLT

## (undated) DEVICE — STERILE-Z SURGICAL PATIENT COVERS CLEAR POLYETHYLENE STERILE UNIVERSAL FIT 10 PER CASE: Brand: STERILE-Z

## (undated) DEVICE — CATH GUID COR EB35 7FR 100CM -- LAUNCHER

## (undated) DEVICE — TRAY PREP DRY W/ PREM GLV 2 APPL 6 SPNG 2 UNDPD 1 OVERWRAP

## (undated) DEVICE — SYR POWER 150ML 8IN FILL TUBE --

## (undated) DEVICE — HYPODERMIC SAFETY NEEDLE: Brand: MAGELLAN

## (undated) DEVICE — SPHERE STEALTH 12PK/TY --

## (undated) DEVICE — SPLINT WR POS F/ARTERIAL ACC -- BX/10

## (undated) DEVICE — KIT ARMOR C DRP COLLAPSIBLE AND SELF EXP TOP CVR FOR FLUOROSCOPIC

## (undated) DEVICE — DRAPE,LAP,CHOLE,W/TROUGHS,STERILE: Brand: MEDLINE

## (undated) DEVICE — CARTRIDGE: Brand: PREP PLUS

## (undated) DEVICE — SUTURE STRATAFIX SYMMETRIC SZ 1 L18IN ABSRB VLT CT1 L36CM SXPP1A404

## (undated) DEVICE — APPLIER CLP M/L SHFT DIA5MM 15 LIG LIGAMAX 5

## (undated) DEVICE — COVER LT HNDL PLAS RIG 1 PER PK

## (undated) DEVICE — SUTURE VCRL SZ 2-0 L36IN ABSRB UD L36MM CT-1 1/2 CIR J945H

## (undated) DEVICE — APPLICATOR BNDG 1MM ADH PREMIERPRO EXOFIN

## (undated) DEVICE — Device: Brand: PADPRO

## (undated) DEVICE — SUTURE MONOCRYL ABSORBABLE 3-0 PS-1 18 IN UD MONOCRYL + STRATAFIX SXMP1B102

## (undated) DEVICE — CATH TRAPPER EXCHANGE --

## (undated) DEVICE — SPONGE GZ W4XL4IN COT 12 PLY TYP VII WVN C FLD DSGN STERILE

## (undated) DEVICE — YANKAUER,OPEN TIP,W/O VENT,STERILE: Brand: MEDLINE INDUSTRIES, INC.

## (undated) DEVICE — COVER,MAYO STAND,STERILE: Brand: MEDLINE

## (undated) DEVICE — TRAY,URINE METER,100% SILICONE,16FR10ML: Brand: MEDLINE

## (undated) DEVICE — VALVE ANGIO ID0.11IN HEMSTAT MTL GUID WIRE INSRT TOOL AND

## (undated) DEVICE — SYR MED COLOR CODED 3ML RED -- MEDALLION

## (undated) DEVICE — TOOL 14MH30 LEGEND 14CM 3MM: Brand: MIDAS REX ™

## (undated) DEVICE — CONTAINER,SPECIMEN,3OZ,OR STRL: Brand: MEDLINE

## (undated) DEVICE — SYR LR LCK 1ML GRAD NSAF 30ML --

## (undated) DEVICE — POWDER HEMOSTAT GEL 3.0GR -- SURGICEL

## (undated) DEVICE — Device: Brand: ASAHI GAIA SECOND

## (undated) DEVICE — SYRINGE ANGIO 10ML RED FLAT GRP FIX M LUER CONN MEDALLION

## (undated) DEVICE — 1010 S-DRAPE TOWEL DRAPE 10/BX: Brand: STERI-DRAPE™

## (undated) DEVICE — AGENT HEMOSTATIC SURGIFLOW MATRIX KIT W/THROMBIN

## (undated) DEVICE — GLIDESHEATH SLENDER STAINLESS STEEL KIT: Brand: GLIDESHEATH SLENDER

## (undated) DEVICE — AGENT HEMSTAT 3GM OXIDIZED REGENERATED CELOS ABSRB FOR CONT (ORDER MULTIPLES OF 5EA)

## (undated) DEVICE — DISSECTOR LAP DIA5MM BLNT TIP ENDOPATH

## (undated) DEVICE — Z DUPLICATE USE 2275497 DRSG POSTOP PRMSL AG 3.5X6IN

## (undated) DEVICE — Device: Brand: ASAHI GAIA THIRD

## (undated) DEVICE — SUTURE VCRL SZ 1 L36IN ABSRB UD L36MM CT-1 1/2 CIR J947H

## (undated) DEVICE — RUNTHROUGH NS EXTRA FLOPPY PTCA GUIDEWIRE: Brand: RUNTHROUGH

## (undated) DEVICE — KIT EVAC 0.13IN RECT TB DIA10FR 400CC PVC 3 SPR Y CONN DRN

## (undated) DEVICE — FLOSEAL HEMOSTATIC MATRIX, 10 ML: Brand: FLOSEAL

## (undated) DEVICE — TOWEL SURG W17XL27IN STD BLU COT NONFENESTRATED PREWASHED

## (undated) DEVICE — SOLUTION IRRIG 1000ML STRL H2O USP PLAS POUR BTL

## (undated) DEVICE — GLOVE ORTHO 8   MSG9480

## (undated) DEVICE — SUTURE VCRL + SZ 1-0 L36IN ABSRB UD CTX 1/2 CIR TAPR PNT VCP977H

## (undated) DEVICE — C-ARM: Brand: UNBRANDED

## (undated) DEVICE — ELECTRODE PT RET AD L9FT HI MOIST COND ADH HYDRGEL CORDED

## (undated) DEVICE — GUIDEWIRE VASC L145CM 0.035IN J TIP L3MM PTFE FIX COR NAMIC

## (undated) DEVICE — SUTURE VICRYL SZ 2-0 L27IN ABSRB UD L26MM CT-2 1/2 CIR J269H

## (undated) DEVICE — PIN, 9733235, 100MM, STERILE, PERC REF

## (undated) DEVICE — Device

## (undated) DEVICE — SEALER LAP L37CM MARYLAND JAW OPN NANO COAT MULTIFUNCTIONAL

## (undated) DEVICE — CORD ELECSURG BPLR 12 FT DISP [810T818750] [ADLER INSTRUMENT CO]

## (undated) DEVICE — MINI TREK CORONARY DILATATION CATHETER 2.0 MM X 15 MM / RAPID-EXCHANGE: Brand: MINI TREK

## (undated) DEVICE — PACKING 8004000 NEURAY 200PK 13X13MM: Brand: NEURAY ®

## (undated) DEVICE — SUTURE VICRYL + SZ 2-0 L27IN ABSRB CLR CT-1 1/2 CIR TAPERCUT VCP259H

## (undated) DEVICE — GLOVE SURG SZ 65 THK91MIL LTX FREE SYN POLYISOPRENE

## (undated) DEVICE — (D)SOL MEDC ALC ISO 70% 16OZ -- CONVERT TO ITEM 364515

## (undated) DEVICE — STRAP,POSITIONING,KNEE/BODY,FOAM,4X60": Brand: MEDLINE

## (undated) DEVICE — DRAPE XR C ARM 41X74IN LF --

## (undated) DEVICE — DRAIN SPONGES,6 PLY: Brand: EXCILON

## (undated) DEVICE — TOOL MR8-14MH30 MR8 14CM MATCH 3MM: Brand: MIDAS REX MR8

## (undated) DEVICE — (D)PREP SKN CHLRAPRP APPL 26ML -- CONVERT TO ITEM 371833

## (undated) DEVICE — TRAP SUC MUCOUS 70ML -- MEDICHOICE MEDLINE

## (undated) DEVICE — SOL IRR STRL H2O 1000ML BTL --

## (undated) DEVICE — CUSTOM KT PTCA INFL DEV K05 00052M

## (undated) DEVICE — PRESSURE GUIDEWIRE: Brand: COMET

## (undated) DEVICE — SOLUTION IV 1000ML 0.9% SOD CHL

## (undated) DEVICE — TOTAL TRAY, 16FR 10ML SIL FOLEY, URN: Brand: MEDLINE

## (undated) DEVICE — LIQUIBAND RAPID ADHESIVE 36/CS 0.8ML: Brand: MEDLINE

## (undated) DEVICE — PICO 7 10CM X 30CM: Brand: PICO™ 7